# Patient Record
Sex: FEMALE | Race: WHITE | Employment: OTHER | URBAN - METROPOLITAN AREA
[De-identification: names, ages, dates, MRNs, and addresses within clinical notes are randomized per-mention and may not be internally consistent; named-entity substitution may affect disease eponyms.]

---

## 2017-04-26 ENCOUNTER — OFFICE VISIT (OUTPATIENT)
Dept: FAMILY MEDICINE CLINIC | Age: 27
End: 2017-04-26
Payer: MEDICAID

## 2017-04-26 VITALS
BODY MASS INDEX: 48.82 KG/M2 | HEIGHT: 65 IN | WEIGHT: 293 LBS | DIASTOLIC BLOOD PRESSURE: 76 MMHG | SYSTOLIC BLOOD PRESSURE: 132 MMHG | TEMPERATURE: 97.6 F | HEART RATE: 88 BPM

## 2017-04-26 DIAGNOSIS — N92.0 SPOTTING: ICD-10-CM

## 2017-04-26 DIAGNOSIS — N64.3 GALACTORRHEA: ICD-10-CM

## 2017-04-26 DIAGNOSIS — R06.00 DYSPNEA, UNSPECIFIED TYPE: Primary | ICD-10-CM

## 2017-04-26 DIAGNOSIS — E66.01 MORBID OBESITY DUE TO EXCESS CALORIES (HCC): ICD-10-CM

## 2017-04-26 LAB — HBA1C MFR BLD: 5.7 %

## 2017-04-26 PROCEDURE — 99213 OFFICE O/P EST LOW 20 MIN: CPT

## 2017-04-26 PROCEDURE — 99213 OFFICE O/P EST LOW 20 MIN: CPT | Performed by: STUDENT IN AN ORGANIZED HEALTH CARE EDUCATION/TRAINING PROGRAM

## 2017-04-26 PROCEDURE — 83036 HEMOGLOBIN GLYCOSYLATED A1C: CPT | Performed by: STUDENT IN AN ORGANIZED HEALTH CARE EDUCATION/TRAINING PROGRAM

## 2017-04-26 PROCEDURE — 81025 URINE PREGNANCY TEST: CPT | Performed by: STUDENT IN AN ORGANIZED HEALTH CARE EDUCATION/TRAINING PROGRAM

## 2017-04-26 RX ORDER — ALBUTEROL SULFATE 90 UG/1
2 AEROSOL, METERED RESPIRATORY (INHALATION) EVERY 6 HOURS PRN
Qty: 1 INHALER | Refills: 1 | Status: SHIPPED | OUTPATIENT
Start: 2017-04-26 | End: 2017-08-22 | Stop reason: SDUPTHER

## 2017-04-26 RX ORDER — PREDNISONE 20 MG/1
20 TABLET ORAL 2 TIMES DAILY
Qty: 10 TABLET | Refills: 0 | Status: SHIPPED | OUTPATIENT
Start: 2017-04-26 | End: 2017-05-01

## 2017-04-26 RX ORDER — DOXYCYCLINE HYCLATE 100 MG
100 TABLET ORAL 2 TIMES DAILY
Qty: 20 TABLET | Refills: 0 | Status: SHIPPED | OUTPATIENT
Start: 2017-04-26 | End: 2017-05-06

## 2017-04-26 ASSESSMENT — ENCOUNTER SYMPTOMS
WHEEZING: 1
CHEST TIGHTNESS: 1
DIARRHEA: 0
ABDOMINAL PAIN: 1
BLOOD IN STOOL: 0
SHORTNESS OF BREATH: 1
COUGH: 1
NAUSEA: 1
SORE THROAT: 0
CONSTIPATION: 0
BACK PAIN: 1
VOMITING: 1
CHOKING: 1
RECTAL PAIN: 0

## 2017-08-10 ENCOUNTER — OFFICE VISIT (OUTPATIENT)
Dept: OBGYN CLINIC | Age: 27
End: 2017-08-10
Payer: MEDICAID

## 2017-08-10 ENCOUNTER — HOSPITAL ENCOUNTER (OUTPATIENT)
Age: 27
Setting detail: SPECIMEN
Discharge: HOME | End: 2017-08-10
Payer: MEDICAID

## 2017-08-10 VITALS
WEIGHT: 288 LBS | BODY MASS INDEX: 47.93 KG/M2 | SYSTOLIC BLOOD PRESSURE: 119 MMHG | DIASTOLIC BLOOD PRESSURE: 81 MMHG | HEART RATE: 77 BPM | RESPIRATION RATE: 16 BRPM

## 2017-08-10 DIAGNOSIS — Z01.419 WOMEN'S ANNUAL ROUTINE GYNECOLOGICAL EXAMINATION: Primary | ICD-10-CM

## 2017-08-10 DIAGNOSIS — Z12.4 PAP SMEAR FOR CERVICAL CANCER SCREENING: ICD-10-CM

## 2017-08-10 PROCEDURE — S0612 ANNUAL GYNECOLOGICAL EXAMINA: HCPCS | Performed by: CLINICAL NURSE SPECIALIST

## 2017-08-11 LAB — CYTOLOGY REPORT: NORMAL

## 2017-08-18 ENCOUNTER — HOSPITAL ENCOUNTER (OUTPATIENT)
Age: 27
Setting detail: SPECIMEN
Discharge: HOME | End: 2017-08-18
Payer: MEDICAID

## 2017-08-18 ENCOUNTER — OFFICE VISIT (OUTPATIENT)
Dept: FAMILY MEDICINE CLINIC | Age: 27
End: 2017-08-18
Payer: MEDICAID

## 2017-08-18 VITALS
HEIGHT: 65 IN | WEIGHT: 293 LBS | DIASTOLIC BLOOD PRESSURE: 76 MMHG | BODY MASS INDEX: 48.82 KG/M2 | SYSTOLIC BLOOD PRESSURE: 118 MMHG | TEMPERATURE: 97.7 F | HEART RATE: 76 BPM

## 2017-08-18 DIAGNOSIS — Z00.00 HEALTH CARE MAINTENANCE: ICD-10-CM

## 2017-08-18 DIAGNOSIS — J45.40 MODERATE PERSISTENT ASTHMA WITHOUT COMPLICATION: ICD-10-CM

## 2017-08-18 DIAGNOSIS — F41.9 ANXIETY: Primary | ICD-10-CM

## 2017-08-18 LAB — HIV AG/AB: NONREACTIVE

## 2017-08-18 PROCEDURE — G0009 ADMIN PNEUMOCOCCAL VACCINE: HCPCS | Performed by: FAMILY MEDICINE

## 2017-08-18 PROCEDURE — 99213 OFFICE O/P EST LOW 20 MIN: CPT | Performed by: FAMILY MEDICINE

## 2017-08-18 PROCEDURE — 90732 PPSV23 VACC 2 YRS+ SUBQ/IM: CPT | Performed by: FAMILY MEDICINE

## 2017-08-18 RX ORDER — BUSPIRONE HYDROCHLORIDE 15 MG/1
15 TABLET ORAL 3 TIMES DAILY
Qty: 30 TABLET | Refills: 1 | Status: ON HOLD | OUTPATIENT
Start: 2017-08-18 | End: 2017-12-11

## 2017-08-18 RX ORDER — FLUTICASONE PROPIONATE 110 UG/1
2 AEROSOL, METERED RESPIRATORY (INHALATION) 2 TIMES DAILY
Qty: 1 INHALER | Refills: 1 | Status: ON HOLD | OUTPATIENT
Start: 2017-08-18 | End: 2017-12-03

## 2017-08-18 RX ORDER — HYDROXYZINE HYDROCHLORIDE 25 MG/1
25 TABLET, FILM COATED ORAL 3 TIMES DAILY PRN
Status: ON HOLD | COMMUNITY
End: 2017-12-11

## 2017-08-18 RX ORDER — HYDROXYZINE HYDROCHLORIDE 25 MG/1
25 TABLET, FILM COATED ORAL 3 TIMES DAILY PRN
Status: CANCELLED | OUTPATIENT
Start: 2017-08-18

## 2017-08-18 ASSESSMENT — ENCOUNTER SYMPTOMS
ABDOMINAL PAIN: 0
SHORTNESS OF BREATH: 1
VOMITING: 0
WHEEZING: 0
COUGH: 0
NAUSEA: 0

## 2017-08-22 ENCOUNTER — TELEPHONE (OUTPATIENT)
Dept: ADMINISTRATIVE | Age: 27
End: 2017-08-22

## 2017-08-22 DIAGNOSIS — R06.00 DYSPNEA, UNSPECIFIED TYPE: ICD-10-CM

## 2017-08-22 RX ORDER — MONTELUKAST SODIUM 10 MG/1
10 TABLET ORAL NIGHTLY
Qty: 30 TABLET | Refills: 12 | Status: CANCELLED | OUTPATIENT
Start: 2017-08-22

## 2017-08-22 RX ORDER — CETIRIZINE HYDROCHLORIDE 10 MG/1
10 TABLET ORAL DAILY
Qty: 30 TABLET | Refills: 0 | Status: ON HOLD | OUTPATIENT
Start: 2017-08-22 | End: 2020-08-13

## 2017-08-22 RX ORDER — FLUOXETINE HYDROCHLORIDE 20 MG/1
CAPSULE ORAL
Qty: 30 CAPSULE | Refills: 11 | Status: CANCELLED | OUTPATIENT
Start: 2017-08-22

## 2017-08-22 RX ORDER — ALBUTEROL SULFATE 90 UG/1
2 AEROSOL, METERED RESPIRATORY (INHALATION) EVERY 6 HOURS PRN
Qty: 1 INHALER | Refills: 1 | Status: CANCELLED | OUTPATIENT
Start: 2017-08-22

## 2017-08-22 RX ORDER — FLUOXETINE HYDROCHLORIDE 20 MG/1
CAPSULE ORAL
Qty: 30 CAPSULE | Refills: 11 | Status: ON HOLD | OUTPATIENT
Start: 2017-08-22 | End: 2017-12-11 | Stop reason: HOSPADM

## 2017-08-22 RX ORDER — CETIRIZINE HYDROCHLORIDE 10 MG/1
10 TABLET ORAL DAILY
Qty: 30 TABLET | Refills: 0 | Status: CANCELLED | OUTPATIENT
Start: 2017-08-22

## 2017-08-22 RX ORDER — MELOXICAM 15 MG/1
TABLET ORAL
Qty: 30 TABLET | Refills: 11 | Status: CANCELLED | OUTPATIENT
Start: 2017-08-22

## 2017-08-22 RX ORDER — FLUTICASONE PROPIONATE 50 MCG
SPRAY, SUSPENSION (ML) NASAL
Qty: 16 G | Refills: 11 | Status: ON HOLD
Start: 2017-08-22 | End: 2020-08-13

## 2017-08-22 RX ORDER — GUAIFENESIN 600 MG/1
TABLET, EXTENDED RELEASE ORAL
Qty: 28 TABLET | Refills: 10 | Status: CANCELLED | OUTPATIENT
Start: 2017-08-22

## 2017-08-22 RX ORDER — GUAIFENESIN 600 MG/1
TABLET, EXTENDED RELEASE ORAL
Qty: 28 TABLET | Refills: 10 | Status: ON HOLD | OUTPATIENT
Start: 2017-08-22 | End: 2017-12-11 | Stop reason: HOSPADM

## 2017-08-22 RX ORDER — MELOXICAM 15 MG/1
TABLET ORAL
Qty: 30 TABLET | Refills: 11 | Status: ON HOLD | OUTPATIENT
Start: 2017-08-22 | End: 2017-12-03

## 2017-08-22 RX ORDER — FLUTICASONE PROPIONATE 50 MCG
SPRAY, SUSPENSION (ML) NASAL
Qty: 16 G | Refills: 11 | Status: CANCELLED | OUTPATIENT
Start: 2017-08-22

## 2017-08-22 RX ORDER — IBUPROFEN 800 MG/1
TABLET ORAL
Qty: 30 TABLET | Refills: 10 | Status: CANCELLED | OUTPATIENT
Start: 2017-08-22

## 2017-08-22 RX ORDER — MONTELUKAST SODIUM 10 MG/1
10 TABLET ORAL NIGHTLY
Qty: 30 TABLET | Refills: 12 | Status: ON HOLD | OUTPATIENT
Start: 2017-08-22 | End: 2020-08-13

## 2017-08-22 RX ORDER — ALBUTEROL SULFATE 90 UG/1
2 AEROSOL, METERED RESPIRATORY (INHALATION) EVERY 6 HOURS PRN
Qty: 1 INHALER | Refills: 1 | Status: ON HOLD | OUTPATIENT
Start: 2017-08-22 | End: 2017-12-03

## 2017-08-22 RX ORDER — IBUPROFEN 800 MG/1
TABLET ORAL
Qty: 30 TABLET | Refills: 10 | Status: ON HOLD | OUTPATIENT
Start: 2017-08-22 | End: 2017-12-03 | Stop reason: HOSPADM

## 2017-08-22 RX ORDER — LORATADINE 10 MG/1
TABLET ORAL
Qty: 30 TABLET | Refills: 11 | Status: ON HOLD | OUTPATIENT
Start: 2017-08-22 | End: 2017-12-11 | Stop reason: HOSPADM

## 2017-11-29 ENCOUNTER — APPOINTMENT (OUTPATIENT)
Dept: ULTRASOUND IMAGING | Age: 27
DRG: 244 | End: 2017-11-29
Payer: MEDICAID

## 2017-11-29 ENCOUNTER — APPOINTMENT (OUTPATIENT)
Dept: GENERAL RADIOLOGY | Age: 27
DRG: 244 | End: 2017-11-29
Payer: MEDICAID

## 2017-11-29 ENCOUNTER — APPOINTMENT (OUTPATIENT)
Dept: NUCLEAR MEDICINE | Age: 27
DRG: 244 | End: 2017-11-29
Payer: MEDICAID

## 2017-11-29 ENCOUNTER — HOSPITAL ENCOUNTER (INPATIENT)
Age: 27
LOS: 3 days | Discharge: HOME | DRG: 244 | End: 2017-12-03
Attending: EMERGENCY MEDICINE | Admitting: INTERNAL MEDICINE
Payer: MEDICAID

## 2017-11-29 DIAGNOSIS — J45.40 MODERATE PERSISTENT ASTHMA WITHOUT COMPLICATION: ICD-10-CM

## 2017-11-29 DIAGNOSIS — R06.00 DYSPNEA, UNSPECIFIED TYPE: ICD-10-CM

## 2017-11-29 DIAGNOSIS — R10.11 RUQ ABDOMINAL PAIN: Primary | ICD-10-CM

## 2017-11-29 DIAGNOSIS — D72.829 LEUKOCYTOSIS, UNSPECIFIED TYPE: ICD-10-CM

## 2017-11-29 LAB
-: ABNORMAL
-: NORMAL
ABSOLUTE EOS #: 0.13 K/UL (ref 0–0.44)
ABSOLUTE IMMATURE GRANULOCYTE: 0.09 K/UL (ref 0–0.3)
ABSOLUTE LYMPH #: 1.79 K/UL (ref 1.1–3.7)
ABSOLUTE MONO #: 1.19 K/UL (ref 0.1–1.2)
ALBUMIN SERPL-MCNC: 3.8 G/DL (ref 3.5–5.2)
ALBUMIN/GLOBULIN RATIO: 1 (ref 1–2.5)
ALP BLD-CCNC: 99 U/L (ref 35–104)
ALT SERPL-CCNC: 22 U/L (ref 5–33)
AMORPHOUS: ABNORMAL
AMORPHOUS: NORMAL
ANION GAP SERPL CALCULATED.3IONS-SCNC: 11 MMOL/L (ref 9–17)
AST SERPL-CCNC: 20 U/L
BACTERIA: ABNORMAL
BACTERIA: NORMAL
BASOPHILS # BLD: 0 % (ref 0–2)
BASOPHILS ABSOLUTE: 0.05 K/UL (ref 0–0.2)
BILIRUB SERPL-MCNC: 0.91 MG/DL (ref 0.3–1.2)
BILIRUBIN DIRECT: 0.2 MG/DL
BILIRUBIN URINE: NEGATIVE
BILIRUBIN URINE: NEGATIVE
BILIRUBIN, INDIRECT: 0.71 MG/DL (ref 0–1)
BUN BLDV-MCNC: 12 MG/DL (ref 6–20)
BUN/CREAT BLD: ABNORMAL (ref 9–20)
CALCIUM SERPL-MCNC: 9 MG/DL (ref 8.6–10.4)
CASTS UA: ABNORMAL /LPF (ref 0–2)
CASTS UA: NORMAL /LPF (ref 0–8)
CHLORIDE BLD-SCNC: 97 MMOL/L (ref 98–107)
CO2: 25 MMOL/L (ref 20–31)
COLOR: YELLOW
COLOR: YELLOW
COMMENT UA: ABNORMAL
CREAT SERPL-MCNC: 0.75 MG/DL (ref 0.5–0.9)
CRYSTALS, UA: ABNORMAL /HPF
CRYSTALS, UA: NORMAL /HPF
DIFFERENTIAL TYPE: ABNORMAL
EKG ATRIAL RATE: 85 BPM
EKG P AXIS: 31 DEGREES
EKG P-R INTERVAL: 142 MS
EKG Q-T INTERVAL: 374 MS
EKG QRS DURATION: 84 MS
EKG QTC CALCULATION (BAZETT): 445 MS
EKG R AXIS: 11 DEGREES
EKG T AXIS: 17 DEGREES
EKG VENTRICULAR RATE: 85 BPM
EOSINOPHILS RELATIVE PERCENT: 1 % (ref 1–4)
EPITHELIAL CELLS UA: ABNORMAL /HPF (ref 0–5)
EPITHELIAL CELLS UA: NORMAL /HPF (ref 0–5)
GFR AFRICAN AMERICAN: >60 ML/MIN
GFR NON-AFRICAN AMERICAN: >60 ML/MIN
GFR SERPL CREATININE-BSD FRML MDRD: ABNORMAL ML/MIN/{1.73_M2}
GFR SERPL CREATININE-BSD FRML MDRD: ABNORMAL ML/MIN/{1.73_M2}
GLOBULIN: NORMAL G/DL (ref 1.5–3.8)
GLUCOSE BLD-MCNC: 102 MG/DL (ref 65–105)
GLUCOSE BLD-MCNC: 121 MG/DL (ref 70–99)
GLUCOSE BLD-MCNC: 131 MG/DL (ref 65–105)
GLUCOSE BLD-MCNC: 166 MG/DL (ref 65–105)
GLUCOSE URINE: NEGATIVE
GLUCOSE URINE: NEGATIVE
HCG QUALITATIVE: NEGATIVE
HCT VFR BLD CALC: 38.2 % (ref 36.3–47.1)
HEMOGLOBIN: 11.9 G/DL (ref 11.9–15.1)
IMMATURE GRANULOCYTES: 1 %
KETONES, URINE: ABNORMAL
KETONES, URINE: NEGATIVE
LEUKOCYTE ESTERASE, URINE: NEGATIVE
LEUKOCYTE ESTERASE, URINE: NEGATIVE
LIPASE: 25 U/L (ref 13–60)
LYMPHOCYTES # BLD: 10 % (ref 24–43)
MCH RBC QN AUTO: 28.4 PG (ref 25.2–33.5)
MCHC RBC AUTO-ENTMCNC: 31.2 G/DL (ref 28.4–34.8)
MCV RBC AUTO: 91.2 FL (ref 82.6–102.9)
MONOCYTES # BLD: 7 % (ref 3–12)
MUCUS: ABNORMAL
MUCUS: NORMAL
NITRITE, URINE: NEGATIVE
NITRITE, URINE: NEGATIVE
OTHER OBSERVATIONS UA: ABNORMAL
OTHER OBSERVATIONS UA: NORMAL
PDW BLD-RTO: 13.2 % (ref 11.8–14.4)
PH UA: 6 (ref 5–8)
PH UA: 6 (ref 5–8)
PLATELET # BLD: 434 K/UL (ref 138–453)
PLATELET ESTIMATE: ABNORMAL
PMV BLD AUTO: 9.6 FL (ref 8.1–13.5)
POTASSIUM SERPL-SCNC: 3.9 MMOL/L (ref 3.7–5.3)
PROTEIN UA: NEGATIVE
PROTEIN UA: NEGATIVE
RBC # BLD: 4.19 M/UL (ref 3.95–5.11)
RBC # BLD: ABNORMAL 10*6/UL
RBC UA: ABNORMAL /HPF (ref 0–2)
RBC UA: NORMAL /HPF (ref 0–4)
RENAL EPITHELIAL, UA: ABNORMAL /HPF
RENAL EPITHELIAL, UA: NORMAL /HPF
SEG NEUTROPHILS: 81 % (ref 36–65)
SEGMENTED NEUTROPHILS ABSOLUTE COUNT: 14.35 K/UL (ref 1.5–8.1)
SODIUM BLD-SCNC: 133 MMOL/L (ref 135–144)
SPECIFIC GRAVITY UA: 1.02 (ref 1–1.03)
SPECIFIC GRAVITY UA: 1.02 (ref 1–1.03)
TOTAL PROTEIN: 7.7 G/DL (ref 6.4–8.3)
TRICHOMONAS: ABNORMAL
TRICHOMONAS: NORMAL
TURBIDITY: ABNORMAL
TURBIDITY: CLEAR
URINE HGB: NEGATIVE
URINE HGB: NEGATIVE
UROBILINOGEN, URINE: NORMAL
UROBILINOGEN, URINE: NORMAL
WBC # BLD: 17.6 K/UL (ref 3.5–11.3)
WBC # BLD: ABNORMAL 10*3/UL
WBC UA: ABNORMAL /HPF (ref 0–5)
WBC UA: NORMAL /HPF (ref 0–5)
YEAST: ABNORMAL
YEAST: NORMAL

## 2017-11-29 PROCEDURE — 78227 HEPATOBIL SYST IMAGE W/DRUG: CPT

## 2017-11-29 PROCEDURE — 96375 TX/PRO/DX INJ NEW DRUG ADDON: CPT

## 2017-11-29 PROCEDURE — 71020 XR CHEST STANDARD TWO VW: CPT

## 2017-11-29 PROCEDURE — 99222 1ST HOSP IP/OBS MODERATE 55: CPT | Performed by: SURGERY

## 2017-11-29 PROCEDURE — G0378 HOSPITAL OBSERVATION PER HR: HCPCS

## 2017-11-29 PROCEDURE — 81001 URINALYSIS AUTO W/SCOPE: CPT

## 2017-11-29 PROCEDURE — 83690 ASSAY OF LIPASE: CPT

## 2017-11-29 PROCEDURE — 96372 THER/PROPH/DIAG INJ SC/IM: CPT

## 2017-11-29 PROCEDURE — 82947 ASSAY GLUCOSE BLOOD QUANT: CPT

## 2017-11-29 PROCEDURE — A9537 TC99M MEBROFENIN: HCPCS | Performed by: SURGERY

## 2017-11-29 PROCEDURE — 84703 CHORIONIC GONADOTROPIN ASSAY: CPT

## 2017-11-29 PROCEDURE — 6360000004 HC RX CONTRAST MEDICATION: Performed by: EMERGENCY MEDICINE

## 2017-11-29 PROCEDURE — 80076 HEPATIC FUNCTION PANEL: CPT

## 2017-11-29 PROCEDURE — 96374 THER/PROPH/DIAG INJ IV PUSH: CPT

## 2017-11-29 PROCEDURE — 6370000000 HC RX 637 (ALT 250 FOR IP): Performed by: EMERGENCY MEDICINE

## 2017-11-29 PROCEDURE — 99285 EMERGENCY DEPT VISIT HI MDM: CPT

## 2017-11-29 PROCEDURE — 93005 ELECTROCARDIOGRAM TRACING: CPT

## 2017-11-29 PROCEDURE — 83036 HEMOGLOBIN GLYCOSYLATED A1C: CPT

## 2017-11-29 PROCEDURE — 87086 URINE CULTURE/COLONY COUNT: CPT

## 2017-11-29 PROCEDURE — 6360000002 HC RX W HCPCS: Performed by: EMERGENCY MEDICINE

## 2017-11-29 PROCEDURE — 76705 ECHO EXAM OF ABDOMEN: CPT

## 2017-11-29 PROCEDURE — 85025 COMPLETE CBC W/AUTO DIFF WBC: CPT

## 2017-11-29 PROCEDURE — 6360000002 HC RX W HCPCS: Performed by: SURGERY

## 2017-11-29 PROCEDURE — 2580000003 HC RX 258: Performed by: EMERGENCY MEDICINE

## 2017-11-29 PROCEDURE — 3430000000 HC RX DIAGNOSTIC RADIOPHARMACEUTICAL: Performed by: SURGERY

## 2017-11-29 PROCEDURE — 94640 AIRWAY INHALATION TREATMENT: CPT

## 2017-11-29 PROCEDURE — 94762 N-INVAS EAR/PLS OXIMTRY CONT: CPT

## 2017-11-29 PROCEDURE — 80048 BASIC METABOLIC PNL TOTAL CA: CPT

## 2017-11-29 RX ORDER — HYDROXYZINE HYDROCHLORIDE 25 MG/1
25 TABLET, FILM COATED ORAL 3 TIMES DAILY PRN
Status: DISCONTINUED | OUTPATIENT
Start: 2017-11-29 | End: 2017-12-03 | Stop reason: HOSPADM

## 2017-11-29 RX ORDER — ONDANSETRON 2 MG/ML
4 INJECTION INTRAMUSCULAR; INTRAVENOUS ONCE
Status: COMPLETED | OUTPATIENT
Start: 2017-11-29 | End: 2017-11-29

## 2017-11-29 RX ORDER — CETIRIZINE HYDROCHLORIDE 10 MG/1
10 TABLET ORAL DAILY
Status: DISCONTINUED | OUTPATIENT
Start: 2017-11-29 | End: 2017-12-03 | Stop reason: HOSPADM

## 2017-11-29 RX ORDER — FLUTICASONE PROPIONATE 50 MCG
1 SPRAY, SUSPENSION (ML) NASAL DAILY
Status: DISCONTINUED | OUTPATIENT
Start: 2017-11-29 | End: 2017-12-03 | Stop reason: HOSPADM

## 2017-11-29 RX ORDER — DICYCLOMINE HYDROCHLORIDE 10 MG/ML
20 INJECTION INTRAMUSCULAR ONCE
Status: COMPLETED | OUTPATIENT
Start: 2017-11-29 | End: 2017-11-29

## 2017-11-29 RX ORDER — 0.9 % SODIUM CHLORIDE 0.9 %
1000 INTRAVENOUS SOLUTION INTRAVENOUS ONCE
Status: COMPLETED | OUTPATIENT
Start: 2017-11-29 | End: 2017-11-29

## 2017-11-29 RX ORDER — ACETAMINOPHEN 325 MG/1
650 TABLET ORAL EVERY 4 HOURS PRN
Status: DISCONTINUED | OUTPATIENT
Start: 2017-11-29 | End: 2017-12-03 | Stop reason: HOSPADM

## 2017-11-29 RX ORDER — SODIUM CHLORIDE 0.9 % (FLUSH) 0.9 %
10 SYRINGE (ML) INJECTION PRN
Status: DISCONTINUED | OUTPATIENT
Start: 2017-11-29 | End: 2017-12-03 | Stop reason: HOSPADM

## 2017-11-29 RX ORDER — GUAIFENESIN 600 MG/1
600 TABLET, EXTENDED RELEASE ORAL 2 TIMES DAILY
Status: DISCONTINUED | OUTPATIENT
Start: 2017-11-29 | End: 2017-12-03 | Stop reason: HOSPADM

## 2017-11-29 RX ORDER — ONDANSETRON 2 MG/ML
4 INJECTION INTRAMUSCULAR; INTRAVENOUS EVERY 6 HOURS PRN
Status: DISCONTINUED | OUTPATIENT
Start: 2017-11-29 | End: 2017-12-01

## 2017-11-29 RX ORDER — FLUOXETINE HYDROCHLORIDE 20 MG/1
20 CAPSULE ORAL DAILY
Status: DISCONTINUED | OUTPATIENT
Start: 2017-11-29 | End: 2017-12-03 | Stop reason: HOSPADM

## 2017-11-29 RX ORDER — ALBUTEROL SULFATE 90 UG/1
2 AEROSOL, METERED RESPIRATORY (INHALATION) EVERY 6 HOURS PRN
Status: DISCONTINUED | OUTPATIENT
Start: 2017-11-29 | End: 2017-12-03 | Stop reason: HOSPADM

## 2017-11-29 RX ORDER — MONTELUKAST SODIUM 10 MG/1
10 TABLET ORAL NIGHTLY
Status: DISCONTINUED | OUTPATIENT
Start: 2017-11-29 | End: 2017-12-03 | Stop reason: HOSPADM

## 2017-11-29 RX ORDER — BUSPIRONE HYDROCHLORIDE 15 MG/1
15 TABLET ORAL 3 TIMES DAILY
Status: DISCONTINUED | OUTPATIENT
Start: 2017-11-29 | End: 2017-12-03 | Stop reason: HOSPADM

## 2017-11-29 RX ORDER — CETIRIZINE HYDROCHLORIDE 10 MG/1
10 TABLET ORAL DAILY
Status: DISCONTINUED | OUTPATIENT
Start: 2017-11-29 | End: 2017-11-29 | Stop reason: SDUPTHER

## 2017-11-29 RX ORDER — SODIUM CHLORIDE 9 MG/ML
INJECTION, SOLUTION INTRAVENOUS CONTINUOUS
Status: DISCONTINUED | OUTPATIENT
Start: 2017-11-29 | End: 2017-12-02

## 2017-11-29 RX ORDER — SODIUM CHLORIDE 0.9 % (FLUSH) 0.9 %
10 SYRINGE (ML) INJECTION EVERY 12 HOURS SCHEDULED
Status: DISCONTINUED | OUTPATIENT
Start: 2017-11-29 | End: 2017-12-03 | Stop reason: HOSPADM

## 2017-11-29 RX ADMIN — FLUOXETINE HYDROCHLORIDE 20 MG: 20 CAPSULE ORAL at 08:28

## 2017-11-29 RX ADMIN — SODIUM CHLORIDE: 9 INJECTION, SOLUTION INTRAVENOUS at 18:19

## 2017-11-29 RX ADMIN — Medication 10 ML: at 21:08

## 2017-11-29 RX ADMIN — HYDROMORPHONE HYDROCHLORIDE 1 MG: 1 INJECTION, SOLUTION INTRAMUSCULAR; INTRAVENOUS; SUBCUTANEOUS at 18:39

## 2017-11-29 RX ADMIN — SODIUM CHLORIDE 1000 ML: 9 INJECTION, SOLUTION INTRAVENOUS at 04:58

## 2017-11-29 RX ADMIN — ONDANSETRON 4 MG: 2 INJECTION INTRAMUSCULAR; INTRAVENOUS at 04:58

## 2017-11-29 RX ADMIN — BUSPIRONE HYDROCHLORIDE 15 MG: 15 TABLET ORAL at 08:28

## 2017-11-29 RX ADMIN — BUSPIRONE HYDROCHLORIDE 15 MG: 15 TABLET ORAL at 21:04

## 2017-11-29 RX ADMIN — BECLOMETHASONE DIPROPIONATE 1 PUFF: 40 AEROSOL, METERED RESPIRATORY (INHALATION) at 20:57

## 2017-11-29 RX ADMIN — Medication 3 MILLICURIE: at 13:00

## 2017-11-29 RX ADMIN — BUSPIRONE HYDROCHLORIDE 15 MG: 15 TABLET ORAL at 18:19

## 2017-11-29 RX ADMIN — BECLOMETHASONE DIPROPIONATE 1 PUFF: 40 AEROSOL, METERED RESPIRATORY (INHALATION) at 08:51

## 2017-11-29 RX ADMIN — CETIRIZINE HYDROCHLORIDE 10 MG: 10 TABLET ORAL at 08:28

## 2017-11-29 RX ADMIN — SODIUM CHLORIDE: 9 INJECTION, SOLUTION INTRAVENOUS at 23:24

## 2017-11-29 RX ADMIN — GUAIFENESIN 600 MG: 600 TABLET, EXTENDED RELEASE ORAL at 21:05

## 2017-11-29 RX ADMIN — IOHEXOL 50 ML: 240 INJECTION, SOLUTION INTRATHECAL; INTRAVASCULAR; INTRAVENOUS; ORAL at 23:51

## 2017-11-29 RX ADMIN — ACETAMINOPHEN 650 MG: 325 TABLET ORAL at 18:55

## 2017-11-29 RX ADMIN — DICYCLOMINE HYDROCHLORIDE 20 MG: 20 INJECTION, SOLUTION INTRAMUSCULAR at 04:57

## 2017-11-29 RX ADMIN — SODIUM CHLORIDE: 9 INJECTION, SOLUTION INTRAVENOUS at 06:06

## 2017-11-29 RX ADMIN — GUAIFENESIN 600 MG: 600 TABLET, EXTENDED RELEASE ORAL at 08:28

## 2017-11-29 RX ADMIN — FLUTICASONE PROPIONATE 1 SPRAY: 50 SPRAY, METERED NASAL at 08:27

## 2017-11-29 ASSESSMENT — PAIN DESCRIPTION - FREQUENCY: FREQUENCY: CONTINUOUS

## 2017-11-29 ASSESSMENT — ENCOUNTER SYMPTOMS
NAUSEA: 1
DIARRHEA: 0
SORE THROAT: 0
VOMITING: 1
EYE PAIN: 0
EYE DISCHARGE: 0
ABDOMINAL PAIN: 1
COUGH: 0
SHORTNESS OF BREATH: 1

## 2017-11-29 ASSESSMENT — PAIN DESCRIPTION - LOCATION: LOCATION: CHEST

## 2017-11-29 ASSESSMENT — PAIN DESCRIPTION - ORIENTATION: ORIENTATION: MID

## 2017-11-29 ASSESSMENT — PAIN DESCRIPTION - ONSET: ONSET: ON-GOING

## 2017-11-29 ASSESSMENT — PAIN SCALES - GENERAL
PAINLEVEL_OUTOF10: 5
PAINLEVEL_OUTOF10: 4
PAINLEVEL_OUTOF10: 10

## 2017-11-29 ASSESSMENT — PAIN DESCRIPTION - DESCRIPTORS: DESCRIPTORS: ACHING

## 2017-11-29 ASSESSMENT — PAIN DESCRIPTION - PAIN TYPE: TYPE: ACUTE PAIN

## 2017-11-29 NOTE — PROGRESS NOTES
Smoking Cessation - topics covered   []  Health Risks  []  Benefits of Quitting   []  Smoking Cessation  []  Patient has no history of tobacco use  [x]  Patient is former smoker. [x]  No need for tobacco cessation education. []  Booklet given  []  Patient verbalizes understanding. []  Patient denies need for tobacco cessation education.   Marie Mora  8:24 AM

## 2017-11-29 NOTE — PROGRESS NOTES
[address] [facility]  CDU / OBSERVATION eNCOUnter  Attending NOte       I performed a history and physical examination of the patient and discussed management with the resident. I reviewed the residents note and agree with the documented findings and plan of care. Any areas of disagreement are noted on the chart. I was personally present for the key portions of any procedures. I have documented in the chart those procedures where I was not present during the key portions. I have reviewed the nurses notes. I agree with the chief complaint, past medical history, past surgical history, allergies, medications, social and family history as documented unless otherwise noted below. The Family history, social history, and ROS are effectively unchanged since admission unless noted elsewhere in the chart. Patient done with Monica scan. Some decrease in ejection fraction showing biliary dyskinesia versus chronic cholecystitis. Patient having difficulty handling oral.  Will notify surgery of result. Patient will need to stay as she is unable to handle oral and she requires further pain control.     Loyda Mojica MD  Attending Emergency  Physician

## 2017-11-29 NOTE — H&P
[address] [facility]  CDU / OBSERVATION eNCOUnter  Resident Note     Pt Name: Araceli Hernandez  MRN: [id number]  [place] 1990  Date of evaluation: 11/29/17  Patient's PCP is :  Ny Hoskins MD    [address] [place] [address]       Chief Complaint   Patient presents with    Chest Pain    Abdominal Pain         HISTORY OF PRESENT ILLNESS    Araceli Hernandez is a 32 y.o. female who presents With acute on chronic right upper quadrant abdominal pain. Patient has a history of gallstones. Patient states his pain started 1 day ago and has been associated with nausea, vomiting. Patient does state right upper quadrant abdominal pain does radiate into the midsternal area. Patient denies shortness of breath, dysuria, hematuria, headaches, vision changes. Patient is complaining of left ear pain. Initial workup showed normal LFTs, normal lipase, contaminated urinalysis, with an elevated WBC of 17.6. Patient was admitted to observation for formal gallbladder ultrasound after a bedside ultrasound demonstrated enlarged gallbladder, thickened wall and large stone. On August 4 patient stating her nausea is under control following receiving antiemetic in ED.     Location/Symptom: Right upper quadrant abdominal pain  Timing/Onset: Recurrent, with a history  Provocation: None  Quality: Stabbing  Radiation: Radiation into the chest  Severity: Moderate  Timing/Duration: Constant since onset    Modifying Factors: None    REVIEW OF SYSTEMS       General ROS - No fevers, No malaise  Ophthalmic ROS - No discharge, No changes in vision  ENT ROS -  No sore throat, No rhinorrhea,Left ear pain   Respiratory ROS - positive shortness of breath, no cough, no  wheezing  Cardiovascular ROS - positive chest pain, no dyspnea on exertion  Gastrointestinal ROS - positive right upper quadrant abdominal pain, positive nausea or vomiting,   Genito-Urinary ROS - No dysuria, trouble voiding, or hematuria  Musculoskeletal ROS - No myalgias, No arthalgias  Neurological ROS - No headache, no dizziness/lightheadedness, No focal weakness, no loss of sensation  Dermatological ROS - No lesions, No rash     (PQRS) Advance directives on face sheet per hospital policy. No change unless specifically mentioned in chart    PAST MEDICAL HISTORY    has a past medical history of Asthma; Back pain; Bipolar 1 disorder ([address] Depression; Dizziness; Fatty liver disease, nonalcoholic; Fatty liver disease, nonalcoholic; GERD (gastroesophageal reflux disease); and Obesity. I have reviewed the past medical history with the patient and it is pertinent to this complaint. History of gallstones, presenting with right upper quadrant abdominal pain. SURGICAL HISTORY      has a past surgical history that includes  section and LEEP. I have reviewed and agree with Surgical History entered    CURRENT MEDICATIONS       hydrOXYzine (ATARAX) tablet 25 mg TID PRN   busPIRone (BUSPAR) tablet 15 mg TID   beclomethasone (QVAR) 40 MCG/ACT inhaler 1 puff BID   cetirizine (ZYRTEC) tablet 10 mg Daily   FLUoxetine (PROZAC) capsule 20 mg Daily   albuterol sulfate  (90 Base) MCG/ACT inhaler 2 puff Q6H PRN   fluticasone (FLONASE) 50 MCG/ACT nasal spray 1 spray Daily   guaiFENesin (MUCINEX) extended release tablet 600 mg BID   metFORMIN (GLUCOPHAGE) tablet 500 mg Daily with breakfast   montelukast (SINGULAIR) tablet 10 mg Nightly   sodium chloride flush 0.9 % injection 10 mL 2 times per day   sodium chloride flush 0.9 % injection 10 mL PRN   acetaminophen (TYLENOL) tablet 650 mg Q4H PRN   magnesium hydroxide (MILK OF MAGNESIA) 400 MG/5ML suspension 30 mL Daily PRN   ondansetron (ZOFRAN) injection 4 mg Q6H PRN   0.9 % sodium chloride infusion Continuous       All medication charted and reviewed. ALLERGIES     is allergic to pcn [penicillins]; amoxicillin; and seasonal.      FAMILY HISTORY     indicated that her mother is alive.  She indicated that her father is . She indicated that her maternal aunt is . She indicated that her paternal cousin is . family history includes Cancer in her maternal aunt and paternal cousin; Diabetes in her mother; Early Death in her father; Heart Disease in her father and mother; High Blood Pressure in her mother. The patient denies any pertinent family history. I have reviewed and agree with the family history entered. I have reviewed the Family History and it is not significant to the case    SOCIAL HISTORY      reports that she has quit smoking. Her smoking use included Cigarettes. She has a 3.50 pack-year smoking history. She has never used smokeless tobacco. She reports that she uses drugs, including Marijuana. I have reviewed and agree with all Social.  There are no concerns for substance abuse/use. PHYSICAL EXAM     INITIAL VITALS:  height is 5' 5\" (1.651 m) and weight is 294 lb (133.4 kg). Her oral temperature is 98.1 °F (36.7 °C). Her blood pressure is 136/82 and her pulse is 79. Her respiration is 16 and oxygen saturation is 100%.       CONSTITUTIONAL: AOx4, no apparent distress, appears stated age, obese    HEAD: normocephalic, atraumatic   EYES: PERRLA, EOMI    ENT: moist mucous membranes, uvula midline   NECK: supple, symmetric   BACK: symmetric   LUNGS: clear to auscultation bilaterally   CARDIOVASCULAR: regular rate and rhythm, no murmurs, rubs or gallops   ABDOMEN: soft, Positive Rutherford's, non-distended with normal active bowel sounds   NEUROLOGIC:  MAEx4, no focal sensory or motor deficits   MUSCULOSKELETAL: no clubbing, cyanosis or edema   SKIN: no rash or wounds       DIFFERENTIAL DIAGNOSIS/MDM:     Abdominal Pain:  DDX: GERD, PUD, pancreatitis, cholecystitis, GB colic, cholangitis, Jsjs-Uyeg-Lkyutl, ACS/ MI, pneumonia, SBO, DKA, AAA, mesenteric ischemia, perforated viscous, acute gastroenteritis, NSAP, pyelonephritis, kidney stone, appendicitis, hernia, D-TICS, testicular torsion, ectopic, ovarian torsion, ovarian cyst, PID, Mittelschmerz, period/ fibroid, UTI, constipation, epididymitis/ orchitis      DIAGNOSTIC RESULTS     EKG: All EKG's are interpreted by the Observation Physician who either signs or Co-signs this chart in the absence of a cardiologist.    EKG Interpretation    EKG Interpretation    Interpreted by me    Rhythm: normal sinus   Rate: normal  Axis: normal  Ectopy: none  Conduction: normal  ST Segments: no acute change  T Waves: no acute change  Q Waves: none    Clinical Impression: no acute changes and normal EKG    RADIOLOGY:   I directly visualized the following  images and reviewed the radiologist interpretations:    Xr Chest Standard (2 Vw)    Result Date: 11/29/2017  EXAMINATION: TWO VIEWS OF THE CHEST 11/29/2017 4:03 am COMPARISON: December 22, 2010 HISTORY: ORDERING SYSTEM PROVIDED HISTORY: CP TECHNOLOGIST PROVIDED HISTORY: Reason for exam:->CP FINDINGS: The mediastinal and cardiac contours are normal. No lobar lung consolidation, pleural effusion or pneumothorax. The bones are stable. No radiographic evidence of acute cardiopulmonary disease. LABS:  I have reviewed and interpreted all available lab results.   Labs Reviewed   CBC WITH AUTO DIFFERENTIAL - Abnormal; Notable for the following:        Result Value    WBC 17.6 (*)     Seg Neutrophils 81 (*)     Lymphocytes 10 (*)     Immature Granulocytes 1 (*)     Segs Absolute 14.35 (*)     All other components within normal limits   BASIC METABOLIC PANEL - Abnormal; Notable for the following:     Glucose 121 (*)     Sodium 133 (*)     Chloride 97 (*)     All other components within normal limits   UA W/REFLEX CULTURE - Abnormal; Notable for the following:     Turbidity UA CLOUDY (*)     Ketones, Urine TRACE (*)     All other components within normal limits   MICROSCOPIC URINALYSIS - Abnormal; Notable for the following:     Bacteria, UA MANY (*)     All other components within normal limits   LIPASE   HEPATIC FUNCTION PANEL               CDU IMPRESSION / PLAN      Rey Farley is a 32 y.o. female who presents with    1. Acute on chronic right upper quadrant abdominal pain, with radiation chest associated with nausea and vomiting. Patient with history of gallstones  -Bedside ultrasound in the ED demonstrated large gallbladder, thickened wall and largest stone.  -Med office for formal call for ultrasound, pending result planned for surgery consultation.  -Patient is afebrile, WBC 17.6  -Normal lipase, normal LFTs, UA appears to be contaminant. -IV fluids, antiemetics    · None  · Further workup and evaluation   · Follow up recommendations     · Continue home meds, pain control   · Monitor vitals, labs, imaging     DISPO: pending consults and clinical improvement     CONSULTS:    None    PROCEDURES:  Not indicated       PATIENT REFERRED TO:    Collin Burch MD            --  Yaima Pineda DO   Emergency Medicine Resident     This dictation was generated by voice recognition computer software. Although all attempts are made to edit the dictation for accuracy, there may be errors in the transcription that are not intended.

## 2017-11-29 NOTE — ED PROVIDER NOTES
Family History   Problem Relation Age of Onset    High Blood Pressure Mother     Diabetes Mother     Heart Disease Mother     Heart Disease Father     Early Death Father     Cancer Maternal Aunt      lung    Cancer Paternal Cousin      brain       Allergies:  Pcn [penicillins]; Amoxicillin; and Seasonal    Home Medications:  Prior to Admission medications    Medication Sig Start Date End Date Taking?  Authorizing Provider   cetirizine (ZYRTEC) 10 MG tablet Take 1 tablet by mouth daily 8/22/17   Rio Gross MD   FLUoxetine (PROZAC) 20 MG capsule TAKE ONE (1) CAPSULE BY MOUTH ONCE DAILY 8/22/17   Sandra Torres MD   albuterol sulfate HFA (PROVENTIL HFA) 108 (90 Base) MCG/ACT inhaler Inhale 2 puffs into the lungs every 6 hours as needed for Wheezing 8/22/17   Rio Gross MD   fluticasone (FLONASE) 50 MCG/ACT nasal spray SPRAY 1 SPRAY IN EACH NOSTRIL ONCE DAILY 8/22/17   Rio Gross MD   guaiFENesin (MUCINEX) 600 MG extended release tablet TAKE 1 TABLET BY MOUTH 2 TIMES DAILY 8/22/17   Rio Gross MD   ibuprofen (ADVIL;MOTRIN) 800 MG tablet TAKE (1) TABLET BY MOUTH EVERY 8 HOURS AS NEEDED FOR PAIN 8/22/17   Rio Gross MD   loratadine (CLARITIN) 10 MG tablet TAKE ONE (1) TABLET BY MOUTH ONCE DAILY 8/22/17   Soila Torres MD   meloxicam (MOBIC) 15 MG tablet TAKE 1 TABLET BY MOUTH ONCE DAILY 8/22/17   Sandra Torres MD   metFORMIN (GLUCOPHAGE) 500 MG tablet TAKE 1 TABLET BY MOUTH DAILY 8/22/17   Soila Torres MD   montelukast (SINGULAIR) 10 MG tablet Take 1 tablet by mouth nightly 8/22/17   Rio Gross MD   hydrOXYzine (ATARAX) 25 MG tablet Take 25 mg by mouth 3 times daily as needed for Itching    Historical Provider, MD   busPIRone (BUSPAR) 15 MG tablet Take 1 tablet by mouth 3 times daily 8/18/17   Rio Gross MD   fluticasone (FLOVENT HFA) 110 MCG/ACT inhaler Inhale 2 puffs into the lungs 2 times daily 8/18/17   Rio Gross MD   triamcinolone (KENALOG) 0.1 % ointment APPLY TO >60 mL/min    GFR African American >60 >60 mL/min    GFR Comment          GFR Staging NOT REPORTED    LIPASE   Result Value Ref Range    Lipase 25 13 - 60 U/L   HEPATIC FUNCTION PANEL   Result Value Ref Range    Alb 3.8 3.5 - 5.2 g/dL    Alkaline Phosphatase 99 35 - 104 U/L    ALT 22 5 - 33 U/L    AST 20 <32 U/L    Total Bilirubin 0.91 0.3 - 1.2 mg/dL    Bilirubin, Direct 0.20 <0.31 mg/dL    Bilirubin, Indirect 0.71 0.00 - 1.00 mg/dL    Total Protein 7.7 6.4 - 8.3 g/dL    Globulin NOT REPORTED 1.5 - 3.8 g/dL    Albumin/Globulin Ratio 1.0 1.0 - 2.5   UA W/REFLEX CULTURE   Result Value Ref Range    Color, UA YELLOW YEL    Turbidity UA CLOUDY (A) CLEAR    Glucose, Ur NEGATIVE NEG    Bilirubin Urine NEGATIVE NEG    Ketones, Urine TRACE (A) NEG    Specific Gravity, UA 1.023 1.005 - 1.030    Urine Hgb NEGATIVE NEG    pH, UA 6.0 5.0 - 8.0    Protein, UA NEGATIVE NEG    Urobilinogen, Urine Normal NORM    Nitrite, Urine NEGATIVE NEG    Leukocyte Esterase, Urine NEGATIVE NEG    Urinalysis Comments Culture ordered based on defined criteria. Microscopic Urinalysis   Result Value Ref Range    -          WBC, UA 5 TO 10 0 - 5 /HPF    RBC, UA 2 TO 5 0 - 2 /HPF    Casts UA 0 TO 2 0 - 2 /LPF    Crystals UA NOT REPORTED NONE /HPF    Epithelial Cells UA 20 TO 50 0 - 5 /HPF    Renal Epithelial, Urine NOT REPORTED 0 /HPF    Bacteria, UA MANY (A) NONE    Mucus, UA NOT REPORTED NONE    Trichomonas, UA NOT REPORTED NONE    Amorphous, UA NOT REPORTED NONE    Other Observations UA NOT REPORTED NREQ    Yeast, UA NOT REPORTED NONE       IMPRESSION: 29-year-old female complaining of abdominal pain and chest pain, paints her abdomen, as progressed to her chest over the last day, associated nausea and vomiting, shortness of breath. Patient appears well, overall benign exam other than diffuse abdominal tenderness without guarding or rebound.   Lower patient's chest without with EKG and chest x-ray, abdominal labs, treat symptoms and

## 2017-11-29 NOTE — ED NOTES
PT arrived to ED via self with CO chest pain and abdominal pain. PT states that she has had several episodes of emesis, in the previous days. PT states that she has had abdominal pain recently. PT states that two days ago the pain \"moved\" from her stomach to her chest.  PT complains of midsternal chest pain. PT is alert and oriented, able to speak in full sentences. PT placed on monitor. Dr. William Gaviria at bedside.        Cathaleen Lesches, RN  11/29/17 [id number]

## 2017-11-29 NOTE — PROGRESS NOTES
Patient seen and examined with Dr. Sivakumar Faust. HIDA scan result reviewed. Due to patient having RLQ pain on exam and unclear source for leukocytosis, will get CT abd/pelvis with IV and PO contrast to rule out appendicitis. Further recs to follow post scan.     Mario Prescott DO, PGY-4  Pager#: [phone]  6:27 PM 11/29/2017

## 2017-11-30 ENCOUNTER — APPOINTMENT (OUTPATIENT)
Dept: CT IMAGING | Age: 27
DRG: 244 | End: 2017-11-30
Payer: MEDICAID

## 2017-11-30 PROBLEM — K80.10 CALCULUS OF GALLBLADDER WITH CHRONIC CHOLECYSTITIS WITHOUT OBSTRUCTION: Status: ACTIVE | Noted: 2017-11-30

## 2017-11-30 PROBLEM — E11.9 TYPE 2 DIABETES MELLITUS WITHOUT COMPLICATION (HCC): Status: ACTIVE | Noted: 2017-11-30

## 2017-11-30 PROBLEM — K57.80 DIVERTICULITIS OF INTESTINE WITH PERFORATION WITHOUT ABSCESS: Status: ACTIVE | Noted: 2017-11-30

## 2017-11-30 LAB
CULTURE: NORMAL
CULTURE: NORMAL
GLUCOSE BLD-MCNC: 76 MG/DL (ref 65–105)
Lab: NORMAL
SPECIMEN DESCRIPTION: NORMAL
STATUS: NORMAL

## 2017-11-30 PROCEDURE — 1200000000 HC SEMI PRIVATE

## 2017-11-30 PROCEDURE — 6360000002 HC RX W HCPCS: Performed by: INTERNAL MEDICINE

## 2017-11-30 PROCEDURE — 6360000002 HC RX W HCPCS: Performed by: EMERGENCY MEDICINE

## 2017-11-30 PROCEDURE — 6360000004 HC RX CONTRAST MEDICATION: Performed by: SURGERY

## 2017-11-30 PROCEDURE — 6360000002 HC RX W HCPCS: Performed by: SURGERY

## 2017-11-30 PROCEDURE — 6370000000 HC RX 637 (ALT 250 FOR IP): Performed by: EMERGENCY MEDICINE

## 2017-11-30 PROCEDURE — 99232 SBSQ HOSP IP/OBS MODERATE 35: CPT | Performed by: SURGERY

## 2017-11-30 PROCEDURE — 2580000003 HC RX 258: Performed by: INTERNAL MEDICINE

## 2017-11-30 PROCEDURE — 2500000003 HC RX 250 WO HCPCS: Performed by: INTERNAL MEDICINE

## 2017-11-30 PROCEDURE — 6360000002 HC RX W HCPCS: Performed by: STUDENT IN AN ORGANIZED HEALTH CARE EDUCATION/TRAINING PROGRAM

## 2017-11-30 PROCEDURE — 94640 AIRWAY INHALATION TREATMENT: CPT

## 2017-11-30 PROCEDURE — 94762 N-INVAS EAR/PLS OXIMTRY CONT: CPT

## 2017-11-30 PROCEDURE — 99223 1ST HOSP IP/OBS HIGH 75: CPT | Performed by: INTERNAL MEDICINE

## 2017-11-30 PROCEDURE — 74177 CT ABD & PELVIS W/CONTRAST: CPT

## 2017-11-30 PROCEDURE — 2500000003 HC RX 250 WO HCPCS: Performed by: STUDENT IN AN ORGANIZED HEALTH CARE EDUCATION/TRAINING PROGRAM

## 2017-11-30 PROCEDURE — 82947 ASSAY GLUCOSE BLOOD QUANT: CPT

## 2017-11-30 RX ORDER — ESOMEPRAZOLE SODIUM 40 MG/5ML
40 INJECTION INTRAVENOUS DAILY
Status: DISCONTINUED | OUTPATIENT
Start: 2017-11-30 | End: 2017-12-01

## 2017-11-30 RX ORDER — 0.9 % SODIUM CHLORIDE 0.9 %
10 VIAL (ML) INJECTION DAILY
Status: DISCONTINUED | OUTPATIENT
Start: 2017-11-30 | End: 2017-12-03 | Stop reason: HOSPADM

## 2017-11-30 RX ORDER — CIPROFLOXACIN 2 MG/ML
400 INJECTION, SOLUTION INTRAVENOUS EVERY 12 HOURS
Status: DISCONTINUED | OUTPATIENT
Start: 2017-11-30 | End: 2017-12-03

## 2017-11-30 RX ADMIN — FLUOXETINE HYDROCHLORIDE 20 MG: 20 CAPSULE ORAL at 09:06

## 2017-11-30 RX ADMIN — HYDROMORPHONE HYDROCHLORIDE 1 MG: 1 INJECTION, SOLUTION INTRAMUSCULAR; INTRAVENOUS; SUBCUTANEOUS at 01:18

## 2017-11-30 RX ADMIN — ESOMEPRAZOLE SODIUM 40 MG: 40 INJECTION, POWDER, LYOPHILIZED, FOR SOLUTION INTRAVENOUS at 15:22

## 2017-11-30 RX ADMIN — ENOXAPARIN SODIUM 30 MG: 30 INJECTION SUBCUTANEOUS at 12:29

## 2017-11-30 RX ADMIN — HYDROMORPHONE HYDROCHLORIDE 1 MG: 1 INJECTION, SOLUTION INTRAMUSCULAR; INTRAVENOUS; SUBCUTANEOUS at 09:09

## 2017-11-30 RX ADMIN — METRONIDAZOLE 500 MG: 500 INJECTION, SOLUTION INTRAVENOUS at 17:51

## 2017-11-30 RX ADMIN — SODIUM CHLORIDE: 9 INJECTION, SOLUTION INTRAVENOUS at 23:45

## 2017-11-30 RX ADMIN — BUSPIRONE HYDROCHLORIDE 15 MG: 15 TABLET ORAL at 09:05

## 2017-11-30 RX ADMIN — CIPROFLOXACIN 400 MG: 2 INJECTION, SOLUTION INTRAVENOUS at 04:19

## 2017-11-30 RX ADMIN — SODIUM CHLORIDE 10 ML: 9 INJECTION, SOLUTION INTRAMUSCULAR; INTRAVENOUS; SUBCUTANEOUS at 15:31

## 2017-11-30 RX ADMIN — BECLOMETHASONE DIPROPIONATE 1 PUFF: 40 AEROSOL, METERED RESPIRATORY (INHALATION) at 19:33

## 2017-11-30 RX ADMIN — FLUTICASONE PROPIONATE 1 SPRAY: 50 SPRAY, METERED NASAL at 09:05

## 2017-11-30 RX ADMIN — BECLOMETHASONE DIPROPIONATE 1 PUFF: 40 AEROSOL, METERED RESPIRATORY (INHALATION) at 10:31

## 2017-11-30 RX ADMIN — HYDROMORPHONE HYDROCHLORIDE 1 MG: 1 INJECTION, SOLUTION INTRAMUSCULAR; INTRAVENOUS; SUBCUTANEOUS at 22:52

## 2017-11-30 RX ADMIN — HYDROMORPHONE HYDROCHLORIDE 1 MG: 1 INJECTION, SOLUTION INTRAMUSCULAR; INTRAVENOUS; SUBCUTANEOUS at 12:32

## 2017-11-30 RX ADMIN — CETIRIZINE HYDROCHLORIDE 10 MG: 10 TABLET ORAL at 09:06

## 2017-11-30 RX ADMIN — ENOXAPARIN SODIUM 30 MG: 30 INJECTION SUBCUTANEOUS at 22:36

## 2017-11-30 RX ADMIN — CIPROFLOXACIN 400 MG: 2 INJECTION, SOLUTION INTRAVENOUS at 16:08

## 2017-11-30 RX ADMIN — METRONIDAZOLE 500 MG: 500 INJECTION, SOLUTION INTRAVENOUS at 05:31

## 2017-11-30 RX ADMIN — IOVERSOL 130 ML: 741 INJECTION INTRA-ARTERIAL; INTRAVENOUS at 02:18

## 2017-11-30 RX ADMIN — ONDANSETRON 4 MG: 2 INJECTION, SOLUTION INTRAMUSCULAR; INTRAVENOUS at 01:27

## 2017-11-30 RX ADMIN — HYDROMORPHONE HYDROCHLORIDE 1 MG: 1 INJECTION, SOLUTION INTRAMUSCULAR; INTRAVENOUS; SUBCUTANEOUS at 04:26

## 2017-11-30 RX ADMIN — METRONIDAZOLE 500 MG: 500 INJECTION, SOLUTION INTRAVENOUS at 10:20

## 2017-11-30 RX ADMIN — HYDROMORPHONE HYDROCHLORIDE 1 MG: 1 INJECTION, SOLUTION INTRAMUSCULAR; INTRAVENOUS; SUBCUTANEOUS at 18:35

## 2017-11-30 ASSESSMENT — PAIN SCALES - GENERAL
PAINLEVEL_OUTOF10: 8
PAINLEVEL_OUTOF10: 6
PAINLEVEL_OUTOF10: 8
PAINLEVEL_OUTOF10: 8
PAINLEVEL_OUTOF10: 7
PAINLEVEL_OUTOF10: 8

## 2017-11-30 ASSESSMENT — PAIN DESCRIPTION - ORIENTATION: ORIENTATION: RIGHT;MID

## 2017-11-30 ASSESSMENT — PAIN DESCRIPTION - DESCRIPTORS: DESCRIPTORS: SHARP

## 2017-11-30 ASSESSMENT — PAIN DESCRIPTION - PAIN TYPE: TYPE: ACUTE PAIN

## 2017-11-30 ASSESSMENT — PAIN DESCRIPTION - LOCATION: LOCATION: ABDOMEN

## 2017-11-30 NOTE — H&P
[facility]    HISTORY AND PHYSICAL EXAMINATION            Date:   2017  Patient name:  Lonny Garcia  Date of admission:  2017  3:43 AM  MRN:   [id number]  Account:  [de-identified]  YOB: 1990  PCP:    Jose Mckeon MD  Room:   7712/9892-14  Code Status:    Full Code    Chief Complaint:     Chief Complaint   Patient presents with    Chest Pain    Abdominal Pain       History Obtained From:     patient, electronic medical record    History of Present Illness:     Lonny Garcia is a 32 y.o. female who presents With acute on chronic right upper quadrant abdominal pain. Patient has a history of gallstones. The pain started one and is back more so on the right upper quadrant it was associated with nausea and vomiting and was radiating to the midsternal area. Her white cell count was elevated and was 17.6 the bedside gallbladder ultrasound showed enlarged gallbladder thickened wall and large stone, HIDA scan was done which is suggestive of chronic cholecystitis or biliary dyskinesia. She was evaluated by surgical team CT of the abdomen was done which was consistent with transverse colon diverticulitis with microperforation, she has been started on IV antibiotics and  is being kept nothing by mouth for now. Currently her pain is better by starting pain medications. She was also complaining of left ear pain in the ER and was told to be having ear infection.         Past Medical History:     Past Medical History:   Diagnosis Date    Asthma     Back pain 2014    Bipolar 1 disorder ([facility] [address]     Depression     Dizziness     Fatty liver disease, nonalcoholic     Fatty liver disease, nonalcoholic     GERD (gastroesophageal reflux disease)     Obesity         Past Surgical History:     Past Surgical History:   Procedure Laterality Date     SECTION      LEEP          Medications Prior to Admission:     Prior to Admission medications    Medication Sig Start Date End Date Taking? Authorizing Provider   FLUoxetine (PROZAC) 20 MG capsule TAKE ONE (1) CAPSULE BY MOUTH ONCE DAILY 8/22/17  Yes Lyiah Phillips MD   albuterol sulfate HFA (PROVENTIL HFA) 108 (90 Base) MCG/ACT inhaler Inhale 2 puffs into the lungs every 6 hours as needed for Wheezing 8/22/17  Yes Liyah Phillips MD   fluticasone (FLONASE) 50 MCG/ACT nasal spray SPRAY 1 SPRAY IN EACH NOSTRIL ONCE DAILY 8/22/17  Yes Liyah Phillips MD   metFORMIN (GLUCOPHAGE) 500 MG tablet TAKE 1 TABLET BY MOUTH DAILY 8/22/17  Yes Liyah Phillips MD   montelukast (SINGULAIR) 10 MG tablet Take 1 tablet by mouth nightly 8/22/17  Yes Liyah Phillips MD   fluticasone (FLOVENT HFA) 110 MCG/ACT inhaler Inhale 2 puffs into the lungs 2 times daily 8/18/17  Yes Liyah Phillips MD   cetirizine (ZYRTEC) 10 MG tablet Take 1 tablet by mouth daily 8/22/17   Liyah Phillips MD   guaiFENesin (MUCINEX) 600 MG extended release tablet TAKE 1 TABLET BY MOUTH 2 TIMES DAILY 8/22/17   Liyah Phillips MD   ibuprofen (ADVIL;MOTRIN) 800 MG tablet TAKE (1) TABLET BY MOUTH EVERY 8 HOURS AS NEEDED FOR PAIN 8/22/17   Liyah Phillips MD   loratadine (CLARITIN) 10 MG tablet TAKE ONE (1) TABLET BY MOUTH ONCE DAILY 8/22/17   Soila Torres MD   meloxicam (MOBIC) 15 MG tablet TAKE 1 TABLET BY MOUTH ONCE DAILY 8/22/17   Soila Torres MD   hydrOXYzine (ATARAX) 25 MG tablet Take 25 mg by mouth 3 times daily as needed for Itching    Historical Provider, MD   busPIRone (BUSPAR) 15 MG tablet Take 1 tablet by mouth 3 times daily 8/18/17   Liyah Phillips MD   triamcinolone (KENALOG) 0.1 % ointment APPLY TO AFFECTED AREA TWICE DAILY 10/21/16   Liyah Phillips MD   glucose blood VI test strips (ACURA BLOOD GLUCOSE TEST) strip 1 each by In Vitro route daily As needed.  8/29/16   Zafar Mariano MD   Respiratory Therapy Supplies (NEBULIZER/TUBING/MOUTHPIECE) KIT Use ever 4 hours prn 7/28/16 Pearl Barksdale MD   Accu-Chek Multiclix Lancets MISC USE AS INSTRUCTED. 4/22/16   Pearl Barksdale MD   Blood Glucose Monitoring Suppl [facility] BLOOD GLUCOSE METER) W/DEVICE KIT Use as instructed 10/23/15   Tiffany De Oliveira MD   [facility] [place] [facility]. Devices (WRIST BRACE) [facility] [place] Dispense 1 rt cock up wrist splint for CTS 5/15/15   Melanie Robledo MD   chlorhexidine (PERIDEX) 0.12 % solution  4/23/14   Historical Provider, MD        Allergies:     Pcn [penicillins]; Amoxicillin; and Seasonal    Social History:     Tobacco:    reports that she has quit smoking. Her smoking use included Cigarettes. She has a 3.50 pack-year smoking history. She has never used smokeless tobacco.  Alcohol:      has no alcohol history on file. Drug Use:  reports that she uses drugs, including Marijuana. Family History:     Family History   Problem Relation Age of Onset    High Blood Pressure Mother     Diabetes Mother     Heart Disease Mother     Heart Disease Father     Early Death Father     Cancer Maternal Aunt      lung    Cancer Paternal Cousin      brain       Review of Systems:     Positive and Negative as described in HPI. CONSTITUTIONAL:  negative for fevers, chills, sweats, fatigue, weight loss  HEENT:  negative for vision, hearing changes, runny nose, throat pain  RESPIRATORY:  negative for shortness of breath, cough, congestion, wheezing. CARDIOVASCULAR:  negative for chest pain, palpitations.   GASTROINTESTINAL:  + for nausea, vomiting, Right upper abdominal pain   GENITOURINARY:  negative for difficulty of urination, burning with urination, frequency   INTEGUMENT:  negative for rash, skin lesions, easy bruising   HEMATOLOGIC/LYMPHATIC:  negative for swelling/edema   ALLERGIC/IMMUNOLOGIC:  negative for urticaria , itching  ENDOCRINE:  negative increase in drinking, increase in urination, hot or cold intolerance  MUSCULOSKELETAL:  negative joint pains, muscle aches, swelling of joints  NEUROLOGICAL:  negative for headaches, dizziness, lightheadedness, numbness, pain, tingling extremities  BEHAVIOR/PSYCH:  + for depression, anxiety    Physical Exam:   /69   Pulse 90   Temp 97.8 °F (36.6 °C) (Oral)   Resp 18   Ht 5' 5\" (1.651 m)   Wt 294 lb (133.4 kg)   LMP 10/26/2017   SpO2 92%   BMI 48.92 kg/m²   Temp (24hrs), Av.4 °F (29.7 °C), Min:44.4 °F (6.9 °C), Max:101.1 °F (38.4 °C)    Recent Labs      17   1025  17   1543  17   1848   POCGLU  131*  166*  102     No intake or output data in the 24 hours ending 17 1840    General Appearance:  alert, well appearing, and in no acute distress,Morbidly obese  Mental status: oriented to person, place, and time with normal affect  Head:  normocephalic, atraumatic. Eye: no icterus, redness, pupils equal and reactive, extraocular eye movements intact, conjunctiva clear  Ear: normal external ear, no discharge, hearing intact, left eardrum does not show any congestion and no wax  Nose:  no drainage noted  Mouth: mucous membranes moist  Neck: supple, no carotid bruits, thyroid not palpable  Lungs: Bilateral equal air entry, clear to ausculation, no wheezing, rales or rhonchi, normal effort  Cardiovascular: normal rate, regular rhythm, no murmur, gallop, rub.   Abdomen: Soft, + discomfort in upper abdomen and the right upper quadrant and epigastric region, nondistended, normal bowel sounds, no hepatomegaly or splenomegaly  Neurologic: There are no new focal motor or sensory deficits, normal muscle tone and bulk, no abnormal sensation, normal speech, cranial nerves II through XII grossly intact  Skin: No gross lesions, rashes, bruising or bleeding on exposed skin area  Extremities:  peripheral pulses palpable, no pedal edema or calf pain with palpation  Psych: Anxious         Investigations:      Laboratory Testing:  Recent Results (from the past 24 hour(s))   POC Glucose Fingerstick    Collection Time: 17  6:48 PM   Result Value Ref Range    POC Glucose 102 65 - 105 mg/dL       Imaging/Diagnostics:    GB US     Cholelithiasis without secondary features of acute cholecystitis. HIDA scan;  1. Normal uptake of radiotracer activity within the gallbladder. 2. Slightly decreased gallbladder ejection fraction of 29.8%.  This can be   seen with chronic cholecystitis or biliary dyskinesia. CT abdomen;  Acute perforated diverticulitis of the transverse colon.  Moderate   pericolonic inflammatory changes without organized abscess. Assessment :      Primary Problem  Diverticulitis of intestine with perforation without abscess    Active Hospital Problems    Diagnosis Date Noted    Diverticulitis of intestine with perforation without abscess [K57.80] 11/30/2017     Priority: High    RUQ abdominal pain [R10.11] 11/29/2017     Priority: High    Calculus of gallbladder with chronic cholecystitis without obstruction [K80.10] 11/30/2017     Priority: Medium    Type 2 diabetes mellitus without complication ([facility] [address] [O14.7] 11/30/2017    Fatty liver disease, nonalcoholic [S64.0]     Asthma [J45.909] 02/13/2012    Depression [F32.9] 02/13/2012    GERD (gastroesophageal reflux disease) [K21.9] 02/13/2012    Obesity [E66.9] 11/07/2011       Plan:     Patient status Admit as inpatient in the  Med/Surge    1. Continue IV Cipro and Flagyl and IV fluids  2. Continue oral home medicines with a sip of water if okay with surgery  3. DVT prophylaxis  4. GI prophylaxis  5. Pain control  6. Monitor labs  7. Surgical consult has already been taken    Consultations:   IP [place] TO HOSPITALIST     Patient is admitted as inpatient status because of co-morbidities listed above, severity of signs and symptoms as outlined, requirement for current medical therapies and most importantly because of direct risk to patient if care not provided in a hospital setting.     Rosie Hammer MD  11/30/2017  6:40 PM    Copy sent to Dr. Nancy Mendoza

## 2017-11-30 NOTE — PLAN OF CARE
Problem: RESPIRATORY  Intervention: Respiratory assessment  BRONCHOSPASM/BRONCHOCONSTRICTION     [x]         IMPROVE AERATION/BREATH SOUNDS  [x]   ADMINISTER BRONCHODILATOR THERAPY AS APPROPRIATE  [x]   ASSESS BREATH SOUNDS  []   IMPLEMENT AEROSOL/MDI PROTOCOL  [x]   PATIENT EDUCATION AS NEEDED    PROVIDE ADEQUATE OXYGENATION WITH ACCEPTABLE SP02/ABG'S    [x]  IDENTIFY APPROPRIATE OXYGEN THERAPY  [x]   MONITOR SP02/ABG'S AS NEEDED   [x]   PATIENT EDUCATION AS NEEDED

## 2017-12-01 PROBLEM — K57.20 DIVERTICULITIS OF LARGE INTESTINE WITH PERFORATION WITHOUT ABSCESS OR BLEEDING: Status: ACTIVE | Noted: 2017-11-30

## 2017-12-01 LAB
ANION GAP SERPL CALCULATED.3IONS-SCNC: 14 MMOL/L (ref 9–17)
BUN BLDV-MCNC: 11 MG/DL (ref 6–20)
BUN/CREAT BLD: ABNORMAL (ref 9–20)
CALCIUM SERPL-MCNC: 7.9 MG/DL (ref 8.6–10.4)
CHLORIDE BLD-SCNC: 104 MMOL/L (ref 98–107)
CO2: 20 MMOL/L (ref 20–31)
CREAT SERPL-MCNC: 0.73 MG/DL (ref 0.5–0.9)
ESTIMATED AVERAGE GLUCOSE: 108 MG/DL
GFR AFRICAN AMERICAN: >60 ML/MIN
GFR NON-AFRICAN AMERICAN: >60 ML/MIN
GFR SERPL CREATININE-BSD FRML MDRD: ABNORMAL ML/MIN/{1.73_M2}
GFR SERPL CREATININE-BSD FRML MDRD: ABNORMAL ML/MIN/{1.73_M2}
GLUCOSE BLD-MCNC: 79 MG/DL (ref 65–105)
GLUCOSE BLD-MCNC: 80 MG/DL (ref 65–105)
GLUCOSE BLD-MCNC: 88 MG/DL (ref 70–99)
GLUCOSE BLD-MCNC: 92 MG/DL (ref 65–105)
GLUCOSE BLD-MCNC: 93 MG/DL (ref 65–105)
HBA1C MFR BLD: 5.4 % (ref 4–6)
HCT VFR BLD CALC: 34.8 % (ref 36.3–47.1)
HEMOGLOBIN: 10.4 G/DL (ref 11.9–15.1)
MCH RBC QN AUTO: 28.6 PG (ref 25.2–33.5)
MCHC RBC AUTO-ENTMCNC: 29.9 G/DL (ref 28.4–34.8)
MCV RBC AUTO: 95.6 FL (ref 82.6–102.9)
PDW BLD-RTO: 13.2 % (ref 11.8–14.4)
PLATELET # BLD: 373 K/UL (ref 138–453)
PMV BLD AUTO: 9.8 FL (ref 8.1–13.5)
POTASSIUM SERPL-SCNC: 3.8 MMOL/L (ref 3.7–5.3)
RBC # BLD: 3.64 M/UL (ref 3.95–5.11)
SODIUM BLD-SCNC: 138 MMOL/L (ref 135–144)
WBC # BLD: 12.3 K/UL (ref 3.5–11.3)

## 2017-12-01 PROCEDURE — 80048 BASIC METABOLIC PNL TOTAL CA: CPT

## 2017-12-01 PROCEDURE — 6360000002 HC RX W HCPCS: Performed by: STUDENT IN AN ORGANIZED HEALTH CARE EDUCATION/TRAINING PROGRAM

## 2017-12-01 PROCEDURE — 6360000002 HC RX W HCPCS: Performed by: SURGERY

## 2017-12-01 PROCEDURE — 82947 ASSAY GLUCOSE BLOOD QUANT: CPT

## 2017-12-01 PROCEDURE — 6370000000 HC RX 637 (ALT 250 FOR IP): Performed by: EMERGENCY MEDICINE

## 2017-12-01 PROCEDURE — 1200000000 HC SEMI PRIVATE

## 2017-12-01 PROCEDURE — 99232 SBSQ HOSP IP/OBS MODERATE 35: CPT | Performed by: INTERNAL MEDICINE

## 2017-12-01 PROCEDURE — 2580000003 HC RX 258: Performed by: INTERNAL MEDICINE

## 2017-12-01 PROCEDURE — 94762 N-INVAS EAR/PLS OXIMTRY CONT: CPT

## 2017-12-01 PROCEDURE — 2500000003 HC RX 250 WO HCPCS: Performed by: STUDENT IN AN ORGANIZED HEALTH CARE EDUCATION/TRAINING PROGRAM

## 2017-12-01 PROCEDURE — 36415 COLL VENOUS BLD VENIPUNCTURE: CPT

## 2017-12-01 PROCEDURE — 2500000003 HC RX 250 WO HCPCS: Performed by: INTERNAL MEDICINE

## 2017-12-01 PROCEDURE — 6370000000 HC RX 637 (ALT 250 FOR IP): Performed by: INTERNAL MEDICINE

## 2017-12-01 PROCEDURE — 6360000002 HC RX W HCPCS: Performed by: INTERNAL MEDICINE

## 2017-12-01 PROCEDURE — 85027 COMPLETE CBC AUTOMATED: CPT

## 2017-12-01 PROCEDURE — 94640 AIRWAY INHALATION TREATMENT: CPT

## 2017-12-01 PROCEDURE — 6360000002 HC RX W HCPCS: Performed by: EMERGENCY MEDICINE

## 2017-12-01 RX ORDER — FAMOTIDINE 20 MG/1
20 TABLET, FILM COATED ORAL 2 TIMES DAILY
Status: DISCONTINUED | OUTPATIENT
Start: 2017-12-01 | End: 2017-12-03 | Stop reason: HOSPADM

## 2017-12-01 RX ORDER — PROMETHAZINE HYDROCHLORIDE 25 MG/ML
12.5 INJECTION, SOLUTION INTRAMUSCULAR; INTRAVENOUS EVERY 6 HOURS PRN
Status: DISCONTINUED | OUTPATIENT
Start: 2017-12-01 | End: 2017-12-03 | Stop reason: HOSPADM

## 2017-12-01 RX ORDER — DEXTROSE MONOHYDRATE 25 G/50ML
12.5 INJECTION, SOLUTION INTRAVENOUS PRN
Status: DISCONTINUED | OUTPATIENT
Start: 2017-12-01 | End: 2017-12-03 | Stop reason: HOSPADM

## 2017-12-01 RX ORDER — DIPHENHYDRAMINE HYDROCHLORIDE 50 MG/ML
25 INJECTION INTRAMUSCULAR; INTRAVENOUS EVERY 6 HOURS PRN
Status: DISCONTINUED | OUTPATIENT
Start: 2017-12-01 | End: 2017-12-03 | Stop reason: HOSPADM

## 2017-12-01 RX ORDER — DEXTROSE MONOHYDRATE 50 MG/ML
100 INJECTION, SOLUTION INTRAVENOUS PRN
Status: DISCONTINUED | OUTPATIENT
Start: 2017-12-01 | End: 2017-12-03 | Stop reason: HOSPADM

## 2017-12-01 RX ORDER — MORPHINE SULFATE 4 MG/ML
4 INJECTION, SOLUTION INTRAMUSCULAR; INTRAVENOUS EVERY 4 HOURS PRN
Status: DISCONTINUED | OUTPATIENT
Start: 2017-12-01 | End: 2017-12-02

## 2017-12-01 RX ORDER — MORPHINE SULFATE 2 MG/ML
2 INJECTION, SOLUTION INTRAMUSCULAR; INTRAVENOUS EVERY 4 HOURS PRN
Status: DISCONTINUED | OUTPATIENT
Start: 2017-12-01 | End: 2017-12-02

## 2017-12-01 RX ORDER — NICOTINE POLACRILEX 4 MG
15 LOZENGE BUCCAL PRN
Status: DISCONTINUED | OUTPATIENT
Start: 2017-12-01 | End: 2017-12-03 | Stop reason: HOSPADM

## 2017-12-01 RX ADMIN — METRONIDAZOLE 500 MG: 500 INJECTION, SOLUTION INTRAVENOUS at 19:27

## 2017-12-01 RX ADMIN — FAMOTIDINE 20 MG: 20 TABLET, FILM COATED ORAL at 20:54

## 2017-12-01 RX ADMIN — BUSPIRONE HYDROCHLORIDE 15 MG: 15 TABLET ORAL at 20:54

## 2017-12-01 RX ADMIN — BUSPIRONE HYDROCHLORIDE 15 MG: 15 TABLET ORAL at 14:00

## 2017-12-01 RX ADMIN — MORPHINE SULFATE 4 MG: 4 INJECTION INTRAVENOUS at 15:24

## 2017-12-01 RX ADMIN — DIPHENHYDRAMINE HYDROCHLORIDE 25 MG: 50 INJECTION, SOLUTION INTRAMUSCULAR; INTRAVENOUS at 17:51

## 2017-12-01 RX ADMIN — ACETAMINOPHEN 650 MG: 325 TABLET ORAL at 20:57

## 2017-12-01 RX ADMIN — HYDROMORPHONE HYDROCHLORIDE 1 MG: 1 INJECTION, SOLUTION INTRAMUSCULAR; INTRAVENOUS; SUBCUTANEOUS at 05:05

## 2017-12-01 RX ADMIN — BECLOMETHASONE DIPROPIONATE 1 PUFF: 40 AEROSOL, METERED RESPIRATORY (INHALATION) at 21:27

## 2017-12-01 RX ADMIN — MORPHINE SULFATE 4 MG: 4 INJECTION INTRAVENOUS at 11:21

## 2017-12-01 RX ADMIN — CIPROFLOXACIN 400 MG: 2 INJECTION, SOLUTION INTRAVENOUS at 17:51

## 2017-12-01 RX ADMIN — GUAIFENESIN 600 MG: 600 TABLET, EXTENDED RELEASE ORAL at 20:54

## 2017-12-01 RX ADMIN — MONTELUKAST SODIUM 10 MG: 10 TABLET, FILM COATED ORAL at 20:54

## 2017-12-01 RX ADMIN — HYDROMORPHONE HYDROCHLORIDE 1 MG: 1 INJECTION, SOLUTION INTRAMUSCULAR; INTRAVENOUS; SUBCUTANEOUS at 02:06

## 2017-12-01 RX ADMIN — METRONIDAZOLE 500 MG: 500 INJECTION, SOLUTION INTRAVENOUS at 12:00

## 2017-12-01 RX ADMIN — ENOXAPARIN SODIUM 30 MG: 30 INJECTION SUBCUTANEOUS at 20:54

## 2017-12-01 RX ADMIN — ESOMEPRAZOLE SODIUM 40 MG: 40 INJECTION, POWDER, LYOPHILIZED, FOR SOLUTION INTRAVENOUS at 09:09

## 2017-12-01 RX ADMIN — METRONIDAZOLE 500 MG: 500 INJECTION, SOLUTION INTRAVENOUS at 00:33

## 2017-12-01 RX ADMIN — ENOXAPARIN SODIUM 30 MG: 30 INJECTION SUBCUTANEOUS at 09:07

## 2017-12-01 RX ADMIN — ONDANSETRON 4 MG: 2 INJECTION, SOLUTION INTRAMUSCULAR; INTRAVENOUS at 02:06

## 2017-12-01 RX ADMIN — SODIUM CHLORIDE 10 ML: 9 INJECTION, SOLUTION INTRAMUSCULAR; INTRAVENOUS; SUBCUTANEOUS at 09:07

## 2017-12-01 RX ADMIN — METRONIDAZOLE 500 MG: 500 INJECTION, SOLUTION INTRAVENOUS at 06:38

## 2017-12-01 RX ADMIN — BECLOMETHASONE DIPROPIONATE 1 PUFF: 40 AEROSOL, METERED RESPIRATORY (INHALATION) at 08:28

## 2017-12-01 RX ADMIN — CIPROFLOXACIN 400 MG: 2 INJECTION, SOLUTION INTRAVENOUS at 05:05

## 2017-12-01 RX ADMIN — ONDANSETRON 4 MG: 2 INJECTION, SOLUTION INTRAMUSCULAR; INTRAVENOUS at 19:36

## 2017-12-01 RX ADMIN — SODIUM CHLORIDE: 9 INJECTION, SOLUTION INTRAVENOUS at 19:29

## 2017-12-01 RX ADMIN — FLUTICASONE PROPIONATE 1 SPRAY: 50 SPRAY, METERED NASAL at 09:00

## 2017-12-01 ASSESSMENT — PAIN DESCRIPTION - PAIN TYPE
TYPE: ACUTE PAIN
TYPE: ACUTE PAIN

## 2017-12-01 ASSESSMENT — PAIN SCALES - GENERAL
PAINLEVEL_OUTOF10: 7
PAINLEVEL_OUTOF10: 8
PAINLEVEL_OUTOF10: 7
PAINLEVEL_OUTOF10: 5
PAINLEVEL_OUTOF10: 8
PAINLEVEL_OUTOF10: 2

## 2017-12-01 ASSESSMENT — PAIN DESCRIPTION - LOCATION
LOCATION: ABDOMEN
LOCATION: HEAD

## 2017-12-01 ASSESSMENT — PAIN DESCRIPTION - DESCRIPTORS
DESCRIPTORS: CONSTANT;SHARP
DESCRIPTORS: HEADACHE

## 2017-12-01 ASSESSMENT — PAIN DESCRIPTION - FREQUENCY
FREQUENCY: CONTINUOUS
FREQUENCY: INTERMITTENT

## 2017-12-01 ASSESSMENT — PAIN DESCRIPTION - ONSET
ONSET: GRADUAL
ONSET: ON-GOING

## 2017-12-01 ASSESSMENT — PAIN DESCRIPTION - PROGRESSION: CLINICAL_PROGRESSION: NOT CHANGED

## 2017-12-01 ASSESSMENT — PAIN DESCRIPTION - ORIENTATION: ORIENTATION: RIGHT;MID

## 2017-12-01 NOTE — PROGRESS NOTES
[facility]    Progress Note    12/1/2017    8:43 AM    Name:   Trevor Delatorre  MRN:     [id number]     [place]:      [de-identified]   Room:   [address] [place] [address] Day:  1  Admit Date:  11/29/2017  3:43 AM    PCP:   Sandi Blackburn MD  Code Status:  Full Code    Subjective:     C/C:   Chief Complaint   Patient presents with    Chest Pain    Abdominal Pain     Interval History Status: improved. Pain upper abdominal is minimal  Passed flatus, no BM yet  Brief History:   Zuleyma Goodman is a 32 y.o. female who presents With acute on chronic right upper quadrant abdominal pain.  Patient has a history of gallstones.    The pain started one and is back more so on the right upper quadrant it was associated with nausea and vomiting and was radiating to the midsternal area. Her white cell count was elevated and was 17.6 the bedside gallbladder ultrasound showed enlarged gallbladder thickened wall and large stone, HIDA scan was done which is suggestive of chronic cholecystitis or biliary dyskinesia. She was evaluated by surgical team CT of the abdomen was done which was consistent with transverse colon diverticulitis with microperforation, she has been started on IV antibiotics and  is being kept nothing by mouth for now. Currently her pain is better by starting pain medications. She was also complaining of left ear pain in the ER and was told to be having ear infection      Review of Systems:     Constitutional:  negative for chills, fevers, sweats  Respiratory:  negative for cough, dyspnea on exertion, hemoptysis, shortness of breath, wheezing  Cardiovascular:  negative for chest pain, chest pressure/discomfort, lower extremity edema, palpitations  Gastrointestinal:  Mild upper abdominal pain, + flatus, no bowel movement yet  Neurological:  negative for dizziness, headache    Medications: Allergies:     Allergies   Allergen Reactions    Pcn [Penicillins] Hives and Shortness Of Breath    Amoxicillin Hives    Seasonal        Current Meds:   Scheduled Meds:    ciprofloxacin  400 mg Intravenous Q12H    metroNIDAZOLE  500 mg Intravenous Q6H    enoxaparin  30 mg Subcutaneous BID    esomeprazole  40 mg Intravenous Daily    And    sodium chloride (PF)  10 mL Intravenous Daily    insulin lispro  0-6 Units Subcutaneous TID WC    insulin lispro  0-3 Units Subcutaneous Nightly    busPIRone  15 mg Oral TID    beclomethasone  1 puff Inhalation BID    cetirizine  10 mg Oral Daily    FLUoxetine  20 mg Oral Daily    fluticasone  1 spray Each Nare Daily    guaiFENesin  600 mg Oral BID    montelukast  10 mg Oral Nightly    sodium chloride flush  10 mL Intravenous 2 times per day     Continuous Infusions:    dextrose      sodium chloride 150 mL/hr at 11/30/17 2345     PRN Meds: glucose, dextrose, glucagon (rDNA), dextrose, hydrOXYzine, albuterol sulfate HFA, sodium chloride flush, acetaminophen, magnesium hydroxide, ondansetron, HYDROmorphone    Data:     Past Medical History:   has a past medical history of Asthma; Back pain; Bipolar 1 disorder ([address] Depression; Dizziness; Fatty liver disease, nonalcoholic; Fatty liver disease, nonalcoholic; GERD (gastroesophageal reflux disease); and Obesity. Social History:   reports that she has quit smoking. Her smoking use included Cigarettes. She has a 3.50 pack-year smoking history. She has never used smokeless tobacco. She reports that she uses drugs, including Marijuana.      Family History:   Family History   Problem Relation Age of Onset    High Blood Pressure Mother     Diabetes Mother     Heart Disease Mother     Heart Disease Father     Early Death Father     Cancer Maternal Aunt      lung    Cancer Paternal Cousin      brain       Vitals:  /64   Pulse 78   Temp 98.3 °F (36.8 °C) (Oral)   Resp 17   Ht 5' 5\" (1.651 m)   Wt 294 lb (133.4 kg)   LMP 10/26/2017   SpO2 96%   BMI 48.92 kg/m²   Temp (24hrs), Av.5 °F (36.9 °C), Min:97.8 °F (36.6 °C), Max:99.3 °F (37.4 °C)    Recent Labs      17   1543  17   1848  17   2232  17   0804   POCGLU  166*  102  76  80       I/O (24Hr): No intake or output data in the 24 hours ending 17 0843    Labs:    Hematology:  Recent Labs      17   0401  17   0642   WBC  17.6*  12.3*   HGB  11.9  10.4*   HCT  38.2  34.8*   PLT  434  373     Chemistry:  Recent Labs      17   0401  17   0642   NA  133*  138   K  3.9  3.8   CL  97*  104   CO2  25  20   GLUCOSE  121*  88   BUN  12  11   CREATININE  0.75  0.73   ANIONGAP  11  14   LABGLOM  >60  >60   GFRAA  >60  >60   CALCIUM  9.0  7.9*     Recent Labs      17   0401   PROT  7.7   LABALBU  3.8   AST  20   ALT  22   ALKPHOS  99   BILITOT  0.91   BILIDIR  0.20   LIPASE  25         Lab Results   Component Value Date/Time    SPECIAL NOT REPORTED 2017 07:38 AM     Lab Results   Component Value Date/Time    CULTURE NO SIGNIFICANT GROWTH 2017 07:38 AM    CULTURE  2017 07:38 AM     [address] [phone]       No results found for: POCPH, PHART, PH, POCPCO2, ZOS9WQF, PCO2, POCPO2, PO2ART, PO2, POCHCO3, RXK5YRR, HCO3, NBEA, PBEA, BEART, BE, THGBART, THB, OMS8BBQ, NQUV5VUG, U7SIQJQW, O2SAT, FIO2    Radiology:  CT abdomen  Impression   Acute perforated diverticulitis of the transverse colon.  Moderate   pericolonic inflammatory changes without organized abscess.            Physical Examination:        General appearance:  alert, cooperative and no distress  Mental Status:  oriented to person, place and time and normal affect  Lungs:  clear to auscultation bilaterally, normal effort  Heart:  regular rate and rhythm, no murmur  Abdomen:  soft, Mild discomfort in upper abdomen, nondistended, normal bowel sounds, no masses, hepatomegaly, splenomegaly  Extremities:  no edema, redness, tenderness in

## 2017-12-01 NOTE — PROGRESS NOTES
General Surgery Progress Note            PATIENT NAME: Abhijit Nichols     TODAY'S DATE: 12/1/2017, 5:35 AM    CC: cholelithiasis, perforated diverticulitis    SUBJECTIVE:    Pt seen and examined. No acute events overnight. Abdominal pain minimally improved, passing flatus without stool. No new issues otherwise. OBJECTIVE:   Vitals:  /66   Pulse 75   Temp 99.3 °F (37.4 °C) (Oral)   Resp 16   Ht 5' 5\" (1.651 m)   Wt 294 lb (133.4 kg)   LMP 10/26/2017   SpO2 98%   BMI 48.92 kg/m²      INTAKE/OUTPUT:    No intake or output data in the 24 hours ending 12/01/17 0535              General: AOx3, NAD  Lungs: Symmetrical chest rise bilaterally, no audible wheezes or rhonchi  Heart: S1S2  Abdomen: Soft, ND, minimally tender mid epigastric region. Extremity: moves all extremities x4, No edema      Data Review:  CBC:   Recent Labs      11/29/17 0401   WBC  17.6*   HGB  11.9   PLT  434     BMP:    Recent Labs      11/29/17 0401   NA  133*   K  3.9   CL  97*   CO2  25   BUN  12   CREATININE  0.75   GLUCOSE  121*     Hepatic:   Recent Labs      11/29/17 0401   AST  20   ALT  22   ALKPHOS  99   BILITOT  0.91   BILIDIR  0.20     Coagulation:   Recent Labs      11/29/17 0401   PROT  7.7             ASSESSMENT     Patient Active Problem List   Diagnosis    Obesity    GERD (gastroesophageal reflux disease)    Asthma    Depression    Fatty liver disease, nonalcoholic    Abdominal pain    URI (upper respiratory infection)    Menorrhagia    Back pain    Pedal edema    Medication refill    RUQ abdominal pain    Diverticulitis of intestine with perforation without abscess    Calculus of gallbladder with chronic cholecystitis without obstruction    Type 2 diabetes mellitus without complication (HCC)       PLAN    1. NPO, await morning labs. 2. Nonoperative management of diverticulitis, continue Cipro/Flagyl, monitor clinically  3.  Supportive care         Electronically signed by Galindo Cuasey

## 2017-12-02 LAB
ANION GAP SERPL CALCULATED.3IONS-SCNC: 14 MMOL/L (ref 9–17)
BUN BLDV-MCNC: 8 MG/DL (ref 6–20)
BUN/CREAT BLD: ABNORMAL (ref 9–20)
CALCIUM SERPL-MCNC: 8.1 MG/DL (ref 8.6–10.4)
CHLORIDE BLD-SCNC: 104 MMOL/L (ref 98–107)
CO2: 21 MMOL/L (ref 20–31)
CREAT SERPL-MCNC: 0.62 MG/DL (ref 0.5–0.9)
GFR AFRICAN AMERICAN: >60 ML/MIN
GFR NON-AFRICAN AMERICAN: >60 ML/MIN
GFR SERPL CREATININE-BSD FRML MDRD: ABNORMAL ML/MIN/{1.73_M2}
GFR SERPL CREATININE-BSD FRML MDRD: ABNORMAL ML/MIN/{1.73_M2}
GLUCOSE BLD-MCNC: 103 MG/DL (ref 65–105)
GLUCOSE BLD-MCNC: 103 MG/DL (ref 65–105)
GLUCOSE BLD-MCNC: 88 MG/DL (ref 70–99)
GLUCOSE BLD-MCNC: 92 MG/DL (ref 65–105)
GLUCOSE BLD-MCNC: 94 MG/DL (ref 65–105)
HCT VFR BLD CALC: 32.8 % (ref 36.3–47.1)
HEMOGLOBIN: 10.2 G/DL (ref 11.9–15.1)
MCH RBC QN AUTO: 28.9 PG (ref 25.2–33.5)
MCHC RBC AUTO-ENTMCNC: 31.1 G/DL (ref 28.4–34.8)
MCV RBC AUTO: 92.9 FL (ref 82.6–102.9)
PDW BLD-RTO: 13 % (ref 11.8–14.4)
PLATELET # BLD: 414 K/UL (ref 138–453)
PMV BLD AUTO: 9.4 FL (ref 8.1–13.5)
POTASSIUM SERPL-SCNC: 3.9 MMOL/L (ref 3.7–5.3)
RBC # BLD: 3.53 M/UL (ref 3.95–5.11)
SODIUM BLD-SCNC: 139 MMOL/L (ref 135–144)
WBC # BLD: 9.9 K/UL (ref 3.5–11.3)

## 2017-12-02 PROCEDURE — 1200000000 HC SEMI PRIVATE

## 2017-12-02 PROCEDURE — 99232 SBSQ HOSP IP/OBS MODERATE 35: CPT | Performed by: SURGERY

## 2017-12-02 PROCEDURE — 82947 ASSAY GLUCOSE BLOOD QUANT: CPT

## 2017-12-02 PROCEDURE — 6370000000 HC RX 637 (ALT 250 FOR IP): Performed by: INTERNAL MEDICINE

## 2017-12-02 PROCEDURE — 80048 BASIC METABOLIC PNL TOTAL CA: CPT

## 2017-12-02 PROCEDURE — 2580000003 HC RX 258: Performed by: INTERNAL MEDICINE

## 2017-12-02 PROCEDURE — 94762 N-INVAS EAR/PLS OXIMTRY CONT: CPT

## 2017-12-02 PROCEDURE — 99232 SBSQ HOSP IP/OBS MODERATE 35: CPT | Performed by: INTERNAL MEDICINE

## 2017-12-02 PROCEDURE — 6360000002 HC RX W HCPCS: Performed by: STUDENT IN AN ORGANIZED HEALTH CARE EDUCATION/TRAINING PROGRAM

## 2017-12-02 PROCEDURE — 85027 COMPLETE CBC AUTOMATED: CPT

## 2017-12-02 PROCEDURE — 6370000000 HC RX 637 (ALT 250 FOR IP): Performed by: EMERGENCY MEDICINE

## 2017-12-02 PROCEDURE — 36415 COLL VENOUS BLD VENIPUNCTURE: CPT

## 2017-12-02 PROCEDURE — 6360000002 HC RX W HCPCS: Performed by: INTERNAL MEDICINE

## 2017-12-02 PROCEDURE — 6360000002 HC RX W HCPCS: Performed by: NURSE PRACTITIONER

## 2017-12-02 PROCEDURE — 2580000003 HC RX 258: Performed by: EMERGENCY MEDICINE

## 2017-12-02 PROCEDURE — 2500000003 HC RX 250 WO HCPCS: Performed by: STUDENT IN AN ORGANIZED HEALTH CARE EDUCATION/TRAINING PROGRAM

## 2017-12-02 PROCEDURE — 94640 AIRWAY INHALATION TREATMENT: CPT

## 2017-12-02 RX ORDER — HYDROCODONE BITARTRATE AND ACETAMINOPHEN 5; 325 MG/1; MG/1
2 TABLET ORAL EVERY 4 HOURS PRN
Status: DISCONTINUED | OUTPATIENT
Start: 2017-12-02 | End: 2017-12-03 | Stop reason: HOSPADM

## 2017-12-02 RX ORDER — HYDROCODONE BITARTRATE AND ACETAMINOPHEN 5; 325 MG/1; MG/1
1 TABLET ORAL EVERY 4 HOURS PRN
Status: DISCONTINUED | OUTPATIENT
Start: 2017-12-02 | End: 2017-12-03 | Stop reason: HOSPADM

## 2017-12-02 RX ADMIN — Medication 10 ML: at 09:27

## 2017-12-02 RX ADMIN — FLUTICASONE PROPIONATE 1 SPRAY: 50 SPRAY, METERED NASAL at 09:27

## 2017-12-02 RX ADMIN — FLUOXETINE HYDROCHLORIDE 20 MG: 20 CAPSULE ORAL at 09:27

## 2017-12-02 RX ADMIN — PROMETHAZINE HYDROCHLORIDE 12.5 MG: 25 INJECTION INTRAMUSCULAR; INTRAVENOUS at 07:50

## 2017-12-02 RX ADMIN — METRONIDAZOLE 500 MG: 500 INJECTION, SOLUTION INTRAVENOUS at 05:34

## 2017-12-02 RX ADMIN — PROMETHAZINE HYDROCHLORIDE 12.5 MG: 25 INJECTION INTRAMUSCULAR; INTRAVENOUS at 17:02

## 2017-12-02 RX ADMIN — MORPHINE SULFATE 2 MG: 2 INJECTION, SOLUTION INTRAMUSCULAR; INTRAVENOUS at 17:02

## 2017-12-02 RX ADMIN — METRONIDAZOLE 500 MG: 500 INJECTION, SOLUTION INTRAVENOUS at 22:00

## 2017-12-02 RX ADMIN — ENOXAPARIN SODIUM 30 MG: 30 INJECTION SUBCUTANEOUS at 21:59

## 2017-12-02 RX ADMIN — BECLOMETHASONE DIPROPIONATE 1 PUFF: 40 AEROSOL, METERED RESPIRATORY (INHALATION) at 19:57

## 2017-12-02 RX ADMIN — MORPHINE SULFATE 2 MG: 2 INJECTION, SOLUTION INTRAMUSCULAR; INTRAVENOUS at 07:50

## 2017-12-02 RX ADMIN — SODIUM CHLORIDE: 9 INJECTION, SOLUTION INTRAVENOUS at 03:15

## 2017-12-02 RX ADMIN — BUSPIRONE HYDROCHLORIDE 15 MG: 15 TABLET ORAL at 14:49

## 2017-12-02 RX ADMIN — CIPROFLOXACIN 400 MG: 2 INJECTION, SOLUTION INTRAVENOUS at 06:35

## 2017-12-02 RX ADMIN — MONTELUKAST SODIUM 10 MG: 10 TABLET, FILM COATED ORAL at 21:59

## 2017-12-02 RX ADMIN — METRONIDAZOLE 500 MG: 500 INJECTION, SOLUTION INTRAVENOUS at 11:50

## 2017-12-02 RX ADMIN — MORPHINE SULFATE 2 MG: 2 INJECTION, SOLUTION INTRAMUSCULAR; INTRAVENOUS at 00:06

## 2017-12-02 RX ADMIN — GUAIFENESIN 600 MG: 600 TABLET, EXTENDED RELEASE ORAL at 09:27

## 2017-12-02 RX ADMIN — CETIRIZINE HYDROCHLORIDE 10 MG: 10 TABLET ORAL at 09:27

## 2017-12-02 RX ADMIN — BUSPIRONE HYDROCHLORIDE 15 MG: 15 TABLET ORAL at 21:59

## 2017-12-02 RX ADMIN — FAMOTIDINE 20 MG: 20 TABLET, FILM COATED ORAL at 09:27

## 2017-12-02 RX ADMIN — BUSPIRONE HYDROCHLORIDE 15 MG: 15 TABLET ORAL at 09:27

## 2017-12-02 RX ADMIN — CIPROFLOXACIN 400 MG: 2 INJECTION, SOLUTION INTRAVENOUS at 18:40

## 2017-12-02 RX ADMIN — METRONIDAZOLE 500 MG: 500 INJECTION, SOLUTION INTRAVENOUS at 00:01

## 2017-12-02 RX ADMIN — GUAIFENESIN 600 MG: 600 TABLET, EXTENDED RELEASE ORAL at 21:59

## 2017-12-02 RX ADMIN — ENOXAPARIN SODIUM 30 MG: 30 INJECTION SUBCUTANEOUS at 09:27

## 2017-12-02 RX ADMIN — FAMOTIDINE 20 MG: 20 TABLET, FILM COATED ORAL at 21:59

## 2017-12-02 RX ADMIN — BECLOMETHASONE DIPROPIONATE 1 PUFF: 40 AEROSOL, METERED RESPIRATORY (INHALATION) at 09:35

## 2017-12-02 RX ADMIN — PROMETHAZINE HYDROCHLORIDE 12.5 MG: 25 INJECTION INTRAMUSCULAR; INTRAVENOUS at 00:06

## 2017-12-02 ASSESSMENT — PAIN DESCRIPTION - ORIENTATION: ORIENTATION: RIGHT;UPPER

## 2017-12-02 ASSESSMENT — PAIN SCALES - GENERAL
PAINLEVEL_OUTOF10: 2
PAINLEVEL_OUTOF10: 6
PAINLEVEL_OUTOF10: 6
PAINLEVEL_OUTOF10: 3
PAINLEVEL_OUTOF10: 6

## 2017-12-02 ASSESSMENT — PAIN DESCRIPTION - PAIN TYPE: TYPE: ACUTE PAIN

## 2017-12-02 ASSESSMENT — PAIN DESCRIPTION - FREQUENCY: FREQUENCY: CONTINUOUS

## 2017-12-02 ASSESSMENT — PAIN DESCRIPTION - ONSET: ONSET: ON-GOING

## 2017-12-02 ASSESSMENT — PAIN DESCRIPTION - DESCRIPTORS: DESCRIPTORS: CONSTANT;SHARP

## 2017-12-02 ASSESSMENT — PAIN DESCRIPTION - LOCATION: LOCATION: ABDOMEN

## 2017-12-02 ASSESSMENT — PAIN DESCRIPTION - PROGRESSION: CLINICAL_PROGRESSION: NOT CHANGED

## 2017-12-02 NOTE — PROGRESS NOTES
General Surgery Progress Note            PATIENT NAME: Araceli Hernandez     TODAY'S DATE: 12/2/2017, 7:55 AM    CC: perforated diverticulitis, cholelithiasis    SUBJECTIVE:    Pt seen and examined. No acute events overnight. Abdominal pain continues to improve. Passing flatus and small BM overnight. No new issues. OBJECTIVE:   Vitals:  /68   Pulse 61   Temp 98.3 °F (36.8 °C) (Oral)   Resp 16   Ht 5' 5\" (1.651 m)   Wt 294 lb (133.4 kg)   LMP 10/26/2017   SpO2 98%   BMI 48.92 kg/m²      INTAKE/OUTPUT:      Intake/Output Summary (Last 24 hours) at 12/02/17 0755  Last data filed at 12/01/17 2021   Gross per 24 hour   Intake             1680 ml   Output                0 ml   Net             1680 ml                 General: AOx3, NAD  Lungs: Symmetrical chest rise bilaterally, no audible wheezes or rhonchi  Heart: S1S2, pulses present  Abdomen: Soft, ND, epigastric tenderness improved compared to previous exam  Extremity: moves all extremities x4, No edema      Data Review:  CBC:   Recent Labs      12/01/17   0642  12/02/17   0702   WBC  12.3*  9.9   HGB  10.4*  10.2*   PLT  373  414     BMP:  Recent Labs      12/01/17   0642  12/02/17   0702   NA  138  139   K  3.8  3.9   CL  104  104   CO2  20  21   BUN  11  8   CREATININE  0.73  0.62   GLUCOSE  88  88     Hepatic: No results for input(s): AST, ALT, ALB, ALKPHOS, BILITOT, BILIDIR in the last 72 hours. Coagulation: No results for input(s): APTT, PROT, INR in the last 72 hours.           ASSESSMENT     Patient Active Problem List   Diagnosis    Obesity    Gastroesophageal reflux disease without esophagitis    Asthma    Depression    Fatty liver disease, nonalcoholic    Abdominal pain    URI (upper respiratory infection)    Menorrhagia    Back pain    Pedal edema    Medication refill    RUQ abdominal pain    Diverticulitis of large intestine with perforation without abscess or bleeding    Calculus of gallbladder with chronic

## 2017-12-02 NOTE — PROGRESS NOTES
[facility]    Progress Note    12/2/2017    8:55 AM    Name:   Alexandra Jones  MRN:     [id number]     [place]:      [de-identified]   Room:   [address] [place] [address] Day:  2  Admit Date:  11/29/2017  3:43 AM    PCP:   Mirna Broussard MD  Code Status:  Full Code    Subjective:     C/C:   Chief Complaint   Patient presents with    Chest Pain    Abdominal Pain     Interval History Status: improved. Pain upper abdominal is minimal  Passed flatus, had one small  BM at night  Brief History:   Ananya Goodman is a 32 y.o. female who presents With acute on chronic right upper quadrant abdominal pain.  Patient has a history of gallstones.    The pain started one and is back more so on the right upper quadrant it was associated with nausea and vomiting and was radiating to the midsternal area. Her white cell count was elevated and was 17.6 the bedside gallbladder ultrasound showed enlarged gallbladder thickened wall and large stone, HIDA scan was done which is suggestive of chronic cholecystitis or biliary dyskinesia. She was evaluated by surgical team CT of the abdomen was done which was consistent with transverse colon diverticulitis with microperforation, she has been started on IV antibiotics and  is being kept nothing by mouth for now. Currently her pain is better by starting pain medications. She was also complaining of left ear pain in the ER and was told to be having ear infection      Review of Systems:     Constitutional:  negative for chills, fevers, sweats  Respiratory:  negative for cough, dyspnea on exertion, hemoptysis, shortness of breath, wheezing  Cardiovascular:  negative for chest pain, chest pressure/discomfort, lower extremity edema, palpitations  Gastrointestinal:  Mild upper abdominal pain, + flatus, 1 small bowel movement   Neurological:  negative for dizziness, headache    Medications: Allergies:     Allergies Allergen Reactions    Pcn [Penicillins] Hives and Shortness Of Breath    Amoxicillin Hives    Seasonal        Current Meds:   Scheduled Meds:    famotidine  20 mg Oral BID    ciprofloxacin  400 mg Intravenous Q12H    metroNIDAZOLE  500 mg Intravenous Q6H    enoxaparin  30 mg Subcutaneous BID    sodium chloride (PF)  10 mL Intravenous Daily    insulin lispro  0-6 Units Subcutaneous TID WC    insulin lispro  0-3 Units Subcutaneous Nightly    busPIRone  15 mg Oral TID    beclomethasone  1 puff Inhalation BID    cetirizine  10 mg Oral Daily    FLUoxetine  20 mg Oral Daily    fluticasone  1 spray Each Nare Daily    guaiFENesin  600 mg Oral BID    montelukast  10 mg Oral Nightly    sodium chloride flush  10 mL Intravenous 2 times per day     Continuous Infusions:    dextrose       PRN Meds: glucose, dextrose, glucagon (rDNA), dextrose, morphine, morphine, diphenhydrAMINE, promethazine, hydrOXYzine, albuterol sulfate HFA, sodium chloride flush, acetaminophen, magnesium hydroxide    Data:     Past Medical History:   has a past medical history of Asthma; Back pain; Bipolar 1 disorder ([address] Depression; Dizziness; Fatty liver disease, nonalcoholic; Fatty liver disease, nonalcoholic; GERD (gastroesophageal reflux disease); and Obesity. Social History:   reports that she has quit smoking. Her smoking use included Cigarettes. She has a 3.50 pack-year smoking history. She has never used smokeless tobacco. She reports that she uses drugs, including Marijuana.      Family History:   Family History   Problem Relation Age of Onset    High Blood Pressure Mother     Diabetes Mother     Heart Disease Mother     Heart Disease Father     Early Death Father     Cancer Maternal Aunt      lung    Cancer Paternal Cousin      brain       Vitals:  /82   Pulse 63   Temp 98.1 °F (36.7 °C) (Oral)   Resp 16   Ht 5' 5\" (1.651 m)   Wt 294 lb (133.4 kg)   LMP 10/26/2017   SpO2 94%   BMI 48.92 kg/m²   Temp effort  Heart:  regular rate and rhythm, no murmur  Abdomen:  soft, minimal discomfort in upper abdomen, nondistended, normal bowel sounds, no masses, hepatomegaly, splenomegaly  Extremities:  no edema, redness, tenderness in the calves  Skin:  no gross lesions, rashes, induration    Assessment:        Primary Problem  Diverticulitis of large intestine with perforation without abscess or bleeding-Improving with IV antibiotics    Active Hospital Problems    Diagnosis Date Noted    Diverticulitis of large intestine with perforation without abscess or bleeding [K57.20] 11/30/2017     Priority: High    RUQ abdominal pain [R10.11] 11/29/2017     Priority: High    Calculus of gallbladder with chronic cholecystitis without obstruction [K80.10] 11/30/2017     Priority: Medium    Leukocytosis [D72.829]     Type [facility] mellitus without complication [facility] [S78.9] 11/30/2017    Fatty liver disease, nonalcoholic [M49.4]     Asthma [J45.909] 02/13/2012    Depression [F32.9] 02/13/2012    Gastroesophageal reflux disease without esophagitis [K21.9] 02/13/2012    Obesity [E66.9] 11/07/2011       Plan:        1. Continue IV fluids and IV antibiotics  2. Start clear liquid diet as per surgery  3. Monitor labs  4.  Check labs in A.m.  5. Possible discharge tomorrow    Braden Fraga MD  12/2/2017  8:55 AM

## 2017-12-02 NOTE — PLAN OF CARE
[facility]    Second Visit Note  For more detailed information please refer to the progress note of the day      12/2/2017    5:21 PM    Name:   Elora Burkitt  MRN:     [id number]     [place]:      [de-identified]   Room:   [facility] Day:  2  Admit Date:  11/29/2017  3:43 AM    PCP:   Rob Burrows MD  Code Status:  Full Code        Pt vitals were reviewed   New labs were reviewed   Patient was seen    Updated plan :     1. Tolerating clear liquid diet  2. Denies pain  3.  We will plan to discharge tomorrow if stays well        Yon Brannon MD  12/2/2017  5:21 PM

## 2017-12-03 VITALS
SYSTOLIC BLOOD PRESSURE: 105 MMHG | HEART RATE: 62 BPM | BODY MASS INDEX: 48.82 KG/M2 | TEMPERATURE: 99 F | HEIGHT: 65 IN | WEIGHT: 293 LBS | DIASTOLIC BLOOD PRESSURE: 62 MMHG | RESPIRATION RATE: 16 BRPM | OXYGEN SATURATION: 96 %

## 2017-12-03 LAB
ANION GAP SERPL CALCULATED.3IONS-SCNC: 13 MMOL/L (ref 9–17)
BUN BLDV-MCNC: 8 MG/DL (ref 6–20)
BUN/CREAT BLD: ABNORMAL (ref 9–20)
CALCIUM SERPL-MCNC: 8.2 MG/DL (ref 8.6–10.4)
CHLORIDE BLD-SCNC: 103 MMOL/L (ref 98–107)
CO2: 23 MMOL/L (ref 20–31)
CREAT SERPL-MCNC: 0.73 MG/DL (ref 0.5–0.9)
GFR AFRICAN AMERICAN: >60 ML/MIN
GFR NON-AFRICAN AMERICAN: >60 ML/MIN
GFR SERPL CREATININE-BSD FRML MDRD: ABNORMAL ML/MIN/{1.73_M2}
GFR SERPL CREATININE-BSD FRML MDRD: ABNORMAL ML/MIN/{1.73_M2}
GLUCOSE BLD-MCNC: 103 MG/DL (ref 70–99)
GLUCOSE BLD-MCNC: 92 MG/DL (ref 65–105)
GLUCOSE BLD-MCNC: 94 MG/DL (ref 65–105)
HCT VFR BLD CALC: 33 % (ref 36.3–47.1)
HEMOGLOBIN: 10.5 G/DL (ref 11.9–15.1)
MCH RBC QN AUTO: 29.7 PG (ref 25.2–33.5)
MCHC RBC AUTO-ENTMCNC: 31.8 G/DL (ref 28.4–34.8)
MCV RBC AUTO: 93.2 FL (ref 82.6–102.9)
PDW BLD-RTO: 13.1 % (ref 11.8–14.4)
PLATELET # BLD: 451 K/UL (ref 138–453)
PMV BLD AUTO: 9.8 FL (ref 8.1–13.5)
POTASSIUM SERPL-SCNC: 3.5 MMOL/L (ref 3.7–5.3)
RBC # BLD: 3.54 M/UL (ref 3.95–5.11)
SODIUM BLD-SCNC: 139 MMOL/L (ref 135–144)
WBC # BLD: 10.6 K/UL (ref 3.5–11.3)

## 2017-12-03 PROCEDURE — 80048 BASIC METABOLIC PNL TOTAL CA: CPT

## 2017-12-03 PROCEDURE — 82947 ASSAY GLUCOSE BLOOD QUANT: CPT

## 2017-12-03 PROCEDURE — 99231 SBSQ HOSP IP/OBS SF/LOW 25: CPT | Performed by: SURGERY

## 2017-12-03 PROCEDURE — 6370000000 HC RX 637 (ALT 250 FOR IP): Performed by: INTERNAL MEDICINE

## 2017-12-03 PROCEDURE — 99232 SBSQ HOSP IP/OBS MODERATE 35: CPT | Performed by: INTERNAL MEDICINE

## 2017-12-03 PROCEDURE — 6360000002 HC RX W HCPCS: Performed by: STUDENT IN AN ORGANIZED HEALTH CARE EDUCATION/TRAINING PROGRAM

## 2017-12-03 PROCEDURE — 36415 COLL VENOUS BLD VENIPUNCTURE: CPT

## 2017-12-03 PROCEDURE — 6370000000 HC RX 637 (ALT 250 FOR IP): Performed by: NURSE PRACTITIONER

## 2017-12-03 PROCEDURE — 6370000000 HC RX 637 (ALT 250 FOR IP): Performed by: SURGERY

## 2017-12-03 PROCEDURE — 85027 COMPLETE CBC AUTOMATED: CPT

## 2017-12-03 PROCEDURE — 2500000003 HC RX 250 WO HCPCS: Performed by: STUDENT IN AN ORGANIZED HEALTH CARE EDUCATION/TRAINING PROGRAM

## 2017-12-03 PROCEDURE — 94762 N-INVAS EAR/PLS OXIMTRY CONT: CPT

## 2017-12-03 PROCEDURE — 6360000002 HC RX W HCPCS: Performed by: NURSE PRACTITIONER

## 2017-12-03 PROCEDURE — 6360000002 HC RX W HCPCS: Performed by: INTERNAL MEDICINE

## 2017-12-03 PROCEDURE — 6370000000 HC RX 637 (ALT 250 FOR IP): Performed by: EMERGENCY MEDICINE

## 2017-12-03 RX ORDER — CIPROFLOXACIN 500 MG/1
500 TABLET, FILM COATED ORAL 2 TIMES DAILY
Qty: 14 TABLET | Refills: 0 | Status: ON HOLD | OUTPATIENT
Start: 2017-12-03 | End: 2017-12-11 | Stop reason: HOSPADM

## 2017-12-03 RX ORDER — FLUTICASONE PROPIONATE 110 UG/1
2 AEROSOL, METERED RESPIRATORY (INHALATION) 2 TIMES DAILY
Qty: 1 INHALER | Refills: 1 | Status: ON HOLD | OUTPATIENT
Start: 2017-12-03 | End: 2020-08-13

## 2017-12-03 RX ORDER — ALBUTEROL SULFATE 90 UG/1
2 AEROSOL, METERED RESPIRATORY (INHALATION) EVERY 6 HOURS PRN
Qty: 1 INHALER | Refills: 1 | Status: SHIPPED | OUTPATIENT
Start: 2017-12-03 | End: 2020-09-13 | Stop reason: SDUPTHER

## 2017-12-03 RX ORDER — METRONIDAZOLE 500 MG/1
500 TABLET ORAL EVERY 8 HOURS SCHEDULED
Status: DISCONTINUED | OUTPATIENT
Start: 2017-12-03 | End: 2017-12-03 | Stop reason: HOSPADM

## 2017-12-03 RX ORDER — METRONIDAZOLE 500 MG/1
500 TABLET ORAL EVERY 8 HOURS SCHEDULED
Qty: 21 TABLET | Refills: 0 | Status: ON HOLD | OUTPATIENT
Start: 2017-12-03 | End: 2017-12-11 | Stop reason: HOSPADM

## 2017-12-03 RX ORDER — ONDANSETRON 4 MG/1
4 TABLET, ORALLY DISINTEGRATING ORAL EVERY 8 HOURS PRN
Qty: 20 TABLET | Refills: 0 | Status: SHIPPED | OUTPATIENT
Start: 2017-12-03 | End: 2020-02-06

## 2017-12-03 RX ORDER — ACETAMINOPHEN 325 MG/1
650 TABLET ORAL EVERY 4 HOURS PRN
Qty: 15 TABLET | Refills: 0 | Status: SHIPPED | OUTPATIENT
Start: 2017-12-03 | End: 2020-02-06

## 2017-12-03 RX ORDER — CIPROFLOXACIN 500 MG/1
500 TABLET, FILM COATED ORAL EVERY 12 HOURS SCHEDULED
Status: DISCONTINUED | OUTPATIENT
Start: 2017-12-03 | End: 2017-12-03 | Stop reason: HOSPADM

## 2017-12-03 RX ORDER — MELOXICAM 15 MG/1
TABLET ORAL
Qty: 30 TABLET | Refills: 0 | Status: ON HOLD | OUTPATIENT
Start: 2017-12-03 | End: 2017-12-11 | Stop reason: HOSPADM

## 2017-12-03 RX ORDER — FAMOTIDINE 20 MG/1
20 TABLET, FILM COATED ORAL 2 TIMES DAILY
Qty: 60 TABLET | Refills: 0 | Status: ON HOLD | OUTPATIENT
Start: 2017-12-03 | End: 2020-08-13

## 2017-12-03 RX ADMIN — FLUOXETINE HYDROCHLORIDE 20 MG: 20 CAPSULE ORAL at 10:24

## 2017-12-03 RX ADMIN — METRONIDAZOLE 500 MG: 500 INJECTION, SOLUTION INTRAVENOUS at 05:50

## 2017-12-03 RX ADMIN — FLUTICASONE PROPIONATE 1 SPRAY: 50 SPRAY, METERED NASAL at 10:23

## 2017-12-03 RX ADMIN — HYDROCODONE BITARTRATE AND ACETAMINOPHEN 2 TABLET: 5; 325 TABLET ORAL at 07:13

## 2017-12-03 RX ADMIN — CIPROFLOXACIN 500 MG: 500 TABLET, FILM COATED ORAL at 10:24

## 2017-12-03 RX ADMIN — HYDROCODONE BITARTRATE AND ACETAMINOPHEN 2 TABLET: 5; 325 TABLET ORAL at 02:21

## 2017-12-03 RX ADMIN — ENOXAPARIN SODIUM 30 MG: 30 INJECTION SUBCUTANEOUS at 10:24

## 2017-12-03 RX ADMIN — CETIRIZINE HYDROCHLORIDE 10 MG: 10 TABLET ORAL at 10:24

## 2017-12-03 RX ADMIN — FAMOTIDINE 20 MG: 20 TABLET, FILM COATED ORAL at 10:24

## 2017-12-03 RX ADMIN — BUSPIRONE HYDROCHLORIDE 15 MG: 15 TABLET ORAL at 13:23

## 2017-12-03 RX ADMIN — GUAIFENESIN 600 MG: 600 TABLET, EXTENDED RELEASE ORAL at 10:24

## 2017-12-03 RX ADMIN — METRONIDAZOLE 500 MG: 500 TABLET, FILM COATED ORAL at 13:23

## 2017-12-03 RX ADMIN — METRONIDAZOLE 500 MG: 500 TABLET, FILM COATED ORAL at 10:23

## 2017-12-03 RX ADMIN — BUSPIRONE HYDROCHLORIDE 15 MG: 15 TABLET ORAL at 10:24

## 2017-12-03 RX ADMIN — PROMETHAZINE HYDROCHLORIDE 12.5 MG: 25 INJECTION INTRAMUSCULAR; INTRAVENOUS at 01:42

## 2017-12-03 RX ADMIN — CIPROFLOXACIN 400 MG: 2 INJECTION, SOLUTION INTRAVENOUS at 07:12

## 2017-12-03 ASSESSMENT — PAIN SCALES - GENERAL
PAINLEVEL_OUTOF10: 7
PAINLEVEL_OUTOF10: 2
PAINLEVEL_OUTOF10: 8
PAINLEVEL_OUTOF10: 0

## 2017-12-03 NOTE — DISCHARGE SUMMARY
Chest Standard (2 Vw)    Result Date: 11/29/2017  EXAMINATION: TWO VIEWS OF THE CHEST 11/29/2017 4:03 am COMPARISON: December 22, 2010 HISTORY: ORDERING SYSTEM PROVIDED HISTORY: CP TECHNOLOGIST PROVIDED HISTORY: Reason for exam:->CP FINDINGS: The mediastinal and cardiac contours are normal. No lobar lung consolidation, pleural effusion or pneumothorax. The bones are stable. No radiographic evidence of acute cardiopulmonary disease. Ct Abdomen Pelvis W Iv Contrast Additional Contrast? Oral    Result Date: 12/1/2017  EXAMINATION: CT OF THE ABDOMEN AND PELVIS WITH CONTRAST 11/30/2017 2:18 am TECHNIQUE: CT of the abdomen and pelvis was performed with the administration of intravenous contrast. Multiplanar reformatted images are provided for review. Dose modulation, iterative reconstruction, and/or weight based adjustment of the mA/kV was utilized to reduce the radiation dose to as low as reasonably achievable. COMPARISON: None HISTORY: ORDERING SYSTEM PROVIDED HISTORY: RUQ and RLQ abdominal pain. Leukocytosis TECHNOLOGIST PROVIDED HISTORY: Additional Contrast?->Oral FINDINGS: Lower Chest: Bibasilar subsegmental atelectasis. Organs: The liver, gallbladder, adrenal glands, pancreas, spleen and kidneys are without acute finding. GI/Bowel: Circumferential bowel wall thickening of the transverse colon distal to the hepatic flexure with moderate surrounding inflammatory changes and punctate foci of extraluminal gas. No organized abscess. Bowel caliber is normal.  There are scattered small colonic diverticula. Normal appendix. Pelvis: The uterus is in situ. The urinary bladder is unremarkable. Peritoneum/Retroperitoneum: Aortic caliber is normal.  No significant free fluid. Bones/Soft Tissues: No acute osseous abnormality. Small fat containing periumbilical hernia. Acute perforated diverticulitis of the transverse colon. Moderate pericolonic inflammatory changes without organized abscess.  Critical results paper prescription for each of these medications  · acetaminophen 325 MG tablet  · albuterol sulfate  (90 Base) MCG/ACT inhaler  · ciprofloxacin 500 MG tablet  · famotidine 20 MG tablet  · fluticasone 110 MCG/ACT inhaler  · metroNIDAZOLE 500 MG tablet  · ondansetron 4 MG disintegrating tablet         Time Spent on discharge is  20 mins in patient examination, evaluation, counseling as well as medication reconciliation, prescriptions for required medications, discharge plan and follow up. Electronically signed by   Braden Fraga MD  12/3/2017  10:04 AM      Thank you Dr. Colin Warren MD for the opportunity to be involved in this patient's care.

## 2017-12-03 NOTE — PROGRESS NOTES
Pt tolerated full liquid and low fiber diet for breakfast and lunch. Wanting to be discharged. Orders obtained, meds delivered per meds to bed. Pt to follow up with PCP and surgery clinic.

## 2017-12-03 NOTE — PROGRESS NOTES
headache    Medications: Allergies: Allergies   Allergen Reactions    Pcn [Penicillins] Hives and Shortness Of Breath    Amoxicillin Hives    Seasonal        Current Meds:   Scheduled Meds:    ciprofloxacin  500 mg Oral 2 times per day    metroNIDAZOLE  500 mg Oral 3 times per day    famotidine  20 mg Oral BID    enoxaparin  30 mg Subcutaneous BID    sodium chloride (PF)  10 mL Intravenous Daily    insulin lispro  0-6 Units Subcutaneous TID WC    insulin lispro  0-3 Units Subcutaneous Nightly    busPIRone  15 mg Oral TID    beclomethasone  1 puff Inhalation BID    cetirizine  10 mg Oral Daily    FLUoxetine  20 mg Oral Daily    fluticasone  1 spray Each Nare Daily    guaiFENesin  600 mg Oral BID    montelukast  10 mg Oral Nightly    sodium chloride flush  10 mL Intravenous 2 times per day     Continuous Infusions:    dextrose       PRN Meds: HYDROcodone 5 mg - acetaminophen **OR** HYDROcodone 5 mg - acetaminophen, glucose, dextrose, glucagon (rDNA), dextrose, diphenhydrAMINE, promethazine, hydrOXYzine, albuterol sulfate HFA, sodium chloride flush, acetaminophen, magnesium hydroxide    Data:     Past Medical History:   has a past medical history of Asthma; Back pain; Bipolar 1 disorder ([address] Depression; Dizziness; Fatty liver disease, nonalcoholic; Fatty liver disease, nonalcoholic; GERD (gastroesophageal reflux disease); and Obesity. Social History:   reports that she has quit smoking. Her smoking use included Cigarettes. She has a 3.50 pack-year smoking history. She has never used smokeless tobacco. She reports that she uses drugs, including Marijuana.      Family History:   Family History   Problem Relation Age of Onset    High Blood Pressure Mother     Diabetes Mother     Heart Disease Mother     Heart Disease Father     Early Death Father     Cancer Maternal Aunt      lung    Cancer Paternal Cousin      brain       Vitals:  /77   Pulse 68   Temp 98.5 °F (36.9 °C) (Oral) Resp 18   Ht 5' 5\" (1.651 m)   Wt 294 lb (133.4 kg)   LMP 10/26/2017   SpO2 98%   BMI 48.92 kg/m²   Temp (24hrs), Av.7 °F (37.1 °C), Min:98.4 °F (36.9 °C), Max:99.1 °F (37.3 °C)    Recent Labs      17   1135  17   1705  17   2203  17   0720   POCGLU  92  94  103  92       I/O (24Hr): Intake/Output Summary (Last 24 hours) at 17 0812  Last data filed at 17 0743   Gross per 24 hour   Intake             1365 ml   Output                0 ml   Net             1365 ml       Labs:    Hematology:  Recent Labs      17   8246  17   0702  17   0706   WBC  12.3*  9.9  10.6   HGB  10.4*  10.2*  10.5*   HCT  34.8*  32.8*  33.0*   PLT  373  414  451     Chemistry:  Recent Labs      17   0642  17   0702  17   0706   NA  138  139  139   K  3.8  3.9  3.5*   CL  104  104  103   CO2  20  21  23   GLUCOSE  88  88  103*   BUN  11  8  8   CREATININE  0.73  0.62  0.73   ANIONGAP  14  14  13   LABGLOM  >60  >60  >60   GFRAA  >60  >60  >60   CALCIUM  7.9*  8.1*  8.2*     No results for input(s): PROT, LABALBU, LABA1C, G3ZKBLF, P3LYXPY, FT4, TSH, AST, ALT, LDH, GGT, ALKPHOS, BILITOT, BILIDIR, AMMONIA, AMYLASE, LIPASE, LACTATE, CHOL, HDL, LDLCHOLESTEROL, CHOLHDLRATIO, TRIG, VLDL, PHENYTOIN, PHENYF in the last 72 hours. Lab Results   Component Value Date/Time    SPECIAL NOT REPORTED 2017 07:38 AM     Lab Results   Component Value Date/Time    CULTURE NO SIGNIFICANT GROWTH 2017 07:38 AM    CULTURE  2017 07:38 AM     [address] [phone]       No results found for: POCPH, PHART, PH, POCPCO2, YAB0SYO, PCO2, POCPO2, PO2ART, PO2, POCHCO3, NMB7NPB, HCO3, NBEA, PBEA, BEART, BE, THGBART, THB, MST3JSO, IDIY7CER, W0RXASKH, O2SAT, FIO2    Radiology:  CT abdomen  Impression   Acute perforated diverticulitis of the transverse colon.  Moderate   pericolonic inflammatory changes without organized abscess. Physical Examination:        General appearance:  alert, cooperative and no distress  Mental Status:  oriented to person, place and time and normal affect  Lungs:  clear to auscultation bilaterally, normal effort  Heart:  regular rate and rhythm, no murmur  Abdomen:  soft, no discomfort in upper abdomen, nondistended, normal bowel sounds, no masses, hepatomegaly, splenomegaly  Extremities:  no edema, redness, tenderness in the calves  Skin:  no gross lesions, rashes, induration    Assessment:        Primary Problem  Diverticulitis of large intestine with perforation without abscess or bleeding-Improved  with IV antibiotics    Active Hospital Problems    Diagnosis Date Noted    Diverticulitis of large intestine with perforation without abscess or bleeding [K57.20] 11/30/2017     Priority: High    RUQ abdominal pain [R10.11] 11/29/2017     Priority: High    Calculus of gallbladder with chronic cholecystitis without obstruction [K80.10] 11/30/2017     Priority: Medium    Leukocytosis [D72.829]     [facility] [N45.5] 11/30/2017    Fatty liver disease, nonalcoholic [R33.4]     Asthma [J45.909] 02/13/2012    Depression [F32.9] 02/13/2012    Gastroesophageal reflux disease without esophagitis [K21.9] 02/13/2012    Obesity [E66.9] 11/07/2011       Plan:        1. Change antibiotics to oral  2. Start full liquid diet as per surgery  3.  Discharge home if tolerates diet    Discussed with surgical team    Ivan Martinez MD  12/3/2017  8:12 AM

## 2017-12-03 NOTE — PROGRESS NOTES
General Surgery Progress Note            PATIENT NAME: Glynn Stark     TODAY'S DATE: 12/3/2017, 7:23 AM    Chief Complaint   Patient presents with    Chest Pain    Abdominal Pain       SUBJECTIVE:    Pt seen and examined. No acute events overnight. Passing flatus and stool. Tolerating clear liquids. Abdominal pain nearly resolved. No new issues. OBJECTIVE:   Vitals:  /77   Pulse 68   Temp 98.5 °F (36.9 °C) (Oral)   Resp 18   Ht 5' 5\" (1.651 m)   Wt 294 lb (133.4 kg)   LMP 10/26/2017   SpO2 98%   BMI 48.92 kg/m²      INTAKE/OUTPUT:    No intake or output data in the 24 hours ending 12/03/17 0723              General: AOx3, NAD  Lungs: Symmetrical chest rise bilaterally, no audible wheezes or rhonchi  Heart: S1S2  Abdomen: Soft, ND, minimally tender mid epigastrum  Extremity: moves all extremities x4, No edema      Data Review:  CBC:   Recent Labs      12/01/17   0642  12/02/17   0702  12/03/17   0706   WBC  12.3*  9.9  10.6   HGB  10.4*  10.2*  10.5*   PLT  373  414  451     BMP:    Recent Labs      12/01/17   0642  12/02/17   0702   NA  138  139   K  3.8  3.9   CL  104  104   CO2  20  21   BUN  11  8   CREATININE  0.73  0.62   GLUCOSE  88  88     Hepatic: No results for input(s): AST, ALT, ALB, ALKPHOS, BILITOT, BILIDIR in the last 72 hours. Coagulation: No results for input(s): APTT, PROT, INR in the last 72 hours.           ASSESSMENT     Patient Active Problem List   Diagnosis    Obesity    Gastroesophageal reflux disease without esophagitis    Asthma    Depression    Fatty liver disease, nonalcoholic    Abdominal pain    URI (upper respiratory infection)    Menorrhagia    Back pain    Pedal edema    Medication refill    RUQ abdominal pain    Diverticulitis of large intestine with perforation without abscess or bleeding    Calculus of gallbladder with chronic cholecystitis without obstruction    Type 2 diabetes mellitus without complication (HCC)    Leukocytosis

## 2017-12-03 NOTE — DISCHARGE INSTR - DIET

## 2017-12-08 ENCOUNTER — HOSPITAL ENCOUNTER (INPATIENT)
Age: 27
LOS: 3 days | Discharge: HOME | DRG: 753 | End: 2017-12-11
Attending: EMERGENCY MEDICINE | Admitting: PSYCHIATRY & NEUROLOGY
Payer: MEDICAID

## 2017-12-08 DIAGNOSIS — F41.9 ANXIETY: ICD-10-CM

## 2017-12-08 DIAGNOSIS — F32.A DEPRESSION, UNSPECIFIED DEPRESSION TYPE: Primary | ICD-10-CM

## 2017-12-08 PROBLEM — F32.9 MAJOR DEPRESSION, SINGLE EPISODE: Status: ACTIVE | Noted: 2017-12-08

## 2017-12-08 PROBLEM — F31.9 BIPOLAR DISORDER (HCC): Status: ACTIVE | Noted: 2017-12-08

## 2017-12-08 PROCEDURE — 6370000000 HC RX 637 (ALT 250 FOR IP): Performed by: PSYCHIATRY & NEUROLOGY

## 2017-12-08 PROCEDURE — 1240000000 HC EMOTIONAL WELLNESS R&B

## 2017-12-08 PROCEDURE — 99285 EMERGENCY DEPT VISIT HI MDM: CPT

## 2017-12-08 RX ORDER — DIPHENHYDRAMINE HCL 25 MG
25 TABLET ORAL NIGHTLY PRN
Status: DISCONTINUED | OUTPATIENT
Start: 2017-12-08 | End: 2017-12-11 | Stop reason: HOSPADM

## 2017-12-08 RX ORDER — CIPROFLOXACIN 500 MG/1
500 TABLET, FILM COATED ORAL 2 TIMES DAILY
Status: DISCONTINUED | OUTPATIENT
Start: 2017-12-08 | End: 2017-12-11 | Stop reason: HOSPADM

## 2017-12-08 RX ORDER — BUSPIRONE HYDROCHLORIDE 15 MG/1
15 TABLET ORAL 3 TIMES DAILY
Status: DISCONTINUED | OUTPATIENT
Start: 2017-12-08 | End: 2017-12-11 | Stop reason: HOSPADM

## 2017-12-08 RX ORDER — ALBUTEROL SULFATE 90 UG/1
2 AEROSOL, METERED RESPIRATORY (INHALATION) EVERY 6 HOURS PRN
Status: DISCONTINUED | OUTPATIENT
Start: 2017-12-08 | End: 2017-12-11 | Stop reason: HOSPADM

## 2017-12-08 RX ORDER — MONTELUKAST SODIUM 10 MG/1
10 TABLET ORAL NIGHTLY
Status: DISCONTINUED | OUTPATIENT
Start: 2017-12-08 | End: 2017-12-11 | Stop reason: HOSPADM

## 2017-12-08 RX ORDER — OLANZAPINE 10 MG/1
10 TABLET ORAL NIGHTLY
Status: DISCONTINUED | OUTPATIENT
Start: 2017-12-08 | End: 2017-12-11 | Stop reason: HOSPADM

## 2017-12-08 RX ORDER — ONDANSETRON 4 MG/1
4 TABLET, ORALLY DISINTEGRATING ORAL EVERY 8 HOURS PRN
Status: DISCONTINUED | OUTPATIENT
Start: 2017-12-08 | End: 2017-12-11 | Stop reason: HOSPADM

## 2017-12-08 RX ORDER — CETIRIZINE HYDROCHLORIDE 10 MG/1
10 TABLET ORAL DAILY
Status: DISCONTINUED | OUTPATIENT
Start: 2017-12-08 | End: 2017-12-11 | Stop reason: HOSPADM

## 2017-12-08 RX ORDER — TRAZODONE HYDROCHLORIDE 50 MG/1
50 TABLET ORAL NIGHTLY PRN
Status: DISCONTINUED | OUTPATIENT
Start: 2017-12-08 | End: 2017-12-11 | Stop reason: HOSPADM

## 2017-12-08 RX ORDER — METRONIDAZOLE 500 MG/1
500 TABLET ORAL EVERY 8 HOURS SCHEDULED
Status: DISCONTINUED | OUTPATIENT
Start: 2017-12-08 | End: 2017-12-11 | Stop reason: HOSPADM

## 2017-12-08 RX ORDER — HYDROXYZINE HYDROCHLORIDE 25 MG/1
25 TABLET, FILM COATED ORAL 3 TIMES DAILY PRN
Status: DISCONTINUED | OUTPATIENT
Start: 2017-12-08 | End: 2017-12-11 | Stop reason: HOSPADM

## 2017-12-08 RX ORDER — ACETAMINOPHEN 325 MG/1
650 TABLET ORAL EVERY 4 HOURS PRN
Status: DISCONTINUED | OUTPATIENT
Start: 2017-12-08 | End: 2017-12-11 | Stop reason: HOSPADM

## 2017-12-08 RX ORDER — FAMOTIDINE 20 MG/1
20 TABLET, FILM COATED ORAL 2 TIMES DAILY
Status: DISCONTINUED | OUTPATIENT
Start: 2017-12-08 | End: 2017-12-11 | Stop reason: HOSPADM

## 2017-12-08 RX ORDER — BENZTROPINE MESYLATE 1 MG/ML
2 INJECTION INTRAMUSCULAR; INTRAVENOUS 2 TIMES DAILY PRN
Status: DISCONTINUED | OUTPATIENT
Start: 2017-12-08 | End: 2017-12-11 | Stop reason: HOSPADM

## 2017-12-08 RX ORDER — FLUTICASONE PROPIONATE 50 MCG
1 SPRAY, SUSPENSION (ML) NASAL DAILY PRN
Status: DISCONTINUED | OUTPATIENT
Start: 2017-12-08 | End: 2017-12-11 | Stop reason: HOSPADM

## 2017-12-08 RX ORDER — MAGNESIUM HYDROXIDE/ALUMINUM HYDROXICE/SIMETHICONE 120; 1200; 1200 MG/30ML; MG/30ML; MG/30ML
30 SUSPENSION ORAL PRN
Status: DISCONTINUED | OUTPATIENT
Start: 2017-12-08 | End: 2017-12-11 | Stop reason: HOSPADM

## 2017-12-08 RX ADMIN — FAMOTIDINE 20 MG: 20 TABLET ORAL at 21:07

## 2017-12-08 RX ADMIN — TRAZODONE HYDROCHLORIDE 50 MG: 50 TABLET ORAL at 21:07

## 2017-12-08 RX ADMIN — OLANZAPINE 10 MG: 10 TABLET, FILM COATED ORAL at 21:08

## 2017-12-08 RX ADMIN — CIPROFLOXACIN HYDROCHLORIDE 500 MG: 500 TABLET, FILM COATED ORAL at 21:08

## 2017-12-08 RX ADMIN — BUSPIRONE HYDROCHLORIDE 15 MG: 15 TABLET ORAL at 17:17

## 2017-12-08 RX ADMIN — MONTELUKAST SODIUM 10 MG: 10 TABLET, FILM COATED ORAL at 21:08

## 2017-12-08 RX ADMIN — CETIRIZINE HYDROCHLORIDE 10 MG: 10 TABLET, FILM COATED ORAL at 17:17

## 2017-12-08 RX ADMIN — METRONIDAZOLE 500 MG: 500 TABLET ORAL at 17:17

## 2017-12-08 RX ADMIN — BECLOMETHASONE DIPROPIONATE 2 PUFF: 80 AEROSOL, METERED RESPIRATORY (INHALATION) at 21:09

## 2017-12-08 RX ADMIN — METFORMIN HYDROCHLORIDE 500 MG: 500 TABLET, FILM COATED ORAL at 17:17

## 2017-12-08 RX ADMIN — BUSPIRONE HYDROCHLORIDE 15 MG: 15 TABLET ORAL at 21:08

## 2017-12-08 RX ADMIN — METRONIDAZOLE 500 MG: 500 TABLET ORAL at 21:08

## 2017-12-08 ASSESSMENT — ENCOUNTER SYMPTOMS
NAUSEA: 0
SORE THROAT: 0
SHORTNESS OF BREATH: 0
COUGH: 0
EYE REDNESS: 0
VOMITING: 0
RHINORRHEA: 0
EYE DISCHARGE: 0
DIARRHEA: 0
COLOR CHANGE: 0

## 2017-12-08 ASSESSMENT — SLEEP AND FATIGUE QUESTIONNAIRES
AVERAGE NUMBER OF SLEEP HOURS: 3
DIFFICULTY STAYING ASLEEP: YES
DIFFICULTY FALLING ASLEEP: YES
AVERAGE NUMBER OF SLEEP HOURS: 3
DIFFICULTY FALLING ASLEEP: YES
DO YOU USE A SLEEP AID: NO
DO YOU HAVE DIFFICULTY SLEEPING: YES
DO YOU USE A SLEEP AID: NO
RESTFUL SLEEP: NO
RESTFUL SLEEP: NO
SLEEP PATTERN: DIFFICULTY FALLING ASLEEP;DIFFICULTY ARISING;DISTURBED/INTERRUPTED SLEEP
DIFFICULTY ARISING: YES
DO YOU HAVE DIFFICULTY SLEEPING: YES
DIFFICULTY STAYING ASLEEP: YES
DIFFICULTY ARISING: YES
SLEEP PATTERN: DIFFICULTY FALLING ASLEEP;DIFFICULTY ARISING;DISTURBED/INTERRUPTED SLEEP

## 2017-12-08 ASSESSMENT — LIFESTYLE VARIABLES
HISTORY_ALCOHOL_USE: NO

## 2017-12-08 ASSESSMENT — PATIENT HEALTH QUESTIONNAIRE - PHQ9: SUM OF ALL RESPONSES TO PHQ QUESTIONS 1-9: 18

## 2017-12-08 NOTE — ED NOTES
Provisional Diagnosis:   Depression    Psychosocial and Contextual Factors:   Pt lost a son and her other did not want to live with her. Pt is going through a divorce. Pt states she might have cancer. C-SSRS Summary:  x    Patient: x  Family:   Agency:       Present Suicidal Behavior:  x    Verbal: Pt is suicidal with a plan to slit her wrists, and overdose on pills if the slitting her wrists didn't work    Attempt:Pt had intent but was caught before she attempted    Past Suicidal Behavior: x    Verbal:Pt states she 'used to cut all the time' with the intention of killing herself      Self-Injurious/Self-Mutilation:Pt states she hits walls and pulls her hair    Trauma Identified:  Pt reported she has experienced trauma but did not want to disclose details      Protective Factors:    Pt has housing. Pt has an income. Risk Factors:    Pt has minimal social supports. Pt is not linked with a CMHA. Pt has poor coping skills. Clinical Summary:    Teresita Ty is a 32year old female who was presented to the ED via [facility]. Pt states her friend called [facility] to bring her in because she was 'going to commit suicide'. Pt is suicidal with a plan to slit her wrists, and then overdose on pills if slitting her wrists isn't effective. Pt states she has been feeling suicidal since her dad  15 years ago, but the ideations have gotten worse in the past couple months. Pt states she 'messed up everything' in her life. Pt states she lost a baby and her other son did not want to live with her so he lives with his grandma. Pt is going through a divorce. Pt states she might have cancer. Pt states she is not currently homicidal but she has homicidal ideations sometimes, not directed at a particular person. Pt states she last attempted suicide when she was 14 by cutting her wrists. Pt states she used to cut herself 'all the time' with the intent of killing herself. Pt states she hears voices in her head, pt denies command hallucinations.  Pt denies delusions. Pt states she smokes marijuana 'once in a while', the last time being 'a couple days ago'. Pt states she has been diagnosed with Bipolar and Anxiety disorder. Pt is not linked with a CMHA. Pt states she does not take any psych medications. There is no record of admission to the [facility], but pt reports being admitted to a psych hospital 'a couple of years ago'. Pt states she does not sleep well or eat well. Level of Care Disposition:    Dr. Donna Barksdale consulted and accepted patient for admission to the [facility] for safety and stabilization.

## 2017-12-08 NOTE — BH NOTE

## 2017-12-08 NOTE — CARE COORDINATION
Brief Intervention and Referral to Treatment Summary    Patient was provided PHQ-9, AUDIT and DAST Screening:      PHQ-9 Score:  AUDIT Score: 0  DAST Score: 1 Risky Brief Education/ Brief Intervention    Patients substance use is considered    Low Risk/Healthy  Risk X DAST  Harmful  Dependent    Patients depression is considered:    Minimal  Mild   Moderate  Moderately Severe X  Severe    Brief Education was provided: KATE    Patient was receptive  Patient was not receptive      Brief Intervention Is Provided (Only for AUDIT or DAST, there is no brief intervention for Depression)  KATE    Patient reports readiness to decrease and/or stop use and a plan was discussed   Patient denies readiness to decrease and/or stop use and a plan was not discussed      Recommendations/Referrals for Brief and/or Specialized Treatment Provided to Patient Sw will provide  Education and intervention as needed.

## 2017-12-08 NOTE — ED PROVIDER NOTES
[facility]  eMERGENCY dEPARTMENT eNCOUnter      Pt Name: Srinivasan Cohen  MRN: [id number]  [place] 1990  Date of evaluation: 2017    CHIEF COMPLAINT       Chief Complaint   Patient presents with   [address] Problem     here per TPD    Suicidal         HISTORY OF PRESENT ILLNESS    Srinivasan Cohen is a 32 y.o. female who presents The of the [place] [employer]. The patient was at home this evening and threatened to slit her wrists to kill herself. Patient complains of severe depression and suicidal ideation. She has feelings worthlessness. She described her symptoms as severe. She does not take any psychiatric medicines. REVIEW OF SYSTEMS         Review of Systems   Constitutional: Negative for chills and fever. HENT: Negative for rhinorrhea and sore throat. Eyes: Negative for discharge, redness and visual disturbance. Respiratory: Negative for cough and shortness of breath. Cardiovascular: Negative for chest pain, palpitations and leg swelling. Gastrointestinal: Negative for diarrhea, nausea and vomiting. Genitourinary: Negative for dysuria and hematuria. Musculoskeletal: Negative for arthralgias, myalgias and neck pain. Skin: Negative for color change and rash. Neurological: Negative for seizures, weakness and headaches. Psychiatric/Behavioral: Positive for dysphoric mood and suicidal ideas. Negative for hallucinations and self-injury. PAST MEDICAL HISTORY    has a past medical history of Asthma; Back pain; Bipolar 1 disorder ([address] Depression; Dizziness; Fatty liver disease, nonalcoholic; Fatty liver disease, nonalcoholic; GERD (gastroesophageal reflux disease); and Obesity. SURGICAL HISTORY      has a past surgical history that includes  section and LEEP. CURRENT MEDICATIONS       Previous Medications    ACCU-CHEK MULTICLIX LANCETS MISC    USE AS INSTRUCTED.     ACETAMINOPHEN (TYLENOL) 325 MG TABLET    Take 2 tablets by mouth every 4 hours as needed for Pain    ALBUTEROL SULFATE HFA (PROVENTIL HFA) 108 (90 BASE) MCG/ACT INHALER    Inhale 2 puffs into the lungs every 6 hours as needed for Wheezing    BLOOD GLUCOSE MONITORING SUPPL (ACURA BLOOD GLUCOSE METER) W/DEVICE KIT    Use as instructed    BUSPIRONE (BUSPAR) 15 MG TABLET    Take 1 tablet by mouth 3 times daily    CETIRIZINE (ZYRTEC) 10 MG TABLET    Take 1 tablet by mouth daily    CHLORHEXIDINE (PERIDEX) 0.12 % SOLUTION        CIPROFLOXACIN (CIPRO) 500 MG TABLET    Take 1 tablet by mouth 2 times daily for 7 days    FAMOTIDINE (PEPCID) 20 MG TABLET    Take 1 tablet by mouth 2 times daily    FLUOXETINE (PROZAC) 20 MG CAPSULE    TAKE ONE (1) CAPSULE BY MOUTH ONCE DAILY    FLUTICASONE (FLONASE) 50 MCG/ACT NASAL SPRAY    SPRAY 1 SPRAY IN EACH NOSTRIL ONCE DAILY    FLUTICASONE (FLOVENT HFA) 110 MCG/ACT INHALER    Inhale 2 puffs into the lungs 2 times daily    GLUCOSE BLOOD VI TEST STRIPS (ACURA BLOOD GLUCOSE TEST) STRIP    1 each by In Vitro route daily As needed. GUAIFENESIN (MUCINEX) 600 MG EXTENDED RELEASE TABLET    TAKE 1 TABLET BY MOUTH 2 TIMES DAILY    HYDROXYZINE (ATARAX) 25 MG TABLET    Take 25 mg by mouth 3 times daily as needed for Itching    LORATADINE (CLARITIN) 10 MG TABLET    TAKE ONE (1) TABLET BY MOUTH ONCE DAILY    MELOXICAM (MOBIC) 15 MG TABLET    TAKE 1 TABLET BY MOUTH ONCE DAILY    METFORMIN (GLUCOPHAGE) 500 MG TABLET    TAKE 1 TABLET BY MOUTH DAILY    METRONIDAZOLE (FLAGYL) 500 MG TABLET    Take 1 tablet by mouth every 8 hours for 7 days    [facility].  DEVICES (WRIST BRACE) [facility]    Dispense 1 rt cock up wrist splint for CTS    MONTELUKAST (SINGULAIR) 10 MG TABLET    Take 1 tablet by mouth nightly    ONDANSETRON (ZOFRAN ODT) 4 MG DISINTEGRATING TABLET    Take 1 tablet by mouth every 8 hours as needed for Nausea    RESPIRATORY THERAPY SUPPLIES (NEBULIZER/TUBING/MOUTHPIECE) KIT    Use ever 4 hours prn    TRIAMCINOLONE (KENALOG) 0.1 % OINTMENT    APPLY TO AFFECTED AREA TWICE DAILY       ALLERGIES     is allergic to pcn [penicillins]; amoxicillin; and seasonal.    FAMILY HISTORY     indicated that her mother is alive. She indicated that her father is . She indicated that her maternal aunt is . She indicated that her paternal cousin is . family history includes Cancer in her maternal aunt and paternal cousin; Diabetes in her mother; Early Death in her father; Heart Disease in her father and mother; High Blood Pressure in her mother. SOCIAL HISTORY      reports that she has quit smoking. Her smoking use included Cigarettes. She has a 3.50 pack-year smoking history. She has never used smokeless tobacco. She reports that she uses drugs, including Marijuana. PHYSICAL EXAM     INITIAL VITALS:  height is 5' 5\" (1.651 m) and weight is 293 lb (132.9 kg). Her oral temperature is 98.4 °F (36.9 °C). Her blood pressure is 139/93 (abnormal) and her pulse is 77. Her respiration is 16 and oxygen saturation is 77% (abnormal). Physical Exam   Constitutional: She is oriented to person, place, and time. She appears well-developed and well-nourished. Non-toxic appearance. She does not appear ill. HENT:   Head: Normocephalic and atraumatic. Eyes: Conjunctivae and EOM are normal. Pupils are equal, round, and reactive to light. Neck: Trachea normal and normal range of motion. Neck supple. Cardiovascular: Normal rate, regular rhythm, S1 normal and S2 normal.    No murmur heard. Pulmonary/Chest: Effort normal and breath sounds normal. No accessory muscle usage. No respiratory distress. She exhibits no tenderness and no deformity. Abdominal: Normal appearance and bowel sounds are normal. She exhibits no distension, no abdominal bruit and no ascites. There is no tenderness. There is no rigidity, no rebound and no guarding. Neurological: She is alert and oriented to person, place, and time. GCS eye subscore is 4. GCS verbal subscore is 5.

## 2017-12-08 NOTE — ED NOTES
Pt escorted to the [facility] via [facility] staff. Pt's belongings in possession.

## 2017-12-08 NOTE — BH NOTE
`[facility]  Admission Note     Admission Type:   Admission Type: Voluntary    Reason for admission:  Reason for Admission: Voluntary from [facility], having SI to cut wrists and if that didnt work was going to OD on pills    PATIENT STRENGTHS:  Strengths: Medication Compliance, No significant Physical Illness    Patient Strengths and Limitations:  Limitations: Difficulty problem solving/relies on others to help solve problems    Addictive Behavior:   Addictive Behavior  In the past 3 months, have you felt or has someone told you that you have a problem with:  : None  Do you have a history of Chemical Use?: No  Do you have a history of Alcohol Use?: No  Do you have a history of Street Drug Abuse?: No  Histroy of Prescripton Drug Abuse?: No    Medical Problems:   Past Medical History:   Diagnosis Date    Asthma     Back pain 9/16/2014    [facility]     Depression     Diabetes mellitus ([facility]     Dizziness     Fatty liver disease, nonalcoholic     Fatty liver disease, nonalcoholic     GERD (gastroesophageal reflux disease)     Obesity        Status EXAM:  Status and Exam  Normal: No  Facial Expression: Avoids Gaze, Flat, Sad, Expressionless  Affect: Appropriate  Level of Consciousness: Alert  Mood:Normal: No  Mood: Depressed, Helpless, Sad  Motor Activity:Normal: No  Motor Activity: Decreased  Interview Behavior: Cooperative  Preception: [place] to Person, [place] to Time, [place] to Place, [place] to Situation  Attention:Normal: No  Attention: Distractible  Thought Processes: Blocking  Thought Content:Normal: No  Thought Content: Poverty of Content  Hallucinations:  Auditory (Comment) (\"sometimes, but i can't make them out\")  Delusions: No  Memory:Normal: Yes  Insight and Judgment: Yes  Present Suicidal Ideation: No (was having SI in ER, upon admit denies)  Present Homicidal Ideation: No    Tobacco Screening:  Practical Counseling, on admission, melinda X, if applicable and completed (first 3 are required if patient doesn't refuse):            ( )  Recognizing danger situations (included triggers and roadblocks)                    ( )  Coping skills (new ways to manage stress, exercise, relaxation techniques, changing routine, distraction)                                                           ( )  Basic information about quitting (benefits of quitting, techniques in how to quit, available resources  ( ) Referral for counseling faxed to [facility]                                           (x) Patient refused counseling  ( ) Patient has not smoked in the last 30 days      Pt admitted with followings belongings:  Dentures: None  Vision - Corrective Lenses: None  Hearing Aid: None  Jewelry: Ring, Earrings, Bracelet, Other (Comment) (wedding band set, tongue ring. gray and black colored wedding band with clear stone.)  Body Piercings Removed: No  Clothing: Footwear, Jacket / coat, Pants, Shirt, Socks, Undergarments (Comment)  Were All Patient Medications Collected?: Not Applicable  Other Valuables: Cell phone     Valuables placed in safe in security envelope, number:  [id number]. Patient's home medications were N/A. Patient oriented to surroundings and program expectations and copy of patient rights given. Received admission packet:  yes. Consents reviewed, signed yes. Patient verbalize understanding:  Yes. Patient education on precautions: yes.                    Raphael Connell RN

## 2017-12-08 NOTE — PLAN OF CARE
Problem: Altered Mood, Depressive Behavior  Goal: LTG-Able to verbalize acceptance of life and situations over which he or she has no control  Outcome: Ongoing  [id number] [address]  Initial Interdisciplinary Treatment Plan NO      Original treatment plan Date & Time: 12/8/2017 0728    Admission Type:  Admission Type: Voluntary    Reason for admission:   Reason for Admission: Voluntary from [facility], having SI to cut wrists and if that didnt work was going to OD on pills    Estimated Length of Stay:  5-7days  Estimated Discharge Date: to be determined by physician    PATIENT STRENGTHS:  Patient Strengths:Strengths: Medication Compliance, No significant Physical Illness  Patient Strengths and Limitations:Limitations: Difficulty problem solving/relies on others to help solve problems  Addictive Behavior: Addictive Behavior  In the past 3 months, have you felt or has someone told you that you have a problem with:  : None  Do you have a history of Chemical Use?: No  Do you have a history of Alcohol Use?: No  Do you have a history of Street Drug Abuse?: No  Histroy of Prescripton Drug Abuse?: No  Medical Problems:  Past Medical History:   Diagnosis Date    Asthma     Back pain 9/16/2014    [facility]     Depression     Diabetes mellitus ([email or web address]     Dizziness     Fatty liver disease, nonalcoholic     Fatty liver disease, nonalcoholic     GERD (gastroesophageal reflux disease)     Obesity      Status EXAM:Status and Exam  Normal: No  Facial Expression: Avoids Gaze, Flat, Sad, Expressionless  Affect: Appropriate  Level of Consciousness: Alert  Mood:Normal: No  Mood: Depressed, Helpless, Sad  Motor Activity:Normal: No  Motor Activity: Decreased  Interview Behavior: Cooperative  Preception: [place] to Person, [place] to Time, [place] to Place, [place] to Situation  Attention:Normal: No  Attention: Distractible  Thought Processes: Blocking  Thought Content:Normal: No  Thought Content: Poverty of

## 2017-12-08 NOTE — PLAN OF CARE
Problem: Altered Mood, Depressive Behavior  Goal: LTG-Able to verbalize acceptance of life and situations over which he or she has no control  Outcome: Ongoing  Pt verbalizes that her depression is a 8 on a scale of 1-10 along with her anxiety being an 8 on a scale of 1-10. Pt verbalizes that she knows she needs to be here to get better but just wants to go home. Pt agrees that she needs to focus on her mind and mood and getting them in the right place to go home to her son.

## 2017-12-08 NOTE — CARE COORDINATION
BHI Biopsychosocial Assessment    Current Level of Psychosocial Functioning     Independent   Dependent  X  Minimal Assist     :    Psychosocial High Risk Factors (check all that apply)    Unable to obtain meds   Chronic illness/pain    Substance abuse X  Lack of Family Support X  Financial stress   Isolation X  Inadequate Community Resources  Suicide attempt(s)  Not taking medications X  Victim of crime   Developmental Delay  Unable to manage personal needs    Age 72 or older   Homeless   No transportation   Readmission within 30 days  Unemployment  Traumatic Event X PT reports that she had  A miscarriage one week ago and reports to be going through a divorce. Psychiatric Advanced Directives: none reported    Family to Involve in Treatment: lack of family support    Sexual Orientation:  Heterosexual    Patient Strengths: has housing, has income    Patient Barriers: minimum social supports, not linked with [facility],  Has poor social skills. Opiate Education Provided: N/A PT does not have any documented history of heroin use, or opiate use. CMHC/mental health history: PT is not currently linked with mental health agency, she reports being linked to [employer] [id number] in the past and would li8ke to be relinked upon discharge. Plan of Care:  medication management, group/individual therapies, family meetings, psycho -education, treatment team meetings to assist with stabilization, referral to community resources. Initial Discharge Plan:  PT reports a plan to return to her home in [facility]. Clinical Summary from ED :  Tiffani Carranza is a 32year old female who was presented to the ED via [facility]. Pt states her friend called [employer] to bring her in because she was 'going to commit suicide'. Pt is suicidal with a plan to slit her wrists, and then overdose on pills if slitting her wrists isn't effective.  Pt states she has been feeling suicidal since her dad  12 years ago, but the ideations have gotten worse in the past couple months. Pt states she 'messed up everything' in her life. Pt states she lost a baby and her other son did not want to live with her so he lives with his grandma. Pt is going through a divorce. Pt states she might have cancer. Pt states she is not currently homicidal but she has homicidal ideations sometimes, not directed at a particular person. Pt states she last attempted suicide when she was 14 by cutting her wrists. Pt states she used to cut herself 'all the time' with the intent of killing herself. Pt states she hears voices in her head, pt denies command hallucinations. Pt denies delusions. Pt states she smokes marijuana 'once in a while', the last time being 'a couple days ago'. Pt states she has been diagnosed with Bipolar and Anxiety disorder. Pt is not linked with a CM. Pt states she does not take any psych medications. There is no record of admission to the [facility], but pt reports being admitted to a psych hospital 'a couple of years ago'.  Pt states she does not sleep well or eat well.

## 2017-12-09 LAB
ABSOLUTE EOS #: 0.3 K/UL (ref 0–0.4)
ABSOLUTE IMMATURE GRANULOCYTE: ABNORMAL K/UL (ref 0–0.3)
ABSOLUTE LYMPH #: 3.1 K/UL (ref 1–4.8)
ABSOLUTE MONO #: 0.7 K/UL (ref 0.1–1.3)
ALBUMIN SERPL-MCNC: 3.4 G/DL (ref 3.5–5.2)
ALBUMIN/GLOBULIN RATIO: ABNORMAL (ref 1–2.5)
ALP BLD-CCNC: 70 U/L (ref 35–104)
ALT SERPL-CCNC: 19 U/L (ref 5–33)
ANION GAP SERPL CALCULATED.3IONS-SCNC: 10 MMOL/L (ref 9–17)
AST SERPL-CCNC: 26 U/L
BASOPHILS # BLD: 1 % (ref 0–2)
BASOPHILS ABSOLUTE: 0.1 K/UL (ref 0–0.2)
BILIRUB SERPL-MCNC: 0.35 MG/DL (ref 0.3–1.2)
BUN BLDV-MCNC: 11 MG/DL (ref 6–20)
BUN/CREAT BLD: ABNORMAL (ref 9–20)
CALCIUM SERPL-MCNC: 9.2 MG/DL (ref 8.6–10.4)
CHLORIDE BLD-SCNC: 105 MMOL/L (ref 98–107)
CO2: 27 MMOL/L (ref 20–31)
CREAT SERPL-MCNC: 0.72 MG/DL (ref 0.5–0.9)
DIFFERENTIAL TYPE: ABNORMAL
EOSINOPHILS RELATIVE PERCENT: 3 % (ref 0–4)
GFR AFRICAN AMERICAN: >60 ML/MIN
GFR NON-AFRICAN AMERICAN: >60 ML/MIN
GFR SERPL CREATININE-BSD FRML MDRD: ABNORMAL ML/MIN/{1.73_M2}
GFR SERPL CREATININE-BSD FRML MDRD: ABNORMAL ML/MIN/{1.73_M2}
GLUCOSE BLD-MCNC: 97 MG/DL (ref 70–99)
HCT VFR BLD CALC: 37.4 % (ref 36–46)
HEMOGLOBIN: 12.4 G/DL (ref 12–16)
IMMATURE GRANULOCYTES: ABNORMAL %
LYMPHOCYTES # BLD: 35 % (ref 24–44)
MCH RBC QN AUTO: 29.7 PG (ref 26–34)
MCHC RBC AUTO-ENTMCNC: 33.2 G/DL (ref 31–37)
MCV RBC AUTO: 89.5 FL (ref 80–100)
MONOCYTES # BLD: 8 % (ref 1–7)
PDW BLD-RTO: 13.7 % (ref 11.5–14.9)
PLATELET # BLD: 495 K/UL (ref 150–450)
PLATELET ESTIMATE: ABNORMAL
PMV BLD AUTO: 7.7 FL (ref 6–12)
POTASSIUM SERPL-SCNC: 4.4 MMOL/L (ref 3.7–5.3)
RBC # BLD: 4.18 M/UL (ref 4–5.2)
RBC # BLD: ABNORMAL 10*6/UL
SEG NEUTROPHILS: 53 % (ref 36–66)
SEGMENTED NEUTROPHILS ABSOLUTE COUNT: 4.6 K/UL (ref 1.3–9.1)
SODIUM BLD-SCNC: 142 MMOL/L (ref 135–144)
TOTAL PROTEIN: 7 G/DL (ref 6.4–8.3)
WBC # BLD: 8.7 K/UL (ref 3.5–11)
WBC # BLD: ABNORMAL 10*3/UL

## 2017-12-09 PROCEDURE — 1240000000 HC EMOTIONAL WELLNESS R&B

## 2017-12-09 PROCEDURE — 80053 COMPREHEN METABOLIC PANEL: CPT

## 2017-12-09 PROCEDURE — 6370000000 HC RX 637 (ALT 250 FOR IP): Performed by: PSYCHIATRY & NEUROLOGY

## 2017-12-09 PROCEDURE — 36415 COLL VENOUS BLD VENIPUNCTURE: CPT

## 2017-12-09 PROCEDURE — 85025 COMPLETE CBC W/AUTO DIFF WBC: CPT

## 2017-12-09 RX ADMIN — CIPROFLOXACIN HYDROCHLORIDE 500 MG: 500 TABLET, FILM COATED ORAL at 09:11

## 2017-12-09 RX ADMIN — BUSPIRONE HYDROCHLORIDE 15 MG: 15 TABLET ORAL at 09:11

## 2017-12-09 RX ADMIN — METFORMIN HYDROCHLORIDE 500 MG: 500 TABLET, FILM COATED ORAL at 17:17

## 2017-12-09 RX ADMIN — METRONIDAZOLE 500 MG: 500 TABLET ORAL at 14:59

## 2017-12-09 RX ADMIN — FAMOTIDINE 20 MG: 20 TABLET ORAL at 09:11

## 2017-12-09 RX ADMIN — FAMOTIDINE 20 MG: 20 TABLET ORAL at 21:00

## 2017-12-09 RX ADMIN — METFORMIN HYDROCHLORIDE 500 MG: 500 TABLET, FILM COATED ORAL at 09:11

## 2017-12-09 RX ADMIN — CIPROFLOXACIN HYDROCHLORIDE 500 MG: 500 TABLET, FILM COATED ORAL at 21:01

## 2017-12-09 RX ADMIN — BECLOMETHASONE DIPROPIONATE 2 PUFF: 80 AEROSOL, METERED RESPIRATORY (INHALATION) at 09:13

## 2017-12-09 RX ADMIN — BECLOMETHASONE DIPROPIONATE 2 PUFF: 80 AEROSOL, METERED RESPIRATORY (INHALATION) at 21:00

## 2017-12-09 RX ADMIN — BUSPIRONE HYDROCHLORIDE 15 MG: 15 TABLET ORAL at 21:01

## 2017-12-09 RX ADMIN — MONTELUKAST SODIUM 10 MG: 10 TABLET, FILM COATED ORAL at 21:01

## 2017-12-09 RX ADMIN — OLANZAPINE 10 MG: 10 TABLET, FILM COATED ORAL at 21:01

## 2017-12-09 RX ADMIN — BUSPIRONE HYDROCHLORIDE 15 MG: 15 TABLET ORAL at 14:59

## 2017-12-09 RX ADMIN — METRONIDAZOLE 500 MG: 500 TABLET ORAL at 06:19

## 2017-12-09 RX ADMIN — CETIRIZINE HYDROCHLORIDE 10 MG: 10 TABLET, FILM COATED ORAL at 09:11

## 2017-12-09 RX ADMIN — METRONIDAZOLE 500 MG: 500 TABLET ORAL at 21:01

## 2017-12-09 RX ADMIN — FLUTICASONE PROPIONATE 1 SPRAY: 50 SPRAY, METERED NASAL at 09:13

## 2017-12-09 NOTE — DISCHARGE SUMMARY
CDU Discharge Summary        Patient:  Nahum Kelly  YOB: 1990    MRN: [id number]   Acct: [de-identified]    Primary Care Physician: Jayna Martin MD    Admit date:  11/29/2017  3:43 AM  Discharge date: 12/3/2017  3:41 PM     Discharge Diagnoses:     1.) Acute on chronic right upper quadrant abdominal pain, with radiation chest associated with nausea and vomiting secondary to perforated diverticulitis, treated with IV antibiotics, surgery consult and  transfer to hospitalist service    Follow-up:  Call today/tomorrow for a follow up appointment with Jayna Martin MD , or return to the Emergency Room with worsening symptoms    Stressed to patient the importance of following up with primary care doctor for further workup/management of symptoms. Pt verbalizes understanding and agrees with plan.     Discharge Medication Changes:       Medication List      START taking these medications    acetaminophen 325 MG tablet  Commonly known as:  TYLENOL  Take 2 tablets by mouth every 4 hours as needed for Pain     ciprofloxacin 500 MG tablet  Commonly known as:  CIPRO  Take 1 tablet by mouth 2 times daily for 7 days     famotidine 20 MG tablet  Commonly known as:  PEPCID  Take 1 tablet by mouth 2 times daily     metroNIDAZOLE 500 MG tablet  Commonly known as:  FLAGYL  Take 1 tablet by mouth every 8 hours for 7 days     ondansetron 4 MG disintegrating tablet  Commonly known as:  ZOFRAN ODT  Take 1 tablet by mouth every 8 hours as needed for Nausea        CONTINUE taking these medications    albuterol sulfate  (90 Base) MCG/ACT inhaler  Commonly known as:  PROVENTIL HFA  Inhale 2 puffs into the lungs every 6 hours as needed for Wheezing     busPIRone 15 MG tablet  Commonly known as:  BUSPAR  Take 1 tablet by mouth 3 times daily     cetirizine 10 MG tablet  Commonly known as:  ZYRTEC  Take 1 tablet by mouth daily     chlorhexidine 0.12 % solution  Commonly known as:  PERIDEX FLUoxetine 20 MG capsule  Commonly known as:  PROZAC  TAKE ONE (1) CAPSULE BY MOUTH ONCE DAILY     fluticasone 110 MCG/ACT inhaler  Commonly known as:  FLOVENT HFA  Inhale 2 puffs into the lungs 2 times daily     fluticasone 50 MCG/ACT nasal spray  Commonly known as:  FLONASE  SPRAY 1 SPRAY IN EACH NOSTRIL ONCE DAILY     glucose blood VI test strips strip  Commonly known as:  ACURA BLOOD GLUCOSE TEST  1 each by In Vitro route daily As needed. guaiFENesin 600 MG extended release tablet  Commonly known as:  MUCINEX  TAKE 1 TABLET BY MOUTH 2 TIMES DAILY     hydrOXYzine 25 MG tablet  Commonly known as:  ATARAX     loratadine 10 MG tablet  Commonly known as:  CLARITIN  TAKE ONE (1) TABLET BY MOUTH ONCE DAILY     meloxicam 15 MG tablet  Commonly known as:  MOBIC  TAKE 1 TABLET BY MOUTH ONCE DAILY     metFORMIN 500 MG tablet  Commonly known as:  GLUCOPHAGE  TAKE 1 TABLET BY MOUTH DAILY     montelukast 10 MG tablet  Commonly known as:  SINGULAIR  Take 1 tablet by mouth nightly     triamcinolone 0.1 % ointment  Commonly known as:  KENALOG  APPLY TO AFFECTED AREA TWICE DAILY        STOP taking these medications    ibuprofen 800 MG tablet  Commonly known as:  ADVIL;MOTRIN        ASK your doctor about these medications    ACCU-CHEK MULTICLIX LANCETS Misc  USE AS INSTRUCTED.      ACURA BLOOD GLUCOSE METER w/Device Kit  Use as instructed     Nebulizer/Tubing/Mouthpiece Kit  Use ever 4 hours prn     Wrist Brace Misc  Dispense 1 rt cock up wrist splint for CTS           Where to Get Your Medications      These medications were sent to [facility], [address] [place] [address], [facility] [address]    Phone:  [phone]   · meloxicam 15 MG tablet     You can get these medications from any pharmacy    Bring a paper prescription for each of these medications  · acetaminophen 325 MG tablet  · albuterol sulfate  (90 Base) MCG/ACT inhaler  · ciprofloxacin 500 MG mmol/L    Anion Gap 11 9 - 17 mmol/L    GFR Non-African American >60 >60 mL/min    GFR African American >60 >60 mL/min    GFR Comment          GFR Staging NOT REPORTED    LIPASE   Result Value Ref Range    Lipase 25 13 - 60 U/L   HEPATIC FUNCTION PANEL   Result Value Ref Range    Alb 3.8 3.5 - 5.2 g/dL    Alkaline Phosphatase 99 35 - 104 U/L    ALT 22 5 - 33 U/L    AST 20 <32 U/L    Total Bilirubin 0.91 0.3 - 1.2 mg/dL    Bilirubin, Direct 0.20 <0.31 mg/dL    Bilirubin, Indirect 0.71 0.00 - 1.00 mg/dL    Total Protein 7.7 6.4 - 8.3 g/dL    Globulin NOT REPORTED 1.5 - 3.8 g/dL    Albumin/Globulin Ratio 1.0 1.0 - 2.5   UA W/REFLEX CULTURE   Result Value Ref Range    Color, UA YELLOW YEL    Turbidity UA CLOUDY (A) CLEAR    Glucose, Ur NEGATIVE NEG    Bilirubin Urine NEGATIVE NEG    Ketones, Urine TRACE (A) NEG    Specific Gravity, UA 1.023 1.005 - 1.030    Urine Hgb NEGATIVE NEG    pH, UA 6.0 5.0 - 8.0    Protein, UA NEGATIVE NEG    Urobilinogen, Urine Normal NORM    Nitrite, Urine NEGATIVE NEG    Leukocyte Esterase, Urine NEGATIVE NEG    Urinalysis Comments Culture ordered based on defined criteria.     Microscopic Urinalysis   Result Value Ref Range    -          WBC, UA 5 TO 10 0 - 5 /HPF    RBC, UA 2 TO 5 0 - 2 /HPF    Casts UA 0 TO 2 0 - 2 /LPF    Crystals UA NOT REPORTED NONE /HPF    Epithelial Cells UA 20 TO 50 0 - 5 /HPF    Renal Epithelial, Urine NOT REPORTED 0 /HPF    Bacteria, UA MANY (A) NONE    Mucus, UA NOT REPORTED NONE    Trichomonas, UA NOT REPORTED NONE    Amorphous, UA NOT REPORTED NONE    Other Observations UA NOT REPORTED NREQ    Yeast, UA NOT REPORTED NONE   URINALYSIS WITH MICROSCOPIC   Result Value Ref Range    Color, UA YELLOW YEL    Turbidity UA CLEAR CLEAR    Glucose, Ur NEGATIVE NEG    Bilirubin Urine NEGATIVE NEG    Ketones, Urine NEGATIVE NEG    Specific Gravity, UA 1.016 1.005 - 1.030    Urine Hgb NEGATIVE NEG    pH, UA 6.0 5.0 - 8.0    Protein, UA NEGATIVE NEG    Urobilinogen, Urine Normal NORM    Nitrite, Urine NEGATIVE NEG    Leukocyte Esterase, Urine NEGATIVE NEG    -          WBC, UA 0 TO 2 0 - 5 /HPF    RBC, UA 2 TO 5 0 - 4 /HPF    Casts UA 0 TO 2 HYALINE 0 - 8 /LPF    Crystals UA NOT REPORTED NONE /HPF    Epithelial Cells UA 0 TO 2 0 - 5 /HPF    Renal Epithelial, Urine NOT REPORTED 0 /HPF    Bacteria, UA NOT REPORTED NONE    Mucus, UA NOT REPORTED NONE    Trichomonas, UA NOT REPORTED NONE    Amorphous, UA NOT REPORTED NONE    Other Observations UA NOT REPORTED NREQ    Yeast, UA NOT REPORTED NONE   HCG Qualitative, Serum   Result Value Ref Range    hCG Qual NEGATIVE NEG   CBC   Result Value Ref Range    WBC 12.3 (H) 3.5 - 11.3 k/uL    RBC 3.64 (L) 3.95 - 5.11 m/uL    Hemoglobin 10.4 (L) 11.9 - 15.1 g/dL    Hematocrit 34.8 (L) 36.3 - 47.1 %    MCV 95.6 82.6 - 102.9 fL    MCH 28.6 25.2 - 33.5 pg    MCHC 29.9 28.4 - 34.8 g/dL    RDW 13.2 11.8 - 14.4 %    Platelets 288 153 - 473 k/uL    MPV 9.8 8.1 - 13.5 fL   Basic Metabolic Panel   Result Value Ref Range    Glucose 88 70 - 99 mg/dL    BUN 11 6 - 20 mg/dL    CREATININE 0.73 0.50 - 0.90 mg/dL    Bun/Cre Ratio NOT REPORTED 9 - 20    Calcium 7.9 (L) 8.6 - 10.4 mg/dL    Sodium 138 135 - 144 mmol/L    Potassium 3.8 3.7 - 5.3 mmol/L    Chloride 104 98 - 107 mmol/L    CO2 20 20 - 31 mmol/L    Anion Gap 14 9 - 17 mmol/L    GFR Non-African American >60 >60 mL/min    GFR African American >60 >60 mL/min    GFR Comment          GFR Staging NOT REPORTED    Hemoglobin A1C   Result Value Ref Range    Hemoglobin A1C 5.4 4.0 - 6.0 %    Estimated Avg Glucose 108 mg/dL   CBC   Result Value Ref Range    WBC 9.9 3.5 - 11.3 k/uL    RBC 3.53 (L) 3.95 - 5.11 m/uL    Hemoglobin 10.2 (L) 11.9 - 15.1 g/dL    Hematocrit 32.8 (L) 36.3 - 47.1 %    MCV 92.9 82.6 - 102.9 fL    MCH 28.9 25.2 - 33.5 pg    MCHC 31.1 28.4 - 34.8 g/dL    RDW 13.0 11.8 - 14.4 %    Platelets 581 084 - 991 k/uL    MPV 9.4 8.1 - 13.5 fL   Basic Metabolic Panel   Result Value Ref Range    Glucose 88

## 2017-12-09 NOTE — PROGRESS NOTES
OBS/CDU   RESIDENT NOTE FOR INPATIENT STATUS      INPATIENT       The Patient Now Meets Inpatient Criteria as of 11/29/2017 @ 03:43  .     For the Following reasons:    [x]   Severity of Signs/ Symptoms indicate admission need   []   Medical Condition Would be Threatened in lower level of care   []   Diagnostic studies procedures results justify inpatient care   []   Adverse event likely if not inpatient admission   []   Pre-existing conditions warrants inpatient care     The patient requires inpatient care for further evaluation of their RUQ abdominal pain [R10.11]  RUQ abdominal pain [R10.11]  Diverticulitis of intestine with perforation without abscess, unspecified bleeding status, unspecified part of intestinal tract [K57.80]

## 2017-12-09 NOTE — PLAN OF CARE
Problem: Altered Mood, Depressive Behavior  Goal: LTG-Able to verbalize acceptance of life and situations over which he or she has no control  Outcome: Ongoing  [facility]  Day 3 Interdisciplinary Treatment Plan NOTE    Review Date & Time: 12/9/2017 1100    Admission Type:   Admission Type: Voluntary    Reason for admission:  Reason for Admission: Voluntary from [facility], having SI to cut wrists and if that didnt work was going to OD on pills  Estimated Length of Stay: 5-7 days  Estimated Discharge Date Update: to be determined by physician    PATIENT STRENGTHS:  Patient Strengths Strengths: Medication Compliance, No significant Physical Illness  Patient Strengths and Limitations:Limitations: Tendency to isolate self, Difficulty problem solving/relies on others to help solve problems, Demonstrates discomfort with /lack of social skills  Addictive Behavior:Addictive Behavior  In the past 3 months, have you felt or has someone told you that you have a problem with:  : None  Do you have a history of Chemical Use?: No  Do you have a history of Alcohol Use?: No  Do you have a history of Street Drug Abuse?: Yes  Histroy of Prescripton Drug Abuse?: No  Medical Problems:  Past Medical History:   Diagnosis Date    Asthma     Back pain 9/16/2014    Bipolar [facility] disorder ([facility]     Bipolar disorder ([facility] 12/8/2017    Depression     Diabetes mellitus ([facility]     Dizziness     Fatty liver disease, nonalcoholic     Fatty liver disease, nonalcoholic     GERD (gastroesophageal reflux disease)     Obesity        Risk:  Fall RiskTotal: 53  Son Scale Son Scale Score: 23  BVC Total: 0  Change in scores no Changes to plan of Care no    Status EXAM:   Status and Exam  Normal: No  Facial Expression: Flat  Affect: Appropriate  Level of Consciousness: Alert  Mood:Normal: No  Mood: Depressed, Anxious  Motor Activity:Normal: No  Motor Activity: Decreased  Interview Behavior: Cooperative  Preception: [place] to Person,

## 2017-12-09 NOTE — BH NOTE
PSYCHOEDUCATION GROUP NOTE    Date: 12/9/17       Start Time: 1100      End Time: 1288    Number Participants in Group: 8     Name of group: [facility]      Discipline Responsible:   OT  AT  [facility].  x P Other       Participation Level:     None x Minimal    Active Listener  Interactive    Monopolizing         Participation Quality:   Appropriate x Inappropriate          Attentive        Intrusive          Sharing        Resistant          Supportive x       Lethargic       Affective:    Congruent  Incongruent  Blunted x Flat    Constricted  Anxious  Elated  Angry    Labile  Depressed  Other         Cognitive:   Alert  Oriented PPTP     Concentration  G  F x P   Attention Span  G  F x P   Short-Term Memory  G  F x P   Long-Term Memory  G  F x P   ProblemSolving/  Decision Making  G  F x P   Ability to Process  Information  G  F x P      Contributing Factors             Delusional             Hallucinating             Flight of Ideas             Other:        Modes of Intervention:  x Education  Support x Exploration   x Clarifying x Problem Solving  Confrontation   x Socialization x Limit Setting  Reality Testing    Activity  Movement  Media    Other:            Response to Learning:   Able to verbalize current knowledge/experience    Able to verbalize/acknowledge new learning    Able to retain information    Capable of insight    Able to change behavior    Progressing to goal    Other:        Comments: sleeping

## 2017-12-09 NOTE — PLAN OF CARE
Problem: Altered Mood, Depressive Behavior  Goal: STG-Knowledge of positive coping patterns  Outcome: Ongoing  Pt did not demonstrate current knowledge of positive coping patterns. Pt independence was promoted. Goal: LTG-Able to verbalize and/or display a decrease in depressive symptoms  Outcome: Ongoing  Pt was able to verbalize, but not display a decrease in depressive symptoms. Pt was observed resting in room. Pt appeared thought blocking but responded to staff questions. Pt was observed remaining isolative from peers. Pt denies thoughts to hurt self or others. Pt denies all hallucinations. Pt was encouraged to communicate with staff if symptoms worsen or needs arise. Problem: Depressive Behavior with or without Suicide precautions  Goal: LTG-Able to verbalize and/or display a decrease in depressive symptoms  Outcome: Ongoing  Pt was able to verbalize, but not display a decrease in depressive symptoms. Pt was observed resting in room. Pt appeared thought blocking but responded to staff questions. Pt was observed remaining isolative from peers. Pt denies thoughts to hurt self or others. Pt denies all hallucinations. Pt was encouraged to communicate with staff if symptoms worsen or needs arise.

## 2017-12-09 NOTE — BH NOTE
PSYCHOEDUCATION GROUP NOTE    Date: 12/9/17       Start Time: 1600      End Time: 1645    Number Participants in Group: 15    Name of group: Wellness Group      Discipline Responsible:   OT  AT  [facility].  x [facility] Other       Participation Level:     None x Minimal   x Active Listener  Interactive    Monopolizing         Participation Quality:  x Appropriate  Inappropriate   x       Attentive        Intrusive          Sharing        Resistant   x       Supportive x       Lethargic       Affective:   x Congruent  Incongruent  Blunted  Flat    Constricted  Anxious  Elated  Angry    Labile  Depressed  Other         Cognitive:  x Alert x Oriented PPTP     Concentration  G  F x P   Attention Span  G  F x P   Short-Term Memory  G  F x P   Long-Term Memory  G  F x P   ProblemSolving/  Decision Making  G  F x P   Ability to Process  Information  G  F x P      Contributing Factors             Delusional             Hallucinating             Flight of Ideas             Other:        Modes of Intervention:  x Education  Support x Exploration   x Clarifying x Problem Solving  Confrontation   x Socialization x Limit Setting  Reality Testing    Activity  Movement x Media    Other:            Response to Learning:   Able to verbalize current knowledge/experience    Able to verbalize/acknowledge new learning    Able to retain information    Capable of insight    Able to change behavior    Progressing to goal    Other:        Comments:

## 2017-12-10 LAB — GLUCOSE BLD-MCNC: 88 MG/DL (ref 65–105)

## 2017-12-10 PROCEDURE — 6370000000 HC RX 637 (ALT 250 FOR IP): Performed by: PSYCHIATRY & NEUROLOGY

## 2017-12-10 PROCEDURE — 82947 ASSAY GLUCOSE BLOOD QUANT: CPT

## 2017-12-10 PROCEDURE — 1240000000 HC EMOTIONAL WELLNESS R&B

## 2017-12-10 RX ADMIN — BUSPIRONE HYDROCHLORIDE 15 MG: 15 TABLET ORAL at 08:37

## 2017-12-10 RX ADMIN — HYDROXYZINE HYDROCHLORIDE 25 MG: 25 TABLET, FILM COATED ORAL at 20:47

## 2017-12-10 RX ADMIN — OLANZAPINE 10 MG: 10 TABLET, FILM COATED ORAL at 20:47

## 2017-12-10 RX ADMIN — FAMOTIDINE 20 MG: 20 TABLET ORAL at 08:37

## 2017-12-10 RX ADMIN — CIPROFLOXACIN HYDROCHLORIDE 500 MG: 500 TABLET, FILM COATED ORAL at 08:37

## 2017-12-10 RX ADMIN — ACETAMINOPHEN 650 MG: 325 TABLET, FILM COATED ORAL at 20:47

## 2017-12-10 RX ADMIN — METFORMIN HYDROCHLORIDE 500 MG: 500 TABLET, FILM COATED ORAL at 17:21

## 2017-12-10 RX ADMIN — BECLOMETHASONE DIPROPIONATE 2 PUFF: 80 AEROSOL, METERED RESPIRATORY (INHALATION) at 08:37

## 2017-12-10 RX ADMIN — CETIRIZINE HYDROCHLORIDE 10 MG: 10 TABLET, FILM COATED ORAL at 08:37

## 2017-12-10 RX ADMIN — FAMOTIDINE 20 MG: 20 TABLET ORAL at 20:48

## 2017-12-10 RX ADMIN — METRONIDAZOLE 500 MG: 500 TABLET ORAL at 22:00

## 2017-12-10 RX ADMIN — CIPROFLOXACIN HYDROCHLORIDE 500 MG: 500 TABLET, FILM COATED ORAL at 20:48

## 2017-12-10 RX ADMIN — METRONIDAZOLE 500 MG: 500 TABLET ORAL at 14:44

## 2017-12-10 RX ADMIN — METRONIDAZOLE 500 MG: 500 TABLET ORAL at 06:21

## 2017-12-10 RX ADMIN — MONTELUKAST SODIUM 10 MG: 10 TABLET, FILM COATED ORAL at 20:48

## 2017-12-10 RX ADMIN — FLUTICASONE PROPIONATE 1 SPRAY: 50 SPRAY, METERED NASAL at 08:39

## 2017-12-10 RX ADMIN — BECLOMETHASONE DIPROPIONATE 2 PUFF: 80 AEROSOL, METERED RESPIRATORY (INHALATION) at 20:47

## 2017-12-10 RX ADMIN — BUSPIRONE HYDROCHLORIDE 15 MG: 15 TABLET ORAL at 14:42

## 2017-12-10 RX ADMIN — METFORMIN HYDROCHLORIDE 500 MG: 500 TABLET, FILM COATED ORAL at 08:37

## 2017-12-10 RX ADMIN — BUSPIRONE HYDROCHLORIDE 15 MG: 15 TABLET ORAL at 20:48

## 2017-12-10 ASSESSMENT — PAIN DESCRIPTION - LOCATION: LOCATION: BACK

## 2017-12-10 ASSESSMENT — PAIN SCALES - GENERAL
PAINLEVEL_OUTOF10: 3
PAINLEVEL_OUTOF10: 0
PAINLEVEL_OUTOF10: 6

## 2017-12-10 ASSESSMENT — PAIN DESCRIPTION - PAIN TYPE: TYPE: CHRONIC PAIN

## 2017-12-10 NOTE — BH NOTE
PSYCHOEDUCATION GROUP NOTE       Date:   12/10/17              Start Time:  1600                       End Time: 1700      Number Participants in Group: 14      Name of group:         RT  SW  Nsg x LPN  x BHTII  Other       Participation Level:     None  Minimal   x Active Listener  Interactive    Monopolizing         Participation Quality:  x Appropriate  Inappropriate     Attentive   Intrusive     Sharing   Resistant     Supportive    Lethargic       Affective:   x Congruent  Incongruent  Blunted  Flat    Constricted  Anxious  Elated  Angry    Labile  Depressed  Other         Cognitive:   Alert  Oriented PPTS     Concentration G x F  P    Attention Span G  F  P    Short-Term Memory G  F  P    Long-Term Memory G  F  P    ProblemSolving/  Decision Making G  F  P    Ability to Process  Information G  F  P       Contributing Factors             Delusional             Hallucinating             Flight of Ideas             Other: poor concentration       Modes of Intervention:  x Education  Support  Exploration    Clarifying  Problem Solving  Confrontation    Socialization  Limit Setting  Reality Testing    Activity  Movement  Media    Other:          Response to Learning:  x Able to verbalize current knowledge/experience    Able to verbalize/acknowledge new learning    Able to retain information    Capable of insight    Able to change behavior    Progressing to goal    Other:        Comments:

## 2017-12-10 NOTE — PLAN OF CARE
Problem: Altered Mood, Depressive Behavior  Goal: STG-Knowledge of positive coping patterns  Outcome: Ongoing  Pt did not attend Community Meeting at 0900 d/t resting in room despite staff invitation to attend.

## 2017-12-10 NOTE — BH NOTE
I have reviewed [facility] documentation Electronically signed by Lisette Rodriguez LPN on 2/97/9932 at 09:12 PM

## 2017-12-11 VITALS
SYSTOLIC BLOOD PRESSURE: 130 MMHG | RESPIRATION RATE: 16 BRPM | WEIGHT: 293 LBS | BODY MASS INDEX: 48.82 KG/M2 | HEIGHT: 65 IN | OXYGEN SATURATION: 97 % | HEART RATE: 99 BPM | TEMPERATURE: 96.5 F | DIASTOLIC BLOOD PRESSURE: 65 MMHG

## 2017-12-11 PROBLEM — F32.9 MAJOR DEPRESSION, SINGLE EPISODE: Status: RESOLVED | Noted: 2017-12-08 | Resolved: 2017-12-11

## 2017-12-11 PROCEDURE — 6370000000 HC RX 637 (ALT 250 FOR IP): Performed by: PSYCHIATRY & NEUROLOGY

## 2017-12-11 PROCEDURE — 5130000000 HC BRIDGE APPOINTMENT

## 2017-12-11 RX ORDER — HYDROXYZINE HYDROCHLORIDE 25 MG/1
25 TABLET, FILM COATED ORAL 2 TIMES DAILY PRN
Qty: 60 TABLET | Refills: 0 | Status: ON HOLD | OUTPATIENT
Start: 2017-12-11 | End: 2020-08-13 | Stop reason: ALTCHOICE

## 2017-12-11 RX ORDER — BUSPIRONE HYDROCHLORIDE 15 MG/1
15 TABLET ORAL 3 TIMES DAILY
Qty: 90 TABLET | Refills: 0 | Status: ON HOLD | OUTPATIENT
Start: 2017-12-11 | End: 2020-08-13

## 2017-12-11 RX ORDER — OLANZAPINE 10 MG/1
10 TABLET ORAL NIGHTLY
Qty: 30 TABLET | Refills: 0 | Status: ON HOLD | OUTPATIENT
Start: 2017-12-11 | End: 2020-08-13 | Stop reason: ALTCHOICE

## 2017-12-11 RX ADMIN — BUSPIRONE HYDROCHLORIDE 15 MG: 15 TABLET ORAL at 13:38

## 2017-12-11 RX ADMIN — BUSPIRONE HYDROCHLORIDE 15 MG: 15 TABLET ORAL at 09:00

## 2017-12-11 RX ADMIN — METRONIDAZOLE 500 MG: 500 TABLET ORAL at 06:03

## 2017-12-11 RX ADMIN — FAMOTIDINE 20 MG: 20 TABLET ORAL at 09:00

## 2017-12-11 RX ADMIN — CETIRIZINE HYDROCHLORIDE 10 MG: 10 TABLET, FILM COATED ORAL at 09:00

## 2017-12-11 RX ADMIN — METRONIDAZOLE 500 MG: 500 TABLET ORAL at 13:38

## 2017-12-11 RX ADMIN — METFORMIN HYDROCHLORIDE 500 MG: 500 TABLET, FILM COATED ORAL at 09:00

## 2017-12-11 RX ADMIN — CIPROFLOXACIN HYDROCHLORIDE 500 MG: 500 TABLET, FILM COATED ORAL at 09:00

## 2017-12-11 RX ADMIN — HYDROXYZINE HYDROCHLORIDE 25 MG: 25 TABLET, FILM COATED ORAL at 09:00

## 2017-12-11 RX ADMIN — BECLOMETHASONE DIPROPIONATE 2 PUFF: 80 AEROSOL, METERED RESPIRATORY (INHALATION) at 09:00

## 2017-12-11 RX ADMIN — ACETAMINOPHEN 650 MG: 325 TABLET, FILM COATED ORAL at 10:52

## 2017-12-11 ASSESSMENT — PAIN DESCRIPTION - LOCATION
LOCATION: BACK
LOCATION: BACK

## 2017-12-11 ASSESSMENT — PAIN DESCRIPTION - PAIN TYPE
TYPE: ACUTE PAIN
TYPE: CHRONIC PAIN

## 2017-12-11 ASSESSMENT — PAIN DESCRIPTION - FREQUENCY: FREQUENCY: INTERMITTENT

## 2017-12-11 ASSESSMENT — PAIN SCALES - GENERAL
PAINLEVEL_OUTOF10: 6
PAINLEVEL_OUTOF10: 3
PAINLEVEL_OUTOF10: 1

## 2017-12-11 ASSESSMENT — PAIN DESCRIPTION - ORIENTATION: ORIENTATION: LOWER

## 2017-12-11 ASSESSMENT — PAIN DESCRIPTION - DESCRIPTORS: DESCRIPTORS: ACHING;DISCOMFORT

## 2017-12-11 NOTE — PROGRESS NOTES
She is still quite depressed, despondent, overwhelmed, and having frequent suicidal thoughts to overdose. Affect remains flat and poorly reactive. Patient has been withdrawn and isolative. Patient complains of high level of racing thoughts driving feelings of anxiety. Feeling hopeless and helpless. Denies any side effects to medications. Explored her  concerns and support provided. There is no identifiable safe alternative other than continued hospitalization. Charting and medications reviewed.

## 2017-12-11 NOTE — BH NOTE
Patient given tobacco quitline number 80427814140 at this time, refusing to call at this time, states \" I just dont want to quit now\"- patient given information as to the dangers of long term tobacco use. Continue to reinforce the importance of tobacco cessation.

## 2017-12-11 NOTE — H&P
HISTORY and [facility] [address]       NAME:  Beto Hinojosa  MRN: [id number]   YOB: 1990   Date: 12/11/2017   Age: 32 y.o. Gender: female     H&P Update Note    H&P from 11/30/2017  reviewed and updated, Patient examined. Vitals: /65   Pulse 99   Temp 96.5 °F (35.8 °C) (Oral)   Resp 16   Ht 5' 5\" (1.651 m)   Wt 293 lb (132.9 kg)   LMP 10/26/2017   SpO2 97%   BMI 48.76 kg/m²  Body mass index is 48.76 kg/m². Beot Hinojosa is 32 y.o.,  female, admitted because of depression. Pt has suicidal ideation, pt has plans to overdose on pills or cut her wrist. Pt denies any homicidal ideation. Pt has a history of previous suicide attempts, hx cutting. Pt feels hopeless, helpless, worthless, lack of interest, loss of energy. Poor insight. Pt has loss of appetite, denies having poor sleep, poor concentration and memory. Pt has auditory hallucinations, denies any commands to hurt self or others. Pt denies any current visual or tactile hallucinations. Patient denies any current stressors, per ER note of  Marcin Tran, pt is going through divorce. Pt denies alcohol abuse, drinks \"occassionally\". Patient denies any currentsubstance abuse, marijuana use \"once in awhile\". Patient lives with  in an apartment. Pt has been non compliant with her medications for couple months. Tachcardiac, regular rhythm, lungs are clear, abdomen is soft, non-tender, AAO X 3. No interval changes. I concur with the findings. PROVISIONAL DIAGNOSES / SURGERY:      Depression.     Patient Active Problem List    Diagnosis Date Noted    Bipolar disorder ([facility] 12/08/2017    Leukocytosis     Diverticulitis of large intestine with perforation without abscess or bleeding 11/30/2017    Calculus of gallbladder with chronic cholecystitis without obstruction 11/30/2017    Type 2 diabetes mellitus without complication ([address] 30/17/4591    RUQ abdominal      Heart Disease Mother      Heart Disease Father      Early Death Father      Cancer Maternal Aunt         lung    Cancer Paternal Cousin         brain            Review of Systems:      Positive and Negative as described in HPI.     CONSTITUTIONAL:  negative for fevers, chills, sweats, fatigue, weight loss  HEENT:  negative for vision, hearing changes, runny nose, throat pain  RESPIRATORY:  negative for shortness of breath, cough, congestion, wheezing. CARDIOVASCULAR:  negative for chest pain, palpitations. GASTROINTESTINAL:  + for nausea, vomiting, Right upper abdominal pain   GENITOURINARY:  negative for difficulty of urination, burning with urination, frequency   INTEGUMENT:  negative for rash, skin lesions, easy bruising   HEMATOLOGIC/LYMPHATIC:  negative for swelling/edema   ALLERGIC/IMMUNOLOGIC:  negative for urticaria , itching  ENDOCRINE:  negative increase in drinking, increase in urination, hot or cold intolerance  MUSCULOSKELETAL:  negative joint pains, muscle aches, swelling of joints  NEUROLOGICAL:  negative for headaches, dizziness, lightheadedness, numbness, pain, tingling extremities  BEHAVIOR/PSYCH:  + for depression, anxiety     Physical Exam:   /69   Pulse 90   Temp 97.8 °F (36.6 °C) (Oral)   Resp 18   Ht 5' 5\" (1.651 m)   Wt 294 lb (133.4 kg)   LMP 10/26/2017   SpO2 92%   BMI 48.92 kg/m²   Temp (24hrs), Av.4 °F (29.7 °C), Min:44.4 °F (6.9 °C), Max:101.1 °F (38.4 °C)           Recent Labs      17   1025  17   1543  17   1848   POCGLU  131*  166*  102      No intake or output data in the 24 hours ending 17 1840     General Appearance:  alert, well appearing, and in no acute distress,Morbidly obese  Mental status: oriented to person, place, and time with normal affect  Head:  normocephalic, atraumatic.   Eye: no icterus, redness, pupils equal and reactive, extraocular eye movements intact, conjunctiva clear  Ear: normal external ear, no discharge, hearing intact, left eardrum does not show any congestion and no wax  Nose:  no drainage noted  Mouth: mucous membranes moist  Neck: supple, no carotid bruits, thyroid not palpable  Lungs: Bilateral equal air entry, clear to ausculation, no wheezing, rales or rhonchi, normal effort  Cardiovascular: normal rate, regular rhythm, no murmur, gallop, rub. Abdomen: Soft, + discomfort in upper abdomen and the right upper quadrant and epigastric region, nondistended, normal bowel sounds, no hepatomegaly or splenomegaly  Neurologic: There are no new focal motor or sensory deficits, normal muscle tone and bulk, no abnormal sensation, normal speech, cranial nerves II through XII grossly intact  Skin: No gross lesions, rashes, bruising or bleeding on exposed skin area  Extremities:  peripheral pulses palpable, no pedal edema or calf pain with palpation  Psych: Anxious            Investigations:       Laboratory Testing:  Recent Results         Recent Results (from the past 24 hour(s))   POC Glucose Fingerstick     Collection Time: 11/29/17  6:48 PM   Result Value Ref Range     POC Glucose 102 65 - 105 mg/dL            Imaging/Diagnostics:     GB US      Cholelithiasis without secondary features of acute cholecystitis.         HIDA scan;  1. Normal uptake of radiotracer activity within the gallbladder.    2. Slightly decreased gallbladder ejection fraction of 29.8%.  This can be   seen with chronic cholecystitis or biliary dyskinesia.               CT abdomen;  Acute perforated diverticulitis of the transverse colon.  Moderate   pericolonic inflammatory changes without organized abscess.            Assessment :       Primary Problem  Diverticulitis of intestine with perforation without abscess           Active Hospital Problems     Diagnosis Date Noted    Diverticulitis of intestine with perforation without abscess [K57.80] 11/30/2017       Priority: High    RUQ abdominal pain [R10.11] 11/29/2017       Priority: High    Calculus of gallbladder with chronic cholecystitis without obstruction [K80.10] 11/30/2017       Priority: Medium    Type 2 diabetes mellitus without complication ([facility] [K12.1] 11/30/2017    Fatty liver disease, nonalcoholic [W27.4]      Asthma [J45.909] 02/13/2012    Depression [F32.9] 02/13/2012    GERD (gastroesophageal reflux disease) [K21.9] 02/13/2012    Obesity [E66.9] 11/07/2011         Plan:      Patient status Admit as inpatient in the  Med/Surge     1. Continue IV Cipro and Flagyl and IV fluids  2. Continue oral home medicines with a sip of water if okay with surgery  3. DVT prophylaxis  4. GI prophylaxis  5. Pain control  6. Monitor labs  7.  Surgical consult has already been taken     Consultations:   IP [place] TO HOSPITALIST      Patient is admitted as inpatient status because of co-morbidities listed above, severity of signs and symptoms as outlined, requirement for current medical therapies and most importantly because of direct risk to patient if care not provided in a hospital setting.  Aline Garrett MD  11/30/2017  6:40 PM     Copy sent to Dr. Frida Martell MD

## 2017-12-11 NOTE — BH NOTE
Psychoeducation Group Note    Date: 12/10/2017  Start Time: 2030  End Time: 2100    Number Participants in Group:  10    Goal:  Patient will demonstrate increased interpersonal interaction   Topic: wrap up and relaxation    Discipline Responsible:   OT  AT  [facility].  RT BHT2 Other       Participation Level:     None  Minimal   x Active Listener x Interactive    Monopolizing         Participation Quality:  x Appropriate  Inappropriate          Attentive        Intrusive   x       Sharing        Resistant          Supportive        Lethargic       Affective:   x Congruent  Incongruent  Blunted  Flat    Constricted  Anxious  Elated  Angry    Labile  Depressed  Other         Cognitive:  x Alert x Oriented PPTP     Concentration x G  F  P   Attention Span x G  F  P   Short-Term Memory x G  F  P   Long-Term Memory x G  F  P   ProblemSolving/  Decision Making x G  F  P   Ability to Process  Information x G  F  P      Contributing Factors             Delusional             Hallucinating             Flight of Ideas             Other:       Modes of Intervention:   Education  Support  Exploration    Clarifying  Problem Solving  Confrontation   x Socialization  Limit Setting  Reality Testing   x Activity  Movement  Media    Other:            Response to Learning:  x Able to verbalize current knowledge/experience   x Able to verbalize/acknowledge new learning   x Able to retain information   x Capable of insight   x Able to change behavior   x Progressing to goal    Other:        Comments: Pt was an active participant in HS groups.

## 2017-12-11 NOTE — BH NOTE
Psychoeducation Group Note    Date: 12/11/17  Start Time: 1000AM  End Time: 1045AM    Number Participants in Group:  6    Goal:  Patient will demonstrate increased interpersonal interaction   Topic: SKILLS GROUP: COMMUNICATION    Discipline Responsible:   OT  AT  [facility]. X RT  Other       Participation Level:     None  Minimal   X Active Listener X Interactive    Monopolizing         Participation Quality:   Appropriate  Inappropriate   X       Attentive        Intrusive   X       Sharing        Resistant   X       Supportive        Lethargic       Affective:   X Congruent  Incongruent  Blunted  Flat    Constricted  Anxious  Elated  Angry    Labile  Depressed  Other X BRIGHT        Cognitive:  X Alert X Oriented PPTP     Concentration X G  F  P   Attention Span X G  F  P   Short-Term Memory  G  F  P   Long-Term Memory  G  F  P   ProblemSolving/  Decision Making  G X F  P   Ability to Process  Information X G  F  P      Contributing Factors             Delusional             Hallucinating             Flight of Ideas             Other:       Modes of Intervention:  X Education X Support X Exploration   X Clarifying X Problem Solving  Confrontation    Socialization  Limit Setting  Reality Testing   X Activity  Movement  Media    Other:            Response to Learning:  X Able to verbalize current knowledge/experience   X Able to verbalize/acknowledge new learning   X Able to retain information    Capable of insight    Able to change behavior   X Progressing to goal    Other:        Comments:

## 2017-12-11 NOTE — PLAN OF CARE
Problem: Altered Mood, Depressive Behavior  Goal: STG-Able to verbalize suicidal ideations  Outcome: Ongoing  Pt denies any current suicidal ideations upon request this morning. Reports her depression and anxiety are much improved and she feels ready for discharge. Worried about getting home to watch her \"12 kids\". Cont to appear slow to process at times. Support and reassurance given. Encouraged to attend unit programing and take medications as prescribed. Agrees to seek out staff as needed and before harming self if negative self harm thoughts arise. Q15 minute checks for safety cont.

## 2017-12-12 NOTE — CARE COORDINATION
Bridge Appointment completed: Reviewed Discharge Instructions with patient. Patient verbalizes understanding and agreement with the discharge plan using the teachback method. Discharge Arrangements:  [address] [place] [address]. Miri Gregg   you have an appointment on Tuesday, Dec. 12th at 1:30pm for a hospital follow-up and to get set-up for all services.     Guardian notified:   Discharge destination/address: [place] [address], [address]  Transported by:  cab

## 2018-01-12 NOTE — PROGRESS NOTES
patient:  Blood sugar        CONTINUE taking these medications    ACCU-CHEK MULTICLIX LANCETS Misc  USE AS INSTRUCTED. Notes to patient:  Check blood sugar     acetaminophen 325 MG tablet  Commonly known as:  TYLENOL  Take 2 tablets by mouth every 4 hours as needed for Pain  Notes to patient:  Pain     ACURA BLOOD GLUCOSE METER w/Device Kit  Use as instructed  Notes to patient:  Read blood sugar     albuterol sulfate  (90 Base) MCG/ACT inhaler  Commonly known as:  PROVENTIL HFA  Inhale 2 puffs into the lungs every 6 hours as needed for Wheezing  Notes to patient:  Wheezing     busPIRone 15 MG tablet  Commonly known as:  BUSPAR  Take 1 tablet by mouth 3 times daily  Notes to patient:  Anxiety     cetirizine 10 MG tablet  Commonly known as:  ZYRTEC  Take 1 tablet by mouth daily  Notes to patient:  Allergies     famotidine 20 MG tablet  Commonly known as:  PEPCID  Take 1 tablet by mouth 2 times daily  Notes to patient:  Reduce stomach acid     fluticasone 110 MCG/ACT inhaler  Commonly known as:  FLOVENT HFA  Inhale 2 puffs into the lungs 2 times daily  Notes to patient:  Open airways     fluticasone 50 MCG/ACT nasal spray  Commonly known as:  FLONASE  SPRAY 1 SPRAY IN EACH NOSTRIL ONCE DAILY  Notes to patient:  Allergies     glucose blood VI test strips strip  Commonly known as:  ACURA BLOOD GLUCOSE TEST  1 each by In Vitro route daily As needed.   Notes to patient:  Check blood sugar     montelukast 10 MG tablet  Commonly known as:  SINGULAIR  Take 1 tablet by mouth nightly  Notes to patient:  Prevent asthma attacks     Nebulizer/Tubing/Mouthpiece Kit  Use ever 4 hours prn  Notes to patient:  Open airways     ondansetron 4 MG disintegrating tablet  Commonly known as:  ZOFRAN ODT  Take 1 tablet by mouth every 8 hours as needed for Nausea  Notes to patient:  Nausea     Wrist Brace Misc  Dispense 1 rt cock up wrist splint for CTS  Notes to patient:  Support wrist        STOP taking these medications chlorhexidine 0.12 % solution  Commonly known as:  PERIDEX     ciprofloxacin 500 MG tablet  Commonly known as:  CIPRO     FLUoxetine 20 MG capsule  Commonly known as:  PROZAC     guaiFENesin 600 MG extended release tablet  Commonly known as:  MUCINEX     loratadine 10 MG tablet  Commonly known as:  CLARITIN     meloxicam 15 MG tablet  Commonly known as:  MOBIC     metroNIDAZOLE 500 MG tablet  Commonly known as:  FLAGYL     triamcinolone 0.1 % ointment  Commonly known as:  KENALOG           Where to Get Your Medications      You can get these medications from any pharmacy    Bring a paper prescription for each of these medications  · busPIRone 15 MG tablet  · hydrOXYzine 25 MG tablet  · OLANZapine 10 MG tablet     Information about where to get these medications is not yet available    Ask your nurse or doctor about these medications  · metFORMIN 500 MG tablet

## 2018-01-12 NOTE — DISCHARGE SUMMARY
Presenting Evaluation:  Tere Harris is a 32 y.o. female who was admitted from the ED where she reported feeling suicidal to overdose or cut her wrists. She reports very low mood, low energy, anhedonia, hopeless thinking. She states she has been experiencing auditory hallucinations. She reports poor sleep. She denies recently taking any psychiatric medications. Diagnostic Impression     Bipolar disorder with psychosis      After discussion with patient about potential risks, benefits, side effects, decided tostart patient on Zyprexa 10 mg nightly as well as Vistaril when necessary anxiety. Medication was effective in resolving auditory hallucinations and improving subjective mood. She was no longer having thoughts of harming herself. She was doing fairly well at the time of discharge and was not in any distress. Thought process was organized and showed insight into compliance with treatment. Patient had been making rational and realistic plans so she was discharged. Patient had been bright, reactive, and interacting appropriately with staff and peers. There had been multiple consecutive days without any thoughts of suicide or other safety concerns. Patient was tolerating medication changes without any adverse side effects. She will follow up at [facility].        Medication List      START taking these medications    OLANZapine 10 MG tablet  Commonly known as:  ZYPREXA  Take 1 tablet by mouth nightly  Notes to patient:  Clear thoughts        CHANGE how you take these medications    hydrOXYzine 25 MG tablet  Commonly known as:  ATARAX  Take 1 tablet by mouth 2 times daily as needed for Itching  What changed:  when to take this  Notes to patient:  Anxiety     metFORMIN 500 MG tablet  Commonly known as:  GLUCOPHAGE  Take 1 tablet by mouth 2 times daily (with meals) TAKE 1 TABLET BY MOUTH DAILY  What changed:  · how much to take  · how to take this  · when to take this  Notes to patient:  Blood sugar        CONTINUE taking these medications    ACCU-CHEK MULTICLIX LANCETS Misc  USE AS INSTRUCTED. Notes to patient:  Check blood sugar     acetaminophen 325 MG tablet  Commonly known as:  TYLENOL  Take 2 tablets by mouth every 4 hours as needed for Pain  Notes to patient:  Pain     ACURA BLOOD GLUCOSE METER w/Device Kit  Use as instructed  Notes to patient:  Read blood sugar     albuterol sulfate  (90 Base) MCG/ACT inhaler  Commonly known as:  PROVENTIL HFA  Inhale 2 puffs into the lungs every 6 hours as needed for Wheezing  Notes to patient:  Wheezing     busPIRone 15 MG tablet  Commonly known as:  BUSPAR  Take 1 tablet by mouth 3 times daily  Notes to patient:  Anxiety     cetirizine 10 MG tablet  Commonly known as:  ZYRTEC  Take 1 tablet by mouth daily  Notes to patient:  Allergies     famotidine 20 MG tablet  Commonly known as:  PEPCID  Take 1 tablet by mouth 2 times daily  Notes to patient:  Reduce stomach acid     fluticasone 110 MCG/ACT inhaler  Commonly known as:  FLOVENT HFA  Inhale 2 puffs into the lungs 2 times daily  Notes to patient:  Open airways     fluticasone 50 MCG/ACT nasal spray  Commonly known as:  FLONASE  SPRAY 1 SPRAY IN EACH NOSTRIL ONCE DAILY  Notes to patient:  Allergies     glucose blood VI test strips strip  Commonly known as:  ACURA BLOOD GLUCOSE TEST  1 each by In Vitro route daily As needed.   Notes to patient:  Check blood sugar     montelukast 10 MG tablet  Commonly known as:  SINGULAIR  Take 1 tablet by mouth nightly  Notes to patient:  Prevent asthma attacks     Nebulizer/Tubing/Mouthpiece Kit  Use ever 4 hours prn  Notes to patient:  Open airways     ondansetron 4 MG disintegrating tablet  Commonly known as:  ZOFRAN ODT  Take 1 tablet by mouth every 8 hours as needed for Nausea  Notes to patient:  Nausea     Wrist Brace Misc  Dispense 1 rt cock up wrist splint for CTS  Notes to patient:  Support wrist        STOP taking these medications chlorhexidine 0.12 % solution  Commonly known as:  PERIDEX     ciprofloxacin 500 MG tablet  Commonly known as:  CIPRO     FLUoxetine 20 MG capsule  Commonly known as:  PROZAC     guaiFENesin 600 MG extended release tablet  Commonly known as:  MUCINEX     loratadine 10 MG tablet  Commonly known as:  CLARITIN     meloxicam 15 MG tablet  Commonly known as:  MOBIC     metroNIDAZOLE 500 MG tablet  Commonly known as:  FLAGYL     triamcinolone 0.1 % ointment  Commonly known as:  KENALOG           Where to Get Your Medications      You can get these medications from any pharmacy    Bring a paper prescription for each of these medications  · busPIRone 15 MG tablet  · hydrOXYzine 25 MG tablet  · OLANZapine 10 MG tablet     Information about where to get these medications is not yet available    Ask your nurse or doctor about these medications  · metFORMIN 500 MG tablet

## 2018-04-21 ENCOUNTER — HOSPITAL ENCOUNTER (EMERGENCY)
Age: 28
Discharge: HOME | End: 2018-04-21
Attending: EMERGENCY MEDICINE
Payer: COMMERCIAL

## 2018-04-21 VITALS
SYSTOLIC BLOOD PRESSURE: 152 MMHG | TEMPERATURE: 98.1 F | BODY MASS INDEX: 48.82 KG/M2 | HEART RATE: 73 BPM | WEIGHT: 293 LBS | OXYGEN SATURATION: 96 % | DIASTOLIC BLOOD PRESSURE: 100 MMHG | HEIGHT: 65 IN | RESPIRATION RATE: 16 BRPM

## 2018-04-21 DIAGNOSIS — F32.A DEPRESSION, UNSPECIFIED DEPRESSION TYPE: Primary | ICD-10-CM

## 2018-04-21 PROCEDURE — 99283 EMERGENCY DEPT VISIT LOW MDM: CPT

## 2018-04-21 ASSESSMENT — ENCOUNTER SYMPTOMS
ABDOMINAL PAIN: 0
COUGH: 0

## 2018-04-21 ASSESSMENT — SLEEP AND FATIGUE QUESTIONNAIRES
DO YOU HAVE DIFFICULTY SLEEPING: NO
DO YOU USE A SLEEP AID: NO
AVERAGE NUMBER OF SLEEP HOURS: 7

## 2018-04-21 ASSESSMENT — LIFESTYLE VARIABLES: HISTORY_ALCOHOL_USE: NO

## 2018-09-17 ENCOUNTER — HOSPITAL ENCOUNTER (OUTPATIENT)
Age: 28
Setting detail: SPECIMEN
Discharge: HOME | End: 2018-09-17
Payer: COMMERCIAL

## 2018-09-18 LAB
ABSOLUTE EOS #: 0.23 K/UL (ref 0–0.44)
ABSOLUTE IMMATURE GRANULOCYTE: <0.03 K/UL (ref 0–0.3)
ABSOLUTE LYMPH #: 1.84 K/UL (ref 1.1–3.7)
ABSOLUTE MONO #: 0.58 K/UL (ref 0.1–1.2)
ALBUMIN SERPL-MCNC: 4.1 G/DL (ref 3.5–5.2)
ALBUMIN/GLOBULIN RATIO: 1.5 (ref 1–2.5)
ALP BLD-CCNC: 95 U/L (ref 35–104)
ALT SERPL-CCNC: 26 U/L (ref 5–33)
ANION GAP SERPL CALCULATED.3IONS-SCNC: 15 MMOL/L (ref 9–17)
AST SERPL-CCNC: 29 U/L
BASOPHILS # BLD: 1 % (ref 0–2)
BASOPHILS ABSOLUTE: 0.06 K/UL (ref 0–0.2)
BILIRUB SERPL-MCNC: 0.49 MG/DL (ref 0.3–1.2)
BILIRUBIN URINE: NEGATIVE
BUN BLDV-MCNC: 9 MG/DL (ref 6–20)
BUN/CREAT BLD: NORMAL (ref 9–20)
CALCIUM SERPL-MCNC: 9.3 MG/DL (ref 8.6–10.4)
CHLORIDE BLD-SCNC: 106 MMOL/L (ref 98–107)
CHOLESTEROL/HDL RATIO: 5.2
CHOLESTEROL: 192 MG/DL
CO2: 21 MMOL/L (ref 20–31)
COLOR: YELLOW
COMMENT UA: NORMAL
CREAT SERPL-MCNC: 0.73 MG/DL (ref 0.5–0.9)
DIFFERENTIAL TYPE: ABNORMAL
EOSINOPHILS RELATIVE PERCENT: 3 % (ref 1–4)
ESTIMATED AVERAGE GLUCOSE: 103 MG/DL
GFR AFRICAN AMERICAN: >60 ML/MIN
GFR NON-AFRICAN AMERICAN: >60 ML/MIN
GFR SERPL CREATININE-BSD FRML MDRD: NORMAL ML/MIN/{1.73_M2}
GFR SERPL CREATININE-BSD FRML MDRD: NORMAL ML/MIN/{1.73_M2}
GLUCOSE BLD-MCNC: 96 MG/DL (ref 70–99)
GLUCOSE URINE: NEGATIVE
HBA1C MFR BLD: 5.2 % (ref 4–6)
HCT VFR BLD CALC: 36.6 % (ref 36.3–47.1)
HDLC SERPL-MCNC: 37 MG/DL
HEMOGLOBIN: 11.4 G/DL (ref 11.9–15.1)
IMMATURE GRANULOCYTES: 0 %
KETONES, URINE: NEGATIVE
LDL CHOLESTEROL: 134 MG/DL (ref 0–130)
LEUKOCYTE ESTERASE, URINE: NEGATIVE
LYMPHOCYTES # BLD: 23 % (ref 24–43)
MCH RBC QN AUTO: 30 PG (ref 25.2–33.5)
MCHC RBC AUTO-ENTMCNC: 31.1 G/DL (ref 28.4–34.8)
MCV RBC AUTO: 96.3 FL (ref 82.6–102.9)
MONOCYTES # BLD: 7 % (ref 3–12)
NITRITE, URINE: NEGATIVE
NRBC AUTOMATED: 0 PER 100 WBC
PDW BLD-RTO: 12.7 % (ref 11.8–14.4)
PH UA: 5.5 (ref 5–8)
PLATELET # BLD: 361 K/UL (ref 138–453)
PLATELET ESTIMATE: ABNORMAL
PMV BLD AUTO: 10.6 FL (ref 8.1–13.5)
POTASSIUM SERPL-SCNC: 4 MMOL/L (ref 3.7–5.3)
PROTEIN UA: NEGATIVE
RBC # BLD: 3.8 M/UL (ref 3.95–5.11)
RBC # BLD: ABNORMAL 10*6/UL
SEG NEUTROPHILS: 66 % (ref 36–65)
SEGMENTED NEUTROPHILS ABSOLUTE COUNT: 5.36 K/UL (ref 1.5–8.1)
SODIUM BLD-SCNC: 142 MMOL/L (ref 135–144)
SPECIFIC GRAVITY UA: 1.01 (ref 1–1.03)
THYROXINE, FREE: 1.14 NG/DL (ref 0.93–1.7)
TOTAL PROTEIN: 6.9 G/DL (ref 6.4–8.3)
TRIGL SERPL-MCNC: 105 MG/DL
TSH SERPL DL<=0.05 MIU/L-ACNC: 1.71 MIU/L (ref 0.3–5)
TURBIDITY: CLEAR
URINE HGB: NEGATIVE
UROBILINOGEN, URINE: NORMAL
VITAMIN D 25-HYDROXY: 17.4 NG/ML (ref 30–100)
VLDLC SERPL CALC-MCNC: ABNORMAL MG/DL (ref 1–30)
WBC # BLD: 8.1 K/UL (ref 3.5–11.3)
WBC # BLD: ABNORMAL 10*3/UL

## 2019-07-31 ENCOUNTER — HOSPITAL ENCOUNTER (OUTPATIENT)
Age: 29
Setting detail: SPECIMEN
Discharge: HOME | End: 2019-07-31
Payer: COMMERCIAL

## 2019-08-05 LAB
HPV SAMPLE: NORMAL
HPV, GENOTYPE 16: NOT DETECTED
HPV, GENOTYPE 18: NOT DETECTED
HPV, HIGH RISK OTHER: NOT DETECTED
HPV, INTERPRETATION: NORMAL
SPECIMEN DESCRIPTION: NORMAL

## 2019-08-06 LAB — CYTOLOGY REPORT: NORMAL

## 2020-02-06 ENCOUNTER — APPOINTMENT (OUTPATIENT)
Dept: CT IMAGING | Age: 30
End: 2020-02-06
Payer: COMMERCIAL

## 2020-02-06 ENCOUNTER — APPOINTMENT (OUTPATIENT)
Dept: GENERAL RADIOLOGY | Age: 30
End: 2020-02-06
Payer: COMMERCIAL

## 2020-02-06 ENCOUNTER — HOSPITAL ENCOUNTER (EMERGENCY)
Age: 30
Discharge: HOME | End: 2020-02-06
Attending: EMERGENCY MEDICINE
Payer: COMMERCIAL

## 2020-02-06 VITALS
DIASTOLIC BLOOD PRESSURE: 88 MMHG | HEIGHT: 65 IN | OXYGEN SATURATION: 96 % | HEART RATE: 70 BPM | SYSTOLIC BLOOD PRESSURE: 124 MMHG | WEIGHT: 277 LBS | TEMPERATURE: 98.4 F | RESPIRATION RATE: 18 BRPM | BODY MASS INDEX: 46.15 KG/M2

## 2020-02-06 LAB
-: NORMAL
ABSOLUTE EOS #: 0.43 K/UL (ref 0–0.44)
ABSOLUTE IMMATURE GRANULOCYTE: <0.03 K/UL (ref 0–0.3)
ABSOLUTE LYMPH #: 2.56 K/UL (ref 1.1–3.7)
ABSOLUTE MONO #: 0.67 K/UL (ref 0.1–1.2)
ALBUMIN SERPL-MCNC: 3.6 G/DL (ref 3.5–5.2)
ALBUMIN/GLOBULIN RATIO: 1.1 (ref 1–2.5)
ALP BLD-CCNC: 82 U/L (ref 35–104)
ALT SERPL-CCNC: 19 U/L (ref 5–33)
AMORPHOUS: NORMAL
ANION GAP SERPL CALCULATED.3IONS-SCNC: 12 MMOL/L (ref 9–17)
AST SERPL-CCNC: 19 U/L
BACTERIA: NORMAL
BASOPHILS # BLD: 1 % (ref 0–2)
BASOPHILS ABSOLUTE: 0.07 K/UL (ref 0–0.2)
BILIRUB SERPL-MCNC: 0.19 MG/DL (ref 0.3–1.2)
BILIRUBIN URINE: NEGATIVE
BNP INTERPRETATION: ABNORMAL
BUN BLDV-MCNC: 10 MG/DL (ref 6–20)
BUN/CREAT BLD: ABNORMAL (ref 9–20)
CALCIUM SERPL-MCNC: 8.8 MG/DL (ref 8.6–10.4)
CASTS UA: NORMAL /LPF (ref 0–8)
CHLORIDE BLD-SCNC: 105 MMOL/L (ref 98–107)
CO2: 22 MMOL/L (ref 20–31)
COLOR: YELLOW
COMMENT UA: ABNORMAL
CREAT SERPL-MCNC: 0.65 MG/DL (ref 0.5–0.9)
CRYSTALS, UA: NORMAL /HPF
DIFFERENTIAL TYPE: ABNORMAL
EOSINOPHILS RELATIVE PERCENT: 5 % (ref 1–4)
EPITHELIAL CELLS UA: NORMAL /HPF (ref 0–5)
GFR AFRICAN AMERICAN: >60 ML/MIN
GFR NON-AFRICAN AMERICAN: >60 ML/MIN
GFR SERPL CREATININE-BSD FRML MDRD: ABNORMAL ML/MIN/{1.73_M2}
GFR SERPL CREATININE-BSD FRML MDRD: ABNORMAL ML/MIN/{1.73_M2}
GLUCOSE BLD-MCNC: 88 MG/DL (ref 70–99)
GLUCOSE URINE: NEGATIVE
HCG QUALITATIVE: NEGATIVE
HCT VFR BLD CALC: 37 % (ref 36.3–47.1)
HEMOGLOBIN: 11.6 G/DL (ref 11.9–15.1)
IMMATURE GRANULOCYTES: 0 %
KETONES, URINE: NEGATIVE
LEUKOCYTE ESTERASE, URINE: NEGATIVE
LIPASE: 33 U/L (ref 13–60)
LYMPHOCYTES # BLD: 28 % (ref 24–43)
MCH RBC QN AUTO: 29.4 PG (ref 25.2–33.5)
MCHC RBC AUTO-ENTMCNC: 31.4 G/DL (ref 28.4–34.8)
MCV RBC AUTO: 93.9 FL (ref 82.6–102.9)
MONOCYTES # BLD: 7 % (ref 3–12)
MUCUS: NORMAL
NITRITE, URINE: NEGATIVE
NRBC AUTOMATED: 0 PER 100 WBC
OTHER OBSERVATIONS UA: NORMAL
PDW BLD-RTO: 12.7 % (ref 11.8–14.4)
PH UA: 7 (ref 5–8)
PLATELET # BLD: 411 K/UL (ref 138–453)
PLATELET ESTIMATE: ABNORMAL
PMV BLD AUTO: 9.6 FL (ref 8.1–13.5)
POTASSIUM SERPL-SCNC: 3.8 MMOL/L (ref 3.7–5.3)
PRO-BNP: 350 PG/ML
PROTEIN UA: NEGATIVE
RBC # BLD: 3.94 M/UL (ref 3.95–5.11)
RBC # BLD: ABNORMAL 10*6/UL
RBC UA: NORMAL /HPF (ref 0–4)
RENAL EPITHELIAL, UA: NORMAL /HPF
SEG NEUTROPHILS: 59 % (ref 36–65)
SEGMENTED NEUTROPHILS ABSOLUTE COUNT: 5.32 K/UL (ref 1.5–8.1)
SODIUM BLD-SCNC: 139 MMOL/L (ref 135–144)
SPECIFIC GRAVITY UA: 1.01 (ref 1–1.03)
TOTAL PROTEIN: 6.9 G/DL (ref 6.4–8.3)
TRICHOMONAS: NORMAL
TROPONIN INTERP: NORMAL
TROPONIN T: NORMAL NG/ML
TROPONIN, HIGH SENSITIVITY: <6 NG/L (ref 0–14)
TURBIDITY: CLEAR
URINE HGB: ABNORMAL
UROBILINOGEN, URINE: NORMAL
WBC # BLD: 9.1 K/UL (ref 3.5–11.3)
WBC # BLD: ABNORMAL 10*3/UL
WBC UA: NORMAL /HPF (ref 0–5)
YEAST: NORMAL

## 2020-02-06 PROCEDURE — 6370000000 HC RX 637 (ALT 250 FOR IP): Performed by: STUDENT IN AN ORGANIZED HEALTH CARE EDUCATION/TRAINING PROGRAM

## 2020-02-06 PROCEDURE — 80053 COMPREHEN METABOLIC PANEL: CPT

## 2020-02-06 PROCEDURE — 83690 ASSAY OF LIPASE: CPT

## 2020-02-06 PROCEDURE — 6360000002 HC RX W HCPCS: Performed by: STUDENT IN AN ORGANIZED HEALTH CARE EDUCATION/TRAINING PROGRAM

## 2020-02-06 PROCEDURE — 93005 ELECTROCARDIOGRAM TRACING: CPT | Performed by: STUDENT IN AN ORGANIZED HEALTH CARE EDUCATION/TRAINING PROGRAM

## 2020-02-06 PROCEDURE — 84703 CHORIONIC GONADOTROPIN ASSAY: CPT

## 2020-02-06 PROCEDURE — 96374 THER/PROPH/DIAG INJ IV PUSH: CPT

## 2020-02-06 PROCEDURE — 74177 CT ABD & PELVIS W/CONTRAST: CPT

## 2020-02-06 PROCEDURE — 71046 X-RAY EXAM CHEST 2 VIEWS: CPT

## 2020-02-06 PROCEDURE — 84484 ASSAY OF TROPONIN QUANT: CPT

## 2020-02-06 PROCEDURE — 83880 ASSAY OF NATRIURETIC PEPTIDE: CPT

## 2020-02-06 PROCEDURE — 2580000003 HC RX 258: Performed by: STUDENT IN AN ORGANIZED HEALTH CARE EDUCATION/TRAINING PROGRAM

## 2020-02-06 PROCEDURE — 6360000004 HC RX CONTRAST MEDICATION: Performed by: STUDENT IN AN ORGANIZED HEALTH CARE EDUCATION/TRAINING PROGRAM

## 2020-02-06 PROCEDURE — 81001 URINALYSIS AUTO W/SCOPE: CPT

## 2020-02-06 PROCEDURE — 99284 EMERGENCY DEPT VISIT MOD MDM: CPT

## 2020-02-06 PROCEDURE — 85025 COMPLETE CBC W/AUTO DIFF WBC: CPT

## 2020-02-06 PROCEDURE — 96372 THER/PROPH/DIAG INJ SC/IM: CPT

## 2020-02-06 RX ORDER — ONDANSETRON 4 MG/1
4 TABLET, ORALLY DISINTEGRATING ORAL EVERY 8 HOURS PRN
Qty: 10 TABLET | Refills: 0 | Status: ON HOLD | OUTPATIENT
Start: 2020-02-06 | End: 2020-08-13 | Stop reason: ALTCHOICE

## 2020-02-06 RX ORDER — IBUPROFEN 400 MG/1
400 TABLET ORAL EVERY 8 HOURS PRN
Qty: 30 TABLET | Refills: 0 | Status: ON HOLD | OUTPATIENT
Start: 2020-02-06 | End: 2020-08-13 | Stop reason: ALTCHOICE

## 2020-02-06 RX ORDER — ONDANSETRON 2 MG/ML
4 INJECTION INTRAMUSCULAR; INTRAVENOUS ONCE
Status: COMPLETED | OUTPATIENT
Start: 2020-02-06 | End: 2020-02-06

## 2020-02-06 RX ORDER — ACETAMINOPHEN 325 MG/1
325 TABLET ORAL EVERY 8 HOURS PRN
Qty: 30 TABLET | Refills: 0 | Status: ON HOLD | OUTPATIENT
Start: 2020-02-06 | End: 2020-08-13 | Stop reason: ALTCHOICE

## 2020-02-06 RX ORDER — DICYCLOMINE HYDROCHLORIDE 10 MG/ML
20 INJECTION INTRAMUSCULAR ONCE
Status: COMPLETED | OUTPATIENT
Start: 2020-02-06 | End: 2020-02-06

## 2020-02-06 RX ORDER — ACETAMINOPHEN 325 MG/1
650 TABLET ORAL ONCE
Status: COMPLETED | OUTPATIENT
Start: 2020-02-06 | End: 2020-02-06

## 2020-02-06 RX ORDER — DICYCLOMINE HYDROCHLORIDE 10 MG/1
10 CAPSULE ORAL EVERY 6 HOURS PRN
Qty: 20 CAPSULE | Refills: 0 | Status: ON HOLD | OUTPATIENT
Start: 2020-02-06 | End: 2020-08-13 | Stop reason: ALTCHOICE

## 2020-02-06 RX ORDER — 0.9 % SODIUM CHLORIDE 0.9 %
1000 INTRAVENOUS SOLUTION INTRAVENOUS ONCE
Status: COMPLETED | OUTPATIENT
Start: 2020-02-06 | End: 2020-02-06

## 2020-02-06 RX ADMIN — IOHEXOL 75 ML: 350 INJECTION, SOLUTION INTRAVENOUS at 17:23

## 2020-02-06 RX ADMIN — DICYCLOMINE HYDROCHLORIDE 20 MG: 10 INJECTION INTRAMUSCULAR at 15:46

## 2020-02-06 RX ADMIN — SODIUM CHLORIDE 1000 ML: 9 INJECTION, SOLUTION INTRAVENOUS at 15:52

## 2020-02-06 RX ADMIN — ONDANSETRON 4 MG: 2 INJECTION INTRAMUSCULAR; INTRAVENOUS at 15:49

## 2020-02-06 RX ADMIN — ACETAMINOPHEN 650 MG: 325 TABLET ORAL at 15:45

## 2020-02-06 ASSESSMENT — PAIN DESCRIPTION - LOCATION: LOCATION: BACK;ABDOMEN

## 2020-02-06 ASSESSMENT — PAIN DESCRIPTION - ORIENTATION: ORIENTATION: LOWER

## 2020-02-06 ASSESSMENT — ENCOUNTER SYMPTOMS
NAUSEA: 1
EYE ITCHING: 0
SORE THROAT: 0
RHINORRHEA: 0
BACK PAIN: 1
SHORTNESS OF BREATH: 1
EYE REDNESS: 0
CONSTIPATION: 0
ABDOMINAL PAIN: 1
COUGH: 0
DIARRHEA: 0
BLOOD IN STOOL: 0
VOMITING: 0

## 2020-02-06 ASSESSMENT — PAIN SCALES - GENERAL
PAINLEVEL_OUTOF10: 8
PAINLEVEL_OUTOF10: 8

## 2020-02-06 ASSESSMENT — PAIN DESCRIPTION - PAIN TYPE: TYPE: ACUTE PAIN

## 2020-02-06 ASSESSMENT — PAIN DESCRIPTION - PROGRESSION: CLINICAL_PROGRESSION: NOT CHANGED

## 2020-02-06 ASSESSMENT — PAIN DESCRIPTION - FREQUENCY: FREQUENCY: CONTINUOUS

## 2020-02-06 ASSESSMENT — PAIN DESCRIPTION - ONSET: ONSET: ON-GOING

## 2020-02-06 ASSESSMENT — PAIN DESCRIPTION - DESCRIPTORS: DESCRIPTORS: SHARP;SHOOTING

## 2020-02-06 NOTE — ED PROVIDER NOTES
[facility] ED  Emergency Department Encounter  Emergency Medicine Resident     Pt Name: Jenni Polanco  MRN: [id number]  [place] 1990  Date ofevaluation: 20  PCP:  Halima Andino MD    [address] [place] [address]       Chief Complaint   Patient presents with    Abdominal Pain     x3 weeks    Back Pain    Nausea     HISTORY OF PRESENT ILLNESS  (Location/Symptom, Timing/Onset, Context/Setting, Quality, Duration, Modifying Factors, Severity, Associated signs/symptoms)     Jenni Polanco is a 30 y.o. female who presents with multiple complaints. Patient reports that she has had abdominal pain, lower back pain, and lower extremity weakness for approximate last 2 to 3 weeks. She states her symptoms have not improved prompting her to the emergency department. She currently rates her pain 8/10. She describes a diffuse abdominal pain that she has not had before, but states it is lighter than normal.  It is accompanied by nausea but no vomiting, diarrhea, or vaginal discharge. She does currently on her period s no other urinary symptoms. She also has low back pain but denies any recent trauma, saddle anesthesia, numbness or tingling, bowel or bladder incontinence/retention. Further symptoms include associated lightheadedness, subjective fevers and chills, fatigue, neck pain. She is a history of depression for which she has been taking Zoloft but stopped 6 months ago. Occasional marijuana use, no tobacco use no alcohol use. PAST MEDICAL / SURGICAL / SOCIAL / FAMILY HISTORY      has a past medical history of Asthma, Back pain, Bipolar 1 disorder ([address] Bipolar disorder ([address] Depression, Diabetes mellitus ([address] Dizziness, Fatty liver disease, nonalcoholic, Fatty liver disease, nonalcoholic, GERD (gastroesophageal reflux disease), and Obesity. has a past surgical history that includes  section and LEEP.     Social History     Socioeconomic History    Marital status:      Spouse name: Not on file    Number of children: Not on file    Years of education: Not on file    Highest education level: Not on file   Occupational History    Not on file   Social Needs    Financial resource strain: Not on file    Food insecurity:     Worry: Not on file     Inability: Not on file    Transportation needs:     Medical: Not on file     Non-medical: Not on file   Tobacco Use    Smoking status: Former Smoker     Packs/day: 0.25     Years: 14.00     Pack years: 3.50     Types: Cigarettes    Smokeless tobacco: Never Used   Substance and Sexual Activity    Alcohol use: Not on file     Comment: rarely    Drug use: Yes     Types: Marijuana     Comment: quit    Sexual activity: Not Currently   Lifestyle    Physical activity:     Days per week: Not on file     Minutes per session: Not on file    Stress: Not on file   Relationships    Social connections:     Talks on phone: Not on file     Gets together: Not on file     Attends [employer] service: Not on file     Active member of club or organization: Not on file     Attends meetings of clubs or organizations: Not on file     Relationship status: Not on file    Intimate partner violence:     Fear of current or ex partner: Not on file     Emotionally abused: Not on file     Physically abused: Not on file     Forced sexual activity: Not on file   Other Topics Concern    Not on file   Social History Narrative    Not on file       Family History   Problem Relation Age of Onset    High Blood Pressure Mother     Diabetes Mother     Heart Disease Mother     Heart Disease Father     Early Death Father     Cancer Maternal Aunt         lung    Cancer Paternal Cousin         brain       Allergies:  Pcn [penicillins]; Amoxicillin; and Seasonal    Home Medications:  Prior to Admission medications    Medication Sig Start Date End Date Taking?  Authorizing Provider   dicyclomine (BENTYL) 10 MG capsule Take 1 capsule by mouth every 6 hours as needed (cramps) 2/6/20  Yes Meenakshi Fox DO   ondansetron (ZOFRAN ODT) 4 MG disintegrating tablet Take 1 tablet by mouth every 8 hours as needed for Nausea 2/6/20  Yes Meenakshi Fox DO   ibuprofen (IBU) 400 MG tablet Take 1 tablet by mouth every 8 hours as needed for Pain 2/6/20  Yes Meenakshi Fox DO   acetaminophen (TYLENOL) 325 MG tablet Take 1 tablet by mouth every 8 hours as needed for Pain 2/6/20  Yes Meenakshi Fox DO   busPIRone (BUSPAR) 15 MG tablet Take 1 tablet by mouth 3 times daily 12/11/17   Lynn Wang MD   hydrOXYzine (ATARAX) 25 MG tablet Take 1 tablet by mouth 2 times daily as needed for Itching 12/11/17   Lynn Wang MD   metFORMIN (GLUCOPHAGE) 500 MG tablet Take 1 tablet by mouth 2 times daily (with meals) TAKE 1 TABLET BY MOUTH DAILY 12/11/17   Lynn Wang MD   OLANZapine (ZYPREXA) 10 MG tablet Take 1 tablet by mouth nightly 12/11/17   Lynn Wang MD   albuterol sulfate HFA (PROVENTIL HFA) 108 (90 Base) MCG/ACT inhaler Inhale 2 puffs into the lungs every 6 hours as needed for Wheezing 12/3/17   Bard Karri MD   fluticasone (FLOVENT HFA) 110 MCG/ACT inhaler Inhale 2 puffs into the lungs 2 times daily 12/3/17   Bard Karri MD   famotidine (PEPCID) 20 MG tablet Take 1 tablet by mouth 2 times daily 12/3/17   Bard Karri MD   cetirizine (ZYRTEC) 10 MG tablet Take 1 tablet by mouth daily 8/22/17   Racquel Gerardo MD   fluticasone (FLONASE) 50 MCG/ACT nasal spray SPRAY 1 SPRAY IN EACH NOSTRIL ONCE DAILY 8/22/17   Soila Torres MD   montelukast (SINGULAIR) 10 MG tablet Take 1 tablet by mouth nightly 8/22/17   Racquel Gerardo MD   glucose blood VI test strips (ACURA BLOOD GLUCOSE TEST) strip 1 each by In Vitro route daily As needed.  8/29/16   Halima Andino MD   Respiratory Therapy Supplies (NEBULIZER/TUBING/MOUTHPIECE) KIT Use ever 4 hours prn 7/28/16 June MD Thu   Accu-Chek Multiclix Lancets MISC USE AS INSTRUCTED. 4/22/16 and 2+ on the left side. Heart sounds: No friction rub. No gallop. Pulmonary:      Effort: Pulmonary effort is normal. No respiratory distress. Breath sounds: Normal breath sounds. No stridor. No wheezing, rhonchi or rales. Abdominal:      General: There is no distension. Palpations: Abdomen is soft. There is no mass. Tenderness: There is abdominal tenderness. There is right CVA tenderness and left CVA tenderness. There is no guarding or rebound. Musculoskeletal:      Right lower leg: No edema. Left lower leg: No edema. Skin:     General: Skin is warm and dry. Findings: No rash. Erythema: over exposed skin. Neurological:      General: No focal deficit present. Mental Status: She is alert and oriented to person, place, and time.    Psychiatric:         Mood and Affect: Mood normal.         Behavior: Behavior normal.       DIAGNOSTICS     PLAN (LABS / IMAGING / EKG):  Orders Placed This Encounter   Procedures    XR CHEST STANDARD (2 VW)    CT ABDOMEN PELVIS W IV CONTRAST    CBC WITH AUTO DIFFERENTIAL    Comprehensive Metabolic Panel    LIPASE    URINALYSIS    HCG Qualitative, Serum    Brain Natriuretic Peptide    Microscopic Urinalysis    EKG 12 Lead     MEDICATIONS ORDERED:  Orders Placed This Encounter   Medications    dicyclomine (BENTYL) injection 20 mg    ondansetron (ZOFRAN) injection 4 mg    0.9 % sodium chloride bolus    acetaminophen (TYLENOL) tablet 650 mg    iohexol (OMNIPAQUE 350) solution 75 mL    dicyclomine (BENTYL) 10 MG capsule     Sig: Take 1 capsule by mouth every 6 hours as needed (cramps)     Dispense:  20 capsule     Refill:  0    ondansetron (ZOFRAN ODT) 4 MG disintegrating tablet     Sig: Take 1 tablet by mouth every 8 hours as needed for Nausea     Dispense:  10 tablet     Refill:  0    ibuprofen (IBU) 400 MG tablet     Sig: Take 1 tablet by mouth every 8 hours as needed for Pain     Dispense:  30 tablet     Refill:  0    GFR Staging NOT REPORTED    LIPASE   Result Value Ref Range    Lipase 33 13 - 60 U/L   URINALYSIS   Result Value Ref Range    Color, UA YELLOW YELLOW    Turbidity UA CLEAR CLEAR    Glucose, Ur NEGATIVE NEGATIVE    Bilirubin Urine NEGATIVE NEGATIVE    Ketones, Urine NEGATIVE NEGATIVE    Specific Gravity, UA 1.011 1.005 - 1.030    Urine Hgb LARGE (A) NEGATIVE    pH, UA 7.0 5.0 - 8.0    Protein, UA NEGATIVE NEGATIVE    Urobilinogen, Urine Normal Normal    Nitrite, Urine NEGATIVE NEGATIVE    Leukocyte Esterase, Urine NEGATIVE NEGATIVE    Urinalysis Comments NOT REPORTED    HCG Qualitative, Serum   Result Value Ref Range    hCG Qual NEGATIVE NEGATIVE   Brain Natriuretic Peptide   Result Value Ref Range    Pro- (H) <300 pg/mL    BNP Interpretation Pro-BNP Reference Range:    Troponin   Result Value Ref Range    Troponin, High Sensitivity <6 0 - 14 ng/L    Troponin T NOT REPORTED <0.03 ng/mL    Troponin Interp NOT REPORTED    Microscopic Urinalysis   Result Value Ref Range    -          WBC, UA 2 TO 5 0 - 5 /HPF    RBC, UA 50  0 - 4 /HPF    Casts UA  0 - 8 /LPF    Crystals UA NOT REPORTED None /HPF    Epithelial Cells UA 0 TO 2 0 - 5 /HPF    Renal Epithelial, Urine NOT REPORTED 0 /HPF    Bacteria, UA NOT REPORTED None    Mucus, UA NOT REPORTED None    Trichomonas, UA NOT REPORTED None    Amorphous, UA NOT REPORTED None    Other Observations UA NOT REPORTED NOT REQ. Yeast, UA NOT REPORTED None       RADIOLOGY:  CT ABDOMEN PELVIS W IV CONTRAST   Final Result   1. No acute abnormalities seen in the abdomen or pelvis   2. Nonobstructing bilateral renal calculi   3. No definite obstructive uropathy. Bilateral pelvic calcifications most   likely represent phleboliths   4. Normal appearing appendix   5. Normal appearing gallbladder   6. Fat containing umbilical hernia         XR CHEST STANDARD (2 VW)   Final Result   No acute intrathoracic process.             EKG  Rhythm: sinus bradycardia  Rate: bradycardia  Axis: normal  Ectopy: none  Conduction: normal  ST Segments: normal  T Waves: inversion in  III  Q Waves: none    Clinical Impression: When compared to EKG dated 11/29/17, no acute changes, and non-specific EKG    Normal Interval Reference:  P-wave <110 ms  -200 ms  QRS <100 ms  QT <420 ms  QTc 330-470 ms    All EKG's are interpreted by the Emergency Department Physician who either signs or Co-signsthis chart in the absence of a cardiologist.    EMERGENCY DEPARTMENT COURSE:    Patient evaluated by attending physician and myself. Patient presenting various complaints but most focused on her generalized abdominal pain. On exam she is well-appearing in no acute distress. Her vitals are unremarkable. Heart regular rate and rhythm, lungs are clear to auscultation bilateral.  Abdomen is soft but generalized tenderness palpation, more so in the upper part of her abdomen. She does also report that she has some weakness in her legs, but denies any saddle  anesthesia, or bowel or bladder incontinence. She does have full strength and sensation her bilateral lower extremities with 2+ DP pulses. Doubt cauda equina or other acute spinal cord pathology. Differential diagnosis includes pancreatitis, cholecystitis, gastritis, colitis, constipation, among others. Plan is for abdominal labs, symptomatic treatment and reassess. Patient remained symptomatic. CT scan of her abdomen was performed and was unremarkable for any acute pathology. Doubt any emergent pathology of patient's abdominal pain. Instructed patient to follow-up with her primary care doctor in the next several days. Provided prescriptions for Bentyl, Zofran as well as ibuprofen and Tylenol. Strict return precautions given. Patient instructed to follow with her primary care provider as soon as possible for follow up. Patient and/or family expressed understanding and agreement with this plan.

## 2020-02-06 NOTE — ED NOTES
Pt. Ambulatory with steady gait to ER room 1 from triage  Pt. Presents with diffuse and lower abdominal pain, middle to lower back pain, nausea and fatigue x3 weeks  Pt. Denies vomiting, states she has been able to keep food and water down but has decreased appetite  Pt.  Arrives A/Ox4, RR even and unlabored, NAD; vital signs stable  Awaiting further orders  Will continue to monitor and reassess     Ginny Reilly RN  02/06/20 [id number]

## 2020-02-06 NOTE — ED PROVIDER NOTES
[address] [facility]     Emergency Department     Faculty Attestation        I performed a history and physical examination of the patient and discussed management with the resident. I reviewed the residents note and agree with the documented findings and plan of care. Any areas of disagreement are noted on the chart. I was personally present for the key portions of any procedures. I have documented in the chart those procedures where I was not present during the key portions. I have reviewed the emergency nurses triage note. I agree with the chief complaint, past medical history, past surgical history, allergies, medications, social and family history as documented unless otherwise noted below. For Physician Assistant/ Nurse Practitioner cases/documentation I have personally evaluated this patient and have completed at least one if not all key elements of the E/M (history, physical exam, and MDM). Additional findings are as noted. Vital Signs: BP: (!) 129/90  Pulse: 66  Resp: 16  Temp: 98.4 °F (36.9 °C) SpO2: 96 %  PCP:  Halima Andino MD    Pertinent Comments:         Critical Care  None      (Please note that portions of this note were completed with a voice recognition program. Efforts were made to edit the dictations but occasionally words are mis-transcribed.  Whenever words are used in this note in any gender, they shall be construed as though they were used in the gender appropriate to the circumstances; and whenever words are used in this note in the singular or plural form, they shall be construed as though they were used in the form appropriate to the circumstances.)    Louis Georges MD [facility]  Attending Emergency Medicine Physician            aFith Rivers MD  02/06/20 [id number]

## 2020-02-07 LAB
EKG ATRIAL RATE: 59 BPM
EKG P AXIS: 46 DEGREES
EKG P-R INTERVAL: 134 MS
EKG Q-T INTERVAL: 446 MS
EKG QRS DURATION: 84 MS
EKG QTC CALCULATION (BAZETT): 441 MS
EKG R AXIS: 11 DEGREES
EKG T AXIS: 3 DEGREES
EKG VENTRICULAR RATE: 59 BPM

## 2020-02-07 PROCEDURE — 93010 ELECTROCARDIOGRAM REPORT: CPT | Performed by: INTERNAL MEDICINE

## 2020-07-08 NOTE — CONSULTS
General Surgery   Consultation        PATIENT NAME: Lory Goodman   YOB: 1990    ADMISSION DATE: 2017  3:43 AM     Admitting Provider: Dr. Aric Morin Physician: Dr. Nikki Moffett: 2017    Consult Regarding:  RUQ abdominal pain    HISTORY OF PRESENT ILLNESS:  The patient is a 32 y.o. female being consulted for RUQ abdominal pain. Patient's pain started yesterday morning with radiation to mid upper back. Positive for nausea and vomiting. Denies fever or chills. Last bowel movement yesterday that was normal.  Denies hematochezia or diarrhea. Patient has had right upper quadrant pain in the past.  He was seen by a surgeon in clinic for this reason. Was told that pain was likely not due to gallbladder pathology. Patient had a white blood count of 17.6.  UA showed many bacteria in specimen. Patient denies any dysuria or burning sensation while voiding. Ultrasound of gallbladder showed cholelithiasis without gallbladder wall thickening or pericholecystic fluid. Common bile duct measured 5 mm. LFTs were within normal limits. Our services was consultation for biliary colic symptom and surgical evaluation. Status post . Denies any other abdominal surgeries.     Past Medical History:        Diagnosis Date    Asthma     Back pain 2014    B[facility] 1 disorder ([facility] [address]     Depression     Dizziness     Fatty liver disease, nonalcoholic     Fatty liver disease, nonalcoholic     GERD (gastroesophageal reflux disease)     Obesity        Past Surgical History:        Procedure Laterality Date     SECTION      LEEP         Medications Prior to Admission:   Prescriptions Prior to Admission: FLUoxetine (PROZAC) 20 MG capsule, TAKE ONE (1) CAPSULE BY MOUTH ONCE DAILY  albuterol sulfate HFA (PROVENTIL HFA) 108 (90 Base) MCG/ACT inhaler, Inhale 2 puffs into the lungs every 6 hours as needed for Wheezing  fluticasone (FLONASE) 50 Procedure Name: Medial Branch/Dorsal Ramus Block, Bilateral, Lumbar L3, L4, and L5, Second Diagnostic Block performed by Medhat Flowers MD on 7/7/2020.    Date of procedure: 7/7/20    Pain Diary:   Prior to procedure: 5    At discharge: 0   Hour 1: 0   Hour 2: 0   Hour 3: 0   Hour 4: 0   Hour 5: 0   Current: 0     Pain relief:   100%    SCHEDULERS: Please schedule the following:    DX: Facet arthropathy, lumbar       IIf patient has greater than 50% pain relief, increased activity tolerance and/or range of motion, then please schedule next procedure with :  · Radiofrequency Ablation Medial Branch/Dorsal Ramus, Right, Lumbar L3, L4,  L5  · Followed by Radiofrequency Ablation Medial Branch/Dorsal Ramus, Left, Lumbar L3, L4,  L5  · Procedure(s) to be scheduled with [facility]    Next pain management appointment: None, follow up appt scheduled       MCG/ACT nasal spray, SPRAY 1 SPRAY IN EACH NOSTRIL ONCE DAILY  metFORMIN (GLUCOPHAGE) 500 MG tablet, TAKE 1 TABLET BY MOUTH DAILY  montelukast (SINGULAIR) 10 MG tablet, Take 1 tablet by mouth nightly  fluticasone (FLOVENT HFA) 110 MCG/ACT inhaler, Inhale 2 puffs into the lungs 2 times daily  cetirizine (ZYRTEC) 10 MG tablet, Take 1 tablet by mouth daily  guaiFENesin (MUCINEX) 600 MG extended release tablet, TAKE 1 TABLET BY MOUTH 2 TIMES DAILY  ibuprofen (ADVIL;MOTRIN) 800 MG tablet, TAKE (1) TABLET BY MOUTH EVERY 8 HOURS AS NEEDED FOR PAIN  loratadine (CLARITIN) 10 MG tablet, TAKE ONE (1) TABLET BY MOUTH ONCE DAILY  meloxicam (MOBIC) 15 MG tablet, TAKE 1 TABLET BY MOUTH ONCE DAILY  hydrOXYzine (ATARAX) 25 MG tablet, Take 25 mg by mouth 3 times daily as needed for Itching  busPIRone (BUSPAR) 15 MG tablet, Take 1 tablet by mouth 3 times daily  triamcinolone (KENALOG) 0.1 % ointment, APPLY TO AFFECTED AREA TWICE DAILY  glucose blood VI test strips (ACURA BLOOD GLUCOSE TEST) strip, 1 each by In Vitro route daily As needed. Respiratory Therapy Supplies (NEBULIZER/TUBING/MOUTHPIECE) KIT, Use ever 4 hours prn  Accu-Chek Multiclix Lancets MISC, USE AS INSTRUCTED. Blood Glucose Monitoring Suppl (ACURA BLOOD GLUCOSE METER) W/DEVICE KIT, Use as instructed  [facility] Devices (WRIST BRACE) [facility], Dispense 1 rt cock up wrist splint for CTS  chlorhexidine (PERIDEX) 0.12 % solution,     Allergies:  Pcn [penicillins]; Amoxicillin; and Seasonal    Social History:   Social History     Social History    Marital status:      Spouse name: N/A    Number of children: N/A    Years of education: N/A     Occupational History    Not on file.      Social History Main Topics    Smoking status: Former Smoker     Packs/day: 0.25     Years: 14.00     Types: Cigarettes    Smokeless tobacco: Never Used    Alcohol use Not on file      Comment: rarely    Drug use:      Types: Marijuana      Comment: quit    Sexual activity: Not Currently Other Topics Concern    Not on file     Social History Narrative    No narrative on file       Family History:       Problem Relation Age of Onset    High Blood Pressure Mother     Diabetes Mother     Heart Disease Mother     Heart Disease Father     Early Death Father     Cancer Maternal Aunt      lung    Cancer Paternal Cousin      brain       REVIEW OF SYSTEMS:      General: No recent weight gain/loss. Energy level normal for pt. HEENT: Denies sore throat, sinus problems, allergic rhinosinusitis  Cardiovascular: Denies chest pain, palpitations, orthopnea/PND. Denies h/o murmurs  Pulmonary: Denies cough, shortness of breath  GI:  per HPI  : Denies polyuria, dysuria, hematuria  Endocrine: Positive for DM  Hem/Onc: Denies anemia, h/o bleeding or clotting problems.    Neurological: Denies dizziness/headache  Skin: Denies rashes, ulcers  Musculoskeletal: Denies muscle, joint, back pain      PHYSICAL EXAM:    VITALS:  /76   Pulse 83   Temp 99.9 °F (37.7 °C) (Oral)   Resp 17   Ht 5' 5\" (1.651 m)   Wt 294 lb (133.4 kg)   LMP 10/26/2017   SpO2 99%   BMI 48.92 kg/m²   INTAKE/OUTPUT:   No intake or output data in the 24 hours ending 11/29/17 1128      CONSTITUTIONAL:  Alert and oriented, no acute distress  HEAD: normocephalic, atraumatic  EYES: Pupils equal and reactive to light, Extraocular muscles intact, sclera non icteric  ENT: Mucus membranes moist, No otorrhea, no rhinorrhea  NECK:  supple, symmetrical, trachea midline   LUNGS:  Good air movement bilaterally, unlabored respirations, no wheezes or rhonchi  CARDIOVASCULAR: Regular rate and rhythm, no murmurs rubs or gallops  ABDOMEN: soft, TTP on RUQ, nondistended, obese, no peritoneal sign  MUSCULOSKELETAL:  Equal strength bilaterally, normal muscle tone  SKIN: No abscess or rash  NEUROLOGIC:  Cranial nerves 2-12 grossly intact, no focal deficits  PSYCH: affect appropriate    CBC:   Lab Results   Component Value Date    WBC 17.6 11/29/2017

## 2020-08-12 ENCOUNTER — HOSPITAL ENCOUNTER (INPATIENT)
Age: 30
LOS: 4 days | Discharge: HOME | DRG: 753 | End: 2020-08-17
Attending: EMERGENCY MEDICINE | Admitting: PSYCHIATRY & NEUROLOGY
Payer: COMMERCIAL

## 2020-08-12 PROCEDURE — 99285 EMERGENCY DEPT VISIT HI MDM: CPT

## 2020-08-12 RX ORDER — ACETAMINOPHEN 500 MG
1000 TABLET ORAL ONCE
Status: COMPLETED | OUTPATIENT
Start: 2020-08-13 | End: 2020-08-13

## 2020-08-12 RX ORDER — ONDANSETRON 4 MG/1
4 TABLET, ORALLY DISINTEGRATING ORAL ONCE
Status: COMPLETED | OUTPATIENT
Start: 2020-08-13 | End: 2020-08-13

## 2020-08-12 ASSESSMENT — PAIN SCALES - GENERAL: PAINLEVEL_OUTOF10: 10

## 2020-08-13 ENCOUNTER — APPOINTMENT (OUTPATIENT)
Dept: GENERAL RADIOLOGY | Age: 30
DRG: 753 | End: 2020-08-13
Payer: COMMERCIAL

## 2020-08-13 PROBLEM — F33.9 MAJOR DEPRESSIVE DISORDER, RECURRENT (HCC): Status: ACTIVE | Noted: 2020-08-13

## 2020-08-13 LAB
AMPHETAMINE SCREEN URINE: NEGATIVE
BARBITURATE SCREEN URINE: NEGATIVE
BENZODIAZEPINE SCREEN, URINE: NEGATIVE
BUPRENORPHINE URINE: NORMAL
CANNABINOID SCREEN URINE: NEGATIVE
COCAINE METABOLITE, URINE: NEGATIVE
ETHANOL PERCENT: <0.01 %
ETHANOL: <10 MG/DL
HCG(URINE) PREGNANCY TEST: NEGATIVE
MDMA URINE: NORMAL
METHADONE SCREEN, URINE: NEGATIVE
METHAMPHETAMINE, URINE: NORMAL
OPIATES, URINE: NEGATIVE
OXYCODONE SCREEN URINE: NEGATIVE
PHENCYCLIDINE, URINE: NEGATIVE
PROPOXYPHENE, URINE: NORMAL
SARS-COV-2, PCR: NORMAL
SARS-COV-2, RAPID: NOT DETECTED
SARS-COV-2: NORMAL
SOURCE: NORMAL
TEST INFORMATION: NORMAL
TRICYCLIC ANTIDEPRESSANTS, UR: NORMAL

## 2020-08-13 PROCEDURE — 1240000000 HC EMOTIONAL WELLNESS R&B

## 2020-08-13 PROCEDURE — 6370000000 HC RX 637 (ALT 250 FOR IP): Performed by: EMERGENCY MEDICINE

## 2020-08-13 PROCEDURE — 80307 DRUG TEST PRSMV CHEM ANLYZR: CPT

## 2020-08-13 PROCEDURE — 81025 URINE PREGNANCY TEST: CPT

## 2020-08-13 PROCEDURE — G0480 DRUG TEST DEF 1-7 CLASSES: HCPCS

## 2020-08-13 PROCEDURE — 6370000000 HC RX 637 (ALT 250 FOR IP): Performed by: PSYCHIATRY & NEUROLOGY

## 2020-08-13 PROCEDURE — 73130 X-RAY EXAM OF HAND: CPT

## 2020-08-13 PROCEDURE — 36415 COLL VENOUS BLD VENIPUNCTURE: CPT

## 2020-08-13 PROCEDURE — U0002 COVID-19 LAB TEST NON-CDC: HCPCS

## 2020-08-13 PROCEDURE — 90792 PSYCH DIAG EVAL W/MED SRVCS: CPT | Performed by: NURSE PRACTITIONER

## 2020-08-13 PROCEDURE — 6370000000 HC RX 637 (ALT 250 FOR IP): Performed by: NURSE PRACTITIONER

## 2020-08-13 RX ORDER — TRAZODONE HYDROCHLORIDE 50 MG/1
50 TABLET ORAL NIGHTLY PRN
Status: DISCONTINUED | OUTPATIENT
Start: 2020-08-13 | End: 2020-08-17 | Stop reason: HOSPADM

## 2020-08-13 RX ORDER — HYDROXYZINE HYDROCHLORIDE 25 MG/1
25 TABLET, FILM COATED ORAL 3 TIMES DAILY PRN
Status: DISCONTINUED | OUTPATIENT
Start: 2020-08-13 | End: 2020-08-17 | Stop reason: HOSPADM

## 2020-08-13 RX ORDER — NICOTINE 21 MG/24HR
1 PATCH, TRANSDERMAL 24 HOURS TRANSDERMAL DAILY
Status: DISCONTINUED | OUTPATIENT
Start: 2020-08-13 | End: 2020-08-15

## 2020-08-13 RX ORDER — MAGNESIUM HYDROXIDE/ALUMINUM HYDROXICE/SIMETHICONE 120; 1200; 1200 MG/30ML; MG/30ML; MG/30ML
30 SUSPENSION ORAL EVERY 6 HOURS PRN
Status: DISCONTINUED | OUTPATIENT
Start: 2020-08-13 | End: 2020-08-17 | Stop reason: HOSPADM

## 2020-08-13 RX ORDER — BENZTROPINE MESYLATE 1 MG/ML
2 INJECTION INTRAMUSCULAR; INTRAVENOUS 2 TIMES DAILY PRN
Status: DISCONTINUED | OUTPATIENT
Start: 2020-08-13 | End: 2020-08-17 | Stop reason: HOSPADM

## 2020-08-13 RX ORDER — ALBUTEROL SULFATE 90 UG/1
2 AEROSOL, METERED RESPIRATORY (INHALATION) EVERY 6 HOURS PRN
Status: DISCONTINUED | OUTPATIENT
Start: 2020-08-13 | End: 2020-08-17 | Stop reason: HOSPADM

## 2020-08-13 RX ORDER — ACETAMINOPHEN 325 MG/1
650 TABLET ORAL EVERY 4 HOURS PRN
Status: DISCONTINUED | OUTPATIENT
Start: 2020-08-13 | End: 2020-08-17 | Stop reason: HOSPADM

## 2020-08-13 RX ADMIN — TRAZODONE HYDROCHLORIDE 50 MG: 50 TABLET ORAL at 21:21

## 2020-08-13 RX ADMIN — ACETAMINOPHEN 650 MG: 325 TABLET, FILM COATED ORAL at 14:00

## 2020-08-13 RX ADMIN — NICOTINE POLACRILEX 2 MG: 2 GUM, CHEWING BUCCAL at 13:50

## 2020-08-13 RX ADMIN — HYDROXYZINE HYDROCHLORIDE 25 MG: 25 TABLET, FILM COATED ORAL at 21:21

## 2020-08-13 RX ADMIN — ONDANSETRON 4 MG: 4 TABLET, ORALLY DISINTEGRATING ORAL at 00:00

## 2020-08-13 RX ADMIN — CARIPRAZINE 1.5 MG: 1.5 CAPSULE, GELATIN COATED ORAL at 13:50

## 2020-08-13 RX ADMIN — ACETAMINOPHEN 1000 MG: 500 TABLET, FILM COATED ORAL at 00:00

## 2020-08-13 RX ADMIN — SERTRALINE HYDROCHLORIDE 50 MG: 50 TABLET ORAL at 13:50

## 2020-08-13 RX ADMIN — NICOTINE POLACRILEX 2 MG: 2 GUM, CHEWING BUCCAL at 20:06

## 2020-08-13 RX ADMIN — ACETAMINOPHEN 650 MG: 325 TABLET, FILM COATED ORAL at 19:26

## 2020-08-13 ASSESSMENT — PAIN SCALES - GENERAL
PAINLEVEL_OUTOF10: 7
PAINLEVEL_OUTOF10: 1
PAINLEVEL_OUTOF10: 6
PAINLEVEL_OUTOF10: 3
PAINLEVEL_OUTOF10: 3
PAINLEVEL_OUTOF10: 10

## 2020-08-13 ASSESSMENT — PAIN DESCRIPTION - LOCATION
LOCATION: HAND
LOCATION: ARM;HAND

## 2020-08-13 ASSESSMENT — LIFESTYLE VARIABLES
HISTORY_ALCOHOL_USE: YES
HISTORY_ALCOHOL_USE: NO

## 2020-08-13 ASSESSMENT — SLEEP AND FATIGUE QUESTIONNAIRES
DIFFICULTY ARISING: NO
DIFFICULTY FALLING ASLEEP: YES
DIFFICULTY STAYING ASLEEP: YES
DO YOU HAVE DIFFICULTY SLEEPING: YES
SLEEP PATTERN: DIFFICULTY FALLING ASLEEP;RESTLESSNESS;DISTURBED/INTERRUPTED SLEEP
RESTFUL SLEEP: NO
DO YOU USE A SLEEP AID: NO
AVERAGE NUMBER OF SLEEP HOURS: 4

## 2020-08-13 ASSESSMENT — PAIN DESCRIPTION - PAIN TYPE
TYPE: ACUTE PAIN
TYPE: ACUTE PAIN

## 2020-08-13 ASSESSMENT — PAIN DESCRIPTION - FREQUENCY: FREQUENCY: CONTINUOUS

## 2020-08-13 ASSESSMENT — PAIN DESCRIPTION - ORIENTATION
ORIENTATION: RIGHT
ORIENTATION: RIGHT

## 2020-08-13 NOTE — GROUP NOTE
Group Therapy Note    Date: 8/13/2020    Group Start Time: 1430  Group End Time: 1500  Group Topic: Recreational    [facility]    [place] Day    patient refused to attend recreational therapy group at 1430 after encouragement from staff.   1:1 talk time provided as alternative to group session     Signature:  Alfredo Valentino

## 2020-08-13 NOTE — ED PROVIDER NOTES
[facility] ED  Emergency Department Encounter  Emergency Medicine Attending Note     Pt Name: Chela Belle  MRN: [id number]  [place] 1990   Date of evaluation: 2020  PCP:  No primary care provider on file. CHIEF COMPLAINT       Chief Complaint   Patient presents with    Suicidal    Hand Pain     right       HISTORY OF PRESENT ILLNESS  (Location/Symptom, Timing/Onset, Context/Setting, Quality, Duration, Modifying Factors, Severity.)      Chela Belle is a 26 y.o. female who presents with suicidal ideations with specific plan to cut herself. Patient denies any homicidal ideations. Patient denies hallucinations. Reports alcohol use today. , Patient consumed approximately 2 drinks and last drank several hours ago. .  Denies any illicit drug use . Patient is complaining of right hand pain and states thatt she punched the floor. Denies cough, shortness of breath or fever. Denies any known CoVID contacts. Patient is very tearful and stating that she wants to go home, but I willing to be voluntary for psychiatric admission at this time. PAST MEDICAL / SURGICAL / SOCIAL / FAMILY HISTORY     Past Medical History:  has a past medical history of Asthma, Back pain, Bipolar 1 disorder ([address] Bipolar disorder ([address] Depression, Diabetes mellitus ([address] Dizziness, Fatty liver disease, nonalcoholic, Fatty liver disease, nonalcoholic, GERD (gastroesophageal reflux disease), and Obesity. Past Surgical History:  has a past surgical history that includes  section and LEEP. Allergies:  Pcn [penicillins]; Amoxicillin; and Seasonal     Home Meds:   Prior to Visit Medications    Medication Sig Taking?  Authorizing Provider   sertraline (ZOLOFT) 50 MG tablet Take 50 mg by mouth daily Yes Historical Provider, MD   cariprazine hcl (VRAYLAR) 1.5 MG capsule Take 1.5 mg by mouth daily Yes Historical Provider, MD   albuterol sulfate HFA (PROVENTIL HFA) 108 (90 Base) MCG/ACT inhaler Inhale 2 puffs into the lungs every 6 hours as needed for Wheezing Yes Tereza Haq MD   metFORMIN (GLUCOPHAGE) 500 MG tablet Take 1 tablet by mouth 2 times daily (with meals) TAKE 1 TABLET BY MOUTH DAILY  Yuliet Cannon MD     Please note that medications prescribed at discharge will auto-populate into this medication list when note is refreshed. Please look at prescription date andprescriber to clarify. Family History:family history includes Cancer in her maternal aunt and paternal cousin; Diabetes in her mother; Early Death in her father; Heart Disease in her father and mother; High Blood Pressure in her mother. Social History: She reports that she has been smoking cigarettes. She has a 3.50 pack-year smoking history. She has never used smokeless tobacco. She reports previous drug use. Drug: Marijuana. She reports previously being sexually active. REVIEW OF SYSTEMS    (2-9 systems for level 4, 10 or more for level 5)      Constitutional: Negative for chills and fever. HENT: Negative for congestion and sore throat. Respiratory: Negative for cough and shortness of breath. Cardiovascular: Negative for chest pain and palpitations. Gastrointestinal: Negative for abdominal pain, diarrhea, nausea and vomiting. Genitourinary: Negative for difficulty urinating and dysuria. Musculoskeletal: Positive for right hand pain, negative for back pain . Skin: Negative for rash and wound. Neurological: Negative for light-headedness and headaches.    Psychiatric/Behavioral: Negative for hallucinations, Positive for depression, suicidal thoughts    PHYSICAL EXAM   (up to 7 for level 4, 8 or more for level 5)      Initial Vitals   ED Triage Vitals [08/12/20 2330]   BP Temp Temp Source Pulse Resp SpO2 Height Weight   (!) 172/113 99.2 °F (37.3 °C) Oral 106 20 97 % 5' 5\" (1.651 m) 277 lb (125.6 kg)       Constitutional:  Normal appearance in no acute distress, not ill-appearing  HENT:

## 2020-08-13 NOTE — BH NOTE
`[facility] [place]  Admission Note     Admission Type:   Admission Type: Voluntary    Reason for admission:  Reason for Admission: Patient voluntary from [facility] ED with suicidal ideation to go to her father's grave and cut herself.     PATIENT STRENGTHS:  Strengths: Communication, Connection to output provider    Patient Strengths and Limitations:  Limitations: Hopeless about future, Tendency to isolate self, General negative or hopeless attitude about future/recovery    Addictive Behavior:   Addictive Behavior  In the past 3 months, have you felt or has someone told you that you have a problem with:  : Other(Comments)(denies)  Do you have a history of Chemical Use?: No  Do you have a history of Alcohol Use?: No  Do you have a history of Street Drug Abuse?: No  Histroy of Prescripton Drug Abuse?: No    Medical Problems:   Past Medical History:   Diagnosis Date    Asthma     Back pain 9/16/2014    [facility] [address]     Bipolar disorder ([facility] 12/8/2017    Depression     Diabetes mellitus (HCC)     Dizziness     Fatty liver disease, nonalcoholic     Fatty liver disease, nonalcoholic     GERD (gastroesophageal reflux disease)     Obesity        Status EXAM:  Status and Exam  Normal: No  Facial Expression: Flat  Affect: Appropriate  Level of Consciousness: Alert  Mood:Normal: No  Mood: Depressed, Anxious  Motor Activity:Normal: Yes  Interview Behavior: Cooperative  Preception: [place] to Person, [place] to Time, [place] to Place, [place] to Situation  Attention:Normal: No  Attention: Distractible  Thought Processes: Circumstantial  Thought Content:Normal: No  Thought Content: Preoccupations  Hallucinations: None  Delusions: No  Memory:Normal: Yes  Insight and Judgment: No  Insight and Judgment: Poor Judgment, Poor Insight  Present Suicidal Ideation: No  Present Homicidal Ideation: No    Tobacco Screening:  Practical Counseling, on admission, melinda X, if applicable and completed (first 3 are required if patient doesn't refuse):            ( )  Recognizing danger situations (included triggers and roadblocks)                    ( )  Coping skills (new ways to manage stress, exercise, relaxation techniques, changing routine, distraction)                                                           ( )  Basic information about quitting (benefits of quitting, techniques in how to quit, available resources  ( ) Referral for counseling faxed to [facility]                                           (X2 ) Patient refused counseling  ( ) Patient has not smoked in the last 30 days    Metabolic Screening:    Lab Results   Component Value Date    LABA1C 5.2 09/17/2018       Lab Results   Component Value Date    CHOL 192 09/17/2018    CHOL 188 08/12/2014    CHOL 195 06/27/2013    CHOL 182 02/20/2013    CHOL 170 02/13/2012     Lab Results   Component Value Date    TRIG 105 09/17/2018    TRIG 135 08/12/2014    TRIG 119 06/27/2013    TRIG 108 02/20/2013    TRIG 88 02/13/2012     Lab Results   Component Value Date    HDL 37 (L) 09/17/2018    HDL 38 (L) 08/12/2014    HDL 40 (L) 06/27/2013    HDL 42 02/20/2013    HDL 43 02/13/2012     No components found for: LDLCAL  No results found for: LABVLDL      Body mass index is 46.1 kg/m². BP Readings from Last 2 Encounters:   08/13/20 (!) 159/92   02/06/20 124/88           Pt admitted with followings belongings:  Dentures: None  Vision - Corrective Lenses: None  Hearing Aid: None  Jewelry: Earrings(3 lip piercing  3 pr earrings tongue ring nipple piercing)  Body Piercings Removed: No  Clothing: Footwear, Pants, Shirt, Socks, Undergarments (Comment)  Were All Patient Medications Collected?: Yes  Other Valuables: Cell phone     Valuables sent home with n/a. Valuables placed in safe in security envelope, number:  [id number]. Patient's home medications were reviewed by RN. Patient oriented to surroundings and program expectations and copy of patient rights given. Received admission packet:  yes. Consents reviewed, signed yes. Refused n/a. Patient verbalize understanding:  yes. Patient education on precautions: yes. Patient voluntary from [facility] with suicidal ideation to go to her father's grave and cut herself due to losing custody of her son. Patient denies homicidal ideation at this time. Patient denies hallucinations at this time. Patient denies any drug or alcohol abuse. Patient is linked with [facility] and is medication compliant.        Chris Naik RN
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
## 2020-08-13 NOTE — BH NOTE
Patient given tobacco quitline number 04800956645 at this time, refusing to call at this time, states \" I just dont want to quit now\"- patient given information as to the dangers of long term tobacco use. Continue to reinforce the importance of tobacco cessation.

## 2020-08-13 NOTE — GROUP NOTE
Group Therapy Note    Date: 8/13/2020    Group Start Time: 1100  Group End Time: 5857  Group Topic: Cognitive Skills    ABBY Garrison    Patient refused to attend leisure/ cognitive skills group at 1100 after encouragement from staff. 1:1 talk time provided by staff.         Signature:  Rachelle Garrison

## 2020-08-13 NOTE — GROUP NOTE
Group Therapy Note    Date: 8/13/2020    Group Start Time: 1330  Group End Time: 1350  Group Topic: Psychoeducation    ABBY Anderson    Patient refused to attend coping skills/ positive thinking group at 1330 after encouragement from staff. 1:1 talk time provided by staff.           Signature:  Xiomara Anderson

## 2020-08-13 NOTE — PLAN OF CARE
[address] [place] [address]  Initial Interdisciplinary Treatment Plan NOTE    Original treatment plan Date & Time: 8/13/2020 0820    Admission Type:  Admission Type: Voluntary    Reason for admission:   Reason for Admission: Patient voluntary from [facility] ED with suicidal ideation to go to her father's grave and cut herself.     Estimated Length of Stay:  5-7days  Estimated Discharge Date:   to be determined by physician    PATIENT STRENGTHS:  Patient Strengths:Strengths: Communication, Connection to output provider  Patient Strengths and Limitations:Limitations: Hopeless about future, Tendency to isolate self, General negative or hopeless attitude about future/recovery  Addictive Behavior: Addictive Behavior  In the past 3 months, have you felt or has someone told you that you have a problem with:  : Other(Comments)(denies)  Do you have a history of Chemical Use?: No  Do you have a history of Alcohol Use?: No  Do you have a history of Street Drug Abuse?: No  Histroy of Prescripton Drug Abuse?: No  Medical Problems:  Past Medical History:   Diagnosis Date    Asthma     Back pain 9/16/2014    Bipolar 1 disorder ([facility]     Bipolar disorder ([facility] 12/8/2017    Depression     Diabetes mellitus ([facility]     Dizziness     Fatty liver disease, nonalcoholic     Fatty liver disease, nonalcoholic     GERD (gastroesophageal reflux disease)     Obesity      Status EXAM:Status and Exam  Normal: No  Facial Expression: Flat  Affect: Appropriate  Level of Consciousness: Alert  Mood:Normal: No  Mood: Depressed, Anxious  Motor Activity:Normal: Yes  Interview Behavior: Cooperative  Preception: [place] to Person, [place] Alejandro to Time, [place] to Place, [place] to Situation  Attention:Normal: No  Attention: Distractible  Thought Processes: Circumstantial  Thought Content:Normal: No  Thought Content: Preoccupations  Hallucinations: None  Delusions: No  Memory:Normal: Yes  Insight and Judgment: No  Insight and Judgment: Poor Judgment, Poor Insight  Present Suicidal Ideation: No  Present Homicidal Ideation: No    EDUCATION:   Learner Progress Toward Treatment Goals:  Reviewed group plans and strategies for care    Method:  Group therapy, medication compliance, individualized assessments and care planning    Outcome:  Needs reinforcement    PATIENT GOALS:  Pt did not identify, to be discussed with patient within 72 hours.     PLAN/TREATMENT RECOMMENDATIONS:   Group therapy, medications compliance, goal setting, individualized assessments and care, continue to monitor pt on unit      SHORT-TERM GOALS:   Time frame for Short-Term Goals:  5-7 days    LONG-TERM GOALS:  Time frame for Long-Term Goals:  6 months    Members Present in Team Meeting:       Roberth Painter

## 2020-08-13 NOTE — ED NOTES
Pt arrives to ED by police drop off. States her friend called 911 on her. Pt reports being suicidal. States that her plan is to go to her fathers grave and cut herself. Pt is tearful. Pt states that she just wants to go home to her . Pt also reports punching a wall. Pt has swelling to the lateral dorsal aspect of right hand. Pt reports having a \"nervous breakdown\" earlier where she reports one episode of emesis. Pt is A&Ox4, GCS-15.       Richard Burrows RN  08/13/20 7053

## 2020-08-13 NOTE — VIRTUAL HEALTH
Psychiatric Admission Note Nurse Practitioner     Robert Mccormick is a 26 y.o. female who was voluntarily admitted from the [facility] with suicidal ideation to go to her father's grave and cut herself. Patient reported that her mother allowed her brother to have sex with her son and she has lost custody. She continues to live with her  however; he has a girlfriend. Patient is seen via tele psychiatry; caregiver was present in the room along with the patient. Pursuant to the emergency declaration under the [address] [facility], [address] [place] [address] authority and the [employer] and [employer] Act, this Virtual Visit was conducted with patient's (and/or legal guardian's) consent, to reduce the patient's risk of exposure to COVID-19 and provide necessary medical care. Services were provided through a video synchronous discussion virtually to substitute for in-person visit by provider. Patient is also known by the last name Ron Rai. Patient's last admission was 2017. Patient is guarded, flat, poorly reactive, hopeless and helpless. She reported wanting to kill herself due to her son being in foster care. Patient is not forthcoming with information throughout the interview requiring considerable encouragement. She is denying SI, HI and hallucinations endorsing depression 6/10 and anxiety 6/10 with 0 being none and 10 the worst. She reports being depressed and anxious in high school after her father . She did not seek treatment. She has a history of paranoia, denying currently. She denied racing thoughts, addy, verbal, physical, emotional or sexual trauma and PTSD. Patient currently has a soft case on her right arm from punching the wall last night. Chart and medications reviewed. Therapeutic support, empathetic care and psycho education provided greater than 20 minutes.  At this time there is no safe alternative other than inpatient care.    Past Psychiatric History   Patient reports current outpatient psychiatric linkage. . Reported history of psychiatric inpatient hospitalizations. Her last admission was in 2017. Reported history of suicide attempts in 2017. History of Substance Abuse     Patient denied cigarette and marijuana use. She reported drinking ETOH socially with the last time last night. Denied street drug use. Family History of psychiatric disorders    Family history: denied Brother and Son Bipolar Disorder. Brother - Schizophrenia. Denied ARACELI. Medical History   Allergies:  Pcn [penicillins]; Amoxicillin; and Seasonal   Past Medical History:   Diagnosis Date    Asthma     Back pain 2014    [facility] [address]     [facility]    Depression     Diabetes mellitus (HCC)     Dizziness     Fatty liver disease, nonalcoholic     Fatty liver disease, nonalcoholic     GERD (gastroesophageal reflux disease)     Obesity       Past Surgical History:   Procedure Laterality Date     SECTION      LEEP         Neurologic Exam      Mental Status   Oriented to person, place, and time. Oriented to city, area, street and number. Oriented to country. Registration: recalls 3 of 3 objects. Recall at 5 minutes: recalls 3 of 3 objects. Follows 3 step commands. Attention: normal. Concentration: normal.   Speech: speech is normal   Level of consciousness: alert  Knowledge: good. Able to perform simple calculations. Able to name object. Able to read. Able to repeat. Able to write.  Normal comprehension.      Cranial Nerves   Cranial nerves II through XII intact.      Motor Exam   Muscle bulk: normal  Overall muscle tone: normal     Strength   Strength 5/5 throughout.      Sensory Exam   Light touch normal.      Gait, Coordination, and Reflexes      Normal     Coordination   Romberg: negative     Tremor   Resting tremor: absent  Intention tremor: absent  Action tremor: absent     Reflexes Right brachioradialis: 2+  Left brachioradialis: 2+  Right biceps: 2+  Left biceps: 2+  Right triceps: 2+  Left triceps: 2+  Right patellar: 2+  Left patellar: 2+  Right achilles: 2+  Left achilles: 2+  Right : 2+  Left : 2+    SOCIAL HISTORY: Patient has been  for five years; she is living with her . She has one biological son; he is not living with her. He is in foster care. She grew up in [place], her mother is living, father is . She has two siblings. She completed the 12th grade and supports herself with Social Security.    Social History     Socioeconomic History    Marital status:      Spouse name: Not on file    Number of children: Not on file    Years of education: Not on file    Highest education level: Not on file   Occupational History    Not on file   Social Needs    Financial resource strain: Not on file    Food insecurity     Worry: Not on file     Inability: Not on file    Transportation needs     Medical: Not on file     Non-medical: Not on file   Tobacco Use    Smoking status: Current Some Day Smoker     Packs/day: 0.25     Years: 14.00     Pack years: 3.50     Types: Cigarettes    Smokeless tobacco: Never Used    Tobacco comment: pt accepting of nicotine gum   Substance and Sexual Activity    Alcohol use: Not on file     Comment: rarely    Drug use: Not Currently     Types: Marijuana     Comment: quit    Sexual activity: Not Currently   Lifestyle    Physical activity     Days per week: Not on file     Minutes per session: Not on file    Stress: Not on file   Relationships    Social connections     Talks on phone: Not on file     Gets together: Not on file     Attends Jehovah's witness service: Not on file     Active member of club or organization: Not on file     Attends meetings of clubs or organizations: Not on file     Relationship status: Not on file    Intimate partner violence     Fear of current or ex partner: Not on file     Emotionally abused: Not on file     Physically abused: Not on file     Forced sexual activity: Not on file   Other Topics Concern    Not on file   Social History Narrative    Not on file         Mental Status  Pt. was alert, fully oriented, and cooperative. Appearance and hygiene weredisheveled, poor hygiene and overweight/obese . Mood was depressed. Affect was guarded, flat, \"dysthymic and poorly reactive Thought process was demonstrative of poverty of thought and thought blocking. Patient denied any hallucinations or paranoia. Patient does have a history of paranoid thoughts. Patient denied suicidal ideations. Patient denied homicidal ideations . Patient's gross cognitive functions were intact. Insight and judgement were poor. Both recent and remote memory were intact. Psychomotor status was without abnormality     Diagnostic Impression    Major Depressive Disorder, Moderate, Recurrent      Medications    nicotine polacrilex, traZODone    Treatment Plan:  Continue inpatient psychiatric treatment. Continue home psychotropic medications. Internal Medicine consult placed on 8/13/2020 for diabetic management. Supportive therapy with medication management. Reviewed risks and benefits as well as potential side effects with patient. Review medications for efficacy and side effects. Therapeutic support and empathetic care provided greater than 20 minutes. Engage in therapeutic activities and groups. Follow up at [facility] after symptoms stabilized. Estimated length of stay: 5-7 days    Olga Lidia Nogueira, APRN - CNP  Psychiatric Advanced Practice Nurse    Patient Location:  [address] [place] [address]    Provider Location ([facility]/State): [place]    This virtual visit was conducted via interactive/real-time audio/video.

## 2020-08-13 NOTE — GROUP NOTE
Group Therapy Note    Date: 8/13/2020    Group Start Time: 0900  Group End Time: 0915  Group Topic: Community Meeting    ABBY Amaya    Patient refused to attend community meeting/ goals group at 0900 after encouragement from staff. 1:1 talk time provided by staff.        Signature:  Kalee Amaya Pt not sure.   Slight itching, \" fishy smell',  \" clumpy white discharge\"

## 2020-08-13 NOTE — H&P
HISTORY and Treinta HUSEYIN [address]       NAME:  Daylin Pike  MRN: [id number]   YOB: 1990   Date: 8/13/2020   Age: 26 y.o. Gender: female     COMPLAINT AND PRESENT HISTORY:    Daylin Pike is a 26 y.o.  female, admitted because of increasing depression with suicidal ideation. According to ED/ Admission notes,  Patient came in with suicidal ideations and plans to cut herself. Patient states that she had a panic attack and became upset. She reporting punching the ground resulting in fracture of her 5th metacarpal.  Pt is in a posterior splint. Patient is complaining of pain at 10/10. Patient does admit to occasional auditory hallucinations that usually tells her to harm herself more than others. Patient states that she  has been compliant with psychiatric medications . Patient denies any current alcohol or substance abuse. Patient admits to sleep disturbances . Patient admits to appetite changes, in which it has increased, although she is complaining of nausea currently. Patient endorses poor concentration with racy thoughts. Patient states that living arrangements after discharge will be returning back home with her . No  chest pain or  shortness of breath. No fever/chills. Please see patient's psychiatric hx for more information. THREE XRAY VIEWS OF THE RIGHT HAND       8/13/2020 12:20 am       COMPARISON:   September 13, 2009       HISTORY:   ORDERING SYSTEM PROVIDED HISTORY: punched a floor   TECHNOLOGIST PROVIDED HISTORY:   punched a floor   Reason for Exam: Punched a floor.    Acuity: Acute   Type of Exam: Initial   Mechanism of Injury: Punched a floor.  Pain along 5th metacarpal of right hand   Relevant Medical/Surgical History: Punched a floor.  Pain along 5th   metacarpal of right hand       FINDINGS:   A comminuted, mildly angulated and displaced fracture seen involving the neck   of the 5th metacarpal.  Associated soft tissue swelling is present.  Lateral   view demonstrates dorsal apex angulation of the fracture.  AP view   demonstrates slight radial deviation of the distal fracture fragment. Remainder of the osseous structures are intact.       Impression   1. Comminuted, mildly displaced and angulated 5th metacarpal neck fracture             DIAGNOSTIC RESULTS   Labs:  CBC: No results for input(s): WBC, HGB, PLT in the last 72 hours. BMP:  No results for input(s): NA, K, CL, CO2, BUN, CREATININE, GLUCOSE in the last 72 hours. Hepatic: No results for input(s): AST, ALT, ALB, BILITOT, ALKPHOS in the last 72 hours. Lipids: No results for input(s): CHOL, HDL in the last 72 hours.     Invalid input(s): LDLCALCU  U/A:  Lab Results   Component Value Date    COLORU YELLOW 2020    WBCUA 2 TO 5 2020    RBCUA 50  2020    MUCUS NOT REPORTED 2020    BACTERIA NOT REPORTED 2020    SPECGRAV 1.011 2020    LEUKOCYTESUR NEGATIVE 2020    GLUCOSEU NEGATIVE 2020    AMORPHOUS NOT REPORTED 2020       PAST MEDICAL HISTORY     Past Medical History:   Diagnosis Date    Asthma     Back pain 2014    Bipolar 1 disorder (HCC)     [facility] disorder ([facility] [address] 2017    Depression     Diabetes mellitus (HCC)     Dizziness     Fatty liver disease, nonalcoholic     Fatty liver disease, nonalcoholic     GERD (gastroesophageal reflux disease)     Obesity        Pt denies any history of Diabetes mellitus type 2, hypertension, stroke, heart disease, COPD, Asthma, GERD, HLD, Cancer, Seizures,Thyroid disease, Kidney Disease, Hepatitis, TB.    SURGICAL HISTORY       Past Surgical History:   Procedure Laterality Date     SECTION      LEEP         FAMILY HISTORY       Family History   Problem Relation Age of Onset    High Blood Pressure Mother     Diabetes Mother     Heart Disease Mother     Heart Disease Father     Early Death Father     Cancer Maternal Aunt         lung    Cancer Paternal Cousin         brain       SOCIAL HISTORY       Social History     Socioeconomic History    Marital status:      Spouse name: None    Number of children: None    Years of education: None    Highest education level: None   Occupational History    None   Social Needs    Financial resource strain: None    Food insecurity     Worry: None     Inability: None    Transportation needs     Medical: None     Non-medical: None   Tobacco Use    Smoking status: Current Some Day Smoker     Packs/day: 0.25     Years: 14.00     Pack years: 3.50     Types: Cigarettes    Smokeless tobacco: Never Used    Tobacco comment: pt accepting of nicotine gum   Substance and Sexual Activity    Alcohol use: None     Comment: rarely    Drug use: Not Currently     Types: Marijuana     Comment: quit    Sexual activity: Not Currently   Lifestyle    Physical activity     Days per week: None     Minutes per session: None    Stress: None   Relationships    Social connections     Talks on phone: None     Gets together: None     Attends Amish service: None     Active member of club or organization: None     Attends meetings of clubs or organizations: None     Relationship status: None    Intimate partner violence     Fear of current or ex partner: None     Emotionally abused: None     Physically abused: None     Forced sexual activity: None   Other Topics Concern    None   Social History Narrative    None           REVIEW OF SYSTEMS      Allergies   Allergen Reactions    Pcn [Penicillins] Hives and Shortness Of Breath    Amoxicillin Hives    Seasonal        No current facility-administered medications on file prior to encounter.       Current Outpatient Medications on File Prior to Encounter   Medication Sig Dispense Refill    sertraline (ZOLOFT) 50 MG tablet Take 50 mg by mouth daily      cariprazine hcl (VRAYLAR) 1.5 MG capsule Take 1.5 mg by mouth daily      albuterol sulfate HFA (PROVENTIL HFA) 108 (90 Base) MCG/ACT inhaler Inhale 2 puffs into the lungs every 6 hours as needed for Wheezing 1 Inhaler 1    metFORMIN (GLUCOPHAGE) 500 MG tablet Take 1 tablet by mouth 2 times daily (with meals) TAKE 1 TABLET BY MOUTH DAILY 1 tablet 0                      General health:  Fairly good. No fever or chills. Skin:  No itching, redness or rash. Head, eyes, ears, nose, throat:  No headache, epistaxis, rhinorrhea hearing loss or sore throat. Neck:  No pain, stiffness or masses. Cardiovascular/Respiratory system:  No chest pain, palpitation, shortness of breath, coughing or expectoration. Gastrointestinal tract: No abdominal pain, nausea, vomiting, dysphagia, diarrhea or constipation. Genitourinary:  No burning on micturition. No hesitancy, urgency, frequency or discoloration of urine. Locomotor:  No bone or joint pains. No swelling or deformities. Neuropsychiatric:  See HPI. GENERAL PHYSICAL EXAM:     Vitals: BP (!) 159/92   Pulse 95   Temp 98.6 °F (37 °C)   Resp 16   Ht 5' 5\" (1.651 m)   Wt 277 lb (125.6 kg)   SpO2 97%   BMI 46.10 kg/m²  Body mass index is 46.1 kg/m². Pt was examined with a nurse present in the room. GENERAL APPEARANCE:  Jose Cisneros is 26 y.o.,  female, moderately obese, nourished, conscious, alert. Does not appear to be distress or pain at this time. SKIN:  Warm, dry, no cyanosis or jaundice. HEAD:  Normocephalic, atraumatic, no swelling or tenderness. EYES:  Pupils equal, reactive to light, Conjunctiva is clear, EOMs intact cristiane. eyelids WNL. EARS:  No discharge, no marked hearing loss. NOSE:  No rhinorrhea, epistaxis or septal deformity. THROAT:  Not congested. No ulceration bleeding or discharge. NECK:  No stiffness, trachea central.  No palpable masses or L.N.      CHEST:  Symmetrical and equal on expansion. HEART:  Regular rate and rhythm.  S1 > S2, No audible murmurs or

## 2020-08-13 NOTE — ED NOTES
Paperwork and pt's belongings provided to [facility] staff. Pt escorted to [facility] via two [facility] staff members. Pt cooperative exiting [facility].

## 2020-08-13 NOTE — BH NOTE
On call provider, notified of best practice advisory suggesting to place patient on suicide precautions. Provider to discontinue the order as patient does not meet criteria for suicide precautions at this time. Continue to observe patient on every 15 minute checks.

## 2020-08-14 LAB
ABSOLUTE EOS #: 0.5 K/UL (ref 0–0.4)
ABSOLUTE IMMATURE GRANULOCYTE: ABNORMAL K/UL (ref 0–0.3)
ABSOLUTE LYMPH #: 2.6 K/UL (ref 1–4.8)
ABSOLUTE MONO #: 0.6 K/UL (ref 0.1–1.3)
ALBUMIN SERPL-MCNC: 3.6 G/DL (ref 3.5–5.2)
ALBUMIN/GLOBULIN RATIO: ABNORMAL (ref 1–2.5)
ALP BLD-CCNC: 88 U/L (ref 35–104)
ALT SERPL-CCNC: 24 U/L (ref 5–33)
ANION GAP SERPL CALCULATED.3IONS-SCNC: 9 MMOL/L (ref 9–17)
AST SERPL-CCNC: 23 U/L
BASOPHILS # BLD: 1 % (ref 0–2)
BASOPHILS ABSOLUTE: 0.1 K/UL (ref 0–0.2)
BILIRUB SERPL-MCNC: 0.45 MG/DL (ref 0.3–1.2)
BUN BLDV-MCNC: 13 MG/DL (ref 6–20)
BUN/CREAT BLD: ABNORMAL (ref 9–20)
CALCIUM SERPL-MCNC: 9.2 MG/DL (ref 8.6–10.4)
CHLORIDE BLD-SCNC: 103 MMOL/L (ref 98–107)
CHOLESTEROL/HDL RATIO: 3.9
CHOLESTEROL: 167 MG/DL
CO2: 26 MMOL/L (ref 20–31)
CREAT SERPL-MCNC: 0.74 MG/DL (ref 0.5–0.9)
DIFFERENTIAL TYPE: ABNORMAL
EOSINOPHILS RELATIVE PERCENT: 6 % (ref 0–4)
ESTIMATED AVERAGE GLUCOSE: 120 MG/DL
GFR AFRICAN AMERICAN: >60 ML/MIN
GFR NON-AFRICAN AMERICAN: >60 ML/MIN
GFR SERPL CREATININE-BSD FRML MDRD: ABNORMAL ML/MIN/{1.73_M2}
GFR SERPL CREATININE-BSD FRML MDRD: ABNORMAL ML/MIN/{1.73_M2}
GLUCOSE BLD-MCNC: 110 MG/DL (ref 70–99)
HBA1C MFR BLD: 5.8 % (ref 4–6)
HCT VFR BLD CALC: 34.9 % (ref 36–46)
HDLC SERPL-MCNC: 43 MG/DL
HEMOGLOBIN: 11.8 G/DL (ref 12–16)
IMMATURE GRANULOCYTES: ABNORMAL %
LDL CHOLESTEROL: 108 MG/DL (ref 0–130)
LYMPHOCYTES # BLD: 30 % (ref 24–44)
MCH RBC QN AUTO: 30 PG (ref 26–34)
MCHC RBC AUTO-ENTMCNC: 33.8 G/DL (ref 31–37)
MCV RBC AUTO: 88.8 FL (ref 80–100)
MONOCYTES # BLD: 7 % (ref 1–7)
NRBC AUTOMATED: ABNORMAL PER 100 WBC
PDW BLD-RTO: 13.7 % (ref 11.5–14.9)
PLATELET # BLD: 360 K/UL (ref 150–450)
PLATELET ESTIMATE: ABNORMAL
PMV BLD AUTO: 8.1 FL (ref 6–12)
POTASSIUM SERPL-SCNC: 4.3 MMOL/L (ref 3.7–5.3)
RBC # BLD: 3.93 M/UL (ref 4–5.2)
RBC # BLD: ABNORMAL 10*6/UL
SEG NEUTROPHILS: 56 % (ref 36–66)
SEGMENTED NEUTROPHILS ABSOLUTE COUNT: 4.9 K/UL (ref 1.3–9.1)
SODIUM BLD-SCNC: 138 MMOL/L (ref 135–144)
THYROXINE, FREE: 1.02 NG/DL (ref 0.93–1.7)
TOTAL PROTEIN: 6.7 G/DL (ref 6.4–8.3)
TRIGL SERPL-MCNC: 79 MG/DL
TSH SERPL DL<=0.05 MIU/L-ACNC: 2.34 MIU/L (ref 0.3–5)
VLDLC SERPL CALC-MCNC: NORMAL MG/DL (ref 1–30)
WBC # BLD: 8.7 K/UL (ref 3.5–11)
WBC # BLD: ABNORMAL 10*3/UL

## 2020-08-14 PROCEDURE — 84443 ASSAY THYROID STIM HORMONE: CPT

## 2020-08-14 PROCEDURE — 80053 COMPREHEN METABOLIC PANEL: CPT

## 2020-08-14 PROCEDURE — 83036 HEMOGLOBIN GLYCOSYLATED A1C: CPT

## 2020-08-14 PROCEDURE — 1240000000 HC EMOTIONAL WELLNESS R&B

## 2020-08-14 PROCEDURE — 6370000000 HC RX 637 (ALT 250 FOR IP): Performed by: NURSE PRACTITIONER

## 2020-08-14 PROCEDURE — 99232 SBSQ HOSP IP/OBS MODERATE 35: CPT | Performed by: NURSE PRACTITIONER

## 2020-08-14 PROCEDURE — 84439 ASSAY OF FREE THYROXINE: CPT

## 2020-08-14 PROCEDURE — 85025 COMPLETE CBC W/AUTO DIFF WBC: CPT

## 2020-08-14 PROCEDURE — 99253 IP/OBS CNSLTJ NEW/EST LOW 45: CPT | Performed by: INTERNAL MEDICINE

## 2020-08-14 PROCEDURE — 6370000000 HC RX 637 (ALT 250 FOR IP): Performed by: PSYCHIATRY & NEUROLOGY

## 2020-08-14 PROCEDURE — 36415 COLL VENOUS BLD VENIPUNCTURE: CPT

## 2020-08-14 PROCEDURE — 80061 LIPID PANEL: CPT

## 2020-08-14 RX ORDER — ONDANSETRON 4 MG/1
4 TABLET, FILM COATED ORAL ONCE
Status: COMPLETED | OUTPATIENT
Start: 2020-08-14 | End: 2020-08-14

## 2020-08-14 RX ORDER — IBUPROFEN 800 MG/1
800 TABLET ORAL 3 TIMES DAILY PRN
Status: DISCONTINUED | OUTPATIENT
Start: 2020-08-14 | End: 2020-08-17 | Stop reason: HOSPADM

## 2020-08-14 RX ADMIN — HYDROXYZINE HYDROCHLORIDE 25 MG: 25 TABLET, FILM COATED ORAL at 21:20

## 2020-08-14 RX ADMIN — CARIPRAZINE 1.5 MG: 1.5 CAPSULE, GELATIN COATED ORAL at 08:43

## 2020-08-14 RX ADMIN — ALBUTEROL SULFATE 2 PUFF: 90 AEROSOL, METERED RESPIRATORY (INHALATION) at 08:45

## 2020-08-14 RX ADMIN — IBUPROFEN 800 MG: 800 TABLET ORAL at 21:20

## 2020-08-14 RX ADMIN — NICOTINE POLACRILEX 2 MG: 2 GUM, CHEWING BUCCAL at 21:22

## 2020-08-14 RX ADMIN — TRAZODONE HYDROCHLORIDE 50 MG: 50 TABLET ORAL at 21:20

## 2020-08-14 RX ADMIN — ACETAMINOPHEN 650 MG: 325 TABLET, FILM COATED ORAL at 08:43

## 2020-08-14 RX ADMIN — ONDANSETRON HYDROCHLORIDE 4 MG: 4 TABLET, FILM COATED ORAL at 08:49

## 2020-08-14 RX ADMIN — SERTRALINE HYDROCHLORIDE 50 MG: 50 TABLET ORAL at 08:43

## 2020-08-14 RX ADMIN — IBUPROFEN 800 MG: 800 TABLET ORAL at 15:43

## 2020-08-14 ASSESSMENT — PAIN DESCRIPTION - PAIN TYPE: TYPE: ACUTE PAIN

## 2020-08-14 ASSESSMENT — PAIN SCALES - GENERAL
PAINLEVEL_OUTOF10: 8
PAINLEVEL_OUTOF10: 6
PAINLEVEL_OUTOF10: 10
PAINLEVEL_OUTOF10: 5

## 2020-08-14 ASSESSMENT — PAIN DESCRIPTION - LOCATION: LOCATION: HAND

## 2020-08-14 NOTE — VIRTUAL HEALTH
Department of Psychiatry  Nurse Practitioner Progress Note    Chief Complaint: Major Depressive Disorder    SUBJECTIVE:  Cody Miranda is a 26 y.o. female who was voluntarily admitted from the [facility] with suicidal ideation to go to her father's grave and cut herself. Patient reported that her mother allowed her brother to have sex with her son and she has lost custody. She continues to live with her  however; he has a girlfriend. Patient is seen via tele psychiatry; caregiver was present in the room along with the patient. Pursuant to the emergency declaration under the [facility], [address] [place] [address] authority and the [employer] and [employer] Act, this Virtual Visit was conducted with patient's (and/or legal guardian's) consent, to reduce the patient's risk of exposure to COVID-19 and provide necessary medical care. Services were provided through a video synchronous discussion virtually to substitute for in-person visit by provider. Patient is dressed in hospital attire with poor ADLS. She remains flat, poorly reactive, guarded, hopeless and helpless. She is c/o a headache and nausea. Writer asked if she had worked anything out at home and she minimized the situation with her son and . She is denying SI, HI and hallucinations endorsing depression and anxiety 5/10 with 0 being none and 10 the worst. Chart and medications reviewed. Therapeutic support, empathetic care and psycho education provided. At this time there is no safe alternative other than inpatient care.     OBJECTIVE    Physical  /80   Pulse 80   Temp 97.7 °F (36.5 °C) (Oral)   Resp 14   Ht 5' 5\" (1.651 m)   Wt 277 lb (125.6 kg)   SpO2 97%   BMI 46.10 kg/m²      Mental Status Evaluation:  Orientation: alertness: alert   Mood:. depressed      Affect:  blunted and flat      Appearance:  disheveled and overweight   Activity:  Psychomotor Retardation Panel w/ Reflex to MG    Collection Time: 08/14/20  6:36 AM   Result Value Ref Range    Glucose 110 (H) 70 - 99 mg/dL    BUN 13 6 - 20 mg/dL    CREATININE 0.74 0.50 - 0.90 mg/dL    Bun/Cre Ratio NOT REPORTED 9 - 20    Calcium 9.2 8.6 - 10.4 mg/dL    Sodium 138 135 - 144 mmol/L    Potassium 4.3 3.7 - 5.3 mmol/L    Chloride 103 98 - 107 mmol/L    CO2 26 20 - 31 mmol/L    Anion Gap 9 9 - 17 mmol/L    Alkaline Phosphatase 88 35 - 104 U/L    ALT 24 5 - 33 U/L    AST 23 <32 U/L    Total Bilirubin 0.45 0.3 - 1.2 mg/dL    Total Protein 6.7 6.4 - 8.3 g/dL    Alb 3.6 3.5 - 5.2 g/dL    Albumin/Globulin Ratio NOT REPORTED 1.0 - 2.5    GFR Non-African American >60 >60 mL/min    GFR African American >60 >60 mL/min    GFR Comment          GFR Staging NOT REPORTED    Hemoglobin A1c    Collection Time: 08/14/20  6:36 AM   Result Value Ref Range    Hemoglobin A1C 5.8 4.0 - 6.0 %    Estimated Avg Glucose 120 mg/dL   TSH without Reflex    Collection Time: 08/14/20  6:36 AM   Result Value Ref Range    TSH 2.34 0.30 - 5.00 mIU/L   T4, free    Collection Time: 08/14/20  6:36 AM   Result Value Ref Range    Thyroxine, Free 1.02 0.93 - 1.70 ng/dL   Lipid panel - fasting    Collection Time: 08/14/20  6:36 AM   Result Value Ref Range    Cholesterol 167 <200 mg/dL    HDL 43 >40 mg/dL    LDL Cholesterol 108 0 - 130 mg/dL    Chol/HDL Ratio 3.9 <5    Triglycerides 79 <150 mg/dL    VLDL NOT REPORTED 1 - 30 mg/dL   CBC auto differential    Collection Time: 08/14/20  6:36 AM   Result Value Ref Range    WBC 8.7 3.5 - 11.0 k/uL    RBC 3.93 (L) 4.0 - 5.2 m/uL    Hemoglobin 11.8 (L) 12.0 - 16.0 g/dL    Hematocrit 34.9 (L) 36 - 46 %    MCV 88.8 80 - 100 fL    MCH 30.0 26 - 34 pg    MCHC 33.8 31 - 37 g/dL    RDW 13.7 11.5 - 14.9 %    Platelets 702 142 - 985 k/uL    MPV 8.1 6.0 - 12.0 fL    NRBC Automated NOT REPORTED per 100 WBC    Differential Type NOT REPORTED     Seg Neutrophils 56 36 - 66 %    Lymphocytes 30 24 - 44 %    Monocytes 7 1 - 7 % Eosinophils % 6 (H) 0 - 4 %    Basophils 1 0 - 2 %    Immature Granulocytes NOT REPORTED 0 %    Segs Absolute 4.90 1.3 - 9.1 k/uL    Absolute Lymph # 2.60 1.0 - 4.8 k/uL    Absolute Mono # 0.60 0.1 - 1.3 k/uL    Absolute Eos # 0.50 (H) 0.0 - 0.4 k/uL    Basophils Absolute 0.10 0.0 - 0.2 k/uL    Absolute Immature Granulocyte NOT REPORTED 0.00 - 0.30 k/uL    WBC Morphology NOT REPORTED     RBC Morphology NOT REPORTED     Platelet Estimate NOT REPORTED        Medications  Current Facility-Administered Medications   Medication Dose Route Frequency Provider Last Rate Last Dose    ibuprofen (ADVIL;MOTRIN) tablet 800 mg  800 mg Oral TID PRN JUNE Reilly CNP   800 mg at 08/14/20 1543    [START ON 8/15/2020] metFORMIN (GLUCOPHAGE) tablet 500 mg  500 mg Oral Daily with breakfast Pau Amado MD        nicotine polacrilex (NICORETTE) gum 2 mg  2 mg Oral PRN Liberty Nielsen MD   2 mg at 08/13/20 2006    traZODone (DESYREL) tablet 50 mg  50 mg Oral Nightly PRN Liberty Nielsen MD   50 mg at 08/13/20 2121    albuterol sulfate  (90 Base) MCG/ACT inhaler 2 puff  2 puff Inhalation Q6H PRN JUNE Reilly CNP   2 puff at 08/14/20 0845    cariprazine hcl (VRAYLAR) capsule 1.5 mg  1.5 mg Oral Daily JUNE Reilly CNP   1.5 mg at 08/14/20 0570    sertraline (ZOLOFT) tablet 50 mg  50 mg Oral Daily JUNE Reilly CNP   50 mg at 08/14/20 3514    benztropine mesylate (COGENTIN) injection 2 mg  2 mg Intramuscular BID PRN JUNE Reilly CNP        magnesium hydroxide (MILK OF MAGNESIA) 400 MG/5ML suspension 30 mL  30 mL Oral Daily PRN JUNE Reilly CNP        aluminum & magnesium hydroxide-simethicone (MAALOX) 200-200-20 MG/5ML suspension 30 mL  30 mL Oral Q6H PRN Michael Faye, APRN - CNP        nicotine (NICODERM CQ) 14 MG/24HR 1 patch  1 patch Transdermal Daily JUNE Reilly - CNP        hydrOXYzine (ATARAX) tablet 25 mg  25 mg Oral TID PRN JUNE Cisneros CNP   25 mg at 08/13/20 2121    acetaminophen (TYLENOL) tablet 650 mg  650 mg Oral Q4H PRN Kayla Cobb MD   650 mg at 08/14/20 0843         [START ON 8/15/2020] metFORMIN  500 mg Oral Daily with breakfast    cariprazine hcl  1.5 mg Oral Daily    sertraline  50 mg Oral Daily    nicotine  1 patch Transdermal Daily       ASSESSMENT  Major Depressive Disorder, Moderate, Recurrent    Patient's Response to Treatment:   Slow    PLAN  · Continue inpatient psychiatric treatment. · Continue home psychotropic medications. · Internal Medicine consult placed on 8/13/2020 for diabetic management. · Supportive therapy with medication management. Reviewed risks and benefits as well as potential side effects with patient. · Review medications for efficacy and side effects. · Therapeutic support and empathetic care provided. · Engage in therapeutic activities and groups. · Follow up at [facility] after symptoms stabilized.     Estimated length of stay: 5-7 days      Electronically signed by JUNE Cisneros CNP on 8/14/2020 at 4:40 PM    Patient Location:  [address] [place] [address]    Provider Location (City/State): [place]    This virtual visit was conducted via interactive/real-time audio/video.

## 2020-08-14 NOTE — GROUP NOTE
Group Therapy Note    Date: 8/14/2020    Group Start Time: 0900  Group End Time: 0920  Group Topic: Community Meeting    ABBY FRANCO    [place]    Patient refused to attend community meeting/ goals group at 0900 after encouragement from staff. 1:1 talk time provided by staff.       Signature:  [place] Osgood

## 2020-08-14 NOTE — GROUP NOTE
Group Therapy Note    Date: 8/14/2020    Group Start Time: 1400  Group End Time: 1440  Group Topic: Psychoeducation    ABBY Schwab    Patient refused to attend social skills/ mental health education group at 1400 after encouragement from staff. 1:1 talk time provided by staff.        Signature:  Denita Schwab

## 2020-08-14 NOTE — PLAN OF CARE
Preoccupations  Hallucinations: None  Delusions: No  Memory:Normal: Yes  Insight and Judgment: No  Insight and Judgment: Poor Judgment, Poor Insight  Present Suicidal Ideation: No  Present Homicidal Ideation: No    Daily Assessment Last Entry:   Daily Sleep (WDL): Within Defined Limits         Patient Currently in Pain: Yes  Daily Nutrition (WDL): Within Defined Limits    Patient Monitoring:  Frequency of Checks: (Line of Site for ace wrap)    Psychiatric Symptoms:   Depression Symptoms  Depression Symptoms: Isolative, Loss of interest, Impaired concentration  Anxiety Symptoms  Anxiety Symptoms: Generalized  Rosaura Symptoms  Rosaura Symptoms: No problems reported or observed. Psychosis Symptoms  Delusion Type: No problems reported or observed. Suicide Risk CSSR-S:  1) Within the past month, have you wished you were dead or wished you could go to sleep and not wake up? : Yes  2) Have you actually had any thoughts of killing yourself? : Yes  3) Have you been thinking about how you might kill yourself? : Yes  5) Have you started to work out or worked out the details of how to kill yourself? Do you intend to carry out this plan? : No  6) Have you ever done anything, started to do anything, or prepared to do anything to end your life?: No  Change in Result No Change in Plan of care No      EDUCATION:   EDUCATION:   Learner Progress Toward Treatment Goals: Reviewed results and recommendations of this team, Reviewed group plan and strategies, Reviewed signs, symptoms and risk of self harm and violent behavior, Reviewed goals and plan of care    Method:small group, individual verbal education    Outcome:verbalized by patient, but needs reinforcement to obtain goals    PATIENT GOALS:  Short term: To decrease depression and increase positive coping skills  Long term:  Follow up with [facility] and to express feelings more, be compliant with medications     PLAN/TREATMENT RECOMMENDATIONS UPDATE: continue with group therapies, increased socialization, continue planning for after discharge goals, continue with medication compliance    SHORT-TERM GOALS UPDATE:   Time frame for Short-Term Goals: 5-7 days    LONG-TERM GOALS UPDATE:   Time frame for Long-Term Goals: 6 months  Members Present in Team Meeting: See Signature Sheet    Xiomara Anderson

## 2020-08-14 NOTE — GROUP NOTE
Group Therapy Note    Date: 8/14/2020    Group Start Time: 9659  Group End Time: 1630  Group Topic: Healthy Living/Wellness    STCZ AUNDREA Rocha RN        Group Therapy Note    Attendees: 4     Patient refused to attend wellness group at [phone] after encouragement from staff. 1:1 talk time offered as alternative to group session and patient declined.        Signature:  Melissa Rocha RN

## 2020-08-14 NOTE — PLAN OF CARE
Problem: Suicide risk  Goal: Provide patient with safe environment  Description: Provide patient with safe environment  8/14/2020 1128 by Elizabeth Lam  Outcome: Ongoing   Safe environment maintained and patient remains free from harm. Agreeable to seeking staff should thoughts to harm self or others arise. Q15 minute checks for safety. Problem: Depressive Behavior With or Without Suicide Precautions:  Goal: Able to verbalize and/or display a decrease in depressive symptoms  Description: Able to verbalize and/or display a decrease in depressive symptoms  Outcome: Ongoing   Patient admits to feelings of depression and rates the severity 6/10. Writer offered support and pt accepted. Will continue to monitor and provide support as needed. Q15 minute checks for safety. Problem: Depressive Behavior With or Without Suicide Precautions:  Goal: Ability to disclose and discuss suicidal ideas will improve  Description: Ability to disclose and discuss suicidal ideas will improve  8/14/2020 1128 by Elizabeth Lam  Outcome: Ongoing   Patient denies suicidal ideations. Agreeable to talking with staff if thoughts to harm self arise. Q15 minute checks maintained for safety.

## 2020-08-14 NOTE — GROUP NOTE
Group Therapy Note    Date: 8/14/2020    Group Start Time: 1000  Group End Time: 0280  Group Topic: Psychotherapy    [address], LSW    patient refused to attend psychotherapy group at [facility] after encouragement from staff.   1:1 talk time provided as alternative to group session

## 2020-08-14 NOTE — GROUP NOTE
Group Therapy Note    Date: 8/14/2020    Group Start Time: 1100  Group End Time: 8233  Group Topic: Psychoeducation    FRANKY Kramer    Patient refused to attend coping skills and support group at 1100 after encouragement from staff. 1:1 talk time offered by staff as alternative to group session.

## 2020-08-15 PROCEDURE — 6370000000 HC RX 637 (ALT 250 FOR IP): Performed by: PSYCHIATRY & NEUROLOGY

## 2020-08-15 PROCEDURE — 99232 SBSQ HOSP IP/OBS MODERATE 35: CPT | Performed by: NURSE PRACTITIONER

## 2020-08-15 PROCEDURE — 6370000000 HC RX 637 (ALT 250 FOR IP): Performed by: INTERNAL MEDICINE

## 2020-08-15 PROCEDURE — 6370000000 HC RX 637 (ALT 250 FOR IP): Performed by: NURSE PRACTITIONER

## 2020-08-15 PROCEDURE — 1240000000 HC EMOTIONAL WELLNESS R&B

## 2020-08-15 RX ADMIN — CARIPRAZINE 1.5 MG: 1.5 CAPSULE, GELATIN COATED ORAL at 08:00

## 2020-08-15 RX ADMIN — IBUPROFEN 800 MG: 800 TABLET ORAL at 08:00

## 2020-08-15 RX ADMIN — IBUPROFEN 800 MG: 800 TABLET ORAL at 19:19

## 2020-08-15 RX ADMIN — HYDROXYZINE HYDROCHLORIDE 25 MG: 25 TABLET, FILM COATED ORAL at 12:03

## 2020-08-15 RX ADMIN — ALBUTEROL SULFATE 2 PUFF: 90 AEROSOL, METERED RESPIRATORY (INHALATION) at 07:59

## 2020-08-15 RX ADMIN — ALBUTEROL SULFATE 2 PUFF: 90 AEROSOL, METERED RESPIRATORY (INHALATION) at 16:59

## 2020-08-15 RX ADMIN — ACETAMINOPHEN 650 MG: 325 TABLET, FILM COATED ORAL at 16:59

## 2020-08-15 RX ADMIN — SERTRALINE HYDROCHLORIDE 50 MG: 50 TABLET ORAL at 08:00

## 2020-08-15 RX ADMIN — NICOTINE POLACRILEX 2 MG: 2 GUM, CHEWING BUCCAL at 21:27

## 2020-08-15 RX ADMIN — NICOTINE POLACRILEX 2 MG: 2 GUM, CHEWING BUCCAL at 08:03

## 2020-08-15 RX ADMIN — TRAZODONE HYDROCHLORIDE 50 MG: 50 TABLET ORAL at 21:27

## 2020-08-15 RX ADMIN — HYDROXYZINE HYDROCHLORIDE 25 MG: 25 TABLET, FILM COATED ORAL at 21:27

## 2020-08-15 RX ADMIN — METFORMIN HYDROCHLORIDE 500 MG: 500 TABLET ORAL at 08:00

## 2020-08-15 ASSESSMENT — PAIN DESCRIPTION - LOCATION: LOCATION: HAND

## 2020-08-15 ASSESSMENT — PAIN SCALES - GENERAL
PAINLEVEL_OUTOF10: 6
PAINLEVEL_OUTOF10: 8
PAINLEVEL_OUTOF10: 6
PAINLEVEL_OUTOF10: 0
PAINLEVEL_OUTOF10: 4

## 2020-08-15 ASSESSMENT — PAIN DESCRIPTION - PAIN TYPE: TYPE: ACUTE PAIN

## 2020-08-15 ASSESSMENT — PAIN DESCRIPTION - DESCRIPTORS: DESCRIPTORS: ACHING

## 2020-08-15 NOTE — GROUP NOTE
Group Therapy Note    Date: 8/15/2020    Group Start Time: 1100  Group End Time: 1200  Group Topic: Psychoeducation    ABBY GAONA    MASOUD Mackay LSW        Group Therapy Note    Attendees: 10         Patient's Goal:  Pt will demonstrate increased interpersonal interaction. Notes:  Group topic was Cognitive Distortions.      Status After Intervention:  Improved    Participation Level: Interactive    Participation Quality: Appropriate      Speech:  normal      Thought Process/Content: Logical      Affective Functioning: Congruent      Mood: anxious      Level of consciousness:  Alert      Response to Learning: Progressing to goal      Endings: None Reported    Modes of Intervention: Education      Discipline Responsible: /Counselor      Signature:  MASOUD Mackay, CORBY

## 2020-08-15 NOTE — BH NOTE
patient refused to attend coping skills group at 4pm after encouragement from staff.   1:1 talk time provided as alternative to group session

## 2020-08-15 NOTE — VIRTUAL HEALTH
Psychiatric Progress Note - Psychiatric Nurse Practitioner      Pertinent History & Psychiatric Examination    HPI:  Nando Jacobo was interviewed in her room via telehealth with staff present. She reports that she is feeling less depressed but her nerves are still \"real bad\". She reports that she \"can't get them to calm down. She does deny any suicidal thoughts. She has no auditory or visual hallucinations. She is sleeping good. She reports that she is not going to groups and she \"just wants to be alone\". Encouraged her to use Atarax that is available for anxiety and also encouraged her to attend groups. Chart and medications reviewed. Therapeutic support, empathetic care and psycho education provided. At this time there is no safe alternative other than inpatient care. Patient is seen via tele psychiatry; caregiver was present in the room along with the patient. Pursuant to the emergency declaration under the [facility], [facility] waiver authority and the Domob and [employer]izu Act, this Virtual Visit was conducted with patient's (and/or legal guardian's) consent, to reduce the patient's risk of exposure to COVID-19 and provide necessary medical care. Services were provided through a video synchronous discussion virtually to substitute for in-person visit by provider.     Complaints of Pain: present - adequately treated  Functioning Relationships: good support system      Mental Status Evaluation:  Orientation: alertness: alert   Mood:. anxious      Affect:  blunted      Appearance:  overweight   Activity:  Within Normal Limits   Gait/Posture: Normal   Speech:  normal pitch and normal volume   Thought Process:  within normal limits   Thought Content:  anxioux   Sensorium:  person, place, time/date and situation   Cognition:  grossly intact   Memory: intact   Insight:  limited   Judgment: limited   Suicidal Ideations: denies suicidal ideation   Homicidal Ideations: Negative for homicidal ideation      Medication Side Effects: absent       Attention Span attention span and concentration were age appropriate     Clinical Assessment Medical Decision    Axis I: Major Depression, Rec        Precautions with Justification:   LOS due to Ace wrap    Medication Review/Mgmt: Medications reviewed - no changes    Medical Issues: See Chart    Assessment of Risk for Harm to Self/Others:  None    PLAN  · Continue inpatient psychiatric treatment  · Supportive therapy with medication management. Reviewed risks and benefits as well as potential side effects with patient. · Review medications for efficacy and side effects. · Therapeutic support and empathetic care provided. · Engage in therapeutic activities and groups. · Follow up at [facility] after symptoms stabilized. · Possible DC on 8-      Anticipated Discharge Date: 8-    Patient's Response to Treatment: positive    Isadore Danger  8/15/2020  9:01 AM           Patient Location:  [address] [place] [address]    Provider Location ([facility]): [place], [place]    This virtual visit was conducted via interactive/real-time audio/video.

## 2020-08-15 NOTE — PLAN OF CARE
Problem: Suicide risk  Goal: Provide patient with safe environment  Description: Provide patient with safe environment  8/15/2020 1117 by Murtaza Mccormick LPN  Outcome: Ongoing   Patient is provided with a safe environment Q15 min safety checks continue. Problem: Depressive Behavior With or Without Suicide Precautions:  Goal: Ability to disclose and discuss suicidal ideas will improve  Description: Ability to disclose and discuss suicidal ideas will improve  8/15/2020 1117 by Murtaza Mccormick LPN  Outcome: Ongoing   Patient denies suicidal ideations. Problem: Depressive Behavior With or Without Suicide Precautions:  Goal: Able to verbalize and/or display a decrease in depressive symptoms  Description: Able to verbalize and/or display a decrease in depressive symptoms  Outcome: Ongoing   Patient verbalizes a decrease in depressive symptoms.

## 2020-08-15 NOTE — GROUP NOTE
Group Therapy Note    Date: 8/15/2020    Group Start Time: 1330  Group End Time: 1430  Group Topic: Cognitive Skills    ABBY Fish      Patient declined to attend social skills group at 1330 despite encouragement from staff. 1:1 talk time offered by staff as alternative to group session.         Signature:  Rosette Fish

## 2020-08-15 NOTE — PLAN OF CARE
Problem: Depressive Behavior With or Without Suicide Precautions:  Goal: Ability to disclose and discuss suicidal ideas will improve  Description: Ability to disclose and discuss suicidal ideas will improve  8/15/2020 0009 by Dorothy Gallagher LPN  Outcome: Patient denies having any suicidal thoughts or ideations. Patient states she is feeling better and is ready for discharge. 1:1 talk time offered with patient that lasted 15 minutes. Patient encouraged to seek staff assistance if thoughts of self harm arise, patient verbalized understanding. Will continue to monitor Q15 minute safety checks. Problem: Suicide risk  Goal: Provide patient with safe environment  Description: Provide patient with safe environment  8/15/2020 0009 by Dorothy Gallagher LPN  Outcome:  Patient provided with save environment in [facility]. Patients room is close to nurses station. Will continue to monitor Q15 minute safety checks.

## 2020-08-15 NOTE — CONSULTS
[facility] Internal Medicine    CONSULTATION / HISTORY AND PHYSICAL EXAMINATION            Date:   2020  Patient name:  Cody Miranda  Date of admission:  2020 11:43 PM  MRN:   [id number]  Account:  [de-identified]  YOB: 1990  PCP:    No primary care provider on file. Room:   [address] [place] [address]  Code Status:    Full Code    Physician Requesting Consult: Guillaume Delacruz MD    Reason for Consult: Diabetes    Chief Complaint:     Chief Complaint   Patient presents with    Suicidal    Hand Pain     right       History Obtained From:     patient, electronic medical record    History of Present Illness:   Patient admitted with major depression, to medicine consulted for  Diabetes  Very poor historian, she is taking metformin 500 daily, does not check her blood sugar regularly  Not compliant with diet  Past Medical History:     Past Medical History:   Diagnosis Date    Asthma     Back pain 2014    Bipolar 1 disorder ([facility]     Bipolar disorder ([facility] 2017    Depression     Diabetes mellitus ([facility]     Dizziness     Fatty liver disease, nonalcoholic     Fatty liver disease, nonalcoholic     GERD (gastroesophageal reflux disease)     Obesity         Past Surgical History:     Past Surgical History:   Procedure Laterality Date     SECTION      LEEP          Medications Prior to Admission:     Prior to Admission medications    Medication Sig Start Date End Date Taking?  Authorizing Provider   sertraline (ZOLOFT) 50 MG tablet Take 50 mg by mouth daily   Yes Historical Provider, MD   cariprazine hcl (VRAYLAR) 1.5 MG capsule Take 1.5 mg by mouth daily   Yes Historical Provider, MD   albuterol sulfate HFA (PROVENTIL HFA) 108 (90 Base) MCG/ACT inhaler Inhale 2 puffs into the lungs every 6 hours as needed for Wheezing 12/3/17  Yes Do Logan MD   metFORMIN (GLUCOPHAGE) 500 MG tablet Take 1 tablet by mouth 2 times daily (with meals) TAKE 1 TABLET BY MOUTH DAILY 17   Maria Isabel Toscano MD        Allergies:     Pcn [penicillins]; Amoxicillin; and Seasonal    Social History:     Tobacco:    reports that she has been smoking cigarettes. She has a 3.50 pack-year smoking history. She has never used smokeless tobacco.  Alcohol:      has no history on file for alcohol. Drug Use:  reports previous drug use. Drug: Marijuana. Family History:     Family History   Problem Relation Age of Onset    High Blood Pressure Mother     Diabetes Mother     Heart Disease Mother     Heart Disease Father     Early Death Father     Cancer Maternal Aunt         lung    Cancer Paternal Cousin         brain       Review of Systems:     Positive and Negative as described in HPI. CONSTITUTIONAL:  negative for fevers, chills, sweats, fatigue, weight loss  HEENT:  negative for vision, hearing changes, runny nose, throat pain  RESPIRATORY:  negative for shortness of breath, cough, congestion, wheezing. CARDIOVASCULAR:  negative for chest pain, palpitations.   GASTROINTESTINAL:  negative for nausea, vomiting, diarrhea, constipation, change in bowel habits, abdominal pain   GENITOURINARY:  negative for difficulty of urination, burning with urination, frequency   INTEGUMENT:  negative for rash, skin lesions, easy bruising   HEMATOLOGIC/LYMPHATIC:  negative for swelling/edema   ALLERGIC/IMMUNOLOGIC:  negative for urticaria , itching  ENDOCRINE:  negative increase in drinking, increase in urination, hot or cold intolerance  MUSCULOSKELETAL: Pain in right forearm nEUROLOGICAL:  negative for headaches, dizziness, lightheadedness, numbness, pain, tingling extremities  BEHAVIOR/PSYCH: Depressed    Physical Exam:     /78   Pulse 73   Temp 97.9 °F (36.6 °C) (Oral)   Resp 14   Ht 5' 5\" (1.651 m)   Wt 277 lb (125.6 kg)   SpO2 97%   BMI 46.10 kg/m²   Temp (24hrs), Av.8 °F (36.6 °C), Min:97.7 °F (36.5 °C), Max:97.9 °F (36.6 °C)    No results for input(s): POCGLU in the last 72 hours. No intake or output data in the 24 hours ending 08/14/20 2136    General Appearance:  alert, well appearing, and in no acute distress  Mental status: oriented to person, place, and time with normal affect  Head:  normocephalic, atraumatic. Eye: no icterus, redness, pupils equal and reactive, extraocular eye movements intact, conjunctiva clear  Ear: normal external ear, no discharge, hearing intact  Nose:  no drainage noted  Mouth: mucous membranes moist  Neck: supple, no carotid bruits, thyroid not palpable  Lungs: Bilateral equal air entry, clear to ausculation, no wheezing, rales or rhonchi, normal effort  Cardiovascular: normal rate, regular rhythm, no murmur, gallop, rub.   Abdomen: Soft, nontender, nondistended, normal bowel sounds, no hepatomegaly or splenomegaly  Neurologic: There are no new focal motor or sensory deficits, normal muscle tone and bulk, no abnormal sensation, normal speech, cranial nerves II through XII grossly intact  Skin: No gross lesions, rashes, bruising or bleeding on exposed skin area  Extremities: Right forearm in dressing  Psych: normal affect    Investigations:      Laboratory Testing:  Recent Results (from the past 24 hour(s))   Comprehensive Metabolic Panel w/ Reflex to MG    Collection Time: 08/14/20  6:36 AM   Result Value Ref Range    Glucose 110 (H) 70 - 99 mg/dL    BUN 13 6 - 20 mg/dL    CREATININE 0.74 0.50 - 0.90 mg/dL    Bun/Cre Ratio NOT REPORTED 9 - 20    Calcium 9.2 8.6 - 10.4 mg/dL    Sodium 138 135 - 144 mmol/L    Potassium 4.3 3.7 - 5.3 mmol/L    Chloride 103 98 - 107 mmol/L    CO2 26 20 - 31 mmol/L    Anion Gap 9 9 - 17 mmol/L    Alkaline Phosphatase 88 35 - 104 U/L    ALT 24 5 - 33 U/L    AST 23 <32 U/L    Total Bilirubin 0.45 0.3 - 1.2 mg/dL    Total Protein 6.7 6.4 - 8.3 g/dL    Alb 3.6 3.5 - 5.2 g/dL    Albumin/Globulin Ratio NOT REPORTED 1.0 - 2.5    GFR Non-African American >60 >60 mL/min    GFR African American >60 >60 mL/min    GFR Comment GFR Staging NOT REPORTED    Hemoglobin A1c    Collection Time: 08/14/20  6:36 AM   Result Value Ref Range    Hemoglobin A1C 5.8 4.0 - 6.0 %    Estimated Avg Glucose 120 mg/dL   TSH without Reflex    Collection Time: 08/14/20  6:36 AM   Result Value Ref Range    TSH 2.34 0.30 - 5.00 mIU/L   T4, free    Collection Time: 08/14/20  6:36 AM   Result Value Ref Range    Thyroxine, Free 1.02 0.93 - 1.70 ng/dL   Lipid panel - fasting    Collection Time: 08/14/20  6:36 AM   Result Value Ref Range    Cholesterol 167 <200 mg/dL    HDL 43 >40 mg/dL    LDL Cholesterol 108 0 - 130 mg/dL    Chol/HDL Ratio 3.9 <5    Triglycerides 79 <150 mg/dL    VLDL NOT REPORTED 1 - 30 mg/dL   CBC auto differential    Collection Time: 08/14/20  6:36 AM   Result Value Ref Range    WBC 8.7 3.5 - 11.0 k/uL    RBC 3.93 (L) 4.0 - 5.2 m/uL    Hemoglobin 11.8 (L) 12.0 - 16.0 g/dL    Hematocrit 34.9 (L) 36 - 46 %    MCV 88.8 80 - 100 fL    MCH 30.0 26 - 34 pg    MCHC 33.8 31 - 37 g/dL    RDW 13.7 11.5 - 14.9 %    Platelets 891 230 - 884 k/uL    MPV 8.1 6.0 - 12.0 fL    NRBC Automated NOT REPORTED per 100 WBC    Differential Type NOT REPORTED     Seg Neutrophils 56 36 - 66 %    Lymphocytes 30 24 - 44 %    Monocytes 7 1 - 7 %    Eosinophils % 6 (H) 0 - 4 %    Basophils 1 0 - 2 %    Immature Granulocytes NOT REPORTED 0 %    Segs Absolute 4.90 1.3 - 9.1 k/uL    Absolute Lymph # 2.60 1.0 - 4.8 k/uL    Absolute Mono # 0.60 0.1 - 1.3 k/uL    Absolute Eos # 0.50 (H) 0.0 - 0.4 k/uL    Basophils Absolute 0.10 0.0 - 0.2 k/uL    Absolute Immature Granulocyte NOT REPORTED 0.00 - 0.30 k/uL    WBC Morphology NOT REPORTED     RBC Morphology NOT REPORTED     Platelet Estimate NOT REPORTED        Imaging/Diagonstics:    Assessment :      Primary Problem  <principal problem not specified>    Active Hospital Problems    Diagnosis Date Noted    Major depressive disorder, recurrent ([facility] [F33.9] 08/13/2020    Type 2 diabetes mellitus without complication ([facility] [E11.9] 11/30/2017    Obesity [E66.9] 11/07/2011       Plan:     1. Diabetes, controlled on oral hypoglycemics, last HbA1c 5.8  2. Continue with same medication  3. No need for AC and HS blood sugar testing  4. We will sign off, please call with questions  5. Consultations:   IP CONSULT TO HOSPITALIST  IP CONSULT TO INTERNAL MEDICINE      River Mcdaniel MD  8/14/2020  9:36 PM    Copy sent to Dr. Sergio Clark primary care provider on file. Please note that this chart was generated using voice recognition Dragon dictation software. Although every effort was made to ensure the accuracy of this automated transcription, some errors in transcription may have occurred.

## 2020-08-16 PROCEDURE — 6370000000 HC RX 637 (ALT 250 FOR IP): Performed by: INTERNAL MEDICINE

## 2020-08-16 PROCEDURE — 99232 SBSQ HOSP IP/OBS MODERATE 35: CPT | Performed by: NURSE PRACTITIONER

## 2020-08-16 PROCEDURE — 1240000000 HC EMOTIONAL WELLNESS R&B

## 2020-08-16 PROCEDURE — 6370000000 HC RX 637 (ALT 250 FOR IP): Performed by: PSYCHIATRY & NEUROLOGY

## 2020-08-16 PROCEDURE — 6370000000 HC RX 637 (ALT 250 FOR IP): Performed by: NURSE PRACTITIONER

## 2020-08-16 RX ORDER — HYDROXYZINE HYDROCHLORIDE 25 MG/1
25 TABLET, FILM COATED ORAL 3 TIMES DAILY PRN
Qty: 28 TABLET | Refills: 0 | Status: SHIPPED | OUTPATIENT
Start: 2020-08-16 | End: 2020-08-26

## 2020-08-16 RX ORDER — TRAZODONE HYDROCHLORIDE 50 MG/1
50 TABLET ORAL NIGHTLY PRN
Qty: 14 TABLET | Refills: 0 | Status: SHIPPED | OUTPATIENT
Start: 2020-08-16 | End: 2022-04-21

## 2020-08-16 RX ADMIN — NICOTINE POLACRILEX 2 MG: 2 GUM, CHEWING BUCCAL at 19:34

## 2020-08-16 RX ADMIN — NICOTINE POLACRILEX 2 MG: 2 GUM, CHEWING BUCCAL at 08:32

## 2020-08-16 RX ADMIN — IBUPROFEN 800 MG: 800 TABLET ORAL at 08:34

## 2020-08-16 RX ADMIN — ACETAMINOPHEN 650 MG: 325 TABLET, FILM COATED ORAL at 01:43

## 2020-08-16 RX ADMIN — ALBUTEROL SULFATE 2 PUFF: 90 AEROSOL, METERED RESPIRATORY (INHALATION) at 08:31

## 2020-08-16 RX ADMIN — CARIPRAZINE 1.5 MG: 1.5 CAPSULE, GELATIN COATED ORAL at 08:31

## 2020-08-16 RX ADMIN — NICOTINE POLACRILEX 2 MG: 2 GUM, CHEWING BUCCAL at 12:03

## 2020-08-16 RX ADMIN — METFORMIN HYDROCHLORIDE 500 MG: 500 TABLET ORAL at 08:31

## 2020-08-16 RX ADMIN — TRAZODONE HYDROCHLORIDE 50 MG: 50 TABLET ORAL at 21:22

## 2020-08-16 RX ADMIN — ACETAMINOPHEN 650 MG: 325 TABLET, FILM COATED ORAL at 19:05

## 2020-08-16 RX ADMIN — HYDROXYZINE HYDROCHLORIDE 25 MG: 25 TABLET, FILM COATED ORAL at 21:22

## 2020-08-16 RX ADMIN — ALBUTEROL SULFATE 2 PUFF: 90 AEROSOL, METERED RESPIRATORY (INHALATION) at 19:34

## 2020-08-16 RX ADMIN — IBUPROFEN 800 MG: 800 TABLET ORAL at 22:07

## 2020-08-16 RX ADMIN — SERTRALINE HYDROCHLORIDE 50 MG: 50 TABLET ORAL at 08:31

## 2020-08-16 RX ADMIN — ALBUTEROL SULFATE 2 PUFF: 90 AEROSOL, METERED RESPIRATORY (INHALATION) at 01:40

## 2020-08-16 ASSESSMENT — PAIN SCALES - GENERAL
PAINLEVEL_OUTOF10: 6
PAINLEVEL_OUTOF10: 6
PAINLEVEL_OUTOF10: 2
PAINLEVEL_OUTOF10: 1
PAINLEVEL_OUTOF10: 10
PAINLEVEL_OUTOF10: 3
PAINLEVEL_OUTOF10: 1

## 2020-08-16 ASSESSMENT — PAIN DESCRIPTION - LOCATION: LOCATION: HAND

## 2020-08-16 ASSESSMENT — PAIN DESCRIPTION - PAIN TYPE: TYPE: ACUTE PAIN

## 2020-08-16 NOTE — GROUP NOTE
Group Therapy Note    Date: 8/16/2020    Group Start Time: 1100  Group End Time: 1200  Group Topic: Psychoeducation    [facility]    MASOUD Rebollar LSW        Group Therapy Note    Attendees: 13         Patient's Goal:  Pt will demonstrate increased interpersonal interaction. Notes:  Group topic was Problem Solving.     Status After Intervention:  Improved    Participation Level: Interactive    Participation Quality: Appropriate      Speech:  normal      Thought Process/Content: Logical      Affective Functioning: Congruent      Mood: anxious      Level of consciousness:  Alert      Response to Learning: Progressing to goal      Endings: None Reported    Modes of Intervention: Education      Discipline Responsible: /Counselor      Signature:  MASOUD Rebollar, CORBY

## 2020-08-16 NOTE — GROUP NOTE
Group Therapy Note    Date: 8/16/2020    Group Start Time: 0900  Group End Time: 0940  Group Topic: Community Meeting    [address] [facility]      Patient declined to attend community meeting/goal setting group at 0900 despite encouragement from staff. 1:1 talk time offered by staff as alternative to group session.       Signature:  Rosette Fish

## 2020-08-16 NOTE — VIRTUAL HEALTH
Psychiatric Progress Note - Psychiatric Nurse Practitioner      Pertinent History & Psychiatric Examination    HPI:  MAKAYLA was interviewed in her room via telehealth with staff present. She reports that she is still anxious and she did try Atarax yesterday afternoon and she reports that it helped a little. She reports that her anxiety would be better at home because she doesn't know anybody here. She reports that depression is good. She denies any suicidal thoughts. She does not have auditory or visual hallucinations. She is sleeping good. She did go to a group yesterday and will try to go today. She has a good appetite. Chart and medications reviewed. Therapeutic support, empathetic care and psycho education provided. At this time there is no safe alternative other than inpatient care. Patient is seen via tele psychiatry; caregiver was present in the room along with the patient. Pursuant to the emergency declaration under the [facility], 1135 waiver authority and the [employer] Act, this Virtual Visit was conducted with patient's (and/or legal guardian's) consent, to reduce the patient's risk of exposure to COVID-19 and provide necessary medical care. Services were provided through a video synchronous discussion virtually to substitute for in-person visit by provider.     Complaints of Pain: present - adequately treated  Functioning Relationships: good support system      Mental Status Evaluation:  Orientation: alertness: alert   Mood:. anxious      Affect:  blunted      Appearance:  overweight   Activity:  Within Normal Limits   Gait/Posture: Normal   Speech:  normal pitch and normal volume   Thought Process:  within normal limits   Thought Content:  anxioux   Sensorium:  person, place, time/date and situation   Cognition:  grossly intact   Memory: intact   Insight:  limited   Judgment: limited   Suicidal Ideations: denies suicidal ideation   Homicidal Ideations: Negative for homicidal ideation      Medication Side Effects: absent       Attention Span attention span and concentration were age appropriate     Clinical Assessment Medical Decision    Axis I: Major Depression, Rec        Precautions with Justification:   LOS due to Ace wrap    Medication Review/Mgmt: Medications reviewed - no changes    Medical Issues: See Chart    Assessment of Risk for Harm to Self/Others:  None    PLAN  · Continue inpatient psychiatric treatment  · Supportive therapy with medication management. Reviewed risks and benefits as well as potential side effects with patient. · Review medications for efficacy and side effects. · Therapeutic support and empathetic care provided. · Engage in therapeutic activities and groups. · Follow up at [facility] after symptoms stabilized. · DC on 8-      Anticipated Discharge Date: 8-    Patient's Response to Treatment: positive    Kolton Pineda  8/16/2020  8:38 AM           Patient Location:  [address] [place] [address]    Provider Location ([facility]): [place], [place]    This virtual visit was conducted via interactive/real-time audio/video.

## 2020-08-16 NOTE — GROUP NOTE
Patient participated in [address] this shift.     Signature:  Thomas Austin RN

## 2020-08-16 NOTE — PLAN OF CARE
Problem: Suicide risk  Goal: Provide patient with safe environment  Description: Provide patient with safe environment  8/15/2020 2328 by Jenny Child  Outcome: Ongoing    Problem: Depressive Behavior With or Without Suicide Precautions:  Goal: Able to verbalize and/or display a decrease in depressive symptoms  Description: Able to verbalize and/or display a decrease in depressive symptoms  8/15/2020 2328 by Jenny Child  Outcome: Ongoing    Goal: Ability to disclose and discuss suicidal ideas will improve  Description: Ability to disclose and discuss suicidal ideas will improve  8/15/2020 2328 by Jenny Child  Outcome: Ongoing    Pt admits to depression and anxiety. Pt denies suicidal and/or homicidal ideation, and hallucinations. Pt contracts for safety and states she feels safe in hospital. Pt appeared brightened during evening group and actively participated. Pt was social with select peers. Pt remains safe on unit. Every 15 min checks for safety continue.

## 2020-08-16 NOTE — GROUP NOTE
Group Therapy Note    Date: 8/16/2020    Group Start Time: 1330  Group End Time: 1261  Group Topic: Psychoeducation    ABBY Cho      Patient declined to attend coping skills group at 1330 despite encouragement from staff. 1:1 talk time offered by staff as alternative to group session.         Signature:  Pau Shah

## 2020-08-17 VITALS
TEMPERATURE: 98.6 F | OXYGEN SATURATION: 97 % | WEIGHT: 277 LBS | DIASTOLIC BLOOD PRESSURE: 58 MMHG | BODY MASS INDEX: 46.15 KG/M2 | HEIGHT: 65 IN | SYSTOLIC BLOOD PRESSURE: 112 MMHG | HEART RATE: 100 BPM | RESPIRATION RATE: 12 BRPM

## 2020-08-17 PROCEDURE — 99238 HOSP IP/OBS DSCHRG MGMT 30/<: CPT | Performed by: PSYCHIATRY & NEUROLOGY

## 2020-08-17 PROCEDURE — 6370000000 HC RX 637 (ALT 250 FOR IP): Performed by: PSYCHIATRY & NEUROLOGY

## 2020-08-17 PROCEDURE — 6370000000 HC RX 637 (ALT 250 FOR IP): Performed by: NURSE PRACTITIONER

## 2020-08-17 PROCEDURE — 6370000000 HC RX 637 (ALT 250 FOR IP): Performed by: INTERNAL MEDICINE

## 2020-08-17 RX ADMIN — CARIPRAZINE 1.5 MG: 1.5 CAPSULE, GELATIN COATED ORAL at 09:22

## 2020-08-17 RX ADMIN — ACETAMINOPHEN 650 MG: 325 TABLET, FILM COATED ORAL at 06:14

## 2020-08-17 RX ADMIN — METFORMIN HYDROCHLORIDE 500 MG: 500 TABLET ORAL at 09:22

## 2020-08-17 RX ADMIN — IBUPROFEN 800 MG: 800 TABLET ORAL at 07:48

## 2020-08-17 RX ADMIN — SERTRALINE HYDROCHLORIDE 50 MG: 50 TABLET ORAL at 09:22

## 2020-08-17 RX ADMIN — ALBUTEROL SULFATE 2 PUFF: 90 AEROSOL, METERED RESPIRATORY (INHALATION) at 06:15

## 2020-08-17 RX ADMIN — NICOTINE POLACRILEX 2 MG: 2 GUM, CHEWING BUCCAL at 09:24

## 2020-08-17 ASSESSMENT — PAIN SCALES - GENERAL
PAINLEVEL_OUTOF10: 3
PAINLEVEL_OUTOF10: 8

## 2020-08-17 NOTE — BH NOTE
Patient given tobacco quitline number 13516122101 at this time, refusing to call at this time and states they are not interested in quitting. Will consider options in the future. Patient given information as to the dangers of long term tobacco use. Continue to reinforce the importance of tobacco cessation.    Patient is a non smoker

## 2020-08-17 NOTE — BH NOTE
[facility]  Discharge Note    Pt discharged with followings belongings:   Dentures: None  Vision - Corrective Lenses: None  Hearing Aid: None  Jewelry: Earrings(3 lip piercing  3 pr earrings tongue ring nipple piercing)  Body Piercings Removed: No  Clothing: Footwear, Pants, Shirt, Socks, Undergarments (Comment)  Were All Patient Medications Collected?: Yes  Other Valuables: Cell phone   Valuables sent home with patient. Valuables retrieved from safe, Security envelope number:  [id number] and returned to patient. Patient education on aftercare instructions: given  Information faxed to [facility] by staff Patient verbalize understanding of AVS:  yes. Pt transported home by family.    Status EXAM upon discharge:  Status and Exam  Normal: Yes  Facial Expression: Flat  Affect: Appropriate  Level of Consciousness: Alert  Mood:Normal: Yes  Mood: Depressed, Anxious  Motor Activity:Normal: Yes  Motor Activity: Decreased  Interview Behavior: Cooperative  Preception: [place] to Person, Ramandeep Sauger to Time, [place] to Place, [place] to Situation  Attention:Normal: No  Attention: Distractible  Thought Processes: Circumstantial  Thought Content:Normal: Yes  Thought Content: Preoccupations  Hallucinations: None  Delusions: No  Memory:Normal: Yes  Insight and Judgment: No  Insight and Judgment: Poor Judgment  Present Suicidal Ideation: No  Present Homicidal Ideation: No      Metabolic Screening:    Lab Results   Component Value Date    LABA1C 5.8 08/14/2020       Lab Results   Component Value Date    CHOL 167 08/14/2020    CHOL 192 09/17/2018    CHOL 188 08/12/2014    CHOL 195 06/27/2013    CHOL 182 02/20/2013    CHOL 170 02/13/2012     Lab Results   Component Value Date    TRIG 79 08/14/2020    TRIG 105 09/17/2018    TRIG 135 08/12/2014    TRIG 119 06/27/2013    TRIG 108 02/20/2013    TRIG 88 02/13/2012     Lab Results   Component Value Date    HDL 43 08/14/2020    HDL 37 (L) 09/17/2018    HDL 38 (L) 08/12/2014    HDL 40 (L) 06/27/2013    HDL 42 02/20/2013    HDL 43 02/13/2012     No components found for: LDLCAL  No results found for: Nando Bhagat LPN

## 2020-08-17 NOTE — GROUP NOTE
Group Therapy Note    Date: 8/17/2020    Group Start Time: 1000  Group End Time: 6115  Group Topic: Psychotherapy    [address], LSW        Group Therapy Note    Attendees: 6/9         Patient's Goal:  Expression of hearing     Notes:      Status After Intervention:  Unchanged    Participation Level:  Active Listener and Interactive    Participation Quality: Appropriate and Attentive      Speech:  normal      Thought Process/Content: Logical      Affective Functioning: Congruent      Mood: euthymic      Level of consciousness:  Alert and Oriented x4      Response to Learning: Able to verbalize current knowledge/experience and Able to verbalize/acknowledge new learning      Endings: None Reported    Modes of Intervention: Education and Support      Discipline Responsible: /Counselor      Signature:  CORBY Saenz

## 2020-08-17 NOTE — PLAN OF CARE
Problem: Depressive Behavior With or Without Suicide Precautions:  Goal: Able to verbalize and/or display a decrease in depressive symptoms  Description: Able to verbalize and/or display a decrease in depressive symptoms  8/16/2020 2251 by Eros Mojica  Outcome: Ongoing  8/16/2020 1042 by Ayo Mathis LPN  Outcome: Ongoing     Problem: Suicide risk  Goal: Provide patient with safe environment  Description: Provide patient with safe environment  8/16/2020 2251 by Eros Mojica  Outcome: Ongoing  8/16/2020 1042 by Aoy Mathis LPN  Outcome: Ongoing   Patient relates that she is feeling good and ready for her discharge tomorrow. She denies depression, suicidal thoughts or anxiety at this time.
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
## 2020-09-11 ENCOUNTER — HOSPITAL ENCOUNTER (EMERGENCY)
Age: 30
Discharge: LEFT BEFORE DISCHARGE | End: 2020-09-11
Payer: COMMERCIAL

## 2020-09-11 VITALS
BODY MASS INDEX: 46.15 KG/M2 | HEIGHT: 65 IN | DIASTOLIC BLOOD PRESSURE: 94 MMHG | SYSTOLIC BLOOD PRESSURE: 134 MMHG | HEART RATE: 103 BPM | WEIGHT: 277 LBS | TEMPERATURE: 97.9 F | OXYGEN SATURATION: 93 % | RESPIRATION RATE: 14 BRPM

## 2020-09-11 PROCEDURE — 99281 EMR DPT VST MAYX REQ PHY/QHP: CPT

## 2020-09-12 ENCOUNTER — HOSPITAL ENCOUNTER (OUTPATIENT)
Age: 30
Setting detail: OBSERVATION
Discharge: LEFT BEFORE DISCHARGE | End: 2020-09-13
Attending: EMERGENCY MEDICINE
Payer: COMMERCIAL

## 2020-09-12 ENCOUNTER — APPOINTMENT (OUTPATIENT)
Dept: GENERAL RADIOLOGY | Age: 30
End: 2020-09-12
Payer: COMMERCIAL

## 2020-09-12 VITALS
RESPIRATION RATE: 28 BRPM | SYSTOLIC BLOOD PRESSURE: 132 MMHG | BODY MASS INDEX: 46.15 KG/M2 | TEMPERATURE: 98.3 F | OXYGEN SATURATION: 97 % | HEIGHT: 65 IN | HEART RATE: 120 BPM | WEIGHT: 277 LBS | DIASTOLIC BLOOD PRESSURE: 90 MMHG

## 2020-09-12 PROBLEM — R06.02 SHORTNESS OF BREATH: Status: ACTIVE | Noted: 2020-09-12

## 2020-09-12 LAB
ABSOLUTE EOS #: 0.91 K/UL (ref 0–0.44)
ABSOLUTE IMMATURE GRANULOCYTE: 0.04 K/UL (ref 0–0.3)
ABSOLUTE LYMPH #: 2.6 K/UL (ref 1.1–3.7)
ABSOLUTE MONO #: 0.83 K/UL (ref 0.1–1.2)
ANION GAP SERPL CALCULATED.3IONS-SCNC: 10 MMOL/L (ref 9–17)
BASOPHILS # BLD: 1 % (ref 0–2)
BASOPHILS ABSOLUTE: 0.07 K/UL (ref 0–0.2)
BUN BLDV-MCNC: 8 MG/DL (ref 6–20)
BUN/CREAT BLD: ABNORMAL (ref 9–20)
CALCIUM SERPL-MCNC: 9.6 MG/DL (ref 8.6–10.4)
CHLORIDE BLD-SCNC: 103 MMOL/L (ref 98–107)
CO2: 23 MMOL/L (ref 20–31)
CREAT SERPL-MCNC: 0.76 MG/DL (ref 0.5–0.9)
DIFFERENTIAL TYPE: ABNORMAL
EOSINOPHILS RELATIVE PERCENT: 7 % (ref 1–4)
GFR AFRICAN AMERICAN: >60 ML/MIN
GFR NON-AFRICAN AMERICAN: >60 ML/MIN
GFR SERPL CREATININE-BSD FRML MDRD: ABNORMAL ML/MIN/{1.73_M2}
GFR SERPL CREATININE-BSD FRML MDRD: ABNORMAL ML/MIN/{1.73_M2}
GLUCOSE BLD-MCNC: 119 MG/DL (ref 70–99)
HCG QUALITATIVE: NEGATIVE
HCT VFR BLD CALC: 40.1 % (ref 36.3–47.1)
HEMOGLOBIN: 13 G/DL (ref 11.9–15.1)
IMMATURE GRANULOCYTES: 0 %
LYMPHOCYTES # BLD: 21 % (ref 24–43)
MCH RBC QN AUTO: 29.3 PG (ref 25.2–33.5)
MCHC RBC AUTO-ENTMCNC: 32.4 G/DL (ref 28.4–34.8)
MCV RBC AUTO: 90.5 FL (ref 82.6–102.9)
MONOCYTES # BLD: 7 % (ref 3–12)
NRBC AUTOMATED: 0 PER 100 WBC
PDW BLD-RTO: 13.1 % (ref 11.8–14.4)
PLATELET # BLD: 489 K/UL (ref 138–453)
PLATELET ESTIMATE: ABNORMAL
PMV BLD AUTO: 9.5 FL (ref 8.1–13.5)
POTASSIUM SERPL-SCNC: 4.2 MMOL/L (ref 3.7–5.3)
RBC # BLD: 4.43 M/UL (ref 3.95–5.11)
RBC # BLD: ABNORMAL 10*6/UL
SARS-COV-2, RAPID: NOT DETECTED
SARS-COV-2: NORMAL
SARS-COV-2: NORMAL
SEG NEUTROPHILS: 64 % (ref 36–65)
SEGMENTED NEUTROPHILS ABSOLUTE COUNT: 8.16 K/UL (ref 1.5–8.1)
SODIUM BLD-SCNC: 136 MMOL/L (ref 135–144)
SOURCE: NORMAL
TROPONIN INTERP: NORMAL
TROPONIN T: NORMAL NG/ML
TROPONIN, HIGH SENSITIVITY: <6 NG/L (ref 0–14)
WBC # BLD: 12.6 K/UL (ref 3.5–11.3)
WBC # BLD: ABNORMAL 10*3/UL

## 2020-09-12 PROCEDURE — 84703 CHORIONIC GONADOTROPIN ASSAY: CPT

## 2020-09-12 PROCEDURE — 85025 COMPLETE CBC W/AUTO DIFF WBC: CPT

## 2020-09-12 PROCEDURE — 94644 CONT INHLJ TX 1ST HOUR: CPT

## 2020-09-12 PROCEDURE — 96366 THER/PROPH/DIAG IV INF ADDON: CPT

## 2020-09-12 PROCEDURE — 96365 THER/PROPH/DIAG IV INF INIT: CPT

## 2020-09-12 PROCEDURE — 94645 CONT INHLJ TX EACH ADDL HOUR: CPT

## 2020-09-12 PROCEDURE — 94640 AIRWAY INHALATION TREATMENT: CPT

## 2020-09-12 PROCEDURE — 99285 EMERGENCY DEPT VISIT HI MDM: CPT

## 2020-09-12 PROCEDURE — 6360000002 HC RX W HCPCS: Performed by: EMERGENCY MEDICINE

## 2020-09-12 PROCEDURE — 99219 PR INITIAL OBSERVATION CARE/DAY 50 MINUTES: CPT | Performed by: NURSE PRACTITIONER

## 2020-09-12 PROCEDURE — 71045 X-RAY EXAM CHEST 1 VIEW: CPT

## 2020-09-12 PROCEDURE — 6360000002 HC RX W HCPCS: Performed by: STUDENT IN AN ORGANIZED HEALTH CARE EDUCATION/TRAINING PROGRAM

## 2020-09-12 PROCEDURE — U0002 COVID-19 LAB TEST NON-CDC: HCPCS

## 2020-09-12 PROCEDURE — 84484 ASSAY OF TROPONIN QUANT: CPT

## 2020-09-12 PROCEDURE — 93005 ELECTROCARDIOGRAM TRACING: CPT | Performed by: STUDENT IN AN ORGANIZED HEALTH CARE EDUCATION/TRAINING PROGRAM

## 2020-09-12 PROCEDURE — G0378 HOSPITAL OBSERVATION PER HR: HCPCS

## 2020-09-12 PROCEDURE — 80048 BASIC METABOLIC PNL TOTAL CA: CPT

## 2020-09-12 PROCEDURE — 2580000003 HC RX 258: Performed by: STUDENT IN AN ORGANIZED HEALTH CARE EDUCATION/TRAINING PROGRAM

## 2020-09-12 PROCEDURE — 96375 TX/PRO/DX INJ NEW DRUG ADDON: CPT

## 2020-09-12 RX ORDER — ALBUTEROL SULFATE 2.5 MG/3ML
2.5 SOLUTION RESPIRATORY (INHALATION) EVERY 6 HOURS PRN
Status: DISCONTINUED | OUTPATIENT
Start: 2020-09-12 | End: 2020-09-12 | Stop reason: SDUPTHER

## 2020-09-12 RX ORDER — IPRATROPIUM BROMIDE AND ALBUTEROL SULFATE 2.5; .5 MG/3ML; MG/3ML
1 SOLUTION RESPIRATORY (INHALATION)
Status: CANCELLED | OUTPATIENT
Start: 2020-09-13

## 2020-09-12 RX ORDER — MAGNESIUM SULFATE IN WATER 40 MG/ML
2 INJECTION, SOLUTION INTRAVENOUS ONCE
Status: COMPLETED | OUTPATIENT
Start: 2020-09-12 | End: 2020-09-12

## 2020-09-12 RX ORDER — METHYLPREDNISOLONE SODIUM SUCCINATE 125 MG/2ML
125 INJECTION, POWDER, LYOPHILIZED, FOR SOLUTION INTRAMUSCULAR; INTRAVENOUS ONCE
Status: COMPLETED | OUTPATIENT
Start: 2020-09-12 | End: 2020-09-12

## 2020-09-12 RX ORDER — ACETAMINOPHEN 650 MG/1
650 SUPPOSITORY RECTAL EVERY 6 HOURS PRN
Status: CANCELLED | OUTPATIENT
Start: 2020-09-12

## 2020-09-12 RX ORDER — PROMETHAZINE HYDROCHLORIDE 12.5 MG/1
12.5 TABLET ORAL EVERY 6 HOURS PRN
Status: CANCELLED | OUTPATIENT
Start: 2020-09-12

## 2020-09-12 RX ORDER — ONDANSETRON 2 MG/ML
4 INJECTION INTRAMUSCULAR; INTRAVENOUS EVERY 6 HOURS PRN
Status: CANCELLED | OUTPATIENT
Start: 2020-09-12

## 2020-09-12 RX ORDER — SODIUM CHLORIDE 0.9 % (FLUSH) 0.9 %
10 SYRINGE (ML) INJECTION PRN
Status: CANCELLED | OUTPATIENT
Start: 2020-09-12

## 2020-09-12 RX ORDER — ALBUTEROL SULFATE 2.5 MG/3ML
2.5 SOLUTION RESPIRATORY (INHALATION)
Status: DISCONTINUED | OUTPATIENT
Start: 2020-09-12 | End: 2020-09-13 | Stop reason: HOSPADM

## 2020-09-12 RX ORDER — ALBUTEROL SULFATE 2.5 MG/3ML
2.5 SOLUTION RESPIRATORY (INHALATION)
Status: CANCELLED | OUTPATIENT
Start: 2020-09-12

## 2020-09-12 RX ORDER — TRAZODONE HYDROCHLORIDE 50 MG/1
50 TABLET ORAL NIGHTLY PRN
Status: CANCELLED | OUTPATIENT
Start: 2020-09-13

## 2020-09-12 RX ORDER — METHYLPREDNISOLONE SODIUM SUCCINATE 40 MG/ML
40 INJECTION, POWDER, LYOPHILIZED, FOR SOLUTION INTRAMUSCULAR; INTRAVENOUS DAILY
Status: CANCELLED | OUTPATIENT
Start: 2020-09-13

## 2020-09-12 RX ORDER — SODIUM CHLORIDE 0.9 % (FLUSH) 0.9 %
10 SYRINGE (ML) INJECTION EVERY 12 HOURS SCHEDULED
Status: CANCELLED | OUTPATIENT
Start: 2020-09-12

## 2020-09-12 RX ORDER — SODIUM CHLORIDE 9 MG/ML
INJECTION, SOLUTION INTRAVENOUS CONTINUOUS
Status: CANCELLED | OUTPATIENT
Start: 2020-09-12

## 2020-09-12 RX ORDER — NICOTINE 21 MG/24HR
1 PATCH, TRANSDERMAL 24 HOURS TRANSDERMAL DAILY PRN
Status: CANCELLED | OUTPATIENT
Start: 2020-09-12

## 2020-09-12 RX ORDER — 0.9 % SODIUM CHLORIDE 0.9 %
1000 INTRAVENOUS SOLUTION INTRAVENOUS ONCE
Status: COMPLETED | OUTPATIENT
Start: 2020-09-12 | End: 2020-09-12

## 2020-09-12 RX ORDER — ACETAMINOPHEN 325 MG/1
650 TABLET ORAL EVERY 6 HOURS PRN
Status: CANCELLED | OUTPATIENT
Start: 2020-09-12

## 2020-09-12 RX ADMIN — SODIUM CHLORIDE 1000 ML: 9 INJECTION, SOLUTION INTRAVENOUS at 19:46

## 2020-09-12 RX ADMIN — MAGNESIUM SULFATE HEPTAHYDRATE 2 G: 40 INJECTION, SOLUTION INTRAVENOUS at 20:06

## 2020-09-12 RX ADMIN — IPRATROPIUM BROMIDE 0.5 MG: 0.5 SOLUTION RESPIRATORY (INHALATION) at 19:16

## 2020-09-12 RX ADMIN — ALBUTEROL SULFATE 2.5 MG: 2.5 SOLUTION RESPIRATORY (INHALATION) at 19:16

## 2020-09-12 RX ADMIN — IPRATROPIUM BROMIDE 0.5 MG: 0.5 SOLUTION RESPIRATORY (INHALATION) at 20:25

## 2020-09-12 RX ADMIN — METHYLPREDNISOLONE SODIUM SUCCINATE 125 MG: 125 INJECTION, POWDER, FOR SOLUTION INTRAMUSCULAR; INTRAVENOUS at 19:46

## 2020-09-12 RX ADMIN — ALBUTEROL SULFATE 2.5 MG: 2.5 SOLUTION RESPIRATORY (INHALATION) at 19:30

## 2020-09-12 RX ADMIN — ALBUTEROL SULFATE 15 MG/HR: 5 SOLUTION RESPIRATORY (INHALATION) at 20:25

## 2020-09-12 ASSESSMENT — PAIN DESCRIPTION - ORIENTATION: ORIENTATION: LEFT

## 2020-09-12 ASSESSMENT — PAIN DESCRIPTION - ONSET: ONSET: ON-GOING

## 2020-09-12 ASSESSMENT — ENCOUNTER SYMPTOMS
COUGH: 1
SHORTNESS OF BREATH: 1
EYE REDNESS: 0
VOMITING: 0
NAUSEA: 0
EYE ITCHING: 0
SORE THROAT: 0
RHINORRHEA: 0
ABDOMINAL PAIN: 1

## 2020-09-12 ASSESSMENT — PAIN DESCRIPTION - LOCATION: LOCATION: CHEST

## 2020-09-12 ASSESSMENT — PAIN SCALES - GENERAL: PAINLEVEL_OUTOF10: 7

## 2020-09-12 ASSESSMENT — PAIN DESCRIPTION - DESCRIPTORS: DESCRIPTORS: ACHING

## 2020-09-12 ASSESSMENT — PAIN DESCRIPTION - FREQUENCY: FREQUENCY: CONTINUOUS

## 2020-09-12 ASSESSMENT — PAIN DESCRIPTION - PAIN TYPE: TYPE: ACUTE PAIN

## 2020-09-12 ASSESSMENT — PAIN DESCRIPTION - PROGRESSION: CLINICAL_PROGRESSION: NOT CHANGED

## 2020-09-12 NOTE — ED PROVIDER NOTES
[address] [facility]  ED  Emergency Department Encounter  Emergency Medicine Resident     Pt Name: Alonzo Marin  MRN: [id number]  [place] 1990  Date ofevaluation: 20  PCP:  No primary care provider on file. CHIEF COMPLAINT       Chief Complaint   Patient presents with    Asthma    Cough     HISTORY OF PRESENT ILLNESS  (Location/Symptom, Timing/Onset, Context/Setting, Quality, Duration, Modifying Factors, Severity, Associated signs/symptoms)     Alonzo Marin is a 26 y.o. female who presents acute onset shortness of breath. Patient reports that she has been short of breath for the last 3 days. She does have a history of asthma but has been without her inhalers for this time. Reports that she also feels as if she is going to pass out due to her breathing. Socially symptoms include chest pain abdominal pain both of which she states are from coughing so much. Denies any fevers, chills, nausea, vomiting, weakness, numbness, tingling, or other concerns. PAST MEDICAL / SURGICAL / SOCIAL / FAMILY HISTORY      has a past medical history of Asthma, Back pain, Bipolar 1 disorder ([address] Bipolar disorder ([address] Depression, Diabetes mellitus ([facility] [address] Dizziness, Fatty liver disease, nonalcoholic, Fatty liver disease, nonalcoholic, GERD (gastroesophageal reflux disease), and Obesity. has a past surgical history that includes  section and LEEP.     Social History     Socioeconomic History    Marital status:      Spouse name: Not on file    Number of children: Not on file    Years of education: Not on file    Highest education level: Not on file   Occupational History    Not on file   Social Needs    Financial resource strain: Not on file    Food insecurity     Worry: Not on file     Inability: Not on file    Transportation needs     Medical: Not on file     Non-medical: Not on file   Tobacco Use    Smoking status: Current Some Day Smoker     Packs/day: 0.25 8/16/20   JUNE Tadeo - CNP   cariprazine hcl (VRAYLAR) 1.5 MG capsule Take 1 capsule by mouth daily 8/17/20   Carri JUNE Hunter CNP   metFORMIN (GLUCOPHAGE) 500 MG tablet Take 1 tablet by mouth 2 times daily (with meals) TAKE 1 TABLET BY MOUTH DAILY 12/11/17   Lisa Jesus MD       REVIEW OF SYSTEMS    (2-9 systems for level 4, 10 or more for level 5)      Review of Systems   Constitutional: Negative for chills and fever. HENT: Negative for rhinorrhea and sore throat. Eyes: Negative for redness and itching. Respiratory: Positive for cough and shortness of breath. Cardiovascular: Positive for chest pain. Gastrointestinal: Positive for abdominal pain. Negative for nausea and vomiting. Genitourinary: Negative for difficulty urinating and dysuria. Musculoskeletal: Negative for arthralgias and myalgias. Allergic/Immunologic: Negative for environmental allergies and food allergies. Neurological: Positive for light-headedness and headaches. Negative for weakness and numbness. PHYSICAL EXAM   (up to 7 for level 4, 8 or more for level 5)      INITIAL VITALS:   BP (!) 132/90   Pulse 120   Temp 98.3 °F (36.8 °C)   Resp 28   Ht 5' 5\" (1.651 m)   Wt 277 lb (125.6 kg)   SpO2 97%   BMI 46.10 kg/m²     Physical Exam  Vitals signs and nursing note reviewed. Constitutional:       Appearance: Normal appearance. She is obese. She is not ill-appearing, toxic-appearing or diaphoretic. HENT:      Head: Normocephalic and atraumatic. Eyes:      General: No scleral icterus. Conjunctiva/sclera: Conjunctivae normal.   Neck:      Musculoskeletal: Neck supple. Cardiovascular:      Rate and Rhythm: Regular rhythm. Tachycardia present. Pulmonary:      Effort: Respiratory distress present. Breath sounds: No stridor. Wheezing present. No rhonchi or rales. Abdominal:      General: There is no distension. Palpations: There is no mass. Tenderness:  There is no abdominal tenderness. There is no guarding or rebound. Musculoskeletal:      Right lower leg: No edema. Left lower leg: No edema. Skin:     General: Skin is warm and dry. Findings: No rash (over exposed skin). Neurological:      General: No focal deficit present. Mental Status: She is alert and oriented to person, place, and time.    Psychiatric:         Mood and Affect: Mood normal.         Behavior: Behavior normal.       DIAGNOSTICS     PLAN (LABS / IMAGING / EKG):  Orders Placed This Encounter   Procedures    XR CHEST PORTABLE    CBC WITH AUTO DIFFERENTIAL    BASIC METABOLIC PANEL    HCG Qualitative, Serum    COVID-19, PCR    Inpatient consult to [address] [place] [address] Initiate ED RT Aerosol protocol    Initiate RT Protocol    HHN Treatment    HHN Treatment    EKG 12 Lead    PATIENT STATUS (FROM ED OR OR/PROCEDURAL) Observation       MEDICATIONS ORDERED:  Orders Placed This Encounter   Medications    DISCONTD: albuterol (PROVENTIL) nebulizer solution 2.5 mg    ipratropium (ATROVENT) 0.02 % nebulizer solution 0.5 mg    methylPREDNISolone sodium (SOLU-MEDROL) injection 125 mg    magnesium sulfate 2 g in 50 mL IVPB premix    albuterol (PROVENTIL) nebulizer solution 2.5 mg    0.9 % sodium chloride bolus    albuterol (PROVENTIL) nebulizer solution    albuterol sulfate HFA (PROVENTIL HFA) 108 (90 Base) MCG/ACT inhaler     Sig: Inhale 2 puffs into the lungs every 6 hours as needed for Wheezing     Dispense:  1 Inhaler     Refill:  0    predniSONE (DELTASONE) 20 MG tablet     Sig: Take 1 tablet by mouth 2 times daily for 5 days     Dispense:  10 tablet     Refill:  0       DIAGNOSTIC RESULTS / EMERGENCYDEPARTMENT COURSE / MDM     LABS:  Results for orders placed or performed during the hospital encounter of 09/12/20   CBC WITH AUTO DIFFERENTIAL   Result Value Ref Range    WBC 12.6 (H) 3.5 - 11.3 k/uL    RBC 4.43 3.95 - 5.11 m/uL    Hemoglobin 13.0 11.9 - 15.1 g/dL    Hematocrit 40.1 36.3 - 47.1 % MCV 90.5 82.6 - 102.9 fL    MCH 29.3 25.2 - 33.5 pg    MCHC 32.4 28.4 - 34.8 g/dL    RDW 13.1 11.8 - 14.4 %    Platelets 356 (H) 435 - 453 k/uL    MPV 9.5 8.1 - 13.5 fL    NRBC Automated 0.0 0.0 per 100 WBC    Differential Type NOT REPORTED     Seg Neutrophils 64 36 - 65 %    Lymphocytes 21 (L) 24 - 43 %    Monocytes 7 3 - 12 %    Eosinophils % 7 (H) 1 - 4 %    Basophils 1 0 - 2 %    Immature Granulocytes 0 0 %    Segs Absolute 8.16 (H) 1.50 - 8.10 k/uL    Absolute Lymph # 2.60 1.10 - 3.70 k/uL    Absolute Mono # 0.83 0.10 - 1.20 k/uL    Absolute Eos # 0.91 (H) 0.00 - 0.44 k/uL    Basophils Absolute 0.07 0.00 - 0.20 k/uL    Absolute Immature Granulocyte 0.04 0.00 - 0.30 k/uL    WBC Morphology NOT REPORTED     RBC Morphology NOT REPORTED     Platelet Estimate NOT REPORTED    BASIC METABOLIC PANEL   Result Value Ref Range    Glucose 119 (H) 70 - 99 mg/dL    BUN 8 6 - 20 mg/dL    CREATININE 0.76 0.50 - 0.90 mg/dL    Bun/Cre Ratio NOT REPORTED 9 - 20    Calcium 9.6 8.6 - 10.4 mg/dL    Sodium 136 135 - 144 mmol/L    Potassium 4.2 3.7 - 5.3 mmol/L    Chloride 103 98 - 107 mmol/L    CO2 23 20 - 31 mmol/L    Anion Gap 10 9 - 17 mmol/L    GFR Non-African American >60 >60 mL/min    GFR African American >60 >60 mL/min    GFR Comment          GFR Staging NOT REPORTED    Troponin   Result Value Ref Range    Troponin, High Sensitivity <6 0 - 14 ng/L    Troponin T NOT REPORTED <0.03 ng/mL    Troponin Interp NOT REPORTED    HCG Qualitative, Serum   Result Value Ref Range    hCG Qual NEGATIVE NEGATIVE   COVID-19, PCR    Specimen: Other   Result Value Ref Range    SARS-CoV-2          SARS-CoV-2, Rapid Not Detected Not Detected    Source . NASOPHARYNGEAL SWAB     SARS-CoV-2             RADIOLOGY:  XR CHEST PORTABLE   Final Result   No acute cardiopulmonary abnormality.             EKG  Rhythm: sinus tachycardia  Rate: tachycardia   Axis: normal  Ectopy: none  Conduction: normal  ST Segments: no acute change  T Waves: no acute change  Q Waves: none    Clinical Impression: When compared to EKG dated 2/6/20, no acute changes and non-specific EKG    Normal Interval Reference:  P-wave <110 ms  -200 ms  QRS <100 ms  QT <420 ms  QTc 330-470 ms    All EKG's are interpreted by the Emergency Department Physician who either signs or Co-signsthis chart in the absence of a cardiologist.    EMERGENCY DEPARTMENT COURSE:         MDM: 17-year-old female presenting with shortness of breath consistent with prior asthma exacerbations. On exam she is nontoxic-appearing but in mild to moderate respiratory distress. Tachypneic and tachycardic as well as hypoxic on room air but vitals otherwise unremarkable. Heart tachycardic but regular rhythm, lungs with diffuse wheezing rhonchi in bilateral lung fields. Abdomen soft nontender. No significant lower extremity edema noted on exam.  Differential diagnosis includes asthma exacerbation, ACS, pneumonia, among others. Doubt PE as patient is clinically an asthma exacerbation. Will obtain cardiac work-up, treat symptomatically and reassess. Patient required several breathing treatments as well as IV steroids, but continues to be hypoxic and tachycardic. She was going to be admitted to [place] [address] however she states that she feels better and like to go home. She left AGAINST MEDICAL ADVICE. She does have decision-making capacity is alert and oriented x3. PROCEDURES:  none  CONSULTS:  IP CONSULT TO HOSPITALIST    FINAL IMPRESSION      1. Moderate asthma with exacerbation, unspecified whether persistent    2. Dyspnea, unspecified type          DISPOSITION / PLAN     DISPOSITION [place] 09/13/2020 12:03:40 AM      PATIENT REFERRED TO:  No follow-up provider specified.     DISCHARGE MEDICATIONS:  New Prescriptions    PREDNISONE (DELTASONE) 20 MG TABLET    Take 1 tablet by mouth 2 times daily for 5 days       Evangelina Schaefer DO  Emergency Medicine Resident  [facility]    (Please

## 2020-09-13 PROCEDURE — APPNB30 APP NON BILLABLE TIME 0-30 MINS: Performed by: NURSE PRACTITIONER

## 2020-09-13 PROCEDURE — G0378 HOSPITAL OBSERVATION PER HR: HCPCS

## 2020-09-13 RX ORDER — PREDNISONE 20 MG/1
20 TABLET ORAL 2 TIMES DAILY
Qty: 10 TABLET | Refills: 0 | Status: SHIPPED | OUTPATIENT
Start: 2020-09-13 | End: 2020-09-18

## 2020-09-13 RX ORDER — ALBUTEROL SULFATE 90 UG/1
2 AEROSOL, METERED RESPIRATORY (INHALATION) EVERY 6 HOURS PRN
Qty: 1 INHALER | Refills: 0 | Status: SHIPPED | OUTPATIENT
Start: 2020-09-13 | End: 2020-09-29

## 2020-09-13 ASSESSMENT — ENCOUNTER SYMPTOMS
STRIDOR: 0
SHORTNESS OF BREATH: 1
WHEEZING: 1
DIARRHEA: 0
ABDOMINAL PAIN: 0
BLOOD IN STOOL: 0
CONSTIPATION: 0
NAUSEA: 0
COUGH: 1
VOMITING: 0

## 2020-09-13 NOTE — DISCHARGE SUMMARY
[facility]  Office: [address], DO, Guillaumejennifer Sands, DO, Tamiko Lawrence, DO, Laura Flores, DO, Rob Joel MD, Kiley Kilpatrick MD, Norberto Herrera MD, Bethanie Li MD, Idalia Lopez MD, Martinez Escalante MD, Yg Teixeira MD, Charles Moncada MD, Patrizia Del Cid MD, Zulma Dos Santos, DO, Yadira Cano MD, Jerad Ryder MD, Sam Rolon DO, Kae Dumont MD,  Mauricio Nassar DO, Ghanshyam Lester MD, [place] MD, Louise Das [facility], [facility], CNP, Michael Matta, CNP, Nicole Byrd, CNS, Anisha Bailey, CNP, Dileep Mcallister, CNP, Roderick Landon, CNP, Court Hsu, CNP, Frederick Clement, CNP, Lindy Ambrosio PA-C, Louis Arora, [facility], Arnaldo Najera, CNP, Alaina Jacques, CNP, Tyra Beasley, CNP, Ananth Majano, CNP, Keisha Rao, [facility]   [facility]    Discharge Summary     Patient ID: Zamzam Zelaya  :  1990   MRN: [id number]     ACCOUNT:  [de-identified]   Patient's PCP: No primary care provider on file. Admit Date: 2020   Discharge Date: 2020 -AMA  Length of Stay: 0  Code Status:  Prior  Admitting Physician: No admitting provider for patient encounter. Discharge Physician: JUNE Covarrubias - CNP     Active Discharge Diagnoses:     Hospital Problem Lists:  Principal Problem:    Mild intermittent asthma with acute exacerbation  Active Problems:    Leukocytosis    Type 2 diabetes mellitus without complication (HCC)  Resolved Problems:    * No resolved hospital problems. *      Admission Condition:  fair     Discharged Condition: Unable to determine as she signed out [facility] Stay:     Hospital Course: Lesli Lemus is a 26 y.o. female who was admitted for the management of   Mild intermittent asthma with acute exacerbation , presented to ER with acute onset of shortness of breath cough which started after she \"choked on some rice and has not fully recovered \".     She was placed on continuous albuterol aerosol, given Solu-Medrol 125 mg, and 2 g of magnesium of 8 and upon my evaluation she was verbalizing significant improvement and wanting to actually be discharged. When I spoke with her and discussed the recommended plan for admission continued watching and assure improvement she was agreeable for admission. However signed out AMA 1 hour later           Significant therapeutic interventions: See above    Significant Diagnostic Studies:   Labs / Micro:    Metabolic panel. -Normal outside mild elevation of glucose of 119  GI/Liver Panel-not drawn  Hematology.-Normal outside of mild elevation of white blood cell count of 12.6 and platelets 043. Hemoglobin 13 hematocrit 40. Eosinophils sevens absolute segs 8.16 lymphocytes 21 absolute eosinophils 0.91  Coagulation-not done  Cardiac Markers-high sensitive troponin less than 7  EKG-   Urine-not done  COVID-19 swab-negative        Radiology:  Xr Chest Portable    Result Date: 9/12/2020  No acute cardiopulmonary abnormality. Consultations:    Consults:     Final Specialist Recommendations/Findings:   IP CONSULT TO HOSPITALIST      The patient was seen and examined on day of discharge and this discharge summary is in conjunction with any daily progress note from day of discharge.     Discharge plan:     Disposition: AMA    Physician Follow Up:   Obtain and follow-up with her primary care  Requiring Further Evaluation/Follow Up POST HOSPITALIZATION/Incidental Findings:     Consider repeat of x-ray to assure no aspiration    Diet: diabetic diet    Activity: As tolerated    Instructions to Patient: Unavailable given the fact she signed AMA    Discharge Medications:       I did not prescribe any of the medications below any medications prescribed were given by the ER physician as she signed out [place] after I evaluated her           Medication List      START taking these medications    predniSONE 20 MG tablet  Commonly known as: DELTASONE  Take 1 tablet by mouth 2 times daily for 5 days        CONTINUE taking these medications    albuterol sulfate  (90 Base) MCG/ACT inhaler  Commonly known as:  Proventil HFA  Inhale 2 puffs into the lungs every 6 hours as needed for Wheezing        ASK your doctor about these medications    cariprazine hcl 1.5 MG capsule  Commonly known as:  Vraylar  Take 1 capsule by mouth daily     metFORMIN 500 MG tablet  Commonly known as:  GLUCOPHAGE  Take 1 tablet by mouth 2 times daily (with meals) TAKE 1 TABLET BY MOUTH DAILY     sertraline 50 MG tablet  Commonly known as:  ZOLOFT  Take 1 tablet by mouth daily     traZODone 50 MG tablet  Commonly known as:  DESYREL  Take 1 tablet by mouth nightly as needed for Sleep           Where to Get Your Medications      You can get these medications from any pharmacy    Bring a paper prescription for each of these medications  · albuterol sulfate  (90 Base) MCG/ACT inhaler  · predniSONE 20 MG tablet         No discharge procedures on file. Time Spent on discharge is  15 mins in patient examination, evaluation, counseling as well as medication reconciliation, prescriptions for required medications, discharge plan and follow up.     Electronically signed by   JUNE Steele CNP  9/13/2020  12:58 AM

## 2020-09-13 NOTE — ED NOTES
Patient resting comfortably on stretcher, in no apparent distress  Respirations even and non-labored  Patient has no needs at this time  Call light remains within reach     [employer], RN  09/13/20 0036

## 2020-09-13 NOTE — H&P
[facility]  Office: [address], DO, Horacio Resendez, DO, Mateo Plain, DO, Americo Matthews Blood, DO, Stacey Ryan MD, Stefany Justice MD, Madhav Camejo MD, Karyn Rivera MD, Demetra Juarez MD, Jhon Ramirez MD, Marcos Hdez MD, Killian Hood MD, Patrizia Cobb MD, Jammie Becerril DO, Janie Hubbard MD, aGrcia Salgado MD, Adry Soni DO, Lindsey Myers MD,  Ebony Petersen DO, Lesley Chan MD, Li Rawls MD, Vanesa Patel, [facility], [facility] CenterAL[facility] Beck, CNP, Gucci Aleman, CNP, Sukhjinder Driver, CNS, Jewel Mcgarry, CNP, Chidi Garcia, CNP, Osmar Mccloud, CNP, Terry Wilson, CNP, Germán Fowler, CNP, Derik Polanco PA-C, Angela Marc, LISSA, Cadence Mitchell, CNP, Vickki Lennox, CNP, William Roque, CNP, Srinivas Benavides, CNP, Staci Jones, [address]    HISTORY AND PHYSICAL EXAMINATION            Date:   9/12/2020  Patient name:  Traci Rodrigues  Date of admission:  9/12/2020  7:01 PM  MRN:   [id number]  Account:  [de-identified]  YOB: 1990  PCP:    No primary care provider on file. Room:   07/07  Code Status:    Prior    Chief Complaint:     Chief Complaint   Patient presents with    Asthma    Cough       History Obtained From:     patient, electronic medical record    History of Present Illness:     Traci Rodrigues is a 26 y.o. Non-/non  female who presents with Asthma and Cough   and is admitted to the hospital for the management of acute asthma exacerbation on chronic mild intermittent asthma. Patient presents to the emergency room with an acute onset of shortness of breath cough which started after she \"choked on some rice and has not fully recovered \". Patient states that she has not had a flareup in her asthma in multiple years and has not been using any inhalers.   Upon arrival patient stated she was so short of breath she felt like she was going to pass out per the documentation. She denied any fevers or chills, chest pain abdominal pain nausea vomiting dysuria muscle weakness swelling of the lower extremities. She was placed on continuous albuterol aerosol, given Solu-Medrol 125 mg, and 2 g of magnesium of 8 and upon my evaluation she was verbalizing significant improvement and wanting to actually be discharged. When I spoke with her and discussed the recommended plan for admission continued watching and assure improvement she was agreeable for admission. Work up in the emergency room      Vitals:    Temp. 98 3   HR. 120    RR. 28   BP.   132/90      SPO2. 97% continuous aerosol    Laboratories:   Metabolic panel. -Normal outside mild elevation of glucose of 119  GI/Liver Panel-not drawn  Hematology.-Normal outside of mild elevation of white blood cell count of 12.6 and platelets 222. Hemoglobin 13 hematocrit 40. Eosinophils sevens absolute segs 8.16 lymphocytes 21 absolute eosinophils 0.91  Coagulation-not done  Cardiac Markers-high sensitive troponin less than 7  EKG-   Urine-not done  COVID-19 swab-negative    Imaging and Diagnostics:     Xr Chest Portable    Result Date: 2020  No acute cardiopulmonary abnormality. Past Medical History:     Past Medical History:   Diagnosis Date    Asthma     Back pain 2014    [facility]     Bipolar disorder ([facility] 2017    Depression     Diabetes mellitus (HCC)     Dizziness     Fatty liver disease, nonalcoholic     Fatty liver disease, nonalcoholic     GERD (gastroesophageal reflux disease)     Obesity         Past Surgical History:     Past Surgical History:   Procedure Laterality Date     SECTION      LEEP          Medications Prior to Admission:     Prior to Admission medications    Medication Sig Start Date End Date Taking?  Authorizing Provider   sertraline (ZOLOFT) 50 MG tablet Take 1 tablet by mouth daily 20   JUNE Tadeo - JACKIE   traZODonjeison (DESYREL) 50 MG tablet Take 1 tablet by mouth nightly as needed for Sleep 8/16/20   JUNE Tadeo CNP   cariprazine hcl (VRAYLAR) 1.5 MG capsule Take 1 capsule by mouth daily 8/17/20   Carri GastonJUNE CNP   metFORMIN (GLUCOPHAGE) 500 MG tablet Take 1 tablet by mouth 2 times daily (with meals) TAKE 1 TABLET BY MOUTH DAILY 12/11/17   Lisa Jesus MD   albuterol sulfate HFA (PROVENTIL HFA) 108 (90 Base) MCG/ACT inhaler Inhale 2 puffs into the lungs every 6 hours as needed for Wheezing 12/3/17   Paula Gay MD        Allergies:     Morphine; Pcn [penicillins]; Amoxicillin; and Seasonal    Social History:     Tobacco:    reports that she has been smoking cigarettes. She has a 3.50 pack-year smoking history. She has never used smokeless tobacco.  Alcohol:      has no history on file for alcohol. Drug Use:  reports previous drug use. Drug: Marijuana. Family History:     Family History   Problem Relation Age of Onset    High Blood Pressure Mother     Diabetes Mother     Heart Disease Mother     Heart Disease Father     Early Death Father     Cancer Maternal Aunt         lung    Cancer Paternal Cousin         brain       Review of Systems:     Positive and Negative as described in HPI. Review of Systems   Constitutional: Negative for chills, diaphoresis and fever. HENT: Negative for congestion and hearing loss. Respiratory: Positive for cough, shortness of breath and wheezing. Negative for stridor. Cardiovascular: Negative for chest pain, palpitations and leg swelling. Gastrointestinal: Negative for abdominal pain, blood in stool, constipation, diarrhea, nausea and vomiting. Genitourinary: Negative for dysuria and frequency. Musculoskeletal: Negative for myalgias. Skin: Negative for rash. Neurological: Negative for dizziness, seizures and headaches. Psychiatric/Behavioral: The patient is not nervous/anxious.         Physical Exam:   BP (!) 132/90   Pulse 120   Temp 98.3 °F (36.8 °C)   Resp 28   Ht 5' 5\" (1.651 m)   Wt 277 lb (125.6 kg)   SpO2 94%   BMI 46.10 kg/m²   Temp (24hrs), Av.3 °F (36.8 °C), Min:98.3 °F (36.8 °C), Max:98.3 °F (36.8 °C)    No results for input(s): POCGLU in the last 72 hours. No intake or output data in the 24 hours ending 20 2308    Physical Exam  Vitals signs and nursing note reviewed. Constitutional:       General: She is not in acute distress. Appearance: She is well-developed. She is not diaphoretic. HENT:      Head: Normocephalic and atraumatic. Right Ear: Hearing normal.      Left Ear: Hearing normal.      Nose: Nose normal. No rhinorrhea. Eyes:      General: Lids are normal.      Extraocular Movements:      Right eye: Normal extraocular motion. Left eye: Normal extraocular motion. Conjunctiva/sclera: Conjunctivae normal.      Right eye: Right conjunctiva is not injected. Left eye: Left conjunctiva is not injected. Pupils: Pupils are equal, round, and reactive to light. Pupils are equal.      Right eye: Pupil is reactive. Left eye: Pupil is reactive. Neck:      Musculoskeletal: Neck supple. Thyroid: No thyromegaly. Vascular: No carotid bruit. Trachea: Trachea and phonation normal. No tracheal deviation. Cardiovascular:      Rate and Rhythm: Normal rate and regular rhythm. Pulses: Normal pulses. Heart sounds: Normal heart sounds. No murmur. Pulmonary:      Effort: Pulmonary effort is normal. No tachypnea, accessory muscle usage or respiratory distress. Breath sounds: No stridor. Examination of the right-upper field reveals wheezing. Examination of the left-upper field reveals wheezing. Examination of the right-middle field reveals wheezing. Examination of the left-middle field reveals wheezing. Examination of the right-lower field reveals wheezing. Examination of the left-lower field reveals wheezing. Wheezing present. No decreased breath sounds.    Abdominal: General: Bowel sounds are normal. There is no distension. Palpations: Abdomen is soft. There is no mass. Tenderness: There is no abdominal tenderness. There is no guarding. Musculoskeletal:         General: No tenderness. Skin:     General: Skin is warm and dry. Findings: No erythema, lesion or rash. Neurological:      Mental Status: She is alert and oriented to person, place, and time. She is not disoriented. Cranial Nerves: No cranial nerve deficit. Psychiatric:         Speech: Speech normal.         Behavior: Behavior normal. Behavior is cooperative.          Investigations:      Laboratory Testing:  Recent Results (from the past 24 hour(s))   CBC WITH AUTO DIFFERENTIAL    Collection Time: 09/12/20  7:26 PM   Result Value Ref Range    WBC 12.6 (H) 3.5 - 11.3 k/uL    RBC 4.43 3.95 - 5.11 m/uL    Hemoglobin 13.0 11.9 - 15.1 g/dL    Hematocrit 40.1 36.3 - 47.1 %    MCV 90.5 82.6 - 102.9 fL    MCH 29.3 25.2 - 33.5 pg    MCHC 32.4 28.4 - 34.8 g/dL    RDW 13.1 11.8 - 14.4 %    Platelets 033 (H) 524 - 453 k/uL    MPV 9.5 8.1 - 13.5 fL    NRBC Automated 0.0 0.0 per 100 WBC    Differential Type NOT REPORTED     Seg Neutrophils 64 36 - 65 %    Lymphocytes 21 (L) 24 - 43 %    Monocytes 7 3 - 12 %    Eosinophils % 7 (H) 1 - 4 %    Basophils 1 0 - 2 %    Immature Granulocytes 0 0 %    Segs Absolute 8.16 (H) 1.50 - 8.10 k/uL    Absolute Lymph # 2.60 1.10 - 3.70 k/uL    Absolute Mono # 0.83 0.10 - 1.20 k/uL    Absolute Eos # 0.91 (H) 0.00 - 0.44 k/uL    Basophils Absolute 0.07 0.00 - 0.20 k/uL    Absolute Immature Granulocyte 0.04 0.00 - 0.30 k/uL    WBC Morphology NOT REPORTED     RBC Morphology NOT REPORTED     Platelet Estimate NOT REPORTED    BASIC METABOLIC PANEL    Collection Time: 09/12/20  7:26 PM   Result Value Ref Range    Glucose 119 (H) 70 - 99 mg/dL    BUN 8 6 - 20 mg/dL    CREATININE 0.76 0.50 - 0.90 mg/dL    Bun/Cre Ratio NOT REPORTED 9 - 20    Calcium 9.6 8.6 - 10.4 mg/dL    Sodium 136 135 - 144 mmol/L    Potassium 4.2 3.7 - 5.3 mmol/L    Chloride 103 98 - 107 mmol/L    CO2 23 20 - 31 mmol/L    Anion Gap 10 9 - 17 mmol/L    GFR Non-African American >60 >60 mL/min    GFR African American >60 >60 mL/min    GFR Comment          GFR Staging NOT REPORTED    Troponin    Collection Time: 09/12/20  7:26 PM   Result Value Ref Range    Troponin, High Sensitivity <6 0 - 14 ng/L    Troponin T NOT REPORTED <0.03 ng/mL    Troponin Interp NOT REPORTED    HCG Qualitative, Serum    Collection Time: 09/12/20  7:26 PM   Result Value Ref Range    hCG Qual NEGATIVE NEGATIVE   COVID-19, PCR    Collection Time: 09/12/20  9:17 PM    Specimen: Other   Result Value Ref Range    SARS-CoV-2          SARS-CoV-2, Rapid Not Detected Not Detected    Source . NASOPHARYNGEAL SWAB     SARS-CoV-2             Imaging/Diagnostics:  Xr Chest Portable    Result Date: 9/12/2020  No acute cardiopulmonary abnormality. Assessment :      Hospital Problems           Last Modified POA    * (Principal) Mild intermittent asthma with acute exacerbation 9/13/2020 Yes    Leukocytosis 9/13/2020 Yes    [facility] 4/37/0911 Yes          Plan:     Patient status observation in the Progressive Unit/Step down    1. Observation, de-escalate continuous nebulizer to every 2 as needed and reevaluate. Discussed with patient that if she wanted to leave it would be AMA and the concern that she would then went back down and need further medical treatment later in the evening when she got home, that she is probably feeling well because of the medications that we gave her and the 8 months provided and could be short-term. Patient verbalized understanding and is in agreement to stay. Given the fact that the episode started with coughing after ingestion of rice/\"choking\" will hydrate and repeat chest x-ray imaging 2 view to rule out any sort of aspiration.    2. Leukocytosis-this merely could be secondary to the

## 2020-09-13 NOTE — ED PROVIDER NOTES
[facility]     Emergency Department     Faculty Attestation    I performed a history and physical examination of the patient and discussed management with the resident. I reviewed the resident´s note and agree with the documented findings and plan of care. Any areas of disagreement are noted on the chart. I was personally present for the key portions of any procedures. I have documented in the chart those procedures where I was not present during the key portions. I have reviewed the emergency nurses triage note. I agree with the chief complaint, past medical history, past surgical history, allergies, medications, social and family history as documented unless otherwise noted below. For Physician Assistant/ Nurse Practitioner cases/documentation I have personally evaluated this patient and have completed at least one if not all key elements of the E/M (history, physical exam, and MDM). Additional findings are as noted. I saw the patient after an aerosol and she still had coarse expiratory wheezes, no lower extremity pain or swelling on examination. Tachycardic without murmurs.      Nettie Ayala MD  09/12/20 2005

## 2020-09-13 NOTE — ED NOTES
Patient to ED via self ambulatory to room 7  Patient here with c/o asthma exacerbation  Patient states she has also been nauseous, dizzy, and been having chest pain for the last 3 days  Patient believes she has a cold on top of her typical asthma  Patient states she came to be seen today because of her wheezing  Inspiratory and expiratory wheezes heard on auscultation  Vitals obtained, EKG obtained, placed on monitor  IV started and blood work obtained  Respirations even and non-labored  No other needs at this time      [employer], RN  09/12/20 1780

## 2020-09-14 LAB
EKG ATRIAL RATE: 134 BPM
EKG P AXIS: 55 DEGREES
EKG P-R INTERVAL: 132 MS
EKG Q-T INTERVAL: 296 MS
EKG QRS DURATION: 80 MS
EKG QTC CALCULATION (BAZETT): 442 MS
EKG R AXIS: 9 DEGREES
EKG T AXIS: 51 DEGREES
EKG VENTRICULAR RATE: 134 BPM

## 2020-09-14 PROCEDURE — 93010 ELECTROCARDIOGRAM REPORT: CPT | Performed by: INTERNAL MEDICINE

## 2020-09-26 ENCOUNTER — APPOINTMENT (OUTPATIENT)
Dept: GENERAL RADIOLOGY | Age: 30
End: 2020-09-26
Payer: COMMERCIAL

## 2020-09-26 ENCOUNTER — HOSPITAL ENCOUNTER (EMERGENCY)
Age: 30
Discharge: HOME | End: 2020-09-26
Attending: EMERGENCY MEDICINE
Payer: COMMERCIAL

## 2020-09-26 VITALS — HEART RATE: 92 BPM | RESPIRATION RATE: 18 BRPM | OXYGEN SATURATION: 94 %

## 2020-09-26 LAB
ANION GAP SERPL CALCULATED.3IONS-SCNC: 11 MMOL/L (ref 9–17)
BUN BLDV-MCNC: 11 MG/DL (ref 6–20)
BUN/CREAT BLD: ABNORMAL (ref 9–20)
CALCIUM SERPL-MCNC: 9.1 MG/DL (ref 8.6–10.4)
CHLORIDE BLD-SCNC: 102 MMOL/L (ref 98–107)
CO2: 21 MMOL/L (ref 20–31)
CREAT SERPL-MCNC: 0.85 MG/DL (ref 0.5–0.9)
CULTURE: NORMAL
D-DIMER QUANTITATIVE: 0.32 MG/L FEU
GFR AFRICAN AMERICAN: >60 ML/MIN
GFR NON-AFRICAN AMERICAN: >60 ML/MIN
GFR SERPL CREATININE-BSD FRML MDRD: ABNORMAL ML/MIN/{1.73_M2}
GFR SERPL CREATININE-BSD FRML MDRD: ABNORMAL ML/MIN/{1.73_M2}
GLUCOSE BLD-MCNC: 229 MG/DL (ref 70–99)
HCG QUALITATIVE: NEGATIVE
HCT VFR BLD CALC: 37.5 % (ref 36.3–47.1)
HEMOGLOBIN: 12 G/DL (ref 11.9–15.1)
Lab: NORMAL
MCH RBC QN AUTO: 29.3 PG (ref 25.2–33.5)
MCHC RBC AUTO-ENTMCNC: 32 G/DL (ref 28.4–34.8)
MCV RBC AUTO: 91.7 FL (ref 82.6–102.9)
NRBC AUTOMATED: 0 PER 100 WBC
PDW BLD-RTO: 13.7 % (ref 11.8–14.4)
PLATELET # BLD: 395 K/UL (ref 138–453)
PMV BLD AUTO: 9.8 FL (ref 8.1–13.5)
POTASSIUM SERPL-SCNC: 3.9 MMOL/L (ref 3.7–5.3)
RBC # BLD: 4.09 M/UL (ref 3.95–5.11)
SODIUM BLD-SCNC: 134 MMOL/L (ref 135–144)
SPECIMEN DESCRIPTION: NORMAL
TROPONIN INTERP: NORMAL
TROPONIN T: NORMAL NG/ML
TROPONIN, HIGH SENSITIVITY: <6 NG/L (ref 0–14)
WBC # BLD: 16.2 K/UL (ref 3.5–11.3)

## 2020-09-26 PROCEDURE — 71045 X-RAY EXAM CHEST 1 VIEW: CPT

## 2020-09-26 PROCEDURE — 85027 COMPLETE CBC AUTOMATED: CPT

## 2020-09-26 PROCEDURE — 85379 FIBRIN DEGRADATION QUANT: CPT

## 2020-09-26 PROCEDURE — 93005 ELECTROCARDIOGRAM TRACING: CPT | Performed by: EMERGENCY MEDICINE

## 2020-09-26 PROCEDURE — 80048 BASIC METABOLIC PNL TOTAL CA: CPT

## 2020-09-26 PROCEDURE — 99285 EMERGENCY DEPT VISIT HI MDM: CPT

## 2020-09-26 PROCEDURE — 84703 CHORIONIC GONADOTROPIN ASSAY: CPT

## 2020-09-26 PROCEDURE — 87086 URINE CULTURE/COLONY COUNT: CPT

## 2020-09-26 PROCEDURE — 84484 ASSAY OF TROPONIN QUANT: CPT

## 2020-09-26 RX ORDER — ALUMINA, MAGNESIA, AND SIMETHICONE 2400; 2400; 240 MG/30ML; MG/30ML; MG/30ML
30 SUSPENSION ORAL 3 TIMES DAILY PRN
Qty: 355 ML | Refills: 0 | Status: ON HOLD | OUTPATIENT
Start: 2020-09-26 | End: 2021-03-13 | Stop reason: ALTCHOICE

## 2020-09-26 RX ORDER — MORPHINE SULFATE 4 MG/ML
INJECTION, SOLUTION INTRAMUSCULAR; INTRAVENOUS
Status: DISCONTINUED
Start: 2020-09-26 | End: 2020-09-26 | Stop reason: HOSPADM

## 2020-09-26 RX ORDER — ONDANSETRON 4 MG/1
4 TABLET, ORALLY DISINTEGRATING ORAL EVERY 8 HOURS PRN
Qty: 10 TABLET | Refills: 0 | Status: SHIPPED | OUTPATIENT
Start: 2020-09-26 | End: 2021-01-26

## 2020-09-26 RX ORDER — FAMOTIDINE 20 MG/1
20 TABLET, FILM COATED ORAL 2 TIMES DAILY
Qty: 60 TABLET | Refills: 0 | Status: ON HOLD | OUTPATIENT
Start: 2020-09-26 | End: 2021-03-13 | Stop reason: ALTCHOICE

## 2020-09-26 RX ORDER — IBUPROFEN 800 MG/1
800 TABLET ORAL EVERY 8 HOURS PRN
Qty: 30 TABLET | Refills: 0 | Status: ON HOLD | OUTPATIENT
Start: 2020-09-26 | End: 2021-03-13 | Stop reason: ALTCHOICE

## 2020-09-26 NOTE — ED NOTES
Pt updated that continue to resting without distress, awaiting results of pending tests and then dispo decision , pt is aware without concerns voiced.       Bry Jimenes RN  09/26/20 [id number]

## 2020-09-26 NOTE — ED NOTES
All initial assessment and documentation completed on paper charting due to system downtime.  Patient noted in no obvious distress     Chiquis Valerio RN  09/26/20 [id number]

## 2020-09-28 ENCOUNTER — APPOINTMENT (OUTPATIENT)
Dept: GENERAL RADIOLOGY | Age: 30
End: 2020-09-28
Payer: COMMERCIAL

## 2020-09-28 ENCOUNTER — HOSPITAL ENCOUNTER (EMERGENCY)
Age: 30
Discharge: HOME | End: 2020-09-29
Attending: EMERGENCY MEDICINE
Payer: COMMERCIAL

## 2020-09-28 VITALS
SYSTOLIC BLOOD PRESSURE: 156 MMHG | WEIGHT: 277 LBS | TEMPERATURE: 98.2 F | HEART RATE: 112 BPM | DIASTOLIC BLOOD PRESSURE: 101 MMHG | OXYGEN SATURATION: 100 % | BODY MASS INDEX: 46.15 KG/M2 | RESPIRATION RATE: 24 BRPM | HEIGHT: 65 IN

## 2020-09-28 LAB
ABSOLUTE EOS #: 0.95 K/UL (ref 0–0.44)
ABSOLUTE IMMATURE GRANULOCYTE: 0.05 K/UL (ref 0–0.3)
ABSOLUTE LYMPH #: 2.05 K/UL (ref 1.1–3.7)
ABSOLUTE MONO #: 0.95 K/UL (ref 0.1–1.2)
ALLEN TEST: ABNORMAL
ALLEN TEST: NORMAL
ANION GAP: 8 MMOL/L (ref 7–16)
BASOPHILS # BLD: 0 % (ref 0–2)
BASOPHILS ABSOLUTE: 0.06 K/UL (ref 0–0.2)
CARBOXYHEMOGLOBIN: 0.7 % (ref 0–5)
D-DIMER QUANTITATIVE: 0.49 MG/L FEU
DIFFERENTIAL TYPE: ABNORMAL
EKG ATRIAL RATE: 97 BPM
EKG P AXIS: 52 DEGREES
EKG P-R INTERVAL: 150 MS
EKG Q-T INTERVAL: 356 MS
EKG QRS DURATION: 84 MS
EKG QTC CALCULATION (BAZETT): 452 MS
EKG R AXIS: 26 DEGREES
EKG T AXIS: 21 DEGREES
EKG VENTRICULAR RATE: 97 BPM
EOSINOPHILS RELATIVE PERCENT: 7 % (ref 1–4)
FIO2: ABNORMAL
FIO2: NORMAL
GFR NON-AFRICAN AMERICAN: >60 ML/MIN
GFR SERPL CREATININE-BSD FRML MDRD: >60 ML/MIN
GFR SERPL CREATININE-BSD FRML MDRD: NORMAL ML/MIN/{1.73_M2}
GLUCOSE BLD-MCNC: 134 MG/DL (ref 74–100)
HCG QUALITATIVE: NEGATIVE
HCO3 VENOUS: 23.2 MMOL/L (ref 24–30)
HCO3 VENOUS: 27 MMOL/L (ref 22–29)
HCT VFR BLD CALC: 36.4 % (ref 36.3–47.1)
HEMOGLOBIN: 11.6 G/DL (ref 11.9–15.1)
IMMATURE GRANULOCYTES: 0 %
LYMPHOCYTES # BLD: 14 % (ref 24–43)
MCH RBC QN AUTO: 29 PG (ref 25.2–33.5)
MCHC RBC AUTO-ENTMCNC: 31.9 G/DL (ref 28.4–34.8)
MCV RBC AUTO: 91 FL (ref 82.6–102.9)
METHEMOGLOBIN: ABNORMAL % (ref 0–1.5)
MODE: ABNORMAL
MODE: NORMAL
MONOCYTES # BLD: 7 % (ref 3–12)
NEGATIVE BASE EXCESS, VEN: 0.7 MMOL/L (ref 0–2)
NEGATIVE BASE EXCESS, VEN: NORMAL (ref 0–2)
NOTIFICATION TIME: ABNORMAL
NOTIFICATION: ABNORMAL
NRBC AUTOMATED: 0 PER 100 WBC
O2 DEVICE/FLOW/%: ABNORMAL
O2 DEVICE/FLOW/%: NORMAL
O2 SAT, VEN: 82 % (ref 60–85)
O2 SAT, VEN: 94.9 % (ref 60–85)
OXYHEMOGLOBIN: ABNORMAL % (ref 95–98)
PATIENT TEMP: 37
PATIENT TEMP: NORMAL
PCO2, VEN, TEMP ADJ: ABNORMAL MMHG (ref 39–55)
PCO2, VEN: 37.5 (ref 39–55)
PCO2, VEN: 43.2 MM HG (ref 41–51)
PDW BLD-RTO: 13.7 % (ref 11.8–14.4)
PEEP/CPAP: ABNORMAL
PH VENOUS: 7.4 (ref 7.32–7.43)
PH VENOUS: 7.41 (ref 7.32–7.42)
PH, VEN, TEMP ADJ: ABNORMAL (ref 7.32–7.42)
PLATELET # BLD: 389 K/UL (ref 138–453)
PLATELET ESTIMATE: ABNORMAL
PMV BLD AUTO: 9.8 FL (ref 8.1–13.5)
PO2, VEN, TEMP ADJ: ABNORMAL MMHG (ref 30–50)
PO2, VEN: 46.5 MM HG (ref 30–50)
PO2, VEN: 71.8 (ref 30–50)
POC CHLORIDE: 107 MMOL/L (ref 98–107)
POC CREATININE: 0.91 MG/DL (ref 0.51–1.19)
POC HEMATOCRIT: 38 % (ref 36–46)
POC HEMOGLOBIN: 13 G/DL (ref 12–16)
POC IONIZED CALCIUM: 1.22 MMOL/L (ref 1.15–1.33)
POC LACTIC ACID: 0.62 MMOL/L (ref 0.56–1.39)
POC PCO2 TEMP: NORMAL MM HG
POC PH TEMP: NORMAL
POC PO2 TEMP: NORMAL MM HG
POC POTASSIUM: 3.9 MMOL/L (ref 3.5–4.5)
POC SODIUM: 142 MMOL/L (ref 138–146)
POSITIVE BASE EXCESS, VEN: 2 (ref 0–3)
POSITIVE BASE EXCESS, VEN: ABNORMAL MMOL/L (ref 0–2)
PSV: ABNORMAL
PT. POSITION: ABNORMAL
RBC # BLD: 4 M/UL (ref 3.95–5.11)
RBC # BLD: ABNORMAL 10*6/UL
RESPIRATORY RATE: ABNORMAL
SAMPLE SITE: ABNORMAL
SAMPLE SITE: NORMAL
SEG NEUTROPHILS: 72 % (ref 36–65)
SEGMENTED NEUTROPHILS ABSOLUTE COUNT: 10.63 K/UL (ref 1.5–8.1)
SET RATE: ABNORMAL
TEXT FOR RESPIRATORY: ABNORMAL
TOTAL CO2, VENOUS: 28 MMOL/L (ref 23–30)
TOTAL HB: ABNORMAL G/DL (ref 12–16)
TOTAL RATE: ABNORMAL
VT: ABNORMAL
WBC # BLD: 14.7 K/UL (ref 3.5–11.3)
WBC # BLD: ABNORMAL 10*3/UL

## 2020-09-28 PROCEDURE — 94660 CPAP INITIATION&MGMT: CPT

## 2020-09-28 PROCEDURE — 99285 EMERGENCY DEPT VISIT HI MDM: CPT

## 2020-09-28 PROCEDURE — 85025 COMPLETE CBC W/AUTO DIFF WBC: CPT

## 2020-09-28 PROCEDURE — 84295 ASSAY OF SERUM SODIUM: CPT

## 2020-09-28 PROCEDURE — 6360000002 HC RX W HCPCS: Performed by: STUDENT IN AN ORGANIZED HEALTH CARE EDUCATION/TRAINING PROGRAM

## 2020-09-28 PROCEDURE — 82805 BLOOD GASES W/O2 SATURATION: CPT

## 2020-09-28 PROCEDURE — 82330 ASSAY OF CALCIUM: CPT

## 2020-09-28 PROCEDURE — 93010 ELECTROCARDIOGRAM REPORT: CPT | Performed by: INTERNAL MEDICINE

## 2020-09-28 PROCEDURE — 94640 AIRWAY INHALATION TREATMENT: CPT

## 2020-09-28 PROCEDURE — 82435 ASSAY OF BLOOD CHLORIDE: CPT

## 2020-09-28 PROCEDURE — 84703 CHORIONIC GONADOTROPIN ASSAY: CPT

## 2020-09-28 PROCEDURE — 85379 FIBRIN DEGRADATION QUANT: CPT

## 2020-09-28 PROCEDURE — 96374 THER/PROPH/DIAG INJ IV PUSH: CPT

## 2020-09-28 PROCEDURE — 83605 ASSAY OF LACTIC ACID: CPT

## 2020-09-28 PROCEDURE — U0002 COVID-19 LAB TEST NON-CDC: HCPCS

## 2020-09-28 PROCEDURE — 82803 BLOOD GASES ANY COMBINATION: CPT

## 2020-09-28 PROCEDURE — 80048 BASIC METABOLIC PNL TOTAL CA: CPT

## 2020-09-28 PROCEDURE — 36415 COLL VENOUS BLD VENIPUNCTURE: CPT

## 2020-09-28 PROCEDURE — 84132 ASSAY OF SERUM POTASSIUM: CPT

## 2020-09-28 PROCEDURE — 82565 ASSAY OF CREATININE: CPT

## 2020-09-28 PROCEDURE — 2700000000 HC OXYGEN THERAPY PER DAY

## 2020-09-28 PROCEDURE — 82947 ASSAY GLUCOSE BLOOD QUANT: CPT

## 2020-09-28 PROCEDURE — 93005 ELECTROCARDIOGRAM TRACING: CPT | Performed by: STUDENT IN AN ORGANIZED HEALTH CARE EDUCATION/TRAINING PROGRAM

## 2020-09-28 PROCEDURE — 71045 X-RAY EXAM CHEST 1 VIEW: CPT

## 2020-09-28 PROCEDURE — 85014 HEMATOCRIT: CPT

## 2020-09-28 RX ORDER — METHYLPREDNISOLONE SODIUM SUCCINATE 125 MG/2ML
125 INJECTION, POWDER, LYOPHILIZED, FOR SOLUTION INTRAMUSCULAR; INTRAVENOUS ONCE
Status: COMPLETED | OUTPATIENT
Start: 2020-09-28 | End: 2020-09-28

## 2020-09-28 RX ADMIN — METHYLPREDNISOLONE SODIUM SUCCINATE 125 MG: 125 INJECTION, POWDER, FOR SOLUTION INTRAMUSCULAR; INTRAVENOUS at 22:40

## 2020-09-28 RX ADMIN — ALBUTEROL SULFATE 5 MG: 5 SOLUTION RESPIRATORY (INHALATION) at 22:52

## 2020-09-28 RX ADMIN — ALBUTEROL SULFATE 5 MG: 5 SOLUTION RESPIRATORY (INHALATION) at 23:17

## 2020-09-28 ASSESSMENT — ENCOUNTER SYMPTOMS
DIARRHEA: 0
TACHYPNEA: 1
SHORTNESS OF BREATH: 1
SORE THROAT: 0
COUGH: 1
NAUSEA: 0
WHEEZING: 1
ABDOMINAL PAIN: 0
RHINORRHEA: 0
VOMITING: 0

## 2020-09-28 ASSESSMENT — PAIN DESCRIPTION - ONSET: ONSET: ON-GOING

## 2020-09-28 ASSESSMENT — PAIN DESCRIPTION - PROGRESSION: CLINICAL_PROGRESSION: NOT CHANGED

## 2020-09-28 ASSESSMENT — PAIN SCALES - GENERAL: PAINLEVEL_OUTOF10: 10

## 2020-09-28 ASSESSMENT — PAIN DESCRIPTION - LOCATION: LOCATION: ABDOMEN;CHEST

## 2020-09-28 ASSESSMENT — PAIN DESCRIPTION - FREQUENCY: FREQUENCY: CONTINUOUS

## 2020-09-28 ASSESSMENT — PAIN DESCRIPTION - PAIN TYPE: TYPE: ACUTE PAIN

## 2020-09-28 ASSESSMENT — PAIN DESCRIPTION - DESCRIPTORS: DESCRIPTORS: ACHING

## 2020-09-29 LAB
ANION GAP SERPL CALCULATED.3IONS-SCNC: 10 MMOL/L (ref 9–17)
BUN BLDV-MCNC: 11 MG/DL (ref 6–20)
BUN/CREAT BLD: ABNORMAL (ref 9–20)
CALCIUM SERPL-MCNC: 8.8 MG/DL (ref 8.6–10.4)
CHLORIDE BLD-SCNC: 106 MMOL/L (ref 98–107)
CO2: 23 MMOL/L (ref 20–31)
CREAT SERPL-MCNC: 0.81 MG/DL (ref 0.5–0.9)
EKG ATRIAL RATE: 111 BPM
EKG P AXIS: 55 DEGREES
EKG P-R INTERVAL: 138 MS
EKG Q-T INTERVAL: 328 MS
EKG QRS DURATION: 78 MS
EKG QTC CALCULATION (BAZETT): 446 MS
EKG R AXIS: 24 DEGREES
EKG T AXIS: 28 DEGREES
EKG VENTRICULAR RATE: 111 BPM
GFR AFRICAN AMERICAN: >60 ML/MIN
GFR NON-AFRICAN AMERICAN: >60 ML/MIN
GFR SERPL CREATININE-BSD FRML MDRD: ABNORMAL ML/MIN/{1.73_M2}
GFR SERPL CREATININE-BSD FRML MDRD: ABNORMAL ML/MIN/{1.73_M2}
GLUCOSE BLD-MCNC: 128 MG/DL (ref 70–99)
POTASSIUM SERPL-SCNC: 3.9 MMOL/L (ref 3.7–5.3)
SARS-COV-2, RAPID: NOT DETECTED
SARS-COV-2: NORMAL
SARS-COV-2: NORMAL
SODIUM BLD-SCNC: 139 MMOL/L (ref 135–144)
SOURCE: NORMAL

## 2020-09-29 RX ORDER — PREDNISONE 50 MG/1
50 TABLET ORAL DAILY
Qty: 4 TABLET | Refills: 0 | Status: SHIPPED | OUTPATIENT
Start: 2020-09-29 | End: 2020-10-03

## 2020-09-29 RX ORDER — ALBUTEROL SULFATE 90 UG/1
1-2 AEROSOL, METERED RESPIRATORY (INHALATION) EVERY 4 HOURS PRN
Qty: 1 INHALER | Refills: 0 | Status: SHIPPED | OUTPATIENT
Start: 2020-09-29 | End: 2021-01-26 | Stop reason: SDUPTHER

## 2020-09-29 NOTE — ED NOTES
Patient to ED via self wheeled back to room 20  Patient here with c/o asthma exacerbation  Patient states she has been out of her asthma medications and had a flare up today that has not been getting any better  Patient states she also has been coughing a lot which is causing her mid-sternal chest pain as well as diffuse abdominal pain  RT called to bedside  Patient presents with an o2 sat of 86 ORA  Placed on 3L NC O2 bringing patient to an O2 sat of 94  Vitals obtained, EKG obtained, placed on monitor  IV started and blood work obtained  Respirations even and non-labored  No other needs at this time     Tamy Gonsalez, GERALD  09/28/20 2170

## 2020-09-29 NOTE — ED PROVIDER NOTES
[facility]  ED  Emergency Department Encounter  EmergencyMedicine Resident     Pt Name:Angel Rodolph Dancer  MRN: [id number]  [place] 1990  Date of evaluation: 20  PCP:  No primary care provider on file. CHIEF COMPLAINT       Chief Complaint   Patient presents with    Shortness of Breath    Cough    Chest Pain       HISTORY OF PRESENT ILLNESS  (Location/Symptom, Timing/Onset, Context/Setting, Quality, Duration, Modifying Factors, Severity.)      Ligia Razo is a 26 y.o. female who presents with 2-day history of shortness of breath and cough, background history of asthma. Patient states that she has been out of her albuterol inhaler for the last 4 days due to insurance reasons. Patient also endorses chest pain and pleurisy. Patient denies hemoptysis, denies calf pain bilaterally, denies recent travel history, denies hormonal birth control. Patient also denies recent trauma. Patient endorses chills and hot flashes. In the ED, patient was satting at 88%, sitting upright with increased effort breathing, with tachypnea and tachycardia. Patient was afebrile in the ED. Patient was speaking in full sentences. Patient's oxygen saturation and breathing effort improved with nasal cannula oxygen. Patient is a non-smoker, no history of heart problems or clots, denies drug use or alcohol use. Patient denies dysuria or hematuria. PAST MEDICAL / SURGICAL / SOCIAL / FAMILY HISTORY      has a past medical history of Asthma, Back pain, Bipolar 1 disorder ([address] Bipolar disorder ([address] Depression, Diabetes mellitus ([address] Dizziness, Fatty liver disease, nonalcoholic, Fatty liver disease, nonalcoholic, GERD (gastroesophageal reflux disease), and Obesity. has a past surgical history that includes  section and LEEP.       Social History     Socioeconomic History    Marital status:      Spouse name: Not on file    Number of children: Not on file    Years of education: Not on file    Highest education level: Not on file   Occupational History    Not on file   Social Needs    Financial resource strain: Not on file    Food insecurity     Worry: Not on file     Inability: Not on file    Transportation needs     Medical: Not on file     Non-medical: Not on file   Tobacco Use    Smoking status: Current Some Day Smoker     Packs/day: 0.25     Years: 14.00     Pack years: 3.50     Types: Cigarettes    Smokeless tobacco: Never Used    Tobacco comment: pt accepting of nicotine gum   Substance and Sexual Activity    Alcohol use: Not on file     Comment: rarely    Drug use: Not Currently     Types: Marijuana     Comment: quit    Sexual activity: Not Currently   Lifestyle    Physical activity     Days per week: Not on file     Minutes per session: Not on file    Stress: Not on file   Relationships    Social connections     Talks on phone: Not on file     Gets together: Not on file     Attends Orthodoxy service: Not on file     Active member of club or organization: Not on file     Attends meetings of clubs or organizations: Not on file     Relationship status: Not on file    Intimate partner violence     Fear of current or ex partner: Not on file     Emotionally abused: Not on file     Physically abused: Not on file     Forced sexual activity: Not on file   Other Topics Concern    Not on file   Social History Narrative    Not on file       Family History   Problem Relation Age of Onset    High Blood Pressure Mother     Diabetes Mother     Heart Disease Mother     Heart Disease Father     Early Death Father     Cancer Maternal Aunt         lung    Cancer Paternal Cousin         brain       Allergies:  Morphine; Pcn [penicillins]; Amoxicillin; and Seasonal    Home Medications:  Prior to Admission medications    Medication Sig Start Date End Date Taking?  Authorizing Provider   albuterol sulfate HFA (PROVENTIL HFA) 108 (90 Base) MCG/ACT inhaler Inhale 1-2 puffs into the lungs every 4 hours as needed for Wheezing or Shortness of Breath (Space out to every 6 hours as symptoms improve) Space out to every 6 hours as symptoms improve. 9/29/20  Yes Mina Terry MD   predniSONE (DELTASONE) 50 MG tablet Take 1 tablet by mouth daily for 4 days 9/29/20 10/3/20 Yes EMMA Blanco MD   ondansetron (ZOFRAN ODT) 4 MG disintegrating tablet Take 1 tablet by mouth every 8 hours as needed for Nausea 9/26/20   Briseyda Sr MD   ibuprofen (ADVIL;MOTRIN) 800 MG tablet Take 1 tablet by mouth every 8 hours as needed for Pain 9/26/20   Briseyda Sr MD   famotidine (PEPCID) 20 MG tablet Take 1 tablet by mouth 2 times daily 9/26/20   Briseyda Sr MD   aluminum & magnesium hydroxide-simethicone (MAALOX ADVANCED MAX ST) 806-789-82 MG/5ML SUSP Take 30 mLs by mouth 3 times daily as needed (heartburn/indigestion) 9/26/20   Briseyda Sr MD   sertraline (ZOLOFT) 50 MG tablet Take 1 tablet by mouth daily 8/16/20   JUNE Suarez CNP   traZODone (DESYREL) 50 MG tablet Take 1 tablet by mouth nightly as needed for Sleep 8/16/20   JUNE Suarez CNP   cariprazine hcl (VRAYLAR) 1.5 MG capsule Take 1 capsule by mouth daily 8/17/20   JUNE Suarez CNP   metFORMIN (GLUCOPHAGE) 500 MG tablet Take 1 tablet by mouth 2 times daily (with meals) TAKE 1 TABLET BY MOUTH DAILY 12/11/17   Elidia Landaverde MD       REVIEW OF SYSTEMS    (2-9 systems for level 4, 10 or more for level 5)      Review of Systems   Constitutional: Positive for chills. HENT: Negative for rhinorrhea and sore throat. Eyes: Negative for visual disturbance. Respiratory: Positive for cough, shortness of breath and wheezing. Cardiovascular: Positive for chest pain. Negative for leg swelling. Gastrointestinal: Negative for abdominal pain, diarrhea, nausea and vomiting. Endocrine: Positive for polyuria. Genitourinary: Negative for dysuria and hematuria.    Musculoskeletal: Negative for gait problem. Neurological: Negative for light-headedness and headaches. Psychiatric/Behavioral: Negative for confusion. PHYSICAL EXAM   (up to 7 for level 4, 8 or more for level 5)      INITIAL VITALS:   BP (!) 156/101   Pulse 112   Temp 98.2 °F (36.8 °C) (Oral)   Resp 24   Ht 5' 5\" (1.651 m)   Wt 277 lb (125.6 kg)   SpO2 100%   BMI 46.10 kg/m²     Physical Exam  Constitutional:       Appearance: She is obese. HENT:      Head: Normocephalic. Eyes:      Extraocular Movements: Extraocular movements intact. Pupils: Pupils are equal, round, and reactive to light. Cardiovascular:      Rate and Rhythm: Regular rhythm. Tachycardia present. Pulses: Normal pulses. Heart sounds: Normal heart sounds. Pulmonary:      Effort: Tachypnea and accessory muscle usage present. Breath sounds: Wheezing present. Chest:      Chest wall: No tenderness. Abdominal:      Palpations: Abdomen is soft. Musculoskeletal: Normal range of motion. Right lower leg: She exhibits no tenderness. Left lower leg: She exhibits no tenderness. Comments: No unilateral calf swelling   Skin:     General: Skin is warm. Capillary Refill: Capillary refill takes less than 2 seconds. Neurological:      General: No focal deficit present. Mental Status: She is alert and oriented to person, place, and time.    Psychiatric:         Mood and Affect: Mood normal.         DIFFERENTIAL  DIAGNOSIS     PLAN (LABS / IMAGING / EKG):  Orders Placed This Encounter   Procedures    XR CHEST PORTABLE    Hemoglobin and hematocrit, blood    SODIUM (POC)    POTASSIUM (POC)    CHLORIDE (POC)    CALCIUM, IONIC (POC)    COVID-19    Basic Metabolic Panel    CBC Auto Differential    D-Dimer, Quantitative    HCG Qualitative, Serum    Blood Gas, Venous    BIPAP    HHN Treatment    Venous Blood Gas, POC    Creatinine W/GFR Point of Care    Lactic Acid, POC    POCT Glucose    Anion Gap (Calc) POC    EKG 12 Lead       MEDICATIONS ORDERED:  Orders Placed This Encounter   Medications    methylPREDNISolone sodium (SOLU-MEDROL) injection 125 mg    albuterol (PROVENTIL) nebulizer solution 2.5 mg    albuterol sulfate HFA (PROVENTIL HFA) 108 (90 Base) MCG/ACT inhaler     Sig: Inhale 1-2 puffs into the lungs every 4 hours as needed for Wheezing or Shortness of Breath (Space out to every 6 hours as symptoms improve) Space out to every 6 hours as symptoms improve. Dispense:  1 Inhaler     Refill:  0    predniSONE (DELTASONE) 50 MG tablet     Sig: Take 1 tablet by mouth daily for 4 days     Dispense:  4 tablet     Refill:  0       DDX: Asthma exacerbation, COVID, PE, pneumonia    DIAGNOSTIC RESULTS / EMERGENCY DEPARTMENT COURSE / MDM   LAB RESULTS:  Results for orders placed or performed during the hospital encounter of 09/28/20   Hemoglobin and hematocrit, blood   Result Value Ref Range    POC Hemoglobin 13.0 12.0 - 16.0 g/dL    POC Hematocrit 38 36 - 46 %   SODIUM (POC)   Result Value Ref Range    POC Sodium 142 138 - 146 mmol/L   POTASSIUM (POC)   Result Value Ref Range    POC Potassium 3.9 3.5 - 4.5 mmol/L   CHLORIDE (POC)   Result Value Ref Range    POC Chloride 107 98 - 107 mmol/L   CALCIUM, IONIC (POC)   Result Value Ref Range    POC Ionized Calcium 1.22 1.15 - 1.33 mmol/L   COVID-19    Specimen: Other   Result Value Ref Range    SARS-CoV-2          SARS-CoV-2, Rapid Not Detected Not Detected    Source . NASOPHARYNGEAL SWAB     SARS-CoV-2         Basic Metabolic Panel   Result Value Ref Range    Glucose 128 (H) 70 - 99 mg/dL    BUN 11 6 - 20 mg/dL    CREATININE 0.81 0.50 - 0.90 mg/dL    Bun/Cre Ratio NOT REPORTED 9 - 20    Calcium 8.8 8.6 - 10.4 mg/dL    Sodium 139 135 - 144 mmol/L    Potassium 3.9 3.7 - 5.3 mmol/L    Chloride 106 98 - 107 mmol/L    CO2 23 20 - 31 mmol/L    Anion Gap 10 9 - 17 mmol/L    GFR Non-African American >60 >60 mL/min    GFR African American >60 >60 mL/min    GFR Comment          GFR Staging NOT REPORTED    CBC Auto Differential   Result Value Ref Range    WBC 14.7 (H) 3.5 - 11.3 k/uL    RBC 4.00 3.95 - 5.11 m/uL    Hemoglobin 11.6 (L) 11.9 - 15.1 g/dL    Hematocrit 36.4 36.3 - 47.1 %    MCV 91.0 82.6 - 102.9 fL    MCH 29.0 25.2 - 33.5 pg    MCHC 31.9 28.4 - 34.8 g/dL    RDW 13.7 11.8 - 14.4 %    Platelets 376 613 - 845 k/uL    MPV 9.8 8.1 - 13.5 fL    NRBC Automated 0.0 0.0 per 100 WBC    Differential Type NOT REPORTED     Seg Neutrophils 72 (H) 36 - 65 %    Lymphocytes 14 (L) 24 - 43 %    Monocytes 7 3 - 12 %    Eosinophils % 7 (H) 1 - 4 %    Basophils 0 0 - 2 %    Immature Granulocytes 0 0 %    Segs Absolute 10.63 (H) 1.50 - 8.10 k/uL    Absolute Lymph # 2.05 1.10 - 3.70 k/uL    Absolute Mono # 0.95 0.10 - 1.20 k/uL    Absolute Eos # 0.95 (H) 0.00 - 0.44 k/uL    Basophils Absolute 0.06 0.00 - 0.20 k/uL    Absolute Immature Granulocyte 0.05 0.00 - 0.30 k/uL    WBC Morphology NOT REPORTED     RBC Morphology NOT REPORTED     Platelet Estimate NOT REPORTED    D-Dimer, Quantitative   Result Value Ref Range    D-Dimer, Quant 0.49 mg/L FEU   HCG Qualitative, Serum   Result Value Ref Range    hCG Qual NEGATIVE NEGATIVE   Blood Gas, Venous   Result Value Ref Range    pH, Kirt 7.408 7.320 - 7.420    pCO2, Kirt 37.5 (L) 39 - 55    pO2, Kirt 71.8 (H) 30 - 50    HCO3, Venous 23.2 (L) 24 - 30 mmol/L    Positive Base Excess, Kirt NOT REPORTED 0.0 - 2.0 mmol/L    Negative Base Excess, Kirt 0.7 0.0 - 2.0 mmol/L    O2 Sat, Kirt 94.9 (H) 60.0 - 85.0 %    Total Hb NOT REPORTED 12.0 - 16.0 g/dl    Oxyhemoglobin NOT REPORTED 95.0 - 98.0 %    Carboxyhemoglobin 0.7 0 - 5 %    Methemoglobin NOT REPORTED 0.0 - 1.5 %    Pt Temp 37.0     pH, Kirt, Temp Adj NOT REPORTED 7.320 - 7.420    pCO2, Kirt, Temp Adj NOT REPORTED 39 - 55 mmHg    pO2, Kirt, Temp Adj NOT REPORTED 30 - 50 mmHg    O2 Device/Flow/% NOT REPORTED     Respiratory Rate NOT REPORTED     Raghu Test NOT REPORTED     Sample Site NOT REPORTED     Pt.  Position NOT chest x-ray. Will start patient on albuterol nebulizer and BiPAP. Will assess after, if patient still wheezing after BiPAP, will put her on continuous BiPAP. Will reassess after for CT PE if d-dimer is raised. ED Course as of Sep 29 0117   Mon Sep 28, 2020   2315 WBC(!): 14.7 [EM]   2318 CXR   There is no acute consolidation or effusion. There is no pneumothorax. The  mediastinal structures are unremarkable. The upper abdomen is unremarkable. The extrathoracic soft tissues are unremarkable. There is no acute osseous  abnormality. [EM]   7542 hCG Qual: NEGATIVE [EM]   7079 Patient satting 98%, and feeling better on BiPAP    [EM]   Tue Sep 29, 2020   0055 Patient expressed that she did not want to be admitted. Expressed that she is feeling much better and would like to go home. Had conversation with patient about risk of going home including respiratory distress, coma and other risks. Patient understands and still wishes to be discharged AMA. We will have respiratory therapist wean her off BiPAP and reevaluate after.    [EM]   7808 SARS-CoV-2, Rapid: Not Detected [EM]      ED Course User Index  [EM] Yang Deshpande MD        PROCEDURES:  None    CONSULTS:  None    CRITICAL CARE:  Please see attending note    FINAL IMPRESSION      1. Asthma with acute exacerbation, unspecified asthma severity, unspecified whether persistent          DISPOSITION / PLAN     DISPOSITION patient decided to leave AGAINST MEDICAL ADVICE      PATIENT REFERRED TO:  No follow-up provider specified. DISCHARGE MEDICATIONS:  New Prescriptions    ALBUTEROL SULFATE HFA (PROVENTIL HFA) 108 (90 BASE) MCG/ACT INHALER    Inhale 1-2 puffs into the lungs every 4 hours as needed for Wheezing or Shortness of Breath (Space out to every 6 hours as symptoms improve) Space out to every 6 hours as symptoms improve.     PREDNISONE (DELTASONE) 50 MG TABLET    Take 1 tablet by mouth daily for 4 days       Yang Deshpande MD  Emergency Medicine Resident    (Please note that portions of thisnote were completed with a voice recognition program.  Efforts were made to edit the dictations but occasionally words are mis-transcribed.)        Kendra Garcia MD  Resident  09/29/20 [id number]

## 2020-09-29 NOTE — ED NOTES
Patient resting comfortably on stretcher, in no apparent distress on BIPAP  Respirations even and non-labored  Patient has no needs at this time  Call light remains within reach       Everton Sweeney RN  09/29/20 0114

## 2020-09-29 NOTE — ED PROVIDER NOTES
[facility]   Emergency Department  Emergency Medicine Attending Sign-out     Care of Patrick Tony was assumed from previous attending Dr. Corwin De Anda and is being seen for Shortness of Breath; Cough; and Chest Pain  . The patient's initial evaluation and plan have been discussed with the prior provider who initially evaluated the patient. Attestation  I was available and discussed any additional care issues that arose and coordinated the management plans with the resident(s) caring for the patient during my duty period. Any areas of disagreement with resident's documentation of care or procedures are noted on the chart. I was personally present for the key portions of any/all procedures, during my duty period. I have documented in the chart those procedures where I was not present during the key portions. BRIEF PATIENT SUMMARY/MDM COURSE PER INITIAL PROVIDER:   RECENT VITALS:     Temp: 98.2 °F (36.8 °C),  Pulse: 112, Resp: 24, BP: (!) 156/101, SpO2: 100 %    This patient is a 26 y.o. Female with asthma exacerbation with respiratory distress initially at upper [de-identified]. On bipap. Awaiitng labs and CoVID.   Plan for admissin    DIAGNOSTICS/MEDICATIONS:     MEDICATIONS GIVEN:  ED Medication Orders (From admission, onward)    Start Ordered     Status Ordering Provider    09/28/20 2245 09/28/20 2230  methylPREDNISolone sodium (SOLU-MEDROL) injection 125 mg  ONCE      Last MAR action:  Given - by Ivonne Caballero on 09/28/20 at [address], [address]    09/28/20 2237 09/28/20 2238  albuterol (PROVENTIL) nebulizer solution 2.5 mg  EVERY 6 HOURS PRN      Last MAR action:  Given - [facility] Zachery MERA [facility] - Abnormal; Notable for the following components:       Result Value    Glucose 128 (*)     All other components within normal limits   CBC WITH AUTO DIFFERENTIAL - Abnormal; Notable for the following components:

## 2020-10-01 ASSESSMENT — ENCOUNTER SYMPTOMS
ABDOMINAL PAIN: 1
VOMITING: 0
NAUSEA: 0
SHORTNESS OF BREATH: 0

## 2020-10-02 NOTE — ED PROVIDER NOTES
[facility] ED  Emergency Department Encounter  Emergency Medicine Resident     Pt Name: Glenys Carbone  MRN: [id number]  [place] 1990  Date of evaluation: 10/1/20  PCP:  No primary care provider on file. CHIEF COMPLAINT       Abdominal pain      HISTORY OFPRESENT ILLNESS  (Location/Symptom, Timing/Onset, Context/Setting, Quality, Duration, Modifying Factors,Severity.)      Glenys Carbone is a 26year old female who presents with abdominal pain since this morning. Patient reports that the pain has been constant is located in the epigastric region. Denies any nausea/vomiting or fevers at home. No urinary complaints. No vaginal discharge or bleeding. PAST MEDICAL / SURGICAL / SOCIAL / FAMILY HISTORY      has a past medical history of Asthma, Back pain, Bipolar 1 disorder ([facility] [address] Bipolar disorder ([facility] [address] Depression, Diabetes mellitus ([facility] [address] Dizziness, Fatty liver disease, nonalcoholic, Fatty liver disease, nonalcoholic, GERD (gastroesophageal reflux disease), and Obesity. has a past surgical history that includes  section and LEEP.      Social History     Socioeconomic History    Marital status:      Spouse name: Not on file    Number of children: Not on file    Years of education: Not on file    Highest education level: Not on file   Occupational History    Not on file   Social Needs    Financial resource strain: Not on file    Food insecurity     Worry: Not on file     Inability: Not on file    Transportation needs     Medical: Not on file     Non-medical: Not on file   Tobacco Use    Smoking status: Current Some Day Smoker     Packs/day: 0.25     Years: 14.00     Pack years: 3.50     Types: Cigarettes    Smokeless tobacco: Never Used    Tobacco comment: pt accepting of nicotine gum   Substance and Sexual Activity    Alcohol use: Not on file     Comment: rarely    Drug use: Not Currently     Types: Marijuana     Comment: quit    Sexual activity: Not Currently   Lifestyle    Physical activity     Days per week: Not on file     Minutes per session: Not on file    Stress: Not on file   Relationships    Social connections     Talks on phone: Not on file     Gets together: Not on file     Attends Gnosticism service: Not on file     Active member of club or organization: Not on file     Attends meetings of clubs or organizations: Not on file     Relationship status: Not on file    Intimate partner violence     Fear of current or ex partner: Not on file     Emotionally abused: Not on file     Physically abused: Not on file     Forced sexual activity: Not on file   Other Topics Concern    Not on file   Social History Narrative    Not on file       Family History   Problem Relation Age of Onset    High Blood Pressure Mother     Diabetes Mother     Heart Disease Mother     Heart Disease Father     Early Death Father     Cancer Maternal Aunt         lung    Cancer Paternal Cousin         brain        Allergies:  Morphine; Pcn [penicillins]; Amoxicillin; and Seasonal    Home Medications:  Prior to Admission medications    Medication Sig Start Date End Date Taking?  Authorizing Provider   ondansetron (ZOFRAN ODT) 4 MG disintegrating tablet Take 1 tablet by mouth every 8 hours as needed for Nausea 9/26/20  Yes George Harley MD   ibuprofen (ADVIL;MOTRIN) 800 MG tablet Take 1 tablet by mouth every 8 hours as needed for Pain 9/26/20  Yes George Harley MD   famotidine (PEPCID) 20 MG tablet Take 1 tablet by mouth 2 times daily 9/26/20  Yes George Harley MD   aluminum & magnesium hydroxide-simethicone (MAALOX ADVANCED MAX ST) 400-400-40 MG/5ML SUSP Take 30 mLs by mouth 3 times daily as needed (heartburn/indigestion) 9/26/20  Yes George Harley MD   albuterol sulfate HFA (PROVENTIL HFA) 108 (90 Base) MCG/ACT inhaler Inhale 1-2 puffs into the lungs every 4 hours as needed for Wheezing or Shortness of Breath (Space out to every 6 hours as symptoms improve) Space out to every 6 hours as symptoms improve. 9/29/20   Madhav Braone MD   predniSONE (DELTASONE) 50 MG tablet Take 1 tablet by mouth daily for 4 days 9/29/20 10/3/20  EMMA Shelton MD   sertraline (ZOLOFT) 50 MG tablet Take 1 tablet by mouth daily 8/16/20   Elton Griffin APRN - CNP   traZODone (DESYREL) 50 MG tablet Take 1 tablet by mouth nightly as needed for Sleep 8/16/20   Elton Griffin APRN - CNP   cariprazine hcl (VRAYLAR) 1.5 MG capsule Take 1 capsule by mouth daily 8/17/20   Elton Griffin APRN - CNP   metFORMIN (GLUCOPHAGE) 500 MG tablet Take 1 tablet by mouth 2 times daily (with meals) TAKE 1 TABLET BY MOUTH DAILY 12/11/17   Mita Forde MD       REVIEW OFSYSTEMS    (2-9 systems for level 4, 10 or more for level 5)      Review of Systems   Constitutional: Negative for chills and fever. Respiratory: Negative for shortness of breath. Cardiovascular: Positive for chest pain. Gastrointestinal: Positive for abdominal pain. Negative for nausea and vomiting. Genitourinary: Negative for difficulty urinating, dysuria, vaginal bleeding and vaginal discharge. Skin: Negative for wound. Neurological: Negative for light-headedness and headaches. PHYSICAL EXAM   (up to 7 for level 4, 8 or more forlevel 5)      INITIAL VITALS:   ED Triage Vitals [09/26/20 0706]   BP Temp Temp src Pulse Resp SpO2 Height Weight   -- -- -- 92 18 94 % -- --       Physical Exam  Constitutional:       General: She is not in acute distress. Appearance: She is well-developed. She is not diaphoretic. HENT:      Head: Normocephalic and atraumatic. Eyes:      Conjunctiva/sclera: Conjunctivae normal.      Pupils: Pupils are equal, round, and reactive to light. Neck:      Musculoskeletal: Neck supple. Cardiovascular:      Rate and Rhythm: Normal rate and regular rhythm. Heart sounds: No murmur. No friction rub. No gallop.     Pulmonary:      Effort: Pulmonary effort is normal. No respiratory distress. Breath sounds: Normal breath sounds. No wheezing or rales. Abdominal:      General: There is no distension. Palpations: Abdomen is soft. Tenderness: There is no abdominal tenderness. There is no guarding. Musculoskeletal:      Comments: No lower extremity edema    Skin:     General: Skin is warm. Neurological:      Mental Status: She is alert and oriented to person, place, and time. DIFFERENTIAL  DIAGNOSIS     PLAN (LABS / IMAGING / EKG):  Orders Placed This Encounter   Procedures    XR CHEST PORTABLE    EKG 12 Lead    EKG REPORT       MEDICATIONS ORDERED:  Orders Placed This Encounter   Medications    DISCONTD: morphine 4 MG/ML injection     Tendarion Valdezars: cabinet override    ondansetron (ZOFRAN ODT) 4 MG disintegrating tablet     Sig: Take 1 tablet by mouth every 8 hours as needed for Nausea     Dispense:  10 tablet     Refill:  0    ibuprofen (ADVIL;MOTRIN) 800 MG tablet     Sig: Take 1 tablet by mouth every 8 hours as needed for Pain     Dispense:  30 tablet     Refill:  0    famotidine (PEPCID) 20 MG tablet     Sig: Take 1 tablet by mouth 2 times daily     Dispense:  60 tablet     Refill:  0    aluminum & magnesium hydroxide-simethicone (MAALOX ADVANCED MAX ST) 609-706-91 MG/5ML SUSP     Sig: Take 30 mLs by mouth 3 times daily as needed (heartburn/indigestion)     Dispense:  355 mL     Refill:  0         Initial MDM/Plan: 26 y.o. female who presents with abdominal pain. The patient was afebrile on arrival.  Heart rate in the 90s. Saturating well on room air. On physical exam, she had mild tenderness palpation epigastric region. No lower abdominal tenderness. Will get basic lab work and cardiac work-up given the patient's complaint of chest pain on review of systems. DIAGNOSTIC RESULTS / EMERGENCYDEPARTMENT COURSE / MDM     LABS:  Labs Reviewed - No data to display      RADIOLOGY:  No results found.       EKG  EKG Interpretation    Interpreted by emergency department physician    Rhythm: normal sinus   Rate: normal  Axis: normal  Ectopy: none  Conduction: normal  ST Segments: no acute change  T Waves: no acute change and normal  Q Waves: none    Clinical Impression: no acute changes    [place]      All EKG's are interpreted by the Emergency Department Physicianwho either signs or Co-signs this chart in the absence of a cardiologist.    EMERGENCY DEPARTMENT COURSE:      Pregnancy test negative. Troponin within normal limits. Electrolytes within normal limits as well. D-dimer negative. The patient was discharged in stable condition. She was given strict return precautions. PROCEDURES:  None    CONSULTS:  None    CRITICAL CARE:  Please see attending note    FINAL IMPRESSION      1. Chest pain, unspecified type    2. Acute gastritis without hemorrhage, unspecified gastritis type          DISPOSITION / PLAN     DISPOSITION Decision To Discharge 09/26/2020 07:38:25 AM      PATIENT REFERRED TO:  [facility]  [phone]  Call in 2 days  To establish a PCP and follow-up.       DISCHARGE MEDICATIONS:  Discharge Medication List as of 9/26/2020  7:40 AM      START taking these medications    Details   ondansetron (ZOFRAN ODT) 4 MG disintegrating tablet Take 1 tablet by mouth every 8 hours as needed for Nausea, Disp-10 tablet,R-0Print      ibuprofen (ADVIL;MOTRIN) 800 MG tablet Take 1 tablet by mouth every 8 hours as needed for Pain, Disp-30 tablet,R-0Print      famotidine (PEPCID) 20 MG tablet Take 1 tablet by mouth 2 times daily, Disp-60 tablet,R-0Print      aluminum & magnesium hydroxide-simethicone (MAALOX ADVANCED MAX ST) 400-400-40 MG/5ML SUSP Take 30 mLs by mouth 3 times daily as needed (heartburn/indigestion), Disp-355 mL,R-0Print             Shanon Galicia MD  Emergency Medicine Resident    (Please note that portions of this note were completed with a voice recognition program.Efforts were made to edit the dictations but occasionally words are mis-transcribed.)        Miguel Donaldson MD  Resident  10/01/20 1057

## 2021-01-25 ENCOUNTER — APPOINTMENT (OUTPATIENT)
Dept: CT IMAGING | Age: 31
End: 2021-01-25
Payer: COMMERCIAL

## 2021-01-25 ENCOUNTER — APPOINTMENT (OUTPATIENT)
Dept: GENERAL RADIOLOGY | Age: 31
End: 2021-01-25
Payer: COMMERCIAL

## 2021-01-25 ENCOUNTER — HOSPITAL ENCOUNTER (EMERGENCY)
Age: 31
Discharge: HOME | End: 2021-01-26
Attending: EMERGENCY MEDICINE
Payer: COMMERCIAL

## 2021-01-25 VITALS
SYSTOLIC BLOOD PRESSURE: 157 MMHG | OXYGEN SATURATION: 99 % | BODY MASS INDEX: 48.82 KG/M2 | HEART RATE: 116 BPM | WEIGHT: 293 LBS | DIASTOLIC BLOOD PRESSURE: 109 MMHG | RESPIRATION RATE: 16 BRPM | TEMPERATURE: 98.4 F | HEIGHT: 65 IN

## 2021-01-25 DIAGNOSIS — J18.9 COMMUNITY ACQUIRED PNEUMONIA, UNSPECIFIED LATERALITY: Primary | ICD-10-CM

## 2021-01-25 DIAGNOSIS — J45.41 MODERATE PERSISTENT ASTHMA WITH EXACERBATION: ICD-10-CM

## 2021-01-25 LAB
ABSOLUTE EOS #: 0.97 K/UL (ref 0–0.44)
ABSOLUTE IMMATURE GRANULOCYTE: 0.06 K/UL (ref 0–0.3)
ABSOLUTE LYMPH #: 2.73 K/UL (ref 1.1–3.7)
ABSOLUTE MONO #: 0.94 K/UL (ref 0.1–1.2)
ALBUMIN SERPL-MCNC: 3.8 G/DL (ref 3.5–5.2)
ALBUMIN/GLOBULIN RATIO: 1.1 (ref 1–2.5)
ALLEN TEST: ABNORMAL
ALP BLD-CCNC: 98 U/L (ref 35–104)
ALT SERPL-CCNC: 24 U/L (ref 5–33)
ANION GAP SERPL CALCULATED.3IONS-SCNC: 12 MMOL/L (ref 9–17)
AST SERPL-CCNC: 34 U/L
BASOPHILS # BLD: 1 % (ref 0–2)
BASOPHILS ABSOLUTE: 0.09 K/UL (ref 0–0.2)
BILIRUB SERPL-MCNC: 0.21 MG/DL (ref 0.3–1.2)
BUN BLDV-MCNC: 7 MG/DL (ref 6–20)
BUN/CREAT BLD: ABNORMAL (ref 9–20)
C-REACTIVE PROTEIN: 14.6 MG/L (ref 0–5)
CALCIUM SERPL-MCNC: 9.3 MG/DL (ref 8.6–10.4)
CARBOXYHEMOGLOBIN: 2.5 % (ref 0–5)
CHLORIDE BLD-SCNC: 104 MMOL/L (ref 98–107)
CO2: 23 MMOL/L (ref 20–31)
CREAT SERPL-MCNC: 0.8 MG/DL (ref 0.5–0.9)
D-DIMER QUANTITATIVE: 0.35 MG/L FEU
DIFFERENTIAL TYPE: ABNORMAL
EOSINOPHILS RELATIVE PERCENT: 7 % (ref 1–4)
FERRITIN: 64 UG/L (ref 13–150)
FIO2: ABNORMAL
GFR AFRICAN AMERICAN: >60 ML/MIN
GFR NON-AFRICAN AMERICAN: >60 ML/MIN
GFR SERPL CREATININE-BSD FRML MDRD: ABNORMAL ML/MIN/{1.73_M2}
GFR SERPL CREATININE-BSD FRML MDRD: ABNORMAL ML/MIN/{1.73_M2}
GLUCOSE BLD-MCNC: 96 MG/DL (ref 70–99)
HCG QUALITATIVE: NEGATIVE
HCO3 VENOUS: 25.9 MMOL/L (ref 24–30)
HCT VFR BLD CALC: 42.2 % (ref 36.3–47.1)
HEMOGLOBIN: 13 G/DL (ref 11.9–15.1)
IMMATURE GRANULOCYTES: 0 %
LACTATE DEHYDROGENASE: 353 U/L (ref 135–214)
LACTIC ACID, WHOLE BLOOD: 1.4 MMOL/L (ref 0.7–2.1)
LACTIC ACID: NORMAL MMOL/L
LIPASE: 29 U/L (ref 13–60)
LYMPHOCYTES # BLD: 20 % (ref 24–43)
MCH RBC QN AUTO: 27.9 PG (ref 25.2–33.5)
MCHC RBC AUTO-ENTMCNC: 30.8 G/DL (ref 28.4–34.8)
MCV RBC AUTO: 90.6 FL (ref 82.6–102.9)
METHEMOGLOBIN: ABNORMAL % (ref 0–1.5)
MODE: ABNORMAL
MONOCYTES # BLD: 7 % (ref 3–12)
NEGATIVE BASE EXCESS, VEN: 0.6 MMOL/L (ref 0–2)
NOTIFICATION TIME: ABNORMAL
NOTIFICATION: ABNORMAL
NRBC AUTOMATED: 0 PER 100 WBC
O2 DEVICE/FLOW/%: ABNORMAL
O2 SAT, VEN: 82.7 % (ref 60–85)
OXYHEMOGLOBIN: ABNORMAL % (ref 95–98)
PATIENT TEMP: 37
PCO2, VEN, TEMP ADJ: ABNORMAL MMHG (ref 39–55)
PCO2, VEN: 52.9 (ref 39–55)
PDW BLD-RTO: 13.2 % (ref 11.8–14.4)
PEEP/CPAP: ABNORMAL
PH VENOUS: 7.31 (ref 7.32–7.42)
PH, VEN, TEMP ADJ: ABNORMAL (ref 7.32–7.42)
PLATELET # BLD: ABNORMAL K/UL (ref 138–453)
PLATELET ESTIMATE: ABNORMAL
PLATELET, FLUORESCENCE: NORMAL K/UL (ref 138–453)
PLATELET, IMMATURE FRACTION: 1.4 % (ref 1.1–10.3)
PMV BLD AUTO: ABNORMAL FL (ref 8.1–13.5)
PO2, VEN, TEMP ADJ: ABNORMAL MMHG (ref 30–50)
PO2, VEN: 52.4 (ref 30–50)
POSITIVE BASE EXCESS, VEN: ABNORMAL MMOL/L (ref 0–2)
POTASSIUM SERPL-SCNC: 4.2 MMOL/L (ref 3.7–5.3)
PROCALCITONIN: 0.05 NG/ML
PSV: ABNORMAL
PT. POSITION: ABNORMAL
RBC # BLD: 4.66 M/UL (ref 3.95–5.11)
RBC # BLD: ABNORMAL 10*6/UL
RESPIRATORY RATE: ABNORMAL
SAMPLE SITE: ABNORMAL
SARS-COV-2, RAPID: NOT DETECTED
SARS-COV-2: NORMAL
SARS-COV-2: NORMAL
SEG NEUTROPHILS: 66 % (ref 36–65)
SEGMENTED NEUTROPHILS ABSOLUTE COUNT: 9.08 K/UL (ref 1.5–8.1)
SET RATE: ABNORMAL
SODIUM BLD-SCNC: 139 MMOL/L (ref 135–144)
SOURCE: NORMAL
TEXT FOR RESPIRATORY: ABNORMAL
TOTAL HB: ABNORMAL G/DL (ref 12–16)
TOTAL PROTEIN: 7.3 G/DL (ref 6.4–8.3)
TOTAL RATE: ABNORMAL
TROPONIN INTERP: NORMAL
TROPONIN T: NORMAL NG/ML
TROPONIN, HIGH SENSITIVITY: <6 NG/L (ref 0–14)
VT: ABNORMAL
WBC # BLD: 13.9 K/UL (ref 3.5–11.3)
WBC # BLD: ABNORMAL 10*3/UL

## 2021-01-25 PROCEDURE — 2580000003 HC RX 258: Performed by: STUDENT IN AN ORGANIZED HEALTH CARE EDUCATION/TRAINING PROGRAM

## 2021-01-25 PROCEDURE — 83605 ASSAY OF LACTIC ACID: CPT

## 2021-01-25 PROCEDURE — 84145 PROCALCITONIN (PCT): CPT

## 2021-01-25 PROCEDURE — 83615 LACTATE (LD) (LDH) ENZYME: CPT

## 2021-01-25 PROCEDURE — 86140 C-REACTIVE PROTEIN: CPT

## 2021-01-25 PROCEDURE — 82805 BLOOD GASES W/O2 SATURATION: CPT

## 2021-01-25 PROCEDURE — 85055 RETICULATED PLATELET ASSAY: CPT

## 2021-01-25 PROCEDURE — 99285 EMERGENCY DEPT VISIT HI MDM: CPT

## 2021-01-25 PROCEDURE — 82728 ASSAY OF FERRITIN: CPT

## 2021-01-25 PROCEDURE — 84703 CHORIONIC GONADOTROPIN ASSAY: CPT

## 2021-01-25 PROCEDURE — 83690 ASSAY OF LIPASE: CPT

## 2021-01-25 PROCEDURE — 71045 X-RAY EXAM CHEST 1 VIEW: CPT

## 2021-01-25 PROCEDURE — 36415 COLL VENOUS BLD VENIPUNCTURE: CPT

## 2021-01-25 PROCEDURE — 6360000004 HC RX CONTRAST MEDICATION: Performed by: STUDENT IN AN ORGANIZED HEALTH CARE EDUCATION/TRAINING PROGRAM

## 2021-01-25 PROCEDURE — 85379 FIBRIN DEGRADATION QUANT: CPT

## 2021-01-25 PROCEDURE — 94640 AIRWAY INHALATION TREATMENT: CPT

## 2021-01-25 PROCEDURE — 71260 CT THORAX DX C+: CPT

## 2021-01-25 PROCEDURE — 85025 COMPLETE CBC W/AUTO DIFF WBC: CPT

## 2021-01-25 PROCEDURE — 6360000002 HC RX W HCPCS: Performed by: STUDENT IN AN ORGANIZED HEALTH CARE EDUCATION/TRAINING PROGRAM

## 2021-01-25 PROCEDURE — 93005 ELECTROCARDIOGRAM TRACING: CPT | Performed by: STUDENT IN AN ORGANIZED HEALTH CARE EDUCATION/TRAINING PROGRAM

## 2021-01-25 PROCEDURE — 80053 COMPREHEN METABOLIC PANEL: CPT

## 2021-01-25 PROCEDURE — 84484 ASSAY OF TROPONIN QUANT: CPT

## 2021-01-25 PROCEDURE — 74177 CT ABD & PELVIS W/CONTRAST: CPT

## 2021-01-25 PROCEDURE — U0002 COVID-19 LAB TEST NON-CDC: HCPCS

## 2021-01-25 PROCEDURE — 96374 THER/PROPH/DIAG INJ IV PUSH: CPT

## 2021-01-25 RX ORDER — IPRATROPIUM BROMIDE AND ALBUTEROL SULFATE 2.5; .5 MG/3ML; MG/3ML
1 SOLUTION RESPIRATORY (INHALATION) EVERY 4 HOURS PRN
Status: DISCONTINUED | OUTPATIENT
Start: 2021-01-25 | End: 2021-01-26 | Stop reason: HOSPADM

## 2021-01-25 RX ORDER — ALBUTEROL SULFATE 2.5 MG/3ML
2.5 SOLUTION RESPIRATORY (INHALATION) EVERY 6 HOURS PRN
Status: DISCONTINUED | OUTPATIENT
Start: 2021-01-25 | End: 2021-01-26 | Stop reason: HOSPADM

## 2021-01-25 RX ORDER — DEXAMETHASONE SODIUM PHOSPHATE 10 MG/ML
10 INJECTION INTRAMUSCULAR; INTRAVENOUS ONCE
Status: COMPLETED | OUTPATIENT
Start: 2021-01-25 | End: 2021-01-25

## 2021-01-25 RX ORDER — SODIUM CHLORIDE 9 MG/ML
INJECTION, SOLUTION INTRAVENOUS ONCE
Status: COMPLETED | OUTPATIENT
Start: 2021-01-25 | End: 2021-01-25

## 2021-01-25 RX ADMIN — SODIUM CHLORIDE: 9 INJECTION, SOLUTION INTRAVENOUS at 19:23

## 2021-01-25 RX ADMIN — ALBUTEROL SULFATE 5 MG: 2.5 SOLUTION RESPIRATORY (INHALATION) at 18:48

## 2021-01-25 RX ADMIN — IPRATROPIUM BROMIDE 0.5 MG: 0.5 SOLUTION RESPIRATORY (INHALATION) at 18:48

## 2021-01-25 RX ADMIN — IOPAMIDOL 90 ML: 755 INJECTION, SOLUTION INTRAVENOUS at 23:13

## 2021-01-25 RX ADMIN — DEXAMETHASONE SODIUM PHOSPHATE 10 MG: 10 INJECTION INTRAMUSCULAR; INTRAVENOUS at 19:21

## 2021-01-25 ASSESSMENT — ENCOUNTER SYMPTOMS
ABDOMINAL PAIN: 1
DIARRHEA: 0
CONSTIPATION: 0
VOMITING: 0
COUGH: 1
CHEST TIGHTNESS: 0
NAUSEA: 0
SHORTNESS OF BREATH: 1
BACK PAIN: 1
COLOR CHANGE: 0
WHEEZING: 1

## 2021-01-25 ASSESSMENT — PAIN DESCRIPTION - LOCATION: LOCATION: ABDOMEN;BACK

## 2021-01-25 ASSESSMENT — PAIN DESCRIPTION - PAIN TYPE: TYPE: ACUTE PAIN

## 2021-01-25 NOTE — PROGRESS NOTES
Rapid Covid 19 swab taken from {RIGHT nare, labeled, placed in red dot bag, and handed off to second healthcare worker outside of room for transport to laboratory per hospital policy and procedure.   Patient tolerated procedure ; WELL

## 2021-01-26 LAB
EKG ATRIAL RATE: 117 BPM
EKG P AXIS: 67 DEGREES
EKG P-R INTERVAL: 138 MS
EKG Q-T INTERVAL: 324 MS
EKG QRS DURATION: 84 MS
EKG QTC CALCULATION (BAZETT): 451 MS
EKG R AXIS: 40 DEGREES
EKG T AXIS: 55 DEGREES
EKG VENTRICULAR RATE: 117 BPM

## 2021-01-26 PROCEDURE — 93010 ELECTROCARDIOGRAM REPORT: CPT | Performed by: INTERNAL MEDICINE

## 2021-01-26 RX ORDER — ACETAMINOPHEN 500 MG
1000 TABLET ORAL 3 TIMES DAILY
Qty: 42 TABLET | Refills: 0 | Status: ON HOLD | OUTPATIENT
Start: 2021-01-26 | End: 2021-03-13 | Stop reason: ALTCHOICE

## 2021-01-26 RX ORDER — ONDANSETRON 2 MG/ML
4 INJECTION INTRAMUSCULAR; INTRAVENOUS ONCE
Status: DISCONTINUED | OUTPATIENT
Start: 2021-01-26 | End: 2021-01-26 | Stop reason: HOSPADM

## 2021-01-26 RX ORDER — ALBUTEROL SULFATE 90 UG/1
2 AEROSOL, METERED RESPIRATORY (INHALATION) EVERY 6 HOURS PRN
Qty: 2 INHALER | Refills: 0 | Status: SHIPPED | OUTPATIENT
Start: 2021-01-26 | End: 2021-08-12 | Stop reason: SDUPTHER

## 2021-01-26 RX ORDER — ONDANSETRON 4 MG/1
4 TABLET, FILM COATED ORAL EVERY 8 HOURS PRN
Qty: 10 TABLET | Refills: 0 | Status: ON HOLD | OUTPATIENT
Start: 2021-01-26 | End: 2021-03-17 | Stop reason: HOSPADM

## 2021-01-26 RX ORDER — DOXYCYCLINE HYCLATE 100 MG
100 TABLET ORAL 2 TIMES DAILY
Qty: 14 TABLET | Refills: 0 | Status: SHIPPED | OUTPATIENT
Start: 2021-01-26 | End: 2021-02-02

## 2021-01-26 RX ORDER — DOXYCYCLINE HYCLATE 100 MG
100 TABLET ORAL ONCE
Status: DISCONTINUED | OUTPATIENT
Start: 2021-01-26 | End: 2021-01-26 | Stop reason: HOSPADM

## 2021-01-26 RX ORDER — ACETAMINOPHEN 500 MG
1000 TABLET ORAL ONCE
Status: DISCONTINUED | OUTPATIENT
Start: 2021-01-26 | End: 2021-01-26 | Stop reason: HOSPADM

## 2021-01-26 RX ORDER — PREDNISONE 20 MG/1
40 TABLET ORAL DAILY
Qty: 10 TABLET | Refills: 0 | Status: SHIPPED | OUTPATIENT
Start: 2021-01-27 | End: 2021-02-01

## 2021-01-26 NOTE — ED PROVIDER NOTES
[facility]  ED  Emergency Department  Emergency Medicine Resident Sign-out     Care of Frank Shin was assumed from Dr. Roger Barrera and is being seen for Shortness of Breath, Abdominal Pain, Back Pain, and Asthma  . The patient's initial evaluation and plan have been discussed with the prior provider who initially evaluated the patient. EMERGENCY DEPARTMENT COURSE / MEDICAL DECISION MAKING:       MEDICATIONS GIVEN:  Orders Placed This Encounter   Medications    ipratropium-albuterol (DUONEB) nebulizer solution 1 ampule    0.9 % sodium chloride infusion    albuterol (PROVENTIL) nebulizer solution 2.5 mg    ipratropium (ATROVENT) 0.02 % nebulizer solution 0.5 mg    dexamethasone (DECADRON) injection 10 mg    iopamidol (ISOVUE-370) 76 % injection 90 mL    doxycycline hyclate (VIBRA-TABS) tablet 100 mg     Order Specific Question:   Antimicrobial Indications     Answer:   Pneumonia (CAP)    acetaminophen (TYLENOL) tablet 1,000 mg    ondansetron (ZOFRAN) injection 4 mg    albuterol sulfate HFA (PROVENTIL HFA) 108 (90 Base) MCG/ACT inhaler     Sig: Inhale 2 puffs into the lungs every 6 hours as needed for Wheezing or Shortness of Breath (Space out to every 6 hours as symptoms improve) Space out to every 6 hours as symptoms improve.      Dispense:  2 Inhaler     Refill:  0    doxycycline hyclate (VIBRA-TABS) 100 MG tablet     Sig: Take 1 tablet by mouth 2 times daily for 7 days     Dispense:  14 tablet     Refill:  0    acetaminophen (TYLENOL) 500 MG tablet     Sig: Take 2 tablets by mouth 3 times daily for 7 days     Dispense:  42 tablet     Refill:  0    ondansetron (ZOFRAN) 4 MG tablet     Sig: Take 1 tablet by mouth every 8 hours as needed for Nausea     Dispense:  10 tablet     Refill:  0    predniSONE (DELTASONE) 20 MG tablet     Sig: Take 2 tablets by mouth daily for 5 days Take 4 tablets by mouth once daily for 5 days     Dispense:  10 tablet     Refill:  0       LABS / RADIOLOGY:     Labs Reviewed   CBC WITH AUTO DIFFERENTIAL - Abnormal; Notable for the following components:       Result Value    WBC 13.9 (*)     Seg Neutrophils 66 (*)     Lymphocytes 20 (*)     Eosinophils % 7 (*)     Segs Absolute 9.08 (*)     Absolute Eos # 0.97 (*)     All other components within normal limits   COMPREHENSIVE METABOLIC PANEL W/ REFLEX TO MG FOR LOW K - Abnormal; Notable for the following components:    AST 34 (*)     Total Bilirubin 0.21 (*)     All other components within normal limits   C-REACTIVE PROTEIN - Abnormal; Notable for the following components:    CRP 14.6 (*)     All other components within normal limits   BLOOD GAS, VENOUS - Abnormal; Notable for the following components:    pH, Kirt 7.311 (*)     pO2, Kirt 52.4 (*)     All other components within normal limits   LACTATE DEHYDROGENASE - Abnormal; Notable for the following components:     (*)     All other components within normal limits   LIPASE   TROPONIN   D-DIMER, QUANTITATIVE   PROCALCITONIN   LACTIC ACID, PLASMA   HCG, SERUM, QUALITATIVE   COVID-19   FERRITIN   IMMATURE PLATELET FRACTION       Xr Chest Portable    Result Date: 1/25/2021  EXAMINATION: ONE XRAY VIEW OF THE CHEST 1/25/2021 6:53 pm COMPARISON: September 28, 2020 HISTORY: ORDERING SYSTEM PROVIDED HISTORY: shortness of breath, hypoxia, chills,concern for covid TECHNOLOGIST PROVIDED HISTORY: shortness of breath, hypoxia, chills,concern for covid Reason for Exam: upr,hx asthma FINDINGS: Lungs are clear. No cardiomegaly. No pulmonary edema. Negative chest radiograph. RECENT VITALS:     Temp: 98.4 °F (36.9 °C),  Pulse: 116, Resp: 16, BP: (!) 157/109, SpO2: 99 %    This patient is a 26 y.o. Female with 3 days of fatigue, fever, right lower quadrant abdominal pain. History of asthma, has been taking her inhaler intermittently but sparingly given she is low on them.   P patient still has several doses at home, however has been trying to spread them out to make them last.  Patient arrives hypoxic in the mid 80s, placed on 5 L nasal cannula and satting 99%. Patient now off oxygen. Covid negative, D-dimer negative, however given tachycardia and hypoxia CT PE ordered. Given right lower quadrant jovanny pain leukocytosis, CT abdomen pelvis added as well. Awaiting scans, likely discharge pending scans and continued toleration of room air. Reevaluated patient. Patient satting 97% on room air. Lung sounds clear to auscultation bilaterally. Patient states she is feeling considerably better. Patient was happy to know that her CT abdomen and pelvis was negative for appendicitis and that she did not have a blood clot. Patient was complaining of some minor abdominal pain and requested Tylenol. Patient safe for discharge at this time. Gave prescriptions for prednisone, doxycycline to treat for atypical pneumonia coverage due to findings on CT scan, prednisone, Tylenol, and some Zofran patient states that she sometimes gets nauseated with steroids. ED Course as of Jan 26 0153   Mon Jan 25, 2021 1919 SARS-CoV-2, Rapid: Not Detected [JG]   2112 Reevaluated, no acute distress, tachypnea improved, satting 93-94% on room air. Pending CT scans, may discharge with steroids and refills for inhalers. Patient states she does have an inhaler at home, but it was almost out. Patient comfortable at home if scans are negative. [JG]      ED Course User Index  [JG] Suzie Gold,        OUTSTANDING TASKS / RECOMMENDATIONS:    1. Re-eval after scans  2. Albuterol at dc  3. Steroids at dc     FINAL IMPRESSION:     1. Community acquired pneumonia, unspecified laterality    2.  Moderate persistent asthma with exacerbation        DISPOSITION:         DISPOSITION:  [x]  Discharge   []  Transfer -    []  Admission -     []  Against Medical Advice   []  Eloped   FOLLOW-UP: [address] [facility] 22 Primary Care  [address] [place] [address]  [phone]        [facility]  [place], [place]  [phone]         DISCHARGE MEDICATIONS: Discharge Medication List as of 1/26/2021 12:41 AM      START taking these medications    Details   doxycycline hyclate (VIBRA-TABS) 100 MG tablet Take 1 tablet by mouth 2 times daily for 7 days, Disp-14 tablet, R-0Print      acetaminophen (TYLENOL) 500 MG tablet Take 2 tablets by mouth 3 times daily for 7 days, Disp-42 tablet, R-0Print      ondansetron (ZOFRAN) 4 MG tablet Take 1 tablet by mouth every 8 hours as needed for Nausea, Disp-10 tablet, R-0Print      predniSONE (DELTASONE) 20 MG tablet Take 2 tablets by mouth daily for 5 days Take 4 tablets by mouth once daily for 5 days, Disp-10 tablet, R-0Print                 Angelina Tao MD  Emergency Medicine Resident  [facility]       Angelina Tao MD  01/26/21 0910

## 2021-01-26 NOTE — FLOWSHEET NOTE
SPIRITUAL CARE DEPARTMENT - [facility]  PROGRESS NOTE    Shift date: 1.25.2021  Shift day: Monday   Shift # 2    Room # 49PED/49PED   Name: Genie Payne            Age: 26 y.o. Gender: female          Sabianism: Unknown   Place of [facility]: Unknown    Referral: Routine Visit    Admit Date & Time: 1/25/2021  6:27 PM    PATIENT/EVENT DESCRIPTION:  Genie Payne is a 26 y.o. female who complains of pain in her side. SPIRITUAL ASSESSMENT/INTERVENTION:  Patient appeared somewhat uncomfortable and solemn. Patient states her side is hurting her and has been for several days. Her  is aware that she is in the hospital but not bedside. Patient seems to be trying to cope with her pain but appears anxious and concerns.  provided space for patient to express feelings, thoughts, and concerns. Determined support to be available. SPIRITUAL CARE FOLLOW-UP PLAN:  Chaplains will remain available to offer spiritual and emotional support as needed. Electronically signed by Yudith Freeman on 1/25/2021 at 9:57 PM.  [address]  [phone]       01/25/21 2130   Encounter Summary   Services provided to: Patient   Referral/Consult From: [employer] Spouse   Continue Visiting   ([phone])   Complexity of Encounter Moderate   Length of Encounter 15 minutes   Spiritual Assessment Completed Yes   Routine   Type Initial   Assessment Calm; Approachable;Coping   Intervention Active listening;Explored feelings, thoughts, concerns;Explored coping resources; Discussed illness/injury and it's impact   Outcome Expressed feelings/needs/concerns     Electronically signed by Carlos Medellin on 1/25/2021 at 9:57 PM

## 2021-01-26 NOTE — ED PROVIDER NOTES
[facility]     Emergency Department     Faculty Attestation    I performed a history and physical examination of the patient and discussed management with the resident. I reviewed the residents note and agree with the documented findings and plan of care. Any areas of disagreement are noted on the chart. I was personally present for the key portions of any procedures. I have documented in the chart those procedures where I was not present during the key portions. I have reviewed the emergency nurses triage note. I agree with the chief complaint, past medical history, past surgical history, allergies, medications, social and family history as documented unless otherwise noted below. For Physician Assistant/ Nurse Practitioner cases/documentation I have personally evaluated this patient and have completed at least one if not all key elements of the E/M (history, physical exam, and MDM). Additional findings are as noted. I have personally seen and evaluated the patient. I find the patient's history and physical exam are consistent with the NP/PA documentation. I agree with the care provided, treatment rendered, disposition and follow-up plan. Patient is reporting shortness of breath history of asthma bilateral wheezes noted on exam patient is also noted to be moderately hypoxic afebrile here but with diffuse myalgias suspicion for COVID-19 cannot exclude pulmonary embolism due to hypoxia and tachycardia as well. Critical Care     Anahy No M.D.   Attending Emergency  Physician              Isaac Larios MD  01/25/21 4366

## 2021-01-26 NOTE — ED TRIAGE NOTES
Pt arrived to the ED with c/o shortness of breath. Asthma flare up, and body aches. Pt states she has felt like this for three days and her albuterol treatments are not helping at home. Pt is wheezing upon arrival. 5L placed on pt EKG completed IV established and pt placed on monitor.

## 2021-01-26 NOTE — ED PROVIDER NOTES
[facility]  ED  Emergency Department Encounter  Emergency Medicine Resident     Pt Name: Franki Uribe  MRN: [id number]  [place] 1990  Date of evaluation: 21  PCP:  No primary care provider on file. CHIEF COMPLAINT       Chief Complaint   Patient presents with    Shortness of Breath    Abdominal Pain    Back Pain    Asthma       HISTORY OFPRESENT ILLNESS  (Location/Symptom, Timing/Onset, Context/Setting, Quality, Duration, Modifying Factors,Severity.)      Franki Uribe is a 26 y.o. female who presents with shortness of breath, asthma, back pain, right lower quadrant abdominal pain. Patient states she has felt short of breath for several days, and has been getting worse and as she has belly pain back pain associated with it. Patient has audible wheezing, last used her inhaler this morning. No known sick contacts, however patient states she has been feeling ill, but afebrile. PAST MEDICAL / SURGICAL / SOCIAL / FAMILY HISTORY      has a past medical history of Asthma, Back pain, Bipolar 1 disorder ([address] Bipolar disorder ([address] Depression, Diabetes mellitus ([address] Dizziness, Fatty liver disease, nonalcoholic, Fatty liver disease, nonalcoholic, GERD (gastroesophageal reflux disease), and Obesity. has a past surgical history that includes  section and LEEP.      Social History     Socioeconomic History    Marital status:      Spouse name: Not on file    Number of children: Not on file    Years of education: Not on file    Highest education level: Not on file   Occupational History    Not on file   Social Needs    Financial resource strain: Not on file    Food insecurity     Worry: Not on file     Inability: Not on file    Transportation needs     Medical: Not on file     Non-medical: Not on file   Tobacco Use    Smoking status: Current Some Day Smoker     Packs/day: 0.25     Years: 14.00     Pack years: 3.50     Types: Cigarettes    Smokeless tobacco: Never Used    Tobacco comment: pt accepting of nicotine gum   Substance and Sexual Activity    Alcohol use: Not on file     Comment: rarely    Drug use: Not Currently     Types: Marijuana     Comment: quit    Sexual activity: Not Currently   Lifestyle    Physical activity     Days per week: Not on file     Minutes per session: Not on file    Stress: Not on file   Relationships    Social connections     Talks on phone: Not on file     Gets together: Not on file     Attends Holiness service: Not on file     Active member of club or organization: Not on file     Attends meetings of clubs or organizations: Not on file     Relationship status: Not on file    Intimate partner violence     Fear of current or ex partner: Not on file     Emotionally abused: Not on file     Physically abused: Not on file     Forced sexual activity: Not on file   Other Topics Concern    Not on file   Social History Narrative    Not on file       Family History   Problem Relation Age of Onset    High Blood Pressure Mother     Diabetes Mother     Heart Disease Mother     Heart Disease Father     Early Death Father     Cancer Maternal Aunt         lung    Cancer Paternal Cousin         brain        Allergies:  Morphine, Pcn [penicillins], Amoxicillin, and Seasonal    Home Medications:  Prior to Admission medications    Medication Sig Start Date End Date Taking? Authorizing Provider   albuterol sulfate HFA (PROVENTIL HFA) 108 (90 Base) MCG/ACT inhaler Inhale 2 puffs into the lungs every 6 hours as needed for Wheezing or Shortness of Breath (Space out to every 6 hours as symptoms improve) Space out to every 6 hours as symptoms improve.  1/26/21  Yes Edmond Varner MD   doxycycline hyclate (VIBRA-TABS) 100 MG tablet Take 1 tablet by mouth 2 times daily for 7 days 1/26/21 2/2/21 Yes Edmond Varner MD   acetaminophen (TYLENOL) 500 MG tablet Take 2 tablets by mouth 3 times daily for 7 days 1/26/21 2/2/21 Yes Angelina Tao MD   ondansetron (ZOFRAN) 4 MG tablet Take 1 tablet by mouth every 8 hours as needed for Nausea 1/26/21  Yes Angelina Tao MD   predniSONE (DELTASONE) 20 MG tablet Take 2 tablets by mouth daily for 5 days Take 4 tablets by mouth once daily for 5 days 1/27/21 2/1/21 Yes Angelina Tao MD   ibuprofen (ADVIL;MOTRIN) 800 MG tablet Take 1 tablet by mouth every 8 hours as needed for Pain 9/26/20   Zari Azul MD   famotidine (PEPCID) 20 MG tablet Take 1 tablet by mouth 2 times daily 9/26/20   Zari Azul MD   aluminum & magnesium hydroxide-simethicone (MAALOX ADVANCED MAX ST) 426-576-15 MG/5ML SUSP Take 30 mLs by mouth 3 times daily as needed (heartburn/indigestion) 9/26/20   Zari Azul MD   sertraline (ZOLOFT) 50 MG tablet Take 1 tablet by mouth daily 8/16/20   JUNE Castro CNP   traZODone (DESYREL) 50 MG tablet Take 1 tablet by mouth nightly as needed for Sleep 8/16/20   JUNE Castro CNP   cariprazine hcl (VRAYLAR) 1.5 MG capsule Take 1 capsule by mouth daily 8/17/20   JUNE Castro CNP   metFORMIN (GLUCOPHAGE) 500 MG tablet Take 1 tablet by mouth 2 times daily (with meals) TAKE 1 TABLET BY MOUTH DAILY 12/11/17   Cynthia Perez MD       REVIEW OFSYSTEMS    (2-9 systems for level 4, 10 or more for level 5)      Review of Systems   Constitutional: Positive for fatigue. Negative for chills, diaphoresis and fever. Respiratory: Positive for cough, shortness of breath and wheezing. Negative for chest tightness. Cardiovascular: Negative for chest pain, palpitations and leg swelling. Gastrointestinal: Positive for abdominal pain. Negative for constipation, diarrhea, nausea and vomiting. Endocrine: Negative for polydipsia, polyphagia and polyuria. Genitourinary: Negative for difficulty urinating, dysuria and urgency. Musculoskeletal: Positive for back pain. Negative for arthralgias, neck pain and neck stiffness.    Skin: Negative for color change, pallor and rash. Neurological: Negative for dizziness, weakness, light-headedness and headaches. PHYSICAL EXAM   (up to 7 for level 4, 8 or more forlevel 5)      INITIAL VITALS:   ED Triage Vitals [01/25/21 1828]   BP Temp Temp Source Pulse Resp SpO2 Height Weight   (!) 152/103 98.4 °F (36.9 °C) Skin 123 22 (!) 85 % 5' 5\" (1.651 m) (!) 328 lb (148.8 kg)       Physical Exam  Vitals signs and nursing note reviewed. Constitutional:       General: She is not in acute distress. Appearance: She is well-developed. She is not diaphoretic. HENT:      Head: Normocephalic and atraumatic. Eyes:      General: No scleral icterus. Conjunctiva/sclera: Conjunctivae normal.      Pupils: Pupils are equal, round, and reactive to light. Neck:      Musculoskeletal: Normal range of motion and neck supple. Vascular: No JVD. Trachea: No tracheal deviation. Cardiovascular:      Rate and Rhythm: Regular rhythm. Tachycardia present. Pulses: Normal pulses. Heart sounds: Normal heart sounds. No murmur. No friction rub. Pulmonary:      Effort: Pulmonary effort is normal. Tachypnea present. Breath sounds: Decreased breath sounds and wheezing present. No rhonchi. Chest:      Chest wall: No tenderness. Abdominal:      General: Bowel sounds are normal. There is no distension. Palpations: Abdomen is soft. Tenderness: There is abdominal tenderness (RLQ, mild). There is no guarding or rebound. Musculoskeletal:      Right lower leg: She exhibits no tenderness. No edema. Left lower leg: She exhibits no tenderness. No edema. Skin:     General: Skin is warm and dry. Capillary Refill: Capillary refill takes less than 2 seconds. Coloration: Skin is not pale. Findings: No erythema. Neurological:      General: No focal deficit present. Mental Status: She is alert and oriented to person, place, and time. Cranial Nerves: No cranial nerve deficit. Sensory: No sensory deficit. Psychiatric:         Mood and Affect: Mood normal.         Behavior: Behavior normal.         DIFFERENTIAL  DIAGNOSIS     PLAN (LABS / IMAGING / EKG):  Orders Placed This Encounter   Procedures    XR CHEST PORTABLE    CT CHEST PULMONARY EMBOLISM W CONTRAST    CT ABDOMEN PELVIS W IV CONTRAST Additional Contrast? None    CBC Auto Differential    Comprehensive Metabolic Panel w/ Reflex to MG    Lipase    Troponin    D-Dimer, Quantitative    C-Reactive Protein    Procalcitonin    Lactic Acid, Plasma    HCG Qualitative, Serum    BLOOD GAS, VENOUS    COVID-19    Ferritin    Lactate Dehydrogenase    Immature Platelet Fraction    EKG 12 Lead    EKG REPORT    Saline lock IV    Insert peripheral IV       MEDICATIONS ORDERED:  Orders Placed This Encounter   Medications    DISCONTD: ipratropium-albuterol (DUONEB) nebulizer solution 1 ampule    0.9 % sodium chloride infusion    DISCONTD: albuterol (PROVENTIL) nebulizer solution 2.5 mg    ipratropium (ATROVENT) 0.02 % nebulizer solution 0.5 mg    dexamethasone (DECADRON) injection 10 mg    iopamidol (ISOVUE-370) 76 % injection 90 mL    DISCONTD: doxycycline hyclate (VIBRA-TABS) tablet 100 mg     Order Specific Question:   Antimicrobial Indications     Answer:   Pneumonia (CAP)    DISCONTD: acetaminophen (TYLENOL) tablet 1,000 mg    DISCONTD: ondansetron (ZOFRAN) injection 4 mg    albuterol sulfate HFA (PROVENTIL HFA) 108 (90 Base) MCG/ACT inhaler     Sig: Inhale 2 puffs into the lungs every 6 hours as needed for Wheezing or Shortness of Breath (Space out to every 6 hours as symptoms improve) Space out to every 6 hours as symptoms improve.      Dispense:  2 Inhaler     Refill:  0    doxycycline hyclate (VIBRA-TABS) 100 MG tablet     Sig: Take 1 tablet by mouth 2 times daily for 7 days     Dispense:  14 tablet     Refill:  0    acetaminophen (TYLENOL) 500 MG tablet     Sig: Take 2 tablets by mouth 3 times daily for 7 days     Dispense:  42 tablet     Refill:  0    ondansetron (ZOFRAN) 4 MG tablet     Sig: Take 1 tablet by mouth every 8 hours as needed for Nausea     Dispense:  10 tablet     Refill:  0    predniSONE (DELTASONE) 20 MG tablet     Sig: Take 2 tablets by mouth daily for 5 days Take 4 tablets by mouth once daily for 5 days     Dispense:  10 tablet     Refill:  0       DDX: Asthma exacerbation, COVID-19, pneumonia, PE, viral illness, appendicitis, sepsis, gastroenteritis, colitis. Initial MDM/Plan: 26 y.o. female who presents with fatigue, shortness of breath, cough, right lower quadrant abdominal pain. Ongoing for 3 days. Running low on her asthma inhaler. Will treat symptomatically with Decadron and breathing treatments. Plan for chest x-ray, Covid test, Covid labs, abdominal labs to include lipase and CMP. Potential admission pending symptomatic improvement. Low threshold to CT scan. Although patient is ill-appearing and tachycardic and hypoxic, I suspect this is secondary to asthma. Will not give significant IV fluids given concern for COVID-19. We will also not start antibiotics for suspected viral illnesses versus asthma exacerbation. If work-up shows a clear source of bacterial infection, would reconsider antibiotics.     DIAGNOSTIC RESULTS / EMERGENCYDEPARTMENT COURSE / MDM     LABS:  Labs Reviewed   CBC WITH AUTO DIFFERENTIAL - Abnormal; Notable for the following components:       Result Value    WBC 13.9 (*)     Seg Neutrophils 66 (*)     Lymphocytes 20 (*)     Eosinophils % 7 (*)     Segs Absolute 9.08 (*)     Absolute Eos # 0.97 (*)     All other components within normal limits   COMPREHENSIVE METABOLIC PANEL W/ REFLEX TO MG FOR LOW K - Abnormal; Notable for the following components:    AST 34 (*)     Total Bilirubin 0.21 (*)     All other components within normal limits   C-REACTIVE PROTEIN - Abnormal; Notable for the following components:    CRP 14.6 (*)     All other components within normal limits   BLOOD GAS, VENOUS - Abnormal; Notable for the following components:    pH, Kirt 7.311 (*)     pO2, Kirt 52.4 (*)     All other components within normal limits   LACTATE DEHYDROGENASE - Abnormal; Notable for the following components:     (*)     All other components within normal limits   LIPASE   TROPONIN   D-DIMER, QUANTITATIVE   PROCALCITONIN   LACTIC ACID, PLASMA   HCG, SERUM, QUALITATIVE   COVID-19   FERRITIN   IMMATURE PLATELET FRACTION         RADIOLOGY:  Ct Abdomen Pelvis W Iv Contrast Additional Contrast? None    Result Date: 1/25/2021  EXAMINATION: CT OF THE ABDOMEN AND PELVIS WITH CONTRAST 1/25/2021 11:00 pm TECHNIQUE: CT of the abdomen and pelvis was performed with the administration of intravenous contrast. Multiplanar reformatted images are provided for review. Dose modulation, iterative reconstruction, and/or weight based adjustment of the mA/kV was utilized to reduce the radiation dose to as low as reasonably achievable. COMPARISON: 02/06/2020 HISTORY: ORDERING SYSTEM PROVIDED HISTORY: RLQ abd pain TECHNOLOGIST PROVIDED HISTORY: RLQ abd pain Decision Support Exception->Emergency Medical Condition (MA) Reason for Exam: rlq pain Acuity: Acute Type of Exam: Initial FINDINGS: Lower Chest: There are geographic areas of ground-glass opacity identified involving the right middle lobe and lingula and mild patchy opacity involving both dependent lower lobes worrisome for developing atypical infection viral pneumonia. Organs: Mild gallbladder distention. No definite gallbladder wall thickening. There is hypoattenuation liver suggesting fatty infiltration. Bilateral renal calculi. No hydronephrosis. Left adrenal nodule/adrenal adenoma unchanged. GI/Bowel: Scattered colonic diverticulosis. Mild retained stool. No bowel obstruction. Appendix is normal. Pelvis: Mild bladder distention. Uterus unremarkable. No suspicious adnexal mass. Bladder is unremarkable.  Peritoneum/Retroperitoneum: note    FINAL IMPRESSION      1. Community acquired pneumonia, unspecified laterality    2.  Moderate persistent asthma with exacerbation          DISPOSITION / PLAN     DISPOSITION        PATIENT REFERRED TO:  [address]  [address] [address]  [address] [place] [address] Care  [phone]  [phone]        [facility]  [address]  [phone]          DISCHARGE MEDICATIONS:  Discharge Medication List as of 1/26/2021 12:41 AM      START taking these medications    Details   doxycycline hyclate (VIBRA-TABS) 100 MG tablet Take 1 tablet by mouth 2 times daily for 7 days, Disp-14 tablet, R-0Print      acetaminophen (TYLENOL) 500 MG tablet Take 2 tablets by mouth 3 times daily for 7 days, Disp-42 tablet, R-0Print      ondansetron (ZOFRAN) 4 MG tablet Take 1 tablet by mouth every 8 hours as needed for Nausea, Disp-10 tablet, R-0Print      predniSONE (DELTASONE) 20 MG tablet Take 2 tablets by mouth daily for 5 days Take 4 tablets by mouth once daily for 5 days, Disp-10 tablet, R-0Print             Alyson Kendrick DO  Emergency Medicine Resident    (Please note that portions of this note were completed with a voice recognition program.Efforts were made to edit the dictations but occasionally words are mis-transcribed.)     Alyson Kendrick DO  Resident  01/26/21 [id number]

## 2021-02-10 ENCOUNTER — APPOINTMENT (OUTPATIENT)
Dept: CT IMAGING | Age: 31
End: 2021-02-10
Payer: COMMERCIAL

## 2021-02-10 ENCOUNTER — HOSPITAL ENCOUNTER (EMERGENCY)
Age: 31
Discharge: ANOTHER HOSPITAL | End: 2021-02-10
Attending: EMERGENCY MEDICINE
Payer: COMMERCIAL

## 2021-02-10 ENCOUNTER — APPOINTMENT (OUTPATIENT)
Dept: GENERAL RADIOLOGY | Age: 31
End: 2021-02-10
Payer: COMMERCIAL

## 2021-02-10 VITALS
RESPIRATION RATE: 22 BRPM | TEMPERATURE: 97.1 F | OXYGEN SATURATION: 96 % | SYSTOLIC BLOOD PRESSURE: 130 MMHG | BODY MASS INDEX: 54.58 KG/M2 | HEART RATE: 125 BPM | WEIGHT: 293 LBS | DIASTOLIC BLOOD PRESSURE: 80 MMHG

## 2021-02-10 DIAGNOSIS — J44.1 COPD EXACERBATION (HCC): Primary | ICD-10-CM

## 2021-02-10 DIAGNOSIS — J18.9 PNEUMONIA DUE TO ORGANISM: ICD-10-CM

## 2021-02-10 LAB
ABSOLUTE EOS #: 1.08 K/UL (ref 0–0.44)
ABSOLUTE IMMATURE GRANULOCYTE: 0.05 K/UL (ref 0–0.3)
ABSOLUTE LYMPH #: 3.47 K/UL (ref 1.1–3.7)
ABSOLUTE MONO #: 1.06 K/UL (ref 0.1–1.2)
ANION GAP SERPL CALCULATED.3IONS-SCNC: 12 MMOL/L (ref 9–17)
BASOPHILS # BLD: 1 % (ref 0–2)
BASOPHILS ABSOLUTE: 0.08 K/UL (ref 0–0.2)
BUN BLDV-MCNC: 8 MG/DL (ref 6–20)
BUN/CREAT BLD: ABNORMAL (ref 9–20)
CALCIUM SERPL-MCNC: 8.7 MG/DL (ref 8.6–10.4)
CHLORIDE BLD-SCNC: 106 MMOL/L (ref 98–107)
CO2: 23 MMOL/L (ref 20–31)
CREAT SERPL-MCNC: 0.84 MG/DL (ref 0.5–0.9)
D-DIMER QUANTITATIVE: 0.56 MG/L FEU
DIFFERENTIAL TYPE: ABNORMAL
EKG ATRIAL RATE: 126 BPM
EKG P AXIS: 75 DEGREES
EKG P-R INTERVAL: 144 MS
EKG Q-T INTERVAL: 292 MS
EKG QRS DURATION: 80 MS
EKG QTC CALCULATION (BAZETT): 422 MS
EKG R AXIS: 66 DEGREES
EKG T AXIS: 65 DEGREES
EKG VENTRICULAR RATE: 126 BPM
EOSINOPHILS RELATIVE PERCENT: 8 % (ref 1–4)
GFR AFRICAN AMERICAN: >60 ML/MIN
GFR NON-AFRICAN AMERICAN: >60 ML/MIN
GFR SERPL CREATININE-BSD FRML MDRD: ABNORMAL ML/MIN/{1.73_M2}
GFR SERPL CREATININE-BSD FRML MDRD: ABNORMAL ML/MIN/{1.73_M2}
GLUCOSE BLD-MCNC: 196 MG/DL (ref 70–99)
HCG QUALITATIVE: NEGATIVE
HCT VFR BLD CALC: 38.8 % (ref 36.3–47.1)
HEMOGLOBIN: 12 G/DL (ref 11.9–15.1)
IMMATURE GRANULOCYTES: 0 %
LYMPHOCYTES # BLD: 26 % (ref 24–43)
MCH RBC QN AUTO: 28.8 PG (ref 25.2–33.5)
MCHC RBC AUTO-ENTMCNC: 30.9 G/DL (ref 28.4–34.8)
MCV RBC AUTO: 93.3 FL (ref 82.6–102.9)
MONOCYTES # BLD: 8 % (ref 3–12)
NRBC AUTOMATED: 0 PER 100 WBC
PDW BLD-RTO: 14.1 % (ref 11.8–14.4)
PLATELET # BLD: 440 K/UL (ref 138–453)
PLATELET ESTIMATE: ABNORMAL
PMV BLD AUTO: 9.6 FL (ref 8.1–13.5)
POTASSIUM SERPL-SCNC: 4 MMOL/L (ref 3.7–5.3)
RBC # BLD: 4.16 M/UL (ref 3.95–5.11)
RBC # BLD: ABNORMAL 10*6/UL
SARS-COV-2, RAPID: NOT DETECTED
SARS-COV-2: NORMAL
SARS-COV-2: NORMAL
SEG NEUTROPHILS: 57 % (ref 36–65)
SEGMENTED NEUTROPHILS ABSOLUTE COUNT: 7.67 K/UL (ref 1.5–8.1)
SODIUM BLD-SCNC: 141 MMOL/L (ref 135–144)
SOURCE: NORMAL
TROPONIN INTERP: NORMAL
TROPONIN T: NORMAL NG/ML
TROPONIN, HIGH SENSITIVITY: 6 NG/L (ref 0–14)
WBC # BLD: 13.4 K/UL (ref 3.5–11.3)
WBC # BLD: ABNORMAL 10*3/UL

## 2021-02-10 PROCEDURE — 84484 ASSAY OF TROPONIN QUANT: CPT

## 2021-02-10 PROCEDURE — 85379 FIBRIN DEGRADATION QUANT: CPT

## 2021-02-10 PROCEDURE — 93005 ELECTROCARDIOGRAM TRACING: CPT | Performed by: STUDENT IN AN ORGANIZED HEALTH CARE EDUCATION/TRAINING PROGRAM

## 2021-02-10 PROCEDURE — 6360000004 HC RX CONTRAST MEDICATION: Performed by: STUDENT IN AN ORGANIZED HEALTH CARE EDUCATION/TRAINING PROGRAM

## 2021-02-10 PROCEDURE — 96365 THER/PROPH/DIAG IV INF INIT: CPT

## 2021-02-10 PROCEDURE — 93010 ELECTROCARDIOGRAM REPORT: CPT | Performed by: INTERNAL MEDICINE

## 2021-02-10 PROCEDURE — 6360000002 HC RX W HCPCS: Performed by: STUDENT IN AN ORGANIZED HEALTH CARE EDUCATION/TRAINING PROGRAM

## 2021-02-10 PROCEDURE — U0002 COVID-19 LAB TEST NON-CDC: HCPCS

## 2021-02-10 PROCEDURE — 84703 CHORIONIC GONADOTROPIN ASSAY: CPT

## 2021-02-10 PROCEDURE — 96375 TX/PRO/DX INJ NEW DRUG ADDON: CPT

## 2021-02-10 PROCEDURE — 6370000000 HC RX 637 (ALT 250 FOR IP): Performed by: STUDENT IN AN ORGANIZED HEALTH CARE EDUCATION/TRAINING PROGRAM

## 2021-02-10 PROCEDURE — 80048 BASIC METABOLIC PNL TOTAL CA: CPT

## 2021-02-10 PROCEDURE — 94640 AIRWAY INHALATION TREATMENT: CPT

## 2021-02-10 PROCEDURE — 71045 X-RAY EXAM CHEST 1 VIEW: CPT

## 2021-02-10 PROCEDURE — 71260 CT THORAX DX C+: CPT

## 2021-02-10 PROCEDURE — 99284 EMERGENCY DEPT VISIT MOD MDM: CPT

## 2021-02-10 PROCEDURE — 85025 COMPLETE CBC W/AUTO DIFF WBC: CPT

## 2021-02-10 RX ORDER — CEFDINIR 300 MG/1
300 CAPSULE ORAL 2 TIMES DAILY
Qty: 10 CAPSULE | Refills: 0 | Status: SHIPPED | OUTPATIENT
Start: 2021-02-10 | End: 2021-02-15

## 2021-02-10 RX ORDER — LEVOFLOXACIN 750 MG/1
750 TABLET ORAL ONCE
Status: DISCONTINUED | OUTPATIENT
Start: 2021-02-10 | End: 2021-02-10

## 2021-02-10 RX ORDER — PREDNISONE 20 MG/1
20 TABLET ORAL 2 TIMES DAILY
Qty: 10 TABLET | Refills: 0 | Status: SHIPPED | OUTPATIENT
Start: 2021-02-10 | End: 2021-02-15

## 2021-02-10 RX ORDER — AZITHROMYCIN 250 MG/1
500 TABLET, FILM COATED ORAL ONCE
Status: COMPLETED | OUTPATIENT
Start: 2021-02-10 | End: 2021-02-10

## 2021-02-10 RX ORDER — ALBUTEROL SULFATE 2.5 MG/3ML
15 SOLUTION RESPIRATORY (INHALATION)
Status: DISCONTINUED | OUTPATIENT
Start: 2021-02-10 | End: 2021-02-10 | Stop reason: HOSPADM

## 2021-02-10 RX ORDER — LEVOFLOXACIN 750 MG/1
750 TABLET ORAL DAILY
Qty: 5 TABLET | Refills: 0 | Status: SHIPPED | OUTPATIENT
Start: 2021-02-10 | End: 2021-02-10

## 2021-02-10 RX ORDER — ALBUTEROL SULFATE 2.5 MG/3ML
2.5 SOLUTION RESPIRATORY (INHALATION)
Status: DISCONTINUED | OUTPATIENT
Start: 2021-02-10 | End: 2021-02-10 | Stop reason: HOSPADM

## 2021-02-10 RX ORDER — CEFDINIR 300 MG/1
300 CAPSULE ORAL ONCE
Status: COMPLETED | OUTPATIENT
Start: 2021-02-10 | End: 2021-02-10

## 2021-02-10 RX ORDER — MAGNESIUM SULFATE IN WATER 40 MG/ML
2000 INJECTION, SOLUTION INTRAVENOUS ONCE
Status: COMPLETED | OUTPATIENT
Start: 2021-02-10 | End: 2021-02-10

## 2021-02-10 RX ORDER — METHYLPREDNISOLONE SODIUM SUCCINATE 125 MG/2ML
125 INJECTION, POWDER, LYOPHILIZED, FOR SOLUTION INTRAMUSCULAR; INTRAVENOUS ONCE
Status: COMPLETED | OUTPATIENT
Start: 2021-02-10 | End: 2021-02-10

## 2021-02-10 RX ORDER — AZITHROMYCIN 250 MG/1
250 TABLET, FILM COATED ORAL DAILY
Qty: 4 TABLET | Refills: 0 | Status: SHIPPED | OUTPATIENT
Start: 2021-02-10 | End: 2021-02-14

## 2021-02-10 RX ADMIN — ALBUTEROL SULFATE 10 MG: 2.5 SOLUTION RESPIRATORY (INHALATION) at 01:36

## 2021-02-10 RX ADMIN — CEFDINIR 300 MG: 300 CAPSULE ORAL at 05:17

## 2021-02-10 RX ADMIN — METHYLPREDNISOLONE SODIUM SUCCINATE 125 MG: 125 INJECTION, POWDER, FOR SOLUTION INTRAMUSCULAR; INTRAVENOUS at 01:48

## 2021-02-10 RX ADMIN — AZITHROMYCIN 500 MG: 250 TABLET, FILM COATED ORAL at 05:17

## 2021-02-10 RX ADMIN — ALBUTEROL SULFATE 10 MG: 2.5 SOLUTION RESPIRATORY (INHALATION) at 01:55

## 2021-02-10 RX ADMIN — IOPAMIDOL 75 ML: 755 INJECTION, SOLUTION INTRAVENOUS at 02:57

## 2021-02-10 RX ADMIN — MAGNESIUM SULFATE 2000 MG: 2 INJECTION INTRAVENOUS at 01:48

## 2021-02-10 RX ADMIN — IPRATROPIUM BROMIDE 0.25 MG: 0.5 SOLUTION RESPIRATORY (INHALATION) at 01:36

## 2021-02-10 ASSESSMENT — ENCOUNTER SYMPTOMS
ABDOMINAL PAIN: 0
NAUSEA: 0
BACK PAIN: 0
WHEEZING: 1
COUGH: 1
SHORTNESS OF BREATH: 1
VOMITING: 0

## 2021-02-10 ASSESSMENT — PAIN SCALES - WONG BAKER: WONGBAKER_NUMERICALRESPONSE: 0

## 2021-02-10 NOTE — ED PROVIDER NOTES
[facility]     Emergency Department     Faculty Attestation    I performed a history and physical examination of the patient and discussed management with the resident. I have reviewed and agree with the residents findings including all diagnostic interpretations, and treatment plans as written. Any areas of disagreement are noted on the chart. I was personally present for the key portions of any procedures. I have documented in the chart those procedures where I was not present during the key portions. I have reviewed the emergency nurses triage note. I agree with the chief complaint, past medical history, past surgical history, allergies, medications, social and family history as documented unless otherwise noted below. Documentation of the HPI, Physical Exam and Medical Decision Making performed by scribjodi is based on my personal performance of the HPI, PE and MDM. For Physician Assistant/ Nurse Practitioner cases/documentation I have personally evaluated this patient and have completed at least one if not all key elements of the E/M (history, physical exam, and MDM). Additional findings are as noted. 25 yo F c/o cp / sob, wheeze, no fever, no injury,   pe Gian RN escort for exam, tachy, gcs 15, neck supple, mild expiratory wheeze, diminished through out, abdomen non tender & obese, no calf swelling, no calf tenderness,     -ct no pe, bilateral ggo, worse than ct on 1/25, will treat with abx, we stressed smoking cessation,     EKG Interpretation    Interpreted by me  Sinus tachycardia heart rate 126, no ischemia, normal axis, QT corrected 422    CRITICAL CARE: There was a high probability of clinically significant/life threatening deterioration in this patient's condition which required my urgent intervention. Total critical care time was 5 minutes. This excludes any time for separately reportable procedures.        Rosemarie [address] [facility], DO  02/10/21 1 Howard Mares, DO  02/10/21 [id number]

## 2021-02-10 NOTE — ED PROVIDER NOTES
[facility]  ED  Emergency Department Encounter  Emergency Medicine Resident     Pt Name: Dania Gallardo  MRN: [id number]  [place] 1990  Date of evaluation: 2/10/21  PCP:  No primary care provider on file. CHIEF COMPLAINT       Chief Complaint   Patient presents with    Shortness of Breath    Wheezing       HISTORY OFPRESENT ILLNESS  (Location/Symptom, Timing/Onset, Context/Setting, Quality, Duration, Modifying Factors,Severity.)      Dania Gallardo is a 26 y. o.yo female who presents with SOB. presented with respiratory distress, has a history of COPD/asthma and is here in the emergency department multiple times for exacerbations in the past, states that this particular exacerbation started 2 days ago and progressively worsened, states that she is having shortness of breath with wheezing. States that there is also's chest pain which is substernal radiating to the left of anterior chest, 10 out of 10 in severity, denies any nausea or vomiting, states she does have a headache but does not have any focal neuro deficits. Denies vision changes, abdominal pain, rashes, dysuria or hematuria. Denies recent fevers or chills, states that she has been using her albuterol inhalers at home but they have not worked. Denies sick contacts. PAST MEDICAL / SURGICAL / SOCIAL / FAMILY HISTORY      has a past medical history of Asthma, Back pain, Bipolar 1 disorder ([address] Bipolar disorder ([address] Depression, Diabetes mellitus ([address] Dizziness, Fatty liver disease, nonalcoholic, Fatty liver disease, nonalcoholic, GERD (gastroesophageal reflux disease), and Obesity. has a past surgical history that includes  section and LEEP.      Social History     Socioeconomic History    Marital status:      Spouse name: Not on file    Number of children: Not on file    Years of education: Not on file    Highest education level: Not on file   Occupational History    Not on file Social Needs    Financial resource strain: Not on file    Food insecurity     Worry: Not on file     Inability: Not on file    Transportation needs     Medical: Not on file     Non-medical: Not on file   Tobacco Use    Smoking status: Current Some Day Smoker     Packs/day: 0.25     Years: 14.00     Pack years: 3.50     Types: Cigarettes    Smokeless tobacco: Never Used    Tobacco comment: pt accepting of nicotine gum   Substance and Sexual Activity    Alcohol use: Not on file     Comment: rarely    Drug use: Not Currently     Types: Marijuana     Comment: quit    Sexual activity: Not Currently   Lifestyle    Physical activity     Days per week: Not on file     Minutes per session: Not on file    Stress: Not on file   Relationships    Social connections     Talks on phone: Not on file     Gets together: Not on file     Attends Worship service: Not on file     Active member of club or organization: Not on file     Attends meetings of clubs or organizations: Not on file     Relationship status: Not on file    Intimate partner violence     Fear of current or ex partner: Not on file     Emotionally abused: Not on file     Physically abused: Not on file     Forced sexual activity: Not on file   Other Topics Concern    Not on file   Social History Narrative    Not on file       Family History   Problem Relation Age of Onset    High Blood Pressure Mother     Diabetes Mother     Heart Disease Mother     Heart Disease Father     Early Death Father     Cancer Maternal Aunt         lung    Cancer Paternal Cousin         brain        Allergies:  Morphine, Pcn [penicillins], Amoxicillin, and Seasonal    Home Medications:  Prior to Admission medications    Medication Sig Start Date End Date Taking?  Authorizing Provider   predniSONE (DELTASONE) 20 MG tablet Take 1 tablet by mouth 2 times daily for 5 days 2/10/21 2/15/21 Yes Tee Garrison MD   levoFLOXacin (LEVAQUIN) 750 MG tablet Take 1 tablet by mouth daily for 5 days 2/10/21 2/15/21 Yes Fuller Felty, MD   albuterol sulfate HFA (PROVENTIL HFA) 108 (90 Base) MCG/ACT inhaler Inhale 2 puffs into the lungs every 6 hours as needed for Wheezing or Shortness of Breath (Space out to every 6 hours as symptoms improve) Space out to every 6 hours as symptoms improve. 1/26/21   Demetrio Rai MD   acetaminophen (TYLENOL) 500 MG tablet Take 2 tablets by mouth 3 times daily for 7 days 1/26/21 2/2/21  Demetrio Rai MD   ond[facility]etron [facility]) 4 MG tablet Take 1 tablet by mouth every 8 hours as needed for Nausea 1/26/21   Demetrio Rai MD   ibuprofen (ADVIL;MOTRIN) 800 MG tablet Take 1 tablet by mouth every 8 hours as needed for Pain 9/26/20   Pat Herrera MD   famotidine (PEPCID) 20 MG tablet Take 1 tablet by mouth 2 times daily 9/26/20   Pat Herrera MD   aluminum & magnesium hydroxide-simethicone (MAALOX ADVANCED MAX ST) 400-400-40 MG/5ML SUSP Take 30 mLs by mouth 3 times daily as needed (heartburn/indigestion) 9/26/20   Pat Herrera MD   sertraline (ZOLOFT) 50 MG tablet Take 1 tablet by mouth daily 8/16/20   JUNE Thakur CNP   traZODone (DESYREL) 50 MG tablet Take 1 tablet by mouth nightly as needed for Sleep 8/16/20   JUNE Thakur CNP   cariprazine hcl (VRAYLAR) 1.5 MG capsule Take 1 capsule by mouth daily 8/17/20   JUNE Thakur CNP   metFORMIN (GLUCOPHAGE) 500 MG tablet Take 1 tablet by mouth 2 times daily (with meals) TAKE 1 TABLET BY MOUTH DAILY 12/11/17   Delia Lucia MD       REVIEW OFSYSTEMS    (2-9 systems for level 4, 10 or more for level 5)      Review of Systems   Constitutional: Negative for diaphoresis and fever. HENT: Negative for congestion. Eyes: Negative for visual disturbance. Respiratory: Positive for cough, shortness of breath and wheezing. Cardiovascular: Positive for chest pain. Gastrointestinal: Negative for abdominal pain, nausea and vomiting.    Endocrine: Negative for polyuria. Genitourinary: Negative for dysuria. Musculoskeletal: Negative for back pain. Skin: Negative for wound. Neurological: Positive for headaches. Psychiatric/Behavioral: Negative for confusion. PHYSICAL EXAM   (up to 7 for level 4, 8 or more forlevel 5)      ED TRIAGE VITALS BP: (!) 167/104, Temp: 97.1 °F (36.2 °C), Pulse: 138, Resp: 26, SpO2: (!) 89 %    Vitals:    02/10/21 0136 02/10/21 0139 02/10/21 0155 02/10/21 0231   BP:    130/80   Pulse:    125   Resp: 23  29 22   Temp:       TempSrc:       SpO2: 96%  100% 96%   Weight:  (!) 328 lb (148.8 kg)         Physical Exam  Constitutional:       General: She is not in acute distress. Appearance: She is well-developed. HENT:      Head: Normocephalic and atraumatic. Nose: Nose normal.   Eyes:      Pupils: Pupils are equal, round, and reactive to light. Neck:      Musculoskeletal: Normal range of motion and neck supple. Cardiovascular:      Rate and Rhythm: Normal rate and regular rhythm. Heart sounds: No murmur. Pulmonary:      Effort: Tachypnea and respiratory distress present. Breath sounds: No stridor. Examination of the right-upper field reveals wheezing. Examination of the left-upper field reveals wheezing. Examination of the right-middle field reveals wheezing. Examination of the left-middle field reveals wheezing. Examination of the right-lower field reveals wheezing. Examination of the left-lower field reveals wheezing. Decreased breath sounds and wheezing present. Chest:      Chest wall: Tenderness (L anterior chest) present. Abdominal:      General: There is no distension. Palpations: Abdomen is soft. Tenderness: There is no abdominal tenderness. Musculoskeletal: Normal range of motion. General: No tenderness. Skin:     General: Skin is warm and dry. Capillary Refill: Capillary refill takes less than 2 seconds. Findings: No erythema or rash.    Neurological:      Mental Status: She is alert and oriented to person, place, and time. Sensory: No sensory deficit. Deep Tendon Reflexes: Reflexes normal.   Psychiatric:         Behavior: Behavior normal.         DIFFERENTIAL  DIAGNOSIS     PLAN (LABS / IMAGING / EKG):  Orders Placed This Encounter   Procedures    XR CHEST PORTABLE    CT CHEST PULMONARY EMBOLISM W CONTRAST    CBC Auto Differential    Troponin    Basic Metabolic Panel w/ Reflex to MG    HCG Qualitative, Serum    D-Dimer, Quantitative    COVID-19    Respiratory care evaluation only    Initiate ED RT Aerosol protocol    EKG 12 Lead    Saline lock IV    Insert peripheral IV       MEDICATIONS ORDERED:  Orders Placed This Encounter   Medications    methylPREDNISolone sodium (SOLU-MEDROL) injection 125 mg    magnesium sulfate 2000 mg in 50 mL IVPB premix    AND Linked Order Group     albuterol (PROVENTIL) nebulizer solution 2.5 mg     ipratropium (ATROVENT) 0.02 % nebulizer solution 0.25 mg    AND Linked Order Group     albuterol (PROVENTIL) nebulizer solution 15 mg     ipratropium (ATROVENT) 0.02 % nebulizer solution 0.25 mg    iopamidol (ISOVUE-370) 76 % injection 75 mL    predniSONE (DELTASONE) 20 MG tablet     Sig: Take 1 tablet by mouth 2 times daily for 5 days     Dispense:  10 tablet     Refill:  0    levoFLOXacin (LEVAQUIN) tablet 750 mg     Order Specific Question:   Antimicrobial Indications     Answer:   Pneumonia (CAP)    levoFLOXacin (LEVAQUIN) 750 MG tablet     Sig: Take 1 tablet by mouth daily for 5 days     Dispense:  5 tablet     Refill:  0       DDX:     COPD exacerbation, pulmonary embolism, myocardial ischemia, pneumonia    Initial MDM/Plan: 26 y.o. female who presents with SOB.   presented with respiratory distress, has a history of COPD/asthma and is here in the emergency department multiple times for exacerbations in the past, states that this particular exacerbation started 2 days ago and progressively worsened, states that she is having shortness of breath with wheezing. States that there is also's chest pain which is substernal radiating to the left of anterior chest, 10 out of 10 in severity, denies any nausea or vomiting, states she does have a headache but does not have any focal neuro deficits. Denies vision changes, abdominal pain, rashes, dysuria or hematuria. Denies recent fevers or chills, states that she has been using her albuterol inhalers at home but they have not worked. Denies sick contacts.     DIAGNOSTIC RESULTS / EMERGENCYDEPARTMENT COURSE / MDM     LABS:  Results for orders placed or performed during the hospital encounter of 02/10/21   CBC Auto Differential   Result Value Ref Range    WBC 13.4 (H) 3.5 - 11.3 k/uL    RBC 4.16 3.95 - 5.11 m/uL    Hemoglobin 12.0 11.9 - 15.1 g/dL    Hematocrit 38.8 36.3 - 47.1 %    MCV 93.3 82.6 - 102.9 fL    MCH 28.8 25.2 - 33.5 pg    MCHC 30.9 28.4 - 34.8 g/dL    RDW 14.1 11.8 - 14.4 %    Platelets 934 529 - 119 k/uL    MPV 9.6 8.1 - 13.5 fL    NRBC Automated 0.0 0.0 per 100 WBC    Differential Type NOT REPORTED     Seg Neutrophils 57 36 - 65 %    Lymphocytes 26 24 - 43 %    Monocytes 8 3 - 12 %    Eosinophils % 8 (H) 1 - 4 %    Basophils 1 0 - 2 %    Immature Granulocytes 0 0 %    Segs Absolute 7.67 1.50 - 8.10 k/uL    Absolute Lymph # 3.47 1.10 - 3.70 k/uL    Absolute Mono # 1.06 0.10 - 1.20 k/uL    Absolute Eos # 1.08 (H) 0.00 - 0.44 k/uL    Basophils Absolute 0.08 0.00 - 0.20 k/uL    Absolute Immature Granulocyte 0.05 0.00 - 0.30 k/uL    WBC Morphology NOT REPORTED     RBC Morphology NOT REPORTED     Platelet Estimate NOT REPORTED    Troponin   Result Value Ref Range    Troponin, High Sensitivity 6 0 - 14 ng/L    Troponin T NOT REPORTED <0.03 ng/mL    Troponin Interp NOT REPORTED    Basic Metabolic Panel w/ Reflex to MG   Result Value Ref Range    Glucose 196 (H) 70 - 99 mg/dL    BUN 8 6 - 20 mg/dL    CREATININE 0.84 0.50 - 0.90 mg/dL    Bun/Cre Ratio NOT REPORTED 9 - 20 Calcium 8.7 8.6 - 10.4 mg/dL    Sodium 141 135 - 144 mmol/L    Potassium 4.0 3.7 - 5.3 mmol/L    Chloride 106 98 - 107 mmol/L    CO2 23 20 - 31 mmol/L    Anion Gap 12 9 - 17 mmol/L    GFR Non-African American >60 >60 mL/min    GFR African American >60 >60 mL/min    GFR Comment          GFR Staging NOT REPORTED    HCG Qualitative, Serum   Result Value Ref Range    hCG Qual NEGATIVE NEGATIVE   D-Dimer, Quantitative   Result Value Ref Range    D-Dimer, Quant 0.56 mg/L FEU   COVID-19    Specimen: Other   Result Value Ref Range    SARS-CoV-2          SARS-CoV-2, Rapid Not Detected Not Detected    Source . NASOPHARYNGEAL SWAB     SARS-CoV-2             RADIOLOGY:  CT CHEST PULMONARY EMBOLISM W CONTRAST   Final Result   1. Note: Study limited by patient breathing motion related artifact. 2. No definitive evidence of acute pulmonary embolism. 3. Mild multifocal ground-glass consolidation involving bilateral upper lung   lobes and left lower lung lobe. Differential diagnostic considerations   include association with viral pneumonia, hypersensitivity pneumonitis,   opportunistic infection, drug toxicity. Recommend clinical correlation. 4. Diffuse fatty infiltration of the liver. XR CHEST PORTABLE   Final Result   Unremarkable single upright AP view of the chest.             EKG  No st seg elevation    All EKG's are interpreted by the Emergency Department Physicianwho either signs or Co-signs this chart in the absence of a cardiologist.    EMERGENCY DEPARTMENT COURSE:  ED Course as of Feb 10 0415   Wed Feb 10, 2021   0200 Patient seen and assessed in the emergency department presented with respiratory distress, has a history of COPD/asthma and is here in the emergency department multiple times for exacerbations in the past, states that this particular exacerbation started 2 days ago and progressively worsened, states that she is having shortness of breath with wheezing.   States that there is also's chest pain which is substernal radiating to the left of anterior chest, 10 out of 10 in severity, denies any nausea or vomiting, states she does have a headache but does not have any focal neuro deficits. Denies vision changes, abdominal pain, rashes, dysuria or hematuria. Denies recent fevers or chills, states that she has been using her albuterol inhalers at home but they have not worked. Denies sick contacts. [PS]   0201 SpO2: 100 % [PS]   0201 Resp: 23 [PS]   0222 Troponin, High Sensitivity: 6 [PS]   0225 Ct ordered     D-Dimer, Quant: 0.56 [PS]   0234 Pulse: 125 [PS]   4555 SARS-CoV-2, Rapid: Not Detected [PS]   7028 D/w radiology, concern for pnm     [PS]      ED Course User Index  [PS] Delta Petit MD          PROCEDURES:  None    CONSULTS:  None    CRITICAL CARE:  Please see attending note    FINAL IMPRESSION      1. COPD exacerbation ([address]    2.  Pneumonia due to organism          DISPOSITION / PLAN     DISPOSITION Discharge - Pending Orders Complete 02/10/2021 03:54:19 AM       PATIENT REFERRED TO:  [facility] ED  [address] [place] [address]  [phone]    As needed, If symptoms worsen    [address] [place] [address] Primary Care  [address] [place] [address]  [phone]  Go in 3 days        DISCHARGE MEDICATIONS:  New Prescriptions    LEVOFLOXACIN (LEVAQUIN) 750 MG TABLET    Take 1 tablet by mouth daily for 5 days    PREDNISONE (DELTASONE) 20 MG TABLET    Take 1 tablet by mouth 2 times daily for 5 days       Delta Petit MD  Emergency Medicine Resident    (Please note that portions of this note were completed with a voice recognition program.Efforts were made to edit the dictations but occasionally words are mis-transcribed.)       Delta Petit MD  Resident  02/10/21 [id number]       Delta Petit MD  Resident  02/10/21 1582

## 2021-02-10 NOTE — ED NOTES
RT at bedside to start albuterol treatment. Dr. Sonia Naylor at bedside to assess.       Lynne Ordonez RN  02/10/21 0140

## 2021-02-10 NOTE — ED NOTES
Pt. To the ED states that she has been having difficulty breathing for the last 2 days and it just isn't getting better. Pt. Has a hx of asthma states that she has been using her inhalers but is getting no relief. Upon arrival pt. Has labored breathing w/ audible wheezes, RT notified, Pt. Is A&Ox4, placed on full cardiac monitor. Resident at bedside to assess.        Jonny Berg RN  02/10/21 [id number]

## 2021-03-10 ENCOUNTER — HOSPITAL ENCOUNTER (EMERGENCY)
Age: 31
Discharge: HOME | End: 2021-03-10
Attending: EMERGENCY MEDICINE
Payer: COMMERCIAL

## 2021-03-10 ENCOUNTER — APPOINTMENT (OUTPATIENT)
Dept: GENERAL RADIOLOGY | Age: 31
End: 2021-03-10
Payer: COMMERCIAL

## 2021-03-10 VITALS
TEMPERATURE: 97.3 F | OXYGEN SATURATION: 95 % | RESPIRATION RATE: 16 BRPM | HEART RATE: 92 BPM | DIASTOLIC BLOOD PRESSURE: 75 MMHG | SYSTOLIC BLOOD PRESSURE: 138 MMHG

## 2021-03-10 DIAGNOSIS — J45.21 MILD INTERMITTENT ASTHMA WITH EXACERBATION: Primary | ICD-10-CM

## 2021-03-10 DIAGNOSIS — R51.9 ACUTE NONINTRACTABLE HEADACHE, UNSPECIFIED HEADACHE TYPE: ICD-10-CM

## 2021-03-10 PROCEDURE — 6370000000 HC RX 637 (ALT 250 FOR IP): Performed by: STUDENT IN AN ORGANIZED HEALTH CARE EDUCATION/TRAINING PROGRAM

## 2021-03-10 PROCEDURE — 99285 EMERGENCY DEPT VISIT HI MDM: CPT

## 2021-03-10 PROCEDURE — 6360000002 HC RX W HCPCS: Performed by: STUDENT IN AN ORGANIZED HEALTH CARE EDUCATION/TRAINING PROGRAM

## 2021-03-10 PROCEDURE — 96375 TX/PRO/DX INJ NEW DRUG ADDON: CPT

## 2021-03-10 PROCEDURE — 94761 N-INVAS EAR/PLS OXIMETRY MLT: CPT

## 2021-03-10 PROCEDURE — 2700000000 HC OXYGEN THERAPY PER DAY

## 2021-03-10 PROCEDURE — 94640 AIRWAY INHALATION TREATMENT: CPT

## 2021-03-10 PROCEDURE — 71046 X-RAY EXAM CHEST 2 VIEWS: CPT

## 2021-03-10 PROCEDURE — 93005 ELECTROCARDIOGRAM TRACING: CPT | Performed by: EMERGENCY MEDICINE

## 2021-03-10 PROCEDURE — 96374 THER/PROPH/DIAG INJ IV PUSH: CPT

## 2021-03-10 RX ORDER — KETOROLAC TROMETHAMINE 15 MG/ML
15 INJECTION, SOLUTION INTRAMUSCULAR; INTRAVENOUS ONCE
Status: COMPLETED | OUTPATIENT
Start: 2021-03-10 | End: 2021-03-10

## 2021-03-10 RX ORDER — PREDNISONE 20 MG/1
60 TABLET ORAL DAILY
Qty: 15 TABLET | Refills: 0 | Status: ON HOLD | OUTPATIENT
Start: 2021-03-10 | End: 2021-03-17 | Stop reason: HOSPADM

## 2021-03-10 RX ORDER — PROCHLORPERAZINE EDISYLATE 5 MG/ML
10 INJECTION INTRAMUSCULAR; INTRAVENOUS ONCE
Status: COMPLETED | OUTPATIENT
Start: 2021-03-10 | End: 2021-03-10

## 2021-03-10 RX ORDER — ALBUTEROL SULFATE 2.5 MG/3ML
10 SOLUTION RESPIRATORY (INHALATION) EVERY 6 HOURS PRN
Status: DISCONTINUED | OUTPATIENT
Start: 2021-03-10 | End: 2021-03-10 | Stop reason: HOSPADM

## 2021-03-10 RX ORDER — MAGNESIUM SULFATE IN WATER 40 MG/ML
2000 INJECTION, SOLUTION INTRAVENOUS ONCE
Status: COMPLETED | OUTPATIENT
Start: 2021-03-10 | End: 2021-03-10

## 2021-03-10 RX ORDER — ACETAMINOPHEN 500 MG
1000 TABLET ORAL ONCE
Status: COMPLETED | OUTPATIENT
Start: 2021-03-10 | End: 2021-03-10

## 2021-03-10 RX ORDER — DIPHENHYDRAMINE HYDROCHLORIDE 50 MG/ML
25 INJECTION INTRAMUSCULAR; INTRAVENOUS ONCE
Status: COMPLETED | OUTPATIENT
Start: 2021-03-10 | End: 2021-03-10

## 2021-03-10 RX ADMIN — PROCHLORPERAZINE EDISYLATE 10 MG: 5 INJECTION INTRAMUSCULAR; INTRAVENOUS at 17:56

## 2021-03-10 RX ADMIN — MAGNESIUM SULFATE 2000 MG: 2 INJECTION INTRAVENOUS at 17:48

## 2021-03-10 RX ADMIN — ACETAMINOPHEN 1000 MG: 500 TABLET ORAL at 17:48

## 2021-03-10 RX ADMIN — ALBUTEROL SULFATE 10 MG: 2.5 SOLUTION RESPIRATORY (INHALATION) at 17:27

## 2021-03-10 RX ADMIN — KETOROLAC TROMETHAMINE 15 MG: 15 INJECTION, SOLUTION INTRAMUSCULAR; INTRAVENOUS at 17:57

## 2021-03-10 RX ADMIN — DIPHENHYDRAMINE HYDROCHLORIDE 25 MG: 50 INJECTION, SOLUTION INTRAMUSCULAR; INTRAVENOUS at 17:57

## 2021-03-10 ASSESSMENT — PAIN SCALES - GENERAL: PAINLEVEL_OUTOF10: 6

## 2021-03-10 NOTE — ED PROVIDER NOTES
[address]  Emergency Department Encounter  Emergency Medicine Resident         This patient was evaluated in the Emergency Department for symptoms described in the history of present illness. He/she was evaluated in the context of the global COVID-19 pandemic, which necessitated consideration that the patient might be at risk for infection with the SARS-CoV-2 virus that causes COVID-19. Institutional protocols and algorithms that pertain to the evaluation of patients at risk for COVID-19 are in a state of rapid change based on information released by regulatory bodies including the CDC and federal and state organizations. These policies and algorithms were followed during the patient's care in the ED. Pt Name: Frank Shin  MRN: [id number]  [place] 1990  Date of evaluation: 3/10/21  PCP:  No primary care provider on file. CHIEF COMPLAINT       Chief Complaint   Patient presents with    Shortness of Breath    Asthma       HISTORY OF PRESENT ILLNESS  (Location/Symptom, Timing/Onset, Context/Setting, Quality, Duration, Modifying Factors, Severity.)    Frank Shin is a 26 y.o. female who presents with urgency of breath, this feels similar to her previous asthma exacerbations. She was exerting herself outside in the hot weather when she started wheezing. She believes this is her asthma exacerbation she tried 2 puffs of inhaler with minimal relief. EMS gave 125 mg of Solu-Medrol with slight improvement. No nausea vomiting lightheadedness dizziness weakness numbness or altered sensorium. Patient does have headache that is 8 out of 10 severity throbbing right side starting this AM and worsened after coughing from her asthma exacerbation. No neck pain or injuries. No chest pain. Denies tobacco alcohol or drug use. Denies any recent changes in medications.           PAST MEDICAL / SURGICAL / SOCIAL / FAMILY HISTORY    has a past medical history of Asthma, Back pain, Bipolar 1 disorder ([facility] Bipolar disorder ([facility] Depression, Diabetes mellitus ([facility] Dizziness, Fatty liver disease, nonalcoholic, Fatty liver disease, nonalcoholic, GERD (gastroesophageal reflux disease), and Obesity. has a past surgical history that includes  section and LEEP.     Social History     Socioeconomic History    Marital status:      Spouse name: Not on file    Number of children: Not on file    Years of education: Not on file    Highest education level: Not on file   Occupational History    Not on file   Social Needs    Financial resource strain: Not on file    Food insecurity     Worry: Not on file     Inability: Not on file    Transportation needs     Medical: Not on file     Non-medical: Not on file   Tobacco Use    Smoking status: Current Some Day Smoker     Packs/day: 0.25     Years: 14.00     Pack years: 3.50     Types: Cigarettes    Smokeless tobacco: Never Used    Tobacco comment: pt accepting of nicotine gum   Substance and Sexual Activity    Alcohol use: Not on file     Comment: rarely    Drug use: Not Currently     Types: Marijuana     Comment: quit    Sexual activity: Not Currently   Lifestyle    Physical activity     Days per week: Not on file     Minutes per session: Not on file    Stress: Not on file   Relationships    Social connections     Talks on phone: Not on file     Gets together: Not on file     Attends [employer] service: Not on file     Active member of club or organization: Not on file     Attends meetings of clubs or organizations: Not on file     Relationship status: Not on file    Intimate partner violence     Fear of current or ex partner: Not on file     Emotionally abused: Not on file     Physically abused: Not on file     Forced sexual activity: Not on file   Other Topics Concern    Not on file   Social History Narrative    Not on file       Family History   Problem Relation Age of Onset    High Blood Pressure Mother     Diabetes Mother     Heart Disease Mother     Heart Disease Father     Early Death Father     Cancer Maternal Aunt         lung    Cancer Paternal Cousin         brain       Allergies:    Morphine, Pcn [penicillins], Amoxicillin, and Seasonal    Home Medications:  Prior to Admission medications    Medication Sig Start Date End Date Taking? Authorizing Provider   predniSONE (DELTASONE) 20 MG tablet Take 3 tablets by mouth daily for 5 days 3/10/21 3/15/21 Yes Darinel Harden,    albuterol sulfate HFA (PROVENTIL HFA) 108 (90 Base) MCG/ACT inhaler Inhale 2 puffs into the lungs every 6 hours as needed for Wheezing or Shortness of Breath (Space out to every 6 hours as symptoms improve) Space out to every 6 hours as symptoms improve.  1/26/21  Yes Raghu Medeiros MD   ondansetron [facility]) 4 MG tablet Take 1 tablet by mouth every 8 hours as needed for Nausea 1/26/21  Yes Raghu Medeiros MD   ibuprofen (ADVIL;MOTRIN) 800 MG tablet Take 1 tablet by mouth every 8 hours as needed for Pain 9/26/20  Yes Jean Nix MD   famotidine (PEPCID) 20 MG tablet Take 1 tablet by mouth 2 times daily 9/26/20  Yes Jean Nxi MD   aluminum & magnesium hydroxide-simethicone (MAALOX ADVANCED MAX ST) 230-509-00 MG/5ML SUSP Take 30 mLs by mouth 3 times daily as needed (heartburn/indigestion) 9/26/20  Yes Jean Nix MD   sertraline (ZOLOFT) 50 MG tablet Take 1 tablet by mouth daily 8/16/20  Yes JUNE Zaldivar CNP   traZODone (DESYREL) 50 MG tablet Take 1 tablet by mouth nightly as needed for Sleep 8/16/20  Yes JUNE Zaldivar CNP   cariprazine hcl (VRAYLAR) 1.5 MG capsule Take 1 capsule by mouth daily 8/17/20  Yes JUNE Zaldivar CNP   metFORMIN (GLUCOPHAGE) 500 MG tablet Take 1 tablet by mouth 2 times daily (with meals) TAKE 1 TABLET BY MOUTH DAILY 12/11/17  Yes Isauro Gomez MD   acetaminophen (TYLENOL) 500 MG tablet Take 2 tablets by mouth 3 times daily for 7 days 1/26/21 2/2/21  Raghu Medeiros  albuterol (PROVENTIL) nebulizer solution 10 mg    ipratropium (ATROVENT) 0.02 % nebulizer solution 0.5 mg    magnesium sulfate 2000 mg in 50 mL IVPB premix    ketorolac (TORADOL) injection 15 mg    prochlorperazine (COMPAZINE) injection 10 mg    diphenhydrAMINE (BENADRYL) injection 25 mg    predniSONE (DELTASONE) 20 MG tablet     Sig: Take 3 tablets by mouth daily for 5 days     Dispense:  15 tablet     Refill:  0         MDM:    Ernestine Gaston is a 26 y.o. female who presents with shortness of breath consistent with patient's prior episodes of asthma exacerbation as well as a headache that has developed since. Continue aerosolized nebs, she does not have history of prior admissions or intubations for this. Not feel that laboratory imaging is needed at this time as patient episodes seems like her prior episodes for known condition when she exerts herself to the hot weather. Will additionally give headache cocktail. Consider possible CT if patient does not have headache improvement. 6:27 PM EST  Patient reassessed, she has significant improvement with her breathing as well as her headache which is 2 out of 10 severity and continues to improve. No focal deficits. Did not require advanced imaging at this time. Will discharge patient home with prednisone burst.  Did offer her refills on her inhalers states that she has not at home. She understands return precautions. EMERGENCY DEPARTMENT COURSE:  ED Course as of Mar 10 1827   Wed Mar 10, 2021   1730 Patient reassessed, she is still wheezing however she feels that she is significantly improved. As she is still wheezy, will trial magnesium. [RB]      ED Course User Index  [RB] Ana Colon DO         DIAGNOSTIC RESULTS / EMERGENCYDEPARTMENT COURSE   LABS:  Labs Reviewed - No data to display    RADIOLOGY:  No results found. CONSULTS:  None    CRITICAL CARE:  Please see attending note    FINAL IMPRESSION     1.  Mild intermittent asthma with exacerbation    2. Acute nonintractable headache, unspecified headache type          DISPOSITION / PLAN   DISPOSITION Decision To Discharge 03/10/2021 06:26:38 PM       Evaluation and treatment course in the ED, and plan of care upon discharge was discussed in length with the patient. Patient had no further questions prior to being discharged and was instructed to return to the ED for new or worsening symptoms. Any changes to existing medications or new prescriptions were reviewed with patient and they expressed understanding of how to correctly take their medications and the possible common side effects. The patient understands that at this time there is no evidence for a more malignant underlying process, but the patient also understands that early in the process of an illness or injury, an emergency department workup can be falsely reassuring. Routine discharge counseling was given, and the patient understands that worsening, changing or persistent symptoms should prompt an immediate call or follow up with his/her primary physician or return to the emergency department. The importance of appropriate follow up was also discussed. More extensive discharge instructions were given in the patients discharge paperwork. PATIENT REFERRED TO:  [address] [place] [address] AT [place] [id number] [phone]  Schedule an appointment as soon as possible for a visit in 2 days  Return to the ER if worsening or any other concern      DISCHARGE MEDICATIONS:  New Prescriptions    PREDNISONE (DELTASONE) 20 MG TABLET    Take 3 tablets by mouth daily for 5 days       Dr. Sandeep Lombardi.  1970 [facility]  Emergency Medicine Resident Physician, PGY-3    (Please note that portions of this note were completed with a voice recognition program.  Efforts were made to edit the dictations but occasionally words are mis-transcribed.)          Radha Dempsey DO  Resident  03/10/21 2031

## 2021-03-10 NOTE — ED PROVIDER NOTES
for her which is right-sided throbbing and associated with peripheral vertigo. No clumsiness or ataxia. There is mild achiness in her neck but not stiff neck. No pain with eye muscle movement. No syncope. No trauma. No history of migraines. On exam she is uncomfortable tachycardic minimal respiratory distress normal saturations. Breath sounds have diffuse rhonchi. No retractions or accessory muscle use. GCS is 15. Normal speech mentation memory pupils extraocular movements. There are several beats of right fast beating lateral nystagmus which fatigues with holding position. Normal finger-nose movements. Neck is supple. Impression is bronchitis rule out pneumonia, asthma, atypical headache. Plan is migraine cocktail, steroids, bronchodilators, chest x-ray, reassess, if no improvement in headache will CT brain and CT angiogram head/neck. Tone Gallo.  Marco Daily MD, [facility]  Attending Emergency  Physician               Aminta Durant MD  03/10/21 8565

## 2021-03-11 LAB
EKG ATRIAL RATE: 118 BPM
EKG P AXIS: 64 DEGREES
EKG P-R INTERVAL: 138 MS
EKG Q-T INTERVAL: 346 MS
EKG QRS DURATION: 80 MS
EKG QTC CALCULATION (BAZETT): 484 MS
EKG R AXIS: 11 DEGREES
EKG T AXIS: 24 DEGREES
EKG VENTRICULAR RATE: 118 BPM

## 2021-03-11 PROCEDURE — 93010 ELECTROCARDIOGRAM REPORT: CPT | Performed by: INTERNAL MEDICINE

## 2021-03-13 ENCOUNTER — HOSPITAL ENCOUNTER (INPATIENT)
Age: 31
LOS: 4 days | Discharge: HOME | DRG: 753 | End: 2021-03-17
Attending: EMERGENCY MEDICINE | Admitting: PSYCHIATRY & NEUROLOGY
Payer: COMMERCIAL

## 2021-03-13 DIAGNOSIS — R45.851 SUICIDAL IDEATION: Primary | ICD-10-CM

## 2021-03-13 PROBLEM — F32.A DEPRESSION WITH SUICIDAL IDEATION: Status: ACTIVE | Noted: 2021-03-13

## 2021-03-13 LAB
-: ABNORMAL
ABSOLUTE EOS #: 0.2 K/UL (ref 0–0.4)
ABSOLUTE IMMATURE GRANULOCYTE: ABNORMAL K/UL (ref 0–0.3)
ABSOLUTE LYMPH #: 2.6 K/UL (ref 1–4.8)
ABSOLUTE MONO #: 0.8 K/UL (ref 0.1–1.3)
ACETAMINOPHEN LEVEL: <5 UG/ML (ref 10–30)
AMORPHOUS: ABNORMAL
AMPHETAMINE SCREEN URINE: NEGATIVE
ANION GAP SERPL CALCULATED.3IONS-SCNC: 11 MMOL/L (ref 9–17)
BACTERIA: ABNORMAL
BARBITURATE SCREEN URINE: NEGATIVE
BASOPHILS # BLD: 1 % (ref 0–2)
BASOPHILS ABSOLUTE: 0.1 K/UL (ref 0–0.2)
BENZODIAZEPINE SCREEN, URINE: NEGATIVE
BILIRUBIN URINE: NEGATIVE
BUN BLDV-MCNC: 15 MG/DL (ref 6–20)
BUN/CREAT BLD: ABNORMAL (ref 9–20)
BUPRENORPHINE URINE: NORMAL
CALCIUM SERPL-MCNC: 9.1 MG/DL (ref 8.6–10.4)
CANNABINOID SCREEN URINE: NEGATIVE
CASTS UA: ABNORMAL /LPF
CHLORIDE BLD-SCNC: 105 MMOL/L (ref 98–107)
CO2: 22 MMOL/L (ref 20–31)
COCAINE METABOLITE, URINE: NEGATIVE
COLOR: YELLOW
COMMENT UA: ABNORMAL
CREAT SERPL-MCNC: 0.76 MG/DL (ref 0.5–0.9)
CRYSTALS, UA: ABNORMAL /HPF
DIFFERENTIAL TYPE: ABNORMAL
EOSINOPHILS RELATIVE PERCENT: 2 % (ref 0–4)
EPITHELIAL CELLS UA: ABNORMAL /HPF
ETHANOL PERCENT: <0.01 %
ETHANOL: <10 MG/DL
GFR AFRICAN AMERICAN: >60 ML/MIN
GFR NON-AFRICAN AMERICAN: >60 ML/MIN
GFR SERPL CREATININE-BSD FRML MDRD: ABNORMAL ML/MIN/{1.73_M2}
GFR SERPL CREATININE-BSD FRML MDRD: ABNORMAL ML/MIN/{1.73_M2}
GLUCOSE BLD-MCNC: 110 MG/DL (ref 70–99)
GLUCOSE BLD-MCNC: 123 MG/DL (ref 65–105)
GLUCOSE URINE: NEGATIVE
HCG QUALITATIVE: NEGATIVE
HCT VFR BLD CALC: 38 % (ref 36–46)
HEMOGLOBIN: 12.8 G/DL (ref 12–16)
IMMATURE GRANULOCYTES: ABNORMAL %
KETONES, URINE: NEGATIVE
LEUKOCYTE ESTERASE, URINE: NEGATIVE
LYMPHOCYTES # BLD: 23 % (ref 24–44)
MCH RBC QN AUTO: 29.1 PG (ref 26–34)
MCHC RBC AUTO-ENTMCNC: 33.8 G/DL (ref 31–37)
MCV RBC AUTO: 86.1 FL (ref 80–100)
MDMA URINE: NORMAL
METHADONE SCREEN, URINE: NEGATIVE
METHAMPHETAMINE, URINE: NORMAL
MONOCYTES # BLD: 7 % (ref 1–7)
MUCUS: ABNORMAL
NITRITE, URINE: NEGATIVE
NRBC AUTOMATED: ABNORMAL PER 100 WBC
OPIATES, URINE: NEGATIVE
OTHER OBSERVATIONS UA: ABNORMAL
OXYCODONE SCREEN URINE: NEGATIVE
PDW BLD-RTO: 14.9 % (ref 11.5–14.9)
PH UA: 6.5 (ref 5–8)
PHENCYCLIDINE, URINE: NEGATIVE
PLATELET # BLD: 461 K/UL (ref 150–450)
PLATELET ESTIMATE: ABNORMAL
PMV BLD AUTO: 7.1 FL (ref 6–12)
POTASSIUM SERPL-SCNC: 3.9 MMOL/L (ref 3.7–5.3)
PROPOXYPHENE, URINE: NORMAL
PROTEIN UA: NEGATIVE
RBC # BLD: 4.41 M/UL (ref 4–5.2)
RBC # BLD: ABNORMAL 10*6/UL
RBC UA: ABNORMAL /HPF
RENAL EPITHELIAL, UA: ABNORMAL /HPF
SALICYLATE LEVEL: <1 MG/DL (ref 3–10)
SARS-COV-2, RAPID: NOT DETECTED
SEG NEUTROPHILS: 67 % (ref 36–66)
SEGMENTED NEUTROPHILS ABSOLUTE COUNT: 8 K/UL (ref 1.3–9.1)
SODIUM BLD-SCNC: 138 MMOL/L (ref 135–144)
SPECIFIC GRAVITY UA: 1.01 (ref 1–1.03)
SPECIMEN DESCRIPTION: NORMAL
TEST INFORMATION: NORMAL
TOXIC TRICYCLIC SC,BLOOD: ABNORMAL
TRICHOMONAS: ABNORMAL
TRICYCLIC ANTIDEP,URINE: NEGATIVE
TRICYCLIC ANTIDEPRESSANTS, UR: NORMAL
TURBIDITY: CLEAR
URINE HGB: ABNORMAL
UROBILINOGEN, URINE: NORMAL
WBC # BLD: 11.7 K/UL (ref 3.5–11)
WBC # BLD: ABNORMAL 10*3/UL
WBC UA: ABNORMAL /HPF
YEAST: ABNORMAL

## 2021-03-13 PROCEDURE — 6370000000 HC RX 637 (ALT 250 FOR IP): Performed by: PSYCHIATRY & NEUROLOGY

## 2021-03-13 PROCEDURE — G0480 DRUG TEST DEF 1-7 CLASSES: HCPCS

## 2021-03-13 PROCEDURE — U0002 COVID-19 LAB TEST NON-CDC: HCPCS

## 2021-03-13 PROCEDURE — 80307 DRUG TEST PRSMV CHEM ANLYZR: CPT

## 2021-03-13 PROCEDURE — 99285 EMERGENCY DEPT VISIT HI MDM: CPT

## 2021-03-13 PROCEDURE — 84703 CHORIONIC GONADOTROPIN ASSAY: CPT

## 2021-03-13 PROCEDURE — 80179 DRUG ASSAY SALICYLATE: CPT

## 2021-03-13 PROCEDURE — 82947 ASSAY GLUCOSE BLOOD QUANT: CPT

## 2021-03-13 PROCEDURE — 36415 COLL VENOUS BLD VENIPUNCTURE: CPT

## 2021-03-13 PROCEDURE — 80143 DRUG ASSAY ACETAMINOPHEN: CPT

## 2021-03-13 PROCEDURE — 80048 BASIC METABOLIC PNL TOTAL CA: CPT

## 2021-03-13 PROCEDURE — 1240000000 HC EMOTIONAL WELLNESS R&B

## 2021-03-13 PROCEDURE — 85025 COMPLETE CBC W/AUTO DIFF WBC: CPT

## 2021-03-13 PROCEDURE — 81001 URINALYSIS AUTO W/SCOPE: CPT

## 2021-03-13 RX ORDER — HYDROXYZINE 50 MG/1
50 TABLET, FILM COATED ORAL 3 TIMES DAILY PRN
Status: DISCONTINUED | OUTPATIENT
Start: 2021-03-13 | End: 2021-03-17 | Stop reason: HOSPADM

## 2021-03-13 RX ORDER — BENZTROPINE MESYLATE 1 MG/ML
2 INJECTION INTRAMUSCULAR; INTRAVENOUS 2 TIMES DAILY PRN
Status: DISCONTINUED | OUTPATIENT
Start: 2021-03-13 | End: 2021-03-17 | Stop reason: HOSPADM

## 2021-03-13 RX ORDER — PREDNISONE 20 MG/1
60 TABLET ORAL DAILY
Status: DISCONTINUED | OUTPATIENT
Start: 2021-03-13 | End: 2021-03-15

## 2021-03-13 RX ORDER — IBUPROFEN 400 MG/1
400 TABLET ORAL EVERY 6 HOURS PRN
Status: DISCONTINUED | OUTPATIENT
Start: 2021-03-13 | End: 2021-03-17 | Stop reason: HOSPADM

## 2021-03-13 RX ORDER — POLYETHYLENE GLYCOL 3350 17 G/17G
17 POWDER, FOR SOLUTION ORAL DAILY PRN
Status: DISCONTINUED | OUTPATIENT
Start: 2021-03-13 | End: 2021-03-17 | Stop reason: HOSPADM

## 2021-03-13 RX ORDER — MAGNESIUM HYDROXIDE/ALUMINUM HYDROXICE/SIMETHICONE 120; 1200; 1200 MG/30ML; MG/30ML; MG/30ML
30 SUSPENSION ORAL EVERY 6 HOURS PRN
Status: DISCONTINUED | OUTPATIENT
Start: 2021-03-13 | End: 2021-03-17 | Stop reason: HOSPADM

## 2021-03-13 RX ORDER — ACETAMINOPHEN 325 MG/1
650 TABLET ORAL EVERY 4 HOURS PRN
Status: DISCONTINUED | OUTPATIENT
Start: 2021-03-13 | End: 2021-03-17 | Stop reason: HOSPADM

## 2021-03-13 RX ORDER — ALBUTEROL SULFATE 2.5 MG/3ML
2.5 SOLUTION RESPIRATORY (INHALATION) EVERY 6 HOURS PRN
Status: DISCONTINUED | OUTPATIENT
Start: 2021-03-13 | End: 2021-03-17 | Stop reason: HOSPADM

## 2021-03-13 RX ORDER — TRAZODONE HYDROCHLORIDE 50 MG/1
50 TABLET ORAL NIGHTLY PRN
Status: DISCONTINUED | OUTPATIENT
Start: 2021-03-13 | End: 2021-03-17 | Stop reason: HOSPADM

## 2021-03-13 RX ADMIN — SERTRALINE HYDROCHLORIDE 50 MG: 50 TABLET ORAL at 17:57

## 2021-03-13 RX ADMIN — HYDROXYZINE HYDROCHLORIDE 50 MG: 50 TABLET, FILM COATED ORAL at 15:08

## 2021-03-13 RX ADMIN — HYDROXYZINE HYDROCHLORIDE 50 MG: 50 TABLET, FILM COATED ORAL at 23:18

## 2021-03-13 RX ADMIN — TRAZODONE HYDROCHLORIDE 50 MG: 50 TABLET ORAL at 20:48

## 2021-03-13 ASSESSMENT — LIFESTYLE VARIABLES: HISTORY_ALCOHOL_USE: NO

## 2021-03-13 ASSESSMENT — SLEEP AND FATIGUE QUESTIONNAIRES: DO YOU HAVE DIFFICULTY SLEEPING: NO

## 2021-03-13 ASSESSMENT — PAIN SCALES - GENERAL: PAINLEVEL_OUTOF10: 0

## 2021-03-13 NOTE — BH NOTE
Pt was approached to complete RT assessment but was resting in room et declined. RT will seek out pt to complete assessment during next shift on 3/14/2021.

## 2021-03-13 NOTE — GROUP NOTE
Group Therapy Note    Date: 3/13/2021    Group Start Time: 1600  Group End Time: 0108  Group Topic: Healthy Living/Wellness    [facility]    Shae Swann        Group Therapy Note    Attendees: 7/18         Patient's Goal:  Discuss the effects our environment can have on one's mental health    Notes:      Status After Intervention:  Improved    Participation Level:  Active Listener    Participation Quality: Appropriate      Speech:  normal      Thought Process/Content: Logical      Affective Functioning: Congruent      Mood: normal      Level of consciousness:  Alert      Response to Learning: Able to verbalize current knowledge/experience      Endings: None Reported    Modes of Intervention: Education      Discipline Responsible: [address], [employer]      Signature:  Shae Swann

## 2021-03-13 NOTE — ED NOTES
Mode of arrival (squad #, walk in, police, etc) : [employer] from home      Chief complaint(s): Mental health problem, suicidal         Arrival Note (brief scenario, treatment PTA, etc). : Pt arrives due to SI. Pt states she attempted to harm herself by overusing her inhaler and breathing treatment, unknown amount of times. Pt reports hx SI and cutting herself. Pt denies HI. Pt tearful upon arrival. Pt is A&Ox4, eupneic, PWD. GCS=15. Call light in reach. Safeguard at bedside. C= \"Have you ever felt that you should Cut down on your drinking? \"  No  A= \"Have people Annoyed you by criticizing your drinking? \"  No  G= \"Have you ever felt bad or Guilty about your drinking? \"  No  E= \"Have you ever had a drink as an Eye-opener first thing in the morning to steady your nerves or to help a hangover? \"  No      Deferred []      Reason for deferring: N/A    *If yes to two or more: probable alcohol abuse. Court Lulú, GERALD  03/13/21 8955

## 2021-03-13 NOTE — PROGRESS NOTES
Behavioral Services  Medicare Certification Upon Admission    I certify that this patient's inpatient psychiatric hospital admission is medically necessary for:    [x] (1) Treatment which could reasonably be expected to improve this patient's condition,       [x] (2) Or for diagnostic study;     AND     [x](2) The inpatient psychiatric services are provided while the individual is under the care of a physician and are included in the individualized plan of care.     Estimated length of stay/service 5-7day    Plan for post-hospital care outpatient care      Electronically signed by Xuan Colbert MD on 3/13/2021 at 2:26 PM

## 2021-03-13 NOTE — BH NOTE
`Behavioral [facility] [place]  Admission Note     Admission Type:   Admission Type: Voluntary    Reason for admission:  Reason for Admission: thoughts to kill self by overdosing on prescribed medications    PATIENT STRENGTHS:  Strengths: Positive Support, Connection to output provider, No significant Physical Illness    Patient Strengths and Limitations:  Limitations: Hopeless about future    Addictive Behavior:   Addictive Behavior  In the past 3 months, have you felt or has someone told you that you have a problem with:  : None  Do you have a history of Chemical Use?: No  Do you have a history of Alcohol Use?: No  Do you have a history of Street Drug Abuse?: No  Histroy of Prescripton Drug Abuse?: No    Medical Problems:   Past Medical History:   Diagnosis Date    Asthma     Back pain 9/16/2014    Bipolar 1 disorder ([facility]     Bipolar disorder ([facility] 12/8/2017    Depression     Diabetes mellitus ([facility]     Dizziness     Fatty liver disease, nonalcoholic     Fatty liver disease, nonalcoholic     GERD (gastroesophageal reflux disease)     Obesity        Status EXAM:  Status and Exam  Normal: No  Facial Expression: Sad  Affect: Appropriate  Level of Consciousness: Alert  Mood:Normal: No  Mood: Depressed, Anxious  Motor Activity:Normal: Yes  Interview Behavior: Cooperative  Preception: [place] to Person, Radha Scrape to Time, [place] to Place, [place] to Situation  Attention:Normal: Yes  Thought Content:Normal: Yes  Hallucinations: None  Delusions: No  Memory:Normal: Yes  Insight and Judgment: No  Insight and Judgment: Poor Insight  Present Suicidal Ideation: No  Present Homicidal Ideation: No    Tobacco Screening:  Practical Counseling, on admission, melinda X, if applicable and completed (first 3 are required if patient doesn't refuse):            ( )  Recognizing danger situations (included triggers and roadblocks)                    ( )  Coping skills (new ways to manage stress, exercise, relaxation techniques, changing routine, distraction)                                                           ( )  Basic information about quitting (benefits of quitting, techniques in how to quit, available resources  ( ) Referral for counseling faxed to [facility]                                           ( ) Patient refused counseling  (X ) Patient has not smoked in the last 30 days    Metabolic Screening:    Lab Results   Component Value Date    LABA1C 5.8 08/14/2020       Lab Results   Component Value Date    CHOL 167 08/14/2020    CHOL 192 09/17/2018    CHOL 188 08/12/2014    CHOL 195 06/27/2013    CHOL 182 02/20/2013    CHOL 170 02/13/2012     Lab Results   Component Value Date    TRIG 79 08/14/2020    TRIG 105 09/17/2018    TRIG 135 08/12/2014    TRIG 119 06/27/2013    TRIG 108 02/20/2013    TRIG 88 02/13/2012     Lab Results   Component Value Date    HDL 43 08/14/2020    HDL 37 (L) 09/17/2018    HDL 38 (L) 08/12/2014    HDL 40 (L) 06/27/2013    HDL 42 02/20/2013    HDL 43 02/13/2012     No components found for: LDLCAL  No results found for: LABVLDL      Body mass index is 54.58 kg/m². BP Readings from Last 2 Encounters:   03/13/21 (!) 130/96   03/10/21 138/75           Pt admitted with followings belongings:  Dentures: None  Vision - Corrective Lenses: None  Hearing Aid: None  Jewelry: Ring, Necklace  Body Piercings Removed: No  Clothing: Footwear, Pants, Shirt, Socks, Undergarments (Comment), Sweater  Were All Patient Medications Collected?: Other (comment)(all pill bottles in belongings are empty)  Other Valuables: Cell phone, Other (Comment)(headphones, court papers, phone )     Valuables placed in safe in security envelope, number: TBD. Patient's home medications will be verified with [employer]. Patient oriented to surroundings and program expectations and copy of patient rights given. Received admission packet: complete. Consents reviewed, signed by patient and staff.  Patient verbalize understanding: of unit rules and expectations. Patient education on precautions: safety    Patient admitted to [facility] room [facility] from the ER due to suicidal thoughts to overdose on her inhaler. Patient stated she has been feeling hopeless and depressed on and off for the past year due to losing her 12year old son to foster care.   Patient stated she has been sleeping and crying more than usual.  Suicide precautions discontinued as patient stated she will remain safe while in the hospital.                  Vladimir Zamora RN

## 2021-03-13 NOTE — ED PROVIDER NOTES
lisbeth and [facility]. Social History:  reports that she has been smoking cigarettes. She has a 3.50 pack-year smoking history. She has never used smokeless tobacco. She reports previous drug use. Drug: Marijuana. Family History: Noncontributory at this time  Psychiatric History: See PMH  Allergies:is allergic to morphine; pcn [penicillins]; amoxicillin; and seasonal.      PHYSICAL EXAM     INITIAL VITALS: BP: (!) 129/112  Pulse: 103  Resp: 18  Temp: 97.4 °F (36.3 °C) SpO2: 97 %     Constitutional:  Well developed, no acute distress   Eyes:  Pupils equal and readily reactive to light  HENT:  Atraumatic, external ears normal, nose normal, oropharynx moist. Neck- supple    Respiratory: Scattered wheezing bilaterally  Cardiovascular:  RRR with normal S1 and S2  GI:  Soft, nondistended and nontender   Musculoskeletal:  No edema, no tenderness, no deformities  Integument:  No rash  Neurologic:  Alert & oriented x 4, no focal deficits noted   Psychiatric: Flat affect      EMERGENCY DEPARTMENT COURSE     27-year-old female presents with suicidal ideations with a plan to overdose or cut herself. She is afebrile, nontoxic, normal vital signs. No acute distress. She has no other medical complaints. Her respiratory status is baseline for her. She does have wheezing but has a long history of asthma. Not in any respiratory distress. Patient is medically cleared for psychiatric evaluation and likely admission. FINAL IMPRESSION     1. Suicidal ideation          DISPOSITION:  DISPOSITION      Admission    PATIENT REFERRED TO:  No follow-up provider specified. DISCHARGE MEDICATIONS:  New Prescriptions    No medications on file       (Please note that portions of this note were completed with a voice recognition program. Efforts were made to edit the dictations but occasionally words are mis-transcribed.  Whenever words are used in this note in any gender, they shall be construed as though they were used in the gender appropriate to the circumstances; and whenever words are used in this note in the singular or plural form, they shall be construed as though they were used in the form appropriate to the circumstances.)    Yoni Byrd DO  Attending Emergency Medicine Physician        Yoni Byrd,   03/13/21 Ashish Fernandez DO  03/13/21 1132

## 2021-03-13 NOTE — ED NOTES
Report given to Sonia Valencia from [place]. Report method by phone   The following was reviewed with receiving RN:   Current vital signs:  BP (!) 130/96   Pulse 103   Temp 97.4 °F (36.3 °C) (Oral)   Resp 18   Ht 5' 5\" (1.651 m)   Wt (!) 328 lb (148.8 kg)   SpO2 97%   BMI 54.58 kg/m²                MEWS Score: 2     Any medication or safety alerts were reviewed. Any pending diagnostics and notifications were also reviewed, as well as any safety concerns or issues, abnormal labs, abnormal imaging, and abnormal assessment findings. Questions were answered.             Gerda Casillas RN  03/13/21 3270

## 2021-03-14 PROBLEM — F33.3 MDD (MAJOR DEPRESSIVE DISORDER), RECURRENT, SEVERE, WITH PSYCHOSIS (HCC): Status: ACTIVE | Noted: 2021-03-14

## 2021-03-14 PROBLEM — F33.2 MDD (MAJOR DEPRESSIVE DISORDER), RECURRENT SEVERE, WITHOUT PSYCHOSIS (HCC): Status: ACTIVE | Noted: 2021-03-14

## 2021-03-14 PROCEDURE — 6370000000 HC RX 637 (ALT 250 FOR IP): Performed by: PSYCHIATRY & NEUROLOGY

## 2021-03-14 PROCEDURE — 99223 1ST HOSP IP/OBS HIGH 75: CPT | Performed by: PSYCHIATRY & NEUROLOGY

## 2021-03-14 PROCEDURE — 94640 AIRWAY INHALATION TREATMENT: CPT

## 2021-03-14 PROCEDURE — 94760 N-INVAS EAR/PLS OXIMETRY 1: CPT

## 2021-03-14 PROCEDURE — 1240000000 HC EMOTIONAL WELLNESS R&B

## 2021-03-14 RX ORDER — SERTRALINE HYDROCHLORIDE 100 MG/1
100 TABLET, FILM COATED ORAL DAILY
Status: DISCONTINUED | OUTPATIENT
Start: 2021-03-15 | End: 2021-03-14

## 2021-03-14 RX ADMIN — CARIPRAZINE 3 MG: 3 CAPSULE, GELATIN COATED ORAL at 08:41

## 2021-03-14 RX ADMIN — ACETAMINOPHEN 650 MG: 325 TABLET ORAL at 19:01

## 2021-03-14 RX ADMIN — PREDNISONE 60 MG: 20 TABLET ORAL at 08:42

## 2021-03-14 RX ADMIN — TRAZODONE HYDROCHLORIDE 50 MG: 50 TABLET ORAL at 22:48

## 2021-03-14 RX ADMIN — HYDROXYZINE HYDROCHLORIDE 50 MG: 50 TABLET, FILM COATED ORAL at 18:23

## 2021-03-14 RX ADMIN — SERTRALINE HYDROCHLORIDE 50 MG: 50 TABLET ORAL at 08:41

## 2021-03-14 ASSESSMENT — SLEEP AND FATIGUE QUESTIONNAIRES
DIFFICULTY FALLING ASLEEP: NO
SLEEP PATTERN: INSOMNIA
DO YOU USE A SLEEP AID: NO
AVERAGE NUMBER OF SLEEP HOURS: 5
DO YOU HAVE DIFFICULTY SLEEPING: YES
RESTFUL SLEEP: NO

## 2021-03-14 ASSESSMENT — PAIN SCALES - GENERAL: PAINLEVEL_OUTOF10: 3
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
## 2021-03-14 NOTE — PLAN OF CARE
Problem: Altered Mood, Depressive Behavior:  Goal: Absence of self-harm  Description: Absence of self-harm  Outcome: Ongoing  Note: Pt denies thoughts of self harm and is agreeable to seeking out should thoughts of self harm arise. Safe environment maintained. Q15 minute checks for safety cont per unit policy. Will cont to monitor for safety and provides support and reassurance as needed. Patient states depression and anxiety are the same.

## 2021-03-14 NOTE — PLAN OF CARE
[facility]  Initial Interdisciplinary Treatment Plan NO      Original treatment plan Date & Time: 3/14/2021             730AM    Admission Type:  Admission Type: Involuntary    Reason for admission:   Reason for Admission: SI no plan    Estimated Length of Stay:  5-7days  Estimated Discharge Date: to be determined by physician    PATIENT STRENGTHS:  Patient Strengths:Strengths: Positive Support, No significant Physical Illness  Patient Strengths and Limitations:Limitations: Tendency to isolate self, Lacks leisure interests, Difficulty problem solving/relies on others to help solve problems, Hopeless about future, Multiple barriers to leisure interests, Inappropriate/potentially harmful leisure interests (depression substance abuse anxiety poor coping skills)  Addictive Behavior: Addictive Behavior  In the past 3 months, have you felt or has someone told you that you have a problem with:  : None  Do you have a history of Chemical Use?: No  Do you have a history of Alcohol Use?: No  Do you have a history of Street Drug Abuse?: Yes  Histroy of Prescripton Drug Abuse?: No  Medical Problems:No past medical history on file.   Status EXAM:Status and Exam  Normal: No  Facial Expression: Elevated  Affect: Inappropriate  Level of Consciousness: Alert  Mood:Normal: No  Mood: Depressed, Anxious  Motor Activity:Normal: No  Motor Activity: Decreased  Interview Behavior: Cooperative  Preception: Delta to Person, Radha Scrape to Time, Delta to Place, Delta to Situation  Attention:Normal: Yes  Attention: Distractible  Thought Processes: Circumstantial  Thought Content:Normal: Yes  Thought Content: Preoccupations  Hallucinations: None  Delusions: No  Memory:Normal: Yes  Memory: Poor Recent, Confabulation  Insight and Judgment: No  Insight and Judgment: Unmotivated  Present Suicidal Ideation: No  Present Homicidal Ideation: No    EDUCATION:   Learner Progress Toward Treatment Goals: reviewed group plans and strategies for care    Method:group therapy, medication compliance, individualized assessments and care planning    Outcome: needs reinforcement    PATIENT GOALS: to be discussed with patient within 72 hours    PLAN/TREATMENT RECOMMENDATIONS:     continue group therapy , medications compliance, goal setting, individualized assessments and care, continue to monitor pt on unit      SHORT-TERM GOALS:   Time frame for Short-Term Goals: 5-7 days    LONG-TERM GOALS:  Time frame for Long-Term Goals: 6 months  Members Present in Team Meeting: See Signature Sheet    Anibal Gomez

## 2021-03-14 NOTE — GROUP NOTE
Group Therapy Note    Date: 3/14/2021    Group Start Time: 0900  Group End Time: 0940  Group Topic: Community Meeting    [facility] AUNDREA Marie [place]        Group Therapy Note    Attendees: 8/20         Pt did not participate in Community Meeting/Goals Group at 900am when encouraged by RT due to resting in room. Pt was offered talk time as an alternative to group but declined.       Discipline Responsible: Psychoeducational Specialist      Signature:  Ioana Ruiz

## 2021-03-14 NOTE — GROUP NOTE
Group Therapy Note    Date: 3/14/2021    Group Start Time: 1330  Group End Time: 2115  Group Topic: Cognitive Skills    [facility]    FRANKY Madden        Group Therapy Note    Attendees: 7/20         Patient's Goal:  To increase social interaction and to practice decision making and communication skills. Notes: Pt participated fully in group task . Pt was able to practice decision making and communication skills. Status After Intervention:  Improved     Participation Level:  Attentive, Interactive, alert     Participation Quality:  Attentive, Sharing , Appropriate     Speech:  Normal        Thought Process/Content: Logical, linear        Affective Functioning: blunted, brightened      Mood:  Euthymic      Level of consciousness:  Alert, Oriented x4 and Attentive        Response to Learning: Able to verbalize current knowledge/experience, Able to acknowledge new learning ,and Progressing to goal        Endings: None Reported     Modes of Intervention: Education, Support, Socialization, Exploration, Clarifying and Problem-solving        Discipline Responsible: Psychoeducational Specialist        Signature:  FRANKY Ryder

## 2021-03-14 NOTE — PLAN OF CARE
Problem: Suicide risk  Goal: Provide patient with safe environment  Description: Provide patient with safe environment  3/14/2021 1435 by Riaz Nassar RN  Outcome: Ongoing  Patient provided with a safe environment, spoken to often and checked on at least every 15 minutes by staff.  3/14/2021 0730 by FRANKY Bridges  Outcome: Ongoing     Problem: Altered Mood, Depressive Behavior:  Goal: Able to verbalize and/or display a decrease in depressive symptoms  Description: Able to verbalize and/or display a decrease in depressive symptoms  3/14/2021 1435 by Riaz Nassar RN  Outcome: Ongoing  Patient continues to complain of high levels of depression stating she is not feeling any better than when she arrived at the hospital.  Patient is out on the unit in intervals, affect flat, but occasionally brightens upon approach, stated she slept well and her appetite is good. Patient showered and changed clothes, attends most groups and is selectively social with peers and staff.  3/14/2021 0730 by FRANKY Bridges  Outcome: Ongoing  Goal: Absence of self-harm  Description: Absence of self-harm  3/14/2021 1435 by Riaz Nassar RN  Outcome: Ongoing  Patient denies any thoughts to harm self or others.   3/14/2021 0730 by FRANKY Bridges  Outcome: Ongoing
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
## 2021-03-15 LAB
GLUCOSE BLD-MCNC: 184 MG/DL (ref 65–105)
GLUCOSE BLD-MCNC: 186 MG/DL (ref 65–105)

## 2021-03-15 PROCEDURE — 6370000000 HC RX 637 (ALT 250 FOR IP): Performed by: PSYCHIATRY & NEUROLOGY

## 2021-03-15 PROCEDURE — 82947 ASSAY GLUCOSE BLOOD QUANT: CPT

## 2021-03-15 PROCEDURE — 6370000000 HC RX 637 (ALT 250 FOR IP): Performed by: INTERNAL MEDICINE

## 2021-03-15 PROCEDURE — 6370000000 HC RX 637 (ALT 250 FOR IP): Performed by: NURSE PRACTITIONER

## 2021-03-15 PROCEDURE — 99253 IP/OBS CNSLTJ NEW/EST LOW 45: CPT | Performed by: INTERNAL MEDICINE

## 2021-03-15 PROCEDURE — 1240000000 HC EMOTIONAL WELLNESS R&B

## 2021-03-15 PROCEDURE — 99232 SBSQ HOSP IP/OBS MODERATE 35: CPT | Performed by: PSYCHIATRY & NEUROLOGY

## 2021-03-15 PROCEDURE — 94640 AIRWAY INHALATION TREATMENT: CPT

## 2021-03-15 RX ORDER — NICOTINE POLACRILEX 4 MG
15 LOZENGE BUCCAL PRN
Status: DISCONTINUED | OUTPATIENT
Start: 2021-03-15 | End: 2021-03-17 | Stop reason: HOSPADM

## 2021-03-15 RX ORDER — DEXTROSE MONOHYDRATE 50 MG/ML
100 INJECTION, SOLUTION INTRAVENOUS PRN
Status: DISCONTINUED | OUTPATIENT
Start: 2021-03-15 | End: 2021-03-17 | Stop reason: HOSPADM

## 2021-03-15 RX ORDER — PREDNISONE 10 MG/1
10 TABLET ORAL DAILY
Status: DISCONTINUED | OUTPATIENT
Start: 2021-03-16 | End: 2021-03-17 | Stop reason: HOSPADM

## 2021-03-15 RX ORDER — BUDESONIDE AND FORMOTEROL FUMARATE DIHYDRATE 160; 4.5 UG/1; UG/1
2 AEROSOL RESPIRATORY (INHALATION) 2 TIMES DAILY
Status: DISCONTINUED | OUTPATIENT
Start: 2021-03-15 | End: 2021-03-17 | Stop reason: HOSPADM

## 2021-03-15 RX ORDER — DEXTROSE MONOHYDRATE 25 G/50ML
12.5 INJECTION, SOLUTION INTRAVENOUS PRN
Status: DISCONTINUED | OUTPATIENT
Start: 2021-03-15 | End: 2021-03-17 | Stop reason: HOSPADM

## 2021-03-15 RX ORDER — IPRATROPIUM BROMIDE AND ALBUTEROL SULFATE 2.5; .5 MG/3ML; MG/3ML
1 SOLUTION RESPIRATORY (INHALATION) 3 TIMES DAILY
Status: DISCONTINUED | OUTPATIENT
Start: 2021-03-15 | End: 2021-03-17 | Stop reason: HOSPADM

## 2021-03-15 RX ORDER — ACETAMINOPHEN, ASPIRIN AND CAFFEINE 250; 250; 65 MG/1; MG/1; MG/1
1 TABLET, FILM COATED ORAL EVERY 6 HOURS PRN
Status: DISCONTINUED | OUTPATIENT
Start: 2021-03-15 | End: 2021-03-17 | Stop reason: HOSPADM

## 2021-03-15 RX ADMIN — CARIPRAZINE 3 MG: 3 CAPSULE, GELATIN COATED ORAL at 08:57

## 2021-03-15 RX ADMIN — SERTRALINE HYDROCHLORIDE 150 MG: 100 TABLET ORAL at 08:56

## 2021-03-15 RX ADMIN — ACETAMINOPHEN, ASPIRIN AND CAFFEINE 1 TABLET: 250; 250; 65 TABLET, FILM COATED ORAL at 18:13

## 2021-03-15 RX ADMIN — TRAZODONE HYDROCHLORIDE 50 MG: 50 TABLET ORAL at 22:27

## 2021-03-15 RX ADMIN — HYDROXYZINE HYDROCHLORIDE 50 MG: 50 TABLET, FILM COATED ORAL at 22:27

## 2021-03-15 RX ADMIN — METFORMIN HYDROCHLORIDE 500 MG: 500 TABLET ORAL at 08:57

## 2021-03-15 RX ADMIN — INSULIN LISPRO 1 UNITS: 100 INJECTION, SOLUTION INTRAVENOUS; SUBCUTANEOUS at 20:58

## 2021-03-15 RX ADMIN — BUDESONIDE AND FORMOTEROL FUMARATE DIHYDRATE 2 PUFF: 160; 4.5 AEROSOL RESPIRATORY (INHALATION) at 20:58

## 2021-03-15 RX ADMIN — PREDNISONE 60 MG: 20 TABLET ORAL at 08:57

## 2021-03-15 RX ADMIN — INSULIN LISPRO 1 UNITS: 100 INJECTION, SOLUTION INTRAVENOUS; SUBCUTANEOUS at 17:06

## 2021-03-15 RX ADMIN — IPRATROPIUM BROMIDE AND ALBUTEROL SULFATE 1 AMPULE: .5; 3 SOLUTION RESPIRATORY (INHALATION) at 19:16

## 2021-03-15 ASSESSMENT — PAIN DESCRIPTION - LOCATION: LOCATION: HEAD

## 2021-03-15 NOTE — PLAN OF CARE
Problem: Suicide risk  Goal: Provide patient with safe environment  Description: Provide patient with safe environment  Outcome: Ongoing  Note: Patient is provided with a safe environment. Q15 minute checks maintained. Patient remains safe at this time. Problem: Altered Mood, Depressive Behavior:  Goal: Able to verbalize and/or display a decrease in depressive symptoms  Description: Able to verbalize and/or display a decrease in depressive symptoms  Outcome: Ongoing  Note: Patient is accepting of 1:1 talk time with staff. Patient admits to depression and anxiety. Patient is eating and sleeping well. Patient is medication compliant. Patient attends unit programming. Reassurance and support provided. Q15 minute checks maintained. Patient remains safe at this time. Problem: Altered Mood, Depressive Behavior:  Goal: Absence of self-harm  Description: Absence of self-harm  3/15/2021 1034 by Heather Crowder  Outcome: Ongoing  Note: No self harm behaviors noted. Patient denies suicidal and homicidal ideation and auditory and visual hallucinations and verbally agrees to approach staff if thoughts of self harm arises. Q15 minute checks maintained. Patient remains safe at this time.

## 2021-03-15 NOTE — SUICIDE SAFETY PLAN
SAFETY PLAN    A suicide Safety Plan is a document that supports someone when they are having thoughts of suicide. Warning Signs that indicate a suicidal crisis may be developing: What (situations, thoughts, feelings, body sensations, behaviors, etc.) do you experience that lets you know you are beginning to think about suicide? 1. Crying a lot  2. Screaming  3. Shaking    Internal Coping Strategies:  What things can I do (relaxation techniques, hobbies, physical activities, etc.) to take my mind off my problems without contacting another person? 1. Coloring  2. Reading  3. Talking    People and social settings that provide distraction: Who can I call or where can I go to distract me? 1. Name: My   Phone: [phone]    People whom I can ask for help: Who can I call when I need help - for example, friends, family, clergy, someone else? 1. Name: My   Phone: [phone]    Professionals or [address] [place] [address] Blvd I can contact during a crisis: Who can I call for help - for example, my doctor, my psychiatrist, my psychologist, a mental health provider, a suicide hotline? 1. Clinician Name: [facility]   2.911  3. Suicide Prevention Lifeline: 2-830-919-TALK (7072)      Making the environment safe: How can I make my environment (house/apartment/living space) safer? For example, can I remove guns, medications, and other items? 1. Think positive' clean house. 2. Talk to more people about my feelings. 3. Follow-up with appointments.

## 2021-03-15 NOTE — PLAN OF CARE
5 [facility]  Day 3 Interdisciplinary Treatment Plan NOTE    Review Date & Time: 3/15/2021 1313    Admission Type:   Admission Type: Voluntary    Reason for admission:  Reason for Admission: thoughts to kill self by overdosing on prescribed medications  Estimated Length of Stay: 5-7 days  Estimated Discharge Date Update: to be determined by physician    PATIENT STRENGTHS:  Patient Strengths Strengths: Connection to output provider, Medication Compliance  Patient Strengths and Limitations:Limitations: Difficult relationships / poor social skills, Demonstrates discomfort with /lack of social skills, Difficulty problem solving/relies on others to help solve problems, Tendency to isolate self, Unrealistic self-view  Addictive Behavior:Addictive Behavior  In the past 3 months, have you felt or has someone told you that you have a problem with:  : None  Do you have a history of Chemical Use?: No  Do you have a history of Alcohol Use?: No  Do you have a history of Street Drug Abuse?: No  Histroy of Prescripton Drug Abuse?: No  Medical Problems:  Past Medical History:   Diagnosis Date    Asthma     Back pain 9/16/2014    Bipolar 1 disorder ([facility]     Bipolar disorder ([facility] 12/8/2017    Depression     Diabetes mellitus ([facility]     Dizziness     Fatty liver disease, nonalcoholic     Fatty liver disease, nonalcoholic     GERD (gastroesophageal reflux disease)     Obesity        Risk:  Fall RiskTotal: 63  Son Scale Son Scale Score: 22  BVC Total: 0  Change in scores no Changes to plan of Care no    Status EXAM:   Status and Exam  Normal: No  Facial Expression: Flat  Affect: Appropriate  Level of Consciousness: Alert  Mood:Normal: No  Mood: Depressed, Anxious  Motor Activity:Normal: Yes  Interview Behavior: Cooperative  Preception: [place] to Person, [place] to Time, [place] to Situation, [place] to Place  Attention:Normal: Yes  Thought Content:Normal: No  Thought Content: Preoccupations  Hallucinations: None Delusions: No  Memory:Normal: Yes  Insight and Judgment: No  Insight and Judgment: Poor Judgment, Poor Insight  Present Suicidal Ideation: No  Present Homicidal Ideation: No    Daily Assessment Last Entry:   Daily Sleep (WDL): Exceptions to WDL         Patient Currently in Pain: Denies  Daily Nutrition (WDL): Within Defined Limits    Patient Monitoring:  Frequency of Checks: 4 times per hour, close    Psychiatric Symptoms:   Depression Symptoms  Depression Symptoms: Feelings of helplessness  Anxiety Symptoms  Anxiety Symptoms: Generalized  Rosaura Symptoms  Rosaura Symptoms: No problems reported or observed. Psychosis Symptoms  Delusion Type: No problems reported or observed. Suicide Risk CSSR-S:  1) Within the past month, have you wished you were dead or wished you could go to sleep and not wake up? : Yes  2) Have you actually had any thoughts of killing yourself? : Yes  3) Have you been thinking about how you might kill yourself? : Yes  5) Have you started to work out or worked out the details of how to kill yourself? Do you intend to carry out this plan? : No  6) Have you ever done anything, started to do anything, or prepared to do anything to end your life?: Yes  Change in Result No Change in Plan of care No      EDUCATION:   EDUCATION:   Learner Progress Toward Treatment Goals: Reviewed results and recommendations of this team, Reviewed group plan and strategies, Reviewed signs, symptoms and risk of self harm and violent behavior, Reviewed goals and plan of care    Method:small group, individual verbal education    Outcome:verbalized by patient, but needs reinforcement to obtain goals    PATIENT GOALS:  Short term: Decrease depression and discharge planning  Long term:  To stay positive and surround self with more positive people to encourage better decision making     PLAN/TREATMENT RECOMMENDATIONS UPDATE: continue with group therapies, increased socialization, continue planning for after discharge goals,

## 2021-03-15 NOTE — GROUP NOTE
Group Therapy Note    Date: 3/15/2021    Group Start Time: 1430  Group End Time: 8381  Group Topic: cognitive skills    ABBY BHFRANKY Gilman        Group Therapy Note    Attendees: 11         Patient's Goal:  To improve coping skills / improve communication skills     Notes:   Pt was pleasant and participated well     Status After Intervention:  Improved    Participation Level:  Active Listener and Interactive    Participation Quality: Appropriate, Attentive and Supportive      Speech:  normal      Thought Process/Content: Logical      Affective Functioning: Congruent      Mood: euthymic      Level of consciousness:  Alert and Oriented x4      Response to Learning: Able to verbalize current knowledge/experience and Progressing to goal      Endings: None Reported    Modes of Intervention: Education, Support and Problem-solving      Discipline Responsible: Psychoeducational Specialist      Signature:  Latisha Perdomo

## 2021-03-15 NOTE — GROUP NOTE
Group Therapy Note    Date: 3/15/2021    Group Start Time: 1600  Group End Time: 2448  Group Topic: Healthy Living/Wellness    [facility] D    Amy Cedillo        Group Therapy Note    Attendees: 11/19         Patient's Goal:  Discuss discharge plan    Notes:      Status After Intervention:  Improved    Participation Level:  Active Listener    Participation Quality: Appropriate      Speech:  normal      Thought Process/Content: Logical      Affective Functioning: Congruent      Mood: normal      Level of consciousness:  Alert      Response to Learning: Able to verbalize current knowledge/experience      Endings: None Reported    Modes of Intervention: Education      Discipline Responsible: [address], [employer]      Signature:  Amy Cedillo

## 2021-03-15 NOTE — GROUP NOTE
Group Therapy Note    Date: 3/15/2021    Group Start Time: 0900  Group End Time: 7707  Group Topic: Community Meeting    FRANKY Mcallistre        Group Therapy Note    Attendees: 11         Patient's Goal:  To improve decision making skills     Notes:   Pt was pleasant and participated well     Status After Intervention:  Improved    Participation Level:  Active Listener and Interactive    Participation Quality: Appropriate, Attentive and Supportive      Speech:  normal      Thought Process/Content: Logical      Affective Functioning: flat      Mood: depressed       Level of consciousness:  Alert and Oriented x4      Response to Learning: Able to verbalize current knowledge/experience and Progressing to goal      Endings: None Reported    Modes of Intervention: Education, Support and Problem-solving      Discipline Responsible: Psychoeducational Specialist      Signature:  Rebekah Henderson

## 2021-03-15 NOTE — CONSULTS
[facility] Internal Medicine    CONSULTATION    / FOLLOW UP VISIT       Date:   3/15/2021  Patient name:  Mita Zuniga  Date of admission:  3/13/2021 10:31 AM  MRN:   [id number]  Account:  [de-identified]  YOB: 1990  PCP:    No primary care provider on file. Room:   [address] [place] [address]  Code Status:    Full Code    Physician Requesting Consult: William Robles MD    History of Present Illness:      C/C ;  Medical comorbidity management     REASON FOR CONSULT;  Medical comorbidity and medication management ;                                                 *Active Problems:    Obesity    Mild intermittent asthma with acute exacerbation    Type 2 diabetes mellitus without complication (HCC)    MDD (major depressive disorder), recurrent, severe, with psychosis ([facility]  Resolved Problems:    * No resolved hospital problems. *           HPI;  Patient has been started on prednisone 60 mg daily  Patient has history of mild diabetes mellitus in the past hemoglobin A1c was 5.8%  Not on any meds  We will check Accu-Cheks before meals and at bedtime  Steroid-induced hyperglycemia will be treated with low-dose sliding scale    Patient is known to have asthma     Past and Surgical hx as in H and P  Social History:     Tobacco:    reports that she has quit smoking. Her smoking use included cigarettes. She has never used smokeless tobacco.  Alcohol:      has no history on file for alcohol. Drug Use:  reports previous drug use. Drug: Marijuana.     Review of Systems:     POSITIVE AND NEGATIVES AS DESCRIBED IN HISTORY OF PRESENT ILLNESS ;  IN ADDITION ;  Review of Systems          All other systems negative                Physical Exam:     Physical Exam   Vitals:    03/14/21 0548 03/14/21 0800 03/14/21 1945 03/15/21 0800   BP:  (!) 146/77 (!) 155/97 138/81   Pulse:  73 107 80   Resp:  14 14 14   Temp:  98.1 °F (36.7 °C) 98.1 °F (36.7 °C) 98 °F (36.7 °C)   TempSrc:  Oral Oral Oral sertraline  150 mg Oral Daily    cariprazine hcl  3 mg Oral Daily    predniSONE  60 mg Oral Daily     Continuous Infusions:   PRN Meds: acetaminophen, ibuprofen, aluminum & magnesium hydroxide-simethicone, traZODone, benztropine mesylate, polyethylene glycol, hydrOXYzine, albuterol        Assessment :       Assessment Dx  Active Problems:    Obesity    Mild intermittent asthma with acute exacerbation    Type 2 diabetes mellitus without complication (HCC)    MDD (major depressive disorder), recurrent, severe, with psychosis ([facility]  Resolved Problems:    * No resolved hospital problems. *              Plan: Will start him on Accu-Cheks and low-dose insulin sliding scale to treat  Corticosteroid associated hyperglycemia  Patient has history of diabetes mellitus      Reduce prednisone to 10 mg daily ,      High dose prednisone can decompensate bipolar   Add symbicort . Monitor sugars     Morbidly obese       Francisco J Banks MD    Copy sent to Dr. Burton primary care provider on file. Pleasenote that this chart was generated using voice recognition Dragon dictation software. Although every effort was made to ensure the accuracy of this automated transcription, some errors in transcription may have occurred.

## 2021-03-15 NOTE — PROGRESS NOTES
BEHAVIORAL HEALTH FOLLOW-UP NOTE     3/15/2021     Patient was seen and examined in person, Chart reviewed   Patient's case discussed with staff/team    Chief Complaint: Depression with suicidal attempt with overdose of albuterol    Interim History:   Patient has been compliant with medications, attending groups. Using Atarax and trazodone as needed for anxiety and sleep. Patient endorses depression rates depression 8/10 (10 worst) with fleeting suicidal ideation, contracts for safety while on unit. Denies auditory visual hallucinations. Reports that she had difficulty with sleep was broken down a total of 5 hours last night. Reports her appetite is \"okay\". Denies any medication side effects  She reports she has a headache, thinks it is from lack of caffeine. Denies any vision changes, auras.     Appetite:   [x] Normal/Unchanged  [] Increased  [] Decreased      Sleep:       [] Normal/Unchanged  [] Fair       [x] Poor              Energy:    [] Normal/Unchanged  [] Increased  [x] Decreased        Aggression:  [] yes  [x] no    Patient is [x] able  [] unable to CONTRACT FOR SAFETY ON THE UNIT    PAST MEDICAL/PSYCHIATRIC HISTORY:   Past Medical History:   Diagnosis Date    Asthma     Back pain 9/16/2014    B[facility] 1 disorder ([facility]     Bipolar disorder ([facility] 12/8/2017    Depression     Diabetes mellitus ([facility]     Dizziness     Fatty liver disease, nonalcoholic     Fatty liver disease, nonalcoholic     GERD (gastroesophageal reflux disease)     Obesity        FAMILY/SOCIAL HISTORY:  Family History   Problem Relation Age of Onset    High Blood Pressure Mother     Diabetes Mother     Heart Disease Mother     Heart Disease Father     Early Death Father     Cancer Maternal Aunt         lung    Cancer Paternal Cousin         brain     Social History     Socioeconomic History    Marital status:      Spouse name: Not on file    Number of children: Not on file    Years of education: Not on file  Highest education level: Not on file   Occupational History    Not on file   Social Needs    Financial resource strain: Not on file    Food insecurity     Worry: Not on file     Inability: Not on file    Transportation needs     Medical: Not on file     Non-medical: Not on file   Tobacco Use    Smoking status: Former Smoker     Types: Cigarettes    Smokeless tobacco: Never Used   Substance and Sexual Activity    Alcohol use: Not on file     Comment: rarely    Drug use: Not Currently     Types: Marijuana     Comment: quit    Sexual activity: Not Currently   Lifestyle    Physical activity     Days per week: Not on file     Minutes per session: Not on file    Stress: Not on file   Relationships    Social connections     Talks on phone: Not on file     Gets together: Not on file     Attends [employer] service: Not on file     Active member of club or organization: Not on file     Attends meetings of clubs or organizations: Not on file     Relationship status: Not on file    Intimate partner violence     Fear of current or ex partner: Not on file     Emotionally abused: Not on file     Physically abused: Not on file     Forced sexual activity: Not on file   Other Topics Concern    Not on file   Social History Narrative    Not on file           ROS:  [x] All negative/unchanged except if checked.  Explain positive(checked items) below:  [] Constitutional  [] Eyes  [] Ear/Nose/Mouth/Throat  [] Respiratory  [] CV  [] GI  []   [] Musculoskeletal  [] Skin/Breast  [x] Neurological  [] Endocrine  [] Heme/Lymph  [] Allergic/Immunologic    Explanation: Headache    MEDICATIONS:    Current Facility-Administered Medications:     insulin lispro (HUMALOG) injection vial 0-6 Units, 0-6 Units, Subcutaneous, TID WCCarolina MD    insulin lispro (HUMALOG) injection vial 0-3 Units, 0-3 Units, Subcutaneous, Nightly, Carolina Troncoso MD    glucose (GLUTOSE) 40 % oral gel 15 g, 15 g, Oral, PRN, Carolina Troncoso MD   dextrose 50 % IV solution, 12.5 g, Intravenous, PRN, Mary Foreman MD    glucagon (rDNA) injection 1 mg, 1 mg, Intramuscular, PRN, Mary Foreman MD    dextrose 5 % solution, 100 mL/hr, Intravenous, PRN, Mary Foreman MD  Cherelle Ryan  [START ON 3/16/2021] predniSONE (DELTASONE) tablet 10 mg, 10 mg, Oral, Daily, Mary Foreman MD    budesonide-formoterol (SYMBICORT) 160-4.5 MCG/ACT inhaler 2 puff, 2 puff, Inhalation, BID, Mary Formean MD    ipratropium-albuterol (DUONEB) nebulizer solution 1 ampule, 1 ampule, Inhalation, TID, Mary Foreman MD    sertraline (ZOLOFT) tablet 150 mg, 150 mg, Oral, Daily, Hema Perales MD, 150 mg at 03/15/21 0856    acetaminophen (TYLENOL) tablet 650 mg, 650 mg, Oral, Q4H PRN, Hema Perales MD, 650 mg at 03/14/21 1901    ibuprofen (ADVIL;MOTRIN) tablet 400 mg, 400 mg, Oral, Q6H PRN, Hema Perales MD    aluminum & magnesium hydroxide-simethicone (MAALOX) 200-200-20 MG/5ML suspension 30 mL, 30 mL, Oral, Q6H PRN, Hema Perales MD    traZODone (DESYREL) tablet 50 mg, 50 mg, Oral, Nightly PRN, Hema Perales MD, 50 mg at 03/14/21 2248    benztropine mesylate (COGENTIN) injection 2 mg, 2 mg, Intramuscular, BID PRN, Hema Perales MD    polyethylene glycol (GLYCOLAX) packet 17 g, 17 g, Oral, Daily PRN, Hema Perales MD    hydrOXYzine (ATARAX) tablet 50 mg, 50 mg, Oral, TID PRN, Hema Perales MD, 50 mg at 03/14/21 1823    cariprazine hcl (VRAYLAR) capsule 3 mg, 3 mg, Oral, Daily, Hema Perales MD, 3 mg at 03/15/21 0857    albuterol (PROVENTIL) nebulizer solution 2.5 mg, 2.5 mg, Nebulization, Q6H PRN, Hema Perales MD      Examination:  /81   Pulse 80   Temp 98 °F (36.7 °C) (Oral)   Resp 14   Ht 5' 5\" (1.651 m)   Wt (!) 328 lb (148.8 kg)   SpO2 98%   BMI 54.58 kg/m²   Gait - steady   Medication side effects(SE): Denies    Mental Status Examination:    Level of consciousness:  within normal limits   Appearance: Dressed casually, fair grooming and hygiene  Behavior/Motor: Approachable, no psychomotor abnormality  Attitude toward examiner: Cooperative with fair eye contact  Speech: Normal rate and volume with good articulation  Mood: Depressed  Affect: Mood congruent  Thought processes: Logical and coherent  Thought content:  Homicidal ideation - none  Suicidal Ideation: Fleeting, contracts for safety while on unit  Delusions: None observed or reported by staff  Perceptual Disturbance: Denies auditory visual hallucinations  Cognition: Oriented to person, location and circumstance  Concentration intact  Insight fair  Judgement poor    ASSESSMENT:   Patient symptoms are:  [] Well controlled  [x] Improving  [] Worsening  [] No change      Diagnosis:   Active Problems:    Obesity    Mild intermittent asthma with acute exacerbation    Type 2 diabetes mellitus without complication ([facility])    MDD (major depressive disorder), recurrent, severe, with psychosis ([facility]    Moderate persistent asthma  Resolved Problems:    * No resolved hospital problems. *      LABS:    Recent Labs     03/13/21  1100   WBC 11.7*   HGB 12.8   *     Recent Labs     03/13/21  1100      K 3.9      CO2 22   BUN 15   CREATININE 0.76   GLUCOSE 110*     No results for input(s): BILITOT, ALKPHOS, AST, ALT in the last 72 hours. Lab Results   Component Value Date    BARBSCNU NEGATIVE 03/13/2021    LABBENZ NEGATIVE 03/13/2021    LABMETH NEGATIVE 03/13/2021    PPXUR NOT REPORTED 03/13/2021     Lab Results   Component Value Date    TSH 2.34 08/14/2020     Lab Results   Component Value Date    LITHIUM 0.3 (L) 01/14/2014     No results found for: VALPROATE, CBMZ        Treatment Plan:  Reviewed current Medications with the patient. Continue current medication  Start Excedrin Migraine    Risks, benefits, side effects, drug-to-drug interactions and alternatives to treatment were discussed. The patient  consented to treatment.      Encourage patient to attend group and other milieu activities. Discharge planning discussed with the patient and treatment team.  Follow-up daily while inpatient    PSYCHOTHERAPY/COUNSELING:  [] Therapeutic interview  [x] Supportive  [] CBT  [] Ongoing  [] Other    [x] Patient continues to need, on a daily basis, active treatment furnished directly by or requiring the supervision of inpatient psychiatric personnel      Anticipated Length of stay: Per attending psychiatric physician                                         Keri López is a 26 y.o. female being evaluated face to face. --JUNE Contreras - CNP on 3/15/2021 at 2:37 PM    An electronic signature was used to authenticate this note. **This report has been created using voice recognition software. It may contain minor errors which are inherent in voice recognition technology. **  I independently saw and evaluated the patient. I reviewed the midlevel provider's documentation above. Any additional comments or changes to the midlevel provider's documentation are stated below otherwise agree with assessment. - no longer hearing voices. Depression and anxiety are both better. No longer thinking of hurting herself. Comes across as anxious with limited range of affect and is restless and fidgety.     -Believes she almost killed herself with her inhaler.      -Has been refusing her Metformin yesterday. Says she is supposed to be off of it. It will be discontinued.     -Eating and sleeping ok. PLAN  Medications as noted above  Attempt to develop insight  Psycho-education conducted. Supportive Therapy conducted.   Probable discharge is as noted above  Follow-up daily while on inpatient unit    Electronically signed by Juan Diego Sinha MD on 3/15/21 at 3:48 PM EDT

## 2021-03-15 NOTE — GROUP NOTE
Group Therapy Note    Date: 3/15/2021    Group Start Time: 1100  Group End Time: 5706  Group Topic: Group Therapy    ABBY TELLO    MASOUD Baker LSW        Group Therapy Note    Attendees: 6/11         Patient's Goal:  Increase interpersonal relationship skills    Notes:  Patient was an active participant in group discussion     Status After Intervention:  Unchanged    Participation Level:  Active Listener and Interactive    Participation Quality: Appropriate, Attentive, Sharing and Supportive      Speech:  normal      Thought Process/Content: Logical      Affective Functioning: Congruent      Mood: anxious and depressed      Level of consciousness:  Alert, Oriented x4 and Attentive      Response to Learning: Able to verbalize current knowledge/experience      Endings: None Reported    Modes of Intervention: Support, Socialization, Exploration and Clarifying      Discipline Responsible: /Counselor      Signature:  MASOUD Baker LSW

## 2021-03-16 LAB
GLUCOSE BLD-MCNC: 143 MG/DL (ref 65–105)
GLUCOSE BLD-MCNC: 92 MG/DL (ref 65–105)

## 2021-03-16 PROCEDURE — 6370000000 HC RX 637 (ALT 250 FOR IP): Performed by: PSYCHIATRY & NEUROLOGY

## 2021-03-16 PROCEDURE — 94640 AIRWAY INHALATION TREATMENT: CPT

## 2021-03-16 PROCEDURE — 6370000000 HC RX 637 (ALT 250 FOR IP): Performed by: NURSE PRACTITIONER

## 2021-03-16 PROCEDURE — 94761 N-INVAS EAR/PLS OXIMETRY MLT: CPT

## 2021-03-16 PROCEDURE — 6370000000 HC RX 637 (ALT 250 FOR IP): Performed by: INTERNAL MEDICINE

## 2021-03-16 PROCEDURE — 99232 SBSQ HOSP IP/OBS MODERATE 35: CPT | Performed by: PSYCHIATRY & NEUROLOGY

## 2021-03-16 PROCEDURE — 99232 SBSQ HOSP IP/OBS MODERATE 35: CPT | Performed by: INTERNAL MEDICINE

## 2021-03-16 PROCEDURE — 82947 ASSAY GLUCOSE BLOOD QUANT: CPT

## 2021-03-16 PROCEDURE — 1240000000 HC EMOTIONAL WELLNESS R&B

## 2021-03-16 RX ORDER — ALBUTEROL SULFATE 2.5 MG/3ML
2.5 SOLUTION RESPIRATORY (INHALATION) EVERY 6 HOURS PRN
Status: DISCONTINUED | OUTPATIENT
Start: 2021-03-16 | End: 2021-03-16

## 2021-03-16 RX ADMIN — TRAZODONE HYDROCHLORIDE 50 MG: 50 TABLET ORAL at 21:27

## 2021-03-16 RX ADMIN — INSULIN LISPRO 1 UNITS: 100 INJECTION, SOLUTION INTRAVENOUS; SUBCUTANEOUS at 12:17

## 2021-03-16 RX ADMIN — BUDESONIDE AND FORMOTEROL FUMARATE DIHYDRATE 2 PUFF: 160; 4.5 AEROSOL RESPIRATORY (INHALATION) at 21:27

## 2021-03-16 RX ADMIN — SERTRALINE HYDROCHLORIDE 150 MG: 100 TABLET ORAL at 08:39

## 2021-03-16 RX ADMIN — ACETAMINOPHEN 650 MG: 325 TABLET ORAL at 18:56

## 2021-03-16 RX ADMIN — HYDROXYZINE HYDROCHLORIDE 50 MG: 50 TABLET, FILM COATED ORAL at 21:27

## 2021-03-16 RX ADMIN — PREDNISONE 10 MG: 10 TABLET ORAL at 08:39

## 2021-03-16 RX ADMIN — BUDESONIDE AND FORMOTEROL FUMARATE DIHYDRATE 2 PUFF: 160; 4.5 AEROSOL RESPIRATORY (INHALATION) at 08:38

## 2021-03-16 RX ADMIN — ACETAMINOPHEN, ASPIRIN AND CAFFEINE 1 TABLET: 250; 250; 65 TABLET, FILM COATED ORAL at 19:36

## 2021-03-16 RX ADMIN — IPRATROPIUM BROMIDE AND ALBUTEROL SULFATE 1 AMPULE: .5; 3 SOLUTION RESPIRATORY (INHALATION) at 07:22

## 2021-03-16 RX ADMIN — IPRATROPIUM BROMIDE AND ALBUTEROL SULFATE 1 AMPULE: .5; 3 SOLUTION RESPIRATORY (INHALATION) at 20:30

## 2021-03-16 RX ADMIN — IPRATROPIUM BROMIDE AND ALBUTEROL SULFATE 1 AMPULE: .5; 3 SOLUTION RESPIRATORY (INHALATION) at 14:33

## 2021-03-16 RX ADMIN — CARIPRAZINE 3 MG: 3 CAPSULE, GELATIN COATED ORAL at 08:39

## 2021-03-16 ASSESSMENT — PAIN SCALES - GENERAL
PAINLEVEL_OUTOF10: 3
PAINLEVEL_OUTOF10: 6

## 2021-03-16 ASSESSMENT — PAIN DESCRIPTION - PAIN TYPE: TYPE: CHRONIC PAIN

## 2021-03-16 ASSESSMENT — PAIN - FUNCTIONAL ASSESSMENT
PAIN_FUNCTIONAL_ASSESSMENT: 0-10
PAIN_FUNCTIONAL_ASSESSMENT: 0-10

## 2021-03-16 ASSESSMENT — PAIN DESCRIPTION - LOCATION: LOCATION: BACK

## 2021-03-16 NOTE — PROGRESS NOTES
BEHAVIORAL HEALTH FOLLOW-UP NOTE     3/16/2021     Patient was seen and examined in person, Chart reviewed   Patient's case discussed with staff/team    Chief Complaint: Depression with suicidal attempt with overdose of albuterol    Interim History:   Patient has been compliant with medications, attending group. Using Atarax and trazodone as needed for anxiety and sleep last night. Patient rates depression 6/10 (10 worst), denies suicidal ideation intent or plan, contracts for safety while in unit. Denies auditory and visual hallucinations. Reports that she slept 6 hours last night, and her appetite is normal.  She was concerned about having her Ventolin as needed for shortness of breath. Discussed with her we will order nebulizer Ventolin while she is here. She has had no more headaches, reports that Excedrin Migraine helped \"a lot\".   Denies any medication side effects    Appetite:   [x] Normal/Unchanged  [] Increased  [] Decreased      Sleep:       [] Normal/Unchanged  [x] Fair       [] Poor              Energy:    [x] Normal/Unchanged  [] Increased  [] Decreased        Aggression:  [] yes  [x] no    Patient is [x] able  [] unable to CONTRACT FOR SAFETY ON THE UNIT    PAST MEDICAL/PSYCHIATRIC HISTORY:   Past Medical History:   Diagnosis Date    Asthma     Back pain 9/16/2014    [facility] [address]     Bipolar disorder ([facility] [address] 12/8/2017    Depression     Diabetes mellitus ([facility] [address]     Dizziness     Fatty liver disease, nonalcoholic     Fatty liver disease, nonalcoholic     GERD (gastroesophageal reflux disease)     Obesity        FAMILY/SOCIAL HISTORY:  Family History   Problem Relation Age of Onset    High Blood Pressure Mother     Diabetes Mother     Heart Disease Mother     Heart Disease Father     Early Death Father     Cancer Maternal Aunt         lung    Cancer Paternal Cousin         brain     Social History     Socioeconomic History    Marital status:      Spouse name: Not on file    Number of children: Not on file    Years of education: Not on file    Highest education level: Not on file   Occupational History    Not on file   Social Needs    Financial resource strain: Not on file    Food insecurity     Worry: Not on file     Inability: Not on file    Transportation needs     Medical: Not on file     Non-medical: Not on file   Tobacco Use    Smoking status: Former Smoker     Types: Cigarettes    Smokeless tobacco: Never Used   Substance and Sexual Activity    Alcohol use: Not on file     Comment: rarely    Drug use: Not Currently     Types: Marijuana     Comment: quit    Sexual activity: Not Currently   Lifestyle    Physical activity     Days per week: Not on file     Minutes per session: Not on file    Stress: Not on file   Relationships    Social connections     Talks on phone: Not on file     Gets together: Not on file     Attends Rastafari service: Not on file     Active member of club or organization: Not on file     Attends meetings of clubs or organizations: Not on file     Relationship status: Not on file    Intimate partner violence     Fear of current or ex partner: Not on file     Emotionally abused: Not on file     Physically abused: Not on file     Forced sexual activity: Not on file   Other Topics Concern    Not on file   Social History Narrative    Not on file           ROS:  [x] All negative/unchanged except if checked.  Explain positive(checked items) below:  [] Constitutional  [] Eyes  [] Ear/Nose/Mouth/Throat  [] Respiratory  [] CV  [] GI  []   [] Musculoskeletal  [] Skin/Breast  [] Neurological  [] Endocrine  [] Heme/Lymph  [] Allergic/Immunologic    Explanation:     MEDICATIONS:    Current Facility-Administered Medications:     insulin lispro (HUMALOG) injection vial 0-6 Units, 0-6 Units, Subcutaneous, TID Radha DE SOUZA MD, 1 Units at 03/16/21 1217    insulin lispro (HUMALOG) injection vial 0-3 Units, 0-3 Units, Subcutaneous, Nightly, Juan Freed MD, 1 Units at 03/15/21 2058    glucose (GLUTOSE) 40 % oral gel 15 g, 15 g, Oral, PRN, Juan Freed MD    dextrose 50 % IV solution, 12.5 g, Intravenous, PRN, Juan Freed MD    glucagon (rDNA) injection 1 mg, 1 mg, Intramuscular, PRN, Juan Freed MD    dextrose 5 % solution, 100 mL/hr, Intravenous, PRN, Juan Freed MD    predniSONE (DELTASONE) tablet 10 mg, 10 mg, Oral, Daily, Juan Freed MD, 10 mg at 03/16/21 0839    budesonide-formoterol (SYMBICORT) 160-4.5 MCG/ACT inhaler 2 puff, 2 puff, Inhalation, BID, Juan Freed MD, 2 puff at 03/16/21 0838    ipratropium-albuterol (DUONEB) nebulizer solution 1 ampule, 1 ampule, Inhalation, TID, Juan Freed MD, 1 ampule at 03/16/21 0722    aspirin-acetaminophen-caffeine (Viveca Quince) per tablet 1 tablet, 1 tablet, Oral, Q6H PRN, Jose Alejandra APRN - CNP, 1 tablet at 03/15/21 1813    sertraline (ZOLOFT) tablet 150 mg, 150 mg, Oral, Daily, Gonzales Valenzuela MD, 150 mg at 03/16/21 7021    acetaminophen (TYLENOL) tablet 650 mg, 650 mg, Oral, Q4H PRN, Gonzales Valenzuela MD, 650 mg at 03/14/21 1901    ibuprofen (ADVIL;MOTRIN) tablet 400 mg, 400 mg, Oral, Q6H PRN, Gonzales Valenzuela MD    aluminum & magnesium hydroxide-simethicone (MAALOX) 200-200-20 MG/5ML suspension 30 mL, 30 mL, Oral, Q6H PRN, Gonzales Valenzuela MD    traZODone (DESYREL) tablet 50 mg, 50 mg, Oral, Nightly PRN, Gonzales Valenzuela MD, 50 mg at 03/15/21 2227    benztropine mesylate (COGENTIN) injection 2 mg, 2 mg, Intramuscular, BID PRN, Gonzales Valenzuela MD    polyethylene glycol (GLYCOLAX) packet 17 g, 17 g, Oral, Daily PRN, Gonzales Valenzuela MD    hydrOXYzine (ATARAX) tablet 50 mg, 50 mg, Oral, TID PRN, Gonzales Valenzuela MD, 50 mg at 03/15/21 2227    cariprazine hcl (VRAYLAR) capsule 3 mg, 3 mg, Oral, Daily, Gonzales Valenzuela MD, 3 mg at 03/16/21 0839    albuterol (PROVENTIL) nebulizer solution 2.5 mg, 2.5 mg, Nebulization, Q6H PRN, Gonzales Valenzuela MD      Examination: /67   Pulse 55   Temp 98 °F (36.7 °C)   Resp 14   Ht 5' 5\" (1.651 m)   Wt (!) 328 lb (148.8 kg)   SpO2 96%   BMI 54.58 kg/m²   Gait - steady   Medication side effects(SE): Denies    Mental Status Examination:    Level of consciousness: Awake and alert  Appearance: Dressed casually, fair grooming and hygiene  Behavior/Motor: Approachable, no psychomotor abnormality  Attitude toward examiner: Cooperative with fair eye contact  Speech: Normal rate and volume with good articulation  Mood: Depressed  Affect: Mood congruent  Thought processes: Logical and coherent  Thought content:  Homicidal ideation - none  Suicidal Ideation: Denies, contracts for safety while on unit  Delusions: None observed or reported by staff  Perceptual Disturbance: Denies auditory visual hallucinations  Cognition: Oriented to person, location and circumstance  Concentration intact  Insight fair  Judgement poor    ASSESSMENT:   Patient symptoms are:  [] Well controlled  [x] Improving  [] Worsening  [] No change      Diagnosis:   Active Problems:    Obesity    Mild intermittent asthma with acute exacerbation    Type 2 diabetes mellitus without complication ([facility])    MDD (major depressive disorder), recurrent, severe, with psychosis ([facility])    Moderate persistent asthma  Resolved Problems:    * No resolved hospital problems. *      LABS:    No results for input(s): WBC, HGB, PLT in the last 72 hours. No results for input(s): NA, K, CL, CO2, BUN, CREATININE, GLUCOSE in the last 72 hours. No results for input(s): BILITOT, ALKPHOS, AST, ALT in the last 72 hours.   Lab Results   Component Value Date    BARBSCNU NEGATIVE 03/13/2021    LABBENZ NEGATIVE 03/13/2021    LABMETH NEGATIVE 03/13/2021    PPXUR NOT REPORTED 03/13/2021     Lab Results   Component Value Date    TSH 2.34 08/14/2020     Lab Results   Component Value Date    LITHIUM 0.3 (L) 01/14/2014     No results found for: VALPROATE, CBMZ        Treatment Plan:  Reviewed current Medications with the patient. Albuterol nebulizing treatments every 4 as needed for shortness of breath/wheezing    Risks, benefits, side effects, drug-to-drug interactions and alternatives to treatment were discussed. The patient  consented to treatment. Encourage patient to attend group and other milieu activities. Discharge planning discussed with the patient and treatment team.  Follow-up daily while inpatient    PSYCHOTHERAPY/COUNSELING:  [] Therapeutic interview  [x] Supportive  [] CBT  [] Ongoing  [] Other    [x] Patient continues to need, on a daily basis, active treatment furnished directly by or requiring the supervision of inpatient psychiatric personnel      Anticipated Length of stay: Per attending psychiatric physician                                         Lisa Truong is a 26 y.o. female being evaluated face to face. --JUNE Landa - CNP on 3/16/2021 at 1:39 PM    An electronic signature was used to authenticate this note. **This report has been created using voice recognition software. It may contain minor errors which are inherent in voice recognition technology. **    I independently saw and evaluated the patient. I reviewed the midlevel provider's documentation above. Any additional comments or changes to the midlevel provider's documentation are stated below otherwise agree with assessment.      -Patient is concerned about getting put back on her inhaler. Mood is slightly better  -Has been sleeping on and off at night and she is feeling sleepy at this time. She has been going to groups. -Wants to get her son out of foster care and not be suicidal  -Feels ready to go home. She is discharge focussed. PLAN  Medications as noted above  Attempt to develop insight  Psycho-education conducted. Supportive Therapy conducted.   Probable discharge is as noted above  Follow-up daily while on inpatient unit    Electronically signed by Ginna Cano MD on 3/16/21 at

## 2021-03-16 NOTE — GROUP NOTE
Group Therapy Note    Date: 3/16/2021    Group Start Time: 1000  Group End Time: 4524  Group Topic: Cognitive Skills    [facility]    General Coon, DILLONS        Group Therapy Note    Attendees: 5/10         Patient's Goal:  To increase social interaction and to practice problem solving/deductive reasoning and communication skills. Notes: Pt participated fully in group task . Pt was able to practice problem solving/deductive reasoning and communication skills. Pt was pleasant and supportive of peers. Status After Intervention:  Improved     Participation Level:  Attentive, Interactive, alert     Participation Quality:  Attentive, Sharing , Appropriate     Speech:  Normal        Thought Process/Content: Logical, linear        Affective Functioning: Congruent, brightened      Mood:  Euthymic, increased participation     Level of consciousness:  Alert, Oriented x4 and Attentive        Response to Learning: Able to verbalize current knowledge/experience, Able to acknowledge new learning ,and Progressing to goal        Endings: None Reported     Modes of Intervention: Education, Support, Socialization, Exploration, Clarifying and Problem-solving        Discipline Responsible: Psychoeducational Specialist        Signature:  FRANKY Fairbanks

## 2021-03-16 NOTE — GROUP NOTE
Group Therapy Note    Date: 3/16/2021    Group Start Time: 1330  Group End Time: 7521  Group Topic: Music Therapy    ABBY LARSON    Miguel Shearer        Group Therapy Note    Attendees: 11/20         Patient's Goal:  Patients shared music and linked the songs to mental health related quotes as provided by on a piece of paper by this writer. Patients had brief opportunities to engage in conversations regarding these things. Goals to increase sense of community, increase self-expression, and increase sense of hope and motivation. Notes:  Patient attended and participated in group, sharing music and engaging positively with peers and staff throughout group. Briefly pulled for meeting with doctor and returned afterwards. Shared the song My Girl by [employer] Temptations      Status After Intervention:  Improved    Participation Level:  Active Listener and Interactive    Participation Quality: Appropriate, Attentive and Sharing      Speech:  normal      Thought Process/Content: Logical  Linear      Affective Functioning: Congruent      Mood: euthymic      Level of consciousness:  Alert and Attentive      Response to Learning: Able to verbalize current knowledge/experience and Progressing to goal      Endings: None Reported    Modes of Intervention: Exploration, Activity, Media and Reality-testing      Discipline Responsible: Psychoeducational Specialist      Signature:  Miguel Shearer

## 2021-03-16 NOTE — CONSULTS
[facility]    CONSULTATION    / FOLLOW UP VISIT       Date:   3/16/2021  Patient name:  Dania Gallardo  Date of admission:  3/13/2021 10:31 AM  MRN:   [id number]  Account:  [de-identified]  YOB: 1990  PCP:    No primary care provider on file. Room:   [address] [place] [address]  Code Status:    Full Code    Physician Requesting Consult: Abbie Hagen MD    History of Present Illness:      C/C ;  Medical comorbidity management     REASON FOR CONSULT;  Medical comorbidity and medication management ;                                                 *Active Problems:    Obesity    Mild intermittent asthma with acute exacerbation    Type 2 diabetes mellitus without complication (HCC)    MDD (major depressive disorder), recurrent, severe, with psychosis ([facility] [address]    Moderate persistent asthma  Resolved Problems:    * No resolved hospital problems. *           HPI;  Patient has been started on prednisone 60 mg daily  Patient has history of mild diabetes mellitus in the past hemoglobin A1c was 5.8%  Not on any meds  We will check Accu-Cheks before meals and at bedtime  Steroid-induced hyperglycemia will be treated with low-dose sliding scale    Patient is known to have asthma  Recent Labs     03/13/21  1447 03/15/21  1654 03/15/21  2030 03/16/21  0753 03/16/21  1145   POCGLU 123* 184* 186* 92 143*        Past and Surgical hx as in H and P  Social History:     Tobacco:    reports that she has quit smoking. Her smoking use included cigarettes. She has never used smokeless tobacco.  Alcohol:      has no history on file for alcohol. Drug Use:  reports previous drug use. Drug: Marijuana.     Review of Systems:     POSITIVE AND NEGATIVES AS DESCRIBED IN HISTORY OF PRESENT ILLNESS ;  IN ADDITION ;  Review of Systems          All other systems negative                Physical Exam:     Physical Exam   Vitals:    03/15/21 1917 03/15/21 1928 03/16/21 0722 03/16/21 0810   BP: (!) 152/86  120/67   Pulse:  91  55   Resp: 16 14 16 14   Temp:  98 °F (36.7 °C)     TempSrc:       SpO2: 97%  96%    Weight:       Height:                       Body mass index is 54.58 kg/m². General Appearance:   -, CO-OPERATIVE ,                                                        Pulmonary/Chest:        Clear to auscultation bilaterally . No wheezes, rales or rhonchi . Cardiovascular:            Normal rate, regular rhythm,                                          No murmur or  Gallop . Abdomen:                       Soft, non-tender                                                                                    Extremities:                    No Edema . Neuromuskuloskeletal    ... Data:     URINE ANALYSIS: No results found for: LABURIN     CBC:  Lab Results   Component Value Date    WBC 11.7 03/13/2021    HGB 12.8 03/13/2021     03/13/2021     05/14/2012        BMP:    Lab Results   Component Value Date     03/13/2021    K 3.9 03/13/2021     03/13/2021    CO2 22 03/13/2021    BUN 15 03/13/2021    CREATININE 0.76 03/13/2021    GLUCOSE 110 03/13/2021    GLUCOSE 93 05/14/2012      LIVER PROFILE:  Lab Results   Component Value Date    ALT 24 01/25/2021    AST 34 01/25/2021    PROT 7.3 01/25/2021    BILITOT 0.21 01/25/2021    BILIDIR 0.20 11/29/2017    LABALBU 3.8 01/25/2021    LABALBU 4.9 10/04/2011           Recent Labs     03/13/21  1447 03/15/21  1654 03/15/21  2030 03/16/21  0753 03/16/21  1145   POCGLU 123* 184* 186* 92 143*     Lab Results   Component Value Date    LABA1C 5.8 08/14/2020    LABA1C 5.2 09/17/2018     Lab Results   Component Value Date    CREATININE 0.76 03/13/2021    CREATININE 0.84 02/10/2021     No results found for: [facility]  Lab Results   Component Value Date    LDLCHOLESTEROL 108 08/14/2020              Radiology:         Medications:      Allergies: Allergies   Allergen Reactions    Morphine Anaphylaxis    Pcn [Penicillins] Hives and Shortness Of Breath    Amoxicillin Hives    Seasonal        Current Meds:   Scheduled Meds:    insulin lispro  0-6 Units Subcutaneous TID WC    insulin lispro  0-3 Units Subcutaneous Nightly    predniSONE  10 mg Oral Daily    budesonide-formoterol  2 puff Inhalation BID    ipratropium-albuterol  1 ampule Inhalation TID    sertraline  150 mg Oral Daily    cariprazine hcl  3 mg Oral Daily     Continuous Infusions:    dextrose       PRN Meds: glucose, dextrose, glucagon (rDNA), dextrose, aspirin-acetaminophen-caffeine, acetaminophen, ibuprofen, aluminum & magnesium hydroxide-simethicone, traZODone, benztropine mesylate, polyethylene glycol, hydrOXYzine, albuterol        Assessment :       Assessment Dx  Active Problems:    Obesity    Mild intermittent asthma with acute exacerbation    Type 2 diabetes mellitus without complication (HCC)    MDD (major depressive disorder), recurrent, severe, with psychosis (HCC)    Moderate persistent asthma  Resolved Problems:    * No resolved hospital problems. *              Plan: Will start him on Accu-Cheks and low-dose insulin sliding scale to treat  Corticosteroid associated hyperglycemia  Patient has history of diabetes mellitus      Reduce prednisone to 10 mg daily ,      High dose prednisone can decompensate bipolar   Add symbicort . Monitor sugars     Morbidly obese       3/16/21    · Discontinue insulin   · Blood sugars noted   · rx asthma removed 1. Dilip Alvarado MD    Copy sent to Dr. Rosie Medina primary care provider on file. Pleasenote that this chart was generated using voice recognition Dragon dictation software. Although every effort was made to ensure the accuracy of this automated transcription, some errors in transcription may have occurred.

## 2021-03-16 NOTE — GROUP NOTE
Group Therapy Note    Date: 3/16/2021    Group Start Time: 1100  Group End Time: 0835  Group Topic: Psychotherapy    [facility]    Shaquille Guzman        Group Therapy Note    Attendees: 7/10         Patient's Goal:    PT will demonstrate increased interpersonal interaction and a clear understanding on multiple types of coping skills relating to the here-and-now therapeutic practice. Notes:  :  Patient is making progress, AEB participating in group discussion, actively listening, and supporting other group members. PT participates in group and encourages others to participate     Status After Intervention:  Improved    Participation Level: Active Listener and Interactive    Participation Quality: Appropriate, Attentive and Sharing      Speech:  normal      Thought Process/Content: Logical      Affective Functioning: Flat      Mood: depressed      Level of consciousness:  Alert, Oriented x4 and Attentive      Response to Learning: Able to verbalize current knowledge/experience      Endings: None Reported    Modes of Intervention: Support, Socialization, Exploration, Clarifying and Problem-solving      Discipline Responsible: /Counselor      Signature:   Shaquille Guzman

## 2021-03-16 NOTE — PLAN OF CARE
Problem: Suicide risk  Goal: Provide patient with safe environment  Description: Provide patient with safe environment  Outcome: Ongoing  Note: Patient provided with safe environment. Safety checks every 15 minutes and as needed. Will monitor. Problem: Altered Mood, Depressive Behavior:  Goal: Able to verbalize and/or display a decrease in depressive symptoms  Description: Able to verbalize and/or display a decrease in depressive symptoms  Outcome: Ongoing  Note: Patient alert and orient x 4. Speech clear. Patient presents this shift with flat affect, makes good eye contact during 1:1 talk. Patient reports mood has improved since admission. Patient denies suicidal/homicidal/self harm ideations. Patient is out of room during the day and social with peers. Patient attends groups. Patient compliant with medications and in behavioral control. Patient reports restless night of sleep this past night/appetite good. Support provided. Safety maintained.       Problem: Altered Mood, Depressive Behavior:  Goal: Absence of self-harm  Description: Absence of self-harm  3/16/2021 1010 by Barry Abad RN  Outcome: Ongoing

## 2021-03-17 VITALS
HEART RATE: 82 BPM | WEIGHT: 293 LBS | RESPIRATION RATE: 16 BRPM | HEIGHT: 65 IN | OXYGEN SATURATION: 97 % | SYSTOLIC BLOOD PRESSURE: 102 MMHG | TEMPERATURE: 97.3 F | BODY MASS INDEX: 48.82 KG/M2 | DIASTOLIC BLOOD PRESSURE: 52 MMHG

## 2021-03-17 LAB — GLUCOSE BLD-MCNC: 93 MG/DL (ref 65–105)

## 2021-03-17 PROCEDURE — 94640 AIRWAY INHALATION TREATMENT: CPT

## 2021-03-17 PROCEDURE — 82947 ASSAY GLUCOSE BLOOD QUANT: CPT

## 2021-03-17 PROCEDURE — 6370000000 HC RX 637 (ALT 250 FOR IP): Performed by: INTERNAL MEDICINE

## 2021-03-17 PROCEDURE — 6370000000 HC RX 637 (ALT 250 FOR IP): Performed by: PSYCHIATRY & NEUROLOGY

## 2021-03-17 PROCEDURE — 99239 HOSP IP/OBS DSCHRG MGMT >30: CPT | Performed by: PSYCHIATRY & NEUROLOGY

## 2021-03-17 RX ORDER — BUDESONIDE AND FORMOTEROL FUMARATE DIHYDRATE 160; 4.5 UG/1; UG/1
2 AEROSOL RESPIRATORY (INHALATION) 2 TIMES DAILY
Qty: 1 INHALER | Refills: 3 | Status: SHIPPED | OUTPATIENT
Start: 2021-03-17 | End: 2021-08-12 | Stop reason: SDUPTHER

## 2021-03-17 RX ORDER — ACETAMINOPHEN, ASPIRIN AND CAFFEINE 250; 250; 65 MG/1; MG/1; MG/1
1 TABLET, FILM COATED ORAL EVERY 6 HOURS PRN
Qty: 90 TABLET | Refills: 3 | Status: SHIPPED | OUTPATIENT
Start: 2021-03-17 | End: 2022-03-24 | Stop reason: SDUPTHER

## 2021-03-17 RX ADMIN — CARIPRAZINE 3 MG: 3 CAPSULE, GELATIN COATED ORAL at 08:18

## 2021-03-17 RX ADMIN — IPRATROPIUM BROMIDE AND ALBUTEROL SULFATE 1 AMPULE: .5; 3 SOLUTION RESPIRATORY (INHALATION) at 06:59

## 2021-03-17 RX ADMIN — BUDESONIDE AND FORMOTEROL FUMARATE DIHYDRATE 2 PUFF: 160; 4.5 AEROSOL RESPIRATORY (INHALATION) at 08:17

## 2021-03-17 RX ADMIN — SERTRALINE HYDROCHLORIDE 150 MG: 100 TABLET ORAL at 08:18

## 2021-03-17 NOTE — SUICIDE SAFETY PLAN
SAFETY PLAN    A suicide Safety Plan is a document that supports someone when they are having thoughts of suicide. Warning Signs that indicate a suicidal crisis may be developing: What (situations, thoughts, feelings, body sensations, behaviors, etc.) do you experience that lets you know you are beginning to think about suicide? 1. Go off medications  2. Mood is depressed and start to feel sad, hopeless, helpless, guilty, decline in self-esteem, excess worry, no interest in doing any pleasurable activities, unable to concentrate  3. Begin to cry over the smallest of things  4. Not eating or sleeping as normal  5. Relationship issues start happening  6. I become angry and start a fight  7. When I dont listen or respond to people in a good, positive way  Internal Coping Strategies:  What things can I do (relaxation techniques, hobbies, physical activities, etc.) to take my mind off my problems without contacting another person? 1. Go to hospital discharge appointments and follow-up with community mental health counseling  2. Talk with other people  3. Learn to identify and control your emotions by new ways  4. Think before you speak or act; walk away from the situation  5. Join a support group in person or on Social Media  6. Take a time-out  7. Take deep breaths; use relaxation techniques  8. Get some exercise; go for a walk  9. Read; listen to music; watch a funny movie     People whom I can ask for help: Who can I call when I need help - for example, friends, family, clergy, someone else? [facility] [address], [address] [facility]    Professionals or [facility] agencies I can contact during a crisis: Who can I call for help - for example, my doctor, my psychiatrist, my psychologist, a mental health provider, a suicide hotline?   Suicide Prevention Lifeline: 4-090-814-TALK (1190)  [facility] 2-1-8 [phone]) [phone] or (578) [phone]  [facility], 24-hour Crisis Services, Central Access: (439)
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
## 2021-03-17 NOTE — PLAN OF CARE
Problem: Altered Mood, Depressive Behavior:  Goal: Absence of self-harm  Description: Absence of self-harm  Outcome: Ongoing  Note: Patient remains free from self harm. Denies any thoughts of self harm or hallucinations. Behavior controlled. Reports being ready for discharge.

## 2021-03-17 NOTE — GROUP NOTE
Group Therapy Note    Date: 3/17/2021    Group Start Time: 1000  Group End Time: 0646  Group Topic: Recreational    [facility]    Milagro Kerr        Group Therapy Note    Attendees: 3/10         Patient's Goal:  To increase interpersonal interaction    Notes:      Status After Intervention:  Improved    Participation Level:  Active Listener and Interactive    Participation Quality: Appropriate, Attentive and Sharing      Speech:  normal      Thought Process/Content: Logical  Linear      Affective Functioning: Congruent      Mood: euthymic      Level of consciousness:  Alert, Oriented x4 and Attentive      Response to Learning: Able to verbalize current knowledge/experience, Able to verbalize/acknowledge new learning, Able to retain information and Progressing to goal      Endings: None Reported    Modes of Intervention: Education, Support, Socialization, Exploration, Clarifying, Problem-solving and Activity      Discipline Responsible: Psychoeducational Specialist      Signature:  Milagro Kerr

## 2021-03-17 NOTE — GROUP NOTE
Group Therapy Note    Date: 3/17/2021    Group Start Time: 1100  Group End Time: 1130  Group Topic: Psychotherapy    [facility]    Sarah Ashby        Group Therapy Note    Attendees: 3/10         Patient's Goal:  PT will demonstrate increased interpersonal interaction and a clear understanding on multiple types of coping skills relating to the here-and-now therapeutic practice. Notes:  Patient is making progress, AEB participating in group discussion, actively listening, and supporting other group members. PT participates in group and encourages others to participate     Status After Intervention:  Improved    Participation Level: Active Listener and Interactive    Participation Quality: Appropriate, Attentive and Sharing      Speech:  normal      Thought Process/Content: Logical      Affective Functioning: Flat      Mood: depressed      Level of consciousness:  Alert, Oriented x4 and Attentive      Response to Learning: Able to verbalize current knowledge/experience and Progressing to goal      Endings: None Reported    Modes of Intervention: Support, Socialization, Exploration, Clarifying and Problem-solving      Discipline Responsible: /Counselor      Signature:   Sarah Ashby

## 2021-03-17 NOTE — BH NOTE
[facility]  Discharge Note    Pt discharged with followings belongings: All Valuables sent home with patient. Security envelope number: patient   Patient went home by cab  Medications sent to patient pharmacy  Denied suicidal thoughts or thoughts of harming others at time of D/C. Discharged to home    Patient education on aftercare instructions, states understanding and signed discharge AVS, copy given to patient.          Status EXAM upon discharge:  Status and Exam  Normal: No  Facial Expression: Flat  Affect: Appropriate  Level of Consciousness: Alert  Mood:Normal: No  Mood: Depressed, Anxious  Motor Activity:Normal: Yes  Motor Activity: Decreased  Interview Behavior: Cooperative  Preception: [place] to Person, Adeline Pilon to Time, [place] to Place, [place] to Situation  Attention:Normal: Yes  Thought Processes: Circumstantial  Thought Content:Normal: No  Thought Content: Preoccupations  Hallucinations: None  Delusions: No  Memory:Normal: Yes  Insight and Judgment: No  Insight and Judgment: Poor Judgment, Poor Insight  Present Suicidal Ideation: No  Present Homicidal Ideation: No    Roberta Mckenzie RN

## 2021-03-17 NOTE — CARE COORDINATION
Name: Franki Uribe    : 1990    Discharge Date: 3/17/2021    Primary Auth/Cert #: [id number]    Destination: home    Discharge Medications:      Medication List      START taking these medications    aspirin-acetaminophen-caffeine 250-250-65 MG per tablet  Commonly known as: EXCEDRIN MIGRAINE  Take 1 tablet by mouth every 6 hours as needed for Headaches  Notes to patient: headaches     budesonide-formoterol 160-4.5 MCG/ACT Aero  Commonly known as: SYMBICORT  Inhale 2 puffs into the lungs 2 times daily  Notes to patient: Improve breathing        CHANGE how you take these medications    cariprazine hcl 3 MG Caps capsule  Commonly known as: Vraylar  Take 1 capsule by mouth daily  Start taking on: 2021  What changed:   · medication strength  · how much to take  Notes to patient: Clear thoughts     sertraline 50 MG tablet  Commonly known as: ZOLOFT  Take 3 tablets by mouth daily  Start taking on: 2021  What changed: how much to take  Notes to patient: Improve mood        CONTINUE taking these medications    albuterol sulfate  (90 Base) MCG/ACT inhaler  Commonly known as: Proventil HFA  Inhale 2 puffs into the lungs every 6 hours as needed for Wheezing or Shortness of Breath (Space out to every 6 hours as symptoms improve) Space out to every 6 hours as symptoms improve. Notes to patient: Wheezing or shortness of breath     traZODone 50 MG tablet  Commonly known as: DESYREL  Take 1 tablet by mouth nightly as needed for Sleep  Notes to patient: sleep        STOP taking these medications    acetaminophen 500 MG tablet  Commonly known as: TYLENOL     aluminum & magnesium hydroxide-simethicone 400-400-40 MG/5ML Susp  Commonly known as:  Maalox Advanced Max St     famotidine 20 MG tablet  Commonly known as: Pepcid     ibuprofen 800 MG tablet  Commonly known as: ADVIL;MOTRIN     metFORMIN 500 MG tablet  Commonly known as: GLUCOPHAGE     ondansetron 4 MG tablet  Commonly known as: Zofran     predniSONE 20 MG tablet  Commonly known as: Danita Peterson           Where to Get Your Medications      These medications were sent to [facility], [address], [address] [place] [address]    Phone: [phone]   · aspirin-acetaminophen-caffeine [id number] MG per tablet  · budesonide-formoterol 160-4.5 MCG/ACT Aero  · cariprazine hcl 3 MG Caps capsule  · sertraline 50 MG tablet         Follow Up Appointment: Ruby [address] [place], [place] [address] [address]  [phone]    On 3/19/2021  3/19 @12PM for video appointment.       Pt discharge home to:  [address] [place] [id number]      On 3/16/2021  If Pt doesn't have a ride home, Please send by [facility]

## 2021-04-14 ENCOUNTER — HOSPITAL ENCOUNTER (INPATIENT)
Age: 31
LOS: 3 days | Discharge: HOME | DRG: 244 | End: 2021-04-17
Attending: EMERGENCY MEDICINE | Admitting: SURGERY
Payer: COMMERCIAL

## 2021-04-14 ENCOUNTER — APPOINTMENT (OUTPATIENT)
Dept: CT IMAGING | Age: 31
DRG: 244 | End: 2021-04-14
Payer: COMMERCIAL

## 2021-04-14 DIAGNOSIS — K57.92 ACUTE DIVERTICULITIS: ICD-10-CM

## 2021-04-14 DIAGNOSIS — K57.80 PERFORATED DIVERTICULUM: Primary | ICD-10-CM

## 2021-04-14 DIAGNOSIS — D72.829 LEUKOCYTOSIS, UNSPECIFIED TYPE: ICD-10-CM

## 2021-04-14 LAB
ALBUMIN SERPL-MCNC: 3.7 G/DL (ref 3.5–5.2)
ALBUMIN/GLOBULIN RATIO: 1.1 (ref 1–2.5)
ALP BLD-CCNC: 81 U/L (ref 35–104)
ALT SERPL-CCNC: 30 U/L (ref 5–33)
ANION GAP SERPL CALCULATED.3IONS-SCNC: 9 MMOL/L (ref 9–17)
AST SERPL-CCNC: 26 U/L
BILIRUB SERPL-MCNC: 1.04 MG/DL (ref 0.3–1.2)
BUN BLDV-MCNC: 10 MG/DL (ref 6–20)
BUN/CREAT BLD: ABNORMAL (ref 9–20)
C-REACTIVE PROTEIN: 121.4 MG/L (ref 0–5)
CALCIUM SERPL-MCNC: 9.2 MG/DL (ref 8.6–10.4)
CHLORIDE BLD-SCNC: 101 MMOL/L (ref 98–107)
CO2: 22 MMOL/L (ref 20–31)
CREAT SERPL-MCNC: 0.73 MG/DL (ref 0.5–0.9)
GFR AFRICAN AMERICAN: >60 ML/MIN
GFR NON-AFRICAN AMERICAN: >60 ML/MIN
GFR SERPL CREATININE-BSD FRML MDRD: ABNORMAL ML/MIN/{1.73_M2}
GFR SERPL CREATININE-BSD FRML MDRD: ABNORMAL ML/MIN/{1.73_M2}
GLUCOSE BLD-MCNC: 139 MG/DL (ref 70–99)
HCG QUALITATIVE: NEGATIVE
HCT VFR BLD CALC: 40.3 % (ref 36.3–47.1)
HEMOGLOBIN: 13.1 G/DL (ref 11.9–15.1)
LACTIC ACID, WHOLE BLOOD: 2.4 MMOL/L (ref 0.7–2.1)
LIPASE: 23 U/L (ref 13–60)
MCH RBC QN AUTO: 29.1 PG (ref 25.2–33.5)
MCHC RBC AUTO-ENTMCNC: 32.5 G/DL (ref 28.4–34.8)
MCV RBC AUTO: 89.6 FL (ref 82.6–102.9)
NRBC AUTOMATED: 0 PER 100 WBC
PDW BLD-RTO: 14.1 % (ref 11.8–14.4)
PLATELET # BLD: 319 K/UL (ref 138–453)
PMV BLD AUTO: 10.1 FL (ref 8.1–13.5)
POTASSIUM SERPL-SCNC: 4.3 MMOL/L (ref 3.7–5.3)
RBC # BLD: 4.5 M/UL (ref 3.95–5.11)
SARS-COV-2, RAPID: NOT DETECTED
SODIUM BLD-SCNC: 132 MMOL/L (ref 135–144)
SPECIMEN DESCRIPTION: NORMAL
TOTAL PROTEIN: 7.2 G/DL (ref 6.4–8.3)
WBC # BLD: 16.5 K/UL (ref 3.5–11.3)

## 2021-04-14 PROCEDURE — 96376 TX/PRO/DX INJ SAME DRUG ADON: CPT

## 2021-04-14 PROCEDURE — 83690 ASSAY OF LIPASE: CPT

## 2021-04-14 PROCEDURE — 6360000004 HC RX CONTRAST MEDICATION: Performed by: EMERGENCY MEDICINE

## 2021-04-14 PROCEDURE — 99284 EMERGENCY DEPT VISIT MOD MDM: CPT

## 2021-04-14 PROCEDURE — 2580000003 HC RX 258: Performed by: EMERGENCY MEDICINE

## 2021-04-14 PROCEDURE — 6360000002 HC RX W HCPCS: Performed by: EMERGENCY MEDICINE

## 2021-04-14 PROCEDURE — 2500000003 HC RX 250 WO HCPCS: Performed by: STUDENT IN AN ORGANIZED HEALTH CARE EDUCATION/TRAINING PROGRAM

## 2021-04-14 PROCEDURE — 83605 ASSAY OF LACTIC ACID: CPT

## 2021-04-14 PROCEDURE — 80053 COMPREHEN METABOLIC PANEL: CPT

## 2021-04-14 PROCEDURE — 74177 CT ABD & PELVIS W/CONTRAST: CPT

## 2021-04-14 PROCEDURE — 87635 SARS-COV-2 COVID-19 AMP PRB: CPT

## 2021-04-14 PROCEDURE — 84703 CHORIONIC GONADOTROPIN ASSAY: CPT

## 2021-04-14 PROCEDURE — 85027 COMPLETE CBC AUTOMATED: CPT

## 2021-04-14 PROCEDURE — 2580000003 HC RX 258: Performed by: STUDENT IN AN ORGANIZED HEALTH CARE EDUCATION/TRAINING PROGRAM

## 2021-04-14 PROCEDURE — 6370000000 HC RX 637 (ALT 250 FOR IP): Performed by: STUDENT IN AN ORGANIZED HEALTH CARE EDUCATION/TRAINING PROGRAM

## 2021-04-14 PROCEDURE — 6360000002 HC RX W HCPCS: Performed by: STUDENT IN AN ORGANIZED HEALTH CARE EDUCATION/TRAINING PROGRAM

## 2021-04-14 PROCEDURE — 86140 C-REACTIVE PROTEIN: CPT

## 2021-04-14 PROCEDURE — 96365 THER/PROPH/DIAG IV INF INIT: CPT

## 2021-04-14 PROCEDURE — 2500000003 HC RX 250 WO HCPCS: Performed by: EMERGENCY MEDICINE

## 2021-04-14 PROCEDURE — 96375 TX/PRO/DX INJ NEW DRUG ADDON: CPT

## 2021-04-14 PROCEDURE — 1200000000 HC SEMI PRIVATE

## 2021-04-14 RX ORDER — FENTANYL CITRATE 50 UG/ML
50 INJECTION, SOLUTION INTRAMUSCULAR; INTRAVENOUS ONCE
Status: COMPLETED | OUTPATIENT
Start: 2021-04-14 | End: 2021-04-14

## 2021-04-14 RX ORDER — ONDANSETRON 2 MG/ML
4 INJECTION INTRAMUSCULAR; INTRAVENOUS ONCE
Status: COMPLETED | OUTPATIENT
Start: 2021-04-14 | End: 2021-04-14

## 2021-04-14 RX ORDER — CIPROFLOXACIN 2 MG/ML
400 INJECTION, SOLUTION INTRAVENOUS ONCE
Status: COMPLETED | OUTPATIENT
Start: 2021-04-14 | End: 2021-04-14

## 2021-04-14 RX ORDER — OXYCODONE HYDROCHLORIDE 5 MG/1
5 TABLET ORAL EVERY 4 HOURS PRN
Status: DISCONTINUED | OUTPATIENT
Start: 2021-04-14 | End: 2021-04-17

## 2021-04-14 RX ORDER — FENTANYL CITRATE 50 UG/ML
25 INJECTION, SOLUTION INTRAMUSCULAR; INTRAVENOUS ONCE
Status: COMPLETED | OUTPATIENT
Start: 2021-04-14 | End: 2021-04-14

## 2021-04-14 RX ORDER — ONDANSETRON 4 MG/1
4 TABLET, ORALLY DISINTEGRATING ORAL EVERY 8 HOURS PRN
Status: DISCONTINUED | OUTPATIENT
Start: 2021-04-14 | End: 2021-04-17 | Stop reason: HOSPADM

## 2021-04-14 RX ORDER — SODIUM CHLORIDE 9 MG/ML
25 INJECTION, SOLUTION INTRAVENOUS PRN
Status: DISCONTINUED | OUTPATIENT
Start: 2021-04-14 | End: 2021-04-17 | Stop reason: HOSPADM

## 2021-04-14 RX ORDER — SODIUM CHLORIDE 0.9 % (FLUSH) 0.9 %
5-40 SYRINGE (ML) INJECTION PRN
Status: DISCONTINUED | OUTPATIENT
Start: 2021-04-14 | End: 2021-04-17 | Stop reason: HOSPADM

## 2021-04-14 RX ORDER — SODIUM CHLORIDE 0.9 % (FLUSH) 0.9 %
5-40 SYRINGE (ML) INJECTION EVERY 12 HOURS SCHEDULED
Status: DISCONTINUED | OUTPATIENT
Start: 2021-04-14 | End: 2021-04-17 | Stop reason: HOSPADM

## 2021-04-14 RX ORDER — TRAZODONE HYDROCHLORIDE 50 MG/1
50 TABLET ORAL NIGHTLY PRN
Status: DISCONTINUED | OUTPATIENT
Start: 2021-04-14 | End: 2021-04-17 | Stop reason: HOSPADM

## 2021-04-14 RX ORDER — ONDANSETRON 2 MG/ML
4 INJECTION INTRAMUSCULAR; INTRAVENOUS EVERY 6 HOURS PRN
Status: DISCONTINUED | OUTPATIENT
Start: 2021-04-14 | End: 2021-04-17 | Stop reason: HOSPADM

## 2021-04-14 RX ORDER — CIPROFLOXACIN 2 MG/ML
400 INJECTION, SOLUTION INTRAVENOUS EVERY 12 HOURS
Status: DISCONTINUED | OUTPATIENT
Start: 2021-04-15 | End: 2021-04-17

## 2021-04-14 RX ORDER — BUDESONIDE AND FORMOTEROL FUMARATE DIHYDRATE 160; 4.5 UG/1; UG/1
2 AEROSOL RESPIRATORY (INHALATION) 2 TIMES DAILY
Status: DISCONTINUED | OUTPATIENT
Start: 2021-04-14 | End: 2021-04-17 | Stop reason: HOSPADM

## 2021-04-14 RX ORDER — FENTANYL CITRATE 50 UG/ML
50 INJECTION, SOLUTION INTRAMUSCULAR; INTRAVENOUS
Status: DISCONTINUED | OUTPATIENT
Start: 2021-04-14 | End: 2021-04-17 | Stop reason: HOSPADM

## 2021-04-14 RX ORDER — ACETAMINOPHEN 500 MG
1000 TABLET ORAL EVERY 8 HOURS PRN
Status: DISCONTINUED | OUTPATIENT
Start: 2021-04-14 | End: 2021-04-17 | Stop reason: HOSPADM

## 2021-04-14 RX ORDER — ALBUTEROL SULFATE 2.5 MG/3ML
2.5 SOLUTION RESPIRATORY (INHALATION) EVERY 6 HOURS PRN
Status: DISCONTINUED | OUTPATIENT
Start: 2021-04-14 | End: 2021-04-17 | Stop reason: HOSPADM

## 2021-04-14 RX ORDER — 0.9 % SODIUM CHLORIDE 0.9 %
1000 INTRAVENOUS SOLUTION INTRAVENOUS ONCE
Status: COMPLETED | OUTPATIENT
Start: 2021-04-14 | End: 2021-04-14

## 2021-04-14 RX ORDER — SODIUM CHLORIDE, SODIUM LACTATE, POTASSIUM CHLORIDE, CALCIUM CHLORIDE 600; 310; 30; 20 MG/100ML; MG/100ML; MG/100ML; MG/100ML
INJECTION, SOLUTION INTRAVENOUS CONTINUOUS
Status: DISCONTINUED | OUTPATIENT
Start: 2021-04-14 | End: 2021-04-17

## 2021-04-14 RX ADMIN — SODIUM CHLORIDE 1000 ML: 9 INJECTION, SOLUTION INTRAVENOUS at 11:05

## 2021-04-14 RX ADMIN — ONDANSETRON 4 MG: 2 INJECTION INTRAMUSCULAR; INTRAVENOUS at 11:04

## 2021-04-14 RX ADMIN — ENOXAPARIN SODIUM 40 MG: 40 INJECTION SUBCUTANEOUS at 22:36

## 2021-04-14 RX ADMIN — TRAZODONE HYDROCHLORIDE 50 MG: 50 TABLET ORAL at 22:36

## 2021-04-14 RX ADMIN — CIPROFLOXACIN 400 MG: 2 INJECTION, SOLUTION INTRAVENOUS at 14:10

## 2021-04-14 RX ADMIN — METRONIDAZOLE 500 MG: 500 INJECTION, SOLUTION INTRAVENOUS at 15:43

## 2021-04-14 RX ADMIN — SODIUM CHLORIDE, POTASSIUM CHLORIDE, SODIUM LACTATE AND CALCIUM CHLORIDE: 600; 310; 30; 20 INJECTION, SOLUTION INTRAVENOUS at 21:33

## 2021-04-14 RX ADMIN — FENTANYL CITRATE 50 MCG: 50 INJECTION, SOLUTION INTRAMUSCULAR; INTRAVENOUS at 12:40

## 2021-04-14 RX ADMIN — FENTANYL CITRATE 25 MCG: 50 INJECTION, SOLUTION INTRAMUSCULAR; INTRAVENOUS at 11:04

## 2021-04-14 RX ADMIN — FENTANYL CITRATE 50 MCG: 50 INJECTION, SOLUTION INTRAMUSCULAR; INTRAVENOUS at 22:35

## 2021-04-14 RX ADMIN — FENTANYL CITRATE 50 MCG: 50 INJECTION, SOLUTION INTRAMUSCULAR; INTRAVENOUS at 15:09

## 2021-04-14 RX ADMIN — ACETAMINOPHEN 1000 MG: 500 TABLET ORAL at 22:54

## 2021-04-14 RX ADMIN — SERTRALINE 150 MG: 50 TABLET, FILM COATED ORAL at 22:36

## 2021-04-14 RX ADMIN — METRONIDAZOLE 500 MG: 500 INJECTION, SOLUTION INTRAVENOUS at 23:27

## 2021-04-14 RX ADMIN — IOPAMIDOL 75 ML: 755 INJECTION, SOLUTION INTRAVENOUS at 12:44

## 2021-04-14 RX ADMIN — HYDROMORPHONE HYDROCHLORIDE 1 MG: 1 INJECTION, SOLUTION INTRAMUSCULAR; INTRAVENOUS; SUBCUTANEOUS at 13:53

## 2021-04-14 ASSESSMENT — PAIN SCALES - GENERAL
PAINLEVEL_OUTOF10: 3
PAINLEVEL_OUTOF10: 7
PAINLEVEL_OUTOF10: 6
PAINLEVEL_OUTOF10: 6
PAINLEVEL_OUTOF10: 7

## 2021-04-14 ASSESSMENT — PAIN DESCRIPTION - DESCRIPTORS
DESCRIPTORS: SHARP;PRESSURE
DESCRIPTORS: TENDER;ACHING
DESCRIPTORS: ACHING

## 2021-04-14 ASSESSMENT — ENCOUNTER SYMPTOMS
NAUSEA: 1
CONSTIPATION: 0
ABDOMINAL DISTENTION: 0
VOMITING: 0
ABDOMINAL PAIN: 1
DIARRHEA: 0
RHINORRHEA: 0
SORE THROAT: 0
WHEEZING: 0
SHORTNESS OF BREATH: 0
COUGH: 0

## 2021-04-14 ASSESSMENT — PAIN DESCRIPTION - PAIN TYPE: TYPE: ACUTE PAIN

## 2021-04-14 ASSESSMENT — PAIN DESCRIPTION - PROGRESSION
CLINICAL_PROGRESSION: GRADUALLY IMPROVING

## 2021-04-14 ASSESSMENT — PAIN DESCRIPTION - LOCATION
LOCATION: ABDOMEN
LOCATION: ABDOMEN

## 2021-04-14 ASSESSMENT — PAIN DESCRIPTION - ORIENTATION: ORIENTATION: RIGHT;LOWER

## 2021-04-14 ASSESSMENT — PAIN DESCRIPTION - FREQUENCY: FREQUENCY: CONTINUOUS

## 2021-04-14 NOTE — CONSULTS
General Surgery   Consultation        PATIENT NAME: Nia Amaya   YOB: 1990  MRN: [id number]    ADMISSION DATE: 4/14/2021 10:40 AM      TODAY'S DATE: 4/14/2021    Consult regarding: cecal diverticulitis     Cc: RLQ pain    HPI:    This patient is a 25-year-old female with a history of morbid obesity and previous episode of diverticulitis of the transverse colon who presents to [facility] emergency department with 24 hours of severe right lower quadrant pain. The patient reports that yesterday morning she woke up with the pain and it has progressed since that time. She reports that the pain is focal to the right lower quadrant and not diffuse. She does report some associated nausea but denies any vomitings. She denies any fevers or chills. The patient reports that her bowel movements have been regular and normal without any blood and does not have any history of chronic abdominal pain. The patient was admitted to the hospital approximately 4 years ago for right upper quadrant pain which was found to be secondary to diverticulitis of her proximal transverse colon. She denies having had any follow-up since that time or endoscopic evaluation. In the emergency department the patient is afebrile, mildly tachycardic with a heart rate of 112, normotensive, leukocytosis of 16.5k and mild lactic acidosis of 2.4 consistent with dehydration. CT of the abdomen pelvis which demonstrates what appears to be acute diverticulitis of the cecum/ascending colon with question of microperforation although there is no adjacent abscess or significant free air or free fluid. General surgery been consulted in the emergency department for evaluation at this time.     Past Medical History:        Diagnosis Date    Asthma     Back pain 9/16/2014    [facility]     Bipolar disorder ([facility] 12/8/2017    Depression     Diabetes mellitus (HCC)     Dizziness     Fatty liver disease, nonalcoholic  Fatty liver disease, nonalcoholic     GERD (gastroesophageal reflux disease)     Obesity        Past Surgical History:        Procedure Laterality Date     SECTION      LEEP         Medications Prior to Admission:   Current Facility-Administered Medications   Medication Dose Route Frequency Provider Last Rate Last Admin    metronidazole (FLAGYL) 500 mg in NaCl 100 mL IVPB premix  500 mg Intravenous Once Anabela Amaya,  mL/hr at 21 1543 500 mg at 21 1543     Current Outpatient Medications   Medication Sig Dispense Refill    aspirin-acetaminophen-caffeine (EXCEDRIN MIGRAINE) 250-250-65 MG per tablet Take 1 tablet by mouth every 6 hours as needed for Headaches 90 tablet 3    budesonide-formoterol (SYMBICORT) 160-4.5 MCG/ACT AERO Inhale 2 puffs into the lungs 2 times daily 1 Inhaler 3    sertraline (ZOLOFT) 50 MG tablet Take 3 tablets by mouth daily 90 tablet 3    cariprazine hcl (VRAYLAR) 3 MG CAPS capsule Take 1 capsule by mouth daily 30 capsule 0    albuterol sulfate HFA (PROVENTIL HFA) 108 (90 Base) MCG/ACT inhaler Inhale 2 puffs into the lungs every 6 hours as needed for Wheezing or Shortness of Breath (Space out to every 6 hours as symptoms improve) Space out to every 6 hours as symptoms improve.  2 Inhaler 0    traZODone (DESYREL) 50 MG tablet Take 1 tablet by mouth nightly as needed for Sleep 14 tablet 0       Allergies:  Morphine, Pcn [penicillins], Amoxicillin, and Seasonal    Social History:   Social History     Socioeconomic History    Marital status:      Spouse name: Not on file    Number of children: Not on file    Years of education: Not on file    Highest education level: Not on file   Occupational History    Not on file   Social Needs    Financial resource strain: Not on file    Food insecurity     Worry: Not on file     Inability: Not on file    Transportation needs     Medical: Not on file     Non-medical: Not on file   Tobacco Use    Smoking status: Former Smoker     Types: Cigarettes    Smokeless tobacco: Never Used   Substance and Sexual Activity    Alcohol use: Not on file     Comment: rarely    Drug use: Not Currently     Types: Marijuana     Comment: quit    Sexual activity: Not Currently   Lifestyle    Physical activity     Days per week: Not on file     Minutes per session: Not on file    Stress: Not on file   Relationships    Social connections     Talks on phone: Not on file     Gets together: Not on file     Attends [employer]ism service: Not on file     Active member of club or organization: Not on file     Attends meetings of clubs or organizations: Not on file     Relationship status: Not on file    Intimate partner violence     Fear of current or ex partner: Not on file     Emotionally abused: Not on file     Physically abused: Not on file     Forced sexual activity: Not on file   Other Topics Concern    Not on file   Social History Narrative    Not on file       Family History:       Problem Relation Age of Onset    High Blood Pressure Mother     Diabetes Mother     Heart Disease Mother     Heart Disease Father     Early Death Father     Cancer Maternal Aunt         lung    Cancer Paternal Cousin         brain       Review of Systems:    Gen: No fever or chills  Eyes: No blurry vision or conjunctivitis   ENT: No sinus congestion or sore throat  CV: No chest pain or dyspnea on exertion  Pulm: No cough or shortness of breath  GI: Positive right lower quadrant pain and nausea, no emesis or hematochezia or melena  Renal: No dysuria or hematuria  Endo: No excessive sweating or cold intolerance  Heme/Onc: No excessive bruising or swollen lymph nodes  Neuro: No difficulty with speech or acute extremity weakness  Skin: No rashes or ulcers  Musculoskeletal: Denies muscle, joint, back pain      PHYSICAL EXAM:    VITALS:  /76   Pulse 112   Temp 99.9 °F (37.7 °C) (Oral)   Resp 18   Ht 5' 5\" (1.651 m)   Wt (!) 328 lb (148.8 kg)   LMP 04/10/2021   SpO2 96%   BMI 54.58 kg/m²     CONSTITUTIONAL: awake, alert, cooperative, no apparent distress  HEAD: atraumatic, normocephalic  EYES: sclera clear, pupils equal and reactive to light  ENT: ears are symmetric, nares patent, moist mucous membranes  NECK: Supple, symmetrical, trachea midline  LUNGS: no respiratory distress, no audible wheezing  CARDIOVASCULAR: +S1/S2  ABDOMEN: Morbidly obese, nondistended, focally tender to palpation in the right lower quadrant, no diffuse peritoneal signs  MUSCULOSKELETAL: full range of motion noted  NEUROLOGIC: Awake, alert, oriented to name, place and time  EXTREMITIES: no cyanosis or clubbing  SKIN: normal coloration and turgor  Psych: Flat affect      CBC with Differential:    Lab Results   Component Value Date    WBC 16.5 04/14/2021    RBC 4.50 04/14/2021    RBC 4.02 05/14/2012    HGB 13.1 04/14/2021    HCT 40.3 04/14/2021     04/14/2021     05/14/2012    MCV 89.6 04/14/2021    MCH 29.1 04/14/2021    MCHC 32.5 04/14/2021    RDW 14.1 04/14/2021    LYMPHOPCT 23 03/13/2021    MONOPCT 7 03/13/2021    BASOPCT 1 03/13/2021    MONOSABS 0.80 03/13/2021    LYMPHSABS 2.60 03/13/2021    EOSABS 0.20 03/13/2021    BASOSABS 0.10 03/13/2021    DIFFTYPE NOT REPORTED 03/13/2021     BMP:    Lab Results   Component Value Date     04/14/2021    K 4.3 04/14/2021     04/14/2021    CO2 22 04/14/2021    BUN 10 04/14/2021    LABALBU 3.7 04/14/2021    LABALBU 4.9 10/04/2011    CREATININE 0.73 04/14/2021    CALCIUM 9.2 04/14/2021    GFRAA >60 04/14/2021    LABGLOM >60 04/14/2021    GLUCOSE 139 04/14/2021    GLUCOSE 93 05/14/2012       Pertinent Radiology:     Ct Abdomen Pelvis W Iv Contrast Additional Contrast? None  Result Date: 4/14/2021  1. Findings most compatible with a perforated cecal diverticulitis. Fairly normal appearing appendix courses through the area of inflammation. 2. Hepatic steatosis.  Findings were discussed with Dr. Amy Soria

## 2021-04-14 NOTE — H&P
General Surgery  History & Physical Exam        PATIENT NAME: Theadore Leventhal   YOB: 1990  MRN: [id number]    ADMISSION DATE: 4/14/2021 10:40 AM      TODAY'S DATE: 4/14/2021    Consult regarding: cecal diverticulitis     Cc: RLQ pain    HPI:    This patient is a 66-year-old morbidly obese female with a history of morbid obesity and previous episode of diverticulitis of the transverse colon who presents to [facility] emergency department where she is seen and evaluated with 24 hours of severe right lower quadrant pain. The patient reports that yesterday morning she woke up with the pain and it has progressed since that time. She reports that the pain is focal to the right lower quadrant and not diffuse. She does report some associated nausea but denies any vomitings. She denies any fevers or chills. The patient reports that her bowel movements have been regular and normal without any blood and does not have any history of chronic abdominal pain. The patient was admitted to the hospital approximately 4 years ago for right upper quadrant pain which was found to be secondary to diverticulitis of her proximal transverse colon. She denies having had any follow-up since that time or endoscopic evaluation. In the emergency department the patient is afebrile, mildly tachycardic with a heart rate of 112, normotensive, leukocytosis of 16.5k and mild lactic acidosis of 2.4 consistent with dehydration. CT of the abdomen pelvis which demonstrates what appears to be acute diverticulitis of the cecum/ascending colon with question of microperforation although there is no adjacent abscess or significant free air or free fluid patient also noted to have pandiverticulosis of the colon. General surgery been consulted in the emergency department for evaluation at this time. Patient has comorbidities of bipolar disorder, depression, asthma and chronic back pain and morbid obesity.     Past Medical History: Diagnosis Date    Asthma     Back pain 2014    [facility] ([facility]     Bipolar disorder ([facility] 2017    Depression     Diabetes mellitus ([facility]     Dizziness     Fatty liver disease, nonalcoholic     Fatty liver disease, nonalcoholic     GERD (gastroesophageal reflux disease)     Obesity        Past Surgical History:        Procedure Laterality Date     SECTION      LEEP         Medications Prior to Admission:   Current Facility-Administered Medications   Medication Dose Route Frequency Provider Last Rate Last Admin    metronidazole (FLAGYL) 500 mg in NaCl 100 mL IVPB premix  500 mg Intravenous Once Alberto Moser,  mL/hr at 21 1543 500 mg at 21 1543     Current Outpatient Medications   Medication Sig Dispense Refill    aspirin-acetaminophen-caffeine (EXCEDRIN MIGRAINE) 250-250-65 MG per tablet Take 1 tablet by mouth every 6 hours as needed for Headaches 90 tablet 3    budesonide-formoterol (SYMBICORT) 160-4.5 MCG/ACT AERO Inhale 2 puffs into the lungs 2 times daily 1 Inhaler 3    sertraline (ZOLOFT) 50 MG tablet Take 3 tablets by mouth daily 90 tablet 3    cariprazine hcl (VRAYLAR) 3 MG CAPS capsule Take 1 capsule by mouth daily 30 capsule 0    albuterol sulfate HFA (PROVENTIL HFA) 108 (90 Base) MCG/ACT inhaler Inhale 2 puffs into the lungs every 6 hours as needed for Wheezing or Shortness of Breath (Space out to every 6 hours as symptoms improve) Space out to every 6 hours as symptoms improve.  2 Inhaler 0    traZODone (DESYREL) 50 MG tablet Take 1 tablet by mouth nightly as needed for Sleep 14 tablet 0       Allergies:  Morphine, Pcn [penicillins], Amoxicillin, and Seasonal    Social History:   Social History     Socioeconomic History    Marital status:      Spouse name: Not on file    Number of children: Not on file    Years of education: Not on file    Highest education level: Not on file   Occupational History    Not on file nodes  Neuro: No difficulty with speech or acute extremity weakness has treated depression  Skin: No rashes or ulcers  Musculoskeletal: Denies muscle, joint, has chronic back pain      PHYSICAL EXAM:    VITALS:  /76   Pulse 112   Temp 99.9 °F (37.7 °C) (Oral)   Resp 18   Ht 5' 5\" (1.651 m)   Wt (!) 328 lb (148.8 kg)   LMP 04/10/2021   SpO2 96%   BMI 54.58 kg/m²     CONSTITUTIONAL: awake, alert, cooperative, no apparent distress  HEAD: atraumatic, normocephalic  EYES: sclera clear, pupils equal and reactive to light  ENT: ears are symmetric, nares patent, moist mucous membranes  NECK: Supple, symmetrical, trachea midline  LUNGS: no respiratory distress, no audible wheezing  CARDIOVASCULAR: +S1/S2  ABDOMEN: Morbidly obese, nondistended, focally tender to palpation in the right lower quadrant, no diffuse peritoneal signs  MUSCULOSKELETAL: full range of motion noted no focal tenderness but complains of chronic back pain  NEUROLOGIC: Awake, alert, oriented to name, place and time  EXTREMITIES: no cyanosis or clubbing  SKIN: normal coloration and turgor  Psych: Flat affect      CBC with Differential:    Lab Results   Component Value Date    WBC 16.5 04/14/2021    RBC 4.50 04/14/2021    RBC 4.02 05/14/2012    HGB 13.1 04/14/2021    HCT 40.3 04/14/2021     04/14/2021     05/14/2012    MCV 89.6 04/14/2021    MCH 29.1 04/14/2021    MCHC 32.5 04/14/2021    RDW 14.1 04/14/2021    LYMPHOPCT 23 03/13/2021    MONOPCT 7 03/13/2021    BASOPCT 1 03/13/2021    MONOSABS 0.80 03/13/2021    LYMPHSABS 2.60 03/13/2021    EOSABS 0.20 03/13/2021    BASOSABS 0.10 03/13/2021    DIFFTYPE NOT REPORTED 03/13/2021     BMP:    Lab Results   Component Value Date     04/14/2021    K 4.3 04/14/2021     04/14/2021    CO2 22 04/14/2021    BUN 10 04/14/2021    LABALBU 3.7 04/14/2021    LABALBU 4.9 10/04/2011    CREATININE 0.73 04/14/2021    CALCIUM 9.2 04/14/2021    GFRAA >60 04/14/2021    LABGLOM >60 04/14/2021 admission.    -----------------------------------------------  Sarah Puga DO  General Surgery Resident, PGY-5  4/14/2021 at 4:20 PM   I attest that I was present with the resident in the emergency department at [facility] during the patient's evaluation and agree with the description of findings and plan as dictated above.   Deborah Pierce MD

## 2021-04-14 NOTE — ED PROVIDER NOTES
[facility] ED  Emergency Department  Emergency Medicine Resident Sign-out     Care of Latricia Shaw was assumed from Dr. Ac Pimentel and is being seen for Abdominal Pain (RLQ abdominal pain since yesterday)   . The patient's initial evaluation and plan have been discussed with the prior provider who initially evaluated the patient. EMERGENCY DEPARTMENT COURSE / MEDICAL DECISION MAKING:       MEDICATIONS GIVEN:  Orders Placed This Encounter   Medications    fentaNYL (SUBLIMAZE) injection 25 mcg    ondansetron (ZOFRAN) injection 4 mg    0.9 % sodium chloride bolus    fentaNYL (SUBLIMAZE) injection 50 mcg    iopamidol (ISOVUE-370) 76 % injection 75 mL       LABS / RADIOLOGY:     Labs Reviewed   COMPREHENSIVE METABOLIC PANEL - Abnormal; Notable for the following components:       Result Value    Glucose 139 (*)     Sodium 132 (*)     All other components within normal limits   CBC - Abnormal; Notable for the following components:    WBC 16.5 (*)     All other components within normal limits   LACTIC ACID, WHOLE BLOOD - Abnormal; Notable for the following components:    Lactic Acid, Whole Blood 2.4 (*)     All other components within normal limits   LIPASE   HCG, SERUM, QUALITATIVE   C-REACTIVE PROTEIN       CT ABDOMEN PELVIS W IV CONTRAST Additional Contrast? None    (Results Pending)       RECENT VITALS:     Temp: 99.9 °F (37.7 °C),  Pulse: 112, Resp: 18, BP: 116/76, SpO2: 96 %    This patient is a 33 y.o. Female with concerns for appendicitis, patient is reporting abdominal pain, is tachycardic with pulse of 112, elevated white count. Pending CT scan at this time. CT scan shows concerns for perforated diverticulitis of the cecal colon, surgery team admitting, patient started on IV antibiotics, Cipro Flagyl secondary to penicillin allergy. Patient agreeable to admission at this time. OUTSTANDING TASKS / RECOMMENDATIONS:      1.  Await Bed placement       FINAL IMPRESSION:     No

## 2021-04-14 NOTE — ED PROVIDER NOTES
[facility]  ED  Emergency Department        Pt Name: Vivienne Foley  MRN: [id number]  [place] 1990  Date of evaluation: 21    CHIEF COMPLAINT       Chief Complaint   Patient presents with    Abdominal Pain     RLQ abdominal pain since yesterday       HISTORY OF PRESENT ILLNESS  (Location/Symptom, Timing/Onset, Context/Setting, Quality, Duration, ModifyingFactors, Severity.)      Vivienne Foley is a 33 y.o. female who presents with Right lower abdominal pain that started yesterday, non radiating. Has not taken anything for pain, feels nauseated but not vomiting, no fevers. Has prior c/s but no other abdominal surgeries, Last Bm was soft. No diarrhea, no constipation. LMP was 2021 and was a normal menstrual cycle    PAST MEDICAL / SURGICAL / SOCIAL / FAMILY HISTORY      has a past medical history of Asthma, Back pain, Bipolar 1 disorder ([address] Bipolar disorder ([address] Depression, Diabetes mellitus ([address] Dizziness, Fatty liver disease, nonalcoholic, Fatty liver disease, nonalcoholic, GERD (gastroesophageal reflux disease), and Obesity. has a past surgical history that includes  section and LEEP.        Social History     Socioeconomic History    Marital status:      Spouse name: Not on file    Number of children: Not on file    Years of education: Not on file    Highest education level: Not on file   Occupational History    Not on file   Social Needs    Financial resource strain: Not on file    Food insecurity     Worry: Not on file     Inability: Not on file    Transportation needs     Medical: Not on file     Non-medical: Not on file   Tobacco Use    Smoking status: Former Smoker     Types: Cigarettes    Smokeless tobacco: Never Used   Substance and Sexual Activity    Alcohol use: Not on file     Comment: rarely    Drug use: Not Currently     Types: Marijuana     Comment: quit    Sexual activity: Not Currently   Lifestyle    Physical tablet by mouth nightly as needed for Sleep 8/16/20   Danny Guzman, APRN - CNP       REVIEW OF SYSTEMS    (2-9 systems for level 4, 10 or more for level 5)      Review of Systems   Constitutional: Negative for activity change, appetite change, fatigue and fever. HENT: Negative for congestion, rhinorrhea and sore throat. Respiratory: Negative for cough, shortness of breath and wheezing. Cardiovascular: Negative for chest pain, palpitations and leg swelling. Gastrointestinal: Positive for abdominal pain and nausea. Negative for abdominal distention, constipation, diarrhea and vomiting. Genitourinary: Negative for decreased urine volume and dysuria. Skin: Negative for rash. Neurological: Negative for dizziness, weakness, light-headedness, numbness and headaches. PHYSICAL EXAM   (up to 7 for level 4, 8 or more for level 5)     INITIAL VITALS:   /76   Pulse 112   Temp 99.9 °F (37.7 °C) (Oral)   Resp 18   Ht 5' 5\" (1.651 m)   Wt (!) 328 lb (148.8 kg)   LMP 04/10/2021   SpO2 96%   BMI 54.58 kg/m²     Physical Exam  Vitals signs and nursing note reviewed. Constitutional:       General: She is not in acute distress. Appearance: She is well-developed. Comments: Non toxic appearing, speaking in full sentences without signs of distress   HENT:      Head: Normocephalic and atraumatic. Eyes:      General:         Right eye: No discharge. Left eye: No discharge. Conjunctiva/sclera: Conjunctivae normal.   Cardiovascular:      Rate and Rhythm: Normal rate and regular rhythm. Heart sounds: Normal heart sounds. No murmur. No friction rub. No gallop. Pulmonary:      Effort: Pulmonary effort is normal. No respiratory distress. Breath sounds: Normal breath sounds. No wheezing or rales. Chest:      Chest wall: No tenderness. Abdominal:      General: There is no distension. Palpations: Abdomen is soft. There is no mass. Tenderness:  There is abdominal tenderness in the right lower quadrant. Comments: With obese abdomen, and tenderness to palpation in the right upper as well as right lower quadrant, but generalized tenderness in the entire abdomen most prominent on the right side with voluntary guarding in the right lower quadrant   Skin:     General: Skin is warm and dry. Findings: No rash. Neurological:      Mental Status: She is alert and oriented to person, place, and time. DIFFERENTIAL  DIAGNOSIS     Patient with right lower quadrant as well as right-sided abdominal pain previous history of gallbladder disease. We will plan on labs, CRP concern for possible appendicitis. No prior abdominal surgeries.     PLAN (LABS / IMAGING / EKG):  Orders Placed This Encounter   Procedures    CT ABDOMEN PELVIS W IV CONTRAST Additional Contrast? None    COMPREHENSIVE METABOLIC PANEL    CBC    C-REACTIVE PROTEIN    LACTIC ACID, WHOLE BLOOD    LIPASE    HCG Qualitative, Serum    Inpatient consult to General Surgery    Inpatient consult to General Surgery    Insert peripheral IV       MEDICATIONS ORDERED:  Orders Placed This Encounter   Medications    fentaNYL (SUBLIMAZE) injection 25 mcg    ondansetron (ZOFRAN) injection 4 mg    0.9 % sodium chloride bolus    fentaNYL (SUBLIMAZE) injection 50 mcg    iopamidol (ISOVUE-370) 76 % injection 75 mL    HYDROmorphone (DILAUDID) injection 1 mg       DIAGNOSTIC RESULTS / EMERGENCY DEPARTMENT COURSE / MDM     LABS:  Results for orders placed or performed during the hospital encounter of 04/14/21   COMPREHENSIVE METABOLIC PANEL   Result Value Ref Range    Glucose 139 (H) 70 - 99 mg/dL    BUN 10 6 - 20 mg/dL    CREATININE 0.73 0.50 - 0.90 mg/dL    Bun/Cre Ratio NOT REPORTED 9 - 20    Calcium 9.2 8.6 - 10.4 mg/dL    Sodium 132 (L) 135 - 144 mmol/L    Potassium 4.3 3.7 - 5.3 mmol/L    Chloride 101 98 - 107 mmol/L    CO2 22 20 - 31 mmol/L    Anion Gap 9 9 - 17 mmol/L    Alkaline Phosphatase 81 35 - 104 U/L ALT 30 5 - 33 U/L    AST 26 <32 U/L    Total Bilirubin 1.04 0.3 - 1.2 mg/dL    Total Protein 7.2 6.4 - 8.3 g/dL    Albumin 3.7 3.5 - 5.2 g/dL    Albumin/Globulin Ratio 1.1 1.0 - 2.5    GFR Non-African American >60 >60 mL/min    GFR African American >60 >60 mL/min    GFR Comment          GFR Staging NOT REPORTED    CBC   Result Value Ref Range    WBC 16.5 (H) 3.5 - 11.3 k/uL    RBC 4.50 3.95 - 5.11 m/uL    Hemoglobin 13.1 11.9 - 15.1 g/dL    Hematocrit 40.3 36.3 - 47.1 %    MCV 89.6 82.6 - 102.9 fL    MCH 29.1 25.2 - 33.5 pg    MCHC 32.5 28.4 - 34.8 g/dL    RDW 14.1 11.8 - 14.4 %    Platelets 196 255 - 826 k/uL    MPV 10.1 8.1 - 13.5 fL    NRBC Automated 0.0 0.0 per 100 WBC   C-REACTIVE PROTEIN   Result Value Ref Range    .4 (H) 0.0 - 5.0 mg/L   LACTIC ACID, WHOLE BLOOD   Result Value Ref Range    Lactic Acid, Whole Blood 2.4 (H) 0.7 - 2.1 mmol/L   LIPASE   Result Value Ref Range    Lipase 23 13 - 60 U/L   HCG Qualitative, Serum   Result Value Ref Range    hCG Qual NEGATIVE NEGATIVE       IMPRESSION: rlq abominal pain    RADIOLOGY:  CT ABDOMEN PELVIS W IV CONTRAST Additional Contrast? None    (Results Pending)       EMERGENCY DEPARTMENT COURSE:  CT scan showing concerning findings for diverticulitis with perforation and contained abscess we will plan on antibiotics, general surgery consulted admission    PROCEDURES:      CONSULTS:  IP CONSULT TO GENERAL SURGERY  IP CONSULT TO GENERAL SURGERY    CRITICAL CARE:      FINAL IMPRESSION      1. Perforated diverticulum    2. Leukocytosis, unspecified type          DISPOSITION / PLAN     DISPOSITION Decision To Admit 04/14/2021 01:56:24 PM      PATIENT REFERRED TO:  No follow-up provider specified.     DISCHARGE MEDICATIONS:  New Prescriptions    No medications on file       Drea Morse  2:01 PM    Attending Emergency Physician  [facility] ED    (Please note that portions of this note were completed with a voice recognition program.  Effortswere made to edit the dictations but occasionally words are mis-transcribed.)              Giacomo Fam DO  04/14/21 [id number]

## 2021-04-15 LAB
ABSOLUTE EOS #: 0.12 K/UL (ref 0–0.44)
ABSOLUTE IMMATURE GRANULOCYTE: 0.07 K/UL (ref 0–0.3)
ABSOLUTE LYMPH #: 1.16 K/UL (ref 1.1–3.7)
ABSOLUTE MONO #: 1.25 K/UL (ref 0.1–1.2)
ANION GAP SERPL CALCULATED.3IONS-SCNC: 10 MMOL/L (ref 9–17)
BASOPHILS # BLD: 0 % (ref 0–2)
BASOPHILS ABSOLUTE: 0.04 K/UL (ref 0–0.2)
BUN BLDV-MCNC: 12 MG/DL (ref 6–20)
BUN/CREAT BLD: ABNORMAL (ref 9–20)
CALCIUM SERPL-MCNC: 8.3 MG/DL (ref 8.6–10.4)
CHLORIDE BLD-SCNC: 102 MMOL/L (ref 98–107)
CO2: 24 MMOL/L (ref 20–31)
CREAT SERPL-MCNC: 0.84 MG/DL (ref 0.5–0.9)
DIFFERENTIAL TYPE: ABNORMAL
EOSINOPHILS RELATIVE PERCENT: 1 % (ref 1–4)
GFR AFRICAN AMERICAN: >60 ML/MIN
GFR NON-AFRICAN AMERICAN: >60 ML/MIN
GFR SERPL CREATININE-BSD FRML MDRD: ABNORMAL ML/MIN/{1.73_M2}
GFR SERPL CREATININE-BSD FRML MDRD: ABNORMAL ML/MIN/{1.73_M2}
GLUCOSE BLD-MCNC: 125 MG/DL (ref 70–99)
HCT VFR BLD CALC: 37.2 % (ref 36.3–47.1)
HEMOGLOBIN: 12.2 G/DL (ref 11.9–15.1)
IMMATURE GRANULOCYTES: 1 %
LYMPHOCYTES # BLD: 9 % (ref 24–43)
MCH RBC QN AUTO: 28.9 PG (ref 25.2–33.5)
MCHC RBC AUTO-ENTMCNC: 32.8 G/DL (ref 28.4–34.8)
MCV RBC AUTO: 88.2 FL (ref 82.6–102.9)
MONOCYTES # BLD: 10 % (ref 3–12)
NRBC AUTOMATED: 0 PER 100 WBC
PDW BLD-RTO: 14 % (ref 11.8–14.4)
PLATELET # BLD: 233 K/UL (ref 138–453)
PLATELET ESTIMATE: ABNORMAL
PMV BLD AUTO: 9.7 FL (ref 8.1–13.5)
POTASSIUM SERPL-SCNC: 4.2 MMOL/L (ref 3.7–5.3)
RBC # BLD: 4.22 M/UL (ref 3.95–5.11)
RBC # BLD: ABNORMAL 10*6/UL
SEG NEUTROPHILS: 79 % (ref 36–65)
SEGMENTED NEUTROPHILS ABSOLUTE COUNT: 9.82 K/UL (ref 1.5–8.1)
SODIUM BLD-SCNC: 136 MMOL/L (ref 135–144)
WBC # BLD: 12.4 K/UL (ref 3.5–11.3)
WBC # BLD: ABNORMAL 10*3/UL

## 2021-04-15 PROCEDURE — 1200000000 HC SEMI PRIVATE

## 2021-04-15 PROCEDURE — 94760 N-INVAS EAR/PLS OXIMETRY 1: CPT

## 2021-04-15 PROCEDURE — 85025 COMPLETE CBC W/AUTO DIFF WBC: CPT

## 2021-04-15 PROCEDURE — 6370000000 HC RX 637 (ALT 250 FOR IP): Performed by: STUDENT IN AN ORGANIZED HEALTH CARE EDUCATION/TRAINING PROGRAM

## 2021-04-15 PROCEDURE — 94664 DEMO&/EVAL PT USE INHALER: CPT

## 2021-04-15 PROCEDURE — 2500000003 HC RX 250 WO HCPCS: Performed by: STUDENT IN AN ORGANIZED HEALTH CARE EDUCATION/TRAINING PROGRAM

## 2021-04-15 PROCEDURE — 6360000002 HC RX W HCPCS: Performed by: STUDENT IN AN ORGANIZED HEALTH CARE EDUCATION/TRAINING PROGRAM

## 2021-04-15 PROCEDURE — 94640 AIRWAY INHALATION TREATMENT: CPT

## 2021-04-15 PROCEDURE — 2580000003 HC RX 258: Performed by: STUDENT IN AN ORGANIZED HEALTH CARE EDUCATION/TRAINING PROGRAM

## 2021-04-15 PROCEDURE — 36415 COLL VENOUS BLD VENIPUNCTURE: CPT

## 2021-04-15 PROCEDURE — 80048 BASIC METABOLIC PNL TOTAL CA: CPT

## 2021-04-15 RX ORDER — ACETAMINOPHEN 160 MG/5ML
650 SOLUTION ORAL EVERY 4 HOURS PRN
Status: DISCONTINUED | OUTPATIENT
Start: 2021-04-15 | End: 2021-04-15

## 2021-04-15 RX ADMIN — SERTRALINE 150 MG: 50 TABLET, FILM COATED ORAL at 08:57

## 2021-04-15 RX ADMIN — SODIUM CHLORIDE, POTASSIUM CHLORIDE, SODIUM LACTATE AND CALCIUM CHLORIDE: 600; 310; 30; 20 INJECTION, SOLUTION INTRAVENOUS at 16:23

## 2021-04-15 RX ADMIN — ACETAMINOPHEN 1000 MG: 500 TABLET ORAL at 20:17

## 2021-04-15 RX ADMIN — OXYCODONE HYDROCHLORIDE 5 MG: 5 TABLET ORAL at 08:58

## 2021-04-15 RX ADMIN — SODIUM CHLORIDE, POTASSIUM CHLORIDE, SODIUM LACTATE AND CALCIUM CHLORIDE: 600; 310; 30; 20 INJECTION, SOLUTION INTRAVENOUS at 06:21

## 2021-04-15 RX ADMIN — METRONIDAZOLE 500 MG: 500 INJECTION, SOLUTION INTRAVENOUS at 09:08

## 2021-04-15 RX ADMIN — CIPROFLOXACIN 400 MG: 2 INJECTION, SOLUTION INTRAVENOUS at 01:57

## 2021-04-15 RX ADMIN — ENOXAPARIN SODIUM 40 MG: 40 INJECTION SUBCUTANEOUS at 20:17

## 2021-04-15 RX ADMIN — METRONIDAZOLE 500 MG: 500 INJECTION, SOLUTION INTRAVENOUS at 15:37

## 2021-04-15 RX ADMIN — BUDESONIDE AND FORMOTEROL FUMARATE DIHYDRATE 2 PUFF: 160; 4.5 AEROSOL RESPIRATORY (INHALATION) at 07:41

## 2021-04-15 RX ADMIN — ENOXAPARIN SODIUM 40 MG: 40 INJECTION SUBCUTANEOUS at 08:56

## 2021-04-15 RX ADMIN — ACETAMINOPHEN 1000 MG: 500 TABLET ORAL at 11:01

## 2021-04-15 RX ADMIN — CIPROFLOXACIN 400 MG: 2 INJECTION, SOLUTION INTRAVENOUS at 13:53

## 2021-04-15 RX ADMIN — BUDESONIDE AND FORMOTEROL FUMARATE DIHYDRATE 2 PUFF: 160; 4.5 AEROSOL RESPIRATORY (INHALATION) at 20:08

## 2021-04-15 RX ADMIN — SODIUM CHLORIDE, POTASSIUM CHLORIDE, SODIUM LACTATE AND CALCIUM CHLORIDE: 600; 310; 30; 20 INJECTION, SOLUTION INTRAVENOUS at 23:27

## 2021-04-15 RX ADMIN — OXYCODONE HYDROCHLORIDE 5 MG: 5 TABLET ORAL at 12:59

## 2021-04-15 RX ADMIN — METRONIDAZOLE 500 MG: 500 INJECTION, SOLUTION INTRAVENOUS at 23:27

## 2021-04-15 ASSESSMENT — PAIN DESCRIPTION - PROGRESSION

## 2021-04-15 ASSESSMENT — PAIN SCALES - GENERAL
PAINLEVEL_OUTOF10: 3
PAINLEVEL_OUTOF10: 6
PAINLEVEL_OUTOF10: 6
PAINLEVEL_OUTOF10: 7
PAINLEVEL_OUTOF10: 7
PAINLEVEL_OUTOF10: 3

## 2021-04-15 ASSESSMENT — PAIN DESCRIPTION - PAIN TYPE: TYPE: ACUTE PAIN

## 2021-04-15 ASSESSMENT — PAIN DESCRIPTION - LOCATION: LOCATION: ABDOMEN

## 2021-04-15 NOTE — PROGRESS NOTES
General Surgery:  Daily Progress Note                    PATIENT NAME: Lisa Braga     TODAY'S DATE: 4/15/2021, 5:53 AM    SUBJECTIVE:     Pt seen and examined at bedside. No acute events overnight. Afebrile. Hemodynamically stable. Patient reports abdominal pain has improved overall from yesterday. Currently a 5/10. Denies any fever, chills, nausea, or vomiting. No bowel movement since admission, having flatus. No nausea or emesis. OBJECTIVE:   VITALS:  /75   Pulse 104   Temp 100 °F (37.8 °C) (Oral)   Resp 18   Ht 5' 5\" (1.651 m)   Wt (!) 328 lb (148.8 kg)   LMP 04/10/2021   SpO2 95%   BMI 54.58 kg/m²      INTAKE/OUTPUT:      Intake/Output Summary (Last 24 hours) at 4/15/2021 0553  Last data filed at 4/15/2021 0202  Gross per 24 hour   Intake 100 ml   Output 275 ml   Net -175 ml       PHYSICAL EXAM:  General Appearance:  awake, alert, oriented, in no acute distress  HEENT:  Normocephalic, atraumatic, mucus membranes moist   Skin:  Skin color, texture, turgor normal. No rashes or lesions. Lungs:  Normal effort. No respiratory distress. No accessory muscle use. Heart:  Regular rate and rhythm  Abdomen:  Soft, ND, mild right lower quadrant tenderness, nonperitoneal   Extremities: Extremities warm to touch, pink, with no edema.       Data:  CBC:   Lab Results   Component Value Date    WBC 16.5 04/14/2021    RBC 4.50 04/14/2021    RBC 4.02 05/14/2012    HGB 13.1 04/14/2021    HCT 40.3 04/14/2021    MCV 89.6 04/14/2021    MCH 29.1 04/14/2021    MCHC 32.5 04/14/2021    RDW 14.1 04/14/2021     04/14/2021     05/14/2012    MPV 10.1 04/14/2021     BMP:    Lab Results   Component Value Date     04/14/2021    K 4.3 04/14/2021     04/14/2021    CO2 22 04/14/2021    BUN 10 04/14/2021    LABALBU 3.7 04/14/2021    LABALBU 4.9 10/04/2011    CREATININE 0.73 04/14/2021    CALCIUM 9.2 04/14/2021    GFRAA >60 04/14/2021    LABGLOM >60 04/14/2021    GLUCOSE 139 04/14/2021    GLUCOSE 93 05/14/2012       Radiology Review:      CT Abdomen Pelvis:    1. Findings most compatible with a perforated cecal diverticulitis.  Fairly   normal appearing appendix courses through the area of inflammation. 2. Hepatic steatosis. Findings were discussed with Dr. Shawn Deshpande at 1:55 p.m. on 4/14/2021. ASSESSMENT:  Active Hospital Problems    Diagnosis Date Noted    Acute diverticulitis [K57.92] 04/14/2021       33 y.o. female with likely acute cecal/ascending colon diverticulitis. Plan:  1. Diet: CLD   2. Continue IVF  3. Continue IV cipro/flagyl  4. Serial abdominal exams  5. Patient's abdominal pain has improved overall. Will continue to treat non-operatively with IV abx. If she worsens, there is a possibility she could require emergent surgical intervention. At this time, no plans for surgical intervention this admission. Electronically signed by Heydi Pedraza  on 4/15/2021 at 5:53 AM     General Surgery Resident Statement/Note:  I have discussed the case, including pertinent history and exam findings with the above medical student. I have personally seen the patient. Pt seen and examined at bedside. I performed both history and physical exam.     Note was edited and changes made by me. Assessment and plan reviewed and changes made by me. I agree with the assessment and plan as stated above. Afebrile, VSS. Abdominal exam improving-focally tender RLQ. Will continue IV antibiotics and trial CLD.      Dalton Pedraza, DO  General Surgery PGY-2

## 2021-04-15 NOTE — PLAN OF CARE
Problem: Bowel/Gastric:  Goal: Control of bowel function will improve  Description: Control of bowel function will improve  4/15/2021 1412 by Vanda Nicole  Outcome: Ongoing  4/15/2021 0405 by Holli Vera RN  Outcome: Ongoing  Goal: Ability to achieve a regular elimination pattern will improve  Description: Ability to achieve a regular elimination pattern will improve  4/15/2021 1412 by Vanda Nicole  Outcome: Ongoing  4/15/2021 0405 by Holli Vera RN  Outcome: Ongoing  Goal: Bowel function will improve  Description: Bowel function will improve  4/15/2021 1412 by Vanda Nicole  Outcome: Ongoing  4/15/2021 0405 by Holli Vera RN  Outcome: Ongoing  Goal: Diagnostic test results will improve  Description: Diagnostic test results will improve  4/15/2021 1412 by Vanda Nicole  Outcome: Ongoing  4/15/2021 0405 by Holli Vera RN  Outcome: Ongoing  Goal: Occurrences of nausea will decrease  Description: Occurrences of nausea will decrease  4/15/2021 1412 by Vanda Nicole  Outcome: Ongoing  4/15/2021 0405 by Holli Vera RN  Outcome: Ongoing  Goal: Occurrences of vomiting will decrease  Description: Occurrences of vomiting will decrease  4/15/2021 1412 by Vanda Nicole  Outcome: Ongoing  4/15/2021 0405 by Holli Vera RN  Outcome: Ongoing     Problem: Nutritional:  Goal: Ability to follow a diet with enough fiber (20 to 30 grams) for normal bowel function will improve  Description: Ability to follow a diet with enough fiber (20 to 30 grams) for normal bowel function will improve  4/15/2021 1412 by Vanda Nicole  Outcome: Ongoing  4/15/2021 0405 by Holli Vera RN  Outcome: Ongoing     Problem: Skin Integrity:  Goal: Risk for impaired skin integrity will decrease  Description: Risk for impaired skin integrity will decrease  4/15/2021 1412 by Vanda Nicole  Outcome: Ongoing  4/15/2021 0405 by Holli Vera RN  Outcome: Ongoing     Problem:  Activity:  Goal: Risk for activity intolerance will decrease  Description: Risk for activity intolerance will decrease  4/15/2021 1412 by Dianne Duggan  Outcome: Ongoing  4/15/2021 0405 by Airam Anna RN  Outcome: Ongoing     Problem: Fluid Volume:  Goal: Maintenance of adequate hydration will improve  Description: Maintenance of adequate hydration will improve  4/15/2021 1412 by Dianne Duggan  Outcome: Ongoing  4/15/2021 0405 by Airam Anna RN  Outcome: Ongoing     Problem: Health Behavior:  Goal: Ability to state signs and symptoms to report to health care provider will improve  Description: Ability to state signs and symptoms to report to health care provider will improve  4/15/2021 1412 by Dianne Duggan  Outcome: Ongoing  4/15/2021 0405 by Airma Anna RN  Outcome: Ongoing     Problem: Physical Regulation:  Goal: Complications related to the disease process, condition or treatment will be avoided or minimized  Description: Complications related to the disease process, condition or treatment will be avoided or minimized  4/15/2021 1412 by Dianne Duggan  Outcome: Ongoing  4/15/2021 0405 by Airam Anna RN  Outcome: Ongoing  Goal: Ability to maintain clinical measurements within normal limits will improve  Description: Ability to maintain clinical measurements within normal limits will improve  4/15/2021 1412 by Dianne Duggan  Outcome: Ongoing  4/15/2021 0405 by Airam Anna RN  Outcome: Ongoing     Problem: Sensory:  Goal: Ability to identify factors that increase the pain will improve  Description: Ability to identify factors that increase the pain will improve  4/15/2021 1412 by Dianne Duggan  Outcome: Ongoing  4/15/2021 0405 by Airam Anna RN  Outcome: Ongoing  Goal: Ability to notify healthcare provider of pain before it becomes unmanageable or unbearable will improve  Description: Ability to notify healthcare provider of pain before it becomes unmanageable or unbearable will improve  4/15/2021 1412 by Dianne Duggan  Outcome: Ongoing  4/15/2021 0405 by Dontrell Villagran RN  Outcome: Ongoing  Goal: Pain level will decrease  Description: Pain level will decrease  4/15/2021 1412 by Yariel Bush  Outcome: Ongoing  4/15/2021 0405 by Dontrell Villagran RN  Outcome: Ongoing     Problem: Pain:  Goal: Pain level will decrease  Description: Pain level will decrease  4/15/2021 1412 by Yariel Bush  Outcome: Ongoing  4/15/2021 0405 by Dontrell Villagran RN  Outcome: Ongoing  Goal: Control of acute pain  Description: Control of acute pain  Outcome: Ongoing  Goal: Control of chronic pain  Description: Control of chronic pain  Outcome: Ongoing

## 2021-04-15 NOTE — PLAN OF CARE
Problem: Bowel/Gastric:  Goal: Control of bowel function will improve  Description: Control of bowel function will improve  Outcome: Ongoing  Goal: Ability to achieve a regular elimination pattern will improve  Description: Ability to achieve a regular elimination pattern will improve  Outcome: Ongoing  Goal: Bowel function will improve  Description: Bowel function will improve  Outcome: Ongoing  Goal: Diagnostic test results will improve  Description: Diagnostic test results will improve  Outcome: Ongoing  Goal: Occurrences of nausea will decrease  Description: Occurrences of nausea will decrease  Outcome: Ongoing  Goal: Occurrences of vomiting will decrease  Description: Occurrences of vomiting will decrease  Outcome: Ongoing     Problem: Nutritional:  Goal: Ability to follow a diet with enough fiber (20 to 30 grams) for normal bowel function will improve  Description: Ability to follow a diet with enough fiber (20 to 30 grams) for normal bowel function will improve  Outcome: Ongoing     Problem: Skin Integrity:  Goal: Risk for impaired skin integrity will decrease  Description: Risk for impaired skin integrity will decrease  Outcome: Ongoing     Problem:  Activity:  Goal: Risk for activity intolerance will decrease  Description: Risk for activity intolerance will decrease  Outcome: Ongoing     Problem: Fluid Volume:  Goal: Maintenance of adequate hydration will improve  Description: Maintenance of adequate hydration will improve  Outcome: Ongoing     Problem: Health Behavior:  Goal: Ability to state signs and symptoms to report to health care provider will improve  Description: Ability to state signs and symptoms to report to health care provider will improve  Outcome: Ongoing     Problem: Sensory:  Goal: Ability to identify factors that increase the pain will improve  Description: Ability to identify factors that increase the pain will improve  Outcome: Ongoing  Goal: Ability to notify healthcare provider of pain before it becomes unmanageable or unbearable will improve  Description: Ability to notify healthcare provider of pain before it becomes unmanageable or unbearable will improve  Outcome: Ongoing  Goal: Pain level will decrease  Description: Pain level will decrease  Outcome: Ongoing

## 2021-04-16 LAB
ABSOLUTE EOS #: 0.07 K/UL (ref 0–0.44)
ABSOLUTE IMMATURE GRANULOCYTE: 0.04 K/UL (ref 0–0.3)
ABSOLUTE LYMPH #: 1.08 K/UL (ref 1.1–3.7)
ABSOLUTE MONO #: 0.72 K/UL (ref 0.1–1.2)
ANION GAP SERPL CALCULATED.3IONS-SCNC: 9 MMOL/L (ref 9–17)
BASOPHILS # BLD: 0 % (ref 0–2)
BASOPHILS ABSOLUTE: <0.03 K/UL (ref 0–0.2)
BUN BLDV-MCNC: 6 MG/DL (ref 6–20)
BUN/CREAT BLD: ABNORMAL (ref 9–20)
CALCIUM SERPL-MCNC: 8.1 MG/DL (ref 8.6–10.4)
CHLORIDE BLD-SCNC: 104 MMOL/L (ref 98–107)
CO2: 21 MMOL/L (ref 20–31)
CREAT SERPL-MCNC: 0.69 MG/DL (ref 0.5–0.9)
DIFFERENTIAL TYPE: ABNORMAL
EOSINOPHILS RELATIVE PERCENT: 1 % (ref 1–4)
GFR AFRICAN AMERICAN: >60 ML/MIN
GFR NON-AFRICAN AMERICAN: >60 ML/MIN
GFR SERPL CREATININE-BSD FRML MDRD: ABNORMAL ML/MIN/{1.73_M2}
GFR SERPL CREATININE-BSD FRML MDRD: ABNORMAL ML/MIN/{1.73_M2}
GLUCOSE BLD-MCNC: 126 MG/DL (ref 70–99)
HCT VFR BLD CALC: 34.9 % (ref 36.3–47.1)
HEMOGLOBIN: 10.3 G/DL (ref 11.9–15.1)
IMMATURE GRANULOCYTES: 1 %
LYMPHOCYTES # BLD: 15 % (ref 24–43)
MCH RBC QN AUTO: 28.9 PG (ref 25.2–33.5)
MCHC RBC AUTO-ENTMCNC: 29.5 G/DL (ref 28.4–34.8)
MCV RBC AUTO: 98 FL (ref 82.6–102.9)
MONOCYTES # BLD: 10 % (ref 3–12)
NRBC AUTOMATED: 0 PER 100 WBC
PDW BLD-RTO: 13.7 % (ref 11.8–14.4)
PLATELET # BLD: 275 K/UL (ref 138–453)
PLATELET ESTIMATE: ABNORMAL
PMV BLD AUTO: 9.8 FL (ref 8.1–13.5)
POTASSIUM SERPL-SCNC: 3.7 MMOL/L (ref 3.7–5.3)
RBC # BLD: 3.56 M/UL (ref 3.95–5.11)
RBC # BLD: ABNORMAL 10*6/UL
SEG NEUTROPHILS: 73 % (ref 36–65)
SEGMENTED NEUTROPHILS ABSOLUTE COUNT: 5.16 K/UL (ref 1.5–8.1)
SODIUM BLD-SCNC: 134 MMOL/L (ref 135–144)
WBC # BLD: 7.1 K/UL (ref 3.5–11.3)
WBC # BLD: ABNORMAL 10*3/UL

## 2021-04-16 PROCEDURE — 6370000000 HC RX 637 (ALT 250 FOR IP): Performed by: STUDENT IN AN ORGANIZED HEALTH CARE EDUCATION/TRAINING PROGRAM

## 2021-04-16 PROCEDURE — 85025 COMPLETE CBC W/AUTO DIFF WBC: CPT

## 2021-04-16 PROCEDURE — 2580000003 HC RX 258: Performed by: SURGERY

## 2021-04-16 PROCEDURE — 6360000002 HC RX W HCPCS: Performed by: STUDENT IN AN ORGANIZED HEALTH CARE EDUCATION/TRAINING PROGRAM

## 2021-04-16 PROCEDURE — 1200000000 HC SEMI PRIVATE

## 2021-04-16 PROCEDURE — 94640 AIRWAY INHALATION TREATMENT: CPT

## 2021-04-16 PROCEDURE — 2500000003 HC RX 250 WO HCPCS: Performed by: STUDENT IN AN ORGANIZED HEALTH CARE EDUCATION/TRAINING PROGRAM

## 2021-04-16 PROCEDURE — 94760 N-INVAS EAR/PLS OXIMETRY 1: CPT

## 2021-04-16 PROCEDURE — 36415 COLL VENOUS BLD VENIPUNCTURE: CPT

## 2021-04-16 PROCEDURE — 80048 BASIC METABOLIC PNL TOTAL CA: CPT

## 2021-04-16 RX ADMIN — TRAZODONE HYDROCHLORIDE 50 MG: 50 TABLET ORAL at 22:27

## 2021-04-16 RX ADMIN — SODIUM CHLORIDE, POTASSIUM CHLORIDE, SODIUM LACTATE AND CALCIUM CHLORIDE: 600; 310; 30; 20 INJECTION, SOLUTION INTRAVENOUS at 23:39

## 2021-04-16 RX ADMIN — BUDESONIDE AND FORMOTEROL FUMARATE DIHYDRATE 2 PUFF: 160; 4.5 AEROSOL RESPIRATORY (INHALATION) at 20:23

## 2021-04-16 RX ADMIN — CIPROFLOXACIN 400 MG: 2 INJECTION, SOLUTION INTRAVENOUS at 01:54

## 2021-04-16 RX ADMIN — ACETAMINOPHEN 1000 MG: 500 TABLET ORAL at 08:51

## 2021-04-16 RX ADMIN — METRONIDAZOLE 500 MG: 500 INJECTION, SOLUTION INTRAVENOUS at 15:22

## 2021-04-16 RX ADMIN — SERTRALINE 150 MG: 50 TABLET, FILM COATED ORAL at 08:51

## 2021-04-16 RX ADMIN — BUDESONIDE AND FORMOTEROL FUMARATE DIHYDRATE 2 PUFF: 160; 4.5 AEROSOL RESPIRATORY (INHALATION) at 08:47

## 2021-04-16 RX ADMIN — ENOXAPARIN SODIUM 40 MG: 40 INJECTION SUBCUTANEOUS at 22:27

## 2021-04-16 RX ADMIN — ACETAMINOPHEN 1000 MG: 500 TABLET ORAL at 20:22

## 2021-04-16 RX ADMIN — METRONIDAZOLE 500 MG: 500 INJECTION, SOLUTION INTRAVENOUS at 08:42

## 2021-04-16 RX ADMIN — ENOXAPARIN SODIUM 40 MG: 40 INJECTION SUBCUTANEOUS at 08:36

## 2021-04-16 RX ADMIN — METRONIDAZOLE 500 MG: 500 INJECTION, SOLUTION INTRAVENOUS at 23:39

## 2021-04-16 RX ADMIN — CIPROFLOXACIN 400 MG: 2 INJECTION, SOLUTION INTRAVENOUS at 14:07

## 2021-04-16 RX ADMIN — SODIUM CHLORIDE, POTASSIUM CHLORIDE, SODIUM LACTATE AND CALCIUM CHLORIDE: 600; 310; 30; 20 INJECTION, SOLUTION INTRAVENOUS at 10:40

## 2021-04-16 ASSESSMENT — PAIN DESCRIPTION - PAIN TYPE
TYPE: ACUTE PAIN
TYPE: ACUTE PAIN

## 2021-04-16 ASSESSMENT — PAIN DESCRIPTION - DESCRIPTORS: DESCRIPTORS: CRAMPING

## 2021-04-16 ASSESSMENT — PAIN SCALES - GENERAL
PAINLEVEL_OUTOF10: 2
PAINLEVEL_OUTOF10: 2

## 2021-04-16 ASSESSMENT — PAIN DESCRIPTION - PROGRESSION
CLINICAL_PROGRESSION: GRADUALLY IMPROVING

## 2021-04-16 ASSESSMENT — PAIN DESCRIPTION - LOCATION: LOCATION: ABDOMEN

## 2021-04-16 ASSESSMENT — PAIN - FUNCTIONAL ASSESSMENT: PAIN_FUNCTIONAL_ASSESSMENT: PREVENTS OR INTERFERES SOME ACTIVE ACTIVITIES AND ADLS

## 2021-04-16 NOTE — PROGRESS NOTES
General Surgery:  Daily Progress Note                   PATIENT NAME: Moreno Atkins     TODAY'S DATE: 4/16/2021, 5:44 AM    SUBJECTIVE:     Pt seen and examined at bedside. Spiked fevers of 100.4F and 102F yesterday. Tachycardic in the 110-112 range, normotensive. Patient reports she now only has abdominal pain when she moves around but nothing at rest. Denies nausea or vomiting. Three loose stools yesterday, having flatus. Tolerating clear liquid diet. OBJECTIVE:   VITALS:  /72   Pulse 111   Temp 100.4 °F (38 °C) (Oral)   Resp 18   Ht 5' 5\" (1.651 m)   Wt (!) 342 lb 9.6 oz (155.4 kg)   LMP 04/10/2021   SpO2 95%   BMI 57.01 kg/m²      INTAKE/OUTPUT:      Intake/Output Summary (Last 24 hours) at 4/16/2021 0544  Last data filed at 4/16/2021 0528  Gross per 24 hour   Intake 5092.34 ml   Output 1650 ml   Net 3442.34 ml       PHYSICAL EXAM:  General Appearance:  awake, alert, oriented, in no acute distress  HEENT:  Normocephalic, atraumatic, mucus membranes moist   Skin:  Skin color, texture, turgor normal. No rashes or lesions. Lungs:  Normal effort. No respiratory distress. No accessory muscle use. Heart:  Tachycardia in 110-112 range. Regular rhythm. Abdomen:  Soft, ND, mild RLQ tenderness, nonperitoneal  Extremities: Extremities warm to touch, pink, with no edema.         Data:  CBC:   Lab Results   Component Value Date    WBC 12.4 04/15/2021    RBC 4.22 04/15/2021    RBC 4.02 05/14/2012    HGB 12.2 04/15/2021    HCT 37.2 04/15/2021    MCV 88.2 04/15/2021    MCH 28.9 04/15/2021    MCHC 32.8 04/15/2021    RDW 14.0 04/15/2021     04/15/2021     05/14/2012    MPV 9.7 04/15/2021     BMP:    Lab Results   Component Value Date     04/15/2021    K 4.2 04/15/2021     04/15/2021    CO2 24 04/15/2021    BUN 12 04/15/2021    LABALBU 3.7 04/14/2021    LABALBU 4.9 10/04/2011    CREATININE 0.84 04/15/2021    CALCIUM 8.3 04/15/2021    GFRAA >60 04/15/2021    LABGLOM >60 04/15/2021    GLUCOSE 125 04/15/2021    GLUCOSE 93 05/14/2012       Radiology Review:     CT Abdomen Pelvis:     1. Findings most compatible with a perforated cecal diverticulitis.  Fairly   normal appearing appendix courses through the area of inflammation. 2. Hepatic steatosis. Findings were discussed with Dr. Jan Reed at 1:55 p.m. on 4/14/2021. ASSESSMENT:  Active Hospital Problems    Diagnosis Date Noted    Acute diverticulitis [K57.92] 04/14/2021       33 y.o. female with likely acute cecal/ascending colon diverticulitis. Plan:  1. Diet: Transition to FLD  2. Continue IVF  3. Continue IV cipro/flagyl  4. Patient's abdominal pain continues to improve. She would benefit from having another day of IVF and IV abx. If she worsens, there is a possibility she could require emergent surgical intervention. At this time, no plans for surgical intervention this admission. Electronically signed by Tristan Rawls  on 4/16/2021 at 5:44 AM     General Surgery Resident Statement/Note:  I have discussed the case, including pertinent history and exam findings with the above medical student. I have personally seen the patient. Pt seen and examined at bedside. I performed both history and physical exam.     Note was edited and changes made by me. Assessment and plan reviewed and changes made by me. I agree with the assessment and plan as stated above. Tmax 102.4F yesterday with some tachycardia 100-110. Patient states her pain is improving and she is tolerating clears. +several loose stools yesterday. Abdomen is soft with minimal RLQ tenderness (improving)    Agree with plan above. Will increase to FLD. Continue IV abx. Will follow up morning labs.        Shelly Bae, DO  General Surgery PGY-2

## 2021-04-16 NOTE — PLAN OF CARE
Problem: Nutritional:  Goal: Ability to follow a diet with enough fiber (20 to 30 grams) for normal bowel function will improve  Description: Ability to follow a diet with enough fiber (20 to 30 grams) for normal bowel function will improve  4/16/2021 0406 by Edgar Prakash RN  Outcome: Ongoing  4/15/2021 1412 by Ajit Chandler  Outcome: Ongoing     Problem: Skin Integrity:  Goal: Risk for impaired skin integrity will decrease  Description: Risk for impaired skin integrity will decrease  4/16/2021 0406 by Edgar Prakash RN  Outcome: Ongoing  4/15/2021 1412 by Ajit Chandler  Outcome: Ongoing     Problem:  Activity:  Goal: Risk for activity intolerance will decrease  Description: Risk for activity intolerance will decrease  4/16/2021 0406 by Edgar Prakash RN  Outcome: Ongoing  4/15/2021 1412 by Ajit Chandler  Outcome: Ongoing     Problem: Fluid Volume:  Goal: Maintenance of adequate hydration will improve  Description: Maintenance of adequate hydration will improve  4/16/2021 0406 by Edgar Prakash RN  Outcome: Ongoing  4/15/2021 1412 by Ajit Chandler  Outcome: Ongoing     Problem: Health Behavior:  Goal: Ability to state signs and symptoms to report to health care provider will improve  Description: Ability to state signs and symptoms to report to health care provider will improve  4/16/2021 0406 by Edgar Prakash RN  Outcome: Ongoing  4/15/2021 1412 by Ajit Chandler  Outcome: Ongoing     Problem: Physical Regulation:  Goal: Complications related to the disease process, condition or treatment will be avoided or minimized  Description: Complications related to the disease process, condition or treatment will be avoided or minimized  4/16/2021 0406 by Edgar Prakash RN  Outcome: Ongoing  4/15/2021 1412 by Ajit Chandler  Outcome: Ongoing  Goal: Ability to maintain clinical measurements within normal limits will improve  Description: Ability to maintain clinical measurements within normal limits will improve  4/16/2021 0406 by Ashli Yao RN  Outcome: Ongoing  4/15/2021 1412 by Vikram Solorzano  Outcome: Ongoing

## 2021-04-17 VITALS
RESPIRATION RATE: 18 BRPM | HEIGHT: 65 IN | WEIGHT: 293 LBS | TEMPERATURE: 97.6 F | BODY MASS INDEX: 48.82 KG/M2 | SYSTOLIC BLOOD PRESSURE: 124 MMHG | DIASTOLIC BLOOD PRESSURE: 81 MMHG | OXYGEN SATURATION: 94 % | HEART RATE: 79 BPM

## 2021-04-17 PROBLEM — K57.32 DIVERTICULITIS OF CECUM: Status: ACTIVE | Noted: 2021-04-17

## 2021-04-17 LAB
ABSOLUTE EOS #: 0.29 K/UL (ref 0–0.44)
ABSOLUTE IMMATURE GRANULOCYTE: <0.03 K/UL (ref 0–0.3)
ABSOLUTE LYMPH #: 1.18 K/UL (ref 1.1–3.7)
ABSOLUTE MONO #: 0.79 K/UL (ref 0.1–1.2)
ANION GAP SERPL CALCULATED.3IONS-SCNC: 8 MMOL/L (ref 9–17)
BASOPHILS # BLD: 0 % (ref 0–2)
BASOPHILS ABSOLUTE: <0.03 K/UL (ref 0–0.2)
BUN BLDV-MCNC: 6 MG/DL (ref 6–20)
BUN/CREAT BLD: ABNORMAL (ref 9–20)
CALCIUM SERPL-MCNC: 8.4 MG/DL (ref 8.6–10.4)
CHLORIDE BLD-SCNC: 106 MMOL/L (ref 98–107)
CO2: 24 MMOL/L (ref 20–31)
CREAT SERPL-MCNC: 0.63 MG/DL (ref 0.5–0.9)
DIFFERENTIAL TYPE: ABNORMAL
EOSINOPHILS RELATIVE PERCENT: 4 % (ref 1–4)
GFR AFRICAN AMERICAN: >60 ML/MIN
GFR NON-AFRICAN AMERICAN: >60 ML/MIN
GFR SERPL CREATININE-BSD FRML MDRD: ABNORMAL ML/MIN/{1.73_M2}
GFR SERPL CREATININE-BSD FRML MDRD: ABNORMAL ML/MIN/{1.73_M2}
GLUCOSE BLD-MCNC: 124 MG/DL (ref 70–99)
HCT VFR BLD CALC: 33.7 % (ref 36.3–47.1)
HEMOGLOBIN: 10.3 G/DL (ref 11.9–15.1)
IMMATURE GRANULOCYTES: 0 %
LYMPHOCYTES # BLD: 18 % (ref 24–43)
MCH RBC QN AUTO: 28.5 PG (ref 25.2–33.5)
MCHC RBC AUTO-ENTMCNC: 30.6 G/DL (ref 28.4–34.8)
MCV RBC AUTO: 93.4 FL (ref 82.6–102.9)
MONOCYTES # BLD: 12 % (ref 3–12)
NRBC AUTOMATED: 0 PER 100 WBC
PDW BLD-RTO: 13.6 % (ref 11.8–14.4)
PLATELET # BLD: 324 K/UL (ref 138–453)
PLATELET ESTIMATE: ABNORMAL
PMV BLD AUTO: 9.8 FL (ref 8.1–13.5)
POTASSIUM SERPL-SCNC: 3.8 MMOL/L (ref 3.7–5.3)
RBC # BLD: 3.61 M/UL (ref 3.95–5.11)
RBC # BLD: ABNORMAL 10*6/UL
SEG NEUTROPHILS: 66 % (ref 36–65)
SEGMENTED NEUTROPHILS ABSOLUTE COUNT: 4.41 K/UL (ref 1.5–8.1)
SODIUM BLD-SCNC: 138 MMOL/L (ref 135–144)
WBC # BLD: 6.7 K/UL (ref 3.5–11.3)
WBC # BLD: ABNORMAL 10*3/UL

## 2021-04-17 PROCEDURE — 6360000002 HC RX W HCPCS: Performed by: STUDENT IN AN ORGANIZED HEALTH CARE EDUCATION/TRAINING PROGRAM

## 2021-04-17 PROCEDURE — 2500000003 HC RX 250 WO HCPCS: Performed by: STUDENT IN AN ORGANIZED HEALTH CARE EDUCATION/TRAINING PROGRAM

## 2021-04-17 PROCEDURE — 6370000000 HC RX 637 (ALT 250 FOR IP): Performed by: STUDENT IN AN ORGANIZED HEALTH CARE EDUCATION/TRAINING PROGRAM

## 2021-04-17 PROCEDURE — 85025 COMPLETE CBC W/AUTO DIFF WBC: CPT

## 2021-04-17 PROCEDURE — 80048 BASIC METABOLIC PNL TOTAL CA: CPT

## 2021-04-17 PROCEDURE — 94640 AIRWAY INHALATION TREATMENT: CPT

## 2021-04-17 PROCEDURE — 36415 COLL VENOUS BLD VENIPUNCTURE: CPT

## 2021-04-17 RX ORDER — CIPROFLOXACIN 500 MG/1
500 TABLET, FILM COATED ORAL EVERY 12 HOURS SCHEDULED
Status: DISCONTINUED | OUTPATIENT
Start: 2021-04-17 | End: 2021-04-17

## 2021-04-17 RX ORDER — CIPROFLOXACIN 500 MG/1
500 TABLET, FILM COATED ORAL EVERY 12 HOURS SCHEDULED
Qty: 14 TABLET | Refills: 0 | Status: SHIPPED | OUTPATIENT
Start: 2021-04-17 | End: 2021-04-24

## 2021-04-17 RX ORDER — OXYCODONE HYDROCHLORIDE 5 MG/1
5 TABLET ORAL EVERY 6 HOURS PRN
Status: DISCONTINUED | OUTPATIENT
Start: 2021-04-17 | End: 2021-04-17 | Stop reason: HOSPADM

## 2021-04-17 RX ORDER — OXYCODONE HYDROCHLORIDE 5 MG/1
5 TABLET ORAL EVERY 6 HOURS PRN
Qty: 16 TABLET | Refills: 0 | Status: SHIPPED | OUTPATIENT
Start: 2021-04-17 | End: 2021-04-21

## 2021-04-17 RX ORDER — METRONIDAZOLE 500 MG/1
500 TABLET ORAL EVERY 8 HOURS SCHEDULED
Status: DISCONTINUED | OUTPATIENT
Start: 2021-04-17 | End: 2021-04-17 | Stop reason: HOSPADM

## 2021-04-17 RX ORDER — METRONIDAZOLE 500 MG/1
500 TABLET ORAL EVERY 8 HOURS SCHEDULED
Status: DISCONTINUED | OUTPATIENT
Start: 2021-04-17 | End: 2021-04-17

## 2021-04-17 RX ORDER — ACETAMINOPHEN 500 MG
1000 TABLET ORAL EVERY 8 HOURS
Qty: 30 TABLET | Refills: 0 | Status: SHIPPED | OUTPATIENT
Start: 2021-04-17 | End: 2021-08-12 | Stop reason: SDUPTHER

## 2021-04-17 RX ORDER — METRONIDAZOLE 500 MG/1
500 TABLET ORAL EVERY 8 HOURS SCHEDULED
Qty: 21 TABLET | Refills: 0 | Status: SHIPPED | OUTPATIENT
Start: 2021-04-17 | End: 2021-04-24

## 2021-04-17 RX ORDER — CIPROFLOXACIN 500 MG/1
500 TABLET, FILM COATED ORAL EVERY 12 HOURS SCHEDULED
Status: DISCONTINUED | OUTPATIENT
Start: 2021-04-17 | End: 2021-04-17 | Stop reason: HOSPADM

## 2021-04-17 RX ADMIN — METRONIDAZOLE 500 MG: 500 TABLET, FILM COATED ORAL at 16:45

## 2021-04-17 RX ADMIN — SERTRALINE 150 MG: 50 TABLET, FILM COATED ORAL at 08:42

## 2021-04-17 RX ADMIN — BUDESONIDE AND FORMOTEROL FUMARATE DIHYDRATE 2 PUFF: 160; 4.5 AEROSOL RESPIRATORY (INHALATION) at 08:50

## 2021-04-17 RX ADMIN — CIPROFLOXACIN 500 MG: 500 TABLET, FILM COATED ORAL at 13:18

## 2021-04-17 RX ADMIN — CIPROFLOXACIN 400 MG: 2 INJECTION, SOLUTION INTRAVENOUS at 01:55

## 2021-04-17 RX ADMIN — METRONIDAZOLE 500 MG: 500 INJECTION, SOLUTION INTRAVENOUS at 08:42

## 2021-04-17 RX ADMIN — ENOXAPARIN SODIUM 40 MG: 40 INJECTION SUBCUTANEOUS at 08:42

## 2021-04-17 ASSESSMENT — PAIN DESCRIPTION - PROGRESSION
CLINICAL_PROGRESSION: GRADUALLY IMPROVING

## 2021-04-17 NOTE — PLAN OF CARE
Problem:  Bowel/Gastric:  Goal: Control of bowel function will improve  Description: Control of bowel function will improve  4/17/2021 1719 by Suraj Leblanc RN  Outcome: Completed  4/17/2021 0620 by Airam Anna RN  Outcome: Ongoing  Goal: Ability to achieve a regular elimination pattern will improve  Description: Ability to achieve a regular elimination pattern will improve  4/17/2021 1719 by Suraj Leblanc RN  Outcome: Completed  4/17/2021 0620 by Airam Anna RN  Outcome: Ongoing  Goal: Bowel function will improve  Description: Bowel function will improve  4/17/2021 1719 by Suraj Leblanc RN  Outcome: Completed  4/17/2021 0620 by Airam Anna RN  Outcome: Ongoing  Goal: Diagnostic test results will improve  Description: Diagnostic test results will improve  4/17/2021 1719 by Suraj Leblanc RN  Outcome: Completed  4/17/2021 0620 by Airam Anna RN  Outcome: Ongoing  Goal: Occurrences of nausea will decrease  Description: Occurrences of nausea will decrease  4/17/2021 1719 by Suraj Leblanc RN  Outcome: Completed  4/17/2021 0620 by Airam Anna RN  Outcome: Ongoing  Goal: Occurrences of vomiting will decrease  Description: Occurrences of vomiting will decrease  4/17/2021 1719 by Suraj Leblanc RN  Outcome: Completed  4/17/2021 0620 by Airam Anna RN  Outcome: Ongoing

## 2021-04-17 NOTE — PROGRESS NOTES
Discussed with the patient and all questioned fully answered. She will call me if any problems arise.  Pt educated on discharge information regarding new medications, and encouraged to make appointment with Dr. Maryellen Real within a week

## 2021-04-17 NOTE — PROGRESS NOTES
General Surgery:  Daily Progress Note                 PATIENT NAME: Frida Andersen     TODAY'S DATE: 4/17/2021  SUBJECTIVE:     Pt seen and examined at bedside. Afebrile, VSS. Abdominal pain improving and minimal. Denies nausea and emesis. Tolerated a full liquid diet yesterday. Leukocytosis resolved. OBJECTIVE:   VITALS:  /68   Pulse 90   Temp 98.9 °F (37.2 °C) (Oral)   Resp 16   Ht 5' 5\" (1.651 m)   Wt (!) 342 lb 9.6 oz (155.4 kg)   LMP 04/10/2021   SpO2 96%   BMI 57.01 kg/m²      INTAKE/OUTPUT:      Intake/Output Summary (Last 24 hours) at 4/17/2021 8424  Last data filed at 4/16/2021 2345  Gross per 24 hour   Intake 600 ml   Output 1750 ml   Net -1150 ml       PHYSICAL EXAM:  General Appearance:  awake, alert, oriented, in no acute distress  HEENT:  Normocephalic, atraumatic, mucus membranes moist   Skin:  Skin color, texture, turgor normal. No rashes or lesions. Lungs:  Normal effort. No respiratory distress. No accessory muscle use. Heart:  Regular rate and rhythm   Abdomen:  Soft, ND, minimal RLQ tenderness, nonperitoneal  Extremities: Extremities warm to touch, pink, with no edema. Data:  CBC:   Lab Results   Component Value Date    WBC 7.1 04/16/2021    RBC 3.56 04/16/2021    RBC 4.02 05/14/2012    HGB 10.3 04/16/2021    HCT 34.9 04/16/2021    MCV 98.0 04/16/2021    MCH 28.9 04/16/2021    MCHC 29.5 04/16/2021    RDW 13.7 04/16/2021     04/16/2021     05/14/2012    MPV 9.8 04/16/2021     BMP:    Lab Results   Component Value Date     04/16/2021    K 3.7 04/16/2021     04/16/2021    CO2 21 04/16/2021    BUN 6 04/16/2021    LABALBU 3.7 04/14/2021    LABALBU 4.9 10/04/2011    CREATININE 0.69 04/16/2021    CALCIUM 8.1 04/16/2021    GFRAA >60 04/16/2021    LABGLOM >60 04/16/2021    GLUCOSE 126 04/16/2021    GLUCOSE 93 05/14/2012       Radiology Review:     CT Abdomen Pelvis:     1.  Findings most compatible with a perforated cecal diverticulitis. Amanda Aus normal appearing appendix courses through the area of inflammation. 2. Hepatic steatosis. Findings were discussed with Dr. Telly John at 1:55 p.m. on 4/14/2021. ASSESSMENT:  Active Hospital Problems    Diagnosis Date Noted    Acute diverticulitis [K57.92] 04/14/2021       33 y.o. female with likely acute cecal/ascending colon diverticulitis. Plan:  1. Diet: Advance to general diet, dc IVF  2. Transition to PO abx for a total of 10 day course of antibiotics   3. Patient's abdominal pain continues to improve. Likely discharge within the next 24 hours if continues to progress. She will have close follow up with Dr. Yandel Walls in 1 week.       Willi De La Fuente, DO  General Surgery PGY-2

## 2021-04-17 NOTE — FLOWSHEET NOTE
Assessment: The patient was calm and approachable. Intervention:  engaged in active listening.  asked if they would like prayer and informed them chaplains are available 24/7. Outcome: They engaged in the conversation. Chaplains will remain available to offer spiritual and emotional support as needed. 04/17/21 1333   Encounter Summary   Services provided to: Patient   Referral/Consult From: [facility]   (04/17/21)   Complexity of Encounter Moderate   Length of Encounter 15 minutes   Spiritual Assessment Completed Yes   Routine   Type Initial   Assessment Calm; Approachable   Intervention Active listening   Outcome Engaged in conversation

## 2021-04-19 NOTE — DISCHARGE SUMMARY
symptoms improve. aspirin-acetaminophen-caffeine (EXCEDRIN MIGRAINE) 250-250-65 MG per tablet  Take 1 tablet by mouth every 6 hours as needed for Headaches             budesonide-formoterol (SYMBICORT) 160-4.5 MCG/ACT AERO  Inhale 2 puffs into the lungs 2 times daily             cariprazine hcl (VRAYLAR) 3 MG CAPS capsule  Take 1 capsule by mouth daily             ciprofloxacin (CIPRO) 500 MG tablet  Take 1 tablet by mouth every 12 hours for 7 days             metroNIDAZOLE (FLAGYL) 500 MG tablet  Take 1 tablet by mouth every 8 hours for 7 days             oxyCODONE (ROXICODONE) 5 MG immediate release tablet  Take 1 tablet by mouth every 6 hours as needed for Pain for up to 4 days. sertraline (ZOLOFT) 50 MG tablet  Take 3 tablets by mouth daily             traZODone (DESYREL) 50 MG tablet  Take 1 tablet by mouth nightly as needed for Sleep                  HPI and Hospital Course:   33 y.o. female presented on 4/14/2021 for right lower quadrant abdominal pain. She was found to have cecal diverticulitis on CT scan. She was treated with IV antibiotics cipro/flagyl and her abdominal exam improved throughout her admission. She was tolerating a regular diet and abdominal pain had resolved at time of discharge. She was transitioned to oral antibiotics and will follow up with Dr. Lorri Carreon in 1-2 weeks.      Electronically signed by Bisi Chino DO

## 2021-08-12 ENCOUNTER — OFFICE VISIT (OUTPATIENT)
Dept: FAMILY MEDICINE CLINIC | Age: 31
End: 2021-08-12
Payer: COMMERCIAL

## 2021-08-12 VITALS
WEIGHT: 293 LBS | SYSTOLIC BLOOD PRESSURE: 129 MMHG | DIASTOLIC BLOOD PRESSURE: 85 MMHG | BODY MASS INDEX: 58.91 KG/M2 | HEART RATE: 129 BPM

## 2021-08-12 DIAGNOSIS — R73.03 PREDIABETES: ICD-10-CM

## 2021-08-12 DIAGNOSIS — J45.20 MILD INTERMITTENT ASTHMA IN ADULT WITHOUT COMPLICATION: Primary | ICD-10-CM

## 2021-08-12 DIAGNOSIS — Z11.59 NEED FOR HEPATITIS C SCREENING TEST: ICD-10-CM

## 2021-08-12 DIAGNOSIS — G89.29 CHRONIC MIDLINE LOW BACK PAIN WITHOUT SCIATICA: ICD-10-CM

## 2021-08-12 DIAGNOSIS — M54.50 CHRONIC MIDLINE LOW BACK PAIN WITHOUT SCIATICA: ICD-10-CM

## 2021-08-12 DIAGNOSIS — D50.0 IRON DEFICIENCY ANEMIA DUE TO CHRONIC BLOOD LOSS: ICD-10-CM

## 2021-08-12 DIAGNOSIS — Z13.220 SCREENING FOR HYPERLIPIDEMIA: ICD-10-CM

## 2021-08-12 PROCEDURE — 99203 OFFICE O/P NEW LOW 30 MIN: CPT | Performed by: STUDENT IN AN ORGANIZED HEALTH CARE EDUCATION/TRAINING PROGRAM

## 2021-08-12 RX ORDER — ACETAMINOPHEN 500 MG
1000 TABLET ORAL EVERY 8 HOURS
Qty: 30 TABLET | Refills: 0 | Status: SHIPPED | OUTPATIENT
Start: 2021-08-12 | End: 2022-03-17

## 2021-08-12 RX ORDER — ALBUTEROL SULFATE 90 UG/1
2 AEROSOL, METERED RESPIRATORY (INHALATION) EVERY 6 HOURS PRN
Qty: 2 INHALER | Refills: 0 | Status: SHIPPED | OUTPATIENT
Start: 2021-08-12 | End: 2021-12-20 | Stop reason: SDUPTHER

## 2021-08-12 RX ORDER — BUDESONIDE AND FORMOTEROL FUMARATE DIHYDRATE 160; 4.5 UG/1; UG/1
2 AEROSOL RESPIRATORY (INHALATION) 2 TIMES DAILY
Qty: 1 INHALER | Refills: 3 | Status: SHIPPED | OUTPATIENT
Start: 2021-08-12 | End: 2022-02-23 | Stop reason: SDUPTHER

## 2021-08-12 SDOH — ECONOMIC STABILITY: FOOD INSECURITY: WITHIN THE PAST 12 MONTHS, YOU WORRIED THAT YOUR FOOD WOULD RUN OUT BEFORE YOU GOT MONEY TO BUY MORE.: NEVER TRUE

## 2021-08-12 SDOH — ECONOMIC STABILITY: FOOD INSECURITY: WITHIN THE PAST 12 MONTHS, THE FOOD YOU BOUGHT JUST DIDN'T LAST AND YOU DIDN'T HAVE MONEY TO GET MORE.: NEVER TRUE

## 2021-08-12 ASSESSMENT — ENCOUNTER SYMPTOMS
BACK PAIN: 1
NAUSEA: 0
VOMITING: 0
ABDOMINAL PAIN: 0
SHORTNESS OF BREATH: 0

## 2021-08-12 ASSESSMENT — SOCIAL DETERMINANTS OF HEALTH (SDOH): HOW HARD IS IT FOR YOU TO PAY FOR THE VERY BASICS LIKE FOOD, HOUSING, MEDICAL CARE, AND HEATING?: NOT VERY HARD

## 2021-08-12 NOTE — PROGRESS NOTES
Visit Information    Have you changed or started any medications since your last visit including any over-the-counter medicines, vitamins, or herbal medicines? no   Have you stopped taking any of your medications? Is so, why? -  no  Are you having any side effects from any of your medications? - no    Have you seen any other physician or provider since your last visit?  no   Have you had any other diagnostic tests since your last visit?  no   Have you been seen in the emergency room and/or had an admission in a hospital since we last saw you?  no   Have you had your routine dental cleaning in the past 6 months?  no     Do you have an active MyChart account? If no, what is the barrier?   Yes    Patient Care Team:  Maryam Martinez MD as Consulting Physician (Obstetrics & Gynecology)    Medical History Review  Past Medical, Family, and Social History reviewed and does not contribute to the patient presenting condition    Health Maintenance   Topic Date Due    Hepatitis C screen  Never done    Varicella vaccine (1 of 2 - 2-dose childhood series) Never done    Diabetic foot exam  Never done    Diabetic retinal exam  Never done    COVID-19 Vaccine (1) Never done    Diabetic microalbuminuria test  Never done    Hepatitis B vaccine (1 of 3 - Risk 3-dose series) Never done    A1C test (Diabetic or Prediabetic)  08/14/2021    Lipid screen  08/14/2021    Flu vaccine (1) 09/01/2021    Cervical cancer screen  07/31/2024    DTaP/Tdap/Td vaccine (2 - Td or Tdap) 09/08/2024    Pneumococcal 0-64 years Vaccine (2 of 2 - PPSV23) 03/28/2053    HIV screen  Completed    Hepatitis A vaccine  Aged Out    Hib vaccine  Aged Out    Meningococcal (ACWY) vaccine  Aged Out

## 2021-08-12 NOTE — PROGRESS NOTES
Subjective:    Ivonne Espinoza is a 33 y.o. female with  has a past medical history of Asthma, Back pain, Bipolar 1 disorder ([facility] [address] Bipolar disorder ([facility] [address] Depression, Diabetes mellitus ([facility] [address] Dizziness, Fatty liver disease, nonalcoholic, Fatty liver disease, nonalcoholic, GERD (gastroesophageal reflux disease), and Obesity. Family History   Problem Relation Age of Onset    High Blood Pressure Mother     Diabetes Mother     Heart Disease Mother     Heart Disease Father     Early Death Father     Cancer Maternal Aunt         lung    Cancer Paternal Cousin         brain       Presented tothe office today for:  Chief Complaint   Patient presents with    New Patient    Hip Pain    Other     states had an abnormal EKG \"awhile ago\"        HPI  33year old female with a past medical history of morbid obesity, anxiety and bipolar disorder and asthma who is here for right hip pain and abnormal ekg. Patient was sent here by her doctor at [facility]. EKG  - States she had an EKG done and it was abnormal  - Patient has been to ER a few times for chest pain  - EKG's have shown sinus tachy and some non specific t wave changes  - EKG from march noted  - Trops in the past have all been within normal limits  - Currently not having chest pain    Right Hip Pain 2/2 Morbid Obesity  - BMI is 58.9, weights 354 lb  -     Asthma  - Uses symbicort  Twice daily  - Only requires her albuterol about once a week  - Well controlled    Review of Systems   Constitutional: Negative for activity change and unexpected weight change. Respiratory: Negative for shortness of breath. Cardiovascular: Negative for chest pain. Gastrointestinal: Negative for abdominal pain, nausea and vomiting. Genitourinary: Negative for difficulty urinating. Musculoskeletal: Positive for back pain. Neurological: Negative for weakness and numbness. All other systems reviewed and are negative.     Objective:    /85   Pulse 129   Wt (!) 354 lb (160.6 kg)   BMI 58.91 kg/m²    BP Readings from Last 3 Encounters:   08/12/21 129/85   04/17/21 124/81   03/10/21 138/75     Physical Exam  Vitals reviewed. Constitutional:       Appearance: She is morbidly obese. Cardiovascular:      Rate and Rhythm: Normal rate and regular rhythm. Heart sounds: Normal heart sounds. Pulmonary:      Effort: Pulmonary effort is normal.      Breath sounds: Normal breath sounds and air entry. Neurological:      Mental Status: She is alert. Psychiatric:         Behavior: Behavior is cooperative. Lab Results   Component Value Date    WBC 6.7 04/17/2021    HGB 10.3 (L) 04/17/2021    HCT 33.7 (L) 04/17/2021     04/17/2021    CHOL 167 08/14/2020    TRIG 79 08/14/2020    HDL 43 08/14/2020    ALT 30 04/14/2021    AST 26 04/14/2021     04/17/2021    K 3.8 04/17/2021     04/17/2021    CREATININE 0.63 04/17/2021    BUN 6 04/17/2021    CO2 24 04/17/2021    TSH 2.34 08/14/2020    GLUF 133 (H) 03/13/2015    LABA1C 5.8 08/14/2020     Lab Results   Component Value Date    CALCIUM 8.4 (L) 04/17/2021     Lab Results   Component Value Date    LDLCHOLESTEROL 108 08/14/2020       Assessment and Plan:    1. Screening for hyperlipidemia  - Lipid Panel; Future    2. Need for hepatitis C screening test  - Hepatitis C Antibody; Future    3. Mild intermittent asthma in adult without complication  - budesonide-formoterol (SYMBICORT) 160-4.5 MCG/ACT AERO; Inhale 2 puffs into the lungs 2 times daily  Dispense: 1 Inhaler; Refill: 3  - albuterol sulfate HFA (PROVENTIL HFA) 108 (90 Base) MCG/ACT inhaler; Inhale 2 puffs into the lungs every 6 hours as needed for Wheezing or Shortness of Breath (Space out to every 6 hours as symptoms improve) Space out to every 6 hours as symptoms improve. Dispense: 2 Inhaler; Refill: 0    4. Iron deficiency anemia due to chronic blood loss  - CBC With Auto Differential; Future  - Iron And TIBC; Future    5.  Chronic midline low back pain without sciatica  - acetaminophen (TYLENOL) 500 MG tablet; Take 2 tablets by mouth every 8 hours for 5 days  Dispense: 30 tablet; Refill: 0    6. Prediabetes  - Hemoglobin A1C; Future  - Microalbumin, Ur; Future  - HM DIABETES FOOT EXAM  - Last HbA1c 5.8    Requested Prescriptions     Signed Prescriptions Disp Refills    budesonide-formoterol (SYMBICORT) 160-4.5 MCG/ACT AERO 1 Inhaler 3     Sig: Inhale 2 puffs into the lungs 2 times daily    albuterol sulfate HFA (PROVENTIL HFA) 108 (90 Base) MCG/ACT inhaler 2 Inhaler 0     Sig: Inhale 2 puffs into the lungs every 6 hours as needed for Wheezing or Shortness of Breath (Space out to every 6 hours as symptoms improve) Space out to every 6 hours as symptoms improve.  acetaminophen (TYLENOL) 500 MG tablet 30 tablet 0     Sig: Take 2 tablets by mouth every 8 hours for 5 days       Medications Discontinued During This Encounter   Medication Reason    albuterol sulfate HFA (PROVENTIL HFA) 108 (90 Base) MCG/ACT inhaler REORDER    budesonide-formoterol (SYMBICORT) 160-4.5 MCG/ACT AERO REORDER    acetaminophen (TYLENOL) 500 MG tablet REORDER       Return in about 2 weeks (around 8/26/2021) for Asthma FU.

## 2021-08-12 NOTE — PROGRESS NOTES
I have reviewed and discussed key elements of [facility] Eloued with the resident including plan of care and follow up and agree with the care jc plan.

## 2021-09-16 ENCOUNTER — TELEPHONE (OUTPATIENT)
Dept: FAMILY MEDICINE CLINIC | Age: 31
End: 2021-09-16

## 2021-09-16 NOTE — TELEPHONE ENCOUNTER
Writer placed telephone call to patient in attempts to reschedule missed appointment on 09/15/21 with Dr. Florentino Landa . Unable to leave voicemail message as phone is not accepting incoming calls.  Message #2

## 2021-09-17 NOTE — TELEPHONE ENCOUNTER
Writer placed telephone call to patient in attempts to reschedule missed appointment on 09/15/21 with Dr. Chidi Ervin . Unable to leave voicemail message as phone is not accepting incoming calls. Message # 3.  Letter sent

## 2021-12-05 ENCOUNTER — HOSPITAL ENCOUNTER (EMERGENCY)
Age: 31
Discharge: HOME | End: 2021-12-05
Attending: EMERGENCY MEDICINE
Payer: COMMERCIAL

## 2021-12-05 VITALS
BODY MASS INDEX: 48.82 KG/M2 | RESPIRATION RATE: 16 BRPM | HEIGHT: 65 IN | HEART RATE: 99 BPM | TEMPERATURE: 97.3 F | DIASTOLIC BLOOD PRESSURE: 92 MMHG | OXYGEN SATURATION: 93 % | SYSTOLIC BLOOD PRESSURE: 140 MMHG | WEIGHT: 293 LBS

## 2021-12-05 DIAGNOSIS — H66.90 ACUTE OTITIS MEDIA, UNSPECIFIED OTITIS MEDIA TYPE: Primary | ICD-10-CM

## 2021-12-05 DIAGNOSIS — H65.193 ACUTE EFFUSION OF BOTH MIDDLE EARS: ICD-10-CM

## 2021-12-05 DIAGNOSIS — H60.392 INFECTIVE OTITIS EXTERNA OF LEFT EAR: ICD-10-CM

## 2021-12-05 DIAGNOSIS — K08.89 DENTALGIA: ICD-10-CM

## 2021-12-05 PROCEDURE — 6370000000 HC RX 637 (ALT 250 FOR IP): Performed by: STUDENT IN AN ORGANIZED HEALTH CARE EDUCATION/TRAINING PROGRAM

## 2021-12-05 PROCEDURE — 99285 EMERGENCY DEPT VISIT HI MDM: CPT

## 2021-12-05 RX ORDER — CIPROFLOXACIN AND DEXAMETHASONE 3; 1 MG/ML; MG/ML
4 SUSPENSION/ DROPS AURICULAR (OTIC) 2 TIMES DAILY
Qty: 7.5 ML | Refills: 0 | Status: SHIPPED | OUTPATIENT
Start: 2021-12-05 | End: 2021-12-12

## 2021-12-05 RX ORDER — IBUPROFEN 600 MG/1
600 TABLET ORAL 4 TIMES DAILY PRN
Qty: 28 TABLET | Refills: 0 | Status: SHIPPED | OUTPATIENT
Start: 2021-12-05 | End: 2022-04-21

## 2021-12-05 RX ORDER — CIPROFLOXACIN AND DEXAMETHASONE 3; 1 MG/ML; MG/ML
4 SUSPENSION/ DROPS AURICULAR (OTIC) ONCE
Status: COMPLETED | OUTPATIENT
Start: 2021-12-05 | End: 2021-12-05

## 2021-12-05 RX ORDER — ACETAMINOPHEN 500 MG
1000 TABLET ORAL ONCE
Status: COMPLETED | OUTPATIENT
Start: 2021-12-05 | End: 2021-12-05

## 2021-12-05 RX ORDER — ACETAMINOPHEN 500 MG
1000 TABLET ORAL EVERY 8 HOURS SCHEDULED
Qty: 42 TABLET | Refills: 0 | Status: SHIPPED | OUTPATIENT
Start: 2021-12-05 | End: 2022-04-21

## 2021-12-05 RX ORDER — CEFDINIR 300 MG/1
300 CAPSULE ORAL ONCE
Status: COMPLETED | OUTPATIENT
Start: 2021-12-05 | End: 2021-12-05

## 2021-12-05 RX ORDER — CEFDINIR 300 MG/1
300 CAPSULE ORAL 2 TIMES DAILY
Qty: 14 CAPSULE | Refills: 0 | Status: SHIPPED | OUTPATIENT
Start: 2021-12-05 | End: 2021-12-12

## 2021-12-05 RX ORDER — IBUPROFEN 800 MG/1
800 TABLET ORAL ONCE
Status: COMPLETED | OUTPATIENT
Start: 2021-12-05 | End: 2021-12-05

## 2021-12-05 RX ADMIN — IBUPROFEN 800 MG: 800 TABLET, FILM COATED ORAL at 05:33

## 2021-12-05 RX ADMIN — CIPROFLOXACIN AND DEXAMETHASONE 4 DROP: 3; 1 SUSPENSION/ DROPS AURICULAR (OTIC) at 05:33

## 2021-12-05 RX ADMIN — CEFDINIR 300 MG: 300 CAPSULE ORAL at 05:33

## 2021-12-05 RX ADMIN — ACETAMINOPHEN 1000 MG: 500 TABLET ORAL at 05:33

## 2021-12-05 ASSESSMENT — PAIN DESCRIPTION - LOCATION: LOCATION: TEETH;EAR

## 2021-12-05 ASSESSMENT — ENCOUNTER SYMPTOMS
ABDOMINAL PAIN: 0
NAUSEA: 0
VOMITING: 0

## 2021-12-05 ASSESSMENT — PAIN DESCRIPTION - ORIENTATION: ORIENTATION: LEFT

## 2021-12-05 ASSESSMENT — PAIN SCALES - GENERAL
PAINLEVEL_OUTOF10: 10
PAINLEVEL_OUTOF10: 10

## 2021-12-05 ASSESSMENT — PAIN DESCRIPTION - PAIN TYPE: TYPE: ACUTE PAIN

## 2021-12-05 NOTE — ED PROVIDER NOTES
[facility]     Emergency Department     Faculty Note/ Attestation      Pt Name: Alexis Riddle                                       MRN: [id number]  [place] 1990  Date of evaluation: 12/5/2021    Patients PCP:    Elliott Haq MD      Attestation  I performed a history and physical examination of the patient and discussed management with the resident. I reviewed the residents note and agree with the documented findings and plan of care. Any areas of disagreement are noted on the chart. I was personally present for the key portions of any procedures. I have documented in the chart those procedures where I was not present during the key portions. I have reviewed the emergency nurses triage note. I agree with the chief complaint, past medical history, past surgical history, allergies, medications, social and family history as documented unless otherwise noted below. For Physician Assistant/ Nurse Practitioner cases/documentation I have personally evaluated this patient and have completed at least one if not all key elements of the E/M (history, physical exam, and MDM). Additional findings are as noted. Initial Screens:             Vitals:    Vitals:    12/05/21 0403   BP: (!) 140/92   Pulse: 118   Resp: 16   Temp: 97.3 °F (36.3 °C)   TempSrc: Oral   SpO2: 93%   Weight: (!) 334 lb (151.5 kg)   Height: 5' 5\" (1.651 m)       CHIEF COMPLAINT     No chief complaint on file.             DIAGNOSTIC RESULTS             RADIOLOGY:   No orders to display         LABS:  Labs Reviewed - No data to display      EMERGENCY DEPARTMENT COURSE:     -------------------------  BP: (!) 140/92, Temp: 97.3 °F (36.3 °C), Pulse: 118, Resp: 16      Comments    Anterior ear pain, c/f externa  bilat effusions - serous  Also L sided dental pain    L sided OM likely, no drainable abscess  Plan for ABX, sx control, d/c with f/u tomorrow    (Please note that portions of this note were completed with a voice recognition program.  Efforts were made to edit the dictations but occasionally words are mis-transcribed.)      Romero Crawley MD,, MD  Attending Emergency Physician         Romero Crawley MD  12/05/21 2659

## 2021-12-05 NOTE — ED PROVIDER NOTES
[facility] ED  Emergency Department Encounter  EmergencyMedicine Resident     Pt Name:Meño Brumfield  MRN: [id number]  [place] 1990  Date of evaluation: 21  PCP:  Mia Cloud MD    [address] [place] [address]       Chief Complaint   Patient presents with    Otalgia       HISTORY OF PRESENT ILLNESS  (Location/Symptom, Timing/Onset, Context/Setting, Quality, Duration, Modifying Factors, Severity.)      Robin Rivera is a 33 y.o. female who presents with left dental pain, left ear pain for the past few days. Patient has a history of poor dentition, status post multiple tooth extractions and fillings. Additionally complains of left ear pain. No hearing deficits. No headache or neck stiffness. No falls. PAST MEDICAL / SURGICAL / SOCIAL / FAMILY HISTORY      has a past medical history of Asthma, Back pain, Bipolar 1 disorder ([address] Bipolar disorder ([address] Depression, Diabetes mellitus ([address] Dizziness, Fatty liver disease, nonalcoholic, Fatty liver disease, nonalcoholic, GERD (gastroesophageal reflux disease), and Obesity. has a past surgical history that includes  section and LEEP.       Social History     Socioeconomic History    Marital status:      Spouse name: Not on file    Number of children: Not on file    Years of education: Not on file    Highest education level: Not on file   Occupational History    Not on file   Tobacco Use    Smoking status: Former Smoker     Types: Cigarettes    Smokeless tobacco: Never Used   Vaping Use    Vaping Use: Never used   Substance and Sexual Activity    Alcohol use: Not on file     Comment: rarely    Drug use: Not Currently     Types: Marijuana Ary Chin)     Comment: quit    Sexual activity: Not Currently   Other Topics Concern    Not on file   Social History Narrative    Not on file     Social Determinants of Health     Financial Resource Strain: Low Risk     Difficulty of Paying Living Expenses: Not very hard   Food Insecurity: No Food Insecurity    Worried About Running Out of Food in the Last Year: Never true    Bruno of Food in the Last Year: Never true   Transportation Needs:     Lack of Transportation (Medical): Not on file    Lack of Transportation (Non-Medical): Not on file   Physical Activity:     Days of Exercise per Week: Not on file    Minutes of Exercise per Session: Not on file   Stress:     Feeling of Stress : Not on file   Social Connections:     Frequency of Communication with Friends and Family: Not on file    Frequency of Social Gatherings with Friends and Family: Not on file    Attends Alevism Services: Not on file    Active Member of [address] [place] [address] [employer] or Organizations: Not on file    Attends Club or Organization Meetings: Not on file    Marital Status: Not on file   Intimate Partner Violence:     Fear of Current or Ex-Partner: Not on file    Emotionally Abused: Not on file    Physically Abused: Not on file    Sexually Abused: Not on file   Housing Stability:     Unable to Pay for Housing in the Last Year: Not on file    Number of [place] in the Last Year: Not on file    Unstable Housing in the Last Year: Not on file       Family History   Problem Relation Age of Onset    High Blood Pressure Mother     Diabetes Mother     Heart Disease Mother     Heart Disease Father     Early Death Father     Cancer Maternal Aunt         lung    Cancer Paternal Cousin         brain       Allergies:  Morphine, Pcn [penicillins], Amoxicillin, and Seasonal    Home Medications:  Prior to Admission medications    Medication Sig Start Date End Date Taking?  Authorizing Provider   acetaminophen (TYLENOL) 500 MG tablet Take 2 tablets by mouth every 8 hours for 7 days 12/5/21 12/12/21 Yes Latrell Andino,    ibuprofen (ADVIL;MOTRIN) 600 MG tablet Take 1 tablet by mouth 4 times daily as needed for Pain 12/5/21 12/12/21 Yes Jerman Ovalle DO   cefdinir (OMNICEF) 300 MG capsule Take 1 capsule by mouth 2 times daily for 7 days 12/5/21 12/12/21 Yes Jerman Ovalle, DO   ciprofloxacin-dexamethasone (CIPRODEX) 0.3-0.1 % otic suspension Place 4 drops into the left ear 2 times daily for 7 days 12/5/21 12/12/21 Yes Devon Heath, DO   budesonide-formoterol [facility]) 160-4.5 MCG/ACT AERO Inhale 2 puffs into the lungs 2 times daily 8/12/21   Johnathon Modi MD   albuterol sulfate HFA (PROVENTIL HFA) 108 (90 Base) MCG/ACT inhaler Inhale 2 puffs into the lungs every 6 hours as needed for Wheezing or Shortness of Breath (Space out to every 6 hours as symptoms improve) Space out to every 6 hours as symptoms improve. 8/12/21   Johnathon Modi MD   acetaminophen (TYLENOL) 500 MG tablet Take 2 tablets by mouth every 8 hours for 5 days 8/12/21 8/17/21  Johnathon Modi MD   aspirin-acetaminophen-caffeine (EXCEDRIN MIGRAINE) 335-744-29 MG per tablet Take 1 tablet by mouth every 6 hours as needed for Headaches 3/17/21   Wolf Macias MD   sertraline (ZOLOFT) 50 MG tablet Take 3 tablets by mouth daily 3/18/21   Wolf Macias MD   cariprazine hcl (VRAYLAR) 3 MG CAPS capsule Take 1 capsule by mouth daily 3/18/21   Wolf Macias MD   traZODone (DESYREL) 50 MG tablet Take 1 tablet by mouth nightly as needed for Sleep 8/16/20   JUNE Villa - CNP       REVIEW OF SYSTEMS    (2-9 systems for level 4, 10 or more for level 5)      Review of Systems   Constitutional: Negative for chills and fever. HENT: Positive for dental problem and ear pain. Negative for ear discharge and hearing loss. Cardiovascular: Negative for chest pain. Gastrointestinal: Negative for abdominal pain, nausea and vomiting. Musculoskeletal: Negative for neck pain and neck stiffness. Neurological: Negative for weakness and headaches.         PHYSICAL EXAM   (up to 7 for level 4, 8 or more for level 5)      INITIAL VITALS:   BP (!) 140/92   Pulse 99   Temp 97.3 °F (36.3 °C) (Oral)   Resp 16   Ht 5' 5\" (1.651 m)   Wt (!) 334 lb (151.5 kg)   SpO2 93%   BMI 55.58 kg/m²      Vitals:    12/05/21 0403 12/05/21 0513   BP: (!) 140/92    Pulse: 118 99   Resp: 16    Temp: 97.3 °F (36.3 °C)    TempSrc: Oral    SpO2: 93%    Weight: (!) 334 lb (151.5 kg)    Height: 5' 5\" (1.651 m)         Physical Exam  Vitals reviewed. Constitutional:       General: She is not in acute distress. Appearance: She is well-developed. She is not ill-appearing. HENT:      Head: Normocephalic and atraumatic. Comments: Bilateral serous effusions. Tympanic membranes are not bulging. Left tympanic membrane slightly erythematous. Left EAC also showing some erythema, excoriations. Right EAC normal.    No dental caries, no evidence of periapical abscess, uvula midline, tonsils unremarkable. Eyes:      Extraocular Movements: Extraocular movements intact. Pupils: Pupils are equal, round, and reactive to light. Cardiovascular:      Rate and Rhythm: Normal rate and regular rhythm. Pulmonary:      Effort: Pulmonary effort is normal.      Breath sounds: Normal breath sounds. Abdominal:      General: There is no distension. Palpations: Abdomen is soft. Tenderness: There is no abdominal tenderness. Musculoskeletal:         General: Normal range of motion. Cervical back: Normal range of motion and neck supple. Skin:     General: Skin is warm and dry. Neurological:      Mental Status: She is alert and oriented to person, place, and time.          DIFFERENTIAL  DIAGNOSIS     PLAN (LABS / IMAGING / EKG):  Orders Placed This Encounter   Procedures    Misc nursing order (specify)       MEDICATIONS ORDERED:  Orders Placed This Encounter   Medications    acetaminophen (TYLENOL) tablet 1,000 mg    ibuprofen (ADVIL;MOTRIN) tablet 800 mg    ciprofloxacin-dexamethasone (CIPRODEX) otic suspension 4 drop    cefdinir (OMNICEF) capsule 300 mg     Order Specific Question:   Antimicrobial Indications     Answer:   Head and Neck Infection    acetaminophen (TYLENOL) 500 MG tablet     Sig: Take 2 tablets by mouth every 8 hours for 7 days     Dispense:  42 tablet     Refill:  0    ibuprofen (ADVIL;MOTRIN) 600 MG tablet     Sig: Take 1 tablet by mouth 4 times daily as needed for Pain     Dispense:  28 tablet     Refill:  0    cefdinir (OMNICEF) 300 MG capsule     Sig: Take 1 capsule by mouth 2 times daily for 7 days     Dispense:  14 capsule     Refill:  0    ciprofloxacin-dexamethasone (CIPRODEX) 0.3-0.1 % otic suspension     Sig: Place 4 drops into the left ear 2 times daily for 7 days     Dispense:  7.5 mL     Refill:  0       DIAGNOSTIC RESULTS / EMERGENCY DEPARTMENT COURSE / MDM   LAB RESULTS:  No results found for this visit on 12/05/21. RADIOLOGY:  No orders to display        [place]:    Patient has signs of otitis media as well as otitis externa. No dental infection was appreciated. Initially was tachycardic but has been complaining of pain, on reevaluation tachycardia resolved. Administered and then prescribed cefdinir and Ciprodex for her ear infections. Patient states she has a follow-up appointment to her dentist and PCP on Monday. PROCEDURES:      CONSULTS:  None    CRITICAL CARE:  Please see attending note    FINAL IMPRESSION      1. Acute otitis media, unspecified otitis media type    2. Infective otitis externa of left ear    3. Acute effusion of both middle ears    4. Dentalgia          DISPOSITION / PLAN     DISPOSITION Decision To Discharge 12/05/2021 04:29:42 AM      PATIENT REFERRED TO:  No follow-up provider specified.     DISCHARGE MEDICATIONS:  Discharge Medication List as of 12/5/2021  5:07 AM      START taking these medications    Details   ibuprofen (ADVIL;MOTRIN) 600 MG tablet Take 1 tablet by mouth 4 times daily as needed for Pain, Disp-28 tablet, R-0Print      cefdinir (OMNICEF) 300 MG capsule Take 1 capsule by mouth 2 times daily for 7 days, Disp-14 capsule, R-0Print      ciprofloxacin-dexamethasone (CIPRODEX) 0.3-0.1 % otic suspension Place 4 drops into the left ear 2 times daily for 7 days, Disp-7.5 mL, R-0Print             Bright Killian DO  Emergency Medicine Resident    (Please note that portions of thisnote were completed with a voice recognition program.  Efforts were made to edit the dictations but occasionally words are mis-transcribed.)        Bright Killian DO  Resident  12/05/21 2089

## 2021-12-20 DIAGNOSIS — J45.20 MILD INTERMITTENT ASTHMA IN ADULT WITHOUT COMPLICATION: ICD-10-CM

## 2021-12-20 RX ORDER — ALBUTEROL SULFATE 90 UG/1
AEROSOL, METERED RESPIRATORY (INHALATION)
Qty: 1 EACH | Refills: 2 | Status: SHIPPED | OUTPATIENT
Start: 2021-12-20 | End: 2022-03-12 | Stop reason: SDUPTHER

## 2021-12-20 NOTE — TELEPHONE ENCOUNTER
Mild intermittent asthma with acute exacerbation     Depression     Fatty liver disease, nonalcoholic     Abdominal pain     Menorrhagia     Back pain     Pedal edema     Medication refill     RUQ abdominal pain     Diverticulitis of large intestine with perforation without abscess or bleeding     Calculus of gallbladder with chronic cholecystitis without obstruction     Type 2 diabetes mellitus without complication (HCC)     Leukocytosis     Bipolar disorder (HCC)     Major depressive disorder, recurrent (HCC)     Shortness of breath     Depression with suicidal ideation     MDD (major depressive disorder), recurrent severe, without psychosis ([address]     MDD (major depressive disorder), recurrent, severe, with psychosis ([address]     Moderate persistent asthma     Acute diverticulitis     Diverticulitis of cecum

## 2022-02-23 DIAGNOSIS — J45.20 MILD INTERMITTENT ASTHMA IN ADULT WITHOUT COMPLICATION: ICD-10-CM

## 2022-02-23 RX ORDER — BUDESONIDE AND FORMOTEROL FUMARATE DIHYDRATE 160; 4.5 UG/1; UG/1
2 AEROSOL RESPIRATORY (INHALATION) 2 TIMES DAILY
Qty: 1 EACH | Refills: 2 | Status: SHIPPED | OUTPATIENT
Start: 2022-02-23 | End: 2022-03-24 | Stop reason: SDUPTHER

## 2022-02-23 NOTE — TELEPHONE ENCOUNTER
with acute exacerbation     Depression     Fatty liver disease, nonalcoholic     Abdominal pain     Menorrhagia     Back pain     Pedal edema     Medication refill     RUQ abdominal pain     Diverticulitis of large intestine with perforation without abscess or bleeding     Calculus of gallbladder with chronic cholecystitis without obstruction     Type 2 diabetes mellitus without complication (HCC)     Leukocytosis     Bipolar disorder (HCC)     Major depressive disorder, recurrent (HCC)     Shortness of breath     Depression with suicidal ideation     MDD (major depressive disorder), recurrent severe, without psychosis ([address]     MDD (major depressive disorder), recurrent, severe, with psychosis ([address]     Moderate persistent asthma     Acute diverticulitis     Diverticulitis of cecum

## 2022-03-03 ENCOUNTER — APPOINTMENT (OUTPATIENT)
Dept: GENERAL RADIOLOGY | Age: 32
End: 2022-03-03
Payer: COMMERCIAL

## 2022-03-03 ENCOUNTER — HOSPITAL ENCOUNTER (EMERGENCY)
Age: 32
Discharge: HOME | End: 2022-03-04
Attending: EMERGENCY MEDICINE
Payer: COMMERCIAL

## 2022-03-03 VITALS
BODY MASS INDEX: 48.82 KG/M2 | WEIGHT: 293 LBS | DIASTOLIC BLOOD PRESSURE: 93 MMHG | HEART RATE: 125 BPM | HEIGHT: 65 IN | OXYGEN SATURATION: 91 % | SYSTOLIC BLOOD PRESSURE: 161 MMHG | RESPIRATION RATE: 26 BRPM | TEMPERATURE: 98.1 F

## 2022-03-03 DIAGNOSIS — J45.21 MILD INTERMITTENT ASTHMA WITH EXACERBATION: Primary | ICD-10-CM

## 2022-03-03 PROCEDURE — 96365 THER/PROPH/DIAG IV INF INIT: CPT

## 2022-03-03 PROCEDURE — 96366 THER/PROPH/DIAG IV INF ADDON: CPT

## 2022-03-03 PROCEDURE — 6360000002 HC RX W HCPCS: Performed by: STUDENT IN AN ORGANIZED HEALTH CARE EDUCATION/TRAINING PROGRAM

## 2022-03-03 PROCEDURE — 96375 TX/PRO/DX INJ NEW DRUG ADDON: CPT

## 2022-03-03 PROCEDURE — 71045 X-RAY EXAM CHEST 1 VIEW: CPT

## 2022-03-03 PROCEDURE — 94640 AIRWAY INHALATION TREATMENT: CPT

## 2022-03-03 PROCEDURE — 2700000000 HC OXYGEN THERAPY PER DAY

## 2022-03-03 PROCEDURE — 99282 EMERGENCY DEPT VISIT SF MDM: CPT

## 2022-03-03 RX ORDER — DIPHENHYDRAMINE HYDROCHLORIDE 50 MG/ML
50 INJECTION INTRAMUSCULAR; INTRAVENOUS ONCE
Status: COMPLETED | OUTPATIENT
Start: 2022-03-03 | End: 2022-03-03

## 2022-03-03 RX ORDER — MAGNESIUM SULFATE IN WATER 40 MG/ML
2000 INJECTION, SOLUTION INTRAVENOUS ONCE
Status: COMPLETED | OUTPATIENT
Start: 2022-03-03 | End: 2022-03-04

## 2022-03-03 RX ORDER — PROCHLORPERAZINE EDISYLATE 5 MG/ML
10 INJECTION INTRAMUSCULAR; INTRAVENOUS ONCE
Status: COMPLETED | OUTPATIENT
Start: 2022-03-03 | End: 2022-03-03

## 2022-03-03 RX ORDER — BENZONATATE 100 MG/1
100 CAPSULE ORAL ONCE
Status: DISCONTINUED | OUTPATIENT
Start: 2022-03-04 | End: 2022-03-04 | Stop reason: HOSPADM

## 2022-03-03 RX ORDER — DEXAMETHASONE SODIUM PHOSPHATE 10 MG/ML
10 INJECTION INTRAMUSCULAR; INTRAVENOUS ONCE
Status: COMPLETED | OUTPATIENT
Start: 2022-03-03 | End: 2022-03-03

## 2022-03-03 RX ADMIN — PROCHLORPERAZINE EDISYLATE 10 MG: 5 INJECTION INTRAMUSCULAR; INTRAVENOUS at 23:33

## 2022-03-03 RX ADMIN — ALBUTEROL SULFATE 15 MG: 5 SOLUTION RESPIRATORY (INHALATION) at 21:55

## 2022-03-03 RX ADMIN — MAGNESIUM SULFATE 2000 MG: 2 INJECTION INTRAVENOUS at 23:04

## 2022-03-03 RX ADMIN — IPRATROPIUM BROMIDE 0.5 MG: 0.5 SOLUTION RESPIRATORY (INHALATION) at 21:55

## 2022-03-03 RX ADMIN — DEXAMETHASONE SODIUM PHOSPHATE 10 MG: 10 INJECTION INTRAMUSCULAR; INTRAVENOUS at 23:04

## 2022-03-03 RX ADMIN — DIPHENHYDRAMINE HYDROCHLORIDE 50 MG: 50 INJECTION, SOLUTION INTRAMUSCULAR; INTRAVENOUS at 23:33

## 2022-03-03 RX ADMIN — ALBUTEROL SULFATE 10 MG: 5 SOLUTION RESPIRATORY (INHALATION) at 23:53

## 2022-03-04 RX ORDER — PREDNISONE 10 MG/1
TABLET ORAL
Qty: 20 TABLET | Refills: 0 | Status: SHIPPED | OUTPATIENT
Start: 2022-03-04 | End: 2022-03-12

## 2022-03-04 ASSESSMENT — ENCOUNTER SYMPTOMS
WHEEZING: 1
CONSTIPATION: 0
CHEST TIGHTNESS: 0
SHORTNESS OF BREATH: 1
SORE THROAT: 0
BACK PAIN: 0
ABDOMINAL DISTENTION: 0
COUGH: 1
RHINORRHEA: 0
PHOTOPHOBIA: 0
TROUBLE SWALLOWING: 0
NAUSEA: 0
ABDOMINAL PAIN: 0
DIARRHEA: 0
VOMITING: 0

## 2022-03-04 NOTE — ED PROVIDER NOTES
[facility] ED  Emergency Department Encounter  Emergency Medicine Resident     Pt Name: Jefferson Ballesteros  MRN: [id number]  [place] 1990  Date of evaluation: 3/3/22  PCP:  Yessy oFrd MD    [address] [place] [address]       Chief Complaint   Patient presents with    Wheezing    Asthma    Shortness of Breath       HISTORY [facility]  (Location/Symptom, Timing/Onset, Context/Setting, Quality, Duration, Modifying Factors,Severity.)      Jefferson Ballesteros is a 33 y.o. female who presents with concerns or worsening shortness of breath since Friday. Patient has a known history of asthma, states it has been without her albuterol inhaler, states that she has been doing breathing treatments every 2 hours for the past day, states that he is here secondary to chest wall pain as well as fatigue, wheezing, shortness of breath. Patient denies Raynaud's, sore throat, has received with her Covid vaccinations, states that she has not been around anyone with similar symptoms. Patient does state that she has 14 kids, states that the reason that she was not able to come to the hospital sooner is that she has been taking care of her family. PAST MEDICAL / SURGICAL / SOCIAL / FAMILY HISTORY      has a past medical history of Asthma, Back pain, Bipolar 1 disorder ([address] Bipolar disorder ([address] Depression, Diabetes mellitus ([address] Dizziness, Fatty liver disease, nonalcoholic, Fatty liver disease, nonalcoholic, GERD (gastroesophageal reflux disease), and Obesity. has a past surgical history that includes  section and LEEP.      Social History     Socioeconomic History    Marital status:      Spouse name: Not on file    Number of children: Not on file    Years of education: Not on file    Highest education level: Not on file   Occupational History    Not on file   Tobacco Use    Smoking status: Former Smoker     Types: Cigarettes    Smokeless tobacco: Never Used   Vaping Use  Vaping Use: Never used   Substance and Sexual Activity    Alcohol use: Not on file     Comment: rarely    Drug use: Not Currently     Types: Marijuana Dolph [place]flower)     Comment: quit    Sexual activity: Not Currently   Other Topics Concern    Not on file   Social History Narrative    Not on file     Social Determinants of Health     Financial Resource Strain: Low Risk     Difficulty of Paying Living Expenses: Not very hard   Food Insecurity: No Food Insecurity    Worried About Running Out of Food in the Last Year: Never true    Bruno of Food in the Last Year: Never true   Transportation Needs:     Lack of Transportation (Medical): Not on file    Lack of Transportation (Non-Medical):  Not on file   Physical Activity:     Days of Exercise per Week: Not on file    Minutes of Exercise per Session: Not on file   Stress:     Feeling of Stress : Not on file   Social Connections:     Frequency of Communication with Friends and Family: Not on file    Frequency of Social Gatherings with Friends and Family: Not on file    Attends Sikh Services: Not on file    Active Member of [address] [place] [address] or Organizations: Not on file    Attends Club or Organization Meetings: Not on file    Marital Status: Not on file   Intimate Partner Violence:     Fear of Current or Ex-Partner: Not on file    Emotionally Abused: Not on file    Physically Abused: Not on file    Sexually Abused: Not on file   Housing Stability:     Unable to Pay for Housing in the Last Year: Not on file    Number of [place] in the Last Year: Not on file    Unstable Housing in the Last Year: Not on file       Family History   Problem Relation Age of Onset    High Blood Pressure Mother     Diabetes Mother     Heart Disease Mother     Heart Disease Father     Early Death Father     Cancer Maternal Aunt         lung    Cancer Paternal Cousin         brain        Allergies:  Morphine, Pcn [penicillins], Amoxicillin, and Seasonal    Home Medications:  Prior to Admission medications    Medication Sig Start Date End Date Taking? Authorizing Provider   olodaterol (STRIVERDI RESPIMAT) 2.5 MCG/ACT inhaler Inhale 2 puffs into the lungs daily 3/4/22  Yes Maritza Koch MD   predniSONE (DELTASONE) 10 MG tablet Take 4 tablets by mouth once daily for 5 days 3/4/22 3/14/22 Yes Maritza Koch MD   budesonide-formoterol [facility]) 160-4.5 MCG/ACT AERO Inhale 2 puffs into the lungs 2 times daily 2/23/22   Ganesh Cooper MD   albuterol sulfate HFA (PROVENTIL HFA) 108 (90 Base) MCG/ACT inhaler Space out to every 6 hours as symptoms improve. 12/20/21   Cathie Penaloza MD   acetaminophen (TYLENOL) 500 MG tablet Take 2 tablets by mouth every 8 hours for 7 days 12/5/21 12/12/21  Deniz Queen DO   ibuprofen (ADVIL;MOTRIN) 600 MG tablet Take 1 tablet by mouth 4 times daily as needed for Pain 12/5/21 12/12/21  Deniz Queen DO   acetaminophen (TYLENOL) 500 MG tablet Take 2 tablets by mouth every 8 hours for 5 days 8/12/21 8/17/21  Sharon Jennings MD   aspirin-acetaminophen-caffeine (EXCEDRIN MIGRAINE) 578-900-82 MG per tablet Take 1 tablet by mouth every 6 hours as needed for Headaches 3/17/21   Stephanie Sanchez MD   sertraline (ZOLOFT) 50 MG tablet Take 3 tablets by mouth daily 3/18/21   Stephanie Sanchez MD   cariprazine hcl (VRAYLAR) 3 MG CAPS capsule Take 1 capsule by mouth daily 3/18/21   Stephanie Sanchez MD   traZODone (DESYREL) 50 MG tablet Take 1 tablet by mouth nightly as needed for Sleep 8/16/20   Dwain Yadav, APRN - CNP       REVIEW OFSYSTEMS    (2-9 systems for level 4, 10 or more for level 5)      Review of Systems   Constitutional: Positive for fatigue. Negative for chills and fever. HENT: Negative for hearing loss, rhinorrhea, sneezing, sore throat, tinnitus and trouble swallowing. Eyes: Negative for photophobia and visual disturbance. Respiratory: Positive for cough, shortness of breath and wheezing.  Negative for chest tightness. Cardiovascular: Negative for chest pain and palpitations. Gastrointestinal: Negative for abdominal distention, abdominal pain, constipation, diarrhea, nausea and vomiting. Endocrine: Negative for polyuria. Genitourinary: Negative for dysuria, flank pain, frequency, vaginal bleeding and vaginal discharge. Musculoskeletal: Negative for arthralgias, back pain, gait problem and neck pain. Neurological: Negative for dizziness, tremors, seizures, syncope, facial asymmetry, numbness and headaches. Psychiatric/Behavioral: Negative for self-injury and suicidal ideas. PHYSICAL EXAM   (up to 7 for level 4, 8 or more forlevel 5)      INITIAL VITALS:   ED Triage Vitals [03/03/22 2138]   BP Temp Temp Source Pulse Resp SpO2 Height Weight   (!) 161/93 98.1 °F (36.7 °C) Oral 125 26 92 % 5' 5\" (1.651 m) (!) 334 lb (151.5 kg)       Physical Exam  Constitutional:       Appearance: She is not ill-appearing or diaphoretic. HENT:      Head: Normocephalic and atraumatic. Right Ear: External ear normal.      Left Ear: External ear normal.      Nose: No rhinorrhea. Mouth/Throat:      Mouth: Mucous membranes are moist.   Eyes:      Extraocular Movements: Extraocular movements intact. Conjunctiva/sclera: Conjunctivae normal.   Cardiovascular:      Rate and Rhythm: Normal rate and regular rhythm. Pulmonary:      Effort: Pulmonary effort is normal. No respiratory distress. Breath sounds: Examination of the right-upper field reveals wheezing. Examination of the left-upper field reveals wheezing. Examination of the right-middle field reveals wheezing. Examination of the left-middle field reveals wheezing. Examination of the right-lower field reveals wheezing. Examination of the left-lower field reveals wheezing. Wheezing present. Abdominal:      General: Abdomen is flat. Musculoskeletal:         General: No tenderness or signs of injury. Normal range of motion.    Skin: General: Skin is warm. Capillary Refill: Capillary refill takes less than 2 seconds. Neurological:      Mental Status: She is alert and oriented to person, place, and time. Mental status is at baseline. DIFFERENTIAL  DIAGNOSIS     PLAN (LABS / IMAGING / EKG):  Orders Placed This Encounter   Procedures    XR CHEST PORTABLE       MEDICATIONS ORDERED:  Orders Placed This Encounter   Medications    dexamethasone (DECADRON) injection 10 mg    magnesium sulfate 2000 mg in 50 mL IVPB premix    DISCONTD: albuterol (PROVENTIL) nebulizer solution 10 mg     Order Specific Question:   Initiate RT Bronchodilator Protocol     Answer:   No    DISCONTD: ipratropium (ATROVENT) 0.02 % nebulizer solution 0.5 mg     Order Specific Question:   Initiate RT Bronchodilator Protocol     Answer:   No    prochlorperazine (COMPAZINE) injection 10 mg    diphenhydrAMINE (BENADRYL) injection 50 mg    DISCONTD: benzonatate (TESSALON) capsule 100 mg    olodaterol (STRIVERDI RESPIMAT) 2.5 MCG/ACT inhaler     Sig: Inhale 2 puffs into the lungs daily     Dispense:  4 g     Refill:  0    predniSONE (DELTASONE) 10 MG tablet     Sig: Take 4 tablets by mouth once daily for 5 days     Dispense:  20 tablet     Refill:  0       DDX: Asthma exacerbation, pneumonia    Initial MDM/Plan/ED COURSE:    33 y.o. female who presents with concerns for symptoms of asthma patient in for wheezing generalized worsened over the past 4 to 5 days, concerns for potential viral infection versus over asthma exacerbation. Plan treat patient with Decadron, albuterol, Compazine as well as magnesium on the emergency department. ED Course as of 03/07/22 0728   Thu Mar 03, 2022   1646 18-gauge IV placed under ultrasound guidance of the right forearm. [GP]      ED Course User Index  [GP] Hilario Hall MD   Patient symptomatically improved, x-ray does not show signs of acute infectious etiology, does not show signs of pneumothorax.   Patient symptomatically improved, discharged with prednisone burst pack as well as discharged with refill of her inhaler. Patient no questions at time of discharge.:     DIAGNOSTIC RESULTS / EMERGENCYDEPARTMENT COURSE / MDM     LABS:  Labs Reviewed - No data to display        No results found. PROCEDURES:  None    CONSULTS:  None    CRITICAL CARE:  Please see attending note    FINAL IMPRESSION      1.  Mild intermittent asthma with exacerbation        DISPOSITION / PLAN     DISPOSITION Decision To Discharge 03/04/2022 01:31:55 AM      PATIENT REFERRED TO:  [facility] ED  [address] [place] [phone]    As needed      DISCHARGE MEDICATIONS:  Discharge Medication List as of 3/4/2022  1:33 AM      START taking these medications    Details   olodaterol (STRIVERDI RESPIMAT) 2.5 MCG/ACT inhaler Inhale 2 puffs into the lungs daily, Disp-4 g, R-0Print      predniSONE (DELTASONE) 10 MG tablet Take 4 tablets by mouth once daily for 5 days, Disp-20 tablet, R-0Print             Cynthia Rodriguez MD  Emergency Medicine Resident    (Please note that portions of this note were completed with a voice recognition program.Efforts were made to edit the dictations but occasionally words are mis-transcribed.)        Cynthia Rodriguez MD  Resident  03/07/22 9201

## 2022-03-04 NOTE — ED NOTES
Pt. Presents to the ED with asthma exacerbation. Pt. States that she has been dealing with it since Friday. Pt. Has an inhaler at home that she has been using. Pt. Presents breathing rapidly with audible wheezing.      Flory Crews RN  03/03/22 0191

## 2022-03-04 NOTE — ED PROVIDER NOTES
[facility]     Emergency Department     Faculty Attestation    I performed a history and physical examination of the patient and discussed management with the resident. I reviewed the residents note and agree with the documented findings including all diagnostic interpretations and plan of care. Any areas of disagreement are noted on the chart. I was personally present for the key portions of any procedures. I have documented in the chart those procedures where I was not present during the key portions. I have reviewed the emergency nurses triage note. I agree with the chief complaint, past medical history, past surgical history, allergies, medications, social and family history as documented unless otherwise noted below. Documentation of the HPI, Physical Exam and Medical Decision Making performed by scribes is based on my personal performance of the HPI, PE and MDM. For Physician Assistant/ Nurse Practitioner cases/documentation I have personally evaluated this patient and have completed at least one if not all key elements of the E/M (history, physical exam, and MDM). Additional findings are as noted. This patient was evaluated in the Emergency Department for symptoms described in the history of present illness. He/she was evaluated in the context of the global COVID-19 pandemic, which necessitated consideration that the patient might be at risk for infection with the SARS-CoV-2 virus that causes COVID-19. Institutional protocols and algorithms that pertain to the evaluation of patients at risk for COVID-19 are in a state of rapid change based on information released by regulatory bodies including the CDC and federal and state organizations. These policies and algorithms were followed during the patient's care in the ED. Primary Care Physician: Liset Luther MD    History:  This is a 33 y.o. female who presents to the Emergency Department with complaint of shortness of breath, headache. Increasing shortness of breath over the past several days. Occasional cough. History of asthma and has noted wheezing. She reports she has been having progressive headache, no change from her typical headaches. Physical:     height is 5' 5\" (1.651 m) and weight is 334 lb (151.5 kg) (abnormal). Her oral temperature is 98.1 °F (36.7 °C). Her blood pressure is 161/93 (abnormal) and her pulse is 125. Her respiration is 26 and oxygen saturation is 92%.    33 y.o. female no acute distress, appears uncomfortable, no photophobia, no facial droop no dysarthria. Strength is 5 out of 5 in all 4 extremities. Sensation is intact compared left to right.   Cardiac exam tachycardic regular, pulmonary shows some end phase expiratory wheezes, mildly tachypneic improved from original presentation    Impression: Asthma exacerbation, headache    Plan: Symptomatic treatment, chest x-ray, steroids, reassess       Yaz Galindo MD, Soledad Morrison  Attending Emergency Physician         Cori Schwab, MD  03/03/22 8446

## 2022-03-04 NOTE — ED PROVIDER NOTES
[facility]   Emergency Department  Emergency Medicine Attending Sign-out     Care of Yovany Landaverde was assumed from previous attending Dr. Fer Shah and is being seen for Wheezing, Asthma, and Shortness of Breath  . The patient's initial evaluation and plan have been discussed with the prior provider who initially evaluated the patient. Attestation  I was available and discussed any additional care issues that arose and coordinated the management plans with the resident(s) caring for the patient during my duty period. Any areas of disagreement with resident's documentation of care or procedures are noted on the chart. I was personally present for the key portions of any/all procedures, during my duty period. I have documented in the chart those procedures where I was not present during the key portions. BRIEF PATIENT SUMMARY/MDM COURSE PER INITIAL PROVIDER:   RECENT VITALS:     Temp: 98.1 °F (36.7 °C),  Pulse: 125, Resp: 26, BP: (!) 161/93, SpO2: 92 %    This patient is a 33 y.o. Female with headache. Has a history of migraines, also having wheezing. Patient getting symptomatic treatment. Reevaluation. Likely discharge home.     DIAGNOSTICS/MEDICATIONS:     MEDICATIONS GIVEN:  ED Medication Orders (From admission, onward)    Start Ordered     Status Ordering Provider    03/03/22 2315 03/03/22 2304  prochlorperazine (COMPAZINE) injection 10 mg  ONCE         Last MAR action: Given - by Jose Barker on 03/03/22 at [place] [id number]    03/03/22 2315 03/03/22 2304  diphenhydrAMINE (BENADRYL) injection 50 mg  ONCE         Last MAR action: Given - by Jose Barker on 03/03/22 at [place] [id number]    03/03/22 2200 03/03/22 2145  dexamethasone (DECADRON) injection 10 mg  ONCE         Last MAR action: Given - by Jose Barker on 03/03/22 at [id number] [facility], [place]    03/03/22 2154 03/03/22 2154  ipratropium (ATROVENT) 0.02 % nebulizer solution 0.5 mg  EVERY 6 HOURS PRN

## 2022-03-11 ENCOUNTER — TELEPHONE (OUTPATIENT)
Dept: FAMILY MEDICINE CLINIC | Age: 32
End: 2022-03-11

## 2022-03-11 NOTE — TELEPHONE ENCOUNTER
Pt contacted office and updated that no longer comes to our practice will be following [facility].

## 2022-03-12 ENCOUNTER — HOSPITAL ENCOUNTER (EMERGENCY)
Age: 32
Discharge: LEFT BEFORE DISCHARGE | End: 2022-03-12
Attending: EMERGENCY MEDICINE
Payer: COMMERCIAL

## 2022-03-12 ENCOUNTER — APPOINTMENT (OUTPATIENT)
Dept: GENERAL RADIOLOGY | Age: 32
End: 2022-03-12
Payer: COMMERCIAL

## 2022-03-12 VITALS
HEART RATE: 137 BPM | SYSTOLIC BLOOD PRESSURE: 163 MMHG | WEIGHT: 293 LBS | OXYGEN SATURATION: 93 % | BODY MASS INDEX: 48.82 KG/M2 | DIASTOLIC BLOOD PRESSURE: 63 MMHG | RESPIRATION RATE: 24 BRPM | TEMPERATURE: 99.6 F | HEIGHT: 65 IN

## 2022-03-12 DIAGNOSIS — J45.20 MILD INTERMITTENT ASTHMA IN ADULT WITHOUT COMPLICATION: ICD-10-CM

## 2022-03-12 DIAGNOSIS — J45.51 SEVERE PERSISTENT ASTHMA WITH EXACERBATION: Primary | ICD-10-CM

## 2022-03-12 LAB
ABSOLUTE EOS #: 0.41 K/UL (ref 0–0.44)
ABSOLUTE IMMATURE GRANULOCYTE: 0.05 K/UL (ref 0–0.3)
ABSOLUTE LYMPH #: 1.5 K/UL (ref 1.1–3.7)
ABSOLUTE MONO #: 1.17 K/UL (ref 0.1–1.2)
ALBUMIN SERPL-MCNC: 3.8 G/DL (ref 3.5–5.2)
ALBUMIN/GLOBULIN RATIO: 1 (ref 1–2.5)
ALP BLD-CCNC: 103 U/L (ref 35–104)
ALT SERPL-CCNC: 46 U/L (ref 5–33)
ANION GAP SERPL CALCULATED.3IONS-SCNC: 13 MMOL/L (ref 9–17)
AST SERPL-CCNC: 39 U/L
BASOPHILS # BLD: 0 % (ref 0–2)
BASOPHILS ABSOLUTE: 0.04 K/UL (ref 0–0.2)
BILIRUB SERPL-MCNC: 0.52 MG/DL (ref 0.3–1.2)
BUN BLDV-MCNC: 9 MG/DL (ref 6–20)
CALCIUM SERPL-MCNC: 9 MG/DL (ref 8.6–10.4)
CARBOXYHEMOGLOBIN: 2.5 % (ref 0–5)
CHLORIDE BLD-SCNC: 101 MMOL/L (ref 98–107)
CO2: 21 MMOL/L (ref 20–31)
CREAT SERPL-MCNC: 0.77 MG/DL (ref 0.5–0.9)
EOSINOPHILS RELATIVE PERCENT: 3 % (ref 1–4)
FIO2: ABNORMAL
GFR AFRICAN AMERICAN: >60 ML/MIN
GFR NON-AFRICAN AMERICAN: >60 ML/MIN
GFR SERPL CREATININE-BSD FRML MDRD: ABNORMAL ML/MIN/{1.73_M2}
GLUCOSE BLD-MCNC: 204 MG/DL (ref 70–99)
HCG QUALITATIVE: NEGATIVE
HCO3 VENOUS: 21.7 MMOL/L (ref 24–30)
HCT VFR BLD CALC: 42.7 % (ref 36.3–47.1)
HEMOGLOBIN: 13.9 G/DL (ref 11.9–15.1)
IMMATURE GRANULOCYTES: 0 %
LIPASE: 27 U/L (ref 13–60)
LYMPHOCYTES # BLD: 11 % (ref 24–43)
MCH RBC QN AUTO: 30.5 PG (ref 25.2–33.5)
MCHC RBC AUTO-ENTMCNC: 32.6 G/DL (ref 28.4–34.8)
MCV RBC AUTO: 93.6 FL (ref 82.6–102.9)
MONOCYTES # BLD: 8 % (ref 3–12)
NEGATIVE BASE EXCESS, VEN: 3.4 MMOL/L (ref 0–2)
NRBC AUTOMATED: 0 PER 100 WBC
O2 SAT, VEN: 78.7 % (ref 60–85)
PATIENT TEMP: 37
PCO2, VEN: 41.6 (ref 39–55)
PDW BLD-RTO: 13.4 % (ref 11.8–14.4)
PH VENOUS: 7.34 (ref 7.32–7.42)
PLATELET # BLD: 400 K/UL (ref 138–453)
PMV BLD AUTO: 9.9 FL (ref 8.1–13.5)
PO2, VEN: 46.7 (ref 30–50)
POTASSIUM SERPL-SCNC: 4.3 MMOL/L (ref 3.7–5.3)
RBC # BLD: 4.56 M/UL (ref 3.95–5.11)
SEG NEUTROPHILS: 78 % (ref 36–65)
SEGMENTED NEUTROPHILS ABSOLUTE COUNT: 10.94 K/UL (ref 1.5–8.1)
SODIUM BLD-SCNC: 135 MMOL/L (ref 135–144)
TOTAL PROTEIN: 7.6 G/DL (ref 6.4–8.3)
TROPONIN, HIGH SENSITIVITY: <6 NG/L (ref 0–14)
WBC # BLD: 14.1 K/UL (ref 3.5–11.3)

## 2022-03-12 PROCEDURE — 96375 TX/PRO/DX INJ NEW DRUG ADDON: CPT

## 2022-03-12 PROCEDURE — 99284 EMERGENCY DEPT VISIT MOD MDM: CPT

## 2022-03-12 PROCEDURE — 96365 THER/PROPH/DIAG IV INF INIT: CPT

## 2022-03-12 PROCEDURE — 80053 COMPREHEN METABOLIC PANEL: CPT

## 2022-03-12 PROCEDURE — 94644 CONT INHLJ TX 1ST HOUR: CPT

## 2022-03-12 PROCEDURE — 83690 ASSAY OF LIPASE: CPT

## 2022-03-12 PROCEDURE — 36415 COLL VENOUS BLD VENIPUNCTURE: CPT

## 2022-03-12 PROCEDURE — 94761 N-INVAS EAR/PLS OXIMETRY MLT: CPT

## 2022-03-12 PROCEDURE — 84484 ASSAY OF TROPONIN QUANT: CPT

## 2022-03-12 PROCEDURE — 2700000000 HC OXYGEN THERAPY PER DAY

## 2022-03-12 PROCEDURE — 6360000002 HC RX W HCPCS: Performed by: STUDENT IN AN ORGANIZED HEALTH CARE EDUCATION/TRAINING PROGRAM

## 2022-03-12 PROCEDURE — 71045 X-RAY EXAM CHEST 1 VIEW: CPT

## 2022-03-12 PROCEDURE — 6360000002 HC RX W HCPCS

## 2022-03-12 PROCEDURE — 93005 ELECTROCARDIOGRAM TRACING: CPT | Performed by: STUDENT IN AN ORGANIZED HEALTH CARE EDUCATION/TRAINING PROGRAM

## 2022-03-12 PROCEDURE — 82805 BLOOD GASES W/O2 SATURATION: CPT

## 2022-03-12 PROCEDURE — 84703 CHORIONIC GONADOTROPIN ASSAY: CPT

## 2022-03-12 PROCEDURE — 85025 COMPLETE CBC W/AUTO DIFF WBC: CPT

## 2022-03-12 RX ORDER — METHYLPREDNISOLONE SODIUM SUCCINATE 125 MG/2ML
125 INJECTION, POWDER, LYOPHILIZED, FOR SOLUTION INTRAMUSCULAR; INTRAVENOUS ONCE
Status: COMPLETED | OUTPATIENT
Start: 2022-03-12 | End: 2022-03-12

## 2022-03-12 RX ORDER — ALBUTEROL SULFATE 2.5 MG/3ML
2.5 SOLUTION RESPIRATORY (INHALATION) EVERY 6 HOURS PRN
Qty: 120 EACH | Refills: 0 | Status: SHIPPED | OUTPATIENT
Start: 2022-03-12 | End: 2022-04-21

## 2022-03-12 RX ORDER — ALBUTEROL SULFATE 90 UG/1
AEROSOL, METERED RESPIRATORY (INHALATION)
Qty: 1 EACH | Refills: 2 | Status: SHIPPED | OUTPATIENT
Start: 2022-03-12 | End: 2022-03-24 | Stop reason: SDUPTHER

## 2022-03-12 RX ORDER — ONDANSETRON 2 MG/ML
INJECTION INTRAMUSCULAR; INTRAVENOUS
Status: COMPLETED
Start: 2022-03-12 | End: 2022-03-12

## 2022-03-12 RX ORDER — METHYLPREDNISOLONE SODIUM SUCCINATE 125 MG/2ML
INJECTION, POWDER, LYOPHILIZED, FOR SOLUTION INTRAMUSCULAR; INTRAVENOUS
Status: COMPLETED
Start: 2022-03-12 | End: 2022-03-12

## 2022-03-12 RX ORDER — PREDNISONE 20 MG/1
40 TABLET ORAL DAILY
Qty: 10 TABLET | Refills: 0 | Status: SHIPPED | OUTPATIENT
Start: 2022-03-12 | End: 2022-03-17

## 2022-03-12 RX ORDER — ONDANSETRON 2 MG/ML
4 INJECTION INTRAMUSCULAR; INTRAVENOUS ONCE
Status: COMPLETED | OUTPATIENT
Start: 2022-03-12 | End: 2022-03-12

## 2022-03-12 RX ORDER — MAGNESIUM SULFATE IN WATER 40 MG/ML
2000 INJECTION, SOLUTION INTRAVENOUS ONCE
Status: COMPLETED | OUTPATIENT
Start: 2022-03-12 | End: 2022-03-12

## 2022-03-12 RX ADMIN — METHYLPREDNISOLONE SODIUM SUCCINATE 125 MG: 125 INJECTION, POWDER, LYOPHILIZED, FOR SOLUTION INTRAMUSCULAR; INTRAVENOUS at 19:44

## 2022-03-12 RX ADMIN — MAGNESIUM SULFATE 2000 MG: 2 INJECTION INTRAVENOUS at 19:54

## 2022-03-12 RX ADMIN — ALBUTEROL SULFATE 18 MG: 5 SOLUTION RESPIRATORY (INHALATION) at 19:50

## 2022-03-12 RX ADMIN — METHYLPREDNISOLONE SODIUM SUCCINATE 125 MG: 125 INJECTION, POWDER, FOR SOLUTION INTRAMUSCULAR; INTRAVENOUS at 19:44

## 2022-03-12 RX ADMIN — ONDANSETRON 4 MG: 2 INJECTION INTRAMUSCULAR; INTRAVENOUS at 19:43

## 2022-03-12 ASSESSMENT — PAIN DESCRIPTION - LOCATION: LOCATION: CHEST

## 2022-03-12 ASSESSMENT — PAIN DESCRIPTION - PAIN TYPE: TYPE: ACUTE PAIN

## 2022-03-12 ASSESSMENT — PAIN DESCRIPTION - FREQUENCY: FREQUENCY: CONTINUOUS

## 2022-03-12 ASSESSMENT — ENCOUNTER SYMPTOMS
SORE THROAT: 0
VOMITING: 0
RHINORRHEA: 0
DIARRHEA: 0
WHEEZING: 1
NAUSEA: 0
ABDOMINAL PAIN: 0
STRIDOR: 0
SHORTNESS OF BREATH: 1
COUGH: 1

## 2022-03-12 ASSESSMENT — PAIN DESCRIPTION - ONSET: ONSET: PROGRESSIVE

## 2022-03-12 ASSESSMENT — PAIN DESCRIPTION - PROGRESSION: CLINICAL_PROGRESSION: GRADUALLY WORSENING

## 2022-03-12 ASSESSMENT — PAIN SCALES - GENERAL: PAINLEVEL_OUTOF10: 10

## 2022-03-12 ASSESSMENT — PAIN DESCRIPTION - DESCRIPTORS: DESCRIPTORS: TIGHTNESS

## 2022-03-13 NOTE — ED NOTES
The following labs labeled with pt sticker and tubed to lab:     [x] Blue     [x] Lavender   [] on ice  [x] Green/yellow  [x] Green/black [] on ice  [] Yellow  [] Red  [] Pink      [] COVID-19 swab    [] Rapid  [] PCR  [] Flu swab  [] Peds Viral Panel     [] Urine Sample  [] Pelvic Cultures  [] Blood Cultures            Isidoro Smith RN  03/12/22 2001

## 2022-03-13 NOTE — ED TRIAGE NOTES
PT. Has hx of asthma and started to increase her breathing treatments at home more than normal the last few days. PT. Today was out to dinner when her SOB became worse.

## 2022-03-13 NOTE — ED PROVIDER NOTES
[facility]     Emergency Department     Faculty Attestation    I performed a history and physical examination of the patient and discussed management with the resident. I reviewed the resident´s note and agree with the documented findings and plan of care. Any areas of disagreement are noted on the chart. I was personally present for the key portions of any procedures. I have documented in the chart those procedures where I was not present during the key portions. I have reviewed the emergency nurses triage note. I agree with the chief complaint, past medical history, past surgical history, allergies, medications, social and family history as documented unless otherwise noted below. For Physician Assistant/ Nurse Practitioner cases/documentation I have personally evaluated this patient and have completed at least one if not all key elements of the E/M (history, physical exam, and MDM). Additional findings are as noted. History of asthma, patient has been vaccinated for Covid, moderate respiratory distress, diffuse end expiratory wheezes.      Margot Camejo MD  03/12/22 1958       EKG Interpretation    Interpreted by emergency department physician    Rhythm: normal sinus   Rate: 154  Axis: normal 32  Ectopy: none  Conduction: normal  ST Segments: Inferior ST depression  T Waves: no acute change  Q Waves: none    Clinical Impression: Abnormal EKG    Margot Camejo, CHRISSY Camejo MD  03/12/22 2014

## 2022-03-13 NOTE — ED PROVIDER NOTES
[facility] ED  Emergency Department Encounter  EmergencyMedicine Resident     Pt Name:Meño Singh  MRN: [id number]  [place] 1990  Date of evaluation: 3/12/22  PCP:  No primary care provider on file. This patient was evaluated in the Emergency Department for symptoms described in the history of present illness. The patient was evaluated in the context of the global COVID-19 pandemic, which necessitated consideration that the patient might be at risk for infection with the SARS-CoV-2 virus that causes COVID-19. Institutional protocols and algorithms that pertain to the evaluation of patients at risk for COVID-19 are in a state of rapid change based on information released by regulatory bodies including the CDC and federal and state organizations. These policies and algorithms were followed during the patient's care in the ED. CHIEF COMPLAINT       Chief Complaint   Patient presents with    Shortness of Breath     hx of asthma, inhaler not working       HISTORY OF PRESENT ILLNESS  (Location/Symptom, Timing/Onset, Context/Setting, Quality, Duration, Modifying Factors, Severity.)      Jefferson Ballesteros is a 33 y.o. female who presents with shortness of breath. Patient presents in respiratory distress, worsening for the past few days, associated with cough, wheezing. Patient denies fevers, chills, congestion, rhinorrhea, sore throat, vision changes, chest pain, abdominal pain, nausea, vomiting, diarrhea, lower extremity swelling or pain. Patient has history of asthma, was recently seen in the emergency department 2 weeks ago, received prednisone burst, improvement in symptoms, developed the symptoms again, unresponsive to home albuterol. Patient also has history of diabetes, obesity, no cardiac history.     PAST MEDICAL / SURGICAL / SOCIAL / FAMILY HISTORY      has a past medical history of Asthma, Back pain, Bipolar 1 disorder ([facility] [address] Bipolar disorder ([facility] [address] Depression, Diabetes mellitus ([facility] [address] Dizziness, Fatty liver disease, nonalcoholic, Fatty liver disease, nonalcoholic, GERD (gastroesophageal reflux disease), and Obesity. has a past surgical history that includes  section and LEEP. Social History     Socioeconomic History    Marital status:      Spouse name: Not on file    Number of children: Not on file    Years of education: Not on file    Highest education level: Not on file   Occupational History    Not on file   Tobacco Use    Smoking status: Former Smoker     Types: Cigarettes    Smokeless tobacco: Never Used   Vaping Use    Vaping Use: Never used   Substance and Sexual Activity    Alcohol use: Not on file     Comment: rarely    Drug use: Not Currently     Types: Marijuana Jaycee Bachelor)     Comment: quit    Sexual activity: Not Currently   Other Topics Concern    Not on file   Social History Narrative    Not on file     Social Determinants of Health     Financial Resource Strain: Low Risk     Difficulty of Paying Living Expenses: Not very hard   Food Insecurity: No Food Insecurity    Worried About Running Out of Food in the Last Year: Never true    [address] in the Last Year: Never true   Transportation Needs:     Lack of Transportation (Medical): Not on file    Lack of Transportation (Non-Medical):  Not on file   Physical Activity:     Days of Exercise per Week: Not on file    Minutes of Exercise per Session: Not on file   Stress:     Feeling of Stress : Not on file   Social Connections:     Frequency of Communication with Friends and Family: Not on file    Frequency of Social Gatherings with Friends and Family: Not on file    Attends Denominational Services: Not on file    Active Member of Clubs or Organizations: Not on file    Attends Club or Organization Meetings: Not on file    Marital Status: Not on file   Intimate Partner Violence:     Fear of Current or Ex-Partner: Not on file    Emotionally Abused: Not on file   Svetlana Dwyer MD   sertraline (ZOLOFT) 50 MG tablet Take 3 tablets by mouth daily 3/18/21   Nicola Felton MD   cariprazine hcl (VRAYLAR) 3 MG CAPS capsule Take 1 capsule by mouth daily 3/18/21   Nicola Felton MD   traZODone (DESYREL) 50 MG tablet Take 1 tablet by mouth nightly as needed for Sleep 8/16/20   Lynn Kelly, APRN - CNP       REVIEW OF SYSTEMS    (2-9 systems for level 4, 10 or more for level 5)      Review of Systems   Constitutional: Negative for chills, diaphoresis, fatigue and fever. HENT: Negative for congestion, rhinorrhea and sore throat. Eyes: Negative for visual disturbance. Respiratory: Positive for cough, shortness of breath and wheezing. Negative for stridor. Cardiovascular: Negative for chest pain. Gastrointestinal: Negative for abdominal pain, diarrhea, nausea and vomiting. Genitourinary: Negative for dysuria and hematuria. Musculoskeletal: Negative for neck pain and neck stiffness. Skin: Negative for pallor and rash. Neurological: Negative for dizziness, syncope, weakness, light-headedness and headaches. Psychiatric/Behavioral: Negative for confusion. PHYSICAL EXAM   (up to 7 for level 4, 8 or more for level 5)      INITIAL VITALS:   BP (!) 163/63   Pulse 137   Temp 99.6 °F (37.6 °C) (Oral)   Resp 24   Ht 5' 5\" (1.651 m)   Wt (!) 334 lb (151.5 kg)   LMP 02/12/2022   SpO2 93%   BMI 55.58 kg/m²     Physical Exam  Constitutional:       General: She is in acute distress. Appearance: She is well-developed. She is ill-appearing. She is not toxic-appearing or diaphoretic. HENT:      Head: Normocephalic and atraumatic. Right Ear: External ear normal.      Left Ear: External ear normal.      Nose: Nose normal. No congestion or rhinorrhea. Eyes:      General:         Right eye: No discharge. Left eye: No discharge. Extraocular Movements: Extraocular movements intact. Pupils: Pupils are equal, round, and reactive to light.    Neck:      Vascular: No JVD. Trachea: No tracheal deviation. Cardiovascular:      Rate and Rhythm: Normal rate and regular rhythm. Pulses: Normal pulses. Heart sounds: Normal heart sounds. No murmur heard. No friction rub. No gallop. Pulmonary:      Effort: Respiratory distress present. Breath sounds: No stridor. Wheezing present. No rhonchi or rales. Chest:      Chest wall: No tenderness. Abdominal:      General: There is no distension. Palpations: Abdomen is soft. There is no mass. Tenderness: There is no abdominal tenderness. There is no right CVA tenderness, left CVA tenderness or guarding. Musculoskeletal:         General: Normal range of motion. Cervical back: Normal range of motion and neck supple. Right lower leg: No edema. Left lower leg: No edema. Skin:     General: Skin is warm. Capillary Refill: Capillary refill takes less than 2 seconds. Neurological:      Mental Status: She is alert and oriented to person, place, and time.    Psychiatric:         Mood and Affect: Mood normal.         Behavior: Behavior normal.         DIFFERENTIAL  DIAGNOSIS     PLAN (LABS / IMAGING / EKG):  Orders Placed This Encounter   Procedures    XR CHEST PORTABLE    CBC with Auto Differential    Comprehensive Metabolic Panel w/ Reflex to MG    Lipase    Blood Gas, Venous    HCG Qualitative, Serum    Troponin    EKG 12 Lead       MEDICATIONS ORDERED:  Orders Placed This Encounter   Medications    ondansetron (ZOFRAN) 4 MG/2ML injection     Adriel Carroll: cabinet override    methylPREDNISolone sodium (SOLU-MEDROL) 125 MG injection     Adriel Carroll: cabinet override    ondansetron (ZOFRAN) injection 4 mg    methylPREDNISolone sodium (SOLU-MEDROL) injection 125 mg    magnesium sulfate 2000 mg in 50 mL IVPB premix    DISCONTD: albuterol (PROVENTIL) nebulizer solution 18 mg     Order Specific Question:   Initiate RT Bronchodilator Protocol     Answer:   No    albuterol (PROVENTIL) (2.5 MG/3ML) 0.083% nebulizer solution     Sig: Take 3 mLs by nebulization every 6 hours as needed for Wheezing     Dispense:  120 each     Refill:  0    albuterol sulfate HFA (PROVENTIL HFA) 108 (90 Base) MCG/ACT inhaler     Sig: Space out to every 6 hours as symptoms improve.      Dispense:  1 each     Refill:  2    predniSONE (DELTASONE) 20 MG tablet     Sig: Take 2 tablets by mouth daily for 5 days     Dispense:  10 tablet     Refill:  0       DDX: Asthma exacerbation, ACS    DIAGNOSTIC RESULTS / EMERGENCY DEPARTMENT COURSE / MDM   LAB RESULTS:  Results for orders placed or performed during the hospital encounter of 03/12/22   CBC with Auto Differential   Result Value Ref Range    WBC 14.1 (H) 3.5 - 11.3 k/uL    RBC 4.56 3.95 - 5.11 m/uL    Hemoglobin 13.9 11.9 - 15.1 g/dL    Hematocrit 42.7 36.3 - 47.1 %    MCV 93.6 82.6 - 102.9 fL    MCH 30.5 25.2 - 33.5 pg    MCHC 32.6 28.4 - 34.8 g/dL    RDW 13.4 11.8 - 14.4 %    Platelets 935 056 - 868 k/uL    MPV 9.9 8.1 - 13.5 fL    NRBC Automated 0.0 0.0 per 100 WBC    Seg Neutrophils 78 (H) 36 - 65 %    Lymphocytes 11 (L) 24 - 43 %    Monocytes 8 3 - 12 %    Eosinophils % 3 1 - 4 %    Basophils 0 0 - 2 %    Immature Granulocytes 0 0 %    Segs Absolute 10.94 (H) 1.50 - 8.10 k/uL    Absolute Lymph # 1.50 1.10 - 3.70 k/uL    Absolute Mono # 1.17 0.10 - 1.20 k/uL    Absolute Eos # 0.41 0.00 - 0.44 k/uL    Basophils Absolute 0.04 0.00 - 0.20 k/uL    Absolute Immature Granulocyte 0.05 0.00 - 0.30 k/uL   Comprehensive Metabolic Panel w/ Reflex to MG   Result Value Ref Range    Glucose 204 (H) 70 - 99 mg/dL    BUN 9 6 - 20 mg/dL    CREATININE 0.77 0.50 - 0.90 mg/dL    Calcium 9.0 8.6 - 10.4 mg/dL    Sodium 135 135 - 144 mmol/L    Potassium 4.3 3.7 - 5.3 mmol/L    Chloride 101 98 - 107 mmol/L    CO2 21 20 - 31 mmol/L    Anion Gap 13 9 - 17 mmol/L    Alkaline Phosphatase 103 35 - 104 U/L    ALT 46 (H) 5 - 33 U/L    AST 39 (H) <32 U/L    Total Bilirubin 0.52 0.3 - 1.2 mg/dL    Total Protein 7.6 6.4 - 8.3 g/dL    Albumin 3.8 3.5 - 5.2 g/dL    Albumin/Globulin Ratio 1.0 1.0 - 2.5    GFR Non-African American >60 >60 mL/min    GFR African American >60 >60 mL/min    GFR Comment         Lipase   Result Value Ref Range    Lipase 27 13 - 60 U/L   Blood Gas, Venous   Result Value Ref Range    pH, Kirt 7.337 7.320 - 7.420    pCO2, Kirt 41.6 39 - 55    pO2, Kirt 46.7 30 - 50    HCO3, Venous 21.7 (L) 24 - 30 mmol/L    Negative Base Excess, Kirt 3.4 (H) 0.0 - 2.0 mmol/L    O2 Sat, Kirt 78.7 60.0 - 85.0 %    Carboxyhemoglobin 2.5 0 - 5 %    Pt Temp 37.0     FIO2 UNKNOWN    HCG Qualitative, Serum   Result Value Ref Range    hCG Qual NEGATIVE NEGATIVE   Troponin   Result Value Ref Range    Troponin, High Sensitivity <6 0 - 14 ng/L       IMPRESSION: 68-year-old female with history of asthma presenting in respiratory distress, patient is unable to speak in full sentences, significant inspiratory expiratory wheezes, diminished breath sounds, unable to lie back secondary respiratory distress, will administer Solu-Medrol, magnesium, breathing treatments, reassess. Will obtain chest x-ray, cardiac work-up to include troponins and EKG. Will obtain VBG, CBC, BMP to evaluate for electrolyte abnormality, anemia. RADIOLOGY:  XR CHEST PORTABLE    Result Date: 3/12/2022  EXAMINATION: ONE XRAY VIEW OF THE CHEST 3/12/2022 7:22 pm COMPARISON: March 3, 2022. HISTORY: ORDERING SYSTEM PROVIDED HISTORY: Dyspnea TECHNOLOGIST PROVIDED HISTORY: Dyspnea Reason for Exam: upr,sob,hx asthma,anxiety FINDINGS: A portable upright frontal view chest radiograph was obtained. The heart is top-normal in size. The mediastinal contour and pleural spaces are otherwise within normal limits. The lungs are grossly clear. There is no focal consolidation or pneumothorax. The pulmonary vascular pattern is within normal limits. No acute thoracic osseous abnormality. Clear lungs. No acute cardiopulmonary abnormality. EKG  EKG Interpretation    Interpreted by me    Rhythm: Sinus tachycardia  Rate: 154  Axis: normal  Ectopy: none  Conduction: normal  ST Segments: Inferior ST depression  T Waves: no acute change  Q Waves: none    Clinical Impression: Inferior ST depressions    All EKG's are interpreted by the Emergency Department Physician who either signs or Co-signs this chart in the absence of a cardiologist.    EMERGENCY DEPARTMENT COURSE:  Patient came to emergency department, HPI and physical exam were conducted. All nursing notes were reviewed. EKG showed sinus tachycardia, no acute changes, troponin negative, no concern for ACS. Labs are stable. On reevaluation, patient is still mildly short of breath, inspiratory and expiratory wheezing. However, patient reports that she has to go home, will not stay, is leaving the emergency [address] The patient is clinically not intoxicated, free from distracting pain, appears to have intact insight, judgment and reason and in my medical opinion has the capacity to make decisions. The patient is also not under any duress to leave the hospital. In this scenario, it would be battery to subject a patient to treatment against his/her will. I have voiced my concerns for the patient's health given that a full evaluation and treatment had not occurred. I have discussed the need for continued evaluation to determine if their symptoms are caused by a condition that present risk of death or morbidity. Risks including but not limited to death, permanent disability, prolonged hospitalization, prolonged illness, were discussed. I tried offering alternative options in hopes that the patient might be amenable to partial evaluation and treatment which would be medically beneficial to the patient, though the patient declined my options and insisted on leaving.  Because I have been unable to convince the patient to stay, I answered all of their questions about their condition and asked them to return to the ED as soon as possible to complete their evaluation, especially if their symptoms worsen or do not improve. I emphasized that leaving against medical advice does not preclude returning here for further evaluation. I asked the patient to return if they change their mind about the further evaluation and treatment. I strongly encouraged the patient to return to this Emergency Department or any Emergency Department at any time, particularly with worsening symptoms. PROCEDURES:      CONSULTS:  None    CRITICAL CARE:      FINAL IMPRESSION      1. Severe persistent asthma with exacerbation    2.  Mild intermittent asthma in adult without complication          DISPOSITION / PLAN     DISPOSITION AMA      PATIENT REFERRED TO:  [facility]  [address] [address]  [phone]  Go to   As needed, If symptoms worsen    [facility]  [address] [address]  [phone]  Schedule an appointment as soon as possible for a visit in 3 days  For reassessment, For establishment of care      DISCHARGE MEDICATIONS:  Discharge Medication List as of 3/12/2022  9:53 PM      START taking these medications    Details   albuterol (PROVENTIL) (2.5 MG/3ML) 0.083% nebulizer solution Take 3 mLs by nebulization every 6 hours as needed for Wheezing, Disp-120 each, R-0Print             Clovis Santiago MD  Emergency Medicine Resident    (Please note that portions of thisnote were completed with a voice recognition program.  Efforts were made to edit the dictations but occasionally words are mis-transcribed.)        Clovis Santiago MD  Resident  03/14/22 [id number]

## 2022-03-13 NOTE — ED NOTES
PT. Does not want to stay and be adimitted. PT. understands she is signing out AMA.  PT. Is A&O times 3     Aide Gill RN  03/12/22 [id number]

## 2022-03-15 LAB
EKG ATRIAL RATE: 154 BPM
EKG P AXIS: 68 DEGREES
EKG P-R INTERVAL: 124 MS
EKG Q-T INTERVAL: 248 MS
EKG QRS DURATION: 74 MS
EKG QTC CALCULATION (BAZETT): 397 MS
EKG R AXIS: 32 DEGREES
EKG T AXIS: -147 DEGREES
EKG VENTRICULAR RATE: 154 BPM

## 2022-03-17 ENCOUNTER — OFFICE VISIT (OUTPATIENT)
Dept: INTERNAL MEDICINE | Age: 32
End: 2022-03-17
Payer: COMMERCIAL

## 2022-03-17 VITALS
BODY MASS INDEX: 48.82 KG/M2 | DIASTOLIC BLOOD PRESSURE: 78 MMHG | WEIGHT: 293 LBS | HEIGHT: 65 IN | TEMPERATURE: 98.6 F | HEART RATE: 81 BPM | SYSTOLIC BLOOD PRESSURE: 130 MMHG

## 2022-03-17 DIAGNOSIS — J45.51 SEVERE PERSISTENT ASTHMA WITH ACUTE EXACERBATION: ICD-10-CM

## 2022-03-17 DIAGNOSIS — J02.9 SORE THROAT: ICD-10-CM

## 2022-03-17 DIAGNOSIS — Z23 FLU VACCINE NEED: ICD-10-CM

## 2022-03-17 DIAGNOSIS — E11.9 TYPE 2 DIABETES MELLITUS WITHOUT COMPLICATION, WITHOUT LONG-TERM CURRENT USE OF INSULIN (HCC): ICD-10-CM

## 2022-03-17 DIAGNOSIS — M25.472 ANKLE SWELLING, LEFT: ICD-10-CM

## 2022-03-17 DIAGNOSIS — K21.9 GASTROESOPHAGEAL REFLUX DISEASE, UNSPECIFIED WHETHER ESOPHAGITIS PRESENT: ICD-10-CM

## 2022-03-17 DIAGNOSIS — Z13.220 SCREENING FOR HYPERLIPIDEMIA: ICD-10-CM

## 2022-03-17 DIAGNOSIS — Z23 NEED FOR HEPATITIS B VACCINATION: ICD-10-CM

## 2022-03-17 DIAGNOSIS — K57.92 DIVERTICULITIS: Primary | ICD-10-CM

## 2022-03-17 DIAGNOSIS — F32.A DEPRESSION WITH SUICIDAL IDEATION: ICD-10-CM

## 2022-03-17 DIAGNOSIS — E88.89 STEATOSIS (HCC): ICD-10-CM

## 2022-03-17 DIAGNOSIS — G47.33 OBSTRUCTIVE SLEEP APNEA SYNDROME: ICD-10-CM

## 2022-03-17 DIAGNOSIS — K80.20 CALCULUS OF GALLBLADDER WITHOUT CHOLECYSTITIS WITHOUT OBSTRUCTION: ICD-10-CM

## 2022-03-17 DIAGNOSIS — Z11.59 NEED FOR HEPATITIS C SCREENING TEST: ICD-10-CM

## 2022-03-17 DIAGNOSIS — R45.851 DEPRESSION WITH SUICIDAL IDEATION: ICD-10-CM

## 2022-03-17 LAB — HBA1C MFR BLD: 7.3 %

## 2022-03-17 PROCEDURE — 90746 HEPB VACCINE 3 DOSE ADULT IM: CPT | Performed by: INTERNAL MEDICINE

## 2022-03-17 PROCEDURE — 99204 OFFICE O/P NEW MOD 45 MIN: CPT | Performed by: STUDENT IN AN ORGANIZED HEALTH CARE EDUCATION/TRAINING PROGRAM

## 2022-03-17 PROCEDURE — 83036 HEMOGLOBIN GLYCOSYLATED A1C: CPT | Performed by: STUDENT IN AN ORGANIZED HEALTH CARE EDUCATION/TRAINING PROGRAM

## 2022-03-17 PROCEDURE — 3051F HG A1C>EQUAL 7.0%<8.0%: CPT | Performed by: STUDENT IN AN ORGANIZED HEALTH CARE EDUCATION/TRAINING PROGRAM

## 2022-03-17 PROCEDURE — 99211 OFF/OP EST MAY X REQ PHY/QHP: CPT | Performed by: INTERNAL MEDICINE

## 2022-03-17 PROCEDURE — 90686 IIV4 VACC NO PRSV 0.5 ML IM: CPT | Performed by: INTERNAL MEDICINE

## 2022-03-17 RX ORDER — PANTOPRAZOLE SODIUM 40 MG/1
40 TABLET, DELAYED RELEASE ORAL
Qty: 90 TABLET | Refills: 1 | Status: SHIPPED | OUTPATIENT
Start: 2022-03-17 | End: 2022-03-24 | Stop reason: SDUPTHER

## 2022-03-17 RX ORDER — HYDROXYZINE PAMOATE 50 MG/1
50 CAPSULE ORAL 2 TIMES DAILY
COMMUNITY
Start: 2022-03-08 | End: 2022-04-21

## 2022-03-17 RX ORDER — ARIPIPRAZOLE 10 MG/1
10 TABLET ORAL DAILY
COMMUNITY
Start: 2022-03-08 | End: 2022-08-10 | Stop reason: SDUPTHER

## 2022-03-17 RX ORDER — BUPROPION HYDROCHLORIDE 150 MG/1
150 TABLET ORAL DAILY
COMMUNITY
Start: 2022-03-08 | End: 2022-04-21

## 2022-03-17 ASSESSMENT — ENCOUNTER SYMPTOMS
DIARRHEA: 0
STRIDOR: 0
WHEEZING: 0
NAUSEA: 1
ABDOMINAL PAIN: 1
APNEA: 1
SHORTNESS OF BREATH: 1
COUGH: 1
ABDOMINAL DISTENTION: 0
CHOKING: 0
BLOOD IN STOOL: 0
BACK PAIN: 1
CONSTIPATION: 0
VOMITING: 0

## 2022-03-17 ASSESSMENT — COLUMBIA-SUICIDE SEVERITY RATING SCALE - C-SSRS
6. HAVE YOU EVER DONE ANYTHING, STARTED TO DO ANYTHING, OR PREPARED TO DO ANYTHING TO END YOUR LIFE?: NO
1. WITHIN THE PAST MONTH, HAVE YOU WISHED YOU WERE DEAD OR WISHED YOU COULD GO TO SLEEP AND NOT WAKE UP?: YES
5. HAVE YOU STARTED TO WORK OUT OR WORKED OUT THE DETAILS OF HOW TO KILL YOURSELF? DO YOU INTEND TO CARRY OUT THIS PLAN?: NO
3. HAVE YOU BEEN THINKING ABOUT HOW YOU MIGHT KILL YOURSELF?: YES
2. HAVE YOU ACTUALLY HAD ANY THOUGHTS OF KILLING YOURSELF?: YES

## 2022-03-17 ASSESSMENT — PATIENT HEALTH QUESTIONNAIRE - PHQ9
SUM OF ALL RESPONSES TO PHQ QUESTIONS 1-9: 18
8. MOVING OR SPEAKING SO SLOWLY THAT OTHER PEOPLE COULD HAVE NOTICED. OR THE OPPOSITE, BEING SO FIGETY OR RESTLESS THAT YOU HAVE BEEN MOVING AROUND A LOT MORE THAN USUAL: 0
SUM OF ALL RESPONSES TO PHQ9 QUESTIONS 1 & 2: 6
6. FEELING BAD ABOUT YOURSELF - OR THAT YOU ARE A FAILURE OR HAVE LET YOURSELF OR YOUR FAMILY DOWN: 3
2. FEELING DOWN, DEPRESSED OR HOPELESS: 3
SUM OF ALL RESPONSES TO PHQ QUESTIONS 1-9: 19
4. FEELING TIRED OR HAVING LITTLE ENERGY: 3
7. TROUBLE CONCENTRATING ON THINGS, SUCH AS READING THE NEWSPAPER OR WATCHING TELEVISION: 1
1. LITTLE INTEREST OR PLEASURE IN DOING THINGS: 3
3. TROUBLE FALLING OR STAYING ASLEEP: 3
SUM OF ALL RESPONSES TO PHQ QUESTIONS 1-9: 19
SUM OF ALL RESPONSES TO PHQ QUESTIONS 1-9: 19
9. THOUGHTS THAT YOU WOULD BE BETTER OFF DEAD, OR OF HURTING YOURSELF: 1
5. POOR APPETITE OR OVEREATING: 2
10. IF YOU CHECKED OFF ANY PROBLEMS, HOW DIFFICULT HAVE THESE PROBLEMS MADE IT FOR YOU TO DO YOUR WORK, TAKE CARE OF THINGS AT HOME, OR GET ALONG WITH OTHER PEOPLE: 1

## 2022-03-17 NOTE — PATIENT INSTRUCTIONS
Return To Clinic 3/24/2022 for 1 week follow up. Please bring all of your medications with you to this appointment. After Visit Summary given and reviewed. The medication list included in this document is our record of what you are currently taking, including any changes that were made at today's visit. If you find any differences when compared to your medications at home, or have any questions that were not answered at your visit, please contact the office. It is very important for your care that you keep your appointment. If for some reason you are unable to keep your appointment, it is equally important that you call our office at [phone] to cancel your appointment and reschedule. Failure to do so may result in your termination from our practice. Medications have been e-scribed to pharmacy of patients choice. LABORATORY INSTRUCTIONS    Your doctor has ordered blood or urine testing. You can get this testing done at the Lab located on the first floor of the [facility], or at any other [facility]. Please stop at Main Registration, before going to the lab, as you must be registered first.     Please get this lab done before your next visit. You may eat or drink before this test.    TESTING INSTRUCTIONS    Your doctor has ordered a/an Echocardiogram for you, this will be done at any [facility] location of your choice    You will be called by the Scheduling Department to schedule your testing. If you do not hear from them within a week, please call them at [phone] to schedule the appointment. On the day of your appointment, please report to the Admitting Department, located on the main floor of the [facility] center behind the information desk. If you cannot keep this appointment, please call [phone] to cancel and reschedule your appointment.     TESTING INSTRUCTIONS    Your doctor has ordered a/an Gallbladder US for you, this will be done at any [facility] location of your choice    You will be called by the Scheduling Department to schedule your testing. If you do not hear from them within a week, please call them at [phone] to schedule the appointment. On the day of your appointment, please report to the Admitting Department, located on the main floor of the [facility] behind the information desk. If you cannot keep this appointment, please call [phone] to cancel and reschedule your appointment. TESTING INSTRUCTIONS    Your doctor has ordered a/an Abdominal/Pelvis CT for you, this will be done at any [address] [facility] location of your choice    You will be called by the Scheduling Department to schedule your testing. If you do not hear from them within a week, please call them at [phone] to schedule the appointment. On the day of your appointment, please report to the Admitting Department, located on the main floor of the [facility] behind the information desk. If you cannot keep this appointment, please call [phone] to cancel and reschedule your appointment. A sleep study has been ordered by your physician. Please contact [phone] to schedule this test. Sleep Center will only attempt 1 time to contact patient, then patient must contact office to schedule. Referral for Pulmonology sent to [facility]) - [facility] - Joseph Freeman MD. Specialty office will contact patient for appointment. A copy of referral with contact information and address given to patient. Patient should contact specialist office if no contact has been made to patient within 1 week. Referral for Gastroenterology sent to [facility]. Specialty office will contact patient for appointment. A copy of referral with contact information and address given to patient. Patient should contact specialist office if no contact has been made to patient within 1 week.      Patient was administered vaccination(s) for Hepatitis B and Influenza in the office. VIS given to patient for each vaccination given.     -BENNETT Vigil       Patient Education        Influenza (Flu) Vaccine: Care Instructions  Your Care Instructions     Influenza (flu) is an infection in the lungs and breathing passages. It is caused by the influenza virus. There are different strains, or types, of the flu virus every year. The flu comes on quickly. It can cause a cough, stuffy nose, fever, chills, tiredness, and aches and pains. These symptoms may last up to 10 days. The flu can make you feel very sick, but most of the time it doesn't lead to other problems. But it can cause serious problems in people who are older or who have a long-term illness, such as heart disease or diabetes. You can help prevent the flu by getting a flu vaccine every year, as soon as it is available. You cannot get the flu from the vaccine. The vaccine prevents most cases of the flu. But even when the vaccine doesn't prevent the flu, it can make symptoms less severe and reduce the chance of problems from the flu. Sometimes, young children and people who have an immune system problem may have a slight fever or muscle aches or pains 6 to 12 hours after getting the shot. These symptoms usually last 1 or 2 days. Follow-up care is a key part of your treatment and safety. Be sure to make and go to all appointments, and call your doctor if you are having problems. It's also a good idea to know your test results and keep a list of the medicines you take. Who should get the flu vaccine? Everyone age 7 months or older should get a flu vaccine each year. It lowers the chance of getting and spreading the flu. The vaccine is very important for people who are at high risk for getting other health problems from the flu. This includes:  · Anyone 48years of age or older. · People who live in a long-term care center, such as a nursing home.   · All children 6 months through 18 years of age.  · Adults and children 6 months and older who have long-term heart or lung problems, such as asthma. · Adults and children 6 months and older who needed medical care or were in a hospital during the past year because of diabetes, chronic kidney disease, or a weak immune system (including HIV or AIDS). · Women who will be pregnant during the flu season. · People who have any condition that can make it hard to breathe or swallow (such as a brain injury or muscle disorders). · People who can give the flu to others who are at high risk for problems from the flu. This includes all health care workers and close contacts of people age 72 or older. Who should not get the flu vaccine? The person who gives the vaccine may tell you not to get it if you:  · Have a severe allergy to eggs or any part of the vaccine. · Have had a severe reaction to a flu vaccine in the past.  · Have had Guillain-Barré syndrome (GBS). · Are sick with a fever. (Get the vaccine when symptoms are gone.)  How can you care for yourself at home? · If you or your child has a sore arm or a slight fever after the vaccine, take an over-the-counter pain medicine, such as acetaminophen (Tylenol) or ibuprofen (Advil, Motrin). Read and follow all instructions on the label. Do not give aspirin to anyone younger than 20. It has been linked to Reye syndrome, a serious illness. · Do not take two or more pain medicines at the same time unless the doctor told you to. Many pain medicines have acetaminophen, which is Tylenol. Too much acetaminophen (Tylenol) can be harmful. When should you call for help? Call 911 anytime you think you may need emergency care. For example, call if after getting the flu vaccine:    · You have symptoms of a severe reaction to the flu vaccine. Symptoms of a severe reaction may include:  ? Severe difficulty breathing. ? Sudden raised, red areas (hives) all over your body. ? Severe lightheadedness.    Call your doctor now or seek immediate medical care if after getting the flu vaccine:    · You think you are having a reaction to the flu vaccine, such as a new fever. Watch closely for changes in your health, and be sure to contact your doctor if you have any problems. Where can you learn more? Go to https://[email or web address].[email or web address] org and sign in to your [employer] account. Enter C961 in the [employer] box to learn more about \"Influenza (Flu) Vaccine: Care Instructions. \"     If you do not have an account, please click on the \"Sign Up Now\" link. Current as of: February 10, 2021               Content Version: 13.1  © 2006-2021 Healthwise, [facility]. Care instructions adapted under license by [facility] ([facility]). If you have questions about a medical condition or this instruction, always ask your healthcare professional. [address] any warranty or liability for your use of this information.

## 2022-03-17 NOTE — PROGRESS NOTES
Internal Medicine Clinic New Patient Note    Date of patient's visit: 3/17/2022  Name:  Marisol Pace  Primary Care Physician: Maude Humphrey MD    Reason for visit: First Visit, establish care   Chief Complaint   Patient presents with   [id number] [facility] [employer] Maintenance     labs pended, eye exam due, vaccines pended    Diabetes     a1c running,     Asthma     getting worse    Sleep Apnea     snores when sleeping, stops breathing,     Abdominal Pain     left sided abdomen pain     Depression     phq9 score - 19         HISTORY OF PRESENTING ILLNESS:    History was obtained from the patient. Marisol Pace is a 33 y.o. is here to establish care. Patient presents with multiple complaints. She has been having a L sided abdominal pain has a hx of perforated diverticulitis. Patient also has a hx of liver steatosis which was never treated. She also is complaining of abdominal and chest pain after eating. Patient today also reported that she had suicidal ideation a few days ago but is currently not having them. Follows with kailey. PHQ9 score is 19 today    Has been having multiple asthma exacerbations. Most recently 3/15/21. She is very obese with BMI of 55, A1c is 7 today, is a candidate for bariatric surgery but will need psych clearance    Reports waking up from bed gasping for breath. States that she had a sleep study a long time ago    Have been having sore throat. Step son had recent strep throat infection    States that she has had LE pitting edema since she was very young    Former smoker, no alcohol no illicit drugs.       PAST MEDICAL HISTORY:          Diagnosis Date    Asthma     Back pain 9/16/2014    Bipolar 1 disorder (HCC)     Bipolar disorder ([facility] [address] 12/8/2017    Depression     Diabetes mellitus (HCC)     Dizziness     Fatty liver disease, nonalcoholic     Fatty liver disease, nonalcoholic     GERD (gastroesophageal reflux disease)     Obesity        PAST SURGICAL HISTORY:          Procedure Laterality Date     SECTION      LEEP         ALLERGIES:      Allergies   Allergen Reactions    Morphine Anaphylaxis    Pcn [Penicillins] Hives and Shortness Of Breath    Amoxicillin Hives    Seasonal          HOME MEDICATION:      Current Outpatient Medications on File Prior to Visit   Medication Sig Dispense Refill    ARIPiprazole (ABILIFY) 10 MG tablet Take 10 mg by mouth daily      buPROPion (WELLBUTRIN XL) 150 MG extended release tablet Take 150 mg by mouth daily      hydrOXYzine (VISTARIL) 50 MG capsule Take 50 mg by mouth in the morning and at bedtime      albuterol (PROVENTIL) (2.5 MG/3ML) 0.083% nebulizer solution Take 3 mLs by nebulization every 6 hours as needed for Wheezing 120 each 0    albuterol sulfate HFA (PROVENTIL HFA) 108 (90 Base) MCG/ACT inhaler Space out to every 6 hours as symptoms improve.  1 each 2    predniSONE (DELTASONE) 20 MG tablet Take 2 tablets by mouth daily for 5 days 10 tablet 0    olodaterol (STRIVERDI RESPIMAT) 2.5 MCG/ACT inhaler Inhale 2 puffs into the lungs daily 4 g 0    budesonide-formoterol (SYMBICORT) 160-4.5 MCG/ACT AERO Inhale 2 puffs into the lungs 2 times daily (Patient not taking: Reported on 3/17/2022) 1 each 2    acetaminophen (TYLENOL) 500 MG tablet Take 2 tablets by mouth every 8 hours for 7 days (Patient not taking: Reported on 3/17/2022) 42 tablet 0    ibuprofen (ADVIL;MOTRIN) 600 MG tablet Take 1 tablet by mouth 4 times daily as needed for Pain (Patient not taking: Reported on 3/17/2022) 28 tablet 0    aspirin-acetaminophen-caffeine (EXCEDRIN MIGRAINE) 590-660-48 MG per tablet Take 1 tablet by mouth every 6 hours as needed for Headaches (Patient not taking: Reported on 3/17/2022) 90 tablet 3    sertraline (ZOLOFT) 50 MG tablet Take 3 tablets by mouth daily (Patient not taking: Reported on 3/17/2022) 90 tablet 3    cariprazine hcl (VRAYLAR) 3 MG CAPS capsule Take 1 capsule by mouth daily (Patient not taking: Fear of Current or Ex-Partner: Not on file    Emotionally Abused: Not on file    Physically Abused: Not on file    Sexually Abused: Not on file   Housing Stability:     Unable to Pay for Housing in the Last Year: Not on file    Number of Places Lived in the Last Year: Not on file    Unstable Housing in the Last Year: Not on file         FAMILY HISTORY:          Problem Relation Age of Onset    High Blood Pressure Mother     Diabetes Mother     Heart Disease Mother     Heart Disease Father     Early Death Father     Cancer Maternal Aunt         lung    Cancer Paternal Cousin         brain       Review of Systems   Constitutional: Negative for chills, diaphoresis, fatigue and fever. Respiratory: Positive for apnea, cough and shortness of breath. Negative for choking, wheezing and stridor. Cardiovascular: Positive for leg swelling. Negative for chest pain. Gastrointestinal: Positive for abdominal pain and nausea. Negative for abdominal distention, blood in stool, constipation, diarrhea and vomiting. Genitourinary: Negative for decreased urine volume, dysuria and urgency. Musculoskeletal: Positive for back pain. Skin: Negative for pallor and wound. Neurological: Negative for syncope, weakness, numbness and headaches. Psychiatric/Behavioral: Negative for agitation, behavioral problems, confusion, decreased concentration and self-injury. PHYSICAL EXAM:      Vitals:    03/17/22 1433   BP: 130/78   Pulse: 81   Temp: 98.6 °F (37 °C)      Physical Exam  Constitutional:       General: She is not in acute distress. Appearance: Normal appearance. She is obese. She is not ill-appearing, toxic-appearing or diaphoretic. HENT:      Mouth/Throat:      Mouth: Mucous membranes are moist.      Pharynx: Oropharynx is clear. Eyes:      General: No scleral icterus. Cardiovascular:      Pulses: Normal pulses. Heart sounds: Normal heart sounds. No murmur heard. No friction rub. No gallop. Pulmonary:      Effort: Pulmonary effort is normal.      Breath sounds: Normal breath sounds. Abdominal:      General: Abdomen is flat. Bowel sounds are normal. There is no distension. Palpations: Abdomen is soft. There is no mass. Tenderness: There is abdominal tenderness. There is no guarding or rebound. Hernia: No hernia is present. Musculoskeletal:         General: No swelling, tenderness, deformity or signs of injury. Right lower leg: Edema present. Left lower leg: Edema present. Skin:     General: Skin is warm and dry. Coloration: Skin is not jaundiced or pale. Findings: No bruising. Neurological:      General: No focal deficit present. Mental Status: She is alert and oriented to person, place, and time. Cranial Nerves: No cranial nerve deficit. Motor: No weakness. Psychiatric:         Mood and Affect: Mood normal.         Behavior: Behavior normal.         Thought Content:  Thought content normal.         Judgment: Judgment normal.      Comments: Hx of suicidal ideation          LABORATORY FINDINGS:    CBC:   Lab Results   Component Value Date    WBC 14.1 03/12/2022    HGB 13.9 03/12/2022     03/12/2022     05/14/2012     BMP:    Lab Results   Component Value Date     03/12/2022    K 4.3 03/12/2022     03/12/2022    CO2 21 03/12/2022    BUN 9 03/12/2022    CREATININE 0.77 03/12/2022    GLUCOSE 204 03/12/2022    GLUCOSE 93 05/14/2012     Hemoglobin A1C:   Lab Results   Component Value Date    LABA1C 7.3 03/17/2022     Lipid profile:   Lab Results   Component Value Date    CHOL 167 08/14/2020    TRIG 79 08/14/2020    HDL 43 08/14/2020     Thyroid functions:   Lab Results   Component Value Date    TSH 2.34 08/14/2020      Hepatic functions:   Lab Results   Component Value Date    ALT 46 03/12/2022    AST 39 03/12/2022    PROT 7.6 03/12/2022    BILITOT 0.52 03/12/2022    BILIDIR 0.20 11/29/2017    LABALBU 3.8 03/12/2022 LABALBU 4.9 10/04/2011       Any additional findings:    Assessment and Plan:      Diagnosis Orders   1. Diverticulitis  Matilde Israel MD, Gastroenterology, [facility]    CT ABDOMEN PELVIS W IV CONTRAST Additional Contrast? None   2. Screening for hyperlipidemia  Lipid Panel   3. Type 2 diabetes mellitus without complication, without long-term current use of insulin (HCC)  POCT glycosylated hemoglobin (Hb A1C)    Microalbumin, Ur    Creatinine, Random Urine    CBC with Auto Differential    Comprehensive Metabolic Panel   4. Need for hepatitis B vaccination  Hep B Vaccine Adult (ENGERIX-B)    [place] IMMUNIZ ADMIN,1 SINGLE/COMB VAC/TOXOID   5. Flu vaccine need  INFLUENZA, QUADV, 3 YRS AND OLDER, IM PF, PREFILL SYR OR SDV, 0.5ML (AFLURIA QUADV, PF)    [place] ADMIN INFLUENZA VIRUS VAC   6. Ankle swelling, left  ECHO Complete 2D W Doppler W Color   7. Gastroesophageal reflux disease, unspecified whether esophagitis present  pantoprazole (PROTONIX) 40 MG tablet   8. Calculus of gallbladder without cholecystitis without obstruction  US GALLBLADDER RUQ   9. Steatosis  April - Addison Clark MD, Gastroenterology, [facility]   10. Obstructive sleep apnea syndrome  Baseline Diagnostic Sleep Study   11. Sore throat  Rapid Strep Screen    Cetylpyridinium Chloride (CEPACOL MOUTHWASH/GARGLE) 0.05 % LIQD   12. Severe persistent asthma with acute exacerbation  Feliciano Coffman MD, Pulmonology, [facility]   13. Need for hepatitis C screening test  Hepatitis C Antibody       Halley Garza was seen today for establish care, health maintenance, diabetes, asthma, sleep apnea, abdominal pain and depression. Diagnoses and all orders for this visit:    Diverticulitis  -     Matilde Israel MD, Gastroenterology, [facility]  -     CT ABDOMEN PELVIS W IV CONTRAST Additional Contrast? None; Future    Screening for hyperlipidemia  -     Lipid Panel;  Future    Type 2 diabetes mellitus without complication, without long-term current use of insulin (HCC)  -     POCT glycosylated hemoglobin (Hb A1C)  -     Microalbumin, Ur; Future  -     Creatinine, Random Urine; Future  -     CBC with Auto Differential; Future  -     Comprehensive Metabolic Panel; Future    Need for hepatitis B vaccination  -     Hep B Vaccine Adult (ENGERIX-B)  -     DE IMMUNIZ ADMIN,1 SINGLE/COMB VAC/TOXOID    Flu vaccine need  -     INFLUENZA, QUADV, 3 YRS AND OLDER, IM PF, PREFILL SYR OR SDV, 0.5ML (AFLURIA QUADV, PF)  -     DE ADMIN INFLUENZA VIRUS VAC    Ankle swelling, left  -     ECHO Complete 2D W Doppler W Color; Future    Gastroesophageal reflux disease, unspecified whether esophagitis present  -     pantoprazole (PROTONIX) 40 MG tablet; Take 1 tablet by mouth every morning (before breakfast)    Calculus of gallbladder without cholecystitis without obstruction  -     US GALLBLADDER RUQ; Future    Steatosis  -     April - Miguel Angel Gooden MD, Gastroenterology, [facility]    Obstructive sleep apnea syndrome  -     Baseline Diagnostic Sleep Study; Future    Sore throat  -     Rapid Strep Screen  -     Cetylpyridinium Chloride (CEPACOL MOUTHWASH/GARGLE) 0.05 % LIQD; Take 1 each by mouth as needed (sore throat)    Severe persistent asthma with acute exacerbation  -     Praful Burris MD, Pulmonology, [place]    Need for hepatitis C screening test  -     Hepatitis C Antibody; Future    Depression with suicidal ideation  - patient is not actively having any suicidal ideation at this moment but did a few days ago. She states that she will f/u with psych. Follow-up:   1. Follow up with me in 1 week     Nat Nicely received counseling on the following healthy behaviors: nutrition and exercise    Discussed use, benefit, and side effects of prescribed medications. Barriers to medication compliance addressed. All patient questions answered. Pt voiced understanding.      Joaquín Ibanez MD  PGY-1  Internal Medicine  [facility]   9:[address] [place] 3/17/2022 Attending Physician Statement  I have discussed the care of [facility], including pertinent history and exam findings,  with the resident. I have seen and examined the patient and the key elements of all parts of the encounter have been performed by me. I agree with the assessment, plan and orders as documented by the resident. (GC Modifier)    Here to establish care. Multiple recent ER visits for asthma. Also C/O chronic, intermittent abdominal pain, history of diverticulitis. History of depression, currently denies any active SI. Check CT abdomen, referral to GI for history of diverticulitis. Referral to pulmonology for persistent asthma. Continue follow up with psychiatrist. Jolynn Ashwini onset diabetes, A1c 7.3 in office today. Counseled on diet/lifestyle modification. Start medications at next A1c check if still elevated.      Electronically signed by Sharon Linton MD on 3/17/2022 at 3:45 PM

## 2022-03-17 NOTE — PROGRESS NOTES
Vaccine Information Sheet, \"Influenza - Inactivated\"  given to Kellie Pipes, or parent/legal guardian of  Kellie Pipes and verbalized understanding. Patient responses:    Is the person being vaccinated sick today? No    Does the person to be vaccinated have an allergy to a component of the vaccine? No    Has the person to be vaccinated ever had a reaction to the flu vaccine in the past?  No    Have you ever had Guillian Valencia Syndrome? No    Flu vaccine given per order. Please see immunization tab.

## 2022-03-22 ENCOUNTER — HOSPITAL ENCOUNTER (OUTPATIENT)
Age: 32
Discharge: HOME | End: 2022-03-22
Payer: COMMERCIAL

## 2022-03-22 DIAGNOSIS — E11.9 TYPE 2 DIABETES MELLITUS WITHOUT COMPLICATION, WITHOUT LONG-TERM CURRENT USE OF INSULIN (HCC): ICD-10-CM

## 2022-03-22 DIAGNOSIS — Z11.59 NEED FOR HEPATITIS C SCREENING TEST: ICD-10-CM

## 2022-03-22 DIAGNOSIS — Z13.220 SCREENING FOR HYPERLIPIDEMIA: ICD-10-CM

## 2022-03-22 LAB
ABSOLUTE EOS #: 0.43 K/UL (ref 0–0.44)
ABSOLUTE IMMATURE GRANULOCYTE: 0.04 K/UL (ref 0–0.3)
ABSOLUTE LYMPH #: 2.05 K/UL (ref 1.1–3.7)
ABSOLUTE MONO #: 0.62 K/UL (ref 0.1–1.2)
ALBUMIN SERPL-MCNC: 3.8 G/DL (ref 3.5–5.2)
ALBUMIN/GLOBULIN RATIO: 1.1 (ref 1–2.5)
ALP BLD-CCNC: 114 U/L (ref 35–104)
ALT SERPL-CCNC: 75 U/L (ref 5–33)
ANION GAP SERPL CALCULATED.3IONS-SCNC: 16 MMOL/L (ref 9–17)
AST SERPL-CCNC: 61 U/L
BASOPHILS # BLD: 1 % (ref 0–2)
BASOPHILS ABSOLUTE: 0.04 K/UL (ref 0–0.2)
BILIRUB SERPL-MCNC: 0.65 MG/DL (ref 0.3–1.2)
BUN BLDV-MCNC: 9 MG/DL (ref 6–20)
CALCIUM SERPL-MCNC: 9.6 MG/DL (ref 8.6–10.4)
CHLORIDE BLD-SCNC: 101 MMOL/L (ref 98–107)
CHOLESTEROL/HDL RATIO: 4.5
CHOLESTEROL: 191 MG/DL
CO2: 24 MMOL/L (ref 20–31)
CREAT SERPL-MCNC: 0.79 MG/DL (ref 0.5–0.9)
CREATININE URINE: 172.8 MG/DL (ref 28–217)
CREATININE URINE: 175.7 MG/DL (ref 28–217)
EOSINOPHILS RELATIVE PERCENT: 5 % (ref 1–4)
GFR AFRICAN AMERICAN: >60 ML/MIN
GFR NON-AFRICAN AMERICAN: >60 ML/MIN
GFR SERPL CREATININE-BSD FRML MDRD: ABNORMAL ML/MIN/{1.73_M2}
GLUCOSE BLD-MCNC: 146 MG/DL (ref 70–99)
HCT VFR BLD CALC: 43.2 % (ref 36.3–47.1)
HDLC SERPL-MCNC: 42 MG/DL
HEMOGLOBIN: 13.7 G/DL (ref 11.9–15.1)
HEPATITIS C ANTIBODY: NONREACTIVE
IMMATURE GRANULOCYTES: 1 %
LDL CHOLESTEROL: 115 MG/DL (ref 0–130)
LYMPHOCYTES # BLD: 25 % (ref 24–43)
MCH RBC QN AUTO: 30.1 PG (ref 25.2–33.5)
MCHC RBC AUTO-ENTMCNC: 31.7 G/DL (ref 28.4–34.8)
MCV RBC AUTO: 94.9 FL (ref 82.6–102.9)
MICROALBUMIN/CREAT 24H UR: <12 MG/L
MICROALBUMIN/CREAT UR-RTO: NORMAL MCG/MG CREAT
MONOCYTES # BLD: 8 % (ref 3–12)
NRBC AUTOMATED: 0 PER 100 WBC
PDW BLD-RTO: 13.2 % (ref 11.8–14.4)
PLATELET # BLD: 368 K/UL (ref 138–453)
PMV BLD AUTO: 10 FL (ref 8.1–13.5)
POTASSIUM SERPL-SCNC: 4.2 MMOL/L (ref 3.7–5.3)
RBC # BLD: 4.55 M/UL (ref 3.95–5.11)
SEG NEUTROPHILS: 62 % (ref 36–65)
SEGMENTED NEUTROPHILS ABSOLUTE COUNT: 5.13 K/UL (ref 1.5–8.1)
SODIUM BLD-SCNC: 141 MMOL/L (ref 135–144)
TOTAL PROTEIN: 7.4 G/DL (ref 6.4–8.3)
TRIGL SERPL-MCNC: 170 MG/DL
WBC # BLD: 8.3 K/UL (ref 3.5–11.3)

## 2022-03-22 PROCEDURE — 86803 HEPATITIS C AB TEST: CPT

## 2022-03-22 PROCEDURE — 80061 LIPID PANEL: CPT

## 2022-03-22 PROCEDURE — 87880 STREP A ASSAY W/OPTIC: CPT

## 2022-03-22 PROCEDURE — 36415 COLL VENOUS BLD VENIPUNCTURE: CPT

## 2022-03-22 PROCEDURE — 82570 ASSAY OF URINE CREATININE: CPT

## 2022-03-22 PROCEDURE — 80053 COMPREHEN METABOLIC PANEL: CPT

## 2022-03-22 PROCEDURE — 82043 UR ALBUMIN QUANTITATIVE: CPT

## 2022-03-22 PROCEDURE — 85025 COMPLETE CBC W/AUTO DIFF WBC: CPT

## 2022-03-24 ENCOUNTER — OFFICE VISIT (OUTPATIENT)
Dept: INTERNAL MEDICINE | Age: 32
End: 2022-03-24
Payer: COMMERCIAL

## 2022-03-24 VITALS
SYSTOLIC BLOOD PRESSURE: 112 MMHG | OXYGEN SATURATION: 96 % | HEART RATE: 108 BPM | WEIGHT: 293 LBS | BODY MASS INDEX: 48.82 KG/M2 | HEIGHT: 65 IN | DIASTOLIC BLOOD PRESSURE: 69 MMHG

## 2022-03-24 DIAGNOSIS — K21.9 GASTROESOPHAGEAL REFLUX DISEASE, UNSPECIFIED WHETHER ESOPHAGITIS PRESENT: ICD-10-CM

## 2022-03-24 DIAGNOSIS — R51.9 CHRONIC NONINTRACTABLE HEADACHE, UNSPECIFIED HEADACHE TYPE: ICD-10-CM

## 2022-03-24 DIAGNOSIS — E66.01 CLASS 3 SEVERE OBESITY WITH BODY MASS INDEX (BMI) OF 50.0 TO 59.9 IN ADULT, UNSPECIFIED OBESITY TYPE, UNSPECIFIED WHETHER SERIOUS COMORBIDITY PRESENT (HCC): ICD-10-CM
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
## 2022-03-24 DIAGNOSIS — E11.9 TYPE 2 DIABETES MELLITUS WITHOUT COMPLICATION, WITHOUT LONG-TERM CURRENT USE OF INSULIN (HCC): ICD-10-CM

## 2022-03-24 DIAGNOSIS — L70.9 ACNE, UNSPECIFIED ACNE TYPE: ICD-10-CM

## 2022-03-24 DIAGNOSIS — G89.29 CHRONIC NONINTRACTABLE HEADACHE, UNSPECIFIED HEADACHE TYPE: ICD-10-CM

## 2022-03-24 DIAGNOSIS — J45.20 MILD INTERMITTENT ASTHMA IN ADULT WITHOUT COMPLICATION: ICD-10-CM

## 2022-03-24 PROCEDURE — 99214 OFFICE O/P EST MOD 30 MIN: CPT | Performed by: STUDENT IN AN ORGANIZED HEALTH CARE EDUCATION/TRAINING PROGRAM

## 2022-03-24 PROCEDURE — G8482 FLU IMMUNIZE ORDER/ADMIN: HCPCS | Performed by: STUDENT IN AN ORGANIZED HEALTH CARE EDUCATION/TRAINING PROGRAM

## 2022-03-24 PROCEDURE — 1036F TOBACCO NON-USER: CPT | Performed by: STUDENT IN AN ORGANIZED HEALTH CARE EDUCATION/TRAINING PROGRAM

## 2022-03-24 PROCEDURE — G8427 DOCREV CUR MEDS BY ELIG CLIN: HCPCS | Performed by: STUDENT IN AN ORGANIZED HEALTH CARE EDUCATION/TRAINING PROGRAM

## 2022-03-24 PROCEDURE — 3051F HG A1C>EQUAL 7.0%<8.0%: CPT | Performed by: STUDENT IN AN ORGANIZED HEALTH CARE EDUCATION/TRAINING PROGRAM

## 2022-03-24 PROCEDURE — 2022F DILAT RTA XM EVC RTNOPTHY: CPT | Performed by: STUDENT IN AN ORGANIZED HEALTH CARE EDUCATION/TRAINING PROGRAM

## 2022-03-24 PROCEDURE — G8417 CALC BMI ABV UP PARAM F/U: HCPCS | Performed by: STUDENT IN AN ORGANIZED HEALTH CARE EDUCATION/TRAINING PROGRAM

## 2022-03-24 PROCEDURE — 99211 OFF/OP EST MAY X REQ PHY/QHP: CPT | Performed by: INTERNAL MEDICINE

## 2022-03-24 RX ORDER — BUDESONIDE AND FORMOTEROL FUMARATE DIHYDRATE 160; 4.5 UG/1; UG/1
2 AEROSOL RESPIRATORY (INHALATION) 2 TIMES DAILY
Qty: 1 EACH | Refills: 2 | Status: SHIPPED | OUTPATIENT
Start: 2022-03-24 | End: 2022-04-21

## 2022-03-24 RX ORDER — ALBUTEROL SULFATE 90 UG/1
AEROSOL, METERED RESPIRATORY (INHALATION)
Qty: 1 EACH | Refills: 2 | Status: SHIPPED | OUTPATIENT
Start: 2022-03-24 | End: 2022-03-25 | Stop reason: SDUPTHER

## 2022-03-24 RX ORDER — ACETAMINOPHEN, ASPIRIN AND CAFFEINE 250; 250; 65 MG/1; MG/1; MG/1
1 TABLET, FILM COATED ORAL EVERY 8 HOURS PRN
Qty: 90 TABLET | Refills: 0 | Status: ON HOLD | OUTPATIENT
Start: 2022-03-24 | End: 2022-08-02 | Stop reason: HOSPADM

## 2022-03-24 RX ORDER — BACITRACIN, NEOMYCIN, POLYMYXIN B 400; 3.5; 5 [USP'U]/G; MG/G; [USP'U]/G
OINTMENT TOPICAL
Qty: 1 EACH | Refills: 0 | Status: SHIPPED | OUTPATIENT
Start: 2022-03-24 | End: 2022-04-03

## 2022-03-24 RX ORDER — PANTOPRAZOLE SODIUM 40 MG/1
40 TABLET, DELAYED RELEASE ORAL
Qty: 90 TABLET | Refills: 0 | Status: SHIPPED | OUTPATIENT
Start: 2022-03-24 | End: 2022-04-21

## 2022-03-24 RX ORDER — BUPROPION HYDROCHLORIDE 150 MG/1
150 TABLET ORAL DAILY
Qty: 30 TABLET | Refills: 2 | Status: CANCELLED | OUTPATIENT
Start: 2022-03-24

## 2022-03-24 RX ORDER — HYDROXYZINE PAMOATE 50 MG/1
50 CAPSULE ORAL 2 TIMES DAILY
Refills: 2 | Status: CANCELLED | OUTPATIENT
Start: 2022-03-24

## 2022-03-24 RX ORDER — IBUPROFEN 600 MG/1
600 TABLET ORAL 4 TIMES DAILY PRN
Qty: 28 TABLET | Refills: 0 | Status: CANCELLED | OUTPATIENT
Start: 2022-03-24 | End: 2022-03-31

## 2022-03-24 RX ORDER — TRAZODONE HYDROCHLORIDE 50 MG/1
50 TABLET ORAL NIGHTLY PRN
Qty: 14 TABLET | Refills: 0 | Status: CANCELLED | OUTPATIENT
Start: 2022-03-24

## 2022-03-24 RX ORDER — ARIPIPRAZOLE 10 MG/1
10 TABLET ORAL DAILY
Qty: 30 TABLET | Refills: 2 | Status: CANCELLED | OUTPATIENT
Start: 2022-03-24

## 2022-03-24 ASSESSMENT — ENCOUNTER SYMPTOMS
NAUSEA: 1
COUGH: 1
ABDOMINAL DISTENTION: 0
ABDOMINAL PAIN: 1
APNEA: 1
CHOKING: 0
CONSTIPATION: 0
STRIDOR: 0
DIARRHEA: 0
WHEEZING: 0
VOMITING: 0
BLOOD IN STOOL: 0
BACK PAIN: 1
SHORTNESS OF BREATH: 1

## 2022-03-24 NOTE — PROGRESS NOTES
Attending Physician Statement  I have discussed the care of [facility], including pertinent history and exam findings,  with the resident. I have seen and examined the patient and the key elements of all parts of the encounter have been performed by me. I agree with the assessment, plan and orders as documented by the resident.   (GC Modifier)

## 2022-03-24 NOTE — PROGRESS NOTES
Internal Medicine Clinic New Patient Note    Date of patient's visit: 3/24/2022  Name:  Malorie Gomez  Primary Care Physician: Fatemeh Arteaga MD    Reason for visit: First Visit, establish care   Chief Complaint   Patient presents with   Elias Hart     from the 3/22    Knee Pain    Ankle Pain     x 6 year, broken from fall down stairs    Health Maintenance     pt states she going to schedule a appt soon for diabetic eye exam        HISTORY OF PRESENTING ILLNESS:    History was obtained from the patient. Malorie Gomez is a 33 y.o. is here to establish care. Patient presents with multiple complaints. States that she recently fell a month ago and is complaining of R ankle pain. Tenderness is elicited upon palpation of lateral maleolus, unable to transfer weight on her R foot. She has new multiple skin wounds that started out as a pimple that she popped. She is concerned it might be MRSA    Discussed with her about weight loss. She is agreeable to start a healthier diet. And start exercising more.      PAST MEDICAL HISTORY:          Diagnosis Date    Asthma     Back pain 2014    [facility]     B[facility]    Depression     Diabetes mellitus ([facility])     Dizziness     Fatty liver disease, nonalcoholic     Fatty liver disease, nonalcoholic     GERD (gastroesophageal reflux disease)     Obesity        PAST SURGICAL HISTORY:          Procedure Laterality Date     SECTION      LEEP         ALLERGIES:      Allergies   Allergen Reactions    Morphine Anaphylaxis    Pcn [Penicillins] Hives and Shortness Of Breath    Amoxicillin Hives    Seasonal          HOME MEDICATION:      Current Outpatient Medications on File Prior to Visit   Medication Sig Dispense Refill    ARIPiprazole (ABILIFY) 10 MG tablet Take 10 mg by mouth daily      buPROPion (WELLBUTRIN XL) 150 MG extended release tablet Take 150 mg by mouth daily      hydrOXYzine (VISTARIL) 50 MG capsule Take 50 mg by mouth in the morning and at bedtime      Cetylpyridinium Chloride (CEPACOL MOUTHWASH/GARGLE) 0.05 % LIQD Take 1 each by mouth as needed (sore throat) 20 each 0    albuterol (PROVENTIL) (2.5 MG/3ML) 0.083% nebulizer solution Take 3 mLs by nebulization every 6 hours as needed for Wheezing 120 each 0    olodaterol (STRIVERDI RESPIMAT) 2.5 MCG/ACT inhaler Inhale 2 puffs into the lungs daily 4 g 0    sertraline (ZOLOFT) 50 MG tablet Take 3 tablets by mouth daily 90 tablet 3    cariprazine hcl (VRAYLAR) 3 MG CAPS capsule Take 1 capsule by mouth daily 30 capsule 0    traZODone (DESYREL) 50 MG tablet Take 1 tablet by mouth nightly as needed for Sleep 14 tablet 0    acetaminophen (TYLENOL) 500 MG tablet Take 2 tablets by mouth every 8 hours for 7 days (Patient not taking: Reported on 3/17/2022) 42 tablet 0    ibuprofen (ADVIL;MOTRIN) 600 MG tablet Take 1 tablet by mouth 4 times daily as needed for Pain (Patient not taking: Reported on 3/17/2022) 28 tablet 0     No current facility-administered medications on file prior to visit.        SOCIAL HISTORY:      Social History     Socioeconomic History    Marital status:      Spouse name: Not on file    Number of children: Not on file    Years of education: Not on file    Highest education level: Not on file   Occupational History    Not on file   Tobacco Use    Smoking status: Former Smoker     Types: Cigarettes    Smokeless tobacco: Never Used   Vaping Use    Vaping Use: Never used   Substance and Sexual Activity    Alcohol use: Not Currently     Alcohol/week: 0.0 standard drinks     Comment: rarely    Drug use: Not Currently     Types: Marijuana Yair Don)     Comment: quit    Sexual activity: Not Currently   Other Topics Concern    Not on file   Social History Narrative    Not on file     Social Determinants of Health     Financial Resource Strain: Low Risk     Difficulty of Paying Living Expenses: Not very hard   Food Insecurity: No Food Insecurity    Worried About Running Out of Food in the Last Year: Never true    Bruno of Food in the Last Year: Never true   Transportation Needs:     Lack of Transportation (Medical): Not on file    Lack of Transportation (Non-Medical): Not on file   Physical Activity:     Days of Exercise per Week: Not on file    Minutes of Exercise per Session: Not on file   Stress:     Feeling of Stress : Not on file   Social Connections:     Frequency of Communication with Friends and Family: Not on file    Frequency of Social Gatherings with Friends and Family: Not on file    Attends [employer] Services: Not on file    Active Member of [address] [place] [address] [employer] or Organizations: Not on file    Attends Club or Organization Meetings: Not on file    Marital Status: Not on file   Intimate Partner Violence:     Fear of Current or Ex-Partner: Not on file    Emotionally Abused: Not on file    Physically Abused: Not on file    Sexually Abused: Not on file   Housing Stability:     Unable to Pay for Housing in the Last Year: Not on file    Number of [place] in the Last Year: Not on file    Unstable Housing in the Last Year: Not on file         FAMILY HISTORY:          Problem Relation Age of Onset    High Blood Pressure Mother     Diabetes Mother     Heart Disease Mother     Heart Disease Father     Early Death Father     Cancer Maternal Aunt         lung    Cancer Paternal Cousin         brain       Review of Systems   Constitutional: Negative for chills, diaphoresis, fatigue and fever. Respiratory: Positive for apnea, cough and shortness of breath. Negative for choking, wheezing and stridor. Cardiovascular: Positive for leg swelling. Negative for chest pain. Gastrointestinal: Positive for abdominal pain and nausea. Negative for abdominal distention, blood in stool, constipation, diarrhea and vomiting. Genitourinary: Negative for decreased urine volume, dysuria and urgency. Musculoskeletal: Positive for back pain. Skin: Negative for pallor and wound. Neurological: Negative for syncope, weakness, numbness and headaches. Psychiatric/Behavioral: Negative for agitation, behavioral problems, confusion, decreased concentration and self-injury. PHYSICAL EXAM:      Vitals:    03/24/22 1332   BP: 112/69   Pulse: 108   SpO2: 96%      Physical Exam  Constitutional:       General: She is not in acute distress. Appearance: Normal appearance. She is obese. She is not ill-appearing, toxic-appearing or diaphoretic. HENT:      Mouth/Throat:      Mouth: Mucous membranes are moist.      Pharynx: Oropharynx is clear. Eyes:      General: No scleral icterus. Cardiovascular:      Pulses: Normal pulses. Heart sounds: Normal heart sounds. No murmur heard. No friction rub. No gallop. Pulmonary:      Effort: Pulmonary effort is normal.      Breath sounds: Normal breath sounds. Abdominal:      General: Abdomen is flat. Bowel sounds are normal. There is no distension. Palpations: Abdomen is soft. There is no mass. Tenderness: There is abdominal tenderness. There is no guarding or rebound. Hernia: No hernia is present. Musculoskeletal:         General: No swelling, tenderness, deformity or signs of injury. Right lower leg: Edema present. Left lower leg: Edema present. Skin:     General: Skin is warm and dry. Coloration: Skin is not jaundiced or pale. Findings: No bruising. Neurological:      General: No focal deficit present. Mental Status: She is alert and oriented to person, place, and time. Cranial Nerves: No cranial nerve deficit. Motor: No weakness. Psychiatric:         Mood and Affect: Mood normal.         Behavior: Behavior normal.         Thought Content:  Thought content normal.         Judgment: Judgment normal.      Comments: Hx of suicidal ideation          LABORATORY FINDINGS:    CBC:   Lab Results   Component Value Date    WBC 8.3 03/22/2022    HGB 13.7 03/22/2022     03/22/2022     05/14/2012     BMP:    Lab Results   Component Value Date     03/22/2022    K 4.2 03/22/2022     03/22/2022    CO2 24 03/22/2022    BUN 9 03/22/2022    CREATININE 0.79 03/22/2022    GLUCOSE 146 03/22/2022    GLUCOSE 93 05/14/2012     Hemoglobin A1C:   Lab Results   Component Value Date    LABA1C 7.3 03/17/2022     Lipid profile:   Lab Results   Component Value Date    CHOL 191 03/22/2022    TRIG 170 03/22/2022    HDL 42 03/22/2022     Thyroid functions:   Lab Results   Component Value Date    TSH 2.34 08/14/2020      Hepatic functions:   Lab Results   Component Value Date    ALT 75 03/22/2022    AST 61 03/22/2022    PROT 7.4 03/22/2022    BILITOT 0.65 03/22/2022    BILIDIR 0.20 11/29/2017    LABALBU 3.8 03/22/2022    LABALBU 4.9 10/04/2011       Any additional findings:    Assessment and Plan:      Diagnosis Orders   1. Fall, subsequent encounter  XR ANKLE RIGHT (2 VIEWS)   2. Type 2 diabetes mellitus without complication, without long-term current use of insulin (HCC)  metFORMIN (GLUCOPHAGE) 500 MG tablet    April DELONG DO, Weight Management and [facility]   3. Gastroesophageal reflux disease, unspecified whether esophagitis present  pantoprazole (PROTONIX) 40 MG tablet   4. Chronic nonintractable headache, unspecified headache type  aspirin-acetaminophen-caffeine (EXCEDRIN MIGRAINE) 250-250-65 MG per tablet   5. Mild intermittent asthma in adult without complication  budesonide-formoterol (SYMBICORT) 160-4.5 MCG/ACT AERO    albuterol sulfate HFA (PROVENTIL HFA) 108 (90 Base) MCG/ACT inhaler   6. Acne, unspecified acne type  neomycin-bacitracin-polymyxin (NEOSPORIN) 400-5-5000 ointment   7.  Class 3 severe obesity with body mass index (BMI) of 50.0 to 59.9 in adult, unspecified obesity type, unspecified whether serious comorbidity present [facility])  [facility]    April Kim DO, [employer] [facility] and Milagro Peters was seen today for establish care, health maintenance, diabetes, asthma, sleep apnea, abdominal pain and depression. Diagnoses and all orders for this visit:  Navneet Bhakta was seen today for check-up, discuss labs, knee pain, ankle pain and health maintenance. Diagnoses and all orders for this visit:    Fall, subsequent encounter  -     XR ANKLE RIGHT (2 VIEWS); Future    Type 2 diabetes mellitus without complication, without long-term current use of insulin (HCC)  -     metFORMIN (GLUCOPHAGE) 500 MG tablet; Take 1 tablet by mouth 2 times daily (with meals)  -     April Angeles, Inocencia Yip DO, Weight Management and Bariatrics, [place]    Gastroesophageal reflux disease, unspecified whether esophagitis present  -     pantoprazole (PROTONIX) 40 MG tablet; Take 1 tablet by mouth every morning (before breakfast)    Chronic nonintractable headache, unspecified headache type  -     aspirin-acetaminophen-caffeine (EXCEDRIN MIGRAINE) 250-250-65 MG per tablet; Take 1 tablet by mouth every 8 hours as needed for Headaches    Mild intermittent asthma in adult without complication  -     budesonide-formoterol (SYMBICORT) 160-4.5 MCG/ACT AERO; Inhale 2 puffs into the lungs 2 times daily  -     albuterol sulfate HFA (PROVENTIL HFA) 108 (90 Base) MCG/ACT inhaler; Space out to every 6 hours as symptoms improve. Acne, unspecified acne type  -     neomycin-bacitracin-polymyxin (NEOSPORIN) 400-5-5000 ointment; Apply topically 2 times daily. Class 3 severe obesity with body mass index (BMI) of 50.0 to 59.9 in adult, unspecified obesity type, unspecified whether serious comorbidity present [facility])  -     [facility] and [address], Yonny DELONG DO, [employer] [facility] and [facility]            Follow-up:   1. Follow up with me in 3 months.  Due to hx of depression with suicidal ideation. Patient was given contact infor for the national suicide hotline at the end of the visit in case of mental status crisis. Herrera  received counseling on the following healthy behaviors: nutrition and exercise    Discussed use, benefit, and side effects of prescribed medications. Barriers to medication compliance addressed. All patient questions answered. Pt voiced understanding.      Bry To MD  PGY-1  Internal Medicine  [facility]   2:37 [place] 3/24/2022

## 2022-03-24 NOTE — PATIENT INSTRUCTIONS
Return To Clinic in June for 3 month follow up. Patient was added to wait list. Patient will be contacted by office to schedule upcoming appointment with the physician. After Visit Summary given and reviewed. The medication list included in this document is our record of what you are currently taking, including any changes that were made at today's visit. If you find any differences when compared to your medications at home, or have any questions that were not answered at your visit, please contact the office. It is very important for your care that you keep your appointment. If for some reason you are unable to keep your appointment, it is equally important that you call our office at [phone] to cancel your appointment and reschedule. Failure to do so may result in your termination from our practice. National Suicide Prevention Lifeline  Hours: Available 24 hours. [phone]    Medications have been e-scribed to pharmacy of patients choice. X-RAY INSTRUCTIONS    Your doctor has ordered an X-Ray for you. This can be done at any [facility] without an appointment. [address] [facility] [address] [place] [facility] [address] [facility] [address] [place] [address]  [facility] [address] [place] [id number]    Report to the Admitting Department, located on the main floor of the medical center behind the information desk. Please remember to bring your X-Ray order with you. FOR THIS TESTING, YOU DO NOT NEED AN APPOINTMENT. Referral for Bariatrics sent to [facility] and DO Jules. Specialty office will contact patient for appointment. A copy of referral with contact information and address given to patient.  Patient should contact specialist office if no contact has been made to patient within 1 week.     BENNETT Browning

## 2022-03-25 DIAGNOSIS — J45.20 MILD INTERMITTENT ASTHMA IN ADULT WITHOUT COMPLICATION: ICD-10-CM

## 2022-03-25 RX ORDER — ALBUTEROL SULFATE 90 UG/1
AEROSOL, METERED RESPIRATORY (INHALATION)
Qty: 1 EACH | Refills: 2 | Status: SHIPPED | OUTPATIENT
Start: 2022-03-25 | End: 2022-04-21

## 2022-03-25 NOTE — TELEPHONE ENCOUNTER
[facility] is requesting clarification on inhaler medication, pharmacy states, please send new Rx with complete direction.  Thanks    Scanned Rx to pt chart

## 2022-04-04 ENCOUNTER — HOSPITAL ENCOUNTER (OUTPATIENT)
Dept: ULTRASOUND IMAGING | Age: 32
Discharge: HOME | End: 2022-04-06
Payer: COMMERCIAL

## 2022-04-04 ENCOUNTER — HOSPITAL ENCOUNTER (OUTPATIENT)
Dept: CT IMAGING | Age: 32
Discharge: HOME | End: 2022-04-06
Payer: COMMERCIAL

## 2022-04-04 DIAGNOSIS — K80.20 CALCULUS OF GALLBLADDER WITHOUT CHOLECYSTITIS WITHOUT OBSTRUCTION: ICD-10-CM

## 2022-04-04 DIAGNOSIS — K57.92 DIVERTICULITIS: ICD-10-CM

## 2022-04-04 PROCEDURE — 76705 ECHO EXAM OF ABDOMEN: CPT

## 2022-04-04 PROCEDURE — 6360000004 HC RX CONTRAST MEDICATION: Performed by: STUDENT IN AN ORGANIZED HEALTH CARE EDUCATION/TRAINING PROGRAM

## 2022-04-04 PROCEDURE — 74177 CT ABD & PELVIS W/CONTRAST: CPT

## 2022-04-04 RX ADMIN — IOPAMIDOL 75 ML: 755 INJECTION, SOLUTION INTRAVENOUS at 10:26

## 2022-04-04 RX ADMIN — IOHEXOL 50 ML: 240 INJECTION, SOLUTION INTRATHECAL; INTRAVASCULAR; INTRAVENOUS; ORAL at 10:27

## 2022-04-10 ENCOUNTER — HOSPITAL ENCOUNTER (OUTPATIENT)
Dept: SLEEP CENTER | Age: 32
Discharge: HOME | End: 2022-04-12
Payer: COMMERCIAL

## 2022-04-10 DIAGNOSIS — G47.33 OBSTRUCTIVE SLEEP APNEA SYNDROME: ICD-10-CM

## 2022-04-10 PROCEDURE — 95810 POLYSOM 6/> YRS 4/> PARAM: CPT

## 2022-04-11 VITALS
HEART RATE: 89 BPM | HEIGHT: 65 IN | BODY MASS INDEX: 48.82 KG/M2 | OXYGEN SATURATION: 93 % | RESPIRATION RATE: 14 BRPM | TEMPERATURE: 96.3 F | WEIGHT: 293 LBS

## 2022-04-21 ENCOUNTER — OFFICE VISIT (OUTPATIENT)
Dept: INTERNAL MEDICINE | Age: 32
End: 2022-04-21
Payer: COMMERCIAL

## 2022-04-21 VITALS
OXYGEN SATURATION: 98 % | TEMPERATURE: 98 F | HEIGHT: 65 IN | DIASTOLIC BLOOD PRESSURE: 74 MMHG | WEIGHT: 293 LBS | HEART RATE: 94 BPM | BODY MASS INDEX: 48.82 KG/M2 | SYSTOLIC BLOOD PRESSURE: 118 MMHG

## 2022-04-21 DIAGNOSIS — E66.01 CLASS 3 SEVERE OBESITY DUE TO EXCESS CALORIES WITH BODY MASS INDEX (BMI) OF 50.0 TO 59.9 IN ADULT, UNSPECIFIED WHETHER SERIOUS COMORBIDITY PRESENT (HCC): ICD-10-CM

## 2022-04-21 DIAGNOSIS — J45.40 MODERATE PERSISTENT ASTHMA, UNSPECIFIED WHETHER COMPLICATED: ICD-10-CM

## 2022-04-21 DIAGNOSIS — K76.0 FATTY LIVER DISEASE, NONALCOHOLIC: ICD-10-CM

## 2022-04-21 DIAGNOSIS — M54.50 ACUTE LOW BACK PAIN WITHOUT SCIATICA, UNSPECIFIED BACK PAIN LATERALITY: ICD-10-CM

## 2022-04-21 DIAGNOSIS — E11.9 TYPE 2 DIABETES MELLITUS WITHOUT COMPLICATION, WITHOUT LONG-TERM CURRENT USE OF INSULIN (HCC): Primary | ICD-10-CM

## 2022-04-21 DIAGNOSIS — Z23 NEED FOR HEPATITIS B VACCINATION: ICD-10-CM

## 2022-04-21 DIAGNOSIS — G47.33 OBSTRUCTIVE SLEEP APNEA SYNDROME: ICD-10-CM

## 2022-04-21 PROCEDURE — 99213 OFFICE O/P EST LOW 20 MIN: CPT | Performed by: STUDENT IN AN ORGANIZED HEALTH CARE EDUCATION/TRAINING PROGRAM

## 2022-04-21 PROCEDURE — G8427 DOCREV CUR MEDS BY ELIG CLIN: HCPCS | Performed by: STUDENT IN AN ORGANIZED HEALTH CARE EDUCATION/TRAINING PROGRAM

## 2022-04-21 PROCEDURE — 99211 OFF/OP EST MAY X REQ PHY/QHP: CPT | Performed by: INTERNAL MEDICINE

## 2022-04-21 PROCEDURE — 90746 HEPB VACCINE 3 DOSE ADULT IM: CPT | Performed by: INTERNAL MEDICINE

## 2022-04-21 PROCEDURE — 1036F TOBACCO NON-USER: CPT | Performed by: STUDENT IN AN ORGANIZED HEALTH CARE EDUCATION/TRAINING PROGRAM

## 2022-04-21 PROCEDURE — G8417 CALC BMI ABV UP PARAM F/U: HCPCS | Performed by: STUDENT IN AN ORGANIZED HEALTH CARE EDUCATION/TRAINING PROGRAM

## 2022-04-21 PROCEDURE — 2022F DILAT RTA XM EVC RTNOPTHY: CPT | Performed by: STUDENT IN AN ORGANIZED HEALTH CARE EDUCATION/TRAINING PROGRAM

## 2022-04-21 PROCEDURE — 3051F HG A1C>EQUAL 7.0%<8.0%: CPT | Performed by: STUDENT IN AN ORGANIZED HEALTH CARE EDUCATION/TRAINING PROGRAM

## 2022-04-21 RX ORDER — LANCETS 30 GAUGE
1 EACH MISCELLANEOUS DAILY
Qty: 100 EACH | Refills: 5 | Status: SHIPPED | OUTPATIENT
Start: 2022-04-21

## 2022-04-21 RX ORDER — GLUCOSAMINE HCL/CHONDROITIN SU 500-400 MG
CAPSULE ORAL
Qty: 100 STRIP | Refills: 11 | Status: SHIPPED | OUTPATIENT
Start: 2022-04-21 | End: 2022-08-10 | Stop reason: SDUPTHER

## 2022-04-21 RX ORDER — DULAGLUTIDE 0.75 MG/.5ML
0.75 INJECTION, SOLUTION SUBCUTANEOUS WEEKLY
Qty: 4 PEN | Refills: 1 | Status: SHIPPED | OUTPATIENT
Start: 2022-04-21 | End: 2022-05-19

## 2022-04-21 RX ORDER — LANCING DEVICE/LANCETS
KIT MISCELLANEOUS
Qty: 2 KIT | Refills: 3 | Status: SHIPPED | OUTPATIENT
Start: 2022-04-21

## 2022-04-21 ASSESSMENT — ENCOUNTER SYMPTOMS
CONSTIPATION: 0
SHORTNESS OF BREATH: 1
ABDOMINAL DISTENTION: 0
CHOKING: 0
WHEEZING: 0
ABDOMINAL PAIN: 1
BLOOD IN STOOL: 0
STRIDOR: 0
COUGH: 1
VOMITING: 0
APNEA: 0
DIARRHEA: 0
NAUSEA: 1
BACK PAIN: 1

## 2022-04-21 NOTE — PROGRESS NOTES
Writer was requested to give pt education on use of Trulicity pen. Steps reviewed with patient multiple time, pt able to give proper repeat demonstration of correct use of pen. Writer provided pt with phone number in case she has questions or concerns r/t medication. Pt verbalized understanding that medication is once a week on the same day each week.

## 2022-04-21 NOTE — PATIENT INSTRUCTIONS
Return To Clinic 5/19/2022 for 1 month follow up. Please bring all of your medications with you to this appointment. After Visit Summary given and reviewed. The medication list included in this document is our record of what you are currently taking, including any changes that were made at today's visit. If you find any differences when compared to your medications at home, or have any questions that were not answered at your visit, please contact the office. It is very important for your care that you keep your appointment. If for some reason you are unable to keep your appointment, it is equally important that you call our office at [phone] to cancel your appointment and reschedule. Failure to do so may result in your termination from our practice. Medications have been e-scribed to pharmacy of patients choice. X-RAY INSTRUCTIONS    Your doctor has ordered an X-Ray for you. This can be done at any [facility] without an appointment. [address] [facility] [address] [facility]  [address] [place] [address]  [facility] [address] [facility] [address]    Report to the Admitting Department, located on the main floor of the medical center behind the information desk. Please remember to bring your X-Ray order with you. FOR THIS TESTING, YOU DO NOT NEED AN APPOINTMENT. Patient was administered vaccination(s) for Hepatitis B in the office. VIS given to patient for each vaccination given.     BENNETT Browning       Patient Education        Hepatitis B Vaccine: Care Instructions  Your Care Instructions     The hepatitis B vaccine protects against infection with the hepatitis B virus. A hepatitis B infection can damage the liver and lead to liver cancer. The vaccine is given to adults in three doses. You receive the shots in your upper arm. You should get the second shot at least 1 month after the first one.  The third shot is most often given about 6 months after the first one. After you get all three doses, you will be protected for at least 15 years. Thisvaccine is safe for women who are pregnant or breastfeeding. If you are exposed to hepatitis B before you get all three shots, you may need a hepatitis B immunoglobulin (HBIG) shot. This gives you instant protection. The HBIG shot will prevent infection until your hepatitis B vaccine takeseffect. The vaccine may cause pain at the injection site. It can also cause a mild fever for a short time. You should not get the hepatitis B vaccine if you are allergic to baker's yeast. This is the kind of yeast used to make bread. And you should not get a second or third dose if you had a bad reaction to thefirst shot. Follow-up care is a key part of your treatment and safety. Be sure to make and go to all appointments, and call your doctor if you are having problems. It's also a good idea to know your test results and keep alist of the medicines you take. How can you care for yourself at home?  Take an over-the-counter pain medicine, such as acetaminophen (Tylenol), ibuprofen (Advil, Motrin), or naproxen (Aleve), if your arm is sore after the shot. Be safe with medicines. Read and follow all instructions on the label.  Do not take two or more pain medicines at the same time unless the doctor told you to. Many pain medicines have acetaminophen, which is Tylenol. Too much acetaminophen (Tylenol) can be harmful.  Put ice or a cold pack on the sore area for 10 to 20 minutes at a time. Put a thin cloth between the ice and your skin. When should you call for help? Call 911 anytime you think you may need emergency care. For example, call if:     You have a seizure.      You have symptoms of a severe allergic reaction. These may include:  ? Sudden raised, red areas (hives) all over your body. ? Swelling of the throat, mouth, lips, or tongue. ? Trouble breathing. ? Passing out (losing consciousness).  Or you may feel very lightheaded or suddenly feel weak, confused, or restless. Call your doctor now or seek immediate medical care if:     You have symptoms of an allergic reaction, such as:  ? A rash or hives (raised, red areas on the skin). ? Itching. ? Swelling. ? Belly pain, nausea, or vomiting.      You have a high fever. Watch closely for changes in your health, and be sure to contact your doctor ifyou have any problems. Where can you learn more? Go to https://[employer]PIK.[employer]. org and sign in to your [employer] account. Enter X522 in the [address] box to learn more about \"Hepatitis B Vaccine: Care Instructions. \"     If you do not have an account, please click on the \"Sign Up Now\" link. Current as of: February 10, 2021               Content Version: 13.2  © 3995-3293 Healthwise, Incorporated. Care instructions adapted under license by [facility] ([facility]). If you have questions about a medical condition or this instruction, always ask your healthcare professional. [address] any warranty or liability for your use of this information.

## 2022-04-21 NOTE — PROGRESS NOTES
Start Trulicity. Continue metformin. Bring blood sugars to visit. Bariatric clinic. Attending Physician Statement  I have discussed the care of [facility], including pertinent history and exam findings,  with the resident. I have reviewed the key elements of all parts of the encounter with the resident. I agree with the assessment, plan and orders as documented by the resident.   (GE Modifier)

## 2022-04-21 NOTE — PROGRESS NOTES
Internal Medicine Clinic New Patient Note    Date of patient's visit: 2022  Name:  Halie Villegas  Primary Care Physician: Natty Jimenez MD    Reason for visit: First Visit, establish care   Chief Complaint   Patient presents with   Zannie Cowden Diabetes    Health Maintenance     eye exam due, vaccines hep b pended - pneum declined         HISTORY OF PRESENTING ILLNESS:    History was obtained from the patient. Halie Villegas is a 27 y.o. is here for follow up. Patient presents with multiple complaints. States that she recently fell a month ago and is complaining of R ankle pain. Tenderness is elicited upon palpation of lateral maleolus, unable to transfer weight on her R foot. Awaiting R ankle xr. States that she has been increasingly itchy lately. Has been having increased fatigue. States that she has been donating plasma 2x a week.    PAST MEDICAL HISTORY:          Diagnosis Date    Asthma     Back pain 2014    Bipolar 1 disorder (HCC)     Bipolar disorder ([facility] [address] 2017    Depression     Diabetes mellitus (HCC)     Dizziness     Fatty liver disease, nonalcoholic     Fatty liver disease, nonalcoholic     GERD (gastroesophageal reflux disease)     Obesity        PAST SURGICAL HISTORY:          Procedure Laterality Date     SECTION      LEEP         ALLERGIES:      Allergies   Allergen Reactions    Morphine Anaphylaxis    Pcn [Penicillins] Hives and Shortness Of Breath    Amoxicillin Hives    Seasonal          HOME MEDICATION:      Current Outpatient Medications on File Prior to Visit   Medication Sig Dispense Refill    aspirin-acetaminophen-caffeine (EXCEDRIN MIGRAINE) 250-250-65 MG per tablet Take 1 tablet by mouth every 8 hours as needed for Headaches 90 tablet 0    metFORMIN (GLUCOPHAGE) 500 MG tablet Take 1 tablet by mouth 2 times daily (with meals) 60 tablet 5    ARIPiprazole (ABILIFY) 10 MG tablet Take 10 mg by mouth daily       No current facility-administered medications on file prior to visit. SOCIAL HISTORY:      Social History     Socioeconomic History    Marital status:      Spouse name: Not on file    Number of children: Not on file    Years of education: Not on file    Highest education level: Not on file   Occupational History    Not on file   Tobacco Use    Smoking status: Former Smoker     Types: Cigarettes    Smokeless tobacco: Never Used   Vaping Use    Vaping Use: Never used   Substance and Sexual Activity    Alcohol use: Not Currently     Alcohol/week: 0.0 standard drinks     Comment: rarely    Drug use: Not Currently     Types: Marijuana Aurelio Parjeison)     Comment: quit    Sexual activity: Not Currently   Other Topics Concern    Not on file   Social History Narrative    Not on file     Social Determinants of Health     Financial Resource Strain: Low Risk     Difficulty of Paying Living Expenses: Not very hard   Food Insecurity: No Food Insecurity    Worried About Running Out of Food in the Last Year: Never true    Bruno of Food in the Last Year: Never true   Transportation Needs:     Lack of Transportation (Medical): Not on file    Lack of Transportation (Non-Medical):  Not on file   Physical Activity:     Days of Exercise per Week: Not on file    Minutes of Exercise per Session: Not on file   Stress:     Feeling of Stress : Not on file   Social Connections:     Frequency of Communication with Friends and Family: Not on file    Frequency of Social Gatherings with Friends and Family: Not on file    Attends Taoism Services: Not on file    Active Member of Clubs or Organizations: Not on file    Attends Club or Organization Meetings: Not on file    Marital Status: Not on file   Intimate Partner Violence:     Fear of Current or Ex-Partner: Not on file    Emotionally Abused: Not on file    Physically Abused: Not on file    Sexually Abused: Not on file   Housing Stability:     Unable to Pay for Housing in the Last Year: Not on file    Number of Places Lived in the Last Year: Not on file    Unstable Housing in the Last Year: Not on file         FAMILY HISTORY:          Problem Relation Age of Onset    High Blood Pressure Mother     Diabetes Mother     Heart Disease Mother     Heart Disease Father     Early Death Father     Cancer Maternal Aunt         lung    Cancer Paternal Cousin         brain       Review of Systems   Constitutional: Positive for fatigue. Negative for chills, diaphoresis and fever. HENT: Negative for tinnitus. Respiratory: Positive for cough and shortness of breath. Negative for apnea, choking, wheezing and stridor. Cardiovascular: Positive for leg swelling. Negative for chest pain. Gastrointestinal: Positive for abdominal pain and nausea. Negative for abdominal distention, blood in stool, constipation, diarrhea and vomiting. Genitourinary: Negative for decreased urine volume, dysuria and urgency. Musculoskeletal: Positive for back pain. Skin: Negative for pallor and wound. Neurological: Negative for syncope, weakness, numbness and headaches. Psychiatric/Behavioral: Negative for agitation, behavioral problems, confusion, decreased concentration and self-injury. PHYSICAL EXAM:      Vitals:    04/21/22 1507   BP: 118/74   Pulse: 94   Temp: 98 °F (36.7 °C)   SpO2: 98%      Physical Exam  Constitutional:       General: She is not in acute distress. Appearance: Normal appearance. She is obese. She is not ill-appearing, toxic-appearing or diaphoretic. HENT:      Mouth/Throat:      Mouth: Mucous membranes are moist.      Pharynx: Oropharynx is clear. Eyes:      General: No scleral icterus. Cardiovascular:      Rate and Rhythm: Normal rate and regular rhythm. Pulses: Normal pulses. Heart sounds: Normal heart sounds. No murmur heard. No friction rub. No gallop. Pulmonary:      Effort: Pulmonary effort is normal.      Breath sounds: Normal breath sounds.    Abdominal: General: Abdomen is flat. Bowel sounds are normal. There is no distension. Palpations: Abdomen is soft. There is no mass. Tenderness: There is abdominal tenderness. There is no guarding or rebound. Hernia: No hernia is present. Musculoskeletal:         General: No swelling, tenderness, deformity or signs of injury. Right lower leg: Edema present. Left lower leg: Edema present. Skin:     General: Skin is warm and dry. Coloration: Skin is not jaundiced or pale. Findings: No bruising. Neurological:      General: No focal deficit present. Mental Status: She is alert and oriented to person, place, and time. Cranial Nerves: No cranial nerve deficit. Motor: No weakness. Psychiatric:         Mood and Affect: Mood normal.         Behavior: Behavior normal.         Thought Content: Thought content normal.         Judgment: Judgment normal.      Comments: Hx of suicidal ideation          LABORATORY FINDINGS:    CBC:   Lab Results   Component Value Date    WBC 8.3 03/22/2022    HGB 13.7 03/22/2022     03/22/2022     05/14/2012     BMP:    Lab Results   Component Value Date     03/22/2022    K 4.2 03/22/2022     03/22/2022    CO2 24 03/22/2022    BUN 9 03/22/2022    CREATININE 0.79 03/22/2022    GLUCOSE 146 03/22/2022    GLUCOSE 93 05/14/2012     Hemoglobin A1C:   Lab Results   Component Value Date    LABA1C 7.3 03/17/2022     Lipid profile:   Lab Results   Component Value Date    CHOL 191 03/22/2022    TRIG 170 03/22/2022    HDL 42 03/22/2022     Thyroid functions:   Lab Results   Component Value Date    TSH 2.34 08/14/2020      Hepatic functions:   Lab Results   Component Value Date    ALT 75 03/22/2022    AST 61 03/22/2022    PROT 7.4 03/22/2022    BILITOT 0.65 03/22/2022    BILIDIR 0.20 11/29/2017    LABALBU 3.8 03/22/2022    LABALBU 4.9 10/04/2011       Any additional findings:    Assessment and Plan:      Diagnosis Orders   1.  Need for hepatitis B vaccination     2. Type 2 diabetes mellitus without complication, without long-term current use of insulin ([facility] [address]     3. Acute low back pain without sciatica, unspecified back pain laterality     4. Fatty liver disease, nonalcoholic     5. Generalized abdominal pain     6. Depression with suicidal ideation     7. Class 3 severe obesity due to excess calories with body mass index (BMI) of 50.0 to 59.9 in adult, unspecified whether serious comorbidity present ([facility] [address]     8. Moderate persistent asthma, unspecified whether complicated         Radha Mcgraw was seen today for establish care, health maintenance, diabetes, asthma, sleep apnea, abdominal pain and depression. Diagnoses and all orders for this visit:  Radha Mcgraw was seen today for diabetes and health maintenance. Diagnoses and all orders for this visit:    Type 2 diabetes mellitus without complication, without long-term current use of insulin (HCC)  -     Dulaglutide (TRULICITY) 5.21 BARRAZA/3.5PR SOPN; Inject 0.75 mg into the skin once a week  -     blood glucose monitor kit and supplies; Dispense sufficient amount for indicated testing frequency plus additional to accommodate PRN testing needs. -     blood glucose monitor strips; Test 3  times daily Insulin Dependent mellitus  -     Lancets MISC; 1 each by Does not apply route daily  -     Lancets Misc. (ACCU-CHEK SOFTCLIX LANCET DEV) KIT; Use as directed for glucose checks    Acute low back pain without sciatica, unspecified back pain laterality  -     XR LUMBAR SPINE (2-3 VIEWS);  Future    Fatty liver disease, nonalcoholic    Class 3 severe obesity due to excess calories with body mass index (BMI) of 50.0 to 59.9 in adult, unspecified whether serious comorbidity present (HCC)  -     Dulaglutide (TRULICITY) 3.05 IE/2.5CL SOPN; Inject 0.75 mg into the skin once a week    Moderate persistent asthma, unspecified whether complicated    Need for hepatitis B vaccination  -     Hep B Vaccine Adult (ENGERIX-B)  - SC IMMUNIZ ADMIN,1 SINGLE/COMB VAC/TOXOID    Fatigue  - instructed to stop donating plasma  - f/u echo             Follow-up:   1. Follow up with me in 1 months. Due to hx of depression with suicidal ideation. Patient was given contact infor for the national suicide hotline at the end of the visit in case of mental status crisis. Surekha Schroeder received counseling on the following healthy behaviors: nutrition and exercise    Discussed use, benefit, and side effects of prescribed medications. Barriers to medication compliance addressed. All patient questions answered. Pt voiced understanding.      Eugenia Espinoza MD  PGY-1  Internal Medicine  [address]    3:49 [place] 4/21/2022

## 2022-04-21 NOTE — LETTER
Please tell patient that she has to follow up with pulm about her sleep test. Will need CPAP mask.  Referral is made

## 2022-04-25 LAB — STATUS: NORMAL

## 2022-05-05 DIAGNOSIS — G47.33 OBSTRUCTIVE SLEEP APNEA SYNDROME: Primary | ICD-10-CM

## 2022-05-12 ENCOUNTER — TELEPHONE (OUTPATIENT)
Dept: INTERNAL MEDICINE | Age: 32
End: 2022-05-12

## 2022-05-12 NOTE — TELEPHONE ENCOUNTER
----- Message from Iglesia Robbins sent at 5/12/2022  1:26 PM EDT -----  Subject: Message to Provider    QUESTIONS  Information for Provider? Pt calling in about the a letter she received to   call in to get her test results. Updated her phone number in the chart.   ---------------------------------------------------------------------------  --------------  [address]  What is the best way for the office to contact you? OK to leave message on   voicemail  Preferred Call Back Phone Number?  [phone]  ---------------------------------------------------------------------------  --------------  SCRIPT ANSWERS  undefined

## 2022-05-17 NOTE — TELEPHONE ENCOUNTER
Please inform that her labs are unremarkable and we will discuss them at her next visit this Thursday.    Thanks      Jorge Collins MD  Internal Medicine Resident  [address] [facility]  5/17/2022 7:11 AM

## 2022-05-19 ENCOUNTER — OFFICE VISIT (OUTPATIENT)
Dept: INTERNAL MEDICINE | Age: 32
End: 2022-05-19
Payer: COMMERCIAL

## 2022-05-19 VITALS
WEIGHT: 293 LBS | SYSTOLIC BLOOD PRESSURE: 121 MMHG | OXYGEN SATURATION: 97 % | HEART RATE: 100 BPM | TEMPERATURE: 97.9 F | BODY MASS INDEX: 48.82 KG/M2 | DIASTOLIC BLOOD PRESSURE: 82 MMHG | HEIGHT: 65 IN

## 2022-05-19 DIAGNOSIS — Z52.008 PLASMA DONOR: ICD-10-CM

## 2022-05-19 DIAGNOSIS — H93.8X3 CONGESTION OF BOTH EARS: ICD-10-CM

## 2022-05-19 DIAGNOSIS — N39.46 MIXED STRESS AND URGE URINARY INCONTINENCE: ICD-10-CM

## 2022-05-19 DIAGNOSIS — L29.9 ITCHING: ICD-10-CM

## 2022-05-19 DIAGNOSIS — R53.83 FATIGUE, UNSPECIFIED TYPE: ICD-10-CM

## 2022-05-19 DIAGNOSIS — M25.561 CHRONIC PAIN OF RIGHT KNEE: ICD-10-CM

## 2022-05-19 DIAGNOSIS — E11.9 TYPE 2 DIABETES MELLITUS WITHOUT COMPLICATION, WITHOUT LONG-TERM CURRENT USE OF INSULIN (HCC): Primary | ICD-10-CM

## 2022-05-19 DIAGNOSIS — E66.01 CLASS 3 SEVERE OBESITY DUE TO EXCESS CALORIES WITH BODY MASS INDEX (BMI) OF 50.0 TO 59.9 IN ADULT, UNSPECIFIED WHETHER SERIOUS COMORBIDITY PRESENT (HCC): ICD-10-CM

## 2022-05-19 DIAGNOSIS — G89.29 CHRONIC PAIN OF RIGHT KNEE: ICD-10-CM

## 2022-05-19 PROCEDURE — 99213 OFFICE O/P EST LOW 20 MIN: CPT | Performed by: STUDENT IN AN ORGANIZED HEALTH CARE EDUCATION/TRAINING PROGRAM

## 2022-05-19 PROCEDURE — 3051F HG A1C>EQUAL 7.0%<8.0%: CPT | Performed by: STUDENT IN AN ORGANIZED HEALTH CARE EDUCATION/TRAINING PROGRAM

## 2022-05-19 RX ORDER — FLUTICASONE PROPIONATE 50 MCG
2 SPRAY, SUSPENSION (ML) NASAL DAILY
Qty: 16 G | Refills: 0 | Status: SHIPPED | OUTPATIENT
Start: 2022-05-19 | End: 2022-08-10 | Stop reason: SDUPTHER

## 2022-05-19 RX ORDER — BUDESONIDE AND FORMOTEROL FUMARATE DIHYDRATE 160; 4.5 UG/1; UG/1
AEROSOL RESPIRATORY (INHALATION)
COMMUNITY
Start: 2022-05-02 | End: 2022-08-10 | Stop reason: SDUPTHER

## 2022-05-19 RX ORDER — BUPROPION HYDROCHLORIDE 150 MG/1
TABLET ORAL
COMMUNITY
Start: 2022-05-01 | End: 2022-08-10 | Stop reason: SDUPTHER

## 2022-05-19 RX ORDER — PANTOPRAZOLE SODIUM 40 MG/1
TABLET, DELAYED RELEASE ORAL
COMMUNITY
Start: 2022-05-01 | End: 2022-07-18

## 2022-05-19 RX ORDER — DIPHENHYDRAMINE HYDROCHLORIDE, ZINC ACETATE 2; .1 G/100G; G/100G
1 CREAM TOPICAL DAILY PRN
Qty: 60 G | Refills: 0 | Status: SHIPPED | OUTPATIENT
Start: 2022-05-19 | End: 2022-06-16

## 2022-05-19 RX ORDER — DULAGLUTIDE 1.5 MG/.5ML
1.5 INJECTION, SOLUTION SUBCUTANEOUS WEEKLY
Qty: 4 PEN | Refills: 2 | Status: SHIPPED | OUTPATIENT
Start: 2022-05-19 | End: 2022-08-10 | Stop reason: SDUPTHER

## 2022-05-19 RX ORDER — HYDROXYZINE PAMOATE 50 MG/1
50 CAPSULE ORAL
COMMUNITY
Start: 2022-05-01 | End: 2022-08-10 | Stop reason: SDUPTHER

## 2022-05-19 ASSESSMENT — ENCOUNTER SYMPTOMS
STRIDOR: 0
BLOOD IN STOOL: 0
APNEA: 0
NAUSEA: 1
CONSTIPATION: 0
VOMITING: 0
CHOKING: 0
ABDOMINAL PAIN: 1
WHEEZING: 0
COUGH: 1
BACK PAIN: 1
DIARRHEA: 0
SHORTNESS OF BREATH: 1
ABDOMINAL DISTENTION: 0

## 2022-05-19 NOTE — PROGRESS NOTES
Attending Physician Statement  I have discussed the care of [facility], including pertinent history and exam findings,  with the resident. I have reviewed the key elements of all parts of the encounter with the resident. I agree with the assessment, plan and orders as documented by the resident.   (GE Modifier)

## 2022-05-19 NOTE — PATIENT INSTRUCTIONS
Patient was put on a wait list and will be contacted to schedule their next follow up appointment once the schedule is available. If the patient is in need of an appointment before their next visit please call the office at [phone]. After Visit Summary  given and reviewed.

## 2022-05-19 NOTE — PROGRESS NOTES
Internal Medicine Clinic progress note    Date of patient's visit: 2022  Name:  Rachel Cruz  Primary Care Physician: Yariel Dale MD    Reason for visit: First Visit, establish care   Chief Complaint   Patient presents with    Diabetes    Ear Fullness     right ear    Health Maintenance     will provide list of eye doctors        HISTORY OF PRESENTING ILLNESS:    History was obtained from the patient. Rachel Cruz is a 27 y.o. is here for follow up. Patient presents with multiple complaints. Currently on trulicity and metformin, blood sugars have been below 200. BP is stable. she also is stating that she has been having congestion and \"fullness in her ears\" upon inspection of the ear drums, tympanic membrane was clear no fluid or exudates were seen. Also complaining of itchy skin on her arm. Has been having     States that she has been increasingly itchy lately. Has been having increased fatigue. Started to donate plasma again. I requested that she stop. PAST MEDICAL HISTORY:          Diagnosis Date    Asthma     Back pain 2014    Bipolar 1 disorder (HCC)     [facility]    Depression     Diabetes mellitus (HCC)     Dizziness     Fatty liver disease, nonalcoholic     Fatty liver disease, nonalcoholic     GERD (gastroesophageal reflux disease)     Obesity        PAST SURGICAL HISTORY:          Procedure Laterality Date     SECTION      LEEP         ALLERGIES:      Allergies   Allergen Reactions    Morphine Anaphylaxis    Pcn [Penicillins] Hives and Shortness Of Breath    Amoxicillin Hives    Seasonal          HOME MEDICATION:      Current Outpatient Medications on File Prior to Visit   Medication Sig Dispense Refill    blood glucose monitor kit and supplies Dispense sufficient amount for indicated testing frequency plus additional to accommodate PRN testing needs.  1 kit 0    blood glucose monitor strips Test 3  times daily Insulin Dependent mellitus 100 strip 11    Lancets MISC 1 each by Does not apply route daily 100 each 5    Lancets Misc. (ACCU-CHEK SOFTCLIX LANCET DEV) KIT Use as directed for glucose checks 2 kit 3    aspirin-acetaminophen-caffeine (EXCEDRIN MIGRAINE) 250-250-65 MG per tablet Take 1 tablet by mouth every 8 hours as needed for Headaches 90 tablet 0    metFORMIN (GLUCOPHAGE) 500 MG tablet Take 1 tablet by mouth 2 times daily (with meals) 60 tablet 5    ARIPiprazole (ABILIFY) 10 MG tablet Take 10 mg by mouth daily       No current facility-administered medications on file prior to visit. SOCIAL HISTORY:      Social History     Socioeconomic History    Marital status:      Spouse name: Not on file    Number of children: Not on file    Years of education: Not on file    Highest education level: Not on file   Occupational History    Not on file   Tobacco Use    Smoking status: Former Smoker     Types: Cigarettes    Smokeless tobacco: Never Used   Vaping Use    Vaping Use: Never used   Substance and Sexual Activity    Alcohol use: Not Currently     Alcohol/week: 0.0 standard drinks     Comment: rarely    Drug use: Not Currently     Types: Marijuana Aurelio Swanson)     Comment: quit    Sexual activity: Not Currently   Other Topics Concern    Not on file   Social History Narrative    Not on file     Social Determinants of Health     Financial Resource Strain: Low Risk     Difficulty of Paying Living Expenses: Not very hard   Food Insecurity: No Food Insecurity    Worried About Running Out of Food in the Last Year: Never true    Bruno of Food in the Last Year: Never true   Transportation Needs:     Lack of Transportation (Medical): Not on file    Lack of Transportation (Non-Medical):  Not on file   Physical Activity:     Days of Exercise per Week: Not on file    Minutes of Exercise per Session: Not on file   Stress:     Feeling of Stress : Not on file   Social Connections:     Frequency of Communication with Friends and Family: Not on file    Frequency of Social Gatherings with Friends and Family: Not on file    Attends Yazidi Services: Not on file    Active Member of Clubs or Organizations: Not on file    Attends Club or Organization Meetings: Not on file    Marital Status: Not on file   Intimate Partner Violence:     Fear of Current or Ex-Partner: Not on file    Emotionally Abused: Not on file    Physically Abused: Not on file    Sexually Abused: Not on file   Housing Stability:     Unable to Pay for Housing in the Last Year: Not on file    Number of [place] in the Last Year: Not on file    Unstable Housing in the Last Year: Not on file         FAMILY HISTORY:          Problem Relation Age of Onset    High Blood Pressure Mother     Diabetes Mother     Heart Disease Mother     Heart Disease Father     Early Death Father     Cancer Maternal Aunt         lung    Cancer Paternal Cousin         brain       Review of Systems   Constitutional: Positive for fatigue. Negative for chills, diaphoresis and fever. HENT: Negative for tinnitus. Respiratory: Positive for cough and shortness of breath. Negative for apnea, choking, wheezing and stridor. Cardiovascular: Positive for leg swelling. Negative for chest pain. Gastrointestinal: Positive for abdominal pain and nausea. Negative for abdominal distention, blood in stool, constipation, diarrhea and vomiting. Genitourinary: Negative for decreased urine volume, dysuria and urgency. Musculoskeletal: Positive for back pain. Skin: Negative for pallor and wound. Neurological: Negative for syncope, weakness, numbness and headaches. Psychiatric/Behavioral: Negative for agitation, behavioral problems, confusion, decreased concentration and self-injury.       PHYSICAL EXAM:      Vitals:    05/19/22 1451   BP: 121/82   Pulse: 100   Temp:    SpO2:       Physical Exam  Constitutional:       General: She is not in acute distress. Appearance: Normal appearance. She is obese. She is not ill-appearing, toxic-appearing or diaphoretic. HENT:      Mouth/Throat:      Mouth: Mucous membranes are moist.      Pharynx: Oropharynx is clear. Eyes:      General: No scleral icterus. Cardiovascular:      Rate and Rhythm: Normal rate and regular rhythm. Pulses: Normal pulses. Heart sounds: Normal heart sounds. No murmur heard. No friction rub. No gallop. Pulmonary:      Effort: Pulmonary effort is normal.      Breath sounds: Normal breath sounds. Abdominal:      General: Abdomen is flat. Bowel sounds are normal. There is no distension. Palpations: Abdomen is soft. There is no mass. Tenderness: There is abdominal tenderness. There is no guarding or rebound. Hernia: No hernia is present. Musculoskeletal:         General: No swelling, tenderness, deformity or signs of injury. Right lower leg: No edema. Left lower leg: No edema. Skin:     General: Skin is warm and dry. Coloration: Skin is not jaundiced or pale. Findings: No bruising. Neurological:      General: No focal deficit present. Mental Status: She is alert and oriented to person, place, and time. Cranial Nerves: No cranial nerve deficit. Motor: No weakness. Psychiatric:         Mood and Affect: Mood normal.         Behavior: Behavior normal.         Thought Content:  Thought content normal.         Judgment: Judgment normal.      Comments: Hx of suicidal ideation          LABORATORY FINDINGS:    CBC:   Lab Results   Component Value Date    WBC 8.3 03/22/2022    HGB 13.7 03/22/2022     03/22/2022     05/14/2012     BMP:    Lab Results   Component Value Date     03/22/2022    K 4.2 03/22/2022     03/22/2022    CO2 24 03/22/2022    BUN 9 03/22/2022    CREATININE 0.79 03/22/2022    GLUCOSE 146 03/22/2022    GLUCOSE 93 05/14/2012     Hemoglobin A1C:   Lab Results   Component Value Date LABA1C 7.3 03/17/2022     Lipid profile:   Lab Results   Component Value Date    CHOL 191 03/22/2022    TRIG 170 03/22/2022    HDL 42 03/22/2022     Thyroid functions:   Lab Results   Component Value Date    TSH 2.34 08/14/2020      Hepatic functions:   Lab Results   Component Value Date    ALT 75 03/22/2022    AST 61 03/22/2022    PROT 7.4 03/22/2022    BILITOT 0.65 03/22/2022    BILIDIR 0.20 11/29/2017    LABALBU 3.8 03/22/2022    LABALBU 4.9 10/04/2011       Any additional findings:    Assessment and Plan:      Diagnosis Orders   1. Type 2 diabetes mellitus without complication, without long-term current use of insulin ([facility] [address]     2. Itching  diphenhydrAMINE-zinc acetate (BENADRYL EXTRA STRENGTH) 2-0.1 % cream   3. Congestion of both ears  fluticasone (FLONASE) 50 MCG/ACT nasal spray   4. Chronic pain of right knee  XR KNEE RIGHT (3 VIEWS)   5. Plasma donor  Iron and TIBC    CBC with Auto Differential    Ferritin   6. Class 3 severe obesity due to excess calories with body mass index (BMI) of 50.0 to 59.9 in adult, unspecified whether serious comorbidity present ([facility] [address]     7. Fatigue, unspecified type  Ferritin   8. Mixed stress and urge urinary incontinence  Urinalysis with Reflex to Culture       Ana Felton was seen today for establish care, health maintenance, diabetes, asthma, sleep apnea, abdominal pain and depression. Diagnoses and all orders for this visit:  Ana Felton was seen today for diabetes and health maintenance. Diagnoses and all orders for this visit:    Ana Felton was seen today for diabetes, ear fullness and health maintenance. Diagnoses and all orders for this visit:    Type 2 diabetes mellitus without complication, without long-term current use of insulin (HCC)    Itching  -     diphenhydrAMINE-zinc acetate (BENADRYL EXTRA STRENGTH) 2-0.1 % cream; Apply 0.0357 Tubes topically daily as needed for Itching Apply topically 3 times daily as needed.     Congestion of both ears  -     fluticasone (FLONASE) 50 MCG/ACT nasal spray; 2 sprays by Each Nostril route daily    Chronic pain of right knee  -     XR KNEE RIGHT (3 VIEWS); Future    Plasma donor  -     Iron and TIBC; Future  -     CBC with Auto Differential; Future  -     Ferritin; Future    Class 3 severe obesity due to excess calories with body mass index (BMI) of 50.0 to 59.9 in adult, unspecified whether serious comorbidity present (HCC)    Fatigue, unspecified type  -     Ferritin; Future    Mixed stress and urge urinary incontinence  -     Urinalysis with Reflex to Culture; Future    Other orders  -     Dulaglutide (TRULICITY) 1.5 AD/4.8BG SOPN; Inject 1.5 mg into the skin once a week. This is increased in dose                Follow-up:   Follow up with me in 3 months. Ledy Desouza received counseling on the following healthy behaviors: nutrition and exercise    Discussed use, benefit, and side effects of prescribed medications. Barriers to medication compliance addressed. All patient questions answered. Pt voiced understanding.      Idania Trevino MD  PGY-1  Internal Medicine  [facility]   2:36 [place] 5/19/2022

## 2022-06-13 ENCOUNTER — HOSPITAL ENCOUNTER (OUTPATIENT)
Dept: SLEEP CENTER | Age: 32
Discharge: HOME | End: 2022-06-15
Payer: COMMERCIAL

## 2022-06-13 VITALS — BODY MASS INDEX: 48.82 KG/M2 | HEIGHT: 65 IN | WEIGHT: 293 LBS

## 2022-06-13 DIAGNOSIS — G47.33 OBSTRUCTIVE SLEEP APNEA SYNDROME: ICD-10-CM

## 2022-06-13 PROCEDURE — 95810 POLYSOM 6/> YRS 4/> PARAM: CPT

## 2022-06-13 PROCEDURE — 95811 POLYSOM 6/>YRS CPAP 4/> PARM: CPT

## 2022-06-29 LAB — STATUS: NORMAL

## 2022-07-18 RX ORDER — PANTOPRAZOLE SODIUM 40 MG/1
40 TABLET, DELAYED RELEASE ORAL DAILY
Qty: 30 TABLET | Refills: 0 | Status: SHIPPED | OUTPATIENT
Start: 2022-07-18 | End: 2022-08-10 | Stop reason: SDUPTHER

## 2022-07-18 NOTE — TELEPHONE ENCOUNTER
Request for Pantoprazole. Next Visit Date:  No future appointments. Health Maintenance   Topic Date Due    Diabetic retinal exam  Never done    COVID-19 Vaccine (3 - Booster for Moderna series) 03/20/2022    Pneumococcal 0-64 years Vaccine (2 - PCV) 10/21/2022 (Originally 8/18/2018)    Diabetic foot exam  08/12/2022    Hepatitis B vaccine (3 of 3 - Risk 3-dose series) 08/21/2022    Flu vaccine (1) 09/01/2022    A1C test (Diabetic or Prediabetic)  03/17/2023    Depression Monitoring  03/17/2023    Diabetic microalbuminuria test  03/22/2023    Lipids  03/22/2023    Cervical cancer screen  07/31/2024    DTaP/Tdap/Td vaccine (2 - Td or Tdap) 09/08/2024    Hepatitis C screen  Completed    HIV screen  Completed    Hepatitis A vaccine  Aged Out    Hib vaccine  Aged Out    Meningococcal (ACWY) vaccine  Aged Out    Varicella vaccine  Discontinued       Hemoglobin A1C (%)   Date Value   03/17/2022 7.3   08/14/2020 5.8   09/17/2018 5.2             ( goal A1C is < 7)   Microalb/Crt.  Ratio (mcg/mg creat)   Date Value   03/22/2022 Can not be calculated     LDL Cholesterol (mg/dL)   Date Value   03/22/2022 115       (goal LDL is <100)   AST (U/L)   Date Value   03/22/2022 61 (H)     ALT (U/L)   Date Value   03/22/2022 75 (H)     BUN (mg/dL)   Date Value   03/22/2022 9     BP Readings from Last 3 Encounters:   05/19/22 121/82   04/21/22 118/74   03/24/22 112/69          (goal 120/80)    All Future Testing planned in [facility]  Lab Frequency Next Occurrence   XR ANKLE RIGHT (2 VIEWS) Once 07/08/2022   XR LUMBAR SPINE (2-3 VIEWS) Once 04/21/2022   Iron and TIBC Once 05/19/2022   XR KNEE RIGHT (3 VIEWS) Once 05/19/2022   CBC with Auto Differential Once 05/19/2022   Ferritin Once 08/20/2022   Urinalysis with Reflex to Culture Once 05/19/2022         Patient Active Problem List:     Obesity     Gastroesophageal reflux disease without esophagitis     Mild intermittent asthma with acute exacerbation     Depression     Fatty liver disease, nonalcoholic     Abdominal pain     Menorrhagia     Back pain     Pedal edema     Medication refill     RUQ abdominal pain     Diverticulitis of large intestine with perforation without abscess or bleeding     Calculus of gallbladder with chronic cholecystitis without obstruction     Type 2 diabetes mellitus without complication (HCC)     Leukocytosis     Bipolar disorder (HCC)     Major depressive disorder, recurrent (HCC)     Shortness of breath     Depression with suicidal ideation     MDD (major depressive disorder), recurrent severe, without psychosis ([facility] [address]     MDD (major depressive disorder), recurrent, severe, with psychosis ([address]     Moderate persistent asthma     Acute diverticulitis     Diverticulitis of cecum

## 2022-07-31 ENCOUNTER — APPOINTMENT (OUTPATIENT)
Dept: GENERAL RADIOLOGY | Age: 32
DRG: 720 | End: 2022-07-31
Payer: COMMERCIAL

## 2022-07-31 ENCOUNTER — APPOINTMENT (OUTPATIENT)
Dept: CT IMAGING | Age: 32
DRG: 720 | End: 2022-07-31
Payer: COMMERCIAL

## 2022-07-31 ENCOUNTER — HOSPITAL ENCOUNTER (INPATIENT)
Age: 32
LOS: 2 days | Discharge: HOME | DRG: 720 | End: 2022-08-02
Attending: EMERGENCY MEDICINE | Admitting: INTERNAL MEDICINE
Payer: COMMERCIAL

## 2022-07-31 DIAGNOSIS — R65.10 SIRS (SYSTEMIC INFLAMMATORY RESPONSE SYNDROME) (HCC): Primary | ICD-10-CM

## 2022-07-31 PROBLEM — E83.42 HYPOMAGNESEMIA: Status: ACTIVE | Noted: 2022-07-31

## 2022-07-31 PROBLEM — N30.00 ACUTE CYSTITIS: Status: ACTIVE | Noted: 2022-07-31

## 2022-07-31 PROBLEM — E87.1 HYPONATREMIA: Status: ACTIVE | Noted: 2022-07-31

## 2022-07-31 PROBLEM — A41.9 SEPSIS (HCC): Status: ACTIVE | Noted: 2022-07-31

## 2022-07-31 LAB
-: ABNORMAL
ABSOLUTE EOS #: 0 K/UL (ref 0–0.4)
ABSOLUTE IMMATURE GRANULOCYTE: 0 K/UL (ref 0–0.3)
ABSOLUTE LYMPH #: 0.76 K/UL (ref 1–4.8)
ABSOLUTE MONO #: 0 K/UL (ref 0.1–0.8)
ALBUMIN SERPL-MCNC: 3.6 G/DL (ref 3.5–5.2)
ALBUMIN/GLOBULIN RATIO: 1.3 (ref 1–2.5)
ALP BLD-CCNC: 77 U/L (ref 35–104)
ALT SERPL-CCNC: 27 U/L (ref 5–33)
ANION GAP SERPL CALCULATED.3IONS-SCNC: 12 MMOL/L (ref 9–17)
AST SERPL-CCNC: 26 U/L
BACTERIA: ABNORMAL
BASOPHILS # BLD: 0 % (ref 0–2)
BASOPHILS ABSOLUTE: 0 K/UL (ref 0–0.2)
BILIRUB SERPL-MCNC: 0.51 MG/DL (ref 0.3–1.2)
BILIRUBIN URINE: NEGATIVE
BUN BLDV-MCNC: 12 MG/DL (ref 6–20)
CALCIUM SERPL-MCNC: 8.4 MG/DL (ref 8.6–10.4)
CHLORIDE BLD-SCNC: 100 MMOL/L (ref 98–107)
CO2: 21 MMOL/L (ref 20–31)
COLOR: YELLOW
CREAT SERPL-MCNC: 0.85 MG/DL (ref 0.5–0.9)
EOSINOPHILS RELATIVE PERCENT: 0 % (ref 1–4)
EPITHELIAL CELLS UA: ABNORMAL /HPF (ref 0–5)
GFR AFRICAN AMERICAN: >60 ML/MIN
GFR NON-AFRICAN AMERICAN: >60 ML/MIN
GFR SERPL CREATININE-BSD FRML MDRD: ABNORMAL ML/MIN/{1.73_M2}
GLUCOSE BLD-MCNC: 181 MG/DL (ref 65–105)
GLUCOSE BLD-MCNC: 181 MG/DL (ref 70–99)
GLUCOSE URINE: NEGATIVE
HCG QUALITATIVE: NEGATIVE
HCT VFR BLD CALC: 38.1 % (ref 36.3–47.1)
HEMOGLOBIN: 12.6 G/DL (ref 11.9–15.1)
IMMATURE GRANULOCYTES: 0 %
INR BLD: 1
KETONES, URINE: NEGATIVE
LACTIC ACID, SEPSIS WHOLE BLOOD: 3.4 MMOL/L (ref 0.5–1.9)
LACTIC ACID, WHOLE BLOOD: 1.9 MMOL/L (ref 0.7–2.1)
LEUKOCYTE ESTERASE, URINE: ABNORMAL
LYMPHOCYTES # BLD: 7 % (ref 24–44)
MAGNESIUM: 1.4 MG/DL (ref 1.6–2.6)
MCH RBC QN AUTO: 29.9 PG (ref 25.2–33.5)
MCHC RBC AUTO-ENTMCNC: 33.1 G/DL (ref 28.4–34.8)
MCV RBC AUTO: 90.5 FL (ref 82.6–102.9)
MONOCYTES # BLD: 0 % (ref 1–7)
MORPHOLOGY: NORMAL
NITRITE, URINE: NEGATIVE
NRBC AUTOMATED: 0 PER 100 WBC
PARTIAL THROMBOPLASTIN TIME: 23 SEC (ref 20.5–30.5)
PDW BLD-RTO: 13 % (ref 11.8–14.4)
PH UA: 7.5 (ref 5–8)
PLATELET # BLD: 396 K/UL (ref 138–453)
PMV BLD AUTO: 9.7 FL (ref 8.1–13.5)
POTASSIUM SERPL-SCNC: 4 MMOL/L (ref 3.7–5.3)
PROTEIN UA: NEGATIVE
PROTHROMBIN TIME: 10.6 SEC (ref 9.1–12.3)
RBC # BLD: 4.21 M/UL (ref 3.95–5.11)
RBC UA: ABNORMAL /HPF (ref 0–2)
SARS-COV-2, RAPID: NOT DETECTED
SEG NEUTROPHILS: 93 % (ref 36–66)
SEGMENTED NEUTROPHILS ABSOLUTE COUNT: 10.14 K/UL (ref 1.8–7.7)
SODIUM BLD-SCNC: 133 MMOL/L (ref 135–144)
SPECIFIC GRAVITY UA: 1.04 (ref 1–1.03)
SPECIMEN DESCRIPTION: NORMAL
TOTAL PROTEIN: 6.4 G/DL (ref 6.4–8.3)
TSH SERPL DL<=0.05 MIU/L-ACNC: 1.31 UIU/ML (ref 0.3–5)
TURBIDITY: CLEAR
URINE HGB: NEGATIVE
UROBILINOGEN, URINE: NORMAL
WBC # BLD: 10.9 K/UL (ref 3.5–11.3)
WBC UA: ABNORMAL /HPF (ref 0–5)

## 2022-07-31 PROCEDURE — 87086 URINE CULTURE/COLONY COUNT: CPT

## 2022-07-31 PROCEDURE — 2580000003 HC RX 258: Performed by: EMERGENCY MEDICINE

## 2022-07-31 PROCEDURE — 81001 URINALYSIS AUTO W/SCOPE: CPT

## 2022-07-31 PROCEDURE — 2060000000 HC ICU INTERMEDIATE R&B

## 2022-07-31 PROCEDURE — 87186 SC STD MICRODIL/AGAR DIL: CPT

## 2022-07-31 PROCEDURE — 87077 CULTURE AEROBIC IDENTIFY: CPT

## 2022-07-31 PROCEDURE — 93005 ELECTROCARDIOGRAM TRACING: CPT | Performed by: INTERNAL MEDICINE

## 2022-07-31 PROCEDURE — 85730 THROMBOPLASTIN TIME PARTIAL: CPT

## 2022-07-31 PROCEDURE — 6360000004 HC RX CONTRAST MEDICATION: Performed by: EMERGENCY MEDICINE

## 2022-07-31 PROCEDURE — 87635 SARS-COV-2 COVID-19 AMP PRB: CPT

## 2022-07-31 PROCEDURE — 82947 ASSAY GLUCOSE BLOOD QUANT: CPT

## 2022-07-31 PROCEDURE — 71045 X-RAY EXAM CHEST 1 VIEW: CPT

## 2022-07-31 PROCEDURE — 85610 PROTHROMBIN TIME: CPT

## 2022-07-31 PROCEDURE — 74177 CT ABD & PELVIS W/CONTRAST: CPT

## 2022-07-31 PROCEDURE — 84703 CHORIONIC GONADOTROPIN ASSAY: CPT

## 2022-07-31 PROCEDURE — 83605 ASSAY OF LACTIC ACID: CPT

## 2022-07-31 PROCEDURE — 96366 THER/PROPH/DIAG IV INF ADDON: CPT

## 2022-07-31 PROCEDURE — 83735 ASSAY OF MAGNESIUM: CPT

## 2022-07-31 PROCEDURE — 96365 THER/PROPH/DIAG IV INF INIT: CPT

## 2022-07-31 PROCEDURE — 96375 TX/PRO/DX INJ NEW DRUG ADDON: CPT

## 2022-07-31 PROCEDURE — 6370000000 HC RX 637 (ALT 250 FOR IP): Performed by: EMERGENCY MEDICINE

## 2022-07-31 PROCEDURE — 36415 COLL VENOUS BLD VENIPUNCTURE: CPT

## 2022-07-31 PROCEDURE — 85025 COMPLETE CBC W/AUTO DIFF WBC: CPT

## 2022-07-31 PROCEDURE — 84443 ASSAY THYROID STIM HORMONE: CPT

## 2022-07-31 PROCEDURE — 71260 CT THORAX DX C+: CPT | Performed by: EMERGENCY MEDICINE

## 2022-07-31 PROCEDURE — 6360000002 HC RX W HCPCS: Performed by: EMERGENCY MEDICINE

## 2022-07-31 PROCEDURE — 99285 EMERGENCY DEPT VISIT HI MDM: CPT

## 2022-07-31 PROCEDURE — 6360000002 HC RX W HCPCS

## 2022-07-31 PROCEDURE — 87040 BLOOD CULTURE FOR BACTERIA: CPT

## 2022-07-31 PROCEDURE — 80053 COMPREHEN METABOLIC PANEL: CPT

## 2022-07-31 RX ORDER — MAGNESIUM SULFATE IN WATER 40 MG/ML
2000 INJECTION, SOLUTION INTRAVENOUS ONCE
Status: COMPLETED | OUTPATIENT
Start: 2022-07-31 | End: 2022-07-31

## 2022-07-31 RX ORDER — KETOROLAC TROMETHAMINE 30 MG/ML
30 INJECTION, SOLUTION INTRAMUSCULAR; INTRAVENOUS ONCE
Status: COMPLETED | OUTPATIENT
Start: 2022-07-31 | End: 2022-07-31

## 2022-07-31 RX ORDER — MIDAZOLAM HYDROCHLORIDE 1 MG/ML
INJECTION INTRAMUSCULAR; INTRAVENOUS
Status: COMPLETED
Start: 2022-07-31 | End: 2022-07-31

## 2022-07-31 RX ORDER — MIDAZOLAM HYDROCHLORIDE 2 MG/2ML
2 INJECTION, SOLUTION INTRAMUSCULAR; INTRAVENOUS ONCE
Status: COMPLETED | OUTPATIENT
Start: 2022-07-31 | End: 2022-07-31

## 2022-07-31 RX ORDER — ACETAMINOPHEN 500 MG
1000 TABLET ORAL ONCE
Status: COMPLETED | OUTPATIENT
Start: 2022-07-31 | End: 2022-07-31

## 2022-07-31 RX ORDER — SODIUM CHLORIDE, SODIUM LACTATE, POTASSIUM CHLORIDE, AND CALCIUM CHLORIDE .6; .31; .03; .02 G/100ML; G/100ML; G/100ML; G/100ML
1000 INJECTION, SOLUTION INTRAVENOUS ONCE
Status: COMPLETED | OUTPATIENT
Start: 2022-07-31 | End: 2022-07-31

## 2022-07-31 RX ORDER — SODIUM CHLORIDE, SODIUM LACTATE, POTASSIUM CHLORIDE, AND CALCIUM CHLORIDE .6; .31; .03; .02 G/100ML; G/100ML; G/100ML; G/100ML
1000 INJECTION, SOLUTION INTRAVENOUS ONCE
Status: COMPLETED | OUTPATIENT
Start: 2022-07-31 | End: 2022-08-01

## 2022-07-31 RX ADMIN — MIDAZOLAM HYDROCHLORIDE 2 MG: 2 INJECTION, SOLUTION INTRAMUSCULAR; INTRAVENOUS at 17:00

## 2022-07-31 RX ADMIN — MAGNESIUM SULFATE HEPTAHYDRATE 2000 MG: 40 INJECTION, SOLUTION INTRAVENOUS at 18:09

## 2022-07-31 RX ADMIN — KETOROLAC TROMETHAMINE 30 MG: 30 INJECTION, SOLUTION INTRAMUSCULAR at 20:46

## 2022-07-31 RX ADMIN — SODIUM CHLORIDE, POTASSIUM CHLORIDE, SODIUM LACTATE AND CALCIUM CHLORIDE 1000 ML: 600; 310; 30; 20 INJECTION, SOLUTION INTRAVENOUS at 17:22

## 2022-07-31 RX ADMIN — MEROPENEM 1000 MG: 1 INJECTION, POWDER, FOR SOLUTION INTRAVENOUS at 22:53

## 2022-07-31 RX ADMIN — IOPAMIDOL 75 ML: 755 INJECTION, SOLUTION INTRAVENOUS at 18:52

## 2022-07-31 RX ADMIN — ACETAMINOPHEN 1000 MG: 500 TABLET ORAL at 18:27

## 2022-07-31 RX ADMIN — SODIUM CHLORIDE, POTASSIUM CHLORIDE, SODIUM LACTATE AND CALCIUM CHLORIDE 1000 ML: 600; 310; 30; 20 INJECTION, SOLUTION INTRAVENOUS at 20:27

## 2022-07-31 RX ADMIN — MIDAZOLAM HYDROCHLORIDE 2 MG: 1 INJECTION, SOLUTION INTRAMUSCULAR; INTRAVENOUS at 17:00

## 2022-07-31 ASSESSMENT — PAIN DESCRIPTION - LOCATION: LOCATION: EAR;TEETH

## 2022-07-31 ASSESSMENT — PAIN DESCRIPTION - ORIENTATION: ORIENTATION: LEFT

## 2022-07-31 ASSESSMENT — ENCOUNTER SYMPTOMS: TACHYPNEA: 1

## 2022-07-31 ASSESSMENT — PAIN - FUNCTIONAL ASSESSMENT: PAIN_FUNCTIONAL_ASSESSMENT: 0-10

## 2022-07-31 ASSESSMENT — PAIN SCALES - GENERAL: PAINLEVEL_OUTOF10: 10

## 2022-07-31 NOTE — ED NOTES
Bedside glucose [address] [facility], [facility]  07/31/22 3134

## 2022-07-31 NOTE — ED PROVIDER NOTES
[address] [facility]  ED  Emergency Department Encounter  EmergencyMedicine Resident     Pt Name:Meño Shepard  MRN: [id number]  [place] 1990  Date of evaluation: 22  PCP:  Jared Paulson MD    [address] [place] [address]       Chief Complaint   Patient presents with    Shortness of Breath    Anxiety    Abdominal Pain    Emesis       HISTORY OF PRESENT ILLNESS  (Location/Symptom, Timing/Onset, Context/Setting, Quality, Duration, Modifying Factors, Severity.)      Mariam Haney is a 27 y.o. female who presents with nausea, vomiting, abdominal pain has been going on for 2 days. Patient states that her biggest cause of pain though is her left upper tooth and her left ear. Patient states that she has felt warm and had some fevers and chills. Patient also reports some shortness of breath, cough. Patient denies any chest pain. Patient also states that she has right upper quadrant abdominal pain, patient has history of hepatic steatosis. Patient has a history of anxiety, states that she has some anxiety right now due to having a father passed away in this hospital a year ago. Patient states that she has some lightheadedness denies any dizziness. Denies any sore throat. PAST MEDICAL / SURGICAL / SOCIAL / FAMILY HISTORY      has a past medical history of Asthma, Back pain, Bipolar 1 disorder ([address] Bipolar disorder ([address] Depression, Diabetes mellitus ([address] Dizziness, Fatty liver disease, nonalcoholic, Fatty liver disease, nonalcoholic, GERD (gastroesophageal reflux disease), and Obesity. No additional pertinent     has a past surgical history that includes  section and LEEP.   No additional pertinent    Social History     Socioeconomic History    Marital status:      Spouse name: Not on file    Number of children: Not on file    Years of education: Not on file    Highest education level: Not on file   Occupational History    Not on file   Tobacco Use    Smoking status: Former Types: Cigarettes    Smokeless tobacco: Never   Vaping Use    Vaping Use: Never used   Substance and Sexual Activity    Alcohol use: Not Currently     Alcohol/week: 0.0 standard drinks     Comment: rarely    Drug use: Not Currently     Types: Marijuana [place])     Comment: quit    Sexual activity: Not Currently   Other Topics Concern    Not on file   Social History Narrative    Not on file     Social Determinants of Health     Financial Resource Strain: Low Risk     Difficulty of Paying Living Expenses: Not very hard   Food Insecurity: No Food Insecurity    Worried About Running Out of Food in the Last Year: Never true    Ran Out of Food in the Last Year: Never true   Transportation Needs: Not on file   Physical Activity: Not on file   Stress: Not on file   Social Connections: Not on file   Intimate Partner Violence: Not on file   Housing Stability: Not on file       Family History   Problem Relation Age of Onset    High Blood Pressure Mother     Diabetes Mother     Heart Disease Mother     Heart Disease Father     Early Death Father     Cancer Maternal Aunt         lung    Cancer Paternal Cousin         brain       Allergies:  Morphine, Pcn [penicillins], Amoxicillin, and Seasonal    Home Medications:  Prior to Admission medications    Medication Sig Start Date End Date Taking? Authorizing Provider   pantoprazole (PROTONIX) 40 MG tablet Take 1 tablet by mouth in the morning.  7/18/22 8/17/22  Lenore Saxena MD   hydrOXYzine (VISTARIL) 50 MG capsule take 1 capsule by mouth twice a day if needed for anxiety 5/1/22   Historical Provider, MD   buPROPion (WELLBUTRIN XL) 150 MG extended release tablet take 1 tablet by mouth once daily 5/1/22   Historical Provider, MD   SYMBICORT 160-4.5 MCG/ACT AERO inhale 2 puffs by mouth twice a day 5/2/22   Historical Provider, MD   PROAIR  (90 Base) MCG/ACT inhaler SPACE OUT TO EVERY 6 HOURS AS SYMPTOMS IMPROVE 4/29/22   Historical Provider, MD   fluticasone (FLONASE) 50 MCG/ACT nasal spray 2 sprays by Each Nostril route daily 5/19/22   Debi Jonas MD   Dulaglutide (TRULICITY) 1.5 OJ/1.5KV SOPN Inject 1.5 mg into the skin once a week 5/19/22   Debi Jonas MD   blood glucose monitor kit and supplies Dispense sufficient amount for indicated testing frequency plus additional to accommodate PRN testing needs. 4/21/22   Debi Jonas MD   blood glucose monitor strips Test 3  times daily Insulin Dependent mellitus 4/21/22   Tanja Anne MD   Lancets MISC 1 each by Does not apply route daily 4/21/22   Tanja Anne MD   Lancets Misc. (ACCU-CHEK SOFTCLIX LANCET DEV) KIT Use as directed for glucose checks 4/21/22   Tanja Anne MD   aspirin-acetaminophen-caffeine [facility] MIGRAINE) 556-915-87 MG per tablet Take 1 tablet by mouth every 8 hours as needed for Headaches 3/24/22   Debi Jonas MD   metFORMIN (GLUCOPHAGE) 500 MG tablet Take 1 tablet by mouth 2 times daily (with meals) 3/24/22   Debi Jonas MD   ARIPiprazole (ABILIFY) 10 MG tablet Take 10 mg by mouth daily 3/8/22   Historical Provider, MD       REVIEW OF SYSTEMS    (2-9 systems for level 4, 10 or more for level 5)      Review of Systems   Constitutional:  Positive for fatigue. Negative for chills and fever. HENT:  Positive for dental problem and ear pain. Negative for congestion. Respiratory:  Positive for cough and shortness of breath. Negative for chest tightness. Cardiovascular:  Negative for chest pain and leg swelling. Gastrointestinal:  Positive for abdominal pain, nausea and vomiting. Negative for constipation and diarrhea. Endocrine: Negative for polyuria. Genitourinary:  Negative for difficulty urinating. Skin:  Negative for color change. Neurological:  Positive for light-headedness and headaches. Negative for dizziness and weakness. Psychiatric/Behavioral:  Negative for confusion.       PHYSICAL EXAM   (up to 7 for level 4, 8 or more for level 5)      INITIAL VITALS:   /72 Pulse (!) 113   Temp 100.3 °F (37.9 °C) (Oral)   Resp 19   Ht 5' 5\" (1.651 m)   Wt (!) 352 lb (159.7 kg)   SpO2 97%   BMI 58.58 kg/m²     Physical Exam  Constitutional:       Appearance: Normal appearance. She is obese. HENT:      Head: Normocephalic and atraumatic. Right Ear: Tympanic membrane is erythematous and bulging. Mouth/Throat:      Mouth: Mucous membranes are moist.      Pharynx: Oropharynx is clear. Eyes:      Extraocular Movements: Extraocular movements intact. Conjunctiva/sclera: Conjunctivae normal.   Cardiovascular:      Rate and Rhythm: Regular rhythm. Tachycardia present. Pulses: Normal pulses. Heart sounds: Normal heart sounds. No murmur heard. Pulmonary:      Effort: Pulmonary effort is normal. Tachypnea present. Breath sounds: Normal breath sounds. No decreased breath sounds or wheezing. Abdominal:      General: Bowel sounds are normal. There is no distension. Tenderness: There is abdominal tenderness (Right upper quadrant). There is no guarding. Musculoskeletal:         General: Normal range of motion. Right lower leg: Edema present. Left lower leg: Edema present. Comments: Range of motion noted to be normal with patient's natural movements   Skin:     General: Skin is warm and dry. Findings: No rash (On exposed skin). Neurological:      General: No focal deficit present. Mental Status: She is alert and oriented to person, place, and time. Psychiatric:         Mood and Affect: Mood is anxious.          Behavior: Behavior normal.       DIFFERENTIAL  DIAGNOSIS     PLAN (LABS / IMAGING / EKG):  Orders Placed This Encounter   Procedures    COVID-19, Rapid    Blood Culture 1    Blood Culture 2    Culture, Urine    XR CHEST PORTABLE    CT CHEST PULMONARY EMBOLISM W CONTRAST    CT ABDOMEN PELVIS W IV CONTRAST Additional Contrast? None    CMP    CBC with Auto Differential    HCG Qualitative, Serum    Magnesium    Lactate, Sepsis    Protime-INR    APTT    TSH    Urinalysis with Microscopic    Lactic Acid    Inpatient consult to Internal Medicine    POC Glucose Fingerstick    ADMIT TO INPATIENT       MEDICATIONS ORDERED:  Orders Placed This Encounter   Medications    midazolam (VERSED) 2 MG/2ML injection     Jenn Enriquez: cabinet override    midazolam PF (VERSED) injection 2 mg    lactated ringers bolus    magnesium sulfate 2000 mg in 50 mL IVPB premix    lactated ringers bolus    acetaminophen (TYLENOL) tablet 1,000 mg    iopamidol (ISOVUE-370) 76 % injection 75 mL    ketorolac (TORADOL) injection 30 mg    meropenem (MERREM) 1,000 mg in sodium chloride 0.9 % 100 mL IVPB (mini-bag)     Order Specific Question:   Antimicrobial Indications     Answer:   Sepsis of Unknown Etiology       DIAGNOSTIC RESULTS / EMERGENCY DEPARTMENT COURSE / MDM   LAB RESULTS:  Results for orders placed or performed during the hospital encounter of 07/31/22   COVID-19, Rapid    Specimen: Nasopharyngeal Swab   Result Value Ref Range    Specimen Description . NASOPHARYNGEAL SWAB     SARS-CoV-2, Rapid Not Detected Not Detected   Blood Culture 1    Specimen: Blood   Result Value Ref Range    Specimen Description . BLOOD     Special Requests L WRIST 10 ML     Culture NO GROWTH <24 HRS    Blood Culture 2    Specimen: Blood   Result Value Ref Range    Specimen Description . BLOOD     Special Requests R FA 10 ML     Culture NO GROWTH <24 HRS    CMP   Result Value Ref Range    Glucose 181 (H) 70 - 99 mg/dL    BUN 12 6 - 20 mg/dL    Creatinine 0.85 0.50 - 0.90 mg/dL    Calcium 8.4 (L) 8.6 - 10.4 mg/dL    Sodium 133 (L) 135 - 144 mmol/L    Potassium 4.0 3.7 - 5.3 mmol/L    Chloride 100 98 - 107 mmol/L    CO2 21 20 - 31 mmol/L    Anion Gap 12 9 - 17 mmol/L    Alkaline Phosphatase 77 35 - 104 U/L    ALT 27 5 - 33 U/L    AST 26 <32 U/L    Total Bilirubin 0.51 0.3 - 1.2 mg/dL    Total Protein 6.4 6.4 - 8.3 g/dL    Albumin 3.6 3.5 - 5.2 g/dL    Albumin/Globulin Ratio 1.3 1.0 - 2.5    GFR Non-African American >60 >60 mL/min    GFR African American >60 >60 mL/min    GFR Comment         CBC with Auto Differential   Result Value Ref Range    WBC 10.9 3.5 - 11.3 k/uL    RBC 4.21 3.95 - 5.11 m/uL    Hemoglobin 12.6 11.9 - 15.1 g/dL    Hematocrit 38.1 36.3 - 47.1 %    MCV 90.5 82.6 - 102.9 fL    MCH 29.9 25.2 - 33.5 pg    MCHC 33.1 28.4 - 34.8 g/dL    RDW 13.0 11.8 - 14.4 %    Platelets 870 841 - 974 k/uL    MPV 9.7 8.1 - 13.5 fL    NRBC Automated 0.0 0.0 per 100 WBC    Immature Granulocytes 0 0 %    Seg Neutrophils 93 (H) 36 - 66 %    Lymphocytes 7 (L) 24 - 44 %    Monocytes 0 (L) 1 - 7 %    Eosinophils % 0 (L) 1 - 4 %    Basophils 0 0 - 2 %    Absolute Immature Granulocyte 0.00 0.00 - 0.30 k/uL    Segs Absolute 10.14 (H) 1.8 - 7.7 k/uL    Absolute Lymph # 0.76 (L) 1.0 - 4.8 k/uL    Absolute Mono # 0.00 (L) 0.1 - 0.8 k/uL    Absolute Eos # 0.00 0.0 - 0.4 k/uL    Basophils Absolute 0.00 0.0 - 0.2 k/uL    Morphology Normal    HCG Qualitative, Serum   Result Value Ref Range    hCG Qual NEGATIVE NEGATIVE   Magnesium   Result Value Ref Range    Magnesium 1.4 (L) 1.6 - 2.6 mg/dL   Lactate, Sepsis   Result Value Ref Range    Lactic Acid, Sepsis, Whole Blood 3.4 (H) 0.5 - 1.9 mmol/L   Protime-INR   Result Value Ref Range    Protime 10.6 9.1 - 12.3 sec    INR 1.0    APTT   Result Value Ref Range    PTT 23.0 20.5 - 30.5 sec   TSH   Result Value Ref Range    TSH 1.31 0.30 - 5.00 uIU/mL   Urinalysis with Microscopic   Result Value Ref Range    Color, UA Yellow Yellow    Turbidity UA Clear Clear    Glucose, Ur NEGATIVE NEGATIVE    Bilirubin Urine NEGATIVE NEGATIVE    Ketones, Urine NEGATIVE NEGATIVE    Specific Gravity, UA 1.038 (H) 1.005 - 1.030    Urine Hgb NEGATIVE NEGATIVE    pH, UA 7.5 5.0 - 8.0    Protein, UA NEGATIVE NEGATIVE    Urobilinogen, Urine Normal Normal    Nitrite, Urine NEGATIVE NEGATIVE    Leukocyte Esterase, Urine SMALL (A) NEGATIVE    -          WBC, UA 2 TO 5 0 - 5 /HPF    RBC, UA 10 TO 20 0 - 2 /HPF    Epithelial Cells UA 2 TO 5 0 - 5 /HPF    Bacteria, UA MODERATE (A) None   Lactic Acid   Result Value Ref Range    Lactic Acid, Whole Blood 1.9 0.7 - 2.1 mmol/L   POC Glucose Fingerstick   Result Value Ref Range    POC Glucose 181 (H) 65 - 105 mg/dL       RADIOLOGY:  CT CHEST PULMONARY EMBOLISM W CONTRAST   Final Result   1. No evidence of pulmonary embolism or acute pulmonary disease   2. Diffuse hepatic steatosis   3. Patchy air trapping noted within the lungs. Differential considerations   would include reactive airway disease versus respiratory bronchiolitis         CT ABDOMEN PELVIS W IV CONTRAST Additional Contrast? None   Final Result   1. No acute findings within the abdomen or pelvis   2. Resolving bibasilar opacities, suggesting resolving atelectasis and or   pneumonia   3. Diffuse hepatic steatosis   4. Nonobstructing 6 mm calculus, upper pole left kidney   5. Normal appendix         XR CHEST PORTABLE   Final Result   No acute cardiopulmonary disease                EKG  EKG Interpretation    Interpreted by emergency department physician    Rhythm: sinus tachycardia  Rate: tachycardia  Axis: normal  Ectopy: none  Conduction: normal  ST Segments: no acute change  T Waves: normal  Q Waves: nonspecific    Clinical Impression: non-specific EKG    Segundo Crow,      All EKG's are interpreted by the Emergency Department Physician who either signs or Co-signs this chart in the absence of a cardiologist.      PROCEDURES:  none    CONSULTS:  IP CONSULT TO INTERNAL MEDICINE  IP CONSULT TO CASE MANAGEMENT    MEDICAL DECISION MAKING:  Patient presenting with tachycardia, this responded to fluids initially by EMS. On arrival patient was noted to be anxious and tachycardic, patient was given Versed for concerns of anxiety induced tachycardia. Patient had no response to the Versed, patient did appear, was yawning after the Versed but the heart rate did not improve.   At this time he was concerned that the heart rate was causing other symptoms. Evaluation of the patient did demonstrate some erythema of the left tympanic membrane. Patient did noted to have dental caries in the mouth, no abscess noted, patient had no Preeti's angina, retropharyngeal abscess, or infection of the oropharynx at could be causing patient's tachycardia. Patient had clear breath sounds bilaterally, abdomen was tender in the right upper quadrant. With patient's tachycardia there was concern for pulmonary embolism. CT PE was obtained which was demonstrated to demonstrate no concerns of pulmonary embolism. Patient continued to have good O2 saturations. CT imaging did not demonstrate improvement of groundglass opacities that was seen on imaging from February. Patient continued to have abdominal pain, and had worsening complaints of headache. Patient was given Toradol for pain control with improvement of her pain. Patient continued to be tachycardic after receiving multiple liters of IV fluids, there was concern that patient may be septic. Unknown source though. Patient was started on meropenem and for septic of unknown origin as she has allergies to penicillins and amoxicillin's. Patient tolerated the antibiotic without any allergic reaction. Patient was admitted to internal medicine for further work-up and evaluation of SIRS criteria, concerns for sepsis. Initial lactate was obtained and improved for the second lactate after IV fluid administration. Patient had no concerns for ACS on evaluation.     Sepsis Times and Checklist  Vital Signs: BP: 111/72  Heart Rate: (!) 113  Resp: 19  Temp: 100.3 °F (37.9 °C) SpO2: 97 %  SIRS (>2) Non Pregnant       Temp > 38.3C or < 36C   HR > 90   RR > 20   WBC > 12 or < 4 or >10% bands  SIRS (>2) Pregnant 20 weeks until 3 days postpartum   Temp > 38C or <36C   HR >110   RR > 24   WBC >15 or < 4 or >10% bands   SIRS (>2) and confirmed or suspected source of infection = Sepsis  Is Sepsis due to likely bacterial infection?: Yes    Sepsis Identified:  Date: 7/31/2022 Time: 2023  Sepsis Orders:   CBC(required): Yes   CMP: Yes   PT/PTT: Yes   Blood Cultures x2(required): Yes   Urinalysis and Urine Culture: Yes   Lactate(required): Yes   Antibiotics Given (within 3 hours of sepsis identification, after blood cultures):  Yes    (If unable to obtain IV access for IV antibiotics within 3 hours of identification of sepsis, IM antibiotics is acceptable.)              If lactate >2.0 MUST repeat within 6 hours    If elevated, is elevated lactate from a likely infectious source?: Yes. IV Fluid Bolus:  Is lactate > 4.0:  No    Septic Shock Identified (Initial lactate > 4.0 or 2 Hypotensive Blood Pressure readings within the first 6 hours): For septic shock sepsis focus physical exam must be completed AND documented (within 6 hours). Date: 7/31/2022 Time: 2245      Sepsis focus exam completed. For persistent hypotension after 30ml/kg fluid bolus vasopressors must be started (within 6 hours)   CRITICAL CARE:  Please see attending note    FINAL IMPRESSION      1. SIRS (systemic inflammatory response syndrome) ([facility] [address]          DISPOSITION / PLAN     DISPOSITION Admitted 07/31/2022 10:26:38 PM      PATIENT REFERRED TO:  No follow-up provider specified.     DISCHARGE MEDICATIONS:  New Prescriptions    No medications on file       Archie Romano, [facility], DO  Emergency Medicine Resident    (Please note that portions of thisnote were completed with a voice recognition program.  Efforts were made to edit the dictations but occasionally words are mis-transcribed.)        Izzy Farley DO  Resident  08/01/22 [id number]

## 2022-07-31 NOTE — ED NOTES
The following labs labeled with pt sticker and tubed to lab:     [] Blue     [] Lavender   [] on ice  [] Green/yellow  [] Green/black [] on ice  [] Yellow  [] Red  [] Pink      [] COVID-19 swab    [] Rapid  [] PCR  [] Flu swab  [] Peds Viral Panel     [] Urine Sample  [] Pelvic Cultures  [x] Blood Cultures          Lennox Tirado RN  07/31/22 [id number]

## 2022-07-31 NOTE — ED NOTES
Pt to ed via EMS from home. Pt states last night into early this am she started vomiting. Pt reports with the frequent vomiting she has developed some diffuse abdominal pain. Pt has history of anxiety, states she is feeling anxious and a bit sob. Pt states she has a bit of chest pressure with the anxiety. Pt is alert, oriented, speaking in full, complete sentences. Pt states she has left ear pain, dental pain, has rotted tooth with appointment at the dentist in the am but unsure if she will make it. Pt states the pain from the tooth/ear is a 10/10. EMS states first responders on scene documented pt /s, EMS established IV access, administered 400 ml fluid bolus with improvement to 130 HR.       Sole Jaramillo RN  07/31/22 2399

## 2022-07-31 NOTE — ED PROVIDER NOTES
[facility]     Emergency Department     Faculty Note/ Attestation      Pt Name: Jr Rainey                                       MRN: [id number]  [place] 1990  Date of evaluation: 7/31/2022    Patients PCP:    Sarina Apple MD      Attestation  I performed a history and physical examination of the patient and discussed management with the resident. I reviewed the residents note and agree with the documented findings and plan of care. Any areas of disagreement are noted on the chart. I was personally present for the key portions of any procedures. I have documented in the chart those procedures where I was not present during the key portions. I have reviewed the emergency nurses triage note. I agree with the chief complaint, past medical history, past surgical history, allergies, medications, social and family history as documented unless otherwise noted below. For Physician Assistant/ Nurse Practitioner cases/documentation I have personally evaluated this patient and have completed at least one if not all key elements of the E/M (history, physical exam, and MDM). Additional findings are as noted.       Initial Screens:        Yung Coma Scale  Eye Opening: Spontaneous  Best Verbal Response: Oriented  Best Motor Response: Obeys commands  Yung Coma Scale Score: 15    Vitals:    Vitals:    07/31/22 1659 07/31/22 1702 07/31/22 1706 07/31/22 1720   BP: 131/76 (!) 119/45     Pulse: (!) 133 (!) 135 (!) 131 (!) 134   Resp: 24 26 29 25   Temp:       TempSrc:       SpO2: 98% 96% 98% 100%   Weight:       Height:           CHIEF COMPLAINT       Chief Complaint   Patient presents with    Shortness of Breath    Anxiety    Abdominal Pain    Emesis             DIAGNOSTIC RESULTS             RADIOLOGY:   XR CHEST PORTABLE   Final Result   No acute cardiopulmonary disease         CT CHEST PULMONARY EMBOLISM W CONTRAST    (Results Pending)         LABS:  Labs Reviewed   COMPREHENSIVE METABOLIC PANEL - Abnormal; Notable for the following components:       Result Value    Glucose 181 (*)     Calcium 8.4 (*)     Sodium 133 (*)     All other components within normal limits   MAGNESIUM - Abnormal; Notable for the following components:    Magnesium 1.4 (*)     All other components within normal limits   LACTATE, SEPSIS - Abnormal; Notable for the following components:    Lactic Acid, Sepsis, Whole Blood 3.4 (*)     All other components within normal limits   POC GLUCOSE FINGERSTICK - Abnormal; Notable for the following components:    POC Glucose 181 (*)     All other components within normal limits   CULTURE, BLOOD 1   CULTURE, BLOOD 1   COVID-19, RAPID   CULTURE, URINE   CBC WITH AUTO DIFFERENTIAL   PROTIME-INR   APTT   HCG, SERUM, QUALITATIVE   TSH   URINALYSIS WITH MICROSCOPIC         EMERGENCY DEPARTMENT COURSE:     -------------------------  BP: (!) 119/45, Temp: 100.3 °F (37.9 °C), Heart Rate: (!) 134, Resp: 25      Comments    Nausea, vomiting, L upper tooth pain, tachy to 150s per EMS  Given fluids, versed for anxiolysis  Temp 100.3, mag 1.7, lact 3.4    More fluids, CTA PE, CT Abd/pelvis with contrast.  Reassess after fluids, low threshold to keep overnight    (Please note that portions of this note were completed with a voice recognition program.  Efforts were made to edit the dictations but occasionally words are mis-transcribed.)      Romero Crawley MD,, MD  Attending Emergency Physician         Romero Crawley MD  07/31/22 4295

## 2022-08-01 PROBLEM — R65.10 SIRS (SYSTEMIC INFLAMMATORY RESPONSE SYNDROME) (HCC): Status: ACTIVE | Noted: 2022-08-01

## 2022-08-01 PROBLEM — H66.90 ACUTE OTITIS MEDIA: Status: ACTIVE | Noted: 2022-08-01

## 2022-08-01 PROBLEM — Z88.0 PENICILLIN ALLERGY: Status: ACTIVE | Noted: 2022-08-01

## 2022-08-01 LAB
ABSOLUTE EOS #: 0.33 K/UL (ref 0–0.44)
ABSOLUTE IMMATURE GRANULOCYTE: <0.03 K/UL (ref 0–0.3)
ABSOLUTE LYMPH #: 1.5 K/UL (ref 1.1–3.7)
ABSOLUTE MONO #: 0.64 K/UL (ref 0.1–1.2)
ANION GAP SERPL CALCULATED.3IONS-SCNC: 10 MMOL/L (ref 9–17)
BASOPHILS # BLD: 0 % (ref 0–2)
BASOPHILS ABSOLUTE: <0.03 K/UL (ref 0–0.2)
BUN BLDV-MCNC: 10 MG/DL (ref 6–20)
CALCIUM SERPL-MCNC: 7.9 MG/DL (ref 8.6–10.4)
CHLORIDE BLD-SCNC: 106 MMOL/L (ref 98–107)
CO2: 20 MMOL/L (ref 20–31)
CREAT SERPL-MCNC: 0.8 MG/DL (ref 0.5–0.9)
EOSINOPHILS RELATIVE PERCENT: 5 % (ref 1–4)
GFR AFRICAN AMERICAN: >60 ML/MIN
GFR NON-AFRICAN AMERICAN: >60 ML/MIN
GFR SERPL CREATININE-BSD FRML MDRD: ABNORMAL ML/MIN/{1.73_M2}
GLUCOSE BLD-MCNC: 118 MG/DL (ref 65–105)
GLUCOSE BLD-MCNC: 118 MG/DL (ref 65–105)
GLUCOSE BLD-MCNC: 127 MG/DL (ref 65–105)
GLUCOSE BLD-MCNC: 138 MG/DL (ref 70–99)
GLUCOSE BLD-MCNC: 142 MG/DL (ref 65–105)
HCT VFR BLD CALC: 34.6 % (ref 36.3–47.1)
HEMOGLOBIN: 11.2 G/DL (ref 11.9–15.1)
IMMATURE GRANULOCYTES: 0 %
LYMPHOCYTES # BLD: 21 % (ref 24–43)
MCH RBC QN AUTO: 30.6 PG (ref 25.2–33.5)
MCHC RBC AUTO-ENTMCNC: 32.4 G/DL (ref 28.4–34.8)
MCV RBC AUTO: 94.5 FL (ref 82.6–102.9)
MONOCYTES # BLD: 9 % (ref 3–12)
NRBC AUTOMATED: 0 PER 100 WBC
PDW BLD-RTO: 13.3 % (ref 11.8–14.4)
PLATELET # BLD: 327 K/UL (ref 138–453)
PMV BLD AUTO: 9.7 FL (ref 8.1–13.5)
POTASSIUM SERPL-SCNC: 3.8 MMOL/L (ref 3.7–5.3)
RBC # BLD: 3.66 M/UL (ref 3.95–5.11)
SEG NEUTROPHILS: 65 % (ref 36–65)
SEGMENTED NEUTROPHILS ABSOLUTE COUNT: 4.72 K/UL (ref 1.5–8.1)
SODIUM BLD-SCNC: 136 MMOL/L (ref 135–144)
WBC # BLD: 7.2 K/UL (ref 3.5–11.3)

## 2022-08-01 PROCEDURE — 6370000000 HC RX 637 (ALT 250 FOR IP): Performed by: INTERNAL MEDICINE

## 2022-08-01 PROCEDURE — 97530 THERAPEUTIC ACTIVITIES: CPT

## 2022-08-01 PROCEDURE — 6360000002 HC RX W HCPCS

## 2022-08-01 PROCEDURE — 6360000002 HC RX W HCPCS: Performed by: STUDENT IN AN ORGANIZED HEALTH CARE EDUCATION/TRAINING PROGRAM

## 2022-08-01 PROCEDURE — 6370000000 HC RX 637 (ALT 250 FOR IP)

## 2022-08-01 PROCEDURE — 85025 COMPLETE CBC W/AUTO DIFF WBC: CPT

## 2022-08-01 PROCEDURE — 93005 ELECTROCARDIOGRAM TRACING: CPT | Performed by: INTERNAL MEDICINE

## 2022-08-01 PROCEDURE — 2580000003 HC RX 258

## 2022-08-01 PROCEDURE — 99254 IP/OBS CNSLTJ NEW/EST MOD 60: CPT | Performed by: INTERNAL MEDICINE

## 2022-08-01 PROCEDURE — 99222 1ST HOSP IP/OBS MODERATE 55: CPT | Performed by: INTERNAL MEDICINE

## 2022-08-01 PROCEDURE — 2060000000 HC ICU INTERMEDIATE R&B

## 2022-08-01 PROCEDURE — 82947 ASSAY GLUCOSE BLOOD QUANT: CPT

## 2022-08-01 PROCEDURE — 80048 BASIC METABOLIC PNL TOTAL CA: CPT

## 2022-08-01 PROCEDURE — 97166 OT EVAL MOD COMPLEX 45 MIN: CPT

## 2022-08-01 PROCEDURE — 83605 ASSAY OF LACTIC ACID: CPT

## 2022-08-01 PROCEDURE — 36415 COLL VENOUS BLD VENIPUNCTURE: CPT

## 2022-08-01 PROCEDURE — 2500000003 HC RX 250 WO HCPCS: Performed by: STUDENT IN AN ORGANIZED HEALTH CARE EDUCATION/TRAINING PROGRAM

## 2022-08-01 PROCEDURE — 94761 N-INVAS EAR/PLS OXIMETRY MLT: CPT

## 2022-08-01 PROCEDURE — 97161 PT EVAL LOW COMPLEX 20 MIN: CPT

## 2022-08-01 PROCEDURE — 94640 AIRWAY INHALATION TREATMENT: CPT

## 2022-08-01 RX ORDER — ARIPIPRAZOLE 10 MG/1
10 TABLET ORAL DAILY
Status: DISCONTINUED | OUTPATIENT
Start: 2022-08-01 | End: 2022-08-02 | Stop reason: HOSPADM

## 2022-08-01 RX ORDER — INSULIN LISPRO 100 [IU]/ML
0-4 INJECTION, SOLUTION INTRAVENOUS; SUBCUTANEOUS NIGHTLY
Status: DISCONTINUED | OUTPATIENT
Start: 2022-08-01 | End: 2022-08-02 | Stop reason: HOSPADM

## 2022-08-01 RX ORDER — INSULIN LISPRO 100 [IU]/ML
0-8 INJECTION, SOLUTION INTRAVENOUS; SUBCUTANEOUS
Status: DISCONTINUED | OUTPATIENT
Start: 2022-08-01 | End: 2022-08-02 | Stop reason: HOSPADM

## 2022-08-01 RX ORDER — DEXTROSE MONOHYDRATE 100 MG/ML
INJECTION, SOLUTION INTRAVENOUS CONTINUOUS PRN
Status: DISCONTINUED | OUTPATIENT
Start: 2022-08-01 | End: 2022-08-02 | Stop reason: HOSPADM

## 2022-08-01 RX ORDER — POLYETHYLENE GLYCOL 3350 17 G/17G
17 POWDER, FOR SOLUTION ORAL DAILY PRN
Status: DISCONTINUED | OUTPATIENT
Start: 2022-08-01 | End: 2022-08-02 | Stop reason: HOSPADM

## 2022-08-01 RX ORDER — SODIUM CHLORIDE 0.9 % (FLUSH) 0.9 %
5-40 SYRINGE (ML) INJECTION PRN
Status: DISCONTINUED | OUTPATIENT
Start: 2022-08-01 | End: 2022-08-02 | Stop reason: HOSPADM

## 2022-08-01 RX ORDER — ALBUTEROL SULFATE 90 UG/1
2 AEROSOL, METERED RESPIRATORY (INHALATION) EVERY 4 HOURS PRN
Status: DISCONTINUED | OUTPATIENT
Start: 2022-08-01 | End: 2022-08-02 | Stop reason: HOSPADM

## 2022-08-01 RX ORDER — SODIUM CHLORIDE 9 MG/ML
INJECTION, SOLUTION INTRAVENOUS PRN
Status: DISCONTINUED | OUTPATIENT
Start: 2022-08-01 | End: 2022-08-02 | Stop reason: HOSPADM

## 2022-08-01 RX ORDER — ONDANSETRON 2 MG/ML
4 INJECTION INTRAMUSCULAR; INTRAVENOUS EVERY 6 HOURS PRN
Status: DISCONTINUED | OUTPATIENT
Start: 2022-08-01 | End: 2022-08-02 | Stop reason: HOSPADM

## 2022-08-01 RX ORDER — ONDANSETRON 4 MG/1
4 TABLET, ORALLY DISINTEGRATING ORAL EVERY 8 HOURS PRN
Status: DISCONTINUED | OUTPATIENT
Start: 2022-08-01 | End: 2022-08-02 | Stop reason: HOSPADM

## 2022-08-01 RX ORDER — BUPROPION HYDROCHLORIDE 150 MG/1
150 TABLET ORAL DAILY
Status: DISCONTINUED | OUTPATIENT
Start: 2022-08-01 | End: 2022-08-02 | Stop reason: HOSPADM

## 2022-08-01 RX ORDER — BUDESONIDE AND FORMOTEROL FUMARATE DIHYDRATE 160; 4.5 UG/1; UG/1
2 AEROSOL RESPIRATORY (INHALATION) DAILY
Status: DISCONTINUED | OUTPATIENT
Start: 2022-08-01 | End: 2022-08-02 | Stop reason: HOSPADM

## 2022-08-01 RX ORDER — SODIUM CHLORIDE 0.9 % (FLUSH) 0.9 %
5-40 SYRINGE (ML) INJECTION EVERY 12 HOURS SCHEDULED
Status: DISCONTINUED | OUTPATIENT
Start: 2022-08-01 | End: 2022-08-02 | Stop reason: HOSPADM

## 2022-08-01 RX ORDER — HYDROXYZINE 50 MG/1
50 TABLET, FILM COATED ORAL 2 TIMES DAILY PRN
Status: DISCONTINUED | OUTPATIENT
Start: 2022-08-01 | End: 2022-08-02 | Stop reason: HOSPADM

## 2022-08-01 RX ORDER — SODIUM CHLORIDE 9 MG/ML
INJECTION, SOLUTION INTRAVENOUS CONTINUOUS
Status: DISCONTINUED | OUTPATIENT
Start: 2022-08-01 | End: 2022-08-01

## 2022-08-01 RX ORDER — ENOXAPARIN SODIUM 100 MG/ML
40 INJECTION SUBCUTANEOUS 2 TIMES DAILY
Status: DISCONTINUED | OUTPATIENT
Start: 2022-08-01 | End: 2022-08-02 | Stop reason: HOSPADM

## 2022-08-01 RX ORDER — LEVOFLOXACIN 500 MG/1
500 TABLET, FILM COATED ORAL DAILY
Status: DISCONTINUED | OUTPATIENT
Start: 2022-08-01 | End: 2022-08-02 | Stop reason: HOSPADM

## 2022-08-01 RX ORDER — ACETAMINOPHEN 650 MG/1
650 SUPPOSITORY RECTAL EVERY 6 HOURS PRN
Status: DISCONTINUED | OUTPATIENT
Start: 2022-08-01 | End: 2022-08-02 | Stop reason: HOSPADM

## 2022-08-01 RX ORDER — ACETAMINOPHEN 325 MG/1
650 TABLET ORAL EVERY 6 HOURS PRN
Status: DISCONTINUED | OUTPATIENT
Start: 2022-08-01 | End: 2022-08-02 | Stop reason: HOSPADM

## 2022-08-01 RX ORDER — PANTOPRAZOLE SODIUM 40 MG/1
40 TABLET, DELAYED RELEASE ORAL DAILY
Status: DISCONTINUED | OUTPATIENT
Start: 2022-08-01 | End: 2022-08-02 | Stop reason: HOSPADM

## 2022-08-01 RX ADMIN — CEFTRIAXONE 2000 MG: 2 INJECTION, POWDER, FOR SOLUTION INTRAMUSCULAR; INTRAVENOUS at 08:18

## 2022-08-01 RX ADMIN — ARIPIPRAZOLE 10 MG: 10 TABLET ORAL at 08:20

## 2022-08-01 RX ADMIN — BUPROPION HYDROCHLORIDE 150 MG: 150 TABLET, EXTENDED RELEASE ORAL at 08:20

## 2022-08-01 RX ADMIN — SODIUM CHLORIDE: 9 INJECTION, SOLUTION INTRAVENOUS at 05:12

## 2022-08-01 RX ADMIN — LEVOFLOXACIN 500 MG: 500 TABLET, FILM COATED ORAL at 17:15

## 2022-08-01 RX ADMIN — ENOXAPARIN SODIUM 40 MG: 100 INJECTION SUBCUTANEOUS at 08:20

## 2022-08-01 RX ADMIN — ACETAMINOPHEN 650 MG: 325 TABLET ORAL at 19:42

## 2022-08-01 RX ADMIN — ENOXAPARIN SODIUM 40 MG: 100 INJECTION SUBCUTANEOUS at 20:11

## 2022-08-01 RX ADMIN — BUDESONIDE AND FORMOTEROL FUMARATE DIHYDRATE 2 PUFF: 160; 4.5 AEROSOL RESPIRATORY (INHALATION) at 08:32

## 2022-08-01 RX ADMIN — SODIUM CHLORIDE, PRESERVATIVE FREE 10 ML: 5 INJECTION INTRAVENOUS at 20:12

## 2022-08-01 RX ADMIN — PANTOPRAZOLE SODIUM 40 MG: 40 TABLET, DELAYED RELEASE ORAL at 08:20

## 2022-08-01 RX ADMIN — SODIUM CHLORIDE, PRESERVATIVE FREE 5 ML: 5 INJECTION INTRAVENOUS at 08:21

## 2022-08-01 RX ADMIN — ACETAMINOPHEN 650 MG: 325 TABLET ORAL at 06:32

## 2022-08-01 ASSESSMENT — PAIN DESCRIPTION - LOCATION
LOCATION: HEAD
LOCATION: FOOT
LOCATION: MOUTH

## 2022-08-01 ASSESSMENT — PAIN SCALES - GENERAL
PAINLEVEL_OUTOF10: 0
PAINLEVEL_OUTOF10: 4
PAINLEVEL_OUTOF10: 5
PAINLEVEL_OUTOF10: 0
PAINLEVEL_OUTOF10: 5
PAINLEVEL_OUTOF10: 0

## 2022-08-01 ASSESSMENT — ENCOUNTER SYMPTOMS
COLOR CHANGE: 0
VOMITING: 1
SHORTNESS OF BREATH: 1
COUGH: 1
ABDOMINAL PAIN: 1
CONSTIPATION: 0
DIARRHEA: 0
NAUSEA: 1
CHEST TIGHTNESS: 0

## 2022-08-01 ASSESSMENT — PAIN DESCRIPTION - PAIN TYPE: TYPE: CHRONIC PAIN

## 2022-08-01 ASSESSMENT — PAIN DESCRIPTION - DESCRIPTORS: DESCRIPTORS: THROBBING

## 2022-08-01 ASSESSMENT — PAIN DESCRIPTION - ORIENTATION: ORIENTATION: RIGHT

## 2022-08-01 NOTE — CONSULTS
[facility] - Initial Consult Note  Today's Date and Time: 8/1/2022, 11:46 AM    Impression :   Acute Lt otitis media  SIRS  DM 2  Penicillin allergy- Severe    Recommendations:   D/C Meropenem  Levaquin 500 mg po q day. Stop date 8-8-22    Medical Decision Making/Summary/Discussion:8/1/2022       Infection Control Recommendations   Agar Precautions    Antimicrobial Stewardship Recommendations     Targeted therapy  IV to oral conversion    Coordination of Outpatient Care:   Estimated Length of IV antimicrobials: None  Patient will need Midline Catheter Insertion: No  Patient will need PICC line Insertion: No  Patient will need: Home IV , [place],  SNF,  LTAC: TBD  Patient will need outpatient wound care:No    Chief complaint/reason for consultation:   Shortness of breath  Fever  Penicillin allergy        History of Present Illness:   Soila Suarez is a 27y.o.-year-old female who was initially admitted on 7/31/2022. Patient seen at the request of Dr. Prem Segura. INITIAL HISTORY:    Patient states that she has had nausea and vomiting with abdominal pain for 2 days. Patient has also had fever and chills with SOB. No chest pain. Patient also has a history of anxiety. She has had lightheadedness and dizziness. Patient states that today she is feeling ok but has had some nausea this morning without vomiting. Her appetite is increasing    CURRENT EVALUATION : 8/1/2022     Afebrile  VS stable    Patient feels better  No complaints  No new issues per RN    Labs, X rays reviewed: 8/1/2022    Chest XR: No acute cardiopulmonary disease  CT Chest: No evidence of pulmonary embolism, diffuse hepatic steatosis, patchy air trapping noted within the lungs  CT Abdomen: No acute findings in abdomen or pelvis    BUN: 12  Cr: .85    WBC: 10.9  Hb: 12.6  Plat: 396    Cultures:  Urine:  7-31-22:  In progress  Blood:  7-31-22: No growth x2   Sputum :    Wound:      Covid 22: negative     Discussed with patient, RN, family. I have personally reviewed the past medical history, past surgical history, medications, social history, and family history, and I have updated the database accordingly.   Past Medical History:     Past Medical History:   Diagnosis Date    Asthma     Back pain 2014    Bipolar 1 disorder ([facility]     Bipolar disorder ([facility] 2017    Depression     Diabetes mellitus (HCC)     Dizziness     Fatty liver disease, nonalcoholic     Fatty liver disease, nonalcoholic     GERD (gastroesophageal reflux disease)     Obesity        Past Surgical  History:     Past Surgical History:   Procedure Laterality Date     SECTION      LEEP         Medications:      ARIPiprazole  10 mg Oral Daily    buPROPion  150 mg Oral Daily    pantoprazole  40 mg Oral Daily    budesonide-formoterol  2 puff Inhalation Daily    sodium chloride flush  5-40 mL IntraVENous 2 times per day    insulin lispro  0-8 Units SubCUTAneous TID WC    insulin lispro  0-4 Units SubCUTAneous Nightly    enoxaparin  40 mg SubCUTAneous BID    cefTRIAXone (ROCEPHIN) IV  2,000 mg IntraVENous Q24H       Social History:     Social History     Socioeconomic History    Marital status:      Spouse name: Not on file    Number of children: Not on file    Years of education: Not on file    Highest education level: Not on file   Occupational History    Not on file   Tobacco Use    Smoking status: Former     Types: Cigarettes    Smokeless tobacco: Never   Vaping Use    Vaping Use: Never used   Substance and Sexual Activity    Alcohol use: Not Currently     Alcohol/week: 0.0 standard drinks     Comment: rarely    Drug use: Not Currently     Types: Marijuana Charmayne Stai)     Comment: quit    Sexual activity: Not Currently   Other Topics Concern    Not on file   Social History Narrative    Not on file     Social Determinants of Health     Financial Resource Strain: Low Risk     Difficulty of Paying Living Expenses: Not very hard   Food Insecurity: No Food Insecurity    Worried About Running Out of Food in the Last Year: Never true    Ran Out of Food in the Last Year: Never true   Transportation Needs: Not on file   Physical Activity: Not on file   Stress: Not on file   Social Connections: Not on file   Intimate Partner Violence: Not on file   Housing Stability: Not on file       Family History:     Family History   Problem Relation Age of Onset    High Blood Pressure Mother     Diabetes Mother     Heart Disease Mother     Heart Disease Father     Early Death Father     Cancer Maternal Aunt         lung    Cancer Paternal Cousin         brain        Allergies:   Morphine, Pcn [penicillins], Amoxicillin, and Seasonal     Review of Systems:   Constitutional: No fevers or chills. No systemic complaints  Head: No headaches  Eyes: No double vision or blurry vision. No conjunctival inflammation. ENT: No sore throat or runny nose. . No hearing loss, tinnitus or vertigo. Lt ear pain. Cardiovascular: No chest pain or palpitations. Shortness of breath. No HERRERA  Lung: Shortness of breath on admission. No cough. No sputum production  Abdomen: Nausea, vomiting on admission. diarrhea, or abdominal pain. Margurette Coombs No cramps. Genitourinary: No increased urinary frequency, or dysuria. No hematuria. No suprapubic or CVA pain  Musculoskeletal: No muscle aches or pains. No joint effusions, swelling or deformities  Hematologic: No bleeding or bruising. Neurologic: No headache, weakness, numbness, or tingling. Integument: No rash, no ulcers. Psychiatric: No depression. Endocrine: No polyuria, no polydipsia, no polyphagia.     Physical Examination :   Patient Vitals for the past 8 hrs:   BP Temp Temp src Pulse Resp SpO2 Height Weight   08/01/22 1129 -- 97.7 °F (36.5 °C) Oral 82 16 96 % -- --   08/01/22 0758 127/87 97.5 °F (36.4 °C) Oral 91 16 97 % -- --   08/01/22 0614 119/84 97.8 °F (36.6 °C) Oral 95 20 95 % 5' 5\" (1.651 m) (!) 352 lb 4.7 oz (159.8 kg) 08/01/22 0517 128/74 -- -- 97 18 97 % -- --   08/01/22 0513 131/81 -- -- 97 22 96 % -- --   08/01/22 0401 (!) 147/97 -- -- 94 22 97 % -- --     General Appearance: Awake, alert, and in no apparent distress  Head:  Normocephalic, no trauma  Eyes: Pupils equal, round, reactive to light and accommodation; extraocular movements intact; sclera anicteric; conjunctivae pink. No embolic phenomena. ENT: Oropharynx clear, without erythema, exudate, or thrush. No tenderness of sinuses. Mouth/throat: mucosa pink and moist. No lesions. Dentition in good repair. Lt TM inflamed. .   Neck:Supple, without lymphadenopathy. Thyroid normal, No bruits. Pulmonary/Chest: Clear to auscultation, without wheezes, rales, or rhonchi. No dullness to percussion. Cardiovascular: Regular rate and rhythm without murmurs, rubs, or gallops. Abdomen: Soft, non tender. Bowel sounds normal. No organomegaly  All four Extremities: No cyanosis, clubbing, edema, or effusions. Neurologic: No gross sensory or motor deficits. Skin: Warm and dry with good turgor. No signs of peripheral arterial or venous insufficiency. No ulcerations. No open wounds. Medical Decision Making -Laboratory:   I have independently reviewed/ordered the following labs:    CBC with Differential:   Recent Labs     07/31/22  1715   WBC 10.9   HGB 12.6   HCT 38.1      LYMPHOPCT 7*   MONOPCT 0*     BMP:   Recent Labs     07/31/22  1715   *   K 4.0      CO2 21   BUN 12   CREATININE 0.85   MG 1.4*     Hepatic Function Panel:   Recent Labs     07/31/22  1715   PROT 6.4   LABALBU 3.6   BILITOT 0.51   ALKPHOS 77   ALT 27   AST 26     No results for input(s): RPR in the last 72 hours. No results for input(s): HIV in the last 72 hours. No results for input(s): BC in the last 72 hours.   Lab Results   Component Value Date/Time    MUCUS NOT REPORTED 03/13/2021 11:05 AM    RBC 4.21 07/31/2022 05:15 PM    RBC 4.02 05/14/2012 11:03 AM    TRICHOMONAS NOT REPORTED 03/13/2021 11:05 AM    WBC 10.9 07/31/2022 05:15 PM    YEAST NOT REPORTED 03/13/2021 11:05 AM    TURBIDITY Clear 07/31/2022 08:20 PM     Lab Results   Component Value Date/Time    CREATININE 0.85 07/31/2022 05:15 PM    GLUCOSE 181 07/31/2022 05:15 PM    GLUCOSE 93 05/14/2012 11:03 AM       Medical Decision Making-Imaging:     EXAMINATION:   CTA OF THE CHEST 7/31/2022 3:41 pm       TECHNIQUE:   CTA of the chest was performed after the administration of intravenous   contrast.  Multiplanar reformatted images are provided for review. MIP   images are provided for review. Automated exposure control, iterative   reconstruction, and/or weight based adjustment of the mA/kV was utilized to   reduce the radiation dose to as low as reasonably achievable. COMPARISON:   CT PA dated February 10, 2021, portable chest dated July 31, 2022       HISTORY:   ORDERING SYSTEM PROVIDED HISTORY: eval for PE   TECHNOLOGIST PROVIDED HISTORY:   eval for PE   Decision Support Exception - unselect if not a suspected or confirmed   emergency medical condition->Emergency Medical Condition (MA)       FINDINGS:   Pulmonary Arteries: Pulmonary arteries are adequately opacified for   evaluation. No evidence of intraluminal filling defect to suggest pulmonary   embolism. Main pulmonary artery is enlarged, measuring 36 mm, suggesting   pulmonary arterial hypertension. .       Mediastinum: Benign-appearing mediastinal lymph nodes are present. Mediastinal lipomatosis is present. .  The heart and pericardium demonstrate   no acute abnormality. There is no acute abnormality of the thoracic aorta. Lungs/pleura: Patchy air trapping is present within the lungs. Previously   noted ground-glass opacities of near completely resolved. No pneumothorax or   pleural effusion is present. Upper Abdomen: Images through the upper abdomen demonstrate diffuse hepatic   steatosis. Left adrenal adenoma is unchanged.        Soft Tissues/Bones: No acute bone or soft tissue abnormality. Impression   1. No evidence of pulmonary embolism or acute pulmonary disease   2. Diffuse hepatic steatosis   3. Patchy air trapping noted within the lungs. Differential considerations   would include reactive airway disease versus respiratory bronchiolitis         EXAMINATION:   CT OF THE ABDOMEN AND PELVIS WITH CONTRAST 7/31/2022 3:41 pm       TECHNIQUE:   CT of the abdomen and pelvis was performed with the administration of   intravenous contrast. Multiplanar reformatted images are provided for review. Automated exposure control, iterative reconstruction, and/or weight based   adjustment of the mA/kV was utilized to reduce the radiation dose to as low   as reasonably achievable. COMPARISON:   CT scan dated April 4, 2022       HISTORY:   ORDERING SYSTEM PROVIDED HISTORY: Eval for infectious process, right upper   quadrant pain, tachy, elevated lactate, with nausea and vommiting. TECHNOLOGIST PROVIDED HISTORY:   Eval for infectious process, right upper quadrant pain, tachy, elevated   lactate, with nausea and vommiting. Decision Support Exception - unselect if not a suspected or confirmed   emergency medical condition->Emergency Medical Condition (MA)       FINDINGS:   Lower Chest: Improved aeration is present within the bibasilar opacities,   suggesting resolving atelectasis or pneumonia. No pneumothorax or pleural   effusion is present. Organs: Diffuse hepatic steatosis is again demonstrated. The gallbladder,   pancreas, spleen, adrenal glands, and kidneys demonstrate no acute   abnormality. 6 mm calculus is again noted within the upper pole collecting   system of the left kidney. No hydronephrosis is present. 1.4 cm left   adrenal adenoma is unchanged. No follow-up imaging is recommended. GI/Bowel: Stomach appears normal.  No evidence of small-bowel obstruction is   present.   The appendix is normal.  Sigmoid diverticulosis is present without   evidence of diverticulitis. Pelvis: Urinary bladder, uterus, and adnexal regions appear normal.       Peritoneum/Retroperitoneum: No free fluid or free air is present in the   abdomen or pelvis. No aneurysm formation is noted. No pathological   adenopathy is present. .       Bones/Soft Tissues: No acute osseous abnormality is present           Impression   1. No acute findings within the abdomen or pelvis   2. Resolving bibasilar opacities, suggesting resolving atelectasis and or   pneumonia   3. Diffuse hepatic steatosis   4. Nonobstructing 6 mm calculus, upper pole left kidney   5. Normal appendix             Medical Decision [id number]:       Medical Decision Making-Other:     Note:  Labs, medications, radiologic studies were reviewed with personal review of films  Large amounts of data were reviewed  Discussed with nursing Staff, Discharge planner  Infection Control and Prevention measures reviewed  All prior entries were reviewed  Administer medications as ordered  Prognosis: Good  Discharge planning reviewed  Follow up as outpatient. Thank you for allowing us to participate in the care of this patient. Please call with questions. Bobby Sabillon DPM    ATTESTATION:    I have discussed the case, including pertinent history and exam findings with the residents and students. I have seen and examined the patient and the key elements of the encounter have been performed by me. I was present when the student obtained his information or examined the patient. I have reviewed the laboratory data, other diagnostic studies and discussed them with the residents. I have updated the medical record where necessary. I agree with the assessment, plan and orders as documented by the resident/ student.     Farzana Ta MD.    Pager: [phone] - Office: [phone]

## 2022-08-01 NOTE — PROGRESS NOTES
Physical Therapy  Facility/Department: [address] STEPDOWN  Physical Therapy Initial Assessment    Name: Shawnee Malhotra  : 1990  MRN: [id number]  Date of Service: 2022  Chief Complaint   Patient presents with    Shortness of Breath    Anxiety    Abdominal Pain    Emesis      Discharge Recommendations:  Patient would benefit from continued therapy after discharge          Patient Diagnosis(es): The encounter diagnosis was SIRS (systemic inflammatory response syndrome) ([facility] [address] Past Medical History:  has a past medical history of Asthma, Back pain, Bipolar 1 disorder ([address] Bipolar disorder ([address] Depression, Diabetes mellitus ([facility] [address] Dizziness, Fatty liver disease, nonalcoholic, Fatty liver disease, nonalcoholic, GERD (gastroesophageal reflux disease), and Obesity. Past Surgical History:  has a past surgical history that includes  section and LEEP. Assessment   Assessment: Patient is independent with gait, no device. Reports some previous dizzy symptoms, but at current[facility]. No need for further PT either in acute or at home after discharge.   Therapy Prognosis: Good  Decision Making: Low Complexity  Clinical Presentation: stable  Requires PT Follow-Up: Yes  Activity Tolerance  Activity Tolerance: Patient limited by fatigue     Plan   Safety Devices  Type of Devices: Call light within reach, Nurse notified, All fall risk precautions in place, Left in bed  Restraints  Restraints Initially in Place: No     Restrictions  Restrictions/Precautions  Restrictions/Precautions: Fall Risk, General Precautions, Up as Tolerated  Required Braces or Orthoses?: No  Position Activity Restriction  Other position/activity restrictions: Otitis media     Subjective   General  Chart Reviewed: Yes  Patient assessed for rehabilitation services?: Yes  Family / Caregiver Present: No  Follows Commands: Within Functional Limits         Social/Functional History  Social/Functional History  Lives With: Spouse  Type of Home: Apartment  Home Layout: Two level, Bed/Bath upstairs  Home Access: Level entry  Bathroom Shower/Tub: Tub/Shower unit  Bathroom Toilet: Standard  Has the patient had two or more falls in the past year or any fall with injury in the past year?: No  ADL Assistance: Independent  Homemaking Assistance: Independent  Homemaking Responsibilities: Yes  Ambulation Assistance: Independent  Transfer Assistance: Independent  Active : No  Patient's  Info:   Occupation: Unemployed  Leisure & Hobbies: spending time with   Additional Comments:  does not work, able to assist when needed. Cognition   Orientation  Overall Orientation Status: Within Functional Limits  Cognition  Overall Cognitive Status: WFL     Objective   Heart Rate: 95  Heart Rate Source: Monitor  BP: (!) 149/97  BP Location: Left upper arm  BP Method: Automatic  Patient Position: Semi fowlers  MAP (Calculated): 114.33  Resp: 18  SpO2: 96 %  O2 Device: None (Room air)      Strength RLE  Comment:   Strength LLE  Comment:         Bed mobility  Supine to Sit: Independent  Sit to Supine: Independent  Transfers  Sit to Stand: Independent  Stand to sit: Independent  Ambulation  Surface: level tile  Device: No Device  Assistance: Supervision  Distance: 200'  Comments: Mildly fatigued during gait. Steady. Balance  Sitting - Static: Good  Sitting - Dynamic: Good  Standing - Static: Good;-  Standing - Dynamic: Fair;+  Single Leg Stance R Le  Single Leg Stance L Le  Comments: Able to  a narrow base of support, no UE support, eyes closed 10 seconds. Mild sway, independently controlled.          AM-PAC Score  AM-PAC Inpatient Mobility Raw Score : 23 (22 1504)  AM-PAC Inpatient T-Scale Score : 56.93 (22 1504)  Mobility Inpatient CMS 0-100% Score: 11.2 (22 1504)  Mobility Inpatient CMS G-Code Modifier : CI (22 1504)     Education  Patient Education  Education Given To: Patient  Education Provided: Role of Therapy;Plan of Care  Education Method: Demonstration;Verbal  Barriers to Learning: None  Education Outcome: Verbalized understanding;Demonstrated understanding      Therapy Time   Individual Concurrent Group Co-treatment   Time In 1450         Time Out 1502         Minutes 12                 Sara Chandler, PT

## 2022-08-01 NOTE — ED NOTES
Pt resting on stretcher. A&Ox4. RR even and unlabored. No distress noted. Pt denies any needs at this time. Call light within reach.         Gregg Rosa RN  08/01/22 3200

## 2022-08-01 NOTE — PROGRESS NOTES
Pharmacy Note     Enoxaparin Dose Adjustment    Zoraida Velasco is a 27 y.o. female. Pharmacist assessment of enoxaparin dose for VTE prophylaxis. Recent Labs     07/31/22  1715   BUN 12       Recent Labs     07/31/22  1715   CREATININE 0.85       Estimated Creatinine Clearance: 144 mL/min (based on SCr of 0.85 mg/dL). Estimated CrCl using Ideal Body Weight: 84 mL/min (based on IBW 57 kg)    Height:   Ht Readings from Last 1 Encounters:   07/31/22 5' 5\" (1.651 m)     Weight:  Wt Readings from Last 1 Encounters:   07/31/22 (!) 352 lb (159.7 kg)       The following enoxaparin dose has been adjusted based upon renal function and/or patient weight per P&T Guidelines:             Enoxaparin 30 mg subcutaneously twice daily changed to Enoxaparin 40 mg subcutaneously twice daily.

## 2022-08-01 NOTE — H&P
mentioned above were negative  Musculoskeletal ROS:  Completed and except as mentioned above were negative  Neurological ROS:  Completed and except as mentioned above were negative  Skin & Dermatological ROS:  Completed and except as mentioned above were negative  Psychological ROS:  Completed and except as mentioned above were negative    PAST MEDICAL HISTORY     Past Medical History:   Diagnosis Date    Asthma     Back pain 2014    Bipolar 1 disorder ([facility]     Bipolar disorder ([facility] 2017    Depression     Diabetes mellitus ([facility]     Dizziness     Fatty liver disease, nonalcoholic     Fatty liver disease, nonalcoholic     GERD (gastroesophageal reflux disease)     Obesity        PAST SURGICAL HISTORY     Past Surgical History:   Procedure Laterality Date     SECTION      LEEP         ALLERGIES     Morphine, Pcn [penicillins], Amoxicillin, and Seasonal    MEDICATIONS PRIOR TO ADMISSION     Prior to Admission medications    Medication Sig Start Date End Date Taking? Authorizing Provider   pantoprazole (PROTONIX) 40 MG tablet Take 1 tablet by mouth in the morning.  22  Joyce Truong MD   hydrOXYzine (VISTARIL) 50 MG capsule take 1 capsule by mouth twice a day if needed for anxiety 22   Historical Provider, MD   buPROPion (WELLBUTRIN XL) 150 MG extended release tablet take 1 tablet by mouth once daily 22   Historical Provider, MD   SYMBICORT 160-4.5 MCG/ACT AERO inhale 2 puffs by mouth twice a day 22   Historical Provider, MD   PROAIR  (90 Base) MCG/ACT inhaler SPACE OUT TO EVERY 6 HOURS AS SYMPTOMS IMPROVE 22   Historical Provider, MD   fluticasone Juliacaryl Pryor) 50 MCG/ACT nasal spray 2 sprays by Each Nostril route daily 22   Teodoro Bartholomew MD   Dulaglutide (TRULICITY) 1.5 PT/3.8CO SOPN Inject 1.5 mg into the skin once a week 22   Teodoro Bartholomew MD   blood glucose monitor kit and supplies Dispense sufficient amount for indicated testing frequency plus additional to accommodate PRN testing needs. 22   Ca Thakur MD   blood glucose monitor strips Test 3  times daily Insulin Dependent mellitus 22   Jared Paulson MD   Lancets MISC 1 each by Does not apply route daily 22   Jared Paulson MD   Lancets Misc. (ACCU-CHEK SOFTCLIX LANCET DEV) KIT Use as directed for glucose checks 22   Jared Paulson MD   aspirin-acetaminophen-caffeine (EXCEDRIN MIGRAINE) 289-599-50 MG per tablet Take 1 tablet by mouth every 8 hours as needed for Headaches 3/24/22   Ca Thakur MD   metFORMIN (GLUCOPHAGE) 500 MG tablet Take 1 tablet by mouth 2 times daily (with meals) 3/24/22   Ca Thakur MD   ARIPiprazole (ABILIFY) 10 MG tablet Take 10 mg by mouth daily 3/8/22   Historical Provider, MD       SOCIAL HISTORY     Tobacco: Former smoker  Alcohol: Not currently  Illicits: Not currently    FAMILY HISTORY     Family History   Problem Relation Age of Onset    High Blood Pressure Mother     Diabetes Mother     Heart Disease Mother     Heart Disease Father     Early Death Father     Cancer Maternal Aunt         lung    Cancer Paternal Cousin         brain       PHYSICAL EXAM     Vitals: /72   Pulse (!) 113   Temp 100.3 °F (37.9 °C) (Oral)   Resp 19   Ht 5' 5\" (1.651 m)   Wt (!) 352 lb (159.7 kg)   SpO2 97%   BMI 58.58 kg/m²   Tmax: Temp (24hrs), Av.3 °F (37.9 °C), Min:100.3 °F (37.9 °C), Max:100.3 °F (37.9 °C)    Last Body weight:   Wt Readings from Last 3 Encounters:   22 (!) 352 lb (159.7 kg)   22 (!) 351 lb (159.2 kg)   22 (!) 351 lb (159.2 kg)     Body Mass Index : Body mass index is 58.58 kg/m². PHYSICAL EXAMINATION:  Constitutional: This is a well developed, well nourished, Greater than 40 - Morbid Obesity / Extreme Obesity / Grade III 27y.o. year old female who is alert, oriented, cooperative and in no apparent distress. Head:normocephalic and atraumatic. EENT: left tympanic membrane is red and bulging. PERRLA.   No conjunctival injections. Septum was midline, mucosa was without erythema, exudates or cobblestoning. No thrush was noted. Neck: Supple without thyromegaly. No elevated JVP. Trachea was midline. Respiratory: Chest was symmetrical without dullness to percussion. Breath sounds bilaterally were clear to auscultation. There were no wheezes, rhonchi or rales. There is no intercostal retraction or use of accessory muscles. No egophony noted. Cardiovascular: Tachycardia is present. regular without murmur, clicks, gallops or rubs. Abdomen: Slightly rounded and soft without organomegaly. No rebound, rigidity or guarding was appreciated. Lymphatic: No lymphadenopathy. Musculoskeletal: Normal curvature of the spine. No gross muscle weakness. Extremities: Bilateral lower extremity edema is present. no lower extremity ulcerations, tenderness, varicosities or erythema. Muscle size, tone and strength are normal.  No involuntary movements are noted. Skin:  Warm and dry. Good color, turgor and pigmentation. No lesions or scars.   No cyanosis or clubbing  Neurological/Psychiatric: The patient's general behavior, level of consciousness, thought content and emotional status is normal.          INVESTIGATIONS     Laboratory Testing:     Recent Results (from the past 24 hour(s))   POC Glucose Fingerstick    Collection Time: 07/31/22  5:06 PM   Result Value Ref Range    POC Glucose 181 (H) 65 - 105 mg/dL   CMP    Collection Time: 07/31/22  5:15 PM   Result Value Ref Range    Glucose 181 (H) 70 - 99 mg/dL    BUN 12 6 - 20 mg/dL    Creatinine 0.85 0.50 - 0.90 mg/dL    Calcium 8.4 (L) 8.6 - 10.4 mg/dL    Sodium 133 (L) 135 - 144 mmol/L    Potassium 4.0 3.7 - 5.3 mmol/L    Chloride 100 98 - 107 mmol/L    CO2 21 20 - 31 mmol/L    Anion Gap 12 9 - 17 mmol/L    Alkaline Phosphatase 77 35 - 104 U/L    ALT 27 5 - 33 U/L    AST 26 <32 U/L    Total Bilirubin 0.51 0.3 - 1.2 mg/dL    Total Protein 6.4 6.4 - 8.3 g/dL    Albumin 3.6 3.5 - 5.2 g/dL    Albumin/Globulin Ratio 1.3 1.0 - 2.5    GFR Non-African American >60 >60 mL/min    GFR African American >60 >60 mL/min    GFR Comment         CBC with Auto Differential    Collection Time: 07/31/22  5:15 PM   Result Value Ref Range    WBC 10.9 3.5 - 11.3 k/uL    RBC 4.21 3.95 - 5.11 m/uL    Hemoglobin 12.6 11.9 - 15.1 g/dL    Hematocrit 38.1 36.3 - 47.1 %    MCV 90.5 82.6 - 102.9 fL    MCH 29.9 25.2 - 33.5 pg    MCHC 33.1 28.4 - 34.8 g/dL    RDW 13.0 11.8 - 14.4 %    Platelets 345 056 - 594 k/uL    MPV 9.7 8.1 - 13.5 fL    NRBC Automated 0.0 0.0 per 100 WBC    Immature Granulocytes 0 0 %    Seg Neutrophils 93 (H) 36 - 66 %    Lymphocytes 7 (L) 24 - 44 %    Monocytes 0 (L) 1 - 7 %    Eosinophils % 0 (L) 1 - 4 %    Basophils 0 0 - 2 %    Absolute Immature Granulocyte 0.00 0.00 - 0.30 k/uL    Segs Absolute 10.14 (H) 1.8 - 7.7 k/uL    Absolute Lymph # 0.76 (L) 1.0 - 4.8 k/uL    Absolute Mono # 0.00 (L) 0.1 - 0.8 k/uL    Absolute Eos # 0.00 0.0 - 0.4 k/uL    Basophils Absolute 0.00 0.0 - 0.2 k/uL    Morphology Normal    HCG Qualitative, Serum    Collection Time: 07/31/22  5:15 PM   Result Value Ref Range    hCG Qual NEGATIVE NEGATIVE   Magnesium    Collection Time: 07/31/22  5:15 PM   Result Value Ref Range    Magnesium 1.4 (L) 1.6 - 2.6 mg/dL   Lactate, Sepsis    Collection Time: 07/31/22  5:15 PM   Result Value Ref Range    Lactic Acid, Sepsis, Whole Blood 3.4 (H) 0.5 - 1.9 mmol/L   Protime-INR    Collection Time: 07/31/22  5:15 PM   Result Value Ref Range    Protime 10.6 9.1 - 12.3 sec    INR 1.0    APTT    Collection Time: 07/31/22  5:15 PM   Result Value Ref Range    PTT 23.0 20.5 - 30.5 sec   Blood Culture 1    Collection Time: 07/31/22  5:15 PM    Specimen: Blood   Result Value Ref Range    Specimen Description . BLOOD     Special Requests L WRIST 10 ML     Culture NO GROWTH <24 HRS    TSH    Collection Time: 07/31/22  5:15 PM   Result Value Ref Range    TSH 1.31 0.30 - 5.00 uIU/mL   Blood Culture 2    Collection Time: 07/31/22  5:20 PM    Specimen: Blood   Result Value Ref Range    Specimen Description . BLOOD     Special Requests R FA 10 ML     Culture NO GROWTH <24 HRS    COVID-19, Rapid    Collection Time: 07/31/22  5:23 PM    Specimen: Nasopharyngeal Swab   Result Value Ref Range    Specimen Description . NASOPHARYNGEAL SWAB     SARS-CoV-2, Rapid Not Detected Not Detected   Urinalysis with Microscopic    Collection Time: 07/31/22  8:20 PM   Result Value Ref Range    Color, UA Yellow Yellow    Turbidity UA Clear Clear    Glucose, Ur NEGATIVE NEGATIVE    Bilirubin Urine NEGATIVE NEGATIVE    Ketones, Urine NEGATIVE NEGATIVE    Specific Gravity, UA 1.038 (H) 1.005 - 1.030    Urine Hgb NEGATIVE NEGATIVE    pH, UA 7.5 5.0 - 8.0    Protein, UA NEGATIVE NEGATIVE    Urobilinogen, Urine Normal Normal    Nitrite, Urine NEGATIVE NEGATIVE    Leukocyte Esterase, Urine SMALL (A) NEGATIVE    -          WBC, UA 2 TO 5 0 - 5 /HPF    RBC, UA 10 TO 20 0 - 2 /HPF    Epithelial Cells UA 2 TO 5 0 - 5 /HPF    Bacteria, UA MODERATE (A) None   Lactic Acid    Collection Time: 07/31/22  8:51 PM   Result Value Ref Range    Lactic Acid, Whole Blood 1.9 0.7 - 2.1 mmol/L       Imaging:   CT ABDOMEN PELVIS W IV CONTRAST Additional Contrast? None    Result Date: 7/31/2022  1. No acute findings within the abdomen or pelvis 2. Resolving bibasilar opacities, suggesting resolving atelectasis and or pneumonia 3. Diffuse hepatic steatosis 4. Nonobstructing 6 mm calculus, upper pole left kidney 5. Normal appendix     XR CHEST PORTABLE    Result Date: 7/31/2022  No acute cardiopulmonary disease     CT CHEST PULMONARY EMBOLISM W CONTRAST    Result Date: 7/31/2022  1. No evidence of pulmonary embolism or acute pulmonary disease 2. Diffuse hepatic steatosis 3. Patchy air trapping noted within the lungs.   Differential considerations would include reactive airway disease versus respiratory bronchiolitis       ASSESSMENT & PLAN     ASSESSMENT / PLAN:     IMPRESSION  This is a 27 y.o. female who presented with nausea and vomiting and found to have lactate 3.4. Patient admitted to inpatient status for the management of sepsis    Principal Problem:    Sepsis ([address]  Plan: Lactate was 3.4  -Urinalysis showed moderate bacteria and small amount of leukocyte esterase  -We will treat with IV fluids and meropenem  -We will follow-up on urine and blood culture  -Zofran as needed  -Protonix 40 mg    Active Problems:  Otitis media-  - on meropenum currently  - allergic to penicillin       Hypomagnesemia  Plan: Magnesium is 1.4. We will replace      Hyponatremia  Plan: Sodium is 133. Will treat with normal saline. Continue to monitor. Acute cystitis  Plan: Urinalysis is positive for moderate bacteria and small amount of leukocyte esterase  - We will follow-up on urine culture  - we will treat with IV fluids and meropenem. Gastroesophageal reflux disease without esophagitis  Plan: Protonix 40 mg      Mild intermittent asthma with acute exacerbation  Plan: On Symbicort. With as needed Proventil      Depression  Plan: On bupropion and aripiprazole      Fatty liver disease, nonalcoholic  Plan: CT abdomen showed hepatic steatosis  - LFTs normal.  Will monitor      Type 2 diabetes mellitus without complication (HCC)  Plan: Insulin lispro medium dose scheduling 3 times a day with meals and nightly. DVT ppx: On Lovenox  GI ppx: On Protonix    PT/OT/SW-consulted  Discharge Planning: Consulted. we will follow-up    Armando Gill MD  Internal Medicine Resident, PGY-1  [address], [place]  7/31/2022, 10:59 PM

## 2022-08-01 NOTE — ED NOTES
Pt provided ice water and peyton crackers. Pt denies any further needs. RR even and unlabored.       Dona Roy RN  08/01/22 8129

## 2022-08-01 NOTE — PROGRESS NOTES
Patient evaluated per oxygen therapy protocol. Patient is at 97% on Room Air. No oxygen needed at this time.     Bronson Hurt, MALINDA  08/01/22 8216

## 2022-08-01 NOTE — PROGRESS NOTES
Occupational Therapy  [facility] [address] [facility]  Occupational Therapy Initial Assessment    Name: Finley Councilman  : 1990  MRN: [id number]  Date of Service: 2022    Discharge Recommendations:  Patient would benefit from continued therapy after discharge  OT Equipment Recommendations  Equipment Needed: No       Patient Diagnosis(es): The encounter diagnosis was SIRS (systemic inflammatory response syndrome) ([facility] Past Medical History:  has a past medical history of Asthma, Back pain, Bipolar 1 disorder ([facility] Bipolar disorder ([address] Depression, Diabetes mellitus ([facility] Dizziness, Fatty liver disease, nonalcoholic, Fatty liver disease, nonalcoholic, GERD (gastroesophageal reflux disease), and Obesity. Past Surgical History:  has a past surgical history that includes  section and LEEP. Assessment   Performance deficits / Impairments: Decreased functional mobility ; Decreased ADL status; Decreased endurance;Decreased balance;Decreased high-level IADLs  Prognosis: Good  Decision Making: Medium Complexity  REQUIRES OT FOLLOW-UP: Yes  Activity Tolerance  Activity Tolerance: Patient Tolerated treatment well  Activity Tolerance Comments: pt limited d/t SOB and increased nausea        Plan   Plan  Times per Week: 2-4x  Current Treatment Recommendations: Balance training, Functional mobility training, Endurance training, Gait training, Safety education & training, Patient/Caregiver education & training, Self-Care / ADL, Home management training     Restrictions  Restrictions/Precautions  Restrictions/Precautions: Fall Risk, General Precautions, Up as Tolerated  Required Braces or Orthoses?: No    Subjective   General  Patient assessed for rehabilitation services?: Yes  Family / Caregiver Present: No  Diagnosis: SIRS, SOB, nausea, sepsis  Subjective  Subjective: pt reported no pain throughout entire session.      Social/Functional History  Social/Functional History  Lives With: Bathing: Stand by assistance;Setup; Increased time to complete  LE Bathing: Contact guard assistance;Setup; Increased time to complete  UE Dressing: Stand by assistance;Setup; Increased time to complete  LE Dressing: Setup; Increased time to complete;Contact guard assistance  Toileting: Contact guard assistance  Additional Comments: Pt began session supine in bed. Pt sat unsupported EOB, demo'd donning R/L socks, by bringing foot up to bed at [facility]. Pt demo'd func mob around room to chair without use of LRD at [facility]. Pt stood chairside at [facility] and made chair with sheet and chuckpad, verbalizing SOB and nausea. Pt ended session seated in recliner. Vision  Vision: Within Functional Limits  Hearing  Hearing: Within functional limits      Cognition  Overall Cognitive Status: WFL  Orientation  Overall Orientation Status: Within Functional Limits                  Education Given To: Patient  Education Provided: Role of Therapy;Plan of Care;Transfer Training;Energy Conservation; Fall Prevention Strategies  Education Provided Comments: Pt educated on breathing techniques and EC/WS techniques  Education Method: Demonstration;Verbal  Barriers to Learning: None  Education Outcome: Verbalized understanding;Demonstrated understanding  LUE AROM (degrees)  LUE AROM : WFL (L elbow and shoulder AROM WFL)  Left Hand AROM (degrees)  Left Hand AROM: WFL  RUE AROM (degrees)  RUE AROM : WFL (R elbow and shoudler AROM WFL)  Right Hand AROM (degrees)  Right Hand AROM: [facility]                           AM-PAC Score        AM-[facility] Inpatient Daily Activity Raw Score: 19 (08/01/22 0952)  AM-PAC Inpatient ADL T-Scale Score : 40.22 (08/01/22 0952)  ADL Inpatient CMS 0-100% Score: 42.8 (08/01/22 0952)  ADL Inpatient CMS G-Code Modifier : CK (08/01/22 7877)         Goals  Short Term Goals  Time Frame for Short term goals: Pt will by discharge  Short Term Goal 1: demo UB/LB bathing/dressing independently.   Short Term Goal 2: demo good safety awareness during func mob with ADL tasks at SUP, without use of LRD. Short Term Goal 3: demo dynamic standing during func activity for 12+ mins independently, without use of LRD, no rest breaks. Short Term Goal 4: demo use of 2 breathing techniques with no cues. Short Term Goal 5: demo bending/reaching while standing during func task for 6+ mins, at SUP, without use of LRD, no rest breaks.        Therapy Time   Individual Concurrent Group Co-treatment   Time In 7100         Time Out 0917         Minutes 20         Timed Code Treatment Minutes: [address] [facility] S/OT

## 2022-08-02 VITALS
HEIGHT: 65 IN | OXYGEN SATURATION: 96 % | DIASTOLIC BLOOD PRESSURE: 92 MMHG | RESPIRATION RATE: 18 BRPM | WEIGHT: 293 LBS | HEART RATE: 100 BPM | SYSTOLIC BLOOD PRESSURE: 140 MMHG | TEMPERATURE: 97.9 F | BODY MASS INDEX: 48.82 KG/M2

## 2022-08-02 LAB
CULTURE: ABNORMAL
EKG ATRIAL RATE: 133 BPM
EKG ATRIAL RATE: 91 BPM
EKG P AXIS: 22 DEGREES
EKG P AXIS: 36 DEGREES
EKG P-R INTERVAL: 126 MS
EKG P-R INTERVAL: 148 MS
EKG Q-T INTERVAL: 298 MS
EKG Q-T INTERVAL: 370 MS
EKG QRS DURATION: 74 MS
EKG QRS DURATION: 88 MS
EKG QTC CALCULATION (BAZETT): 443 MS
EKG QTC CALCULATION (BAZETT): 455 MS
EKG R AXIS: -2 DEGREES
EKG R AXIS: -2 DEGREES
EKG T AXIS: -4 DEGREES
EKG T AXIS: 31 DEGREES
EKG VENTRICULAR RATE: 133 BPM
EKG VENTRICULAR RATE: 91 BPM
GLUCOSE BLD-MCNC: 128 MG/DL (ref 65–105)
INTERVENTION: NORMAL
LACTIC ACID, WHOLE BLOOD: 1.1 MMOL/L (ref 0.7–2.1)
LACTIC ACID, WHOLE BLOOD: 1.8 MMOL/L (ref 0.7–2.1)
MAGNESIUM: 2.1 MG/DL (ref 1.6–2.6)
SPECIMEN DESCRIPTION: ABNORMAL

## 2022-08-02 PROCEDURE — 36415 COLL VENOUS BLD VENIPUNCTURE: CPT

## 2022-08-02 PROCEDURE — 94761 N-INVAS EAR/PLS OXIMETRY MLT: CPT

## 2022-08-02 PROCEDURE — 94640 AIRWAY INHALATION TREATMENT: CPT

## 2022-08-02 PROCEDURE — 6370000000 HC RX 637 (ALT 250 FOR IP)

## 2022-08-02 PROCEDURE — 6360000002 HC RX W HCPCS

## 2022-08-02 PROCEDURE — 2580000003 HC RX 258

## 2022-08-02 PROCEDURE — 82947 ASSAY GLUCOSE BLOOD QUANT: CPT

## 2022-08-02 PROCEDURE — 93010 ELECTROCARDIOGRAM REPORT: CPT | Performed by: INTERNAL MEDICINE

## 2022-08-02 PROCEDURE — 83735 ASSAY OF MAGNESIUM: CPT

## 2022-08-02 PROCEDURE — 99232 SBSQ HOSP IP/OBS MODERATE 35: CPT | Performed by: INTERNAL MEDICINE

## 2022-08-02 PROCEDURE — 6370000000 HC RX 637 (ALT 250 FOR IP): Performed by: STUDENT IN AN ORGANIZED HEALTH CARE EDUCATION/TRAINING PROGRAM

## 2022-08-02 PROCEDURE — 83605 ASSAY OF LACTIC ACID: CPT

## 2022-08-02 PROCEDURE — 6370000000 HC RX 637 (ALT 250 FOR IP): Performed by: INTERNAL MEDICINE

## 2022-08-02 RX ORDER — CHLORHEXIDINE GLUCONATE 0.12 MG/ML
15 RINSE ORAL 2 TIMES DAILY
Qty: 420 ML | Refills: 0 | Status: SHIPPED | OUTPATIENT
Start: 2022-08-02 | End: 2022-08-10 | Stop reason: SDUPTHER

## 2022-08-02 RX ORDER — CHLORHEXIDINE GLUCONATE 0.12 MG/ML
15 RINSE ORAL 2 TIMES DAILY
Status: DISCONTINUED | OUTPATIENT
Start: 2022-08-02 | End: 2022-08-02 | Stop reason: HOSPADM

## 2022-08-02 RX ORDER — LEVOFLOXACIN 500 MG/1
500 TABLET, FILM COATED ORAL DAILY
Qty: 5 TABLET | Refills: 0 | Status: SHIPPED | OUTPATIENT
Start: 2022-08-03 | End: 2022-08-08

## 2022-08-02 RX ADMIN — ENOXAPARIN SODIUM 40 MG: 100 INJECTION SUBCUTANEOUS at 08:51

## 2022-08-02 RX ADMIN — ARIPIPRAZOLE 10 MG: 10 TABLET ORAL at 08:52

## 2022-08-02 RX ADMIN — PANTOPRAZOLE SODIUM 40 MG: 40 TABLET, DELAYED RELEASE ORAL at 08:52

## 2022-08-02 RX ADMIN — ACETAMINOPHEN 650 MG: 325 TABLET ORAL at 02:56

## 2022-08-02 RX ADMIN — BUDESONIDE AND FORMOTEROL FUMARATE DIHYDRATE 2 PUFF: 160; 4.5 AEROSOL RESPIRATORY (INHALATION) at 08:21

## 2022-08-02 RX ADMIN — LEVOFLOXACIN 500 MG: 500 TABLET, FILM COATED ORAL at 08:52

## 2022-08-02 RX ADMIN — CHLORHEXIDINE GLUCONATE 15 ML: 1.2 SOLUTION ORAL at 12:01

## 2022-08-02 RX ADMIN — SODIUM CHLORIDE, PRESERVATIVE FREE 10 ML: 5 INJECTION INTRAVENOUS at 08:52

## 2022-08-02 RX ADMIN — BUPROPION HYDROCHLORIDE 150 MG: 150 TABLET, EXTENDED RELEASE ORAL at 08:52

## 2022-08-02 ASSESSMENT — PAIN SCALES - GENERAL
PAINLEVEL_OUTOF10: 0
PAINLEVEL_OUTOF10: 6

## 2022-08-02 ASSESSMENT — PAIN DESCRIPTION - DESCRIPTORS: DESCRIPTORS: ACHING

## 2022-08-02 ASSESSMENT — PAIN DESCRIPTION - LOCATION: LOCATION: HEAD

## 2022-08-02 NOTE — PROGRESS NOTES
[facility] - Progress Note  Today's Date and Time: 8/2/2022, 11:31 AM    Impression :   Acute Lt otitis media  UTI  SIRS  DM 2  Penicillin allergy- Severe    Recommendations:   D/C Meropenem  Levaquin 500 mg po q day. Stop date 8-8-22    Medical Decision Making/Summary/Discussion:8/2/2022       Infection Control Recommendations   [place] Precautions    Antimicrobial Stewardship Recommendations     Targeted therapy  IV to oral conversion    Coordination of Outpatient Care:   Estimated Length of IV antimicrobials: None  Patient will need Midline Catheter Insertion: No  Patient will need PICC line Insertion: No  Patient will need: Home IV , [place],  SNF,  LTAC: TBD  Patient will need outpatient wound care:No    Chief complaint/reason for consultation:   Shortness of breath  Fever  Penicillin allergy      History of Present Illness:   Nikhil West is a 27y.o.-year-old female who was initially admitted on 7/31/2022. Patient seen at the request of Dr. Jennifer Rivera. INITIAL HISTORY:    Patient states that she has had nausea and vomiting with abdominal pain for 2 days. Patient has also had fever and chills with SOB. No chest pain. Patient also has a history of anxiety. She has had lightheadedness and dizziness. Patient states that today she is feeling ok but has had some nausea this morning without vomiting.  Her appetite is increasing    CURRENT EVALUATION : 8/2/2022     Afebrile  VS stable    Patient feels better  No complaints  No new issues per RN    Labs, X rays reviewed: 8/2/2022    Chest XR: No acute cardiopulmonary disease  CT Chest: No evidence of pulmonary embolism, diffuse hepatic steatosis, patchy air trapping noted within the lungs  CT Abdomen: No acute findings in abdomen or pelvis    BUN: 12-> 10  Cr: .85-> 0.80    WBC: 10.9-> 7.2  Hb: 12.6-> 11.2  Plat: 396-> 327    Cultures:  Urine:  7-31-22: Gram negative Rods 10-50,000 CFU/ML  Blood:  7-31-22: No growth x2   Sputum :    Wound:      Covid 22: negative     Discussed with patient, RN, family. I have personally reviewed the past medical history, past surgical history, medications, social history, and family history, and I have updated the database accordingly.   Past Medical History:     Past Medical History:   Diagnosis Date    Asthma     Back pain 2014    Bipolar 1 disorder ([facility]     Bipolar disorder ([facility] 2017    Depression     Diabetes mellitus (HCC)     Dizziness     Fatty liver disease, nonalcoholic     Fatty liver disease, nonalcoholic     GERD (gastroesophageal reflux disease)     Obesity        Past Surgical  History:     Past Surgical History:   Procedure Laterality Date     SECTION      LEEP         Medications:      chlorhexidine  15 mL Mouth/Throat BID    ARIPiprazole  10 mg Oral Daily    buPROPion  150 mg Oral Daily    pantoprazole  40 mg Oral Daily    budesonide-formoterol  2 puff Inhalation Daily    sodium chloride flush  5-40 mL IntraVENous 2 times per day    insulin lispro  0-8 Units SubCUTAneous TID WC    insulin lispro  0-4 Units SubCUTAneous Nightly    enoxaparin  40 mg SubCUTAneous BID    levoFLOXacin  500 mg Oral Daily       Social History:     Social History     Socioeconomic History    Marital status:      Spouse name: Not on file    Number of children: Not on file    Years of education: Not on file    Highest education level: Not on file   Occupational History    Not on file   Tobacco Use    Smoking status: Former     Types: Cigarettes    Smokeless tobacco: Never   Vaping Use    Vaping Use: Never used   Substance and Sexual Activity    Alcohol use: Not Currently     Alcohol/week: 0.0 standard drinks     Comment: rarely    Drug use: Not Currently     Types: Marijuana Charmayne Stagilbert)     Comment: quit    Sexual activity: Not Currently   Other Topics Concern    Not on file   Social History Narrative    Not on file     Social Determinants of Health     Financial Resource Strain: Low Risk     Difficulty of Paying Living Expenses: Not very hard   Food Insecurity: No Food Insecurity    Worried About Running Out of Food in the Last Year: Never true    Ran Out of Food in the Last Year: Never true   Transportation Needs: Not on file   Physical Activity: Not on file   Stress: Not on file   Social Connections: Not on file   Intimate Partner Violence: Not on file   Housing Stability: Not on file       Family History:     Family History   Problem Relation Age of Onset    High Blood Pressure Mother     Diabetes Mother     Heart Disease Mother     Heart Disease Father     Early Death Father     Cancer Maternal Aunt         lung    Cancer Paternal Cousin         brain        Allergies:   Morphine, Pcn [penicillins], Amoxicillin, and Seasonal     Review of Systems:   Constitutional: No fevers or chills. No systemic complaints  Head: No headaches  Eyes: No double vision or blurry vision. No conjunctival inflammation. ENT: No sore throat or runny nose. . No hearing loss, tinnitus or vertigo. Lt ear pain. Cardiovascular: No chest pain or palpitations. Shortness of breath. No HERRERA  Lung: Shortness of breath on admission. No cough. No sputum production  Abdomen: Nausea, vomiting on admission. diarrhea, or abdominal pain. Erleen Archibald No cramps. Genitourinary: No increased urinary frequency, or dysuria. No hematuria. No suprapubic or CVA pain  Musculoskeletal: No muscle aches or pains. No joint effusions, swelling or deformities  Hematologic: No bleeding or bruising. Neurologic: No headache, weakness, numbness, or tingling. Integument: No rash, no ulcers. Psychiatric: No depression. Endocrine: No polyuria, no polydipsia, no polyphagia.     Physical Examination :   Patient Vitals for the past 8 hrs:   BP Temp Temp src Pulse Resp SpO2   08/02/22 0748 120/79 97.4 °F (36.3 °C) Temporal 81 18 96 %       General Appearance: Awake, alert, and in no apparent distress  Head:  Normocephalic, no trauma  Eyes: Pupils equal, round, reactive to light and accommodation; extraocular movements intact; sclera anicteric; conjunctivae pink. No embolic phenomena. ENT: Oropharynx clear, without erythema, exudate, or thrush. No tenderness of sinuses. Mouth/throat: mucosa pink and moist. No lesions. Dentition in good repair. Lt TM inflamed. .   Neck:Supple, without lymphadenopathy. Thyroid normal, No bruits. Pulmonary/Chest: Clear to auscultation, without wheezes, rales, or rhonchi. No dullness to percussion. Cardiovascular: Regular rate and rhythm without murmurs, rubs, or gallops. Abdomen: Soft, non tender. Bowel sounds normal. No organomegaly  All four Extremities: No cyanosis, clubbing, edema, or effusions. Neurologic: No gross sensory or motor deficits. Skin: Warm and dry with good turgor. No signs of peripheral arterial or venous insufficiency. No ulcerations. No open wounds. Medical Decision Making -Laboratory:   I have independently reviewed/ordered the following labs:    CBC with Differential:   Recent Labs     07/31/22 1715 08/01/22 2242   WBC 10.9 7.2   HGB 12.6 11.2*   HCT 38.1 34.6*    327   LYMPHOPCT 7* 21*   MONOPCT 0* 9       BMP:   Recent Labs     07/31/22 1715 08/01/22 2242 08/02/22  0745   * 136  --    K 4.0 3.8  --     106  --    CO2 21 20  --    BUN 12 10  --    CREATININE 0.85 0.80  --    MG 1.4*  --  2.1       Hepatic Function Panel:   Recent Labs     07/31/22 1715   PROT 6.4   LABALBU 3.6   BILITOT 0.51   ALKPHOS 77   ALT 27   AST 26       No results for input(s): RPR in the last 72 hours. No results for input(s): HIV in the last 72 hours. No results for input(s): BC in the last 72 hours.   Lab Results   Component Value Date/Time    MUCUS NOT REPORTED 03/13/2021 11:05 AM    RBC 3.66 08/01/2022 10:42 PM    RBC 4.02 05/14/2012 11:03 AM    TRICHOMONAS NOT REPORTED 03/13/2021 11:05 AM    WBC 7.2 08/01/2022 10:42 PM    YEAST NOT REPORTED 03/13/2021 11:05 AM    TURBIDITY Clear 07/31/2022 08:20 PM     Lab Results   Component Value Date/Time    CREATININE 0.80 08/01/2022 10:42 PM    GLUCOSE 138 08/01/2022 10:42 PM    GLUCOSE 93 05/14/2012 11:03 AM       Medical Decision Making-Imaging:     EXAMINATION:   CTA OF THE CHEST 7/31/2022 3:41 pm       TECHNIQUE:   CTA of the chest was performed after the administration of intravenous   contrast.  Multiplanar reformatted images are provided for review. MIP   images are provided for review. Automated exposure control, iterative   reconstruction, and/or weight based adjustment of the mA/kV was utilized to   reduce the radiation dose to as low as reasonably achievable. COMPARISON:   CT PA dated February 10, 2021, portable chest dated July 31, 2022       HISTORY:   ORDERING SYSTEM PROVIDED HISTORY: eval for PE   TECHNOLOGIST PROVIDED HISTORY:   eval for PE   Decision Support Exception - unselect if not a suspected or confirmed   emergency medical condition->Emergency Medical Condition (MA)       FINDINGS:   Pulmonary Arteries: Pulmonary arteries are adequately opacified for   evaluation. No evidence of intraluminal filling defect to suggest pulmonary   embolism. Main pulmonary artery is enlarged, measuring 36 mm, suggesting   pulmonary arterial hypertension. .       Mediastinum: Benign-appearing mediastinal lymph nodes are present. Mediastinal lipomatosis is present. .  The heart and pericardium demonstrate   no acute abnormality. There is no acute abnormality of the thoracic aorta. Lungs/pleura: Patchy air trapping is present within the lungs. Previously   noted ground-glass opacities of near completely resolved. No pneumothorax or   pleural effusion is present. Upper Abdomen: Images through the upper abdomen demonstrate diffuse hepatic   steatosis. Left adrenal adenoma is unchanged. Soft Tissues/Bones: No acute bone or soft tissue abnormality. Impression   1.  No evidence of pulmonary embolism or acute pulmonary disease   2. Diffuse hepatic steatosis   3. Patchy air trapping noted within the lungs. Differential considerations   would include reactive airway disease versus respiratory bronchiolitis         EXAMINATION:   CT OF THE ABDOMEN AND PELVIS WITH CONTRAST 7/31/2022 3:41 pm       TECHNIQUE:   CT of the abdomen and pelvis was performed with the administration of   intravenous contrast. Multiplanar reformatted images are provided for review. Automated exposure control, iterative reconstruction, and/or weight based   adjustment of the mA/kV was utilized to reduce the radiation dose to as low   as reasonably achievable. COMPARISON:   CT scan dated April 4, 2022       HISTORY:   ORDERING SYSTEM PROVIDED HISTORY: Eval for infectious process, right upper   quadrant pain, tachy, elevated lactate, with nausea and vommiting. TECHNOLOGIST PROVIDED HISTORY:   Eval for infectious process, right upper quadrant pain, tachy, elevated   lactate, with nausea and vommiting. Decision Support Exception - unselect if not a suspected or confirmed   emergency medical condition->Emergency Medical Condition (MA)       FINDINGS:   Lower Chest: Improved aeration is present within the bibasilar opacities,   suggesting resolving atelectasis or pneumonia. No pneumothorax or pleural   effusion is present. Organs: Diffuse hepatic steatosis is again demonstrated. The gallbladder,   pancreas, spleen, adrenal glands, and kidneys demonstrate no acute   abnormality. 6 mm calculus is again noted within the upper pole collecting   system of the left kidney. No hydronephrosis is present. 1.4 cm left   adrenal adenoma is unchanged. No follow-up imaging is recommended. GI/Bowel: Stomach appears normal.  No evidence of small-bowel obstruction is   present. The appendix is normal.  Sigmoid diverticulosis is present without   evidence of diverticulitis.        Pelvis: Urinary bladder, uterus, and adnexal regions appear normal.       Peritoneum/Retroperitoneum: No free fluid or free air is present in the   abdomen or pelvis. No aneurysm formation is noted. No pathological   adenopathy is present. .       Bones/Soft Tissues: No acute osseous abnormality is present           Impression   1. No acute findings within the abdomen or pelvis   2. Resolving bibasilar opacities, suggesting resolving atelectasis and or   pneumonia   3. Diffuse hepatic steatosis   4. Nonobstructing 6 mm calculus, upper pole left kidney   5. Normal appendix             Medical Decision [id number]:       Medical Decision Making-Other:     Note:  Labs, medications, radiologic studies were reviewed with personal review of films  Large amounts of data were reviewed  Discussed with nursing Staff, Discharge planner  Infection Control and Prevention measures reviewed  All prior entries were reviewed  Administer medications as ordered  Prognosis: Good  Discharge planning reviewed  Follow up as outpatient. Thank you for allowing us to participate in the care of this patient. Please call with questions. Connor Becerra MD  Internal Medicine Resident, PGY-2  [address]  3/2/9418,75:00 AM    ATTESTATION:    I have discussed the case, including pertinent history and exam findings with the residents and students. I have seen and examined the patient and the key elements of the encounter have been performed by me. I was present when the student obtained his information or examined the patient. I have reviewed the laboratory data, other diagnostic studies and discussed them with the residents. I have updated the medical record where necessary. I agree with the assessment, plan and orders as documented by the resident/ student.     Giselle Meyers MD.

## 2022-08-02 NOTE — PROGRESS NOTES
CLINICAL PHARMACY NOTE: MEDS TO BEDS    Total # of Prescriptions Filled: 2   The following medications were delivered to the patient:  Levofloxacin  chlorhexidine    Additional Documentation:

## 2022-08-02 NOTE — PLAN OF CARE
Problem: Discharge Planning  Goal: Discharge to home or other facility with appropriate resources  Outcome: Progressing  Flowsheets (Taken 8/1/2022 1600 by Alonso Campbell RN)  Discharge to home or other facility with appropriate resources: Refer to discharge planning if patient needs post-hospital services based on physician order or complex needs related to functional status, cognitive ability or social support system     Problem: Pain  Goal: Verbalizes/displays adequate comfort level or baseline comfort level  Outcome: Progressing     Problem: Respiratory - Adult  Goal: Achieves optimal ventilation and oxygenation  Outcome: Progressing     Problem: Chronic Conditions and Co-morbidities  Goal: Patient's chronic conditions and co-morbidity symptoms are monitored and maintained or improved  Outcome: Progressing  Flowsheets (Taken 8/1/2022 1600 by Alonso Campbell RN)  Care Plan - Patient's Chronic Conditions and Co-Morbidity Symptoms are Monitored and Maintained or Improved: Monitor and assess patient's chronic conditions and comorbid symptoms for stability, deterioration, or improvement

## 2022-08-02 NOTE — PROGRESS NOTES
[facility]  Internal Medicine Teaching Residency Program  Inpatient Daily Progress Note  ______________________________________________________________________________    Patient: Lindsey Hua  YOB: 1990   Ivette Paris: [de-identified]     Room: [id number]  Admit date: 7/31/2022  Today's date: 08/02/22  Number of days in the hospital: 2    SUBJECTIVE   Admitting Diagnosis: Sepsis ([address]  CC: Nausea and vomiting  Pt examined at bedside. Chart & results reviewed. No acute events overnight  Patient denies any new complaints  Patient reports that she is feeling better  Patient denies of having nausea and vomiting  Patient reports that her ear pain is better  Patient reports that her appetite is normal and she is tolerating her food really well. ROS:  Constitutional:  negative for chills, fevers, sweats  Respiratory:  negative for cough, dyspnea on exertion, hemoptysis, shortness of breath, wheezing  Cardiovascular:  negative for chest pain, chest pressure/discomfort, lower extremity edema, palpitations  Gastrointestinal:  negative for abdominal pain, constipation, diarrhea, nausea, vomiting  Neurological:  negative for dizziness, headache  BRIEF HISTORY   Patient, 27 years of male, female presented to the hospital with a complains of having nausea and vomiting for 1 day. She also complains of having left teeth and left ear pain for 1 day. Patient also reports of having fever and chills along with these presenting complaints. In the emergency department patient had a heart rate of 113 and temperature of 100.3. Urinalysis was done which showed small amount of leukocyte esterase and moderate amount of bacteria. CT abdomen was unremarkable CT chest was unremarkable. Patient was meeting 2 out of 4 for SIRS, and was admitted to treat the suspected source of infection.     OBJECTIVE     Vital Signs:  /79   Pulse 81   Temp 97.4 °F (36.3 °C) (Temporal)   Resp 18   Ht 5' 5\" (1.651 m)   Wt (!) 352 lb 4.7 oz (159.8 kg)   SpO2 96%   BMI 58.62 kg/m²     Temp (24hrs), Av °F (36.7 °C), Min:97.4 °F (36.3 °C), Max:98.5 °F (36.9 °C)    In: 1713   Out: -     Physical Exam:  Constitutional: This is a well developed, well nourished, Greater than 40 - Morbid Obesity / Extreme Obesity / Grade III 56 [address] year old female who is alert, oriented, cooperative and in no apparent distress. Head:normocephalic and atraumatic. EENT:    No conjunctival injections. Septum was midline, mucosa was without erythema, exudates or cobblestoning. No thrush was noted. Neck: Supple without thyromegaly. No elevated JVP. Trachea was midline. Respiratory: Chest was symmetrical without dullness to percussion. Breath sounds bilaterally were clear to auscultation. There were no wheezes, rhonchi or rales. There is no intercostal retraction or use of accessory muscles. No egophony noted. Cardiovascular: Regular without murmur, clicks, gallops or rubs. Abdomen: Slightly rounded and soft without organomegaly. No rebound, rigidity or guarding was appreciated. Lymphatic: No lymphadenopathy. Musculoskeletal: Normal curvature of the spine. No gross muscle weakness. Extremities:  No lower extremity edema, ulcerations, tenderness, varicosities or erythema. Muscle size, tone and strength are normal.  No involuntary movements are noted. Skin:  Warm and dry. Good color, turgor and pigmentation. No lesions or scars.   No cyanosis or clubbing  Neurological/Psychiatric: The patient's general behavior, level of consciousness, thought content and emotional status is normal.        Medications:  Scheduled Medications:    chlorhexidine  15 mL Mouth/Throat BID    ARIPiprazole  10 mg Oral Daily    buPROPion  150 mg Oral Daily    pantoprazole  40 mg Oral Daily    budesonide-formoterol  2 puff Inhalation Daily    sodium chloride flush  5-40 mL IntraVENous 2 times per day insulin lispro  0-8 Units SubCUTAneous TID     insulin lispro  0-4 Units SubCUTAneous Nightly    enoxaparin  40 mg SubCUTAneous BID    levoFLOXacin  500 mg Oral Daily     Continuous Infusions:    sodium chloride      dextrose       PRN Medicationsalbuterol sulfate HFA, 2 puff, Q4H PRN  hydrOXYzine HCl, 50 mg, BID PRN  sodium chloride flush, 5-40 mL, PRN  sodium chloride, , PRN  ondansetron, 4 mg, Q8H PRN   Or  ondansetron, 4 mg, Q6H PRN  polyethylene glycol, 17 g, Daily PRN  acetaminophen, 650 mg, Q6H PRN   Or  acetaminophen, 650 mg, Q6H PRN  glucose, 4 tablet, PRN  dextrose bolus, 125 mL, PRN   Or  dextrose bolus, 250 mL, PRN  glucagon (rDNA), 1 mg, PRN  dextrose, , Continuous PRN        Diagnostic Labs:  CBC:   Recent Labs     07/31/22 1715 08/01/22  2242   WBC 10.9 7.2   RBC 4.21 3.66*   HGB 12.6 11.2*   HCT 38.1 34.6*   MCV 90.5 94.5   RDW 13.0 13.3    327     BMP:   Recent Labs     07/31/22 1715 08/01/22  2242   * 136   K 4.0 3.8    106   CO2 21 20   BUN 12 10   CREATININE 0.85 0.80     BNP: No results for input(s): BNP in the last 72 hours. PT/INR:   Recent Labs     07/31/22 1715   PROTIME 10.6   INR 1.0     APTT:   Recent Labs     07/31/22 1715   APTT 23.0     CARDIAC ENZYMES: No results for input(s): CKMB, CKMBINDEX, TROPONINI in the last 72 hours. Invalid input(s): CKTOTAL;3  FASTING LIPID PANEL:  Lab Results   Component Value Date    CHOL 191 03/22/2022    HDL 42 03/22/2022    TRIG 170 (H) 03/22/2022     LIVER PROFILE:   Recent Labs     07/31/22 1715   AST 26   ALT 27   BILITOT 0.51   ALKPHOS 77      MICROBIOLOGY:   Lab Results   Component Value Date/Time    CULTURE ESCHERICHIA COLI 10 to 50,000 CFU/ML (A) 07/31/2022 08:20 PM       Imaging:    CT ABDOMEN PELVIS W IV CONTRAST Additional Contrast? None    Result Date: 7/31/2022  1. No acute findings within the abdomen or pelvis 2. Resolving bibasilar opacities, suggesting resolving atelectasis and or pneumonia 3.  Diffuse

## 2022-08-02 NOTE — PROGRESS NOTES
Discharged to [facility] ambulatory per self. Left at 1508. Left with dc instructions and meds. Verbalized understanding of instructions.

## 2022-08-02 NOTE — DISCHARGE INSTRUCTIONS
You were admitted to the hospital for concerns of sepsis and IV antibiotic therapy. You are being prescribed Levaquin 500mg for upper respiratory infection, to be taken once daily until 08/08/2022. Please avoid pregnancy while on this medication. You are at risk for aortic dissection. Please go to the emergency department if you experience chest pain or pain radiating to the back between the shoulder blades.    You are also being prescribed Peridex mouthwash to be used in the morning at at night (swish and spit)  Please continue to take all home medications as prescribed   Please follow up with your PCP in one week for post-admission follow up   Please follow up with your dentist within one week   Please continue good oral hygiene   Please return to the emergency department if symptoms return

## 2022-08-03 ENCOUNTER — TELEPHONE (OUTPATIENT)
Dept: INTERNAL MEDICINE | Age: 32
End: 2022-08-03

## 2022-08-03 NOTE — TELEPHONE ENCOUNTER
[facility] Initial Follow Up Call    Outreach made within 2 business days of discharge: Yes    Patient: Jero Goodman   Patient : 1990   MRN: [id number]    Reason for Admission: sepsis  Discharge Date: 2022       Spoke with: Mike Jorgensen    Discharge department/facility: [facility] Stepdown    TCM Interactive Patient Contact:  Was patient able to fill all prescriptions: Yes  Was patient instructed to bring all medications to the follow-up visit: Yes  Is patient taking all medications as directed in the discharge summary? Yes  Does patient understand their discharge instructions: Yes  Does patient have questions or concerns that need addressed prior to 7-14 day follow up office visit: no    Scheduled appointment with PCP within 7-14 days    Spoke with pt, states she's feeling ok today. Pt has f/u dental appt on  to address rotten tooth, advised to continue chlorhexidine mouthwash twice a day as directed, pt agreeable. PCP not available until September, team available but pt needs afternoon appt. Scheduled with PCR for next week. Follow Up  No future appointments.     Damir Comer RN

## 2022-08-03 NOTE — DISCHARGE SUMMARY
any symptom. Patient was discharged and also was advised to follow with an oral surgeon. Patient was started on peridex mouthwash. Procedures/ Significant Interventions:     None    Consults:     Consults:     Final Specialist Recommendations/Findings:   IP CONSULT TO INTERNAL MEDICINE  IP CONSULT TO CASE MANAGEMENT  IP CONSULT TO INFECTIOUS DISEASES      Any Hospital Acquired Infections: none    Discharge Functional Status:  stable    DISCHARGE PLAN     Disposition: home    Patient Instructions:   Discharge Medication List as of 8/2/2022  1:16 PM        START taking these medications    Details   levoFLOXacin (LEVAQUIN) 500 MG tablet Take 1 tablet by mouth in the morning for 5 doses. , Disp-5 tablet, R-0Normal      chlorhexidine (PERIDEX) 0.12 % solution Take 15 mLs by mouth in the morning and 15 mLs before bedtime. Do all this for 14 days. , Disp-420 mL, R-0Normal           CONTINUE these medications which have NOT CHANGED    Details   pantoprazole (PROTONIX) 40 MG tablet Take 1 tablet by mouth in the morning., Disp-30 tablet, R-0Normal      hydrOXYzine (VISTARIL) 50 MG capsule 50 mg Pt states she only takes it once a day but not sure of doseHistorical Med      buPROPion (WELLBUTRIN XL) 150 MG extended release tablet take 1 tablet by mouth once dailyHistorical Med      SYMBICORT 160-4.5 MCG/ACT AERO inhale 2 puffs by mouth twice a day, DAWHistorical Med      PROAIR  (90 Base) MCG/ACT inhaler SPACE OUT TO EVERY 6 HOURS AS SYMPTOMS IMPROVE, DAWHistorical Med      fluticasone (FLONASE) 50 MCG/ACT nasal spray 2 sprays by Each Nostril route daily, Disp-16 g, R-0Normal      Dulaglutide (TRULICITY) 1.5 VZ/7.0PN SOPN Inject 1.5 mg into the skin once a week, Disp-4 pen, R-2Normal      blood glucose monitor kit and supplies Dispense sufficient amount for indicated testing frequency plus additional to accommodate PRN testing needs. , Disp-1 kit, R-0, Normal      blood glucose monitor strips Test 3  times daily Insulin Dependent mellitus, Disp-100 strip, R-11, Normal      Lancets MISC DAILY Starting u 4/21/2022, Disp-100 each, R-5, Normal      Lancets Misc. (ACCU-CHEK SOFTCLIX LANCET DEV) KIT Disp-2 kit, R-3, NormalUse as directed for glucose checks      metFORMIN (GLUCOPHAGE) 500 MG tablet Take 1 tablet by mouth 2 times daily (with meals), Disp-60 tablet, R-5Normal      ARIPiprazole (ABILIFY) 10 MG tablet Take 10 mg by mouth dailyHistorical Med           STOP taking these medications       aspirin-acetaminophen-caffeine (EXCEDRIN MIGRAINE) 250-250-65 MG per tablet Comments:   Reason for Stopping:               Activity: activity as tolerated    Diet: regular diet    Follow-up:    Justin Berg MD  [facility]  [phone]    Call in 1 week(s)  Post-hospital follow up    Dentist    Schedule an appointment as soon as possible for a visit  F/U on tooth pain and for dental cleaning      Patient Instructions: Take Levaquin 500 mg. Start Peridex mouthwash.  Follow up with oral surgeon  Follow up labs: none  Follow up imaging: none    Note that over 30 minutes was spent in preparing discharge papers, discussing discharge with patient, medication review, etc.      Kurtis Castleman, MD, MD  Internal Medicine Resident, PGY-1  [address], [place].  8/3/2022, 2:46 PM

## 2022-08-05 LAB
CULTURE: NORMAL
CULTURE: NORMAL
Lab: NORMAL
Lab: NORMAL
SPECIMEN DESCRIPTION: NORMAL
SPECIMEN DESCRIPTION: NORMAL

## 2022-08-10 ENCOUNTER — OFFICE VISIT (OUTPATIENT)
Dept: INTERNAL MEDICINE | Age: 32
End: 2022-08-10
Payer: COMMERCIAL

## 2022-08-10 VITALS
WEIGHT: 293 LBS | DIASTOLIC BLOOD PRESSURE: 88 MMHG | OXYGEN SATURATION: 97 % | SYSTOLIC BLOOD PRESSURE: 124 MMHG | HEIGHT: 65 IN | BODY MASS INDEX: 48.82 KG/M2 | HEART RATE: 100 BPM | TEMPERATURE: 97.9 F

## 2022-08-10 DIAGNOSIS — Z09 HOSPITAL DISCHARGE FOLLOW-UP: Primary | ICD-10-CM

## 2022-08-10 DIAGNOSIS — K21.9 GASTROESOPHAGEAL REFLUX DISEASE, UNSPECIFIED WHETHER ESOPHAGITIS PRESENT: ICD-10-CM

## 2022-08-10 DIAGNOSIS — G89.29 CHRONIC PAIN OF RIGHT KNEE: ICD-10-CM

## 2022-08-10 DIAGNOSIS — J45.40 MODERATE PERSISTENT ASTHMA, UNSPECIFIED WHETHER COMPLICATED: ICD-10-CM

## 2022-08-10 DIAGNOSIS — L03.317 CELLULITIS OF BUTTOCK: ICD-10-CM

## 2022-08-10 DIAGNOSIS — E11.9 TYPE 2 DIABETES MELLITUS WITHOUT COMPLICATION, WITHOUT LONG-TERM CURRENT USE OF INSULIN (HCC): ICD-10-CM

## 2022-08-10 DIAGNOSIS — E66.01 CLASS 3 SEVERE OBESITY DUE TO EXCESS CALORIES WITH BODY MASS INDEX (BMI) OF 50.0 TO 59.9 IN ADULT, UNSPECIFIED WHETHER SERIOUS COMORBIDITY PRESENT (HCC): ICD-10-CM

## 2022-08-10 DIAGNOSIS — M25.561 CHRONIC PAIN OF RIGHT KNEE: ICD-10-CM

## 2022-08-10 DIAGNOSIS — F32.89 OTHER DEPRESSION: ICD-10-CM

## 2022-08-10 DIAGNOSIS — L08.9 SUPERFICIAL SKIN INFECTION: ICD-10-CM

## 2022-08-10 DIAGNOSIS — H93.8X3 CONGESTION OF BOTH EARS: ICD-10-CM

## 2022-08-10 LAB — HBA1C MFR BLD: 6.1 %

## 2022-08-10 PROCEDURE — G8417 CALC BMI ABV UP PARAM F/U: HCPCS

## 2022-08-10 PROCEDURE — 83036 HEMOGLOBIN GLYCOSYLATED A1C: CPT

## 2022-08-10 PROCEDURE — 99211 OFF/OP EST MAY X REQ PHY/QHP: CPT | Performed by: STUDENT IN AN ORGANIZED HEALTH CARE EDUCATION/TRAINING PROGRAM

## 2022-08-10 PROCEDURE — 1111F DSCHRG MED/CURRENT MED MERGE: CPT

## 2022-08-10 PROCEDURE — G8427 DOCREV CUR MEDS BY ELIG CLIN: HCPCS

## 2022-08-10 PROCEDURE — 1036F TOBACCO NON-USER: CPT

## 2022-08-10 PROCEDURE — 99214 OFFICE O/P EST MOD 30 MIN: CPT

## 2022-08-10 PROCEDURE — 2022F DILAT RTA XM EVC RTNOPTHY: CPT

## 2022-08-10 PROCEDURE — 3044F HG A1C LEVEL LT 7.0%: CPT

## 2022-08-10 RX ORDER — BUPROPION HYDROCHLORIDE 150 MG/1
TABLET ORAL
Qty: 30 TABLET | Refills: 1 | Status: SHIPPED | OUTPATIENT
Start: 2022-08-10

## 2022-08-10 RX ORDER — HYDROXYZINE PAMOATE 50 MG/1
CAPSULE ORAL
Qty: 30 CAPSULE | Refills: 1 | Status: SHIPPED | OUTPATIENT
Start: 2022-08-10

## 2022-08-10 RX ORDER — BUDESONIDE AND FORMOTEROL FUMARATE DIHYDRATE 160; 4.5 UG/1; UG/1
AEROSOL RESPIRATORY (INHALATION)
Qty: 10.2 G | Refills: 2 | Status: SHIPPED | OUTPATIENT
Start: 2022-08-10

## 2022-08-10 RX ORDER — CEPHALEXIN 500 MG/1
500 CAPSULE ORAL 2 TIMES DAILY
Qty: 5 CAPSULE | Refills: 0 | Status: SHIPPED | OUTPATIENT
Start: 2022-08-10

## 2022-08-10 RX ORDER — DULAGLUTIDE 1.5 MG/.5ML
0.75 INJECTION, SOLUTION SUBCUTANEOUS WEEKLY
Qty: 4 PEN | Refills: 2 | Status: SHIPPED | OUTPATIENT
Start: 2022-08-10

## 2022-08-10 RX ORDER — PANTOPRAZOLE SODIUM 40 MG/1
40 TABLET, DELAYED RELEASE ORAL DAILY
Qty: 30 TABLET | Refills: 0 | Status: SHIPPED | OUTPATIENT
Start: 2022-08-10 | End: 2022-09-09

## 2022-08-10 RX ORDER — CHLORHEXIDINE GLUCONATE 0.12 MG/ML
15 RINSE ORAL 2 TIMES DAILY
Qty: 420 ML | Refills: 0 | Status: SHIPPED | OUTPATIENT
Start: 2022-08-10 | End: 2022-08-24

## 2022-08-10 RX ORDER — ARIPIPRAZOLE 10 MG/1
10 TABLET ORAL DAILY
Qty: 30 TABLET | Refills: 0 | Status: SHIPPED | OUTPATIENT
Start: 2022-08-10 | End: 2022-09-09

## 2022-08-10 RX ORDER — GLUCOSAMINE HCL/CHONDROITIN SU 500-400 MG
CAPSULE ORAL
Qty: 100 STRIP | Refills: 11 | Status: SHIPPED | OUTPATIENT
Start: 2022-08-10

## 2022-08-10 RX ORDER — FLUTICASONE PROPIONATE 50 MCG
2 SPRAY, SUSPENSION (ML) NASAL DAILY
Qty: 16 G | Refills: 0 | Status: SHIPPED | OUTPATIENT
Start: 2022-08-10 | End: 2022-10-20

## 2022-08-10 ASSESSMENT — ENCOUNTER SYMPTOMS
SHORTNESS OF BREATH: 0
SINUS PAIN: 0
WHEEZING: 0

## 2022-08-10 NOTE — PROGRESS NOTES
[facility] Post-Discharge Transitional Care Follow Up      Lance Malin   YOB: 1990    Date of Office Visit:  8/10/2022  Date of Hospital Admission: 7/31/22  Date of Hospital Discharge: 8/2/22  Readmission Risk Score (high >=14%. Medium >=10%):Readmission Risk Score: 9.7      Care management risk score Rising risk (score 2-5) and Complex Care (Scores >=6): No Risk Score On File     Non face to face  following discharge, date last encounter closed (first attempt may have been earlier): 08/03/2022     Call initiated 2 business days of discharge: Yes     Hospital discharge follow-up  -     MO DISCHARGE MEDS RECONCILED W/ CURRENT OUTPATIENT MED LIST  Type 2 diabetes mellitus without complication, without long-term current use of insulin (HCC)  -     metFORMIN (GLUCOPHAGE) 500 MG tablet; Take 1 tablet by mouth in the morning and 1 tablet in the evening. Take with meals. , Disp-60 tablet, R-5Normal  -     Dulaglutide (TRULICITY) 1.5 NF/9.3ZJ SOPN; Inject 0.75 mg into the skin once a week, Disp-4 pen, R-2Normal  -     blood glucose monitor strips; Test 3  times daily Insulin Dependent mellitus, Disp-100 strip, R-11, Normal  Congestion of both ears  -     fluticasone (FLONASE) 50 MCG/ACT nasal spray; 2 sprays by Each Nostril route in the morning., Disp-16 g, R-0Normal  -     POCT glycosylated hemoglobin (Hb A1C)  Chronic pain of right knee  -     XR KNEE RIGHT (1-2 VIEWS);  Future  Class 3 severe obesity due to excess calories with body mass index (BMI) of 50.0 to 59.9 in adult, unspecified whether serious comorbidity present (HCC)  Moderate persistent asthma, unspecified whether complicated  -     SYMBICORT 160-4.5 MCG/ACT AERO; inhale 2 puffs by mouth twice a day, Disp-10.2 g, R-2, DAWNormal  -     PROAIR  (90 Base) MCG/ACT inhaler; SPACE OUT TO EVERY 6 HOURS AS SYMPTOMS IMPROVE, Disp-18 g, R-2, DAWNormal  Gastroesophageal reflux disease, unspecified whether esophagitis present  Other depression  -     buPROPion (WELLBUTRIN XL) 150 MG extended release tablet; take 1 tablet by mouth once daily, Disp-30 tablet, R-1Normal  -     ARIPiprazole (ABILIFY) 10 MG tablet; Take 1 tablet by mouth in the morning., Disp-30 tablet, R-0Normal  Ear discharge of left ear  Superficial skin infection  -     mupirocin (BACTROBAN) 2 % ointment; Apply topically 3 times daily. , Disp-1 each, R-0, Normal      Medical Decision Making: moderate complexity  Return if symptoms worsen or fail to improve. Subjective:   HPI    Jim Palumbo is a 27 y.o. female who was admitted for the management of Sepsis secondary to UTI, suspected ear infection/dental infection. She was started on IV fluids and meropenem. Zofran as needed and Protonix 40 mg. ID was consulted and recommendations were done to Discontinue Meropenem and start levaquin 500mg PO. Patient was examined on 08/02/2022. Patient was tolerating diet well. Patient denied any symptom. Patient was discharged and also was advised to follow with an oral surgeon. Patient was started on peridex mouthwash. Inpatient course: Discharge summary reviewed- see chart. Interval history/Current status:     Patient was treated at the hospital for UTI, left ear pain, and left sided tooth pain. Patient was given IV meropenem which was switched to Levaquin which ended on 08/08/2022. And stated she finished her course of antibiotics. She denies any fevers chills. Patient stated her symptoms of UTI including burning has resolved. Her pain in the left ear and the dental pain has resolved as well. She does complain of discharge from the ear when she lays down. On examination there was no tenderness on ear exam or discharge seen in the left ear canal.  Patient needs to follow-up with her dentist.  Wax was observed in the ear canal which is likely causing the itching in the ear.   Patient is afebrile today    Patient also complained of some redness at site of right buttocks. Patient has some redness and 2 whiteheads at right buttocks. No tenderness at the site of redness. Patient also complained of right knee pain. Likely secondary to arthritis. Past medical history of type 2 diabetes -patient takes metformin 500 twice daily and Trulicity 1.5 mg weekly. Asthma - pro-air and symbicort    DM type 2 - controlled: hba1c 6.1 today; decreased trulicity to 7.56 mg weekly  Cellulitis of R buttocks - keflex prescribed. Patient Active Problem List   Diagnosis    Obesity    Gastroesophageal reflux disease without esophagitis    Mild intermittent asthma with acute exacerbation    Depression    Fatty liver disease, nonalcoholic    Abdominal pain    Menorrhagia    Back pain    Pedal edema    Medication refill    RUQ abdominal pain    Diverticulitis of large intestine with perforation without abscess or bleeding    Calculus of gallbladder with chronic cholecystitis without obstruction    Type 2 diabetes mellitus without complication (HCC)    Leukocytosis    Bipolar disorder (HCC)    Major depressive disorder, recurrent (HCC)    Shortness of breath    Depression with suicidal ideation    MDD (major depressive disorder), recurrent severe, without psychosis ([address]    MDD (major depressive disorder), recurrent, severe, with psychosis ([address]    Moderate persistent asthma    Acute diverticulitis    Diverticulitis of cecum    Sepsis ([address]    Hypomagnesemia    Hyponatremia    Acute cystitis    Acute otitis media    SIRS (systemic inflammatory response syndrome) ([address]    Penicillin allergy       Medications listed as ordered at the time of discharge from hospital     Medication List            Accurate as of August 10, 2022  3:49 PM. If you have any questions, ask your nurse or doctor. START taking these medications      cephALEXin 500 MG capsule  Commonly known as: KEFLEX  Take 1 capsule by mouth in the morning and 1 capsule before bedtime.   Started by: Shasta San Nacho Richardson MD     mupirocin 2 % ointment  Commonly known as: BACTROBAN  Apply topically 3 times daily. Started by: Royce Boogie MD            CHANGE how you take these medications      Trulicity 1.5 ME/5.5SI Sopn  Generic drug: Dulaglutide  Inject 0.75 mg into the skin once a week  What changed: how much to take  Changed by: Royce Boogie MD            CONTINUE taking these medications      Accu-Chek Softclix Lancet Dev Kit  Use as directed for glucose checks     ARIPiprazole 10 MG tablet  Commonly known as: ABILIFY  Take 1 tablet by mouth in the morning. blood glucose monitor kit and supplies  Dispense sufficient amount for indicated testing frequency plus additional to accommodate PRN testing needs. blood glucose test strips  Test 3  times daily Insulin Dependent mellitus     buPROPion 150 MG extended release tablet  Commonly known as: WELLBUTRIN XL  take 1 tablet by mouth once daily     chlorhexidine 0.12 % solution  Commonly known as: PERIDEX  Take 15 mLs by mouth in the morning and 15 mLs before bedtime. Do all this for 14 days. fluticasone 50 MCG/ACT nasal spray  Commonly known as: FLONASE  2 sprays by Each Nostril route in the morning.     hydrOXYzine pamoate 50 MG capsule  Commonly known as: VISTARIL  50 mg Pt states she only takes it once a day but not sure of dose     Lancets Misc  1 each by Does not apply route daily     metFORMIN 500 MG tablet  Commonly known as: GLUCOPHAGE  Take 1 tablet by mouth in the morning and 1 tablet in the evening. Take with meals. pantoprazole 40 MG tablet  Commonly known as: PROTONIX  Take 1 tablet by mouth in the morning.      ProAir  (90 Base) MCG/ACT inhaler  Generic drug: albuterol sulfate HFA  SPACE OUT TO EVERY 6 HOURS AS SYMPTOMS IMPROVE     Symbicort 160-4.5 MCG/ACT Aero  Generic drug: budesonide-formoterol  inhale 2 puffs by mouth twice a day               Where to Get Your Medications        Information about where to get these medications is not yet available    Ask your nurse or doctor about these medications  ARIPiprazole 10 MG tablet  blood glucose test strips  buPROPion 150 MG extended release tablet  cephALEXin 500 MG capsule  chlorhexidine 0.12 % solution  fluticasone 50 MCG/ACT nasal spray  hydrOXYzine pamoate 50 MG capsule  metFORMIN 500 MG tablet  mupirocin 2 % ointment  pantoprazole 40 MG tablet  ProAir  (90 Base) MCG/ACT inhaler  Symbicort 160-4.5 MCG/ACT Aero  Trulicity 1.5 KK/7.1HA Sopn          Medications marked \"taking\" at this time  Outpatient Medications Marked as Taking for the 8/10/22 encounter (Office Visit) with Ra Hernandez MD   Medication Sig Dispense Refill    pantoprazole (PROTONIX) 40 MG tablet Take 1 tablet by mouth in the morning. 30 tablet 0    metFORMIN (GLUCOPHAGE) 500 MG tablet Take 1 tablet by mouth in the morning and 1 tablet in the evening. Take with meals. 60 tablet 5    fluticasone (FLONASE) 50 MCG/ACT nasal spray 2 sprays by Each Nostril route in the morning. 16 g 0    Dulaglutide (TRULICITY) 1.5 GT/5.3BF SOPN Inject 0.75 mg into the skin once a week 4 pen 2    chlorhexidine (PERIDEX) 0.12 % solution Take 15 mLs by mouth in the morning and 15 mLs before bedtime. Do all this for 14 days. 420 mL 0    blood glucose monitor strips Test 3  times daily Insulin Dependent mellitus 100 strip 11    SYMBICORT 160-4.5 MCG/ACT AERO inhale 2 puffs by mouth twice a day 10.2 g 2    PROAIR  (90 Base) MCG/ACT inhaler SPACE OUT TO EVERY 6 HOURS AS SYMPTOMS IMPROVE 18 g 2    hydrOXYzine pamoate (VISTARIL) 50 MG capsule 50 mg Pt states she only takes it once a day but not sure of dose 30 capsule 1    buPROPion (WELLBUTRIN XL) 150 MG extended release tablet take 1 tablet by mouth once daily 30 tablet 1    ARIPiprazole (ABILIFY) 10 MG tablet Take 1 tablet by mouth in the morning. 30 tablet 0    mupirocin (BACTROBAN) 2 % ointment Apply topically 3 times daily.  1 each 0    cephALEXin (KEFLEX) 500 MG capsule Take 1 capsule by mouth in the morning and 1 capsule before bedtime. 5 capsule 0    blood glucose monitor kit and supplies Dispense sufficient amount for indicated testing frequency plus additional to accommodate PRN testing needs. 1 kit 0    Lancets MISC 1 each by Does not apply route daily 100 each 5    Lancets Misc. (ACCU-CHEK SOFTCLIX LANCET DEV) KIT Use as directed for glucose checks 2 kit 3        Medications patient taking as of now reconciled against medications ordered at time of hospital discharge: Yes    Review of Systems   Constitutional:  Negative for activity change, appetite change, chills, fatigue and fever. HENT:  Positive for ear discharge. Negative for congestion, ear pain and sinus pain. Respiratory:  Negative for shortness of breath and wheezing. Cardiovascular:  Negative for chest pain. Genitourinary:  Negative for difficulty urinating, dysuria and urgency. Musculoskeletal:  Positive for arthralgias. Neurological:  Negative for dizziness and light-headedness. Psychiatric/Behavioral:  Negative for agitation and behavioral problems. Objective:    /88 (Site: Left Upper Arm, Position: Sitting, Cuff Size: Large Adult)   Pulse 100   Temp 97.9 °F (36.6 °C)   Ht 5' 5\" (1.651 m)   Wt (!) 365 lb 9.6 oz (165.8 kg)   SpO2 97%   BMI 60.84 kg/m²   Physical Exam  Constitutional:       Appearance: Normal appearance. HENT:      Head: Normocephalic and atraumatic. Left Ear: Ear canal and external ear normal.   Cardiovascular:      Rate and Rhythm: Normal rate and regular rhythm. Pulmonary:      Effort: Pulmonary effort is normal.      Breath sounds: Normal breath sounds. Skin:     Findings: Rash present. Neurological:      General: No focal deficit present. Mental Status: She is alert.    Psychiatric:         Mood and Affect: Mood normal.         Behavior: Behavior normal.         Judgment: Judgment normal.       An electronic signature was used to authenticate this note.  --Дмитрий Lima MD

## 2022-08-10 NOTE — PROGRESS NOTES
Attending Physician Statement  I have discussed the care of [facility], including pertinent history and exam findings with the resident. I have reviewed the key elements of all parts of the encounter with the resident. I have seen and examined the patient with the resident and the key elements of all parts of the encounter have been performed by me. I agree with the assessment, and status of the problem list as documented. The plan and orders should include   Orders Placed This Encounter   Procedures    XR KNEE RIGHT (1-2 VIEWS)    POCT glycosylated hemoglobin (Hb A1C)    ME DISCHARGE MEDS RECONCILED W/ CURRENT OUTPATIENT MED LIST    and this was also documented by the resident. I agree with the referral to dentist.The medication list was reviewed with the resident and is up to date. The return visit should be in  3 months     Diagnosis Orders   1. Hospital discharge follow-up  ME DISCHARGE MEDS RECONCILED W/ CURRENT OUTPATIENT MED LIST      2. Type 2 diabetes mellitus without complication, without long-term current use of insulin (HCC)  metFORMIN (GLUCOPHAGE) 500 MG tablet    Dulaglutide (TRULICITY) 1.5 PINA/9.7HE SOPN    blood glucose monitor strips      3. Congestion of both ears  fluticasone (FLONASE) 50 MCG/ACT nasal spray    POCT glycosylated hemoglobin (Hb A1C)      4. Chronic pain of right knee  XR KNEE RIGHT (1-2 VIEWS)      5. Class 3 severe obesity due to excess calories with body mass index (BMI) of 50.0 to 59.9 in adult, unspecified whether serious comorbidity present [address]        6. Moderate persistent asthma, unspecified whether complicated  SYMBICORT 664-9.8 MCG/ACT AERO    PROAIR  (90 Base) MCG/ACT inhaler      7. Gastroesophageal reflux disease, unspecified whether esophagitis present        8. Other depression  buPROPion (WELLBUTRIN XL) 150 MG extended release tablet    ARIPiprazole (ABILIFY) 10 MG tablet      9. Ear discharge of left ear        10.  Superficial skin infection mupirocin (BACTROBAN) 2 % ointment      11.  Cellulitis of buttock             Lara Cullen MD   Attending Physician, [address] [facility], Internal Medicine Residency Program  [address] [place] [address]

## 2022-10-19 DIAGNOSIS — H93.8X3 CONGESTION OF BOTH EARS: ICD-10-CM

## 2022-10-19 NOTE — TELEPHONE ENCOUNTER
Refill request for fluticasone (FLONASE) 50 MCG/ACT nasal spray. If appropriate please send medication(s) to patients pharmacy. Next appt: Patient is currently on wait list for provider. Last appt: 8/10/2022    Health Maintenance   Topic Date Due    Diabetic retinal exam  Never done    COVID-19 Vaccine (3 - Booster for Moderna series) 03/20/2022    Flu vaccine (1) 08/01/2022    Diabetic foot exam  08/12/2022    Hepatitis B vaccine (3 of 3 - Risk 3-dose series) 08/21/2022    Depression Monitoring  03/17/2023    Diabetic microalbuminuria test  03/22/2023    Lipids  03/22/2023    A1C test (Diabetic or Prediabetic)  08/10/2023    Cervical cancer screen  07/31/2024    DTaP/Tdap/Td vaccine (2 - Td or Tdap) 09/08/2024    Pneumococcal 0-64 years Vaccine  Completed    Hepatitis C screen  Completed    HIV screen  Completed    Hepatitis A vaccine  Aged Out    Hib vaccine  Aged Out    Meningococcal (ACWY) vaccine  Aged Out    Varicella vaccine  Discontinued       Hemoglobin A1C (%)   Date Value   08/10/2022 6.1   03/17/2022 7.3   08/14/2020 5.8             ( goal A1C is < 7)   Microalb/Crt.  Ratio (mcg/mg creat)   Date Value   03/22/2022 Can not be calculated     LDL Cholesterol (mg/dL)   Date Value   03/22/2022 115       (goal LDL is <100)   AST (U/L)   Date Value   07/31/2022 26     ALT (U/L)   Date Value   07/31/2022 27     BUN (mg/dL)   Date Value   08/01/2022 10     BP Readings from Last 3 Encounters:   08/10/22 124/88   08/02/22 (!) 140/92   05/19/22 121/82          (goal 120/80)          Patient Active Problem List:     Obesity     Gastroesophageal reflux disease without esophagitis     Mild intermittent asthma with acute exacerbation     Depression     Fatty liver disease, nonalcoholic     Abdominal pain     Menorrhagia     Back pain     Pedal edema     Medication refill     RUQ abdominal pain     Diverticulitis of large intestine with perforation without abscess or bleeding     Calculus of gallbladder with chronic cholecystitis without obstruction     Type 2 diabetes mellitus without complication (HCC)     Leukocytosis     Bipolar disorder (HCC)     Major depressive disorder, recurrent (HCC)     Shortness of breath     Depression with suicidal ideation     MDD (major depressive disorder), recurrent severe, without psychosis ([address]     MDD (major depressive disorder), recurrent, severe, with psychosis ([facility] [address]     Moderate persistent asthma     Acute diverticulitis     Diverticulitis of cecum     Sepsis ([address]     Hypomagnesemia     Hyponatremia     Acute cystitis     Acute otitis media     SIRS (systemic inflammatory response syndrome) (HCC)     Penicillin allergy

## 2022-10-20 RX ORDER — FLUTICASONE PROPIONATE 50 MCG
SPRAY, SUSPENSION (ML) NASAL
Qty: 16 G | Refills: 0 | Status: SHIPPED | OUTPATIENT
Start: 2022-10-20

## 2022-11-10 ENCOUNTER — TELEPHONE (OUTPATIENT)
Dept: INTERNAL MEDICINE | Age: 32
End: 2022-11-10

## 2022-11-10 NOTE — TELEPHONE ENCOUNTER
PC to pt to schedule from wait list for 3 month follow up. Pt states she is finding a new doctor. Writer offered to schedule pt with different provider in office, pt declined states she is leaving office. Writer advised pt to schedule as soon as possible so that she does not run into a situation where she needs medications refilled but provider requires pt to be seen for refills. Pt verbalized understanding.

## 2022-12-26 DIAGNOSIS — F32.89 OTHER DEPRESSION: ICD-10-CM

## 2022-12-26 DIAGNOSIS — H93.8X3 CONGESTION OF BOTH EARS: ICD-10-CM

## 2023-01-05 RX ORDER — FLUTICASONE PROPIONATE 50 MCG
SPRAY, SUSPENSION (ML) NASAL
Qty: 16 G | Refills: 0 | Status: SHIPPED | OUTPATIENT
Start: 2023-01-05

## 2023-01-05 RX ORDER — ARIPIPRAZOLE 10 MG/1
TABLET ORAL
Qty: 30 TABLET | Refills: 0 | Status: SHIPPED | OUTPATIENT
Start: 2023-01-05

## 2023-01-05 NOTE — TELEPHONE ENCOUNTER
Request for flonase. Next Visit Date:  No future appointments. Health Maintenance   Topic Date Due    Diabetic Alb to Cr ratio (uACR) test  Never done    Diabetic retinal exam  Never done    COVID-19 Vaccine (3 - Booster for Moderna series) 12/15/2021    Flu vaccine (1) 08/01/2022    Diabetic foot exam  08/12/2022    Hepatitis B vaccine (3 of 3 - Risk 3-dose series) 08/21/2022    Depression Monitoring  03/17/2023    Lipids  03/22/2023    GFR test (Diabetes, CKD 3-4, OR last GFR 15-59)  08/01/2023    A1C test (Diabetic or Prediabetic)  08/10/2023    Cervical cancer screen  07/31/2024    DTaP/Tdap/Td vaccine (2 - Td or Tdap) 09/08/2024    Pneumococcal 0-64 years Vaccine  Completed    Hepatitis C screen  Completed    HIV screen  Completed    Hepatitis A vaccine  Aged Out    Hib vaccine  Aged Out    Meningococcal (ACWY) vaccine  Aged Out    Varicella vaccine  Discontinued       Hemoglobin A1C (%)   Date Value   08/10/2022 6.1   03/17/2022 7.3   08/14/2020 5.8             ( goal A1C is < 7)   Microalb/Crt.  Ratio (mcg/mg creat)   Date Value   03/22/2022 Can not be calculated     LDL Cholesterol (mg/dL)   Date Value   03/22/2022 115       (goal LDL is <100)   AST (U/L)   Date Value   07/31/2022 26     ALT (U/L)   Date Value   07/31/2022 27     BUN (mg/dL)   Date Value   08/01/2022 10     BP Readings from Last 3 Encounters:   08/10/22 124/88   08/02/22 (!) 140/92   05/19/22 121/82          (goal 120/80)    All Future Testing planned in [facility]  Lab Frequency Next Occurrence   XR ANKLE RIGHT (2 VIEWS) Once 07/08/2022   XR LUMBAR SPINE (2-3 VIEWS) Once 04/21/2022   Iron and TIBC Once 05/19/2022   XR KNEE RIGHT (3 VIEWS) Once 05/19/2022   Ferritin Once 08/20/2022   XR KNEE RIGHT (1-2 VIEWS) Once 08/10/2022         Patient Active Problem List:     Obesity     Gastroesophageal reflux disease without esophagitis     Mild intermittent asthma with acute exacerbation     Depression     Fatty liver disease, nonalcoholic Abdominal pain     Menorrhagia     Back pain     Pedal edema     Medication refill     RUQ abdominal pain     Diverticulitis of large intestine with perforation without abscess or bleeding     Calculus of gallbladder with chronic cholecystitis without obstruction     Type 2 diabetes mellitus without complication (HCC)     Leukocytosis     Bipolar disorder (HCC)     Major depressive disorder, recurrent (HCC)     Shortness of breath     Depression with suicidal ideation     MDD (major depressive disorder), recurrent severe, without psychosis ([address]     MDD (major depressive disorder), recurrent, severe, with psychosis ([address]     Moderate persistent asthma     Acute diverticulitis     Diverticulitis of cecum     Sepsis ([address]     Hypomagnesemia     Hyponatremia     Acute cystitis     Acute otitis media     SIRS (systemic inflammatory response syndrome) (HCC)     Penicillin allergy

## 2023-01-18 ENCOUNTER — OFFICE VISIT (OUTPATIENT)
Dept: FAMILY MEDICINE CLINIC | Age: 33
End: 2023-01-18

## 2023-01-18 ENCOUNTER — HOSPITAL ENCOUNTER (OUTPATIENT)
Age: 33
Discharge: HOME | End: 2023-01-18
Payer: COMMERCIAL

## 2023-01-18 VITALS
BODY MASS INDEX: 47.09 KG/M2 | RESPIRATION RATE: 20 BRPM | HEIGHT: 66 IN | HEART RATE: 102 BPM | WEIGHT: 293 LBS | SYSTOLIC BLOOD PRESSURE: 122 MMHG | OXYGEN SATURATION: 96 % | DIASTOLIC BLOOD PRESSURE: 86 MMHG

## 2023-01-18 DIAGNOSIS — F31.31 BIPOLAR AFFECTIVE DISORDER, CURRENTLY DEPRESSED, MILD (HCC): ICD-10-CM

## 2023-01-18 DIAGNOSIS — F32.89 OTHER DEPRESSION: ICD-10-CM

## 2023-01-18 DIAGNOSIS — F31.9 BIPOLAR DEPRESSION (HCC): ICD-10-CM

## 2023-01-18 DIAGNOSIS — K76.0 FATTY LIVER DISEASE, NONALCOHOLIC: ICD-10-CM

## 2023-01-18 DIAGNOSIS — M15.9 PRIMARY OSTEOARTHRITIS INVOLVING MULTIPLE JOINTS: Primary | ICD-10-CM

## 2023-01-18 DIAGNOSIS — E66.01 CLASS 3 SEVERE OBESITY DUE TO EXCESS CALORIES WITH BODY MASS INDEX (BMI) OF 50.0 TO 59.9 IN ADULT, UNSPECIFIED WHETHER SERIOUS COMORBIDITY PRESENT (HCC): ICD-10-CM

## 2023-01-18 DIAGNOSIS — J45.40 MODERATE PERSISTENT ASTHMA, UNSPECIFIED WHETHER COMPLICATED: ICD-10-CM

## 2023-01-18 DIAGNOSIS — E11.9 TYPE 2 DIABETES MELLITUS WITHOUT COMPLICATION, WITHOUT LONG-TERM CURRENT USE OF INSULIN (HCC): ICD-10-CM

## 2023-01-18 DIAGNOSIS — K21.9 GASTROESOPHAGEAL REFLUX DISEASE WITHOUT ESOPHAGITIS: ICD-10-CM

## 2023-01-18 PROBLEM — F33.3 MDD (MAJOR DEPRESSIVE DISORDER), RECURRENT, SEVERE, WITH PSYCHOSIS (HCC): Status: RESOLVED | Noted: 2021-03-14 | Resolved: 2023-01-18

## 2023-01-18 PROBLEM — F33.9 MAJOR DEPRESSIVE DISORDER, RECURRENT (HCC): Status: RESOLVED | Noted: 2020-08-13 | Resolved: 2023-01-18

## 2023-01-18 PROBLEM — E87.1 HYPONATREMIA: Status: RESOLVED | Noted: 2022-07-31 | Resolved: 2023-01-18

## 2023-01-18 PROBLEM — F32.A DEPRESSION WITH SUICIDAL IDEATION: Status: RESOLVED | Noted: 2021-03-13 | Resolved: 2023-01-18

## 2023-01-18 PROBLEM — H66.90 ACUTE OTITIS MEDIA: Status: RESOLVED | Noted: 2022-08-01 | Resolved: 2023-01-18

## 2023-01-18 PROBLEM — K57.20 DIVERTICULITIS OF LARGE INTESTINE WITH PERFORATION WITHOUT ABSCESS OR BLEEDING: Status: RESOLVED | Noted: 2017-11-30 | Resolved: 2023-01-18

## 2023-01-18 PROBLEM — E83.42 HYPOMAGNESEMIA: Status: RESOLVED | Noted: 2022-07-31 | Resolved: 2023-01-18

## 2023-01-18 PROBLEM — A41.9 SEPSIS (HCC): Status: RESOLVED | Noted: 2022-07-31 | Resolved: 2023-01-18

## 2023-01-18 PROBLEM — K57.32 DIVERTICULITIS OF CECUM: Status: RESOLVED | Noted: 2021-04-17 | Resolved: 2023-01-18

## 2023-01-18 PROBLEM — Z88.0 PENICILLIN ALLERGY: Status: RESOLVED | Noted: 2022-08-01 | Resolved: 2023-01-18

## 2023-01-18 PROBLEM — R65.10 SIRS (SYSTEMIC INFLAMMATORY RESPONSE SYNDROME) (HCC): Status: RESOLVED | Noted: 2022-08-01 | Resolved: 2023-01-18

## 2023-01-18 PROBLEM — F33.2 MDD (MAJOR DEPRESSIVE DISORDER), RECURRENT SEVERE, WITHOUT PSYCHOSIS (HCC): Status: RESOLVED | Noted: 2021-03-14 | Resolved: 2023-01-18

## 2023-01-18 PROBLEM — R45.851 DEPRESSION WITH SUICIDAL IDEATION: Status: RESOLVED | Noted: 2021-03-13 | Resolved: 2023-01-18

## 2023-01-18 PROBLEM — K80.10 CALCULUS OF GALLBLADDER WITH CHRONIC CHOLECYSTITIS WITHOUT OBSTRUCTION: Status: RESOLVED | Noted: 2017-11-30 | Resolved: 2023-01-18

## 2023-01-18 PROBLEM — R06.02 SHORTNESS OF BREATH: Status: RESOLVED | Noted: 2020-09-12 | Resolved: 2023-01-18

## 2023-01-18 PROBLEM — N30.00 ACUTE CYSTITIS: Status: RESOLVED | Noted: 2022-07-31 | Resolved: 2023-01-18

## 2023-01-18 PROBLEM — K57.92 ACUTE DIVERTICULITIS: Status: RESOLVED | Noted: 2021-04-14 | Resolved: 2023-01-18

## 2023-01-18 PROBLEM — R10.11 RUQ ABDOMINAL PAIN: Status: RESOLVED | Noted: 2017-11-29 | Resolved: 2023-01-18

## 2023-01-18 LAB
ABSOLUTE EOS #: 0.4 K/UL (ref 0–0.4)
ABSOLUTE LYMPH #: 1.5 K/UL (ref 1–4.8)
ABSOLUTE MONO #: 0.7 K/UL (ref 0–1)
ALBUMIN SERPL-MCNC: 3.9 G/DL (ref 3.5–5.2)
ALP BLD-CCNC: 118 U/L (ref 35–104)
ALT SERPL-CCNC: 35 U/L (ref 5–33)
ANION GAP SERPL CALCULATED.3IONS-SCNC: 9 MMOL/L (ref 9–17)
AST SERPL-CCNC: 35 U/L
BASOPHILS # BLD: 1 % (ref 0–2)
BASOPHILS ABSOLUTE: 0 K/UL (ref 0–0.2)
BILIRUB SERPL-MCNC: 0.4 MG/DL (ref 0.3–1.2)
BUN BLDV-MCNC: 9 MG/DL (ref 6–20)
BUN/CREAT BLD: 10 (ref 9–20)
CALCIUM SERPL-MCNC: 9.3 MG/DL (ref 8.6–10.4)
CHLORIDE BLD-SCNC: 103 MMOL/L (ref 98–107)
CO2: 26 MMOL/L (ref 20–31)
CREAT SERPL-MCNC: 0.9 MG/DL (ref 0.5–0.9)
DIFFERENTIAL TYPE: YES
EOSINOPHILS RELATIVE PERCENT: 5 % (ref 0–5)
GFR SERPL CREATININE-BSD FRML MDRD: >60 ML/MIN/1.73M2
GLUCOSE BLD-MCNC: 116 MG/DL (ref 70–99)
HBA1C MFR BLD: 6.9 %
HCT VFR BLD CALC: 37.5 % (ref 36–46)
HEMOGLOBIN: 12.4 G/DL (ref 12–16)
LYMPHOCYTES # BLD: 19 % (ref 15–40)
MAGNESIUM: 2 MG/DL (ref 1.6–2.6)
MCH RBC QN AUTO: 29.2 PG (ref 26–34)
MCHC RBC AUTO-ENTMCNC: 33.1 G/DL (ref 31–37)
MCV RBC AUTO: 88.3 FL (ref 80–100)
MONOCYTES # BLD: 9 % (ref 4–8)
PDW BLD-RTO: 13.6 % (ref 12.1–15.2)
PLATELET # BLD: 398 K/UL (ref 140–450)
POTASSIUM SERPL-SCNC: 4.2 MMOL/L (ref 3.7–5.3)
RBC # BLD: 4.25 M/UL (ref 4–5.2)
SEG NEUTROPHILS: 66 % (ref 47–75)
SEGMENTED NEUTROPHILS ABSOLUTE COUNT: 5.3 K/UL (ref 2.5–7)
SODIUM BLD-SCNC: 138 MMOL/L (ref 135–144)
TOTAL PROTEIN: 7.6 G/DL (ref 6.4–8.3)
TSH SERPL DL<=0.05 MIU/L-ACNC: 1.82 UIU/ML (ref 0.3–5)
WBC # BLD: 8 K/UL (ref 3.5–11)

## 2023-01-18 PROCEDURE — 85025 COMPLETE CBC W/AUTO DIFF WBC: CPT

## 2023-01-18 PROCEDURE — 83735 ASSAY OF MAGNESIUM: CPT

## 2023-01-18 PROCEDURE — 36415 COLL VENOUS BLD VENIPUNCTURE: CPT

## 2023-01-18 PROCEDURE — 84443 ASSAY THYROID STIM HORMONE: CPT

## 2023-01-18 PROCEDURE — 80053 COMPREHEN METABOLIC PANEL: CPT

## 2023-01-18 RX ORDER — HYDROXYZINE PAMOATE 50 MG/1
CAPSULE ORAL
Qty: 30 CAPSULE | Refills: 1 | Status: SHIPPED | OUTPATIENT
Start: 2023-01-18

## 2023-01-18 RX ORDER — BUPROPION HYDROCHLORIDE 150 MG/1
TABLET ORAL
Qty: 30 TABLET | Refills: 1 | Status: SHIPPED | OUTPATIENT
Start: 2023-01-18

## 2023-01-18 RX ORDER — ARIPIPRAZOLE 10 MG/1
TABLET ORAL
Qty: 30 TABLET | Refills: 2 | Status: SHIPPED | OUTPATIENT
Start: 2023-01-18

## 2023-01-18 RX ORDER — PANTOPRAZOLE SODIUM 40 MG/1
40 TABLET, DELAYED RELEASE ORAL DAILY
Qty: 30 TABLET | Refills: 0 | Status: SHIPPED | OUTPATIENT
Start: 2023-01-18 | End: 2023-02-17

## 2023-01-18 RX ORDER — GLUCOSAMINE HCL/CHONDROITIN SU 500-400 MG
CAPSULE ORAL
Qty: 100 STRIP | Refills: 11 | Status: SHIPPED | OUTPATIENT
Start: 2023-01-18

## 2023-01-18 RX ORDER — BUDESONIDE AND FORMOTEROL FUMARATE DIHYDRATE 160; 4.5 UG/1; UG/1
AEROSOL RESPIRATORY (INHALATION)
Qty: 10.2 G | Refills: 2 | Status: SHIPPED | OUTPATIENT
Start: 2023-01-18

## 2023-01-18 RX ORDER — LANCING DEVICE/LANCETS
KIT MISCELLANEOUS
Qty: 2 KIT | Refills: 3 | Status: SHIPPED | OUTPATIENT
Start: 2023-01-18

## 2023-01-18 SDOH — ECONOMIC STABILITY: FOOD INSECURITY: WITHIN THE PAST 12 MONTHS, YOU WORRIED THAT YOUR FOOD WOULD RUN OUT BEFORE YOU GOT MONEY TO BUY MORE.: NEVER TRUE

## 2023-01-18 SDOH — ECONOMIC STABILITY: FOOD INSECURITY: WITHIN THE PAST 12 MONTHS, THE FOOD YOU BOUGHT JUST DIDN'T LAST AND YOU DIDN'T HAVE MONEY TO GET MORE.: NEVER TRUE

## 2023-01-18 ASSESSMENT — PATIENT HEALTH QUESTIONNAIRE - PHQ9
1. LITTLE INTEREST OR PLEASURE IN DOING THINGS: 0
SUM OF ALL RESPONSES TO PHQ QUESTIONS 1-9: 11
8. MOVING OR SPEAKING SO SLOWLY THAT OTHER PEOPLE COULD HAVE NOTICED. OR THE OPPOSITE, BEING SO FIGETY OR RESTLESS THAT YOU HAVE BEEN MOVING AROUND A LOT MORE THAN USUAL: 1
7. TROUBLE CONCENTRATING ON THINGS, SUCH AS READING THE NEWSPAPER OR WATCHING TELEVISION: 1
4. FEELING TIRED OR HAVING LITTLE ENERGY: 0
9. THOUGHTS THAT YOU WOULD BE BETTER OFF DEAD, OR OF HURTING YOURSELF: 0
SUM OF ALL RESPONSES TO PHQ QUESTIONS 1-9: 11
2. FEELING DOWN, DEPRESSED OR HOPELESS: 0
SUM OF ALL RESPONSES TO PHQ QUESTIONS 1-9: 11
10. IF YOU CHECKED OFF ANY PROBLEMS, HOW DIFFICULT HAVE THESE PROBLEMS MADE IT FOR YOU TO DO YOUR WORK, TAKE CARE OF THINGS AT HOME, OR GET ALONG WITH OTHER PEOPLE: 1
3. TROUBLE FALLING OR STAYING ASLEEP: 3
SUM OF ALL RESPONSES TO PHQ QUESTIONS 1-9: 11
6. FEELING BAD ABOUT YOURSELF - OR THAT YOU ARE A FAILURE OR HAVE LET YOURSELF OR YOUR FAMILY DOWN: 3
SUM OF ALL RESPONSES TO PHQ9 QUESTIONS 1 & 2: 0
5. POOR APPETITE OR OVEREATING: 3

## 2023-01-18 ASSESSMENT — ENCOUNTER SYMPTOMS
SINUS PAIN: 0
RHINORRHEA: 0
NAUSEA: 0
BACK PAIN: 0
WHEEZING: 0
VOMITING: 0
ABDOMINAL PAIN: 0
COUGH: 0
DIARRHEA: 0

## 2023-01-18 ASSESSMENT — SOCIAL DETERMINANTS OF HEALTH (SDOH): HOW HARD IS IT FOR YOU TO PAY FOR THE VERY BASICS LIKE FOOD, HOUSING, MEDICAL CARE, AND HEATING?: NOT HARD AT ALL

## 2023-01-18 NOTE — PATIENT INSTRUCTIONS
Derick Ribera you for coming to see me today! I believe that our main problem is getting linked up with me as your new primary doctor. Here's what I would recommend we do:    GERD: take Protonix every day  Asthma: use Symbicort twice a day every day. Even on days you feel better. Bipolar disorder: take Abilify and Wellbutrin and go see Dr. Shell Hernandez    We also discussed your knee and ankle pain. I think that this is arthritis. Go get over the counter voltaren gel and naproxen. Take naproxen twice a day for 7-14 days as needed for the pain. Rub on the voltaren gel four times a day as needed for pain reduction. Go get labs done right away please    Please review the documents I'm attaching related to what we discussed today. Please give me a call or message me on [employer] if you have any problems or questions, and I will see you at your next visit. Dr. Arambula [place]:    Con Goldstein may be receiving a survey from [employer] regarding your visit today. Please complete the survey to enable us to provide the highest quality of care to you and your family. If you cannot score us a very good on any question, please call the office to discuss how we could of made your experience a very good one. Thank you.       Clinical Care Team:     Dr. Miranda Valle CANDELARIA      ClericalTeam:     [id number] [facility]

## 2023-01-18 NOTE — PROGRESS NOTES
HPI Notes    Name: Husam Yoon  : 1990         Chief Complaint:     Chief Complaint   Patient presents with    New Patient     Comes from Peyton Yarbrough MD in [facility] office     Mental Health Problem    Diabetes       History of Present Illness:      HPI    This is a fairly unhealthy 75-year-old woman presenting to establish care with new primary care provider. She has numerous medical problems, including type 2 diabetes mellitus, obesity bipolar depression, GERD, moderate persistent asthma. All of these conditions are currently medically managed, to various specialties. Of note, she was recently hospitalized due to suicidal ideations at the beginning of this calendar year. She is in need of medication refills as well. She is interested in seeing a psychiatrist as well as a bariatric surgeon. Principle concerns she wishes to discuss are chronic right ankle and knee pain. They are worse pain with movement and relieved somewhat by rest. It is also negatively affected by the weather. She has a history of right ankle fracture many years ago but no historic trauma to the knee. She has attempted to improve her condition by being more active and many yeas ago had done PT for these problems.      Past Medical History:     Past Medical History:   Diagnosis Date    Asthma     Back pain 2014    Bipolar 1 disorder ([facility] [address]     Bipolar disorder ([facility] [address] 2017    Depression     Diabetes mellitus ([facility] [address]     Dizziness     Fatty liver disease, nonalcoholic     Fatty liver disease, nonalcoholic     GERD (gastroesophageal reflux disease)     Obesity       Reviewed all health maintenance requirements and ordered appropriate tests  Health Maintenance Due   Topic Date Due    Diabetic retinal exam  Never done    COVID-19 Vaccine (3 - Booster for Moderna series) 12/15/2021    Flu vaccine (1) 2022    Diabetic foot exam  2022    Hepatitis B vaccine (3 of 3 - Risk 3-dose series) 2022       Past Surgical History:     Past Surgical History:   Procedure Laterality Date     SECTION      LEEP          Medications:       Prior to Admission medications    Medication Sig Start Date End Date Taking? Authorizing Provider   SYMBICORT 160-4.5 MCG/ACT AERO inhale 2 puffs by mouth twice a day 23  Yes Bronwyn Bermeo DO   PROAIR  (61 Base) MCG/ACT inhaler SPACE OUT TO EVERY 6 HOURS AS SYMPTOMS IMPROVE 23  Yes Bronwyn Bermeo DO   pantoprazole (PROTONIX) 40 MG tablet Take 1 tablet by mouth daily 23 Yes Mira Christina DO   Lancets Misc. (ACCU-CHEK SOFTCLIX LANCET DEV) KIT Use as directed for glucose checks 23  Yes Mira Christina DO   blood glucose monitor strips Test 3  times daily Insulin Dependent mellitus 23  Yes Mira Christina DO   blood glucose monitor kit and supplies Dispense sufficient amount for indicated testing frequency plus additional to accommodate PRN testing needs. 23  Yes Mira Christina DO   ARIPiprazole (ABILIFY) 10 MG tablet take 1 tablet by mouth every morning 23  Yes Mira Christina DO   buPROPion (WELLBUTRIN XL) 150 MG extended release tablet take 1 tablet by mouth once daily 23  Yes Bronwyn Bermeo DO   hydrOXYzine pamoate (VISTARIL) 50 MG capsule 50 mg Pt states she only takes it once a day but not sure of dose 23  Yes Bronwyn Bermeo DO   fluticasone (FLONASE) 50 MCG/ACT nasal spray instill 2 sprays into each nostril IN THE MORNING 23  Yes Kp Burgess MD   metFORMIN (GLUCOPHAGE) 500 MG tablet Take 1 tablet by mouth in the morning and 1 tablet in the evening. Take with meals.  8/10/22  Yes Edson Gonzales MD   Dulaglutide (TRULICITY) 1.5 BM/6.0UY SOPN Inject 0.75 mg into the skin once a week 8/10/22  Yes Edson Gonzales MD   aspirin-acetaminophen-caffeine [facility] MIGRAINE) 470-113-03 MG per tablet Take 1 tablet by mouth every 8 hours as needed for Headaches 3/24/22 8/2/22  Ann Olvera MD Allergies:       Morphine, Pcn [penicillins], Amoxicillin, and Seasonal    Social History:     Tobacco:    reports that she quit smoking about 8 years ago. Her smoking use included cigarettes. She has never used smokeless tobacco.  Alcohol:      reports that she does not currently use alcohol. Drug Use:  reports that she does not currently use drugs after having used the following drugs: Marijuana Ayan Feliz). Family History:     Family History   Problem Relation Age of Onset    High Blood Pressure Mother     Diabetes Mother     Heart Disease Mother     Heart Disease Father     Early Death Father     Cancer Maternal Aunt         lung    Cancer Paternal Cousin         brain       Review of Systems:       Review of Systems   Constitutional:  Negative for fever. HENT:  Negative for rhinorrhea, sinus pain and sneezing. Respiratory:  Negative for cough and wheezing. Cardiovascular:  Negative for chest pain. Gastrointestinal:  Negative for abdominal pain, diarrhea, nausea and vomiting. Musculoskeletal:  Positive for arthralgias. Negative for back pain. Skin:  Negative for rash. Neurological:  Negative for headaches. Psychiatric/Behavioral:  Negative for sleep disturbance. Physical Exam:     Vitals:  /86 (Site: Left Upper Arm, Position: Sitting, Cuff Size: Large Adult)   Pulse (!) 102   Resp 20   Ht 5' 5.5\" (1.664 m)   Wt (!) 341 lb 8 oz (154.9 kg)   SpO2 96%   BMI 55.96 kg/m²       Physical Exam  Vitals and nursing note reviewed. Constitutional:       General: She is not in acute distress. Appearance: Normal appearance. She is obese. HENT:      Right Ear: Ear canal and external ear normal. There is no impacted cerumen. Left Ear: Tympanic membrane, ear canal and external ear normal. There is no impacted cerumen. Ears:      Comments: OME right side     Nose: Nose normal.      Mouth/Throat:      Mouth: Mucous membranes are moist.      Pharynx: Oropharynx is clear.  No posterior oropharyngeal erythema.   Eyes:      Conjunctiva/sclera: Conjunctivae normal.      Pupils: Pupils are equal, round, and reactive to light.   Cardiovascular:      Rate and Rhythm: Normal rate and regular rhythm.      Pulses: Normal pulses.      Heart sounds: Normal heart sounds. No murmur heard.  Pulmonary:      Effort: Pulmonary effort is normal.      Breath sounds: Normal breath sounds. No wheezing.   Abdominal:      General: Abdomen is flat.      Palpations: Abdomen is soft.      Tenderness: There is no abdominal tenderness.   Musculoskeletal:      Cervical back: Normal range of motion and neck supple.   Skin:     General: Skin is warm and dry.      Capillary Refill: Capillary refill takes less than 2 seconds.   Neurological:      General: No focal deficit present.      Mental Status: She is alert and oriented to person, place, and time. Mental status is at baseline.   Psychiatric:         Mood and Affect: Mood normal.         Behavior: Behavior normal.       Data:     Lab Results   Component Value Date/Time     08/01/2022 10:42 PM    K 3.8 08/01/2022 10:42 PM     08/01/2022 10:42 PM    CO2 20 08/01/2022 10:42 PM    BUN 10 08/01/2022 10:42 PM    CREATININE 0.80 08/01/2022 10:42 PM    GLUCOSE 138 08/01/2022 10:42 PM    GLUCOSE 93 05/14/2012 11:03 AM    PROT 6.4 07/31/2022 05:15 PM    LABALBU 3.6 07/31/2022 05:15 PM    LABALBU 4.9 10/04/2011 10:36 AM    BILITOT 0.51 07/31/2022 05:15 PM    ALKPHOS 77 07/31/2022 05:15 PM    AST 26 07/31/2022 05:15 PM    ALT 27 07/31/2022 05:15 PM     Lab Results   Component Value Date/Time    WBC 7.2 08/01/2022 10:42 PM    RBC 3.66 08/01/2022 10:42 PM    RBC 4.02 05/14/2012 11:03 AM    HGB 11.2 08/01/2022 10:42 PM    HCT 34.6 08/01/2022 10:42 PM    MCV 94.5 08/01/2022 10:42 PM    MCH 30.6 08/01/2022 10:42 PM    MCHC 32.4 08/01/2022 10:42 PM    RDW 13.3 08/01/2022 10:42 PM     08/01/2022 10:42 PM     05/14/2012 11:03 AM    MPV 9.7 08/01/2022 10:42 PM  Lab Results   Component Value Date/Time    TSH 1.31 07/31/2022 05:15 PM     Lab Results   Component Value Date/Time    CHOL 191 03/22/2022 03:03 PM    HDL 42 03/22/2022 03:03 PM    LABA1C 6.1 08/10/2022 03:44 PM          Assessment & Plan      Diagnosis Orders   1. Primary osteoarthritis involving multiple joints        2. Moderate persistent asthma, unspecified whether complicated  SYMBICORT 058-2.9 MCG/ACT AERO    PROAIR  (90 Base) MCG/ACT inhaler      3. Gastroesophageal reflux disease without esophagitis  pantoprazole (PROTONIX) 40 MG tablet      4. Type 2 diabetes mellitus without complication, without long-term current use of insulin ([facility])  POCT glycosylated hemoglobin (Hb A1C)    Lancets Misc. (ACCU-CHEK SOFTCLIX LANCET DEV) KIT    blood glucose monitor strips    blood glucose monitor kit and supplies      5. Other depression  ARIPiprazole (ABILIFY) 10 MG tablet    buPROPion (WELLBUTRIN XL) 150 MG extended release tablet    hydrOXYzine pamoate (VISTARIL) 50 MG capsule      6. Bipolar depression ([facility] [address]  External Referral To Psychiatry    ARIPiprazole (ABILIFY) 10 MG tablet    buPROPion (WELLBUTRIN XL) 150 MG extended release tablet    hydrOXYzine pamoate (VISTARIL) 50 MG capsule    Comprehensive Metabolic Panel    CBC with Auto Differential    Lipid Panel    TSH With Reflex Ft4    Magnesium      7. Bipolar affective disorder, currently depressed, mild ([address]        8. Fatty liver disease, nonalcoholic        9. Class 3 severe obesity due to excess calories with body mass index (BMI) of 50.0 to 59.9 in adult, unspecified whether serious comorbidity present ([facility] [address]          Based on history, physical examination, I believe this to be osteoarthritis, traumatic arthritis in the ankle and primary osteoarthritis in the knee. I would recommend over-the-counter NSAIDs, Voltaren gel and weight reduction. 2.  Restart Symbicort, albuterol. 3.  Restart Protonix. 4.  Hemoglobin A1c is 6.9%.   Increase Trulicity to 1.5 mg subcutaneous once per week, continue metformin. Recheck in 3 months. 5.  We will recheck CMP today. 6.  As her BMI is greater than 45 kg/m², I did offer her referral to bariatrics, which is actually something she had been considering for several years now. She elected to complete the referral and would like to at least discuss risks and benefits of bariatric surgery. 7.  Currently managed with Abilify, Wellbutrin. I would recommend that she seek definitive management with a psychiatrist and have referred her to Dr. Littlejohn Section. Follow up in 6 months for interval medical visit     Completed Refills   Requested Prescriptions     Signed Prescriptions Disp Refills    SYMBICORT 160-4.5 MCG/ACT AERO 10.2 g 2     Sig: inhale 2 puffs by mouth twice a day    PROAIR  (90 Base) MCG/ACT inhaler 18 g 2     Sig: SPACE OUT TO EVERY 6 HOURS AS SYMPTOMS IMPROVE    pantoprazole (PROTONIX) 40 MG tablet 30 tablet 0     Sig: Take 1 tablet by mouth daily    Lancets Misc. (ACCU-CHEK SOFTCLIX LANCET DEV) KIT 2 kit 3     Sig: Use as directed for glucose checks    blood glucose monitor strips 100 strip 11     Sig: Test 3  times daily Insulin Dependent mellitus    blood glucose monitor kit and supplies 1 kit 0     Sig: Dispense sufficient amount for indicated testing frequency plus additional to accommodate PRN testing needs. ARIPiprazole (ABILIFY) 10 MG tablet 30 tablet 2     Sig: take 1 tablet by mouth every morning    buPROPion (WELLBUTRIN XL) 150 MG extended release tablet 30 tablet 1     Sig: take 1 tablet by mouth once daily    hydrOXYzine pamoate (VISTARIL) 50 MG capsule 30 capsule 1     Si mg Pt states she only takes it once a day but not sure of dose     No follow-ups on file.   Orders Placed This Encounter   Medications    SYMBICORT 160-4.5 MCG/ACT AERO     Sig: inhale 2 puffs by mouth twice a day     Dispense:  10.2 g     Refill:  2    PROAIR  (90 Base) MCG/ACT inhaler     Sig: SPACE OUT TO EVERY 6 HOURS AS SYMPTOMS IMPROVE     Dispense:  18 g     Refill:  2    pantoprazole (PROTONIX) 40 MG tablet     Sig: Take 1 tablet by mouth daily     Dispense:  30 tablet     Refill:  0    Lancets Misc. (ACCU-CHEK SOFTCLIX LANCET DEV) KIT     Sig: Use as directed for glucose checks     Dispense:  2 kit     Refill:  3    blood glucose monitor strips     Sig: Test 3  times daily Insulin Dependent mellitus     Dispense:  100 strip     Refill:  11    blood glucose monitor kit and supplies     Sig: Dispense sufficient amount for indicated testing frequency plus additional to accommodate PRN testing needs.      Dispense:  1 kit     Refill:  0    ARIPiprazole (ABILIFY) 10 MG tablet     Sig: take 1 tablet by mouth every morning     Dispense:  30 tablet     Refill:  2    buPROPion (WELLBUTRIN XL) 150 MG extended release tablet     Sig: take 1 tablet by mouth once daily     Dispense:  30 tablet     Refill:  1    hydrOXYzine pamoate (VISTARIL) 50 MG capsule     Si mg Pt states she only takes it once a day but not sure of dose     Dispense:  30 capsule     Refill:  1     Orders Placed This Encounter   Procedures    Comprehensive Metabolic Panel     Standing Status:   Future     Standing Expiration Date:   2024    CBC with Auto Differential     Standing Status:   Future     Standing Expiration Date:   2024    Lipid Panel     Standing Status:   Future     Standing Expiration Date:   2024     Order Specific Question:   Is Patient Fasting?/# of Hours     Answer:   no    TSH With Reflex Ft4     Standing Status:   Future     Standing Expiration Date:   2024    Magnesium     Standing Status:   Future     Standing Expiration Date:   2024    External Referral To Psychiatry     Referral Priority:   Routine     Referral Type:   Eval and Treat     Referral Reason:   Specialty Services Required     Referred to Provider:   Steffen Horta     Requested Specialty:   Psychiatry     Number of Visits Requested:   1    POCT glycosylated hemoglobin (Hb A1C)         Patient Instructions   Al Gruber,    Thank you for coming to see me today! I believe that our main problem is getting linked up with me as your new primary doctor. Here's what I would recommend we do:    GERD: take Protonix every day  Asthma: use Symbicort twice a day every day. Even on days you feel better. Bipolar disorder: take Abilify and Wellbutrin and go see Dr. Littlejohn Section    We also discussed your knee and ankle pain. I think that this is arthritis. Go get over the counter voltaren gel and naproxen. Take naproxen twice a day for 7-14 days as needed for the pain. Rub on the voltaren gel four times a day as needed for pain reduction. Please review the documents I'm attaching related to what we discussed today. Please give me a call or message me on [employer] if you have any problems or questions, and I will see you at your next visit. Dr. Audra Villa:    Adam Cordova may be receiving a survey from [employer] regarding your visit today. Please complete the survey to enable us to provide the highest quality of care to you and your family. If you cannot score us a very good on any question, please call the office to discuss how we could of made your experience a very good one. Thank you.       Clinical Care Team:     Dr. Chaya Cuevas, BENNETT Roger:     Juliette Real   Electronically signed by Jocelyne Jennings DO on 1/18/2023 at 1:40 PM     Completed Refills   Requested Prescriptions     Signed Prescriptions Disp Refills    SYMBICORT 160-4.5 MCG/ACT AERO 10.2 g 2     Sig: inhale 2 puffs by mouth twice a day    PROAIR  (90 Base) MCG/ACT inhaler 18 g 2     Sig: SPACE OUT TO EVERY 6 HOURS AS SYMPTOMS IMPROVE    pantoprazole (PROTONIX) 40 MG tablet 30 tablet 0     Sig: Take 1 tablet by mouth daily    Lancets Misc. (ACCU-CHEK SOFTCLIX LANCET DEV) KIT 2 kit 3     Sig: Use as directed for glucose checks    blood glucose monitor strips 100 strip 11     Sig: Test 3  times daily Insulin Dependent mellitus    blood glucose monitor kit and supplies 1 kit 0     Sig: Dispense sufficient amount for indicated testing frequency plus additional to accommodate PRN testing needs.    ARIPiprazole (ABILIFY) 10 MG tablet 30 tablet 2     Sig: take 1 tablet by mouth every morning    buPROPion (WELLBUTRIN XL) 150 MG extended release tablet 30 tablet 1     Sig: take 1 tablet by mouth once daily    hydrOXYzine pamoate (VISTARIL) 50 MG capsule 30 capsule 1     Si mg Pt states she only takes it once a day but not sure of dose

## 2023-03-30 ENCOUNTER — TELEPHONE (OUTPATIENT)
Dept: FAMILY MEDICINE CLINIC | Age: 33
End: 2023-03-30

## 2023-03-30 DIAGNOSIS — E11.9 TYPE 2 DIABETES MELLITUS WITHOUT COMPLICATION, WITHOUT LONG-TERM CURRENT USE OF INSULIN (HCC): ICD-10-CM

## 2023-03-30 DIAGNOSIS — J45.40 MODERATE PERSISTENT ASTHMA, UNSPECIFIED WHETHER COMPLICATED: ICD-10-CM

## 2023-03-30 NOTE — TELEPHONE ENCOUNTER
----- Message from Pamela Gomez sent at 3/30/2023  2:21 PM EDT -----  Subject: Medication Problem    Medication: blood glucose monitor kit and supplies  Dosage: blood glucose monitor kit and supplies Dispense sufficient amount   for indicated testing frequency plus additional to accommodate PRN testing   needs.   Ordering Provider: Farzana Eddy     Question/Problem: Patient is need a new machine patient called in sugar   machine stated that is was cracked please advise and may need the strips   blood glucose monitor strips       Pharmacy: [address] [place] [address] [phone]    ---------------------------------------------------------------------------  --------------  Serena Rabia INFO  [phone]; OK to leave message on voicemail  ---------------------------------------------------------------------------  --------------    SCRIPT ANSWERS  Relationship to Patient: Self

## 2023-03-31 RX ORDER — GLUCOSAMINE HCL/CHONDROITIN SU 500-400 MG
CAPSULE ORAL
Qty: 100 STRIP | Refills: 11 | Status: SHIPPED | OUTPATIENT
Start: 2023-03-31

## 2023-04-05 DIAGNOSIS — J45.40 MODERATE PERSISTENT ASTHMA, UNSPECIFIED WHETHER COMPLICATED: ICD-10-CM

## 2023-04-05 DIAGNOSIS — F32.89 OTHER DEPRESSION: ICD-10-CM

## 2023-04-05 DIAGNOSIS — F31.9 BIPOLAR DEPRESSION (HCC): ICD-10-CM

## 2023-04-05 DIAGNOSIS — H93.8X3 CONGESTION OF BOTH EARS: ICD-10-CM

## 2023-04-05 DIAGNOSIS — K21.9 GASTROESOPHAGEAL REFLUX DISEASE WITHOUT ESOPHAGITIS: ICD-10-CM

## 2023-04-05 RX ORDER — BUPROPION HYDROCHLORIDE 150 MG/1
TABLET ORAL
Qty: 30 TABLET | Refills: 5 | Status: SHIPPED | OUTPATIENT
Start: 2023-04-05

## 2023-04-05 RX ORDER — ARIPIPRAZOLE 10 MG/1
TABLET ORAL
Qty: 30 TABLET | Refills: 5 | Status: SHIPPED | OUTPATIENT
Start: 2023-04-05

## 2023-04-05 RX ORDER — BUDESONIDE AND FORMOTEROL FUMARATE DIHYDRATE 160; 4.5 UG/1; UG/1
AEROSOL RESPIRATORY (INHALATION)
Qty: 10.2 G | Refills: 2 | Status: SHIPPED | OUTPATIENT
Start: 2023-04-05

## 2023-04-05 RX ORDER — FLUTICASONE PROPIONATE 50 MCG
SPRAY, SUSPENSION (ML) NASAL
Qty: 16 G | Refills: 2 | Status: SHIPPED | OUTPATIENT
Start: 2023-04-05

## 2023-04-05 RX ORDER — PANTOPRAZOLE SODIUM 40 MG/1
40 TABLET, DELAYED RELEASE ORAL DAILY
Qty: 30 TABLET | Refills: 5 | Status: SHIPPED | OUTPATIENT
Start: 2023-04-05 | End: 2023-05-05

## 2023-04-05 RX ORDER — HYDROXYZINE PAMOATE 50 MG/1
CAPSULE ORAL
Qty: 30 CAPSULE | Refills: 1 | Status: SHIPPED | OUTPATIENT
Start: 2023-04-05

## 2023-04-05 NOTE — TELEPHONE ENCOUNTER
Last OV: 1/18/2023 NP   Last RX:    Next scheduled apt: 4/5/2023 3 months DM             Surescript requesting a refill

## 2023-04-05 NOTE — TELEPHONE ENCOUNTER
Last OV: 1/18/2023  NP  Last RX:    Next scheduled apt: 4/19/2023  3 MONTH DM           Pt requesting a refill

## 2023-04-05 NOTE — TELEPHONE ENCOUNTER
Flonase 50 mcg  Symbicort inhaler  Protonix 40 mg  Abilify 10 mg  Wellbutrin 150 mg  Vistaril 50 mg      New patient appointment 1/18/23  Check up 4/19/23    MERY cooley    Health Maintenance   Topic Date Due    Diabetic retinal exam  Never done    COVID-19 Vaccine (3 - Booster for Moderna series) 12/15/2021    Diabetic foot exam  08/12/2022    Hepatitis B vaccine (3 of 3 - Risk 3-dose series) 08/21/2022    Diabetic Alb to Cr ratio (uACR) test  03/22/2023    Lipids  03/22/2023    Flu vaccine (Season Ended) 08/01/2023    A1C test (Diabetic or Prediabetic)  01/18/2024    Depression Monitoring  01/18/2024    GFR test (Diabetes, CKD 3-4, OR last GFR 15-59)  01/18/2024    Cervical cancer screen  07/31/2024    DTaP/Tdap/Td vaccine (2 - Td or Tdap) 09/08/2024    Pneumococcal 0-64 years Vaccine  Completed    Hepatitis C screen  Completed    HIV screen  Completed    Hepatitis A vaccine  Aged Out    Hib vaccine  Aged Out    Meningococcal (ACWY) vaccine  Aged Out    Varicella vaccine  Discontinued             (applicable per patient's age: Cancer Screenings, Depression Screening, Fall Risk Screening, Immunizations)    Hemoglobin A1C (%)   Date Value   01/18/2023 6.9   08/10/2022 6.1   03/17/2022 7.3     Microalb/Crt.  Ratio (mcg/mg creat)   Date Value   03/22/2022 Can not be calculated     LDL Cholesterol (mg/dL)   Date Value   03/22/2022 115     AST (U/L)   Date Value   01/18/2023 35 (H)     ALT (U/L)   Date Value   01/18/2023 35 (H)     BUN (mg/dL)   Date Value   01/18/2023 9      (goal A1C is < 7)   (goal LDL is <100) need 30-50% reduction from baseline     BP Readings from Last 3 Encounters:   01/18/23 122/86   08/10/22 124/88   08/02/22 (!) 140/92    (goal /80)      All Future Testing planned in [facility]:  Lab Frequency Next Occurrence   XR LUMBAR SPINE (2-3 VIEWS) Once 04/21/2022   Iron and TIBC Once 05/19/2022   XR KNEE RIGHT (3 VIEWS) Once 05/19/2022   Ferritin Once 08/20/2022   XR KNEE RIGHT (1-2 VIEWS) Once

## 2023-08-14 ENCOUNTER — HOSPITAL ENCOUNTER (EMERGENCY)
Age: 33
Discharge: HOME | End: 2023-08-15
Attending: STUDENT IN AN ORGANIZED HEALTH CARE EDUCATION/TRAINING PROGRAM
Payer: COMMERCIAL

## 2023-08-14 ENCOUNTER — APPOINTMENT (OUTPATIENT)
Dept: CT IMAGING | Age: 33
End: 2023-08-14
Payer: COMMERCIAL

## 2023-08-14 ENCOUNTER — APPOINTMENT (OUTPATIENT)
Dept: GENERAL RADIOLOGY | Age: 33
End: 2023-08-14
Payer: COMMERCIAL

## 2023-08-14 DIAGNOSIS — M25.562 ACUTE PAIN OF LEFT KNEE: ICD-10-CM

## 2023-08-14 DIAGNOSIS — R11.2 NAUSEA AND VOMITING, UNSPECIFIED VOMITING TYPE: ICD-10-CM

## 2023-08-14 DIAGNOSIS — R10.12 LEFT UPPER QUADRANT ABDOMINAL PAIN: Primary | ICD-10-CM

## 2023-08-14 LAB
ALBUMIN SERPL-MCNC: 3.5 G/DL (ref 3.5–5.2)
ALP SERPL-CCNC: 104 U/L (ref 35–104)
ALT SERPL-CCNC: 25 U/L (ref 5–33)
ANION GAP SERPL CALCULATED.3IONS-SCNC: 9 MMOL/L (ref 9–17)
AST SERPL-CCNC: 26 U/L
BACTERIA URNS QL MICRO: NORMAL
BASOPHILS # BLD: 0.1 K/UL (ref 0–0.2)
BASOPHILS NFR BLD: 1 % (ref 0–2)
BILIRUB DIRECT SERPL-MCNC: <0.1 MG/DL
BILIRUB INDIRECT SERPL-MCNC: NORMAL MG/DL (ref 0–1)
BILIRUB SERPL-MCNC: 0.3 MG/DL (ref 0.3–1.2)
BILIRUB UR QL STRIP: NEGATIVE
BUN SERPL-MCNC: 10 MG/DL (ref 6–20)
CALCIUM SERPL-MCNC: 9.1 MG/DL (ref 8.6–10.4)
CASTS #/AREA URNS LPF: NORMAL /LPF
CHLORIDE SERPL-SCNC: 106 MMOL/L (ref 98–107)
CLARITY UR: CLEAR
CO2 SERPL-SCNC: 25 MMOL/L (ref 20–31)
COLOR UR: YELLOW
CREAT SERPL-MCNC: 0.9 MG/DL (ref 0.5–0.9)
EOSINOPHIL # BLD: 0.4 K/UL (ref 0–0.4)
EOSINOPHILS RELATIVE PERCENT: 4 % (ref 0–4)
EPI CELLS #/AREA URNS HPF: NORMAL /HPF
ERYTHROCYTE [DISTWIDTH] IN BLOOD BY AUTOMATED COUNT: 13.7 % (ref 11.5–14.9)
GFR SERPL CREATININE-BSD FRML MDRD: >60 ML/MIN/1.73M2
GLUCOSE SERPL-MCNC: 153 MG/DL (ref 70–99)
GLUCOSE UR STRIP-MCNC: NEGATIVE MG/DL
HCG SERPL QL: NEGATIVE
HCG UR QL: NEGATIVE
HCT VFR BLD AUTO: 34.7 % (ref 36–46)
HGB BLD-MCNC: 11.5 G/DL (ref 12–16)
HGB UR QL STRIP.AUTO: ABNORMAL
KETONES UR STRIP-MCNC: NEGATIVE MG/DL
LEUKOCYTE ESTERASE UR QL STRIP: NEGATIVE
LIPASE SERPL-CCNC: 29 U/L (ref 13–60)
LYMPHOCYTES NFR BLD: 2.5 K/UL (ref 1–4.8)
LYMPHOCYTES RELATIVE PERCENT: 24 % (ref 24–44)
MAGNESIUM SERPL-MCNC: 2.2 MG/DL (ref 1.6–2.6)
MCH RBC QN AUTO: 29.5 PG (ref 26–34)
MCHC RBC AUTO-ENTMCNC: 33.1 G/DL (ref 31–37)
MCV RBC AUTO: 89.2 FL (ref 80–100)
MONOCYTES NFR BLD: 0.7 K/UL (ref 0.1–1.3)
MONOCYTES NFR BLD: 6 % (ref 1–7)
NEUTROPHILS NFR BLD: 65 % (ref 36–66)
NEUTS SEG NFR BLD: 7 K/UL (ref 1.3–9.1)
NITRITE UR QL STRIP: NEGATIVE
PH UR STRIP: 5.5 [PH] (ref 5–8)
PLATELET # BLD AUTO: 333 K/UL (ref 150–450)
PMV BLD AUTO: 7.7 FL (ref 6–12)
POTASSIUM SERPL-SCNC: 4.2 MMOL/L (ref 3.7–5.3)
PROT SERPL-MCNC: 7.3 G/DL (ref 6.4–8.3)
PROT UR STRIP-MCNC: NEGATIVE MG/DL
RBC # BLD AUTO: 3.89 M/UL (ref 4–5.2)
RBC #/AREA URNS HPF: NORMAL /HPF
SODIUM SERPL-SCNC: 140 MMOL/L (ref 135–144)
SP GR UR STRIP: 1.02 (ref 1–1.03)
UROBILINOGEN UR STRIP-ACNC: NORMAL EU/DL (ref 0–1)
WBC #/AREA URNS HPF: NORMAL /HPF
WBC OTHER # BLD: 10.7 K/UL (ref 3.5–11)

## 2023-08-14 PROCEDURE — 74177 CT ABD & PELVIS W/CONTRAST: CPT

## 2023-08-14 PROCEDURE — 2500000003 HC RX 250 WO HCPCS: Performed by: STUDENT IN AN ORGANIZED HEALTH CARE EDUCATION/TRAINING PROGRAM

## 2023-08-14 PROCEDURE — 83690 ASSAY OF LIPASE: CPT

## 2023-08-14 PROCEDURE — 6360000004 HC RX CONTRAST MEDICATION: Performed by: STUDENT IN AN ORGANIZED HEALTH CARE EDUCATION/TRAINING PROGRAM

## 2023-08-14 PROCEDURE — 73562 X-RAY EXAM OF KNEE 3: CPT

## 2023-08-14 PROCEDURE — 36415 COLL VENOUS BLD VENIPUNCTURE: CPT

## 2023-08-14 PROCEDURE — 85025 COMPLETE CBC W/AUTO DIFF WBC: CPT

## 2023-08-14 PROCEDURE — 81001 URINALYSIS AUTO W/SCOPE: CPT

## 2023-08-14 PROCEDURE — 99285 EMERGENCY DEPT VISIT HI MDM: CPT

## 2023-08-14 PROCEDURE — A4216 STERILE WATER/SALINE, 10 ML: HCPCS | Performed by: STUDENT IN AN ORGANIZED HEALTH CARE EDUCATION/TRAINING PROGRAM

## 2023-08-14 PROCEDURE — 84703 CHORIONIC GONADOTROPIN ASSAY: CPT

## 2023-08-14 PROCEDURE — 83735 ASSAY OF MAGNESIUM: CPT

## 2023-08-14 PROCEDURE — 80076 HEPATIC FUNCTION PANEL: CPT

## 2023-08-14 PROCEDURE — 80048 BASIC METABOLIC PNL TOTAL CA: CPT

## 2023-08-14 PROCEDURE — 96375 TX/PRO/DX INJ NEW DRUG ADDON: CPT

## 2023-08-14 PROCEDURE — 2580000003 HC RX 258: Performed by: STUDENT IN AN ORGANIZED HEALTH CARE EDUCATION/TRAINING PROGRAM

## 2023-08-14 PROCEDURE — 6360000002 HC RX W HCPCS: Performed by: STUDENT IN AN ORGANIZED HEALTH CARE EDUCATION/TRAINING PROGRAM

## 2023-08-14 PROCEDURE — 96374 THER/PROPH/DIAG INJ IV PUSH: CPT

## 2023-08-14 PROCEDURE — 81025 URINE PREGNANCY TEST: CPT

## 2023-08-14 RX ORDER — FENTANYL CITRATE 0.05 MG/ML
50 INJECTION, SOLUTION INTRAMUSCULAR; INTRAVENOUS ONCE
Status: COMPLETED | OUTPATIENT
Start: 2023-08-14 | End: 2023-08-14

## 2023-08-14 RX ORDER — SODIUM CHLORIDE 0.9 % (FLUSH) 0.9 %
10 SYRINGE (ML) INJECTION PRN
Status: COMPLETED | OUTPATIENT
Start: 2023-08-14 | End: 2023-08-14

## 2023-08-14 RX ORDER — 0.9 % SODIUM CHLORIDE 0.9 %
1000 INTRAVENOUS SOLUTION INTRAVENOUS ONCE
Status: COMPLETED | OUTPATIENT
Start: 2023-08-14 | End: 2023-08-15

## 2023-08-14 RX ORDER — ONDANSETRON 2 MG/ML
4 INJECTION INTRAMUSCULAR; INTRAVENOUS ONCE
Status: COMPLETED | OUTPATIENT
Start: 2023-08-14 | End: 2023-08-14

## 2023-08-14 RX ORDER — 0.9 % SODIUM CHLORIDE 0.9 %
100 INTRAVENOUS SOLUTION INTRAVENOUS ONCE
Status: COMPLETED | OUTPATIENT
Start: 2023-08-14 | End: 2023-08-14

## 2023-08-14 RX ADMIN — SODIUM CHLORIDE 100 ML: 9 INJECTION, SOLUTION INTRAVENOUS at 23:08

## 2023-08-14 RX ADMIN — FAMOTIDINE 20 MG: 10 INJECTION INTRAVENOUS at 21:53

## 2023-08-14 RX ADMIN — SODIUM CHLORIDE, PRESERVATIVE FREE 10 ML: 5 INJECTION INTRAVENOUS at 23:09

## 2023-08-14 RX ADMIN — IOPAMIDOL 75 ML: 755 INJECTION, SOLUTION INTRAVENOUS at 23:09

## 2023-08-14 RX ADMIN — FENTANYL CITRATE 50 MCG: 50 INJECTION INTRAMUSCULAR; INTRAVENOUS at 21:53

## 2023-08-14 RX ADMIN — SODIUM CHLORIDE 1000 ML: 9 INJECTION, SOLUTION INTRAVENOUS at 21:53

## 2023-08-14 RX ADMIN — ONDANSETRON 4 MG: 2 INJECTION INTRAMUSCULAR; INTRAVENOUS at 21:53

## 2023-08-14 ASSESSMENT — ENCOUNTER SYMPTOMS
RHINORRHEA: 0
SHORTNESS OF BREATH: 0
DIARRHEA: 0
SORE THROAT: 0
NAUSEA: 1
EYE REDNESS: 0
EYE DISCHARGE: 0
ABDOMINAL PAIN: 1
VOMITING: 0

## 2023-08-14 ASSESSMENT — PAIN DESCRIPTION - DESCRIPTORS: DESCRIPTORS: ACHING;SHARP;SHOOTING

## 2023-08-14 ASSESSMENT — PAIN DESCRIPTION - ORIENTATION: ORIENTATION: LEFT

## 2023-08-14 ASSESSMENT — PAIN SCALES - GENERAL: PAINLEVEL_OUTOF10: 5

## 2023-08-14 ASSESSMENT — PAIN - FUNCTIONAL ASSESSMENT: PAIN_FUNCTIONAL_ASSESSMENT: 0-10

## 2023-08-14 ASSESSMENT — PAIN DESCRIPTION - LOCATION: LOCATION: FLANK

## 2023-08-15 VITALS
HEIGHT: 65 IN | BODY MASS INDEX: 48.82 KG/M2 | SYSTOLIC BLOOD PRESSURE: 127 MMHG | DIASTOLIC BLOOD PRESSURE: 82 MMHG | WEIGHT: 293 LBS | RESPIRATION RATE: 20 BRPM | OXYGEN SATURATION: 99 % | TEMPERATURE: 98.1 F | HEART RATE: 79 BPM

## 2023-08-15 RX ORDER — FAMOTIDINE 20 MG/1
20 TABLET, FILM COATED ORAL 2 TIMES DAILY
Qty: 60 TABLET | Refills: 0 | Status: SHIPPED | OUTPATIENT
Start: 2023-08-15

## 2023-08-15 RX ORDER — ONDANSETRON 4 MG/1
4 TABLET, ORALLY DISINTEGRATING ORAL 3 TIMES DAILY PRN
Qty: 21 TABLET | Refills: 0 | Status: SHIPPED | OUTPATIENT
Start: 2023-08-15

## 2023-08-15 NOTE — ED PROVIDER NOTES
EMERGENCY DEPARTMENT ENCOUNTER    Pt Name: Juno Barraza  MRN: [id number]  [address] [facility] [place] 1990  Date of evaluation: 8/14/23  CHIEF COMPLAINT       Chief Complaint   Patient presents with    Flank Pain     L side pain that started 2 days ago     Nausea    Dizziness    Fatigue     HISTORY OF PRESENT ILLNESS   27 yo F w/ abdominal pain and knee pain    Patient with some luq abdominal pain. Severe. Constant. Pain going on for a few days  nausea but no vomiting    And lightheaded. Also with some left knee pain. No specific injury no numbness tingling weakness fevers chills redness or            REVIEW OF SYSTEMS     Review of Systems   Constitutional:  Negative for chills and fever. HENT:  Negative for rhinorrhea and sore throat. Eyes:  Negative for discharge and redness. Respiratory:  Negative for shortness of breath. Cardiovascular:  Negative for chest pain. Gastrointestinal:  Positive for abdominal pain and nausea. Negative for diarrhea and vomiting. Genitourinary:  Negative for dysuria, frequency and urgency. Musculoskeletal:  Negative for arthralgias and myalgias. Knee pain   Skin:  Negative for rash. Neurological:  Negative for weakness and numbness. Psychiatric/Behavioral:  Negative for suicidal ideas. All other systems reviewed and are negative.   PASTMEDICAL HISTORY     Past Medical History:   Diagnosis Date    Asthma     Back pain 9/16/2014    Bipolar 1 disorder ([address])     Bipolar disorder ([address]) 12/8/2017    Depression     Diabetes mellitus ([address])     Dizziness     Fatty liver disease, nonalcoholic     Fatty liver disease, nonalcoholic     GERD (gastroesophageal reflux disease)     Obesity      Past Problem List  Patient Active Problem List   Diagnosis Code    Obesity E66.9    Gastroesophageal reflux disease without esophagitis K21.9    Fatty liver disease, nonalcoholic W29.8    Type 2 diabetes mellitus without complication ([address]) L75.6    Bipolar affective disorder, currently

## 2023-08-22 ENCOUNTER — HOSPITAL ENCOUNTER (OUTPATIENT)
Age: 33
Setting detail: SPECIMEN
Discharge: HOME | End: 2023-08-22

## 2023-08-22 ENCOUNTER — OFFICE VISIT (OUTPATIENT)
Dept: OBGYN CLINIC | Age: 33
End: 2023-08-22
Payer: COMMERCIAL

## 2023-08-22 VITALS
SYSTOLIC BLOOD PRESSURE: 112 MMHG | DIASTOLIC BLOOD PRESSURE: 84 MMHG | HEIGHT: 65 IN | WEIGHT: 293 LBS | HEART RATE: 78 BPM | BODY MASS INDEX: 48.82 KG/M2

## 2023-08-22 DIAGNOSIS — Z11.51 SCREENING FOR HUMAN PAPILLOMAVIRUS: ICD-10-CM

## 2023-08-22 DIAGNOSIS — Z01.419 PAP SMEAR, AS PART OF ROUTINE GYNECOLOGICAL EXAMINATION: ICD-10-CM

## 2023-08-22 DIAGNOSIS — N76.0 ACUTE VAGINITIS: ICD-10-CM

## 2023-08-22 DIAGNOSIS — Z80.0 FAMILY HISTORY OF PANCREATIC CANCER: Primary | ICD-10-CM

## 2023-08-22 PROCEDURE — 99385 PREV VISIT NEW AGE 18-39: CPT | Performed by: SPECIALIST

## 2023-08-22 ASSESSMENT — ENCOUNTER SYMPTOMS
EYE PAIN: 0
COUGH: 0
ABDOMINAL DISTENTION: 0
ABDOMINAL PAIN: 0
VOMITING: 0
APNEA: 0
NAUSEA: 0
CONSTIPATION: 0
DIARRHEA: 0

## 2023-08-23 DIAGNOSIS — N76.0 ACUTE VAGINITIS: ICD-10-CM

## 2023-08-23 LAB
CANDIDA SPECIES: NEGATIVE
GARDNERELLA VAGINALIS: POSITIVE
SOURCE: ABNORMAL
TRICHOMONAS: NEGATIVE

## 2023-08-23 RX ORDER — METRONIDAZOLE 500 MG/1
500 TABLET ORAL 2 TIMES DAILY
Qty: 14 TABLET | Refills: 0 | Status: SHIPPED | OUTPATIENT
Start: 2023-08-23 | End: 2023-08-30

## 2023-08-24 LAB
C TRACH DNA SPEC QL PROBE+SIG AMP: NEGATIVE
CYTOLOGY REPORT: NORMAL
N GONORRHOEA DNA SPEC QL PROBE+SIG AMP: NEGATIVE
SPECIMEN DESCRIPTION: NORMAL

## 2023-08-25 LAB
HPV I/H RISK 4 DNA CVX QL NAA+PROBE: NOT DETECTED
HPV SAMPLE: NORMAL
HPV, INTERPRETATION: NORMAL
HPV16 DNA CVX QL NAA+PROBE: NOT DETECTED
HPV18 DNA CVX QL NAA+PROBE: NOT DETECTED
SPECIMEN DESCRIPTION: NORMAL

## 2023-09-12 DIAGNOSIS — Z80.0 FAMILY HISTORY OF PANCREATIC CANCER: ICD-10-CM

## 2023-09-21 PROBLEM — Z11.51 SCREENING FOR HUMAN PAPILLOMAVIRUS: Status: RESOLVED | Noted: 2023-08-22 | Resolved: 2023-09-21

## 2023-09-21 PROBLEM — Z01.419 PAP SMEAR, AS PART OF ROUTINE GYNECOLOGICAL EXAMINATION: Status: RESOLVED | Noted: 2023-08-22 | Resolved: 2023-09-21

## 2023-10-10 ENCOUNTER — NURSE TRIAGE (OUTPATIENT)
Dept: OTHER | Facility: CLINIC | Age: 33
End: 2023-10-10

## 2023-10-11 ENCOUNTER — TELEPHONE (OUTPATIENT)
Dept: INTERNAL MEDICINE | Age: 33
End: 2023-10-11

## 2023-10-12 ENCOUNTER — OFFICE VISIT (OUTPATIENT)
Dept: OBGYN CLINIC | Age: 33
End: 2023-10-12

## 2023-10-12 VITALS
DIASTOLIC BLOOD PRESSURE: 82 MMHG | HEART RATE: 94 BPM | WEIGHT: 293 LBS | BODY MASS INDEX: 48.82 KG/M2 | SYSTOLIC BLOOD PRESSURE: 116 MMHG | HEIGHT: 65 IN

## 2023-10-12 DIAGNOSIS — Z80.0 FAMILY HISTORY OF PANCREATIC CANCER: ICD-10-CM

## 2023-10-12 DIAGNOSIS — Z71.2 ENCOUNTER TO DISCUSS TEST RESULTS: Primary | ICD-10-CM

## 2023-10-12 SDOH — ECONOMIC STABILITY: HOUSING INSECURITY
IN THE LAST 12 MONTHS, WAS THERE A TIME WHEN YOU DID NOT HAVE A STEADY PLACE TO SLEEP OR SLEPT IN A SHELTER (INCLUDING NOW)?: NO

## 2023-10-12 SDOH — ECONOMIC STABILITY: INCOME INSECURITY: HOW HARD IS IT FOR YOU TO PAY FOR THE VERY BASICS LIKE FOOD, HOUSING, MEDICAL CARE, AND HEATING?: NOT HARD AT ALL

## 2023-10-12 SDOH — ECONOMIC STABILITY: FOOD INSECURITY: WITHIN THE PAST 12 MONTHS, THE FOOD YOU BOUGHT JUST DIDN'T LAST AND YOU DIDN'T HAVE MONEY TO GET MORE.: SOMETIMES TRUE

## 2023-10-12 SDOH — ECONOMIC STABILITY: FOOD INSECURITY: WITHIN THE PAST 12 MONTHS, YOU WORRIED THAT YOUR FOOD WOULD RUN OUT BEFORE YOU GOT MONEY TO BUY MORE.: NEVER TRUE

## 2023-10-12 SDOH — ECONOMIC STABILITY: FOOD INSECURITY: WITHIN THE PAST 12 MONTHS, THE FOOD YOU BOUGHT JUST DIDN'T LAST AND YOU DIDN'T HAVE MONEY TO GET MORE.: NEVER TRUE

## 2023-10-12 SDOH — ECONOMIC STABILITY: FOOD INSECURITY: WITHIN THE PAST 12 MONTHS, YOU WORRIED THAT YOUR FOOD WOULD RUN OUT BEFORE YOU GOT MONEY TO BUY MORE.: SOMETIMES TRUE

## 2023-10-12 ASSESSMENT — ENCOUNTER SYMPTOMS
EYES NEGATIVE: 1
RESPIRATORY NEGATIVE: 1
ALLERGIC/IMMUNOLOGIC NEGATIVE: 1
GASTROINTESTINAL NEGATIVE: 1

## 2023-10-12 NOTE — PROGRESS NOTES
None    Highest education level: None   Tobacco Use    Smoking status: Former     Years: 15     Types: Cigarettes     Quit date: 2015     Years since quittin.7    Smokeless tobacco: Never   Vaping Use    Vaping Use: Never used   Substance and Sexual Activity    Alcohol use: Not Currently     Alcohol/week: 0.0 standard drinks of alcohol     Comment: rarely    Drug use: Not Currently     Types: Marijuana Julee Larve)     Comment: quit    Sexual activity: Not Currently     Partners: Male     Social Determinants of Health     Financial Resource Strain: Low Risk  (10/12/2023)    Overall Financial Resource Strain (CARDIA)     Difficulty of Paying Living Expenses: Not hard at all   Food Insecurity: [address] Present (10/12/2023)    Hunger Vital Sign     Worried About Running Out of Food in the Last Year: Sometimes true     Ran Out of Food in the Last Year: Sometimes true   Transportation Needs: Unknown (10/12/2023)    [facility] - Transportation     Lack of Transportation (Non-Medical): No   Housing Stability: Unknown (10/12/2023)    Housing Stability Vital Sign     Unstable Housing in the Last Year: No      Current Outpatient Medications   Medication Sig Dispense Refill    ondansetron (ZOFRAN-ODT) 4 MG disintegrating tablet Take 1 tablet by mouth 3 times daily as needed for Nausea or Vomiting 21 tablet 0    famotidine (PEPCID) 20 MG tablet Take 1 tablet by mouth 2 times daily 60 tablet 0    hydrOXYzine pamoate (VISTARIL) 50 MG capsule take 1 capsule by mouth once daily for sleep 30 capsule 1    fluticasone (FLONASE) 50 MCG/ACT nasal spray instill 2 sprays into each nostril IN THE MORNING 16 g 2    SYMBICORT 160-4.5 MCG/ACT AERO inhale 2 puffs by mouth twice a day 10.2 g 2    ARIPiprazole (ABILIFY) 10 MG tablet take 1 tablet by mouth every morning 30 tablet 5    PROAIR  (90 Base) MCG/ACT inhaler Inhale one puff into the lungs q6 hr PRN for SOB (4 puffs/day) 18 g 2    Lancets Misc. (ACCU-CHEK SOFTCLIX LANCET

## 2023-10-13 ENCOUNTER — TELEPHONE (OUTPATIENT)
Dept: INTERNAL MEDICINE | Age: 33
End: 2023-10-13

## 2023-10-13 NOTE — TELEPHONE ENCOUNTER
Received triage call from [facility] (KARLY), pt was calling to cancel and reschedule her acute care appt this afternoon. Pt states she stepped on glass \"some time ago\" and that it is still in her foot. Pt reporting now that her foot is swollen and painful. Writer unsure why she was not directed to go to urgent care or ED first as the glass needs to be removed and pt should have testing done to check for infection. Pt is also diabetic. Writer strenuously recommended pt report this evening or tomorrow at latest to the ED for evaluation of retained glass in foot. Pt verbalized understanding, agreeable to writer reaching out Monday to check on pt and schedule for ED f/u visit.

## 2023-10-16 NOTE — TELEPHONE ENCOUNTER
Writer reviewed chart to see if pt went to ED as advised, pt reported to [facility] and was told to call Podiatry and her PCP for f/u appts. Writer then noted that pt established care with Dr. Octavio Braga in January of 2023. PC to pt to remind her she established care elsewhere and that she needs to contact their office for f/u visit, no answer. Unable to [facility] mailbox not set up. Pt returned call, she wants to re-establish care in our office, pt scheduled for soonest available NP appt in afternoon, pt advised to call podiatry referral from ED to get an appt scheduled as well - pt verbalized understanding.

## 2023-11-25 DIAGNOSIS — E11.9 TYPE 2 DIABETES MELLITUS WITHOUT COMPLICATION, WITHOUT LONG-TERM CURRENT USE OF INSULIN (HCC): ICD-10-CM

## 2023-11-27 RX ORDER — DULAGLUTIDE 1.5 MG/.5ML
INJECTION, SOLUTION SUBCUTANEOUS
Qty: 2 ML | Refills: 2 | Status: ON HOLD | OUTPATIENT
Start: 2023-11-27

## 2023-11-27 NOTE — TELEPHONE ENCOUNTER
Last OV: 1/18/2023    Next scheduled apt: Visit date not found      Patient will need to reschedule office visit for medication refill.

## 2023-11-30 DIAGNOSIS — F31.9 BIPOLAR DEPRESSION (HCC): ICD-10-CM

## 2023-11-30 DIAGNOSIS — F32.89 OTHER DEPRESSION: ICD-10-CM

## 2023-11-30 RX ORDER — BUPROPION HYDROCHLORIDE 150 MG/1
TABLET ORAL
Qty: 30 TABLET | Refills: 1 | OUTPATIENT
Start: 2023-11-30

## 2023-12-01 ENCOUNTER — APPOINTMENT (OUTPATIENT)
Dept: CT IMAGING | Age: 33
DRG: 193 | End: 2023-12-01
Payer: COMMERCIAL

## 2023-12-01 ENCOUNTER — APPOINTMENT (OUTPATIENT)
Dept: GENERAL RADIOLOGY | Age: 33
DRG: 193 | End: 2023-12-01
Attending: EMERGENCY MEDICINE
Payer: COMMERCIAL

## 2023-12-01 ENCOUNTER — HOSPITAL ENCOUNTER (INPATIENT)
Age: 33
LOS: 4 days | Discharge: HOME | DRG: 193 | End: 2023-12-05
Attending: EMERGENCY MEDICINE | Admitting: INTERNAL MEDICINE
Payer: COMMERCIAL

## 2023-12-01 DIAGNOSIS — J45.901 EXACERBATION OF ASTHMA, UNSPECIFIED ASTHMA SEVERITY, UNSPECIFIED WHETHER PERSISTENT: Primary | ICD-10-CM

## 2023-12-01 DIAGNOSIS — J18.9 PNEUMONIA DUE TO INFECTIOUS ORGANISM, UNSPECIFIED LATERALITY, UNSPECIFIED PART OF LUNG: ICD-10-CM

## 2023-12-01 LAB
ABSOLUTE BANDS #: 0.44 K/UL (ref 0–1)
ALBUMIN SERPL-MCNC: 3.3 G/DL (ref 3.5–5.2)
ALP SERPL-CCNC: 118 U/L (ref 35–104)
ALT SERPL-CCNC: 21 U/L (ref 5–33)
ANION GAP SERPL CALCULATED.3IONS-SCNC: 10 MMOL/L (ref 9–17)
ARTERIAL PATENCY WRIST A: ABNORMAL
AST SERPL-CCNC: 24 U/L
BANDS: 3 % (ref 0–10)
BASOPHILS # BLD: 0 K/UL (ref 0–0.2)
BASOPHILS NFR BLD: 0 % (ref 0–2)
BDY SITE: ABNORMAL
BILIRUB SERPL-MCNC: 0.5 MG/DL (ref 0.3–1.2)
BNP SERPL-MCNC: 37 PG/ML
BODY TEMPERATURE: 37
BUN SERPL-MCNC: 10 MG/DL (ref 6–20)
CALCIUM SERPL-MCNC: 9 MG/DL (ref 8.6–10.4)
CHLORIDE SERPL-SCNC: 101 MMOL/L (ref 98–107)
CO2 SERPL-SCNC: 24 MMOL/L (ref 20–31)
COHGB MFR BLD: 4.4 % (ref 0–5)
CREAT SERPL-MCNC: 0.8 MG/DL (ref 0.5–0.9)
EOSINOPHIL # BLD: 0 K/UL (ref 0–0.4)
EOSINOPHILS RELATIVE PERCENT: 0 % (ref 0–4)
ERYTHROCYTE [DISTWIDTH] IN BLOOD BY AUTOMATED COUNT: 13.8 % (ref 11.5–14.9)
FLUAV RNA RESP QL NAA+PROBE: NOT DETECTED
FLUBV RNA RESP QL NAA+PROBE: NOT DETECTED
GFR SERPL CREATININE-BSD FRML MDRD: >60 ML/MIN/1.73M2
GLUCOSE SERPL-MCNC: 128 MG/DL (ref 70–99)
HCO3 VENOUS: 26.2 MMOL/L (ref 24–30)
HCT VFR BLD AUTO: 37.8 % (ref 36–46)
HGB BLD-MCNC: 12.2 G/DL (ref 12–16)
INR PPP: 1.1
LIPASE SERPL-CCNC: 16 U/L (ref 13–60)
LYMPHOCYTES NFR BLD: 0.89 K/UL (ref 1–4.8)
LYMPHOCYTES RELATIVE PERCENT: 6 % (ref 24–44)
MAGNESIUM SERPL-MCNC: 2.1 MG/DL (ref 1.6–2.6)
MCH RBC QN AUTO: 29.1 PG (ref 26–34)
MCHC RBC AUTO-ENTMCNC: 32.1 G/DL (ref 31–37)
MCV RBC AUTO: 90.7 FL (ref 80–100)
METHEMOGLOBIN: 0.1 % (ref 0–1.9)
MONOCYTES NFR BLD: 0.15 K/UL (ref 0.1–1.3)
MONOCYTES NFR BLD: 1 % (ref 1–7)
MORPHOLOGY: NORMAL
NEUTROPHILS NFR BLD: 90 % (ref 36–66)
NEUTS SEG NFR BLD: 13.32 K/UL (ref 1.3–9.1)
O2 SAT, VEN: 87.6 % (ref 60–85)
PCO2, VEN: 35.3 MM HG (ref 39–55)
PH VENOUS: 7.48 (ref 7.32–7.42)
PLATELET # BLD AUTO: 399 K/UL (ref 150–450)
PMV BLD AUTO: 7.7 FL (ref 6–12)
PO2, VEN: 54.5 MM HG (ref 30–50)
POSITIVE BASE EXCESS, VEN: 2.7 MMOL/L (ref 0–2)
POTASSIUM SERPL-SCNC: 4.2 MMOL/L (ref 3.7–5.3)
PROT SERPL-MCNC: 7.2 G/DL (ref 6.4–8.3)
PROTHROMBIN TIME: 14 SEC (ref 11.8–14.6)
RBC # BLD AUTO: 4.17 M/UL (ref 4–5.2)
SARS-COV-2 RNA RESP QL NAA+PROBE: NOT DETECTED
SODIUM SERPL-SCNC: 135 MMOL/L (ref 135–144)
SOURCE: NORMAL
SPECIMEN DESCRIPTION: NORMAL
TROPONIN I SERPL HS-MCNC: 6 NG/L (ref 0–14)
TROPONIN I SERPL HS-MCNC: 7 NG/L (ref 0–14)
WBC OTHER # BLD: 14.8 K/UL (ref 3.5–11)

## 2023-12-01 PROCEDURE — 94640 AIRWAY INHALATION TREATMENT: CPT

## 2023-12-01 PROCEDURE — 71260 CT THORAX DX C+: CPT

## 2023-12-01 PROCEDURE — 6370000000 HC RX 637 (ALT 250 FOR IP): Performed by: NURSE PRACTITIONER

## 2023-12-01 PROCEDURE — 84484 ASSAY OF TROPONIN QUANT: CPT

## 2023-12-01 PROCEDURE — 70450 CT HEAD/BRAIN W/O DYE: CPT

## 2023-12-01 PROCEDURE — 96374 THER/PROPH/DIAG INJ IV PUSH: CPT

## 2023-12-01 PROCEDURE — 83690 ASSAY OF LIPASE: CPT

## 2023-12-01 PROCEDURE — 71045 X-RAY EXAM CHEST 1 VIEW: CPT

## 2023-12-01 PROCEDURE — 85025 COMPLETE CBC W/AUTO DIFF WBC: CPT

## 2023-12-01 PROCEDURE — 6360000002 HC RX W HCPCS: Performed by: EMERGENCY MEDICINE

## 2023-12-01 PROCEDURE — 2580000003 HC RX 258: Performed by: EMERGENCY MEDICINE

## 2023-12-01 PROCEDURE — 96365 THER/PROPH/DIAG IV INF INIT: CPT

## 2023-12-01 PROCEDURE — 6360000004 HC RX CONTRAST MEDICATION: Performed by: EMERGENCY MEDICINE

## 2023-12-01 PROCEDURE — 6360000002 HC RX W HCPCS: Performed by: NURSE PRACTITIONER

## 2023-12-01 PROCEDURE — 99285 EMERGENCY DEPT VISIT HI MDM: CPT

## 2023-12-01 PROCEDURE — 83735 ASSAY OF MAGNESIUM: CPT

## 2023-12-01 PROCEDURE — 82800 BLOOD PH: CPT

## 2023-12-01 PROCEDURE — 2700000000 HC OXYGEN THERAPY PER DAY

## 2023-12-01 PROCEDURE — 6370000000 HC RX 637 (ALT 250 FOR IP): Performed by: EMERGENCY MEDICINE

## 2023-12-01 PROCEDURE — 93005 ELECTROCARDIOGRAM TRACING: CPT

## 2023-12-01 PROCEDURE — 85610 PROTHROMBIN TIME: CPT

## 2023-12-01 PROCEDURE — 83880 ASSAY OF NATRIURETIC PEPTIDE: CPT

## 2023-12-01 PROCEDURE — 94761 N-INVAS EAR/PLS OXIMETRY MLT: CPT

## 2023-12-01 PROCEDURE — 2580000003 HC RX 258: Performed by: NURSE PRACTITIONER

## 2023-12-01 PROCEDURE — 36415 COLL VENOUS BLD VENIPUNCTURE: CPT

## 2023-12-01 PROCEDURE — 82805 BLOOD GASES W/O2 SATURATION: CPT

## 2023-12-01 PROCEDURE — 87636 SARSCOV2 & INF A&B AMP PRB: CPT

## 2023-12-01 PROCEDURE — 2060000000 HC ICU INTERMEDIATE R&B

## 2023-12-01 PROCEDURE — 80053 COMPREHEN METABOLIC PANEL: CPT

## 2023-12-01 PROCEDURE — 96361 HYDRATE IV INFUSION ADD-ON: CPT

## 2023-12-01 RX ORDER — 0.9 % SODIUM CHLORIDE 0.9 %
100 INTRAVENOUS SOLUTION INTRAVENOUS ONCE
Status: COMPLETED | OUTPATIENT
Start: 2023-12-01 | End: 2023-12-01

## 2023-12-01 RX ORDER — IPRATROPIUM BROMIDE AND ALBUTEROL SULFATE 2.5; .5 MG/3ML; MG/3ML
1 SOLUTION RESPIRATORY (INHALATION) EVERY 4 HOURS PRN
Status: DISCONTINUED | OUTPATIENT
Start: 2023-12-01 | End: 2023-12-05

## 2023-12-01 RX ORDER — IPRATROPIUM BROMIDE AND ALBUTEROL SULFATE 2.5; .5 MG/3ML; MG/3ML
1 SOLUTION RESPIRATORY (INHALATION)
Status: DISCONTINUED | OUTPATIENT
Start: 2023-12-02 | End: 2023-12-03

## 2023-12-01 RX ORDER — ALBUTEROL SULFATE 2.5 MG/3ML
2.5 SOLUTION RESPIRATORY (INHALATION)
Status: DISCONTINUED | OUTPATIENT
Start: 2023-12-01 | End: 2023-12-05 | Stop reason: HOSPADM

## 2023-12-01 RX ORDER — ACETAMINOPHEN 325 MG/1
650 TABLET ORAL EVERY 6 HOURS PRN
Status: DISCONTINUED | OUTPATIENT
Start: 2023-12-01 | End: 2023-12-05 | Stop reason: HOSPADM

## 2023-12-01 RX ORDER — ENOXAPARIN SODIUM 100 MG/ML
30 INJECTION SUBCUTANEOUS 2 TIMES DAILY
Status: DISCONTINUED | OUTPATIENT
Start: 2023-12-01 | End: 2023-12-05 | Stop reason: HOSPADM

## 2023-12-01 RX ORDER — SODIUM CHLORIDE 0.9 % (FLUSH) 0.9 %
10 SYRINGE (ML) INJECTION PRN
Status: DISCONTINUED | OUTPATIENT
Start: 2023-12-01 | End: 2023-12-05 | Stop reason: HOSPADM

## 2023-12-01 RX ORDER — GUAIFENESIN 600 MG/1
1200 TABLET, EXTENDED RELEASE ORAL 2 TIMES DAILY
Status: DISCONTINUED | OUTPATIENT
Start: 2023-12-01 | End: 2023-12-05 | Stop reason: HOSPADM

## 2023-12-01 RX ORDER — IPRATROPIUM BROMIDE AND ALBUTEROL SULFATE 2.5; .5 MG/3ML; MG/3ML
1 SOLUTION RESPIRATORY (INHALATION)
Status: DISCONTINUED | OUTPATIENT
Start: 2023-12-02 | End: 2023-12-01

## 2023-12-01 RX ORDER — ONDANSETRON 2 MG/ML
4 INJECTION INTRAMUSCULAR; INTRAVENOUS EVERY 6 HOURS PRN
Status: DISCONTINUED | OUTPATIENT
Start: 2023-12-01 | End: 2023-12-05 | Stop reason: HOSPADM

## 2023-12-01 RX ORDER — ACETAMINOPHEN 650 MG/1
650 SUPPOSITORY RECTAL EVERY 6 HOURS PRN
Status: DISCONTINUED | OUTPATIENT
Start: 2023-12-01 | End: 2023-12-05 | Stop reason: HOSPADM

## 2023-12-01 RX ORDER — SODIUM CHLORIDE 0.9 % (FLUSH) 0.9 %
5-40 SYRINGE (ML) INJECTION EVERY 12 HOURS SCHEDULED
Status: DISCONTINUED | OUTPATIENT
Start: 2023-12-01 | End: 2023-12-05 | Stop reason: HOSPADM

## 2023-12-01 RX ORDER — SODIUM CHLORIDE 9 MG/ML
INJECTION, SOLUTION INTRAVENOUS PRN
Status: DISCONTINUED | OUTPATIENT
Start: 2023-12-01 | End: 2023-12-05 | Stop reason: HOSPADM

## 2023-12-01 RX ORDER — ONDANSETRON 4 MG/1
4 TABLET, ORALLY DISINTEGRATING ORAL EVERY 8 HOURS PRN
Status: DISCONTINUED | OUTPATIENT
Start: 2023-12-01 | End: 2023-12-05 | Stop reason: HOSPADM

## 2023-12-01 RX ORDER — SODIUM CHLORIDE 9 MG/ML
INJECTION, SOLUTION INTRAVENOUS CONTINUOUS
Status: DISCONTINUED | OUTPATIENT
Start: 2023-12-01 | End: 2023-12-04

## 2023-12-01 RX ADMIN — ENOXAPARIN SODIUM 30 MG: 100 INJECTION SUBCUTANEOUS at 21:07

## 2023-12-01 RX ADMIN — SODIUM CHLORIDE 100 ML: 9 INJECTION, SOLUTION INTRAVENOUS at 17:04

## 2023-12-01 RX ADMIN — SODIUM CHLORIDE, PRESERVATIVE FREE 10 ML: 5 INJECTION INTRAVENOUS at 17:03

## 2023-12-01 RX ADMIN — GUAIFENESIN 1200 MG: 600 TABLET, EXTENDED RELEASE ORAL at 21:09

## 2023-12-01 RX ADMIN — IPRATROPIUM BROMIDE AND ALBUTEROL SULFATE 1 DOSE: 2.5; .5 SOLUTION RESPIRATORY (INHALATION) at 21:39

## 2023-12-01 RX ADMIN — SODIUM CHLORIDE, PRESERVATIVE FREE 10 ML: 5 INJECTION INTRAVENOUS at 21:11

## 2023-12-01 RX ADMIN — IOPAMIDOL 75 ML: 755 INJECTION, SOLUTION INTRAVENOUS at 17:03

## 2023-12-01 RX ADMIN — WATER 60 MG: 1 INJECTION INTRAMUSCULAR; INTRAVENOUS; SUBCUTANEOUS at 21:07

## 2023-12-01 RX ADMIN — AZITHROMYCIN MONOHYDRATE 500 MG: 500 INJECTION, POWDER, LYOPHILIZED, FOR SOLUTION INTRAVENOUS at 19:40

## 2023-12-01 RX ADMIN — WATER 125 MG: 1 INJECTION INTRAMUSCULAR; INTRAVENOUS; SUBCUTANEOUS at 16:18

## 2023-12-01 RX ADMIN — SODIUM CHLORIDE: 9 INJECTION, SOLUTION INTRAVENOUS at 21:15

## 2023-12-01 RX ADMIN — IPRATROPIUM BROMIDE AND ALBUTEROL SULFATE 1 DOSE: 2.5; .5 SOLUTION RESPIRATORY (INHALATION) at 16:05

## 2023-12-01 RX ADMIN — CEFTRIAXONE SODIUM 1000 MG: 1 INJECTION, POWDER, FOR SOLUTION INTRAMUSCULAR; INTRAVENOUS at 18:11

## 2023-12-01 ASSESSMENT — PAIN DESCRIPTION - ORIENTATION: ORIENTATION: LEFT

## 2023-12-01 ASSESSMENT — PAIN SCALES - GENERAL
PAINLEVEL_OUTOF10: 10
PAINLEVEL_OUTOF10: 5

## 2023-12-01 ASSESSMENT — LIFESTYLE VARIABLES
HOW OFTEN DO YOU HAVE A DRINK CONTAINING ALCOHOL: MONTHLY OR LESS
HOW MANY STANDARD DRINKS CONTAINING ALCOHOL DO YOU HAVE ON A TYPICAL DAY: 1 OR 2

## 2023-12-01 ASSESSMENT — ENCOUNTER SYMPTOMS
ABDOMINAL PAIN: 0
BACK PAIN: 0
SHORTNESS OF BREATH: 1
EYE PAIN: 0
COLOR CHANGE: 0

## 2023-12-01 ASSESSMENT — PAIN - FUNCTIONAL ASSESSMENT: PAIN_FUNCTIONAL_ASSESSMENT: 0-10

## 2023-12-01 NOTE — ED TRIAGE NOTES
Mode of arrival (squad #, walk in, police, etc) : Squad        Chief complaint(s): SOB, Fatigue, L side pain        Arrival Note (brief scenario, treatment PTA, etc). : Patient states s/s began around 4 weeks ago, and patient is complaining of SOB, fatigue, and L sided rib pain. Patient was at doctor appointment at [facility] when it became noticeable that she was having SOB. Pulse OX was in high 70s, so ambulance was called. Patient currently on NC at 2L. C= \"Have you ever felt that you should Cut down on your drinking? \"  No  A= \"Have people Annoyed you by criticizing your drinking? \"  No  G= \"Have you ever felt bad or Guilty about your drinking? \"  No  E= \"Have you ever had a drink as an Eye-opener first thing in the morning to steady your nerves or to help a hangover? \"  No      Deferred []      Reason for deferring: N/A    *If yes to two or more: probable alcohol abuse. *

## 2023-12-01 NOTE — ED PROVIDER NOTES
EMERGENCY DEPARTMENT ENCOUNTER    Pt Name: Irma Beltre  MRN: [id number]  [address] [place] 1990  Date of evaluation: 12/1/23  CHIEF COMPLAINT       Chief Complaint   Patient presents with    Shortness of Breath     HISTORY OF PRESENT ILLNESS   64-year-old female presents for complaints of shortness of breath. Patient reports that she has been having shortness of breath for approximately the last 4 weeks reports is gotten worse recently. Sure was at a outpatient office visit today and was complaining of shortness of breath, reportedly her SpO2 was in the 70s and she was sent for evaluation. Patient does report some cough has a history of asthma denies any fevers or chills. She is complaining of some left-sided chest pain which she reports has been on going for the last few weeks as well. Denies any history of blood clots or cardiac history. The history is provided by the patient. REVIEW OF SYSTEMS     Review of Systems   Constitutional:  Negative for fever. HENT:  Negative for congestion and ear pain. Eyes:  Negative for pain. Respiratory:  Positive for shortness of breath. Cardiovascular:  Negative for chest pain, palpitations and leg swelling. Gastrointestinal:  Negative for abdominal pain. Genitourinary:  Negative for dysuria and flank pain. Musculoskeletal:  Negative for back pain. Skin:  Negative for color change. Neurological:  Negative for numbness and headaches. Psychiatric/Behavioral:  Negative for confusion. All other systems reviewed and are negative.     PASTMEDICAL HISTORY     Past Medical History:   Diagnosis Date    Asthma     Back pain 9/16/2014    Bipolar 1 disorder ([address])     Bipolar disorder ([address]) 12/8/2017    Depression     Diabetes mellitus ([address])     Dizziness     Fatty liver disease, nonalcoholic     Fatty liver disease, nonalcoholic     GERD (gastroesophageal reflux disease)     Obesity      Past Problem List  Patient Active Problem List   Diagnosis Code

## 2023-12-02 LAB
ANION GAP SERPL CALCULATED.3IONS-SCNC: 13 MMOL/L (ref 9–17)
B PARAP IS1001 DNA NPH QL NAA+NON-PROBE: NOT DETECTED
B PERT DNA SPEC QL NAA+PROBE: NOT DETECTED
BUN SERPL-MCNC: 11 MG/DL (ref 6–20)
C PNEUM DNA NPH QL NAA+NON-PROBE: NOT DETECTED
CALCIUM SERPL-MCNC: 9.4 MG/DL (ref 8.6–10.4)
CHLORIDE SERPL-SCNC: 99 MMOL/L (ref 98–107)
CO2 SERPL-SCNC: 23 MMOL/L (ref 20–31)
CREAT SERPL-MCNC: 0.8 MG/DL (ref 0.5–0.9)
D DIMER PPP FEU-MCNC: 0.7 UG/ML FEU (ref 0–0.59)
ERYTHROCYTE [DISTWIDTH] IN BLOOD BY AUTOMATED COUNT: 13.8 % (ref 11.5–14.9)
FLUAV RNA NPH QL NAA+NON-PROBE: NOT DETECTED
FLUBV RNA NPH QL NAA+NON-PROBE: NOT DETECTED
GFR SERPL CREATININE-BSD FRML MDRD: >60 ML/MIN/1.73M2
GLUCOSE BLD-MCNC: 312 MG/DL (ref 65–105)
GLUCOSE SERPL-MCNC: 298 MG/DL (ref 70–99)
HADV DNA NPH QL NAA+NON-PROBE: NOT DETECTED
HCOV 229E RNA NPH QL NAA+NON-PROBE: NOT DETECTED
HCOV HKU1 RNA NPH QL NAA+NON-PROBE: NOT DETECTED
HCOV NL63 RNA NPH QL NAA+NON-PROBE: NOT DETECTED
HCOV OC43 RNA NPH QL NAA+NON-PROBE: NOT DETECTED
HCT VFR BLD AUTO: 37.7 % (ref 36–46)
HGB BLD-MCNC: 12 G/DL (ref 12–16)
HMPV RNA NPH QL NAA+NON-PROBE: NOT DETECTED
HPIV1 RNA NPH QL NAA+NON-PROBE: NOT DETECTED
HPIV2 RNA NPH QL NAA+NON-PROBE: NOT DETECTED
HPIV3 RNA NPH QL NAA+NON-PROBE: NOT DETECTED
HPIV4 RNA NPH QL NAA+NON-PROBE: NOT DETECTED
M PNEUMO DNA NPH QL NAA+NON-PROBE: NOT DETECTED
MCH RBC QN AUTO: 29.1 PG (ref 26–34)
MCHC RBC AUTO-ENTMCNC: 32 G/DL (ref 31–37)
MCV RBC AUTO: 91 FL (ref 80–100)
PLATELET # BLD AUTO: 427 K/UL (ref 150–450)
PMV BLD AUTO: 8.2 FL (ref 6–12)
POTASSIUM SERPL-SCNC: 4.4 MMOL/L (ref 3.7–5.3)
RBC # BLD AUTO: 4.14 M/UL (ref 4–5.2)
RSV RNA NPH QL NAA+NON-PROBE: NOT DETECTED
RV+EV RNA NPH QL NAA+NON-PROBE: NOT DETECTED
SARS-COV-2 RNA NPH QL NAA+NON-PROBE: NOT DETECTED
SODIUM SERPL-SCNC: 135 MMOL/L (ref 135–144)
SPECIMEN DESCRIPTION: NORMAL
WBC OTHER # BLD: 10.6 K/UL (ref 3.5–11)

## 2023-12-02 PROCEDURE — 99223 1ST HOSP IP/OBS HIGH 75: CPT | Performed by: INTERNAL MEDICINE

## 2023-12-02 PROCEDURE — 5A09357 ASSISTANCE WITH RESPIRATORY VENTILATION, LESS THAN 24 CONSECUTIVE HOURS, CONTINUOUS POSITIVE AIRWAY PRESSURE: ICD-10-PCS | Performed by: INTERNAL MEDICINE

## 2023-12-02 PROCEDURE — 2060000000 HC ICU INTERMEDIATE R&B

## 2023-12-02 PROCEDURE — 6360000002 HC RX W HCPCS: Performed by: NURSE PRACTITIONER

## 2023-12-02 PROCEDURE — 94640 AIRWAY INHALATION TREATMENT: CPT

## 2023-12-02 PROCEDURE — 6370000000 HC RX 637 (ALT 250 FOR IP): Performed by: INTERNAL MEDICINE

## 2023-12-02 PROCEDURE — 2580000003 HC RX 258: Performed by: NURSE PRACTITIONER

## 2023-12-02 PROCEDURE — 94660 CPAP INITIATION&MGMT: CPT

## 2023-12-02 PROCEDURE — 6370000000 HC RX 637 (ALT 250 FOR IP): Performed by: NURSE PRACTITIONER

## 2023-12-02 PROCEDURE — 6360000002 HC RX W HCPCS: Performed by: INTERNAL MEDICINE

## 2023-12-02 PROCEDURE — 80048 BASIC METABOLIC PNL TOTAL CA: CPT

## 2023-12-02 PROCEDURE — 85027 COMPLETE CBC AUTOMATED: CPT

## 2023-12-02 PROCEDURE — 36415 COLL VENOUS BLD VENIPUNCTURE: CPT

## 2023-12-02 PROCEDURE — 82947 ASSAY GLUCOSE BLOOD QUANT: CPT

## 2023-12-02 PROCEDURE — 2580000003 HC RX 258: Performed by: INTERNAL MEDICINE

## 2023-12-02 PROCEDURE — 85379 FIBRIN DEGRADATION QUANT: CPT

## 2023-12-02 PROCEDURE — 94761 N-INVAS EAR/PLS OXIMETRY MLT: CPT

## 2023-12-02 PROCEDURE — 97162 PT EVAL MOD COMPLEX 30 MIN: CPT

## 2023-12-02 PROCEDURE — 0202U NFCT DS 22 TRGT SARS-COV-2: CPT

## 2023-12-02 RX ORDER — INSULIN LISPRO 100 [IU]/ML
0-8 INJECTION, SOLUTION INTRAVENOUS; SUBCUTANEOUS
Status: DISCONTINUED | OUTPATIENT
Start: 2023-12-02 | End: 2023-12-03

## 2023-12-02 RX ORDER — INSULIN GLARGINE 100 [IU]/ML
10 INJECTION, SOLUTION SUBCUTANEOUS NIGHTLY
Status: DISCONTINUED | OUTPATIENT
Start: 2023-12-02 | End: 2023-12-03

## 2023-12-02 RX ORDER — LANOLIN ALCOHOL/MO/W.PET/CERES
3 CREAM (GRAM) TOPICAL NIGHTLY PRN
Status: DISCONTINUED | OUTPATIENT
Start: 2023-12-02 | End: 2023-12-05 | Stop reason: HOSPADM

## 2023-12-02 RX ORDER — INSULIN LISPRO 100 [IU]/ML
0-4 INJECTION, SOLUTION INTRAVENOUS; SUBCUTANEOUS NIGHTLY
Status: DISCONTINUED | OUTPATIENT
Start: 2023-12-02 | End: 2023-12-03

## 2023-12-02 RX ORDER — BENZONATATE 200 MG/1
200 CAPSULE ORAL 3 TIMES DAILY PRN
Status: DISCONTINUED | OUTPATIENT
Start: 2023-12-02 | End: 2023-12-05 | Stop reason: HOSPADM

## 2023-12-02 RX ORDER — DEXTROSE MONOHYDRATE 100 MG/ML
INJECTION, SOLUTION INTRAVENOUS CONTINUOUS PRN
Status: DISCONTINUED | OUTPATIENT
Start: 2023-12-02 | End: 2023-12-05 | Stop reason: HOSPADM

## 2023-12-02 RX ADMIN — ENOXAPARIN SODIUM 30 MG: 100 INJECTION SUBCUTANEOUS at 07:40

## 2023-12-02 RX ADMIN — GUAIFENESIN 1200 MG: 600 TABLET, EXTENDED RELEASE ORAL at 07:40

## 2023-12-02 RX ADMIN — IPRATROPIUM BROMIDE AND ALBUTEROL SULFATE 1 DOSE: 2.5; .5 SOLUTION RESPIRATORY (INHALATION) at 00:30

## 2023-12-02 RX ADMIN — INSULIN LISPRO 6 UNITS: 100 INJECTION, SOLUTION INTRAVENOUS; SUBCUTANEOUS at 18:43

## 2023-12-02 RX ADMIN — INSULIN LISPRO 4 UNITS: 100 INJECTION, SOLUTION INTRAVENOUS; SUBCUTANEOUS at 21:50

## 2023-12-02 RX ADMIN — IPRATROPIUM BROMIDE AND ALBUTEROL SULFATE 1 DOSE: 2.5; .5 SOLUTION RESPIRATORY (INHALATION) at 18:57

## 2023-12-02 RX ADMIN — IPRATROPIUM BROMIDE AND ALBUTEROL SULFATE 1 DOSE: 2.5; .5 SOLUTION RESPIRATORY (INHALATION) at 03:50

## 2023-12-02 RX ADMIN — ACETAMINOPHEN 650 MG: 325 TABLET ORAL at 01:55

## 2023-12-02 RX ADMIN — Medication 3 MG: at 02:48

## 2023-12-02 RX ADMIN — ENOXAPARIN SODIUM 30 MG: 100 INJECTION SUBCUTANEOUS at 21:43

## 2023-12-02 RX ADMIN — CEFTRIAXONE SODIUM 1000 MG: 1 INJECTION, POWDER, FOR SOLUTION INTRAMUSCULAR; INTRAVENOUS at 17:52

## 2023-12-02 RX ADMIN — IPRATROPIUM BROMIDE AND ALBUTEROL SULFATE 1 DOSE: 2.5; .5 SOLUTION RESPIRATORY (INHALATION) at 11:19

## 2023-12-02 RX ADMIN — IPRATROPIUM BROMIDE AND ALBUTEROL SULFATE 1 DOSE: 2.5; .5 SOLUTION RESPIRATORY (INHALATION) at 23:07

## 2023-12-02 RX ADMIN — WATER 60 MG: 1 INJECTION INTRAMUSCULAR; INTRAVENOUS; SUBCUTANEOUS at 03:59

## 2023-12-02 RX ADMIN — SODIUM CHLORIDE, PRESERVATIVE FREE 10 ML: 5 INJECTION INTRAVENOUS at 07:41

## 2023-12-02 RX ADMIN — WATER 40 MG: 1 INJECTION INTRAMUSCULAR; INTRAVENOUS; SUBCUTANEOUS at 16:02

## 2023-12-02 RX ADMIN — ACETAMINOPHEN 650 MG: 325 TABLET ORAL at 07:53

## 2023-12-02 RX ADMIN — GUAIFENESIN 1200 MG: 600 TABLET, EXTENDED RELEASE ORAL at 21:43

## 2023-12-02 RX ADMIN — BENZONATATE 200 MG: 200 CAPSULE ORAL at 05:51

## 2023-12-02 RX ADMIN — IPRATROPIUM BROMIDE AND ALBUTEROL SULFATE 1 DOSE: 2.5; .5 SOLUTION RESPIRATORY (INHALATION) at 14:15

## 2023-12-02 RX ADMIN — SODIUM CHLORIDE, PRESERVATIVE FREE 10 ML: 5 INJECTION INTRAVENOUS at 19:35

## 2023-12-02 RX ADMIN — ACETAMINOPHEN 650 MG: 325 TABLET ORAL at 16:01

## 2023-12-02 RX ADMIN — WATER 60 MG: 1 INJECTION INTRAMUSCULAR; INTRAVENOUS; SUBCUTANEOUS at 07:40

## 2023-12-02 RX ADMIN — BENZONATATE 200 MG: 200 CAPSULE ORAL at 12:30

## 2023-12-02 RX ADMIN — IPRATROPIUM BROMIDE AND ALBUTEROL SULFATE 1 DOSE: 2.5; .5 SOLUTION RESPIRATORY (INHALATION) at 08:15

## 2023-12-02 RX ADMIN — INSULIN GLARGINE 10 UNITS: 100 INJECTION, SOLUTION SUBCUTANEOUS at 21:44

## 2023-12-02 RX ADMIN — WATER 40 MG: 1 INJECTION INTRAMUSCULAR; INTRAVENOUS; SUBCUTANEOUS at 21:44

## 2023-12-02 RX ADMIN — SODIUM CHLORIDE: 9 INJECTION, SOLUTION INTRAVENOUS at 10:16

## 2023-12-02 RX ADMIN — ONDANSETRON 4 MG: 2 INJECTION INTRAMUSCULAR; INTRAVENOUS at 07:53

## 2023-12-02 RX ADMIN — AZITHROMYCIN MONOHYDRATE 500 MG: 500 INJECTION, POWDER, LYOPHILIZED, FOR SOLUTION INTRAVENOUS at 18:46

## 2023-12-02 ASSESSMENT — PAIN DESCRIPTION - LOCATION
LOCATION: HEAD
LOCATION: CHEST

## 2023-12-02 ASSESSMENT — PAIN SCALES - GENERAL
PAINLEVEL_OUTOF10: 0
PAINLEVEL_OUTOF10: 2
PAINLEVEL_OUTOF10: 4
PAINLEVEL_OUTOF10: 0

## 2023-12-02 ASSESSMENT — PAIN DESCRIPTION - DESCRIPTORS
DESCRIPTORS: OTHER (COMMENT)
DESCRIPTORS: ACHING

## 2023-12-02 ASSESSMENT — PAIN - FUNCTIONAL ASSESSMENT: PAIN_FUNCTIONAL_ASSESSMENT: ACTIVITIES ARE NOT PREVENTED

## 2023-12-02 NOTE — H&P
Diagnosis Date    Asthma     Back pain 2014    Bipolar 1 disorder ([address])     Bipolar disorder ([address]) 2017    Depression     Diabetes mellitus (HCC)     Dizziness     Fatty liver disease, nonalcoholic     Fatty liver disease, nonalcoholic     GERD (gastroesophageal reflux disease)     Obesity         Past Surgical History:     Past Surgical History:   Procedure Laterality Date     SECTION      LEEP          Medications Prior to Admission:     Prior to Admission medications    Medication Sig Start Date End Date Taking?  Authorizing Provider   TRULICITY 1.5 GS/1.6GQ SC injection inject 0.5 milliliters subcutaneously every week 23   JUNE Baldwin - CNP   ondansetron (ZOFRAN-ODT) 4 MG disintegrating tablet Take 1 tablet by mouth 3 times daily as needed for Nausea or Vomiting 8/15/23   Yoly Walton MD   famotidine (PEPCID) 20 MG tablet Take 1 tablet by mouth 2 times daily 8/15/23   Yoly Walton MD   hydrOXYzine pamoate (VISTARIL) 50 MG capsule take 1 capsule by mouth once daily for sleep 23   Zack RICO DO   fluticasone Nubia Dyers) 50 MCG/ACT nasal spray instill 2 sprays into each nostril IN THE MORNING 23   Elza Bermeo DO   SYMBICORT 160-4.5 MCG/ACT AERO inhale 2 puffs by mouth twice a day 23   Milagro Medley DO   pantoprazole (PROTONIX) 40 MG tablet Take 1 tablet by mouth daily 23  Elza Bermeo DO   ARIPiprazole (ABILIFY) 10 MG tablet take 1 tablet by mouth every morning 23   Elza Bermeo DO   PROAIR  (77 Base) MCG/ACT inhaler Inhale one puff into the lungs q6 hr PRN for SOB (4 puffs/day) 3/31/23   Elza Bermeo DO   Lancets Misc. (ACCU-CHEK SOFTCLIX LANCET DEV) KIT Use as directed for glucose checks  Patient not taking: Reported on 2023   Milagro Medley DO   metFORMIN (GLUCOPHAGE) 500 MG tablet Take 1 tablet by mouth 2 times daily (with meals) 23   Zack RICO, DO

## 2023-12-03 ENCOUNTER — APPOINTMENT (OUTPATIENT)
Dept: GENERAL RADIOLOGY | Age: 33
DRG: 193 | End: 2023-12-03
Payer: COMMERCIAL

## 2023-12-03 LAB
ANION GAP SERPL CALCULATED.3IONS-SCNC: 9 MMOL/L (ref 9–17)
BUN SERPL-MCNC: 14 MG/DL (ref 6–20)
CALCIUM SERPL-MCNC: 9.1 MG/DL (ref 8.6–10.4)
CHLORIDE SERPL-SCNC: 100 MMOL/L (ref 98–107)
CO2 SERPL-SCNC: 24 MMOL/L (ref 20–31)
CREAT SERPL-MCNC: 0.7 MG/DL (ref 0.5–0.9)
ERYTHROCYTE [DISTWIDTH] IN BLOOD BY AUTOMATED COUNT: 13.7 % (ref 11.5–14.9)
EST. AVERAGE GLUCOSE BLD GHB EST-MCNC: 140 MG/DL
GFR SERPL CREATININE-BSD FRML MDRD: >60 ML/MIN/1.73M2
GLUCOSE BLD-MCNC: 254 MG/DL (ref 65–105)
GLUCOSE BLD-MCNC: 322 MG/DL (ref 65–105)
GLUCOSE BLD-MCNC: 335 MG/DL (ref 65–105)
GLUCOSE BLD-MCNC: 337 MG/DL (ref 65–105)
GLUCOSE SERPL-MCNC: 321 MG/DL (ref 70–99)
HBA1C MFR BLD: 6.5 % (ref 4–6)
HCT VFR BLD AUTO: 35 % (ref 36–46)
HGB BLD-MCNC: 11.2 G/DL (ref 12–16)
MCH RBC QN AUTO: 29.2 PG (ref 26–34)
MCHC RBC AUTO-ENTMCNC: 32 G/DL (ref 31–37)
MCV RBC AUTO: 91.3 FL (ref 80–100)
PLATELET # BLD AUTO: 416 K/UL (ref 150–450)
PMV BLD AUTO: 7.8 FL (ref 6–12)
POTASSIUM SERPL-SCNC: 5.1 MMOL/L (ref 3.7–5.3)
RBC # BLD AUTO: 3.84 M/UL (ref 4–5.2)
SODIUM SERPL-SCNC: 133 MMOL/L (ref 135–144)
WBC OTHER # BLD: 18.6 K/UL (ref 3.5–11)

## 2023-12-03 PROCEDURE — 6370000000 HC RX 637 (ALT 250 FOR IP): Performed by: NURSE PRACTITIONER

## 2023-12-03 PROCEDURE — 94640 AIRWAY INHALATION TREATMENT: CPT

## 2023-12-03 PROCEDURE — 2580000003 HC RX 258: Performed by: INTERNAL MEDICINE

## 2023-12-03 PROCEDURE — 36415 COLL VENOUS BLD VENIPUNCTURE: CPT

## 2023-12-03 PROCEDURE — 83036 HEMOGLOBIN GLYCOSYLATED A1C: CPT

## 2023-12-03 PROCEDURE — 71045 X-RAY EXAM CHEST 1 VIEW: CPT

## 2023-12-03 PROCEDURE — 6360000002 HC RX W HCPCS: Performed by: INTERNAL MEDICINE

## 2023-12-03 PROCEDURE — 6360000002 HC RX W HCPCS: Performed by: NURSE PRACTITIONER

## 2023-12-03 PROCEDURE — 2060000000 HC ICU INTERMEDIATE R&B

## 2023-12-03 PROCEDURE — 97116 GAIT TRAINING THERAPY: CPT

## 2023-12-03 PROCEDURE — 80048 BASIC METABOLIC PNL TOTAL CA: CPT

## 2023-12-03 PROCEDURE — 2580000003 HC RX 258: Performed by: NURSE PRACTITIONER

## 2023-12-03 PROCEDURE — 94660 CPAP INITIATION&MGMT: CPT

## 2023-12-03 PROCEDURE — 2700000000 HC OXYGEN THERAPY PER DAY

## 2023-12-03 PROCEDURE — 85027 COMPLETE CBC AUTOMATED: CPT

## 2023-12-03 PROCEDURE — 82947 ASSAY GLUCOSE BLOOD QUANT: CPT

## 2023-12-03 PROCEDURE — 94761 N-INVAS EAR/PLS OXIMETRY MLT: CPT

## 2023-12-03 PROCEDURE — 6370000000 HC RX 637 (ALT 250 FOR IP): Performed by: INTERNAL MEDICINE

## 2023-12-03 PROCEDURE — 99233 SBSQ HOSP IP/OBS HIGH 50: CPT | Performed by: INTERNAL MEDICINE

## 2023-12-03 RX ORDER — NICOTINE 21 MG/24HR
1 PATCH, TRANSDERMAL 24 HOURS TRANSDERMAL DAILY
Status: DISCONTINUED | OUTPATIENT
Start: 2023-12-03 | End: 2023-12-05 | Stop reason: HOSPADM

## 2023-12-03 RX ORDER — ARIPIPRAZOLE 10 MG/1
10 TABLET ORAL DAILY
Status: DISCONTINUED | OUTPATIENT
Start: 2023-12-04 | End: 2023-12-05 | Stop reason: HOSPADM

## 2023-12-03 RX ORDER — INSULIN LISPRO 100 [IU]/ML
0-8 INJECTION, SOLUTION INTRAVENOUS; SUBCUTANEOUS
Status: DISCONTINUED | OUTPATIENT
Start: 2023-12-03 | End: 2023-12-05 | Stop reason: HOSPADM

## 2023-12-03 RX ORDER — HYDROXYZINE 50 MG/1
50 TABLET, FILM COATED ORAL NIGHTLY PRN
Status: DISCONTINUED | OUTPATIENT
Start: 2023-12-03 | End: 2023-12-05 | Stop reason: HOSPADM

## 2023-12-03 RX ORDER — ALBUTEROL SULFATE 2.5 MG/3ML
2.5 SOLUTION RESPIRATORY (INHALATION)
Status: DISCONTINUED | OUTPATIENT
Start: 2023-12-03 | End: 2023-12-05 | Stop reason: HOSPADM

## 2023-12-03 RX ORDER — INSULIN LISPRO 100 [IU]/ML
0-4 INJECTION, SOLUTION INTRAVENOUS; SUBCUTANEOUS NIGHTLY
Status: DISCONTINUED | OUTPATIENT
Start: 2023-12-03 | End: 2023-12-05 | Stop reason: HOSPADM

## 2023-12-03 RX ORDER — INSULIN GLARGINE 100 [IU]/ML
15 INJECTION, SOLUTION SUBCUTANEOUS NIGHTLY
Status: DISCONTINUED | OUTPATIENT
Start: 2023-12-03 | End: 2023-12-05 | Stop reason: HOSPADM

## 2023-12-03 RX ADMIN — ENOXAPARIN SODIUM 30 MG: 100 INJECTION SUBCUTANEOUS at 08:24

## 2023-12-03 RX ADMIN — WATER 40 MG: 1 INJECTION INTRAMUSCULAR; INTRAVENOUS; SUBCUTANEOUS at 10:05

## 2023-12-03 RX ADMIN — SODIUM CHLORIDE, PRESERVATIVE FREE 10 ML: 5 INJECTION INTRAVENOUS at 18:39

## 2023-12-03 RX ADMIN — CEFTRIAXONE SODIUM 1000 MG: 1 INJECTION, POWDER, FOR SOLUTION INTRAMUSCULAR; INTRAVENOUS at 17:42

## 2023-12-03 RX ADMIN — SODIUM CHLORIDE: 9 INJECTION, SOLUTION INTRAVENOUS at 02:31

## 2023-12-03 RX ADMIN — SODIUM CHLORIDE, PRESERVATIVE FREE 10 ML: 5 INJECTION INTRAVENOUS at 10:05

## 2023-12-03 RX ADMIN — ACETAMINOPHEN 650 MG: 325 TABLET ORAL at 08:16

## 2023-12-03 RX ADMIN — AZITHROMYCIN MONOHYDRATE 500 MG: 500 INJECTION, POWDER, LYOPHILIZED, FOR SOLUTION INTRAVENOUS at 18:28

## 2023-12-03 RX ADMIN — INSULIN LISPRO 4 UNITS: 100 INJECTION, SOLUTION INTRAVENOUS; SUBCUTANEOUS at 08:24

## 2023-12-03 RX ADMIN — ALBUTEROL SULFATE 2.5 MG: 2.5 SOLUTION RESPIRATORY (INHALATION) at 18:59

## 2023-12-03 RX ADMIN — WATER 40 MG: 1 INJECTION INTRAMUSCULAR; INTRAVENOUS; SUBCUTANEOUS at 18:37

## 2023-12-03 RX ADMIN — ALBUTEROL SULFATE 2.5 MG: 2.5 SOLUTION RESPIRATORY (INHALATION) at 14:47

## 2023-12-03 RX ADMIN — IPRATROPIUM BROMIDE AND ALBUTEROL SULFATE 1 DOSE: 2.5; .5 SOLUTION RESPIRATORY (INHALATION) at 10:52

## 2023-12-03 RX ADMIN — GUAIFENESIN 1200 MG: 600 TABLET, EXTENDED RELEASE ORAL at 19:45

## 2023-12-03 RX ADMIN — GUAIFENESIN 1200 MG: 600 TABLET, EXTENDED RELEASE ORAL at 08:16

## 2023-12-03 RX ADMIN — SODIUM CHLORIDE, PRESERVATIVE FREE 10 ML: 5 INJECTION INTRAVENOUS at 19:44

## 2023-12-03 RX ADMIN — INSULIN LISPRO 4 UNITS: 100 INJECTION, SOLUTION INTRAVENOUS; SUBCUTANEOUS at 20:04

## 2023-12-03 RX ADMIN — BENZONATATE 200 MG: 200 CAPSULE ORAL at 06:01

## 2023-12-03 RX ADMIN — INSULIN LISPRO 6 UNITS: 100 INJECTION, SOLUTION INTRAVENOUS; SUBCUTANEOUS at 12:50

## 2023-12-03 RX ADMIN — WATER 40 MG: 1 INJECTION INTRAMUSCULAR; INTRAVENOUS; SUBCUTANEOUS at 03:51

## 2023-12-03 RX ADMIN — BENZONATATE 200 MG: 200 CAPSULE ORAL at 22:32

## 2023-12-03 RX ADMIN — INSULIN GLARGINE 15 UNITS: 100 INJECTION, SOLUTION SUBCUTANEOUS at 21:09

## 2023-12-03 RX ADMIN — IPRATROPIUM BROMIDE AND ALBUTEROL SULFATE 1 DOSE: 2.5; .5 SOLUTION RESPIRATORY (INHALATION) at 07:11

## 2023-12-03 RX ADMIN — IPRATROPIUM BROMIDE AND ALBUTEROL SULFATE 1 DOSE: 2.5; .5 SOLUTION RESPIRATORY (INHALATION) at 03:10

## 2023-12-03 RX ADMIN — HYDROXYZINE HYDROCHLORIDE 50 MG: 50 TABLET, FILM COATED ORAL at 21:10

## 2023-12-03 RX ADMIN — INSULIN LISPRO 6 UNITS: 100 INJECTION, SOLUTION INTRAVENOUS; SUBCUTANEOUS at 17:21

## 2023-12-03 RX ADMIN — ENOXAPARIN SODIUM 30 MG: 100 INJECTION SUBCUTANEOUS at 19:44

## 2023-12-03 ASSESSMENT — PAIN SCALES - GENERAL
PAINLEVEL_OUTOF10: 0
PAINLEVEL_OUTOF10: 5
PAINLEVEL_OUTOF10: 0
PAINLEVEL_OUTOF10: 0

## 2023-12-03 ASSESSMENT — PAIN DESCRIPTION - LOCATION: LOCATION: CHEST;OTHER (COMMENT)

## 2023-12-03 ASSESSMENT — PAIN DESCRIPTION - ORIENTATION: ORIENTATION: LEFT

## 2023-12-03 ASSESSMENT — PAIN - FUNCTIONAL ASSESSMENT: PAIN_FUNCTIONAL_ASSESSMENT: ACTIVITIES ARE NOT PREVENTED

## 2023-12-03 ASSESSMENT — PAIN DESCRIPTION - DESCRIPTORS: DESCRIPTORS: OTHER (COMMENT)

## 2023-12-03 NOTE — CARE COORDINATION
Case Management Assessment  Initial Evaluation    Date/Time of Evaluation: 12/3/2023 6:10 PM  Assessment Completed by: Chema Ansari RN    If patient is discharged prior to next notation, then this note serves as note for discharge by case management. Patient Name: Milagro Carreno                   YOB: 1990  Diagnosis: Pneumonia due to infectious organism [J18.9]  Pneumonia due to infectious organism, unspecified laterality, unspecified part of lung [J18.9]  Exacerbation of asthma, unspecified asthma severity, unspecified whether persistent [J45.901]                   Date / Time: 12/1/2023  2:51 PM    Patient Admission Status: Inpatient   Readmission Risk (Low < 19, Mod (19-27), High > 27): Readmission Risk Score: 10.6    Current PCP: No primary care provider on file. PCP verified by CM? Yes    Chart Reviewed: Yes      History Provided by: Patient  Patient Orientation: Alert and Oriented    Patient Cognition: Alert    Hospitalization in the last 30 days (Readmission):  No    If yes, Readmission Assessment in CM Navigator will be completed. Advance Directives:      Code Status: Full Code   Patient's Primary Decision Maker is: Named in Scanned ACP Document    Primary Decision MakerChester Ambrosio Spouse - [phone]    Discharge Planning:    Patient lives with: Spouse/Significant Other Type of Home: House  Primary Care Giver: Self  Patient Support Systems include: Spouse/Significant Other   Current Financial resources: Medicaid  Current community resources:    Current services prior to admission: None            Current DME:              Type of Home Care services:  None    ADLS  Prior functional level: Independent in ADLs/IADLs  Current functional level: Independent in ADLs/IADLs    PT AM-PAC: 21 /24  OT AM-PAC:   /24    Family can provide assistance at DC:  Yes  Would you like Case Management to discuss the discharge plan with any other family members/significant others, and if so,

## 2023-12-03 NOTE — PLAN OF CARE
Problem: Discharge Planning  Goal: Discharge to home or other facility with appropriate resources  Outcome: Progressing  Flowsheets  Taken 12/2/2023 2000  Discharge to home or other facility with appropriate resources: Identify barriers to discharge with patient and caregiver  Note: Not yet fit for discharge. Problem: Pain  Goal: Verbalizes/displays adequate comfort level or baseline comfort level  Outcome: Progressing  Flowsheets  Taken 12/2/2023 2000  Verbalizes/displays adequate comfort level or baseline comfort level:   Encourage patient to monitor pain and request assistance   Assess pain using appropriate pain scale  Note: Assessed the patient for any complain of pain throughout the shift - no complains made. Problem: ABCDS Injury Assessment  Goal: Absence of physical injury  Outcome: Progressing  Flowsheets (Taken 12/2/2023 2000)  Absence of Physical Injury: Implement safety measures based on patient assessment     Problem: Chronic Conditions and Co-morbidities  Goal: Patient's chronic conditions and co-morbidity symptoms are monitored and maintained or improved  Outcome: Progressing  Flowsheets  Taken 12/2/2023 2000  Care Plan - Patient's Chronic Conditions and Co-Morbidity Symptoms are Monitored and Maintained or Improved:   Monitor and assess patient's chronic conditions and comorbid symptoms for stability, deterioration, or improvement   Collaborate with multidisciplinary team to address chronic and comorbid conditions and prevent exacerbation or deterioration     Problem: Safety - Adult  Goal: Free from fall injury  Outcome: Progressing  Flowsheets (Taken 12/2/2023 2000)  Free From Fall Injury: Instruct family/caregiver on patient safety  Note: Bed was locked and in lowest position. Raised side rails for safety. Call light within reach.

## 2023-12-03 NOTE — PLAN OF CARE
Problem: Discharge Planning  Goal: Discharge to home or other facility with appropriate resources  12/3/2023 1749 by Kevyn Rodriguez RN  Outcome: Progressing  Flowsheets (Taken 12/3/2023 0800)  Discharge to home or other facility with appropriate resources: Identify barriers to discharge with patient and caregiver  12/3/2023 0522 by Gracie Kelley RN  Outcome: Progressing  Flowsheets  Taken 12/3/2023 0400  Discharge to home or other facility with appropriate resources: Identify barriers to discharge with patient and caregiver  Taken 12/3/2023 0000  Discharge to home or other facility with appropriate resources: Identify barriers to discharge with patient and caregiver  Taken 12/2/2023 2000  Discharge to home or other facility with appropriate resources: Identify barriers to discharge with patient and caregiver  Note: Not yet fit for discharge.      Problem: Pain  Goal: Verbalizes/displays adequate comfort level or baseline comfort level  12/3/2023 1749 by Kevyn Rodriguez RN  Outcome: Progressing  Flowsheets (Taken 12/3/2023 0800)  Verbalizes/displays adequate comfort level or baseline comfort level:   Encourage patient to monitor pain and request assistance   Assess pain using appropriate pain scale   Administer analgesics based on type and severity of pain and evaluate response   Implement non-pharmacological measures as appropriate and evaluate response   Consider cultural and social influences on pain and pain management   Notify Licensed Independent Practitioner if interventions unsuccessful or patient reports new pain  12/3/2023 0522 by Gracie Kelley RN  Outcome: Progressing  Flowsheets  Taken 12/3/2023 0400  Verbalizes/displays adequate comfort level or baseline comfort level:   Encourage patient to monitor pain and request assistance   Assess pain using appropriate pain scale  Taken 12/3/2023 0000  Verbalizes/displays adequate comfort level or baseline comfort level:   Encourage patient to monitor

## 2023-12-04 LAB
ANION GAP SERPL CALCULATED.3IONS-SCNC: 9 MMOL/L (ref 9–17)
BUN SERPL-MCNC: 17 MG/DL (ref 6–20)
CALCIUM SERPL-MCNC: 8.8 MG/DL (ref 8.6–10.4)
CHLORIDE SERPL-SCNC: 102 MMOL/L (ref 98–107)
CO2 SERPL-SCNC: 26 MMOL/L (ref 20–31)
CREAT SERPL-MCNC: 0.7 MG/DL (ref 0.5–0.9)
ERYTHROCYTE [DISTWIDTH] IN BLOOD BY AUTOMATED COUNT: 13.9 % (ref 11.5–14.9)
GFR SERPL CREATININE-BSD FRML MDRD: >60 ML/MIN/1.73M2
GLUCOSE BLD-MCNC: 301 MG/DL (ref 65–105)
GLUCOSE BLD-MCNC: 336 MG/DL (ref 65–105)
GLUCOSE BLD-MCNC: 438 MG/DL (ref 65–105)
GLUCOSE SERPL-MCNC: 282 MG/DL (ref 70–99)
HCT VFR BLD AUTO: 34.6 % (ref 36–46)
HGB BLD-MCNC: 11.2 G/DL (ref 12–16)
MCH RBC QN AUTO: 29.5 PG (ref 26–34)
MCHC RBC AUTO-ENTMCNC: 32.3 G/DL (ref 31–37)
MCV RBC AUTO: 91.2 FL (ref 80–100)
PLATELET # BLD AUTO: 388 K/UL (ref 150–450)
PMV BLD AUTO: 8 FL (ref 6–12)
POTASSIUM SERPL-SCNC: 4.7 MMOL/L (ref 3.7–5.3)
RBC # BLD AUTO: 3.79 M/UL (ref 4–5.2)
SODIUM SERPL-SCNC: 137 MMOL/L (ref 135–144)
WBC OTHER # BLD: 14.7 K/UL (ref 3.5–11)

## 2023-12-04 PROCEDURE — 2580000003 HC RX 258: Performed by: INTERNAL MEDICINE

## 2023-12-04 PROCEDURE — 94761 N-INVAS EAR/PLS OXIMETRY MLT: CPT

## 2023-12-04 PROCEDURE — 94640 AIRWAY INHALATION TREATMENT: CPT

## 2023-12-04 PROCEDURE — 6360000002 HC RX W HCPCS: Performed by: INTERNAL MEDICINE

## 2023-12-04 PROCEDURE — 6360000002 HC RX W HCPCS: Performed by: NURSE PRACTITIONER

## 2023-12-04 PROCEDURE — 6370000000 HC RX 637 (ALT 250 FOR IP): Performed by: NURSE PRACTITIONER

## 2023-12-04 PROCEDURE — 2580000003 HC RX 258: Performed by: NURSE PRACTITIONER

## 2023-12-04 PROCEDURE — 36415 COLL VENOUS BLD VENIPUNCTURE: CPT

## 2023-12-04 PROCEDURE — 82947 ASSAY GLUCOSE BLOOD QUANT: CPT

## 2023-12-04 PROCEDURE — 80048 BASIC METABOLIC PNL TOTAL CA: CPT

## 2023-12-04 PROCEDURE — 99232 SBSQ HOSP IP/OBS MODERATE 35: CPT | Performed by: INTERNAL MEDICINE

## 2023-12-04 PROCEDURE — 85027 COMPLETE CBC AUTOMATED: CPT

## 2023-12-04 PROCEDURE — 97110 THERAPEUTIC EXERCISES: CPT

## 2023-12-04 PROCEDURE — 2700000000 HC OXYGEN THERAPY PER DAY

## 2023-12-04 PROCEDURE — 2060000000 HC ICU INTERMEDIATE R&B

## 2023-12-04 PROCEDURE — 97116 GAIT TRAINING THERAPY: CPT

## 2023-12-04 PROCEDURE — 6370000000 HC RX 637 (ALT 250 FOR IP): Performed by: INTERNAL MEDICINE

## 2023-12-04 RX ADMIN — INSULIN LISPRO 4 UNITS: 100 INJECTION, SOLUTION INTRAVENOUS; SUBCUTANEOUS at 20:23

## 2023-12-04 RX ADMIN — ALBUTEROL SULFATE 2.5 MG: 2.5 SOLUTION RESPIRATORY (INHALATION) at 19:44

## 2023-12-04 RX ADMIN — ENOXAPARIN SODIUM 30 MG: 100 INJECTION SUBCUTANEOUS at 20:22

## 2023-12-04 RX ADMIN — WATER 40 MG: 1 INJECTION INTRAMUSCULAR; INTRAVENOUS; SUBCUTANEOUS at 20:22

## 2023-12-04 RX ADMIN — AZITHROMYCIN MONOHYDRATE 500 MG: 500 INJECTION, POWDER, LYOPHILIZED, FOR SOLUTION INTRAVENOUS at 17:52

## 2023-12-04 RX ADMIN — ACETAMINOPHEN 650 MG: 325 TABLET ORAL at 06:42

## 2023-12-04 RX ADMIN — ALBUTEROL SULFATE 2.5 MG: 2.5 SOLUTION RESPIRATORY (INHALATION) at 07:10

## 2023-12-04 RX ADMIN — GUAIFENESIN 1200 MG: 600 TABLET, EXTENDED RELEASE ORAL at 20:22

## 2023-12-04 RX ADMIN — INSULIN GLARGINE 15 UNITS: 100 INJECTION, SOLUTION SUBCUTANEOUS at 20:22

## 2023-12-04 RX ADMIN — ARIPIPRAZOLE 10 MG: 10 TABLET ORAL at 09:33

## 2023-12-04 RX ADMIN — GUAIFENESIN 1200 MG: 600 TABLET, EXTENDED RELEASE ORAL at 09:28

## 2023-12-04 RX ADMIN — INSULIN LISPRO 4 UNITS: 100 INJECTION, SOLUTION INTRAVENOUS; SUBCUTANEOUS at 09:55

## 2023-12-04 RX ADMIN — WATER 40 MG: 1 INJECTION INTRAMUSCULAR; INTRAVENOUS; SUBCUTANEOUS at 09:30

## 2023-12-04 RX ADMIN — WATER 40 MG: 1 INJECTION INTRAMUSCULAR; INTRAVENOUS; SUBCUTANEOUS at 01:47

## 2023-12-04 RX ADMIN — BENZONATATE 200 MG: 200 CAPSULE ORAL at 14:18

## 2023-12-04 RX ADMIN — ALBUTEROL SULFATE 2.5 MG: 2.5 SOLUTION RESPIRATORY (INHALATION) at 15:38

## 2023-12-04 RX ADMIN — INSULIN LISPRO 8 UNITS: 100 INJECTION, SOLUTION INTRAVENOUS; SUBCUTANEOUS at 17:07

## 2023-12-04 RX ADMIN — SODIUM CHLORIDE, PRESERVATIVE FREE 10 ML: 5 INJECTION INTRAVENOUS at 09:29

## 2023-12-04 RX ADMIN — ALBUTEROL SULFATE 2.5 MG: 2.5 SOLUTION RESPIRATORY (INHALATION) at 01:12

## 2023-12-04 RX ADMIN — CEFTRIAXONE SODIUM 1000 MG: 1 INJECTION, POWDER, FOR SOLUTION INTRAMUSCULAR; INTRAVENOUS at 17:13

## 2023-12-04 RX ADMIN — INSULIN LISPRO 6 UNITS: 100 INJECTION, SOLUTION INTRAVENOUS; SUBCUTANEOUS at 12:09

## 2023-12-04 RX ADMIN — ENOXAPARIN SODIUM 30 MG: 100 INJECTION SUBCUTANEOUS at 09:29

## 2023-12-04 ASSESSMENT — PAIN SCALES - GENERAL
PAINLEVEL_OUTOF10: 0

## 2023-12-04 NOTE — PLAN OF CARE
Problem: Discharge Planning  Goal: Discharge to home or other facility with appropriate resources  12/4/2023 0503 by Julian Clinton RN  Outcome: Progressing  Flowsheets  Taken 12/4/2023 0400  Discharge to home or other facility with appropriate resources: Identify barriers to discharge with patient and caregiver  Note: Not yet fit for discharge. For possible transfer out to PCU. Problem: Pain  Goal: Verbalizes/displays adequate comfort level or baseline comfort level  12/4/2023 0503 by Julian Clinton RN  Outcome: Progressing  Flowsheets  Taken 12/4/2023 0400  Verbalizes/displays adequate comfort level or baseline comfort level:   Encourage patient to monitor pain and request assistance   Assess pain using appropriate pain scale  Note: No complain of pain throughout the shift. Problem: ABCDS Injury Assessment  Goal: Absence of physical injury  12/4/2023 0503 by Julian Clinton RN  Outcome: Progressing  Flowsheets (Taken 12/3/2023 2000)  Absence of Physical Injury: Implement safety measures based on patient assessment  Problem: Chronic Conditions and Co-morbidities  Goal: Patient's chronic conditions and co-morbidity symptoms are monitored and maintained or improved  12/4/2023 0503 by Julian Clinton RN  Outcome: Progressing  Flowsheets  Taken 12/4/2023 0400  Care Plan - Patient's Chronic Conditions and Co-Morbidity Symptoms are Monitored and Maintained or Improved:   Monitor and assess patient's chronic conditions and comorbid symptoms for stability, deterioration, or improvement   Collaborate with multidisciplinary team to address chronic and comorbid conditions and prevent exacerbation or deterioration     Problem: Safety - Adult  Goal: Free from fall injury  12/4/2023 0503 by Julian Clinton RN  Outcome: Progressing  Flowsheets (Taken 12/3/2023 2000)  Free From Fall Injury: Instruct family/caregiver on patient safety  Note: Bed was locked and in lowest position.  Raised side rails

## 2023-12-04 NOTE — CARE COORDINATION
ONGOING DISCHARGE PLAN:    Patient is alert and oriented x4. Spoke with patient regarding discharge plan and patient confirms that plan is still home. DME nebulizer (has solutions) states needs inhalers  Pulmonology consult  IV steroids 40 Q 8    95% on 6L, follow for home O2    Will continue to follow for additional discharge needs. If patient is discharged prior to next notation, then this note serves as note for discharge by case management.     Electronically signed by Juan Carlos Acharya RN on 12/4/2023 at 9:10 AM

## 2023-12-05 VITALS
HEART RATE: 86 BPM | DIASTOLIC BLOOD PRESSURE: 92 MMHG | TEMPERATURE: 98.2 F | HEIGHT: 65 IN | BODY MASS INDEX: 48.82 KG/M2 | RESPIRATION RATE: 15 BRPM | WEIGHT: 293 LBS | SYSTOLIC BLOOD PRESSURE: 139 MMHG | OXYGEN SATURATION: 90 %

## 2023-12-05 LAB
GLUCOSE BLD-MCNC: 215 MG/DL (ref 65–105)
GLUCOSE BLD-MCNC: 355 MG/DL (ref 65–105)

## 2023-12-05 PROCEDURE — 94761 N-INVAS EAR/PLS OXIMETRY MLT: CPT

## 2023-12-05 PROCEDURE — 94640 AIRWAY INHALATION TREATMENT: CPT

## 2023-12-05 PROCEDURE — 2700000000 HC OXYGEN THERAPY PER DAY

## 2023-12-05 PROCEDURE — 6370000000 HC RX 637 (ALT 250 FOR IP): Performed by: NURSE PRACTITIONER

## 2023-12-05 PROCEDURE — 6360000002 HC RX W HCPCS: Performed by: INTERNAL MEDICINE

## 2023-12-05 PROCEDURE — 2580000003 HC RX 258: Performed by: INTERNAL MEDICINE

## 2023-12-05 PROCEDURE — 6370000000 HC RX 637 (ALT 250 FOR IP): Performed by: INTERNAL MEDICINE

## 2023-12-05 PROCEDURE — 6360000002 HC RX W HCPCS: Performed by: NURSE PRACTITIONER

## 2023-12-05 PROCEDURE — 99239 HOSP IP/OBS DSCHRG MGMT >30: CPT | Performed by: INTERNAL MEDICINE

## 2023-12-05 PROCEDURE — 94664 DEMO&/EVAL PT USE INHALER: CPT

## 2023-12-05 PROCEDURE — 82947 ASSAY GLUCOSE BLOOD QUANT: CPT

## 2023-12-05 PROCEDURE — 2580000003 HC RX 258: Performed by: NURSE PRACTITIONER

## 2023-12-05 PROCEDURE — 94660 CPAP INITIATION&MGMT: CPT

## 2023-12-05 PROCEDURE — 97116 GAIT TRAINING THERAPY: CPT

## 2023-12-05 RX ORDER — PREDNISONE 20 MG/1
20 TABLET ORAL 2 TIMES DAILY
Qty: 14 TABLET | Refills: 0 | Status: SHIPPED | OUTPATIENT
Start: 2023-12-05 | End: 2023-12-12

## 2023-12-05 RX ORDER — BENZONATATE 200 MG/1
200 CAPSULE ORAL 3 TIMES DAILY PRN
Qty: 30 CAPSULE | Refills: 0 | Status: SHIPPED | OUTPATIENT
Start: 2023-12-05 | End: 2023-12-12

## 2023-12-05 RX ORDER — PREDNISONE 20 MG/1
40 TABLET ORAL DAILY
Status: DISCONTINUED | OUTPATIENT
Start: 2023-12-06 | End: 2023-12-05 | Stop reason: HOSPADM

## 2023-12-05 RX ORDER — ONDANSETRON 4 MG/1
4 TABLET, ORALLY DISINTEGRATING ORAL EVERY 8 HOURS PRN
Qty: 30 TABLET | Refills: 0 | Status: SHIPPED | OUTPATIENT
Start: 2023-12-05

## 2023-12-05 RX ADMIN — ACETAMINOPHEN 650 MG: 325 TABLET ORAL at 18:26

## 2023-12-05 RX ADMIN — CEFTRIAXONE SODIUM 1000 MG: 1 INJECTION, POWDER, FOR SOLUTION INTRAMUSCULAR; INTRAVENOUS at 18:31

## 2023-12-05 RX ADMIN — ENOXAPARIN SODIUM 30 MG: 100 INJECTION SUBCUTANEOUS at 09:03

## 2023-12-05 RX ADMIN — INSULIN LISPRO 2 UNITS: 100 INJECTION, SOLUTION INTRAVENOUS; SUBCUTANEOUS at 12:35

## 2023-12-05 RX ADMIN — ALBUTEROL SULFATE 2.5 MG: 2.5 SOLUTION RESPIRATORY (INHALATION) at 08:25

## 2023-12-05 RX ADMIN — WATER 40 MG: 1 INJECTION INTRAMUSCULAR; INTRAVENOUS; SUBCUTANEOUS at 09:02

## 2023-12-05 RX ADMIN — INSULIN LISPRO 2 UNITS: 100 INJECTION, SOLUTION INTRAVENOUS; SUBCUTANEOUS at 09:03

## 2023-12-05 RX ADMIN — ARIPIPRAZOLE 10 MG: 10 TABLET ORAL at 12:35

## 2023-12-05 RX ADMIN — SODIUM CHLORIDE, PRESERVATIVE FREE 10 ML: 5 INJECTION INTRAVENOUS at 09:03

## 2023-12-05 RX ADMIN — GUAIFENESIN 1200 MG: 600 TABLET, EXTENDED RELEASE ORAL at 09:03

## 2023-12-05 RX ADMIN — INSULIN LISPRO 8 UNITS: 100 INJECTION, SOLUTION INTRAVENOUS; SUBCUTANEOUS at 18:21

## 2023-12-05 RX ADMIN — ALBUTEROL SULFATE 2.5 MG: 2.5 SOLUTION RESPIRATORY (INHALATION) at 14:36

## 2023-12-05 RX ADMIN — ALBUTEROL SULFATE 2.5 MG: 2.5 SOLUTION RESPIRATORY (INHALATION) at 11:21

## 2023-12-05 ASSESSMENT — PAIN SCALES - GENERAL: PAINLEVEL_OUTOF10: 5

## 2023-12-05 NOTE — PLAN OF CARE
Problem: Discharge Planning  Goal: Discharge to home or other facility with appropriate resources  12/4/2023 2257 by Ronie Hammans, RN  Outcome: Progressing     Problem: Pain  Goal: Verbalizes/displays adequate comfort level or baseline comfort level  12/4/2023 2257 by Ronie Hammans, RN  Outcome: Progressing  12/4/2023 1613 by Jaycee Sorto RN  Outcome: Progressing     Problem: Safety - Adult  Goal: Free from fall injury  12/4/2023 2257 by Ronie Hammans, RN  Outcome: Progressing

## 2023-12-05 NOTE — PLAN OF CARE
Problem: Discharge Planning  Goal: Discharge to home or other facility with appropriate resources  Outcome: Progressing     Problem: Pain  Goal: Verbalizes/displays adequate comfort level or baseline comfort level  Outcome: Progressing     Problem: ABCDS Injury Assessment  Goal: Absence of physical injury  Outcome: Progressing  Flowsheets (Taken 12/5/2023 8623)  Absence of Physical Injury: Implement safety measures based on patient assessment     Problem: Safety - Adult  Goal: Free from fall injury  Outcome: Progressing

## 2023-12-05 NOTE — CARE COORDINATION
ONGOING DISCHARGE PLAN:    Patient is alert and oriented x4. Spoke with patient regarding discharge plan and patient confirms that plan is still to discharge to home with no needs    Home oxygen eval does not qualify for home oxygen    Oral steroids     Will continue to follow for additional discharge needs. If patient is discharged prior to next notation, then this note serves as note for discharge by case management.     Electronically signed by Maryellen Mercedes RN on 12/5/2023 at 3:46 PM

## 2023-12-07 LAB
EKG ATRIAL RATE: 106 BPM
EKG P AXIS: 47 DEGREES
EKG P-R INTERVAL: 140 MS
EKG Q-T INTERVAL: 360 MS
EKG QRS DURATION: 86 MS
EKG QTC CALCULATION (BAZETT): 478 MS
EKG R AXIS: 15 DEGREES
EKG T AXIS: 43 DEGREES
EKG VENTRICULAR RATE: 106 BPM

## 2023-12-12 NOTE — PROGRESS NOTES
Problem: Adult Inpatient Plan of Care  Goal: Plan of Care Review  12/12/2023 0506 by Chana Boyle LPN  Outcome: Ongoing, Progressing  12/12/2023 0505 by Chana Boyle LPN  Outcome: Ongoing, Progressing     Problem: Infection  Goal: Absence of Infection Signs and Symptoms  12/12/2023 0506 by Chana Boyle LPN  Outcome: Ongoing, Progressing  12/12/2023 0505 by Chana Boyle LPN  Outcome: Ongoing, Progressing       Problem: Skin Injury Risk Increased  Goal: Skin Health and Integrity  12/12/2023 0506 by Chana Boyle LPN  Outcome: Ongoing, Progressing  12/12/2023 0505 by Chana Boyle LPN  Outcome: Ongoing, Progressing     Problem: Fall Injury Risk  Goal: Absence of Fall and Fall-Related Injury  12/12/2023 0506 by Chana Boyle LPN  Outcome: Ongoing, Progressing  12/12/2023 0505 by Chana Boyle LPN  Outcome: Ongoing, Progressing     Problem: Pain Acute  Goal: Acceptable Pain Control and Functional Ability  12/12/2023 0506 by Chana Boyle LPN  Outcome: Ongoing, Progressing  12/12/2023 0505 by Chana Boyle LPN  Outcome: Ongoing, Progressing     Problem: Fatigue  Goal: Improved Activity Tolerance  12/12/2023 0506 by Chana Boyle LPN  Outcome: Ongoing, Progressing  12/12/2023 0505 by Chana Boyle LPN  Outcome: Ongoing, Progressing     Problem: UTI (Urinary Tract Infection)  Goal: Improved Infection Symptoms  12/12/2023 0506 by Chana Boyle LPN  Outcome: Ongoing, Progressing  12/12/2023 0505 by Chana Boyle LPN  Outcome: Ongoing, Progressing      CDU Daily Progress Note  Attending Physician       Pt Name: Lisa Mendez  MRN: [id number]  [place] 1990  Date of evaluation: 11/30/17    I performed a history and physical examination of the patient and discussed management with the resident. I reviewed the residents note and agree with the documented findings and plan of care. Any areas of disagreement are noted on the chart. I was personally present for the key portions of any procedures. I have documented in the chart those procedures where I was not present during the key portions. I have reviewed the emergency nurses triage note. I agree with the chief complaint, past medical history, past surgical history, allergies, medications, social and family history as documented unless otherwise noted below. Documentation of the HPI, Physical Exam and Medical Decision Making performed by medical students or scribes is based on my personal performance of the HPI, PE and MDM. For Physician Assistant/ Nurse Practitioner cases/documentation I have personally evaluated this patient and have completed at least one if not all key elements of the E/M (history, physical exam, and MDM). Additional findings are as noted. The Family History, Social History and Review of Systems are unchanged from the previous day. No significant events overnight. Patient with ruptured diverticulum. Transferred to medicine.     Dominic Martin MD  Attending Physician  Critical Decision Unit

## 2023-12-23 DIAGNOSIS — F32.89 OTHER DEPRESSION: ICD-10-CM

## 2023-12-23 DIAGNOSIS — J45.40 MODERATE PERSISTENT ASTHMA, UNSPECIFIED WHETHER COMPLICATED: ICD-10-CM

## 2023-12-23 DIAGNOSIS — E11.9 TYPE 2 DIABETES MELLITUS WITHOUT COMPLICATION, WITHOUT LONG-TERM CURRENT USE OF INSULIN (HCC): ICD-10-CM

## 2023-12-23 DIAGNOSIS — F31.9 BIPOLAR DEPRESSION (HCC): ICD-10-CM

## 2023-12-26 RX ORDER — ONDANSETRON 4 MG/1
TABLET, ORALLY DISINTEGRATING ORAL
Qty: 30 TABLET | Refills: 0 | OUTPATIENT
Start: 2023-12-26

## 2023-12-26 RX ORDER — BENZONATATE 200 MG/1
CAPSULE ORAL
Qty: 30 CAPSULE | Refills: 0 | OUTPATIENT
Start: 2023-12-26

## 2023-12-26 RX ORDER — PREDNISONE 20 MG/1
20 TABLET ORAL 2 TIMES DAILY
Qty: 14 TABLET | Refills: 0 | OUTPATIENT
Start: 2023-12-26 | End: 2024-01-02

## 2023-12-26 NOTE — TELEPHONE ENCOUNTER
A Refill Has Been Requested for Meño Goodman    Medication Requested  Requested Prescriptions     Pending Prescriptions Disp Refills    SYMBICORT 160-4.5 MCG/ACT AERO [Pharmacy Med Name: SYMBICORT 160-4.5 MCG INHALER] 10.2 g 2     Sig: inhale 2 puffs by mouth twice a day       Last Visit Date (If Applicable)  8/10/2022    Next Visit Date (If Applicable)  Visit date not found

## 2023-12-26 NOTE — TELEPHONE ENCOUNTER
Last OV: 1/18/2023    Next scheduled apt: Visit date not found      Surescripts requesting refill  Patient was seen in the office as new patient. No showed 3 times after visit.    Left vm to return call to office

## 2023-12-27 RX ORDER — BUPROPION HYDROCHLORIDE 150 MG/1
TABLET ORAL
Qty: 30 TABLET | Refills: 1 | OUTPATIENT
Start: 2023-12-27

## 2023-12-27 RX ORDER — BUDESONIDE AND FORMOTEROL FUMARATE DIHYDRATE 160; 4.5 UG/1; UG/1
AEROSOL RESPIRATORY (INHALATION)
Qty: 10.2 G | Refills: 2 | OUTPATIENT
Start: 2023-12-27

## 2024-01-11 ENCOUNTER — HOSPITAL ENCOUNTER (INPATIENT)
Age: 34
LOS: 2 days | Discharge: HOME | End: 2024-01-13
Attending: EMERGENCY MEDICINE | Admitting: INTERNAL MEDICINE
Payer: COMMERCIAL

## 2024-01-11 ENCOUNTER — APPOINTMENT (OUTPATIENT)
Dept: GENERAL RADIOLOGY | Age: 34
End: 2024-01-11
Payer: COMMERCIAL

## 2024-01-11 DIAGNOSIS — J45.41 MODERATE PERSISTENT ASTHMA WITH EXACERBATION: Primary | ICD-10-CM

## 2024-01-11 PROBLEM — J96.90 RESPIRATORY FAILURE (HCC): Status: ACTIVE | Noted: 2024-01-11

## 2024-01-11 LAB
ALBUMIN SERPL-MCNC: 4.1 G/DL (ref 3.5–5.2)
ALP SERPL-CCNC: 126 U/L (ref 35–104)
ALT SERPL-CCNC: 36 U/L (ref 5–33)
ANION GAP SERPL CALCULATED.3IONS-SCNC: 11 MMOL/L (ref 9–17)
ARTERIAL PATENCY WRIST A: ABNORMAL
AST SERPL-CCNC: 33 U/L
BASOPHILS # BLD: 0.1 K/UL (ref 0–0.2)
BASOPHILS NFR BLD: 1 % (ref 0–2)
BDY SITE: ABNORMAL
BILIRUB SERPL-MCNC: 0.4 MG/DL (ref 0.3–1.2)
BODY TEMPERATURE: 37
BUN SERPL-MCNC: 7 MG/DL (ref 6–20)
CALCIUM SERPL-MCNC: 9.2 MG/DL (ref 8.6–10.4)
CHLORIDE SERPL-SCNC: 103 MMOL/L (ref 98–107)
CO2 SERPL-SCNC: 25 MMOL/L (ref 20–31)
COHGB MFR BLD: 2.2 % (ref 0–5)
CREAT SERPL-MCNC: 0.7 MG/DL (ref 0.5–0.9)
EOSINOPHIL # BLD: 0.3 K/UL (ref 0–0.4)
EOSINOPHILS RELATIVE PERCENT: 3 % (ref 0–4)
ERYTHROCYTE [DISTWIDTH] IN BLOOD BY AUTOMATED COUNT: 14.1 % (ref 11.5–14.9)
FLUAV RNA RESP QL NAA+PROBE: NOT DETECTED
FLUBV RNA RESP QL NAA+PROBE: NOT DETECTED
GAS FLOW.O2 O2 DELIVERY SYS: ABNORMAL L/MIN
GFR SERPL CREATININE-BSD FRML MDRD: >60 ML/MIN/1.73M2
GLUCOSE SERPL-MCNC: 176 MG/DL (ref 70–99)
HCO3 ARTERIAL: 26.7 MMOL/L (ref 22–26)
HCT VFR BLD AUTO: 40.3 % (ref 36–46)
HGB BLD-MCNC: 13 G/DL (ref 12–16)
LACTATE BLDV-SCNC: 1.1 MMOL/L (ref 0.5–2.2)
LACTATE BLDV-SCNC: 1.2 MMOL/L (ref 0.5–2.2)
LIPASE SERPL-CCNC: 35 U/L (ref 13–60)
LYMPHOCYTES NFR BLD: 2.9 K/UL (ref 1–4.8)
LYMPHOCYTES RELATIVE PERCENT: 23 % (ref 24–44)
MCH RBC QN AUTO: 29.2 PG (ref 26–34)
MCHC RBC AUTO-ENTMCNC: 32.3 G/DL (ref 31–37)
MCV RBC AUTO: 90.6 FL (ref 80–100)
METHEMOGLOBIN: 0.2 % (ref 0–1.9)
MONOCYTES NFR BLD: 0.8 K/UL (ref 0.1–1.3)
MONOCYTES NFR BLD: 7 % (ref 1–7)
NEUTROPHILS NFR BLD: 66 % (ref 36–66)
NEUTS SEG NFR BLD: 8.2 K/UL (ref 1.3–9.1)
O2 SAT, ARTERIAL: 92.1 % (ref 95–98)
PCO2 ARTERIAL: 47.5 MMHG (ref 35–45)
PH ARTERIAL: 7.36 (ref 7.35–7.45)
PLATELET # BLD AUTO: 453 K/UL (ref 150–450)
PMV BLD AUTO: 7.8 FL (ref 6–12)
PO2 ARTERIAL: 68.1 MMHG (ref 80–100)
POSITIVE BASE EXCESS, ART: 1.2 MMOL/L (ref 0–2)
POTASSIUM SERPL-SCNC: 4 MMOL/L (ref 3.7–5.3)
PROT SERPL-MCNC: 7.8 G/DL (ref 6.4–8.3)
PT. POSITION: ABNORMAL
RBC # BLD AUTO: 4.45 M/UL (ref 4–5.2)
RESPIRATORY RATE: 20
SARS-COV-2 RNA RESP QL NAA+PROBE: NOT DETECTED
SODIUM SERPL-SCNC: 139 MMOL/L (ref 135–144)
SOURCE: NORMAL
SPECIMEN DESCRIPTION: NORMAL
TEXT FOR RESPIRATORY: ABNORMAL
TROPONIN I SERPL HS-MCNC: 8 NG/L (ref 0–14)
TROPONIN I SERPL HS-MCNC: <6 NG/L (ref 0–14)
WBC OTHER # BLD: 12.2 K/UL (ref 3.5–11)

## 2024-01-11 PROCEDURE — 6360000002 HC RX W HCPCS

## 2024-01-11 PROCEDURE — 94664 DEMO&/EVAL PT USE INHALER: CPT

## 2024-01-11 PROCEDURE — 2580000003 HC RX 258

## 2024-01-11 PROCEDURE — 36415 COLL VENOUS BLD VENIPUNCTURE: CPT

## 2024-01-11 PROCEDURE — 82805 BLOOD GASES W/O2 SATURATION: CPT

## 2024-01-11 PROCEDURE — 85025 COMPLETE CBC W/AUTO DIFF WBC: CPT

## 2024-01-11 PROCEDURE — 96375 TX/PRO/DX INJ NEW DRUG ADDON: CPT

## 2024-01-11 PROCEDURE — 94640 AIRWAY INHALATION TREATMENT: CPT

## 2024-01-11 PROCEDURE — 96367 TX/PROPH/DG ADDL SEQ IV INF: CPT

## 2024-01-11 PROCEDURE — 94761 N-INVAS EAR/PLS OXIMETRY MLT: CPT

## 2024-01-11 PROCEDURE — 99285 EMERGENCY DEPT VISIT HI MDM: CPT

## 2024-01-11 PROCEDURE — 96365 THER/PROPH/DIAG IV INF INIT: CPT

## 2024-01-11 PROCEDURE — 84484 ASSAY OF TROPONIN QUANT: CPT

## 2024-01-11 PROCEDURE — 2060000000 HC ICU INTERMEDIATE R&B

## 2024-01-11 PROCEDURE — 6370000000 HC RX 637 (ALT 250 FOR IP)

## 2024-01-11 PROCEDURE — 87636 SARSCOV2 & INF A&B AMP PRB: CPT

## 2024-01-11 PROCEDURE — 2500000003 HC RX 250 WO HCPCS

## 2024-01-11 PROCEDURE — 87040 BLOOD CULTURE FOR BACTERIA: CPT

## 2024-01-11 PROCEDURE — 80053 COMPREHEN METABOLIC PANEL: CPT

## 2024-01-11 PROCEDURE — 83690 ASSAY OF LIPASE: CPT

## 2024-01-11 PROCEDURE — 2700000000 HC OXYGEN THERAPY PER DAY

## 2024-01-11 PROCEDURE — 36600 WITHDRAWAL OF ARTERIAL BLOOD: CPT

## 2024-01-11 PROCEDURE — 93005 ELECTROCARDIOGRAM TRACING: CPT

## 2024-01-11 PROCEDURE — 71045 X-RAY EXAM CHEST 1 VIEW: CPT

## 2024-01-11 PROCEDURE — 83605 ASSAY OF LACTIC ACID: CPT

## 2024-01-11 RX ORDER — IPRATROPIUM BROMIDE AND ALBUTEROL SULFATE 2.5; .5 MG/3ML; MG/3ML
1 SOLUTION RESPIRATORY (INHALATION)
Status: DISCONTINUED | OUTPATIENT
Start: 2024-01-12 | End: 2024-01-13

## 2024-01-11 RX ORDER — IPRATROPIUM BROMIDE AND ALBUTEROL SULFATE 2.5; .5 MG/3ML; MG/3ML
1 SOLUTION RESPIRATORY (INHALATION) ONCE
Status: COMPLETED | OUTPATIENT
Start: 2024-01-11 | End: 2024-01-11

## 2024-01-11 RX ORDER — ARIPIPRAZOLE 400 MG
400 KIT INTRAMUSCULAR
COMMUNITY

## 2024-01-11 RX ORDER — LORAZEPAM 1 MG/1
1 TABLET ORAL EVERY 4 HOURS PRN
Status: DISCONTINUED | OUTPATIENT
Start: 2024-01-11 | End: 2024-01-13 | Stop reason: HOSPADM

## 2024-01-11 RX ORDER — ONDANSETRON 2 MG/ML
4 INJECTION INTRAMUSCULAR; INTRAVENOUS ONCE
Status: COMPLETED | OUTPATIENT
Start: 2024-01-11 | End: 2024-01-11

## 2024-01-11 RX ORDER — IPRATROPIUM BROMIDE AND ALBUTEROL SULFATE 2.5; .5 MG/3ML; MG/3ML
1 SOLUTION RESPIRATORY (INHALATION) ONCE
Status: DISCONTINUED | OUTPATIENT
Start: 2024-01-11 | End: 2024-01-12

## 2024-01-11 RX ORDER — ARIPIPRAZOLE 15 MG/1
7.5 TABLET ORAL DAILY
COMMUNITY

## 2024-01-11 RX ORDER — MAGNESIUM SULFATE HEPTAHYDRATE 40 MG/ML
2000 INJECTION, SOLUTION INTRAVENOUS ONCE
Status: COMPLETED | OUTPATIENT
Start: 2024-01-11 | End: 2024-01-11

## 2024-01-11 RX ORDER — IPRATROPIUM BROMIDE AND ALBUTEROL SULFATE 2.5; .5 MG/3ML; MG/3ML
1 SOLUTION RESPIRATORY (INHALATION)
Status: DISCONTINUED | OUTPATIENT
Start: 2024-01-11 | End: 2024-01-11

## 2024-01-11 RX ADMIN — MAGNESIUM SULFATE HEPTAHYDRATE 2000 MG: 40 INJECTION, SOLUTION INTRAVENOUS at 18:43

## 2024-01-11 RX ADMIN — CEFTRIAXONE SODIUM 1000 MG: 1 INJECTION, POWDER, FOR SOLUTION INTRAMUSCULAR; INTRAVENOUS at 16:31

## 2024-01-11 RX ADMIN — IPRATROPIUM BROMIDE AND ALBUTEROL SULFATE 1 DOSE: 2.5; .5 SOLUTION RESPIRATORY (INHALATION) at 19:46

## 2024-01-11 RX ADMIN — WATER 125 MG: 1 INJECTION INTRAMUSCULAR; INTRAVENOUS; SUBCUTANEOUS at 15:58

## 2024-01-11 RX ADMIN — IPRATROPIUM BROMIDE AND ALBUTEROL SULFATE 1 DOSE: 2.5; .5 SOLUTION RESPIRATORY (INHALATION) at 17:09

## 2024-01-11 RX ADMIN — ONDANSETRON 4 MG: 2 INJECTION INTRAMUSCULAR; INTRAVENOUS at 15:58

## 2024-01-11 RX ADMIN — AZITHROMYCIN MONOHYDRATE 500 MG: 500 INJECTION, POWDER, LYOPHILIZED, FOR SOLUTION INTRAVENOUS at 17:08

## 2024-01-11 RX ADMIN — IPRATROPIUM BROMIDE AND ALBUTEROL SULFATE 1 DOSE: 2.5; .5 SOLUTION RESPIRATORY (INHALATION) at 16:10

## 2024-01-11 ASSESSMENT — LIFESTYLE VARIABLES
HOW MANY STANDARD DRINKS CONTAINING ALCOHOL DO YOU HAVE ON A TYPICAL DAY: PATIENT DOES NOT DRINK
HOW OFTEN DO YOU HAVE A DRINK CONTAINING ALCOHOL: NEVER

## 2024-01-11 ASSESSMENT — ENCOUNTER SYMPTOMS
COUGH: 1
WHEEZING: 1
SHORTNESS OF BREATH: 1

## 2024-01-11 ASSESSMENT — PAIN - FUNCTIONAL ASSESSMENT: PAIN_FUNCTIONAL_ASSESSMENT: NONE - DENIES PAIN

## 2024-01-11 NOTE — ED TRIAGE NOTES
Mode of arrival (squad #, walk in, police, etc) : walk-in        Chief complaint(s): SOB, cough        Arrival Note (brief scenario, treatment PTA, etc).: patient has felt this way for a few days, does not have her inhaler anymore         C= \"Have you ever felt that you should Cut down on your drinking?\"  No  A= \"Have people Annoyed you by criticizing your drinking?\"  No  G= \"Have you ever felt bad or Guilty about your drinking?\"  No  E= \"Have you ever had a drink as an Eye-opener first thing in the morning to steady your nerves or to help a hangover?\"  No      Deferred []      Reason for deferring: N/A    *If yes to two or more: probable alcohol abuse.*

## 2024-01-11 NOTE — ED PROVIDER NOTES
[facility] ED  Emergency Department Encounter  Emergency Medicine Resident     Pt Name:Meño Goodman  MRN: [id number]  Birthdate 1990  Date of evaluation: 24  PCP:  No primary care provider on file.  Note Started: 3:52 PM EST      CHIEF COMPLAINT       Chief Complaint   Patient presents with    Shortness of Breath    Wheezing       HISTORY OF PRESENT ILLNESS  (Location/Symptom, Timing/Onset, Context/Setting, Quality, Duration, Modifying Factors, Severity.)      Meño Goodman is a 33 y.o. female who presents with shortness of breath.  States that she has a history of asthma has been out of her inhalers for years now.  States that she has had shortness of breath wheezing productive cough with yellow sputum for the past 1 week.  States that she has some chest tightness.  Reports also having some abdominal pain nausea vomiting also for 1 week.  This is more upper abdominal pain denies any vaginal bleeding discharge or urinary symptoms.    PAST MEDICAL / SURGICAL / SOCIAL / FAMILY HISTORY      has a past medical history of Asthma, Back pain, Bipolar 1 disorder (HCC), Bipolar disorder (HCC), Depression, Diabetes mellitus (HCC), Dizziness, Fatty liver disease, nonalcoholic, Fatty liver disease, nonalcoholic, GERD (gastroesophageal reflux disease), and Obesity.       has a past surgical history that includes  section and LEEP.      Social History     Socioeconomic History    Marital status:      Spouse name: Not on file    Number of children: Not on file    Years of education: Not on file    Highest education level: Not on file   Occupational History    Not on file   Tobacco Use    Smoking status: Former     Current packs/day: 0.00     Types: Cigarettes     Start date: 2000     Quit date: 2015     Years since quittin.0    Smokeless tobacco: Never   Vaping Use    Vaping Use: Never used   Substance and Sexual Activity    Alcohol use: Not Currently     Alcohol/week: 0.0

## 2024-01-12 ENCOUNTER — APPOINTMENT (OUTPATIENT)
Dept: GENERAL RADIOLOGY | Age: 34
End: 2024-01-12
Payer: COMMERCIAL

## 2024-01-12 LAB
ANION GAP SERPL CALCULATED.3IONS-SCNC: 13 MMOL/L (ref 9–17)
BASOPHILS # BLD: 0 K/UL (ref 0–0.2)
BASOPHILS NFR BLD: 0 % (ref 0–2)
BUN SERPL-MCNC: 14 MG/DL (ref 6–20)
CALCIUM SERPL-MCNC: 9.6 MG/DL (ref 8.6–10.4)
CHLORIDE SERPL-SCNC: 99 MMOL/L (ref 98–107)
CO2 SERPL-SCNC: 21 MMOL/L (ref 20–31)
CREAT SERPL-MCNC: 1 MG/DL (ref 0.5–0.9)
EKG ATRIAL RATE: 132 BPM
EKG P AXIS: 56 DEGREES
EKG P-R INTERVAL: 132 MS
EKG Q-T INTERVAL: 308 MS
EKG QRS DURATION: 80 MS
EKG QTC CALCULATION (BAZETT): 456 MS
EKG R AXIS: 5 DEGREES
EKG T AXIS: 73 DEGREES
EKG VENTRICULAR RATE: 132 BPM
EOSINOPHIL # BLD: 0 K/UL (ref 0–0.4)
EOSINOPHILS RELATIVE PERCENT: 0 % (ref 0–4)
ERYTHROCYTE [DISTWIDTH] IN BLOOD BY AUTOMATED COUNT: 14.6 % (ref 11.5–14.9)
EST. AVERAGE GLUCOSE BLD GHB EST-MCNC: 163 MG/DL
GFR SERPL CREATININE-BSD FRML MDRD: >60 ML/MIN/1.73M2
GLUCOSE BLD-MCNC: 305 MG/DL (ref 65–105)
GLUCOSE BLD-MCNC: 381 MG/DL (ref 65–105)
GLUCOSE BLD-MCNC: 416 MG/DL (ref 65–105)
GLUCOSE BLD-MCNC: 424 MG/DL (ref 65–105)
GLUCOSE BLD-MCNC: 426 MG/DL (ref 65–105)
GLUCOSE SERPL-MCNC: 481 MG/DL (ref 70–99)
HBA1C MFR BLD: 7.3 % (ref 4–6)
HCT VFR BLD AUTO: 38.6 % (ref 36–46)
HGB BLD-MCNC: 12.3 G/DL (ref 12–16)
LYMPHOCYTES NFR BLD: 0.83 K/UL (ref 1–4.8)
LYMPHOCYTES RELATIVE PERCENT: 6 % (ref 24–44)
MCH RBC QN AUTO: 29.2 PG (ref 26–34)
MCHC RBC AUTO-ENTMCNC: 32 G/DL (ref 31–37)
MCV RBC AUTO: 91.3 FL (ref 80–100)
METAMYELOCYTES ABSOLUTE COUNT: 0.14 K/UL
METAMYELOCYTES: 1 %
MONOCYTES NFR BLD: 0 % (ref 1–7)
MONOCYTES NFR BLD: 0 K/UL (ref 0.1–1.3)
MORPHOLOGY: NORMAL
NEUTROPHILS NFR BLD: 93 % (ref 36–66)
NEUTS SEG NFR BLD: 12.93 K/UL (ref 1.3–9.1)
PLATELET # BLD AUTO: 451 K/UL (ref 150–450)
PMV BLD AUTO: 8.1 FL (ref 6–12)
POTASSIUM SERPL-SCNC: 5 MMOL/L (ref 3.7–5.3)
RBC # BLD AUTO: 4.22 M/UL (ref 4–5.2)
SODIUM SERPL-SCNC: 133 MMOL/L (ref 135–144)
WBC OTHER # BLD: 13.9 K/UL (ref 3.5–11)

## 2024-01-12 PROCEDURE — 87040 BLOOD CULTURE FOR BACTERIA: CPT

## 2024-01-12 PROCEDURE — 71045 X-RAY EXAM CHEST 1 VIEW: CPT

## 2024-01-12 PROCEDURE — 94761 N-INVAS EAR/PLS OXIMETRY MLT: CPT

## 2024-01-12 PROCEDURE — 2060000000 HC ICU INTERMEDIATE R&B

## 2024-01-12 PROCEDURE — 80048 BASIC METABOLIC PNL TOTAL CA: CPT

## 2024-01-12 PROCEDURE — 6370000000 HC RX 637 (ALT 250 FOR IP): Performed by: NURSE PRACTITIONER

## 2024-01-12 PROCEDURE — 6360000002 HC RX W HCPCS

## 2024-01-12 PROCEDURE — 83036 HEMOGLOBIN GLYCOSYLATED A1C: CPT

## 2024-01-12 PROCEDURE — 2700000000 HC OXYGEN THERAPY PER DAY

## 2024-01-12 PROCEDURE — 6370000000 HC RX 637 (ALT 250 FOR IP)

## 2024-01-12 PROCEDURE — 82947 ASSAY GLUCOSE BLOOD QUANT: CPT

## 2024-01-12 PROCEDURE — 6370000000 HC RX 637 (ALT 250 FOR IP): Performed by: INTERNAL MEDICINE

## 2024-01-12 PROCEDURE — 2580000003 HC RX 258

## 2024-01-12 PROCEDURE — 36415 COLL VENOUS BLD VENIPUNCTURE: CPT

## 2024-01-12 PROCEDURE — 85025 COMPLETE CBC W/AUTO DIFF WBC: CPT

## 2024-01-12 PROCEDURE — 93010 ELECTROCARDIOGRAM REPORT: CPT | Performed by: INTERNAL MEDICINE

## 2024-01-12 PROCEDURE — 94640 AIRWAY INHALATION TREATMENT: CPT

## 2024-01-12 PROCEDURE — 94664 DEMO&/EVAL PT USE INHALER: CPT

## 2024-01-12 RX ORDER — ACETAMINOPHEN 325 MG/1
650 TABLET ORAL EVERY 6 HOURS PRN
Status: DISCONTINUED | OUTPATIENT
Start: 2024-01-12 | End: 2024-01-13 | Stop reason: HOSPADM

## 2024-01-12 RX ORDER — INSULIN GLARGINE 100 [IU]/ML
0.15 INJECTION, SOLUTION SUBCUTANEOUS NIGHTLY
Status: DISCONTINUED | OUTPATIENT
Start: 2024-01-12 | End: 2024-01-13 | Stop reason: HOSPADM

## 2024-01-12 RX ORDER — ONDANSETRON 4 MG/1
4 TABLET, ORALLY DISINTEGRATING ORAL EVERY 8 HOURS PRN
Status: DISCONTINUED | OUTPATIENT
Start: 2024-01-12 | End: 2024-01-13 | Stop reason: HOSPADM

## 2024-01-12 RX ORDER — INSULIN LISPRO 100 [IU]/ML
0-4 INJECTION, SOLUTION INTRAVENOUS; SUBCUTANEOUS
Status: DISCONTINUED | OUTPATIENT
Start: 2024-01-12 | End: 2024-01-12

## 2024-01-12 RX ORDER — POTASSIUM CHLORIDE 7.45 MG/ML
10 INJECTION INTRAVENOUS PRN
Status: DISCONTINUED | OUTPATIENT
Start: 2024-01-12 | End: 2024-01-13 | Stop reason: HOSPADM

## 2024-01-12 RX ORDER — SODIUM CHLORIDE 0.9 % (FLUSH) 0.9 %
5-40 SYRINGE (ML) INJECTION EVERY 12 HOURS SCHEDULED
Status: DISCONTINUED | OUTPATIENT
Start: 2024-01-12 | End: 2024-01-13 | Stop reason: HOSPADM

## 2024-01-12 RX ORDER — ACETAMINOPHEN 650 MG/1
650 SUPPOSITORY RECTAL EVERY 6 HOURS PRN
Status: DISCONTINUED | OUTPATIENT
Start: 2024-01-12 | End: 2024-01-13 | Stop reason: HOSPADM

## 2024-01-12 RX ORDER — BUDESONIDE AND FORMOTEROL FUMARATE DIHYDRATE 160; 4.5 UG/1; UG/1
2 AEROSOL RESPIRATORY (INHALATION)
Status: DISCONTINUED | OUTPATIENT
Start: 2024-01-12 | End: 2024-01-13 | Stop reason: HOSPADM

## 2024-01-12 RX ORDER — FLUTICASONE PROPIONATE 50 MCG
2 SPRAY, SUSPENSION (ML) NASAL DAILY
Status: DISCONTINUED | OUTPATIENT
Start: 2024-01-12 | End: 2024-01-13 | Stop reason: HOSPADM

## 2024-01-12 RX ORDER — MAGNESIUM SULFATE HEPTAHYDRATE 40 MG/ML
2000 INJECTION, SOLUTION INTRAVENOUS PRN
Status: DISCONTINUED | OUTPATIENT
Start: 2024-01-12 | End: 2024-01-13 | Stop reason: HOSPADM

## 2024-01-12 RX ORDER — INSULIN LISPRO 100 [IU]/ML
5 INJECTION, SOLUTION INTRAVENOUS; SUBCUTANEOUS ONCE
Status: COMPLETED | OUTPATIENT
Start: 2024-01-12 | End: 2024-01-12

## 2024-01-12 RX ORDER — FAMOTIDINE 20 MG/1
20 TABLET, FILM COATED ORAL 2 TIMES DAILY
Status: DISCONTINUED | OUTPATIENT
Start: 2024-01-12 | End: 2024-01-13 | Stop reason: HOSPADM

## 2024-01-12 RX ORDER — INSULIN LISPRO 100 [IU]/ML
0-4 INJECTION, SOLUTION INTRAVENOUS; SUBCUTANEOUS NIGHTLY
Status: DISCONTINUED | OUTPATIENT
Start: 2024-01-12 | End: 2024-01-12 | Stop reason: SDUPTHER

## 2024-01-12 RX ORDER — INSULIN LISPRO 100 [IU]/ML
0-16 INJECTION, SOLUTION INTRAVENOUS; SUBCUTANEOUS
Status: DISCONTINUED | OUTPATIENT
Start: 2024-01-12 | End: 2024-01-13 | Stop reason: HOSPADM

## 2024-01-12 RX ORDER — INSULIN LISPRO 100 [IU]/ML
4 INJECTION, SOLUTION INTRAVENOUS; SUBCUTANEOUS ONCE
Status: COMPLETED | OUTPATIENT
Start: 2024-01-12 | End: 2024-01-12

## 2024-01-12 RX ORDER — HYDROXYZINE 50 MG/1
50 TABLET, FILM COATED ORAL NIGHTLY PRN
Status: DISCONTINUED | OUTPATIENT
Start: 2024-01-12 | End: 2024-01-13 | Stop reason: HOSPADM

## 2024-01-12 RX ORDER — ALBUTEROL SULFATE 90 UG/1
2 AEROSOL, METERED RESPIRATORY (INHALATION) EVERY 4 HOURS PRN
Status: DISCONTINUED | OUTPATIENT
Start: 2024-01-12 | End: 2024-01-13 | Stop reason: HOSPADM

## 2024-01-12 RX ORDER — SODIUM CHLORIDE 9 MG/ML
INJECTION, SOLUTION INTRAVENOUS PRN
Status: DISCONTINUED | OUTPATIENT
Start: 2024-01-12 | End: 2024-01-13 | Stop reason: HOSPADM

## 2024-01-12 RX ORDER — DEXTROSE MONOHYDRATE 100 MG/ML
INJECTION, SOLUTION INTRAVENOUS CONTINUOUS PRN
Status: DISCONTINUED | OUTPATIENT
Start: 2024-01-12 | End: 2024-01-13 | Stop reason: HOSPADM

## 2024-01-12 RX ORDER — INSULIN LISPRO 100 [IU]/ML
0-4 INJECTION, SOLUTION INTRAVENOUS; SUBCUTANEOUS NIGHTLY
Status: DISCONTINUED | OUTPATIENT
Start: 2024-01-12 | End: 2024-01-13 | Stop reason: HOSPADM

## 2024-01-12 RX ORDER — SODIUM CHLORIDE 0.9 % (FLUSH) 0.9 %
10 SYRINGE (ML) INJECTION PRN
Status: DISCONTINUED | OUTPATIENT
Start: 2024-01-12 | End: 2024-01-13 | Stop reason: HOSPADM

## 2024-01-12 RX ORDER — IPRATROPIUM BROMIDE AND ALBUTEROL SULFATE 2.5; .5 MG/3ML; MG/3ML
1 SOLUTION RESPIRATORY (INHALATION) ONCE
Status: COMPLETED | OUTPATIENT
Start: 2024-01-12 | End: 2024-01-12

## 2024-01-12 RX ORDER — ENOXAPARIN SODIUM 100 MG/ML
30 INJECTION SUBCUTANEOUS 2 TIMES DAILY
Status: DISCONTINUED | OUTPATIENT
Start: 2024-01-12 | End: 2024-01-13 | Stop reason: HOSPADM

## 2024-01-12 RX ORDER — ARIPIPRAZOLE 15 MG/1
7.5 TABLET ORAL DAILY
Status: DISCONTINUED | OUTPATIENT
Start: 2024-01-12 | End: 2024-01-13 | Stop reason: HOSPADM

## 2024-01-12 RX ORDER — POTASSIUM CHLORIDE 20 MEQ/1
40 TABLET, EXTENDED RELEASE ORAL PRN
Status: DISCONTINUED | OUTPATIENT
Start: 2024-01-12 | End: 2024-01-13 | Stop reason: HOSPADM

## 2024-01-12 RX ADMIN — SODIUM CHLORIDE, PRESERVATIVE FREE 10 ML: 5 INJECTION INTRAVENOUS at 21:57

## 2024-01-12 RX ADMIN — INSULIN LISPRO 4 UNITS: 100 INJECTION, SOLUTION INTRAVENOUS; SUBCUTANEOUS at 12:13

## 2024-01-12 RX ADMIN — WATER 60 MG: 1 INJECTION INTRAMUSCULAR; INTRAVENOUS; SUBCUTANEOUS at 07:48

## 2024-01-12 RX ADMIN — INSULIN LISPRO 5 UNITS: 100 INJECTION, SOLUTION INTRAVENOUS; SUBCUTANEOUS at 05:02

## 2024-01-12 RX ADMIN — FLUTICASONE PROPIONATE 2 SPRAY: 50 SPRAY, METERED NASAL at 07:56

## 2024-01-12 RX ADMIN — BUDESONIDE AND FORMOTEROL FUMARATE DIHYDRATE 2 PUFF: 160; 4.5 AEROSOL RESPIRATORY (INHALATION) at 07:23

## 2024-01-12 RX ADMIN — IPRATROPIUM BROMIDE AND ALBUTEROL SULFATE 1 DOSE: 2.5; .5 SOLUTION RESPIRATORY (INHALATION) at 15:43

## 2024-01-12 RX ADMIN — IPRATROPIUM BROMIDE AND ALBUTEROL SULFATE 1 DOSE: 2.5; .5 SOLUTION RESPIRATORY (INHALATION) at 03:56

## 2024-01-12 RX ADMIN — IPRATROPIUM BROMIDE AND ALBUTEROL SULFATE 1 DOSE: 2.5; .5 SOLUTION RESPIRATORY (INHALATION) at 07:18

## 2024-01-12 RX ADMIN — SODIUM CHLORIDE, PRESERVATIVE FREE 10 ML: 5 INJECTION INTRAVENOUS at 07:49

## 2024-01-12 RX ADMIN — ENOXAPARIN SODIUM 30 MG: 100 INJECTION SUBCUTANEOUS at 07:47

## 2024-01-12 RX ADMIN — WATER 60 MG: 1 INJECTION INTRAMUSCULAR; INTRAVENOUS; SUBCUTANEOUS at 17:09

## 2024-01-12 RX ADMIN — CEFTRIAXONE SODIUM 1000 MG: 1 INJECTION, POWDER, FOR SOLUTION INTRAMUSCULAR; INTRAVENOUS at 17:29

## 2024-01-12 RX ADMIN — IPRATROPIUM BROMIDE AND ALBUTEROL SULFATE 1 DOSE: 2.5; .5 SOLUTION RESPIRATORY (INHALATION) at 19:11

## 2024-01-12 RX ADMIN — FAMOTIDINE 20 MG: 20 TABLET, FILM COATED ORAL at 21:57

## 2024-01-12 RX ADMIN — FAMOTIDINE 20 MG: 20 TABLET, FILM COATED ORAL at 07:47

## 2024-01-12 RX ADMIN — INSULIN LISPRO 4 UNITS: 100 INJECTION, SOLUTION INTRAVENOUS; SUBCUTANEOUS at 21:56

## 2024-01-12 RX ADMIN — INSULIN LISPRO 4 UNITS: 100 INJECTION, SOLUTION INTRAVENOUS; SUBCUTANEOUS at 21:58

## 2024-01-12 RX ADMIN — BUDESONIDE AND FORMOTEROL FUMARATE DIHYDRATE 2 PUFF: 160; 4.5 AEROSOL RESPIRATORY (INHALATION) at 19:11

## 2024-01-12 RX ADMIN — INSULIN LISPRO 16 UNITS: 100 INJECTION, SOLUTION INTRAVENOUS; SUBCUTANEOUS at 17:10

## 2024-01-12 RX ADMIN — WATER 60 MG: 1 INJECTION INTRAMUSCULAR; INTRAVENOUS; SUBCUTANEOUS at 21:56

## 2024-01-12 RX ADMIN — ENOXAPARIN SODIUM 30 MG: 100 INJECTION SUBCUTANEOUS at 21:57

## 2024-01-12 RX ADMIN — ACETAMINOPHEN 650 MG: 325 TABLET ORAL at 21:56

## 2024-01-12 RX ADMIN — AZITHROMYCIN MONOHYDRATE 500 MG: 500 INJECTION, POWDER, LYOPHILIZED, FOR SOLUTION INTRAVENOUS at 18:25

## 2024-01-12 RX ADMIN — INSULIN GLARGINE 22 UNITS: 100 INJECTION, SOLUTION SUBCUTANEOUS at 21:57

## 2024-01-12 RX ADMIN — IPRATROPIUM BROMIDE AND ALBUTEROL SULFATE 1 DOSE: 2.5; .5 SOLUTION RESPIRATORY (INHALATION) at 11:01

## 2024-01-12 RX ADMIN — ARIPIPRAZOLE 7.5 MG: 15 TABLET ORAL at 07:56

## 2024-01-12 RX ADMIN — WATER 60 MG: 1 INJECTION INTRAMUSCULAR; INTRAVENOUS; SUBCUTANEOUS at 03:58

## 2024-01-12 RX ADMIN — INSULIN LISPRO 3 UNITS: 100 INJECTION, SOLUTION INTRAVENOUS; SUBCUTANEOUS at 07:48

## 2024-01-12 ASSESSMENT — LIFESTYLE VARIABLES
HOW OFTEN DO YOU HAVE A DRINK CONTAINING ALCOHOL: NEVER
HOW MANY STANDARD DRINKS CONTAINING ALCOHOL DO YOU HAVE ON A TYPICAL DAY: PATIENT DOES NOT DRINK

## 2024-01-12 ASSESSMENT — PAIN DESCRIPTION - DESCRIPTORS: DESCRIPTORS: ACHING;POUNDING

## 2024-01-12 ASSESSMENT — PAIN SCALES - GENERAL
PAINLEVEL_OUTOF10: 0
PAINLEVEL_OUTOF10: 6

## 2024-01-12 ASSESSMENT — PAIN DESCRIPTION - LOCATION: LOCATION: HEAD

## 2024-01-12 NOTE — CARE COORDINATION
Case Management Assessment  Initial Evaluation    Date/Time of Evaluation: 1/12/2024 12:58 PM  Assessment Completed by: Glo Boyd RN    If patient is discharged prior to next notation, then this note serves as note for discharge by case management.    Patient Name: Meño Goodman                   YOB: 1990  Diagnosis: Respiratory failure (HCC) [J96.90]  Moderate persistent asthma with exacerbation [J45.41]                   Date / Time: 1/11/2024  3:43 PM    Patient Admission Status: Inpatient   Readmission Risk (Low < 19, Mod (19-27), High > 27): Readmission Risk Score: 15.1    Current PCP: No primary care provider on file.  PCP verified by ? Yes (None at the Present, Will set her up / [facility])    Chart Reviewed: Yes      History Provided by: Patient  Patient Orientation: Alert and Oriented    Patient Cognition: Alert    Hospitalization in the last 30 days (Readmission):  No    If yes, Readmission Assessment in CM Navigator will be completed.    Advance Directives:      Code Status: Full Code   Patient's Primary Decision Maker is: Legal Next of Kin    Primary Decision Maker: Russ Goodman - Spouse - [phone]    Discharge Planning:    Patient lives with: Spouse/Significant Other Type of Home: House  Primary Care Giver: Self  Patient Support Systems include: Spouse/Significant Other, Family Members   Current Financial resources: Medicaid  Current community resources: None  Current services prior to admission: Durable Medical Equipment            Current DME: Home Aerosol            Type of Home Care services:  None    ADLS  Prior functional level: Independent in ADLs/IADLs  Current functional level: Independent in ADLs/IADLs    PT AM-PAC:   /24  OT AM-PAC:   /24    Family can provide assistance at DC: Yes  Would you like Case Management to discuss the discharge plan with any other family members/significant others, and if so, who? No  Plans to Return to Present Housing:

## 2024-01-12 NOTE — ED NOTES
Report given to GERALD Hernandez from [facility].   Report method by phone   The following was reviewed with receiving RN:   Current vital signs:  /79   Pulse (!) 104   Temp 97.5 °F (36.4 °C) (Oral)   Resp 18   Ht 1.651 m (5' 5\")   Wt (!) 149.7 kg (330 lb)   SpO2 91%   BMI 54.91 kg/m²                MEWS Score: 3     Any medication or safety alerts were reviewed. Any pending diagnostics and notifications were also reviewed, as well as any safety concerns or issues, abnormal labs, abnormal imaging, and abnormal assessment findings. Questions were answered.

## 2024-01-12 NOTE — PROGRESS NOTES
Pharmacy Medication History Note      List of current medications patient is taking is complete.     Source of information: patient, dispense report, OARRS    Changes made to medication list:  Medications flagged for removal (include reason, ex. noncompliance):  Zofran 4mg - duplicate on list    Medications removed (include reason, ex. therapy complete or physician discontinued):  None    Medications added/doses adjusted:  Abilify - per dispense report, 0.5 of the 15mg tablet daily  Abilify injection - per dispense report, Abilify injection 400mg at [facility] last reported by patient on 12/14/23. Per dispense report, dose is accurate, just unable to confirm last injection date.    Other notes (ex. Recent course of antibiotics, Coumadin dosing):  OARRS negative  Per patient, she reports she has been out of a number of medications for about a month now. These include: Zofran 4mg, PROAIR HFA, Symbicort, and Trulicity 1.5mg/0.5mL  Per patient, she also reports smoking marijuana    Denies use of other OTC or herbal medications.      Allergies clarified    Medication list provided to the patient:No  Medication education provided to the patient:none      Electronically signed by Jem Winters on 1/11/2024 at 7:30 PM

## 2024-01-12 NOTE — ED NOTES
Patient urinated in bed and all over her personal clothing. Writer did complete linen change, placed the patient in a hospital gown and brief.

## 2024-01-12 NOTE — PROCEDURES
Home Oxygen Evaluation    Home Oxygen Evaluation completed    Resting SpO2 on room air = 95%    SpO2 on room air with exercise = 92%    Patient does not qualify for oxygen.    Zacarias Segovia RCP  3:54 PM

## 2024-01-12 NOTE — DISCHARGE INSTR - COC
List:[id number]}    Rehab Therapies: {THERAPEUTIC INTERVENTION:1571625307}  Weight Bearing Status/Restrictions: { CC Weight Bearin}  Other Medical Equipment (for information only, NOT a DME order):  {EQUIPMENT:141443945}  Other Treatments: ***    Patient's personal belongings (please select all that are sent with patient):  {CHP DME Belongings:313639585}    RN SIGNATURE:  {Esignature:909424903}    CASE MANAGEMENT/SOCIAL WORK SECTION    Inpatient Status Date: ***    Readmission Risk Assessment Score:  Readmission Risk              Risk of Unplanned Readmission:  18           Discharging to Facility/ Agency   Name:   Address:  Phone:  Fax:    Dialysis Facility (if applicable)   Name:  Address:  Dialysis Schedule:  Phone:  Fax:    / signature: {Esignature:476227521}    PHYSICIAN SECTION    Prognosis: {Prognosis:4393146979}    Condition at Discharge: { Patient Condition:234780068}    Rehab Potential (if transferring to Rehab): {Prognosis:6188763781}    Recommended Labs or Other Treatments After Discharge: ***    Physician Certification: I certify the above information and transfer of Meño Goodman  is necessary for the continuing treatment of the diagnosis listed and that she requires {Admit to Appropriate Level of Care:18151} for {GREATER/LESS:457997940} 30 days.     Update Admission H&P: {CHP DME Changes in HandP:431515280}    PHYSICIAN SIGNATURE:  {Esignature:027268157}

## 2024-01-12 NOTE — PLAN OF CARE
Problem: Discharge Planning  Goal: Discharge to home or other facility with appropriate resources  1/12/2024 1737 by Shari Christina RN  Outcome: Progressing     Problem: Safety - Adult  Goal: Free from fall injury  Outcome: Progressing     Problem: Chronic Conditions and Co-morbidities  Goal: Patient's chronic conditions and co-morbidity symptoms are monitored and maintained or improved  Outcome: Progressing

## 2024-01-12 NOTE — H&P
- 2 %    Neutrophils Absolute 8.20 1.3 - 9.1 k/uL    Lymphocytes Absolute 2.90 1.0 - 4.8 k/uL    Monocytes Absolute 0.80 0.1 - 1.3 k/uL    Eosinophils Absolute 0.30 0.0 - 0.4 k/uL    Basophils Absolute 0.10 0.0 - 0.2 k/uL   Troponin    Collection Time: 01/11/24  3:56 PM   Result Value Ref Range    Troponin, High Sensitivity 8 0 - 14 ng/L   COVID-19 & Influenza Combo    Collection Time: 01/11/24  3:56 PM    Specimen: Nasopharyngeal Swab   Result Value Ref Range    Specimen Description .NASOPHARYNGEAL SWAB     Source .NASOPHARYNGEAL SWAB     SARS-CoV-2 RNA, RT PCR Not Detected Not Detected    INFLUENZA A Not Detected Not Detected    INFLUENZA B Not Detected Not Detected   Comprehensive Metabolic Panel    Collection Time: 01/11/24  3:56 PM   Result Value Ref Range    Sodium 139 135 - 144 mmol/L    Potassium 4.0 3.7 - 5.3 mmol/L    Chloride 103 98 - 107 mmol/L    CO2 25 20 - 31 mmol/L    Anion Gap 11 9 - 17 mmol/L    Glucose 176 (H) 70 - 99 mg/dL    BUN 7 6 - 20 mg/dL    Creatinine 0.7 0.5 - 0.9 mg/dL    Est, Glom Filt Rate >60 >60 mL/min/1.73m2    Calcium 9.2 8.6 - 10.4 mg/dL    Total Protein 7.8 6.4 - 8.3 g/dL    Albumin 4.1 3.5 - 5.2 g/dL    Total Bilirubin 0.4 0.3 - 1.2 mg/dL    Alkaline Phosphatase 126 (H) 35 - 104 U/L    ALT 36 (H) 5 - 33 U/L    AST 33 (H) <32 U/L   Lipase    Collection Time: 01/11/24  3:56 PM   Result Value Ref Range    Lipase 35 13 - 60 U/L   EKG 12 Lead    Collection Time: 01/11/24  3:59 PM   Result Value Ref Range    Ventricular Rate 132 BPM    Atrial Rate 132 BPM    P-R Interval 132 ms    QRS Duration 80 ms    Q-T Interval 308 ms    QTc Calculation (Bazett) 456 ms    P Axis 56 degrees    R Axis 5 degrees    T Axis 73 degrees   Culture, Blood 1    Collection Time: 01/11/24  4:04 PM    Specimen: Blood   Result Value Ref Range    Specimen Description .BLOOD     Special Requests LAC     Culture NO GROWTH 12 HOURS    Lactic Acid    Collection Time: 01/11/24  4:04 PM   Result Value Ref Range     Problems             Last Modified POA    * (Principal) Respiratory failure (HCC) 1/11/2024 Yes     Plan:     Patient status inpatient in the Progressive Unit/Step down      Acute respiratory failure; diverse etiology  -White blood cell 12.2  - Blood cultures  - Empiric antibiotics  - Checks x-ray negative for acute process  - Patient originally requiring 5 L oxygen wean down to 3 L  - will do home oxygen eval   - Patient has diagnosed asthma  - Negative for influenza A, influenza B, SARS-CoV-2  - Patient reported no respiratory symptoms  - Patient reports noncompliance with asthma medications  - IV steroids  - Respiratory evaluation and treatment; DuoNebs  - Consider consult to pulmonology    Diabetes Mellitus  -Continue home dose insulin  -hold oral hypoglycemics/metformin for now  -POCT ac and hs with sliding scale coverage       Elevated LFTs  - Alk phos 126, base 118  - Fatty hepatic steatosis  - Continue to monitor; patient education about risk factor improvement    Disposition 2-3 days    Consultations:   IP CONSULT TO INTERNAL MEDICINE     Patient is admitted as inpatient status because of co-morbidities listed above, severity of signs and symptoms as outlined, requirement for current medical therapies and most importantly because of direct risk to patient if care not provided in a hospital setting.  Expected length of stay > 48 hours.    Matt Limon MD  1/12/2024  3:45 PM    Copy sent to Dr. Burton primary care provider on file.    Please note that this chart was generated using voice recognition Dragon dictation software.  Although every effort was made to ensure the accuracy of this automated transcription, some errors in transcription may have occurred.    [address] [address].   Phone [phone]

## 2024-01-12 NOTE — PROGRESS NOTES
[facility] Internal Medicine  Chris Esqueda MD; Tio Ramsey MD; Naren Collins MD; MD Nataliya Ritter MD; Matt Limon MD  [facility] Internal Medicine   IN-PATIENT SERVICE  [facility]     HISTORY AND PHYSICAL EXAMINATION            Date:   1/12/2024  Patientname:  Meño Goodman  Date of admission:  1/11/2024  3:43 PM  MRN:   [id number]  Account:  [id number]  YOB: 1990  PCP:    No primary care provider on file.  Room:   12/12  Code Status:    Full      Chief Complaint:     Chief Complaint   Patient presents with    Shortness of Breath    Wheezing       History Obtained From:     patient    History of Present Illness:     Meño Goodman is a 33 y.o.  /  female who presents with Shortness of Breath and Wheezing   and is admitted to the hospital for the management of Respiratory failure (HCC).According to patient, According to patient, she has been experiencing an increase in her asthma symptoms which include chest pain, cough, and shortness of breath.  She states that she has been out of her asthma medications for the past 2 months because she is having difficulty getting an appointment with her primary care provider.  The patient appears comfortable at rest at this time; she admits to extreme issues of anxiety which have increased in the past 2 months.  She was unable to explain the causes of anxiety at this time.  In the ER, she originally reported abdominal pain which had resolved by the time of evaluation by internal medicine.  The patient reported feeling gassy and bloated; no other abdominal concerns reported.      Past Medical History:     Past Medical History:   Diagnosis Date    Asthma     Back pain 9/16/2014    Bipolar 1 disorder (HCC)     Bipolar disorder (HCC) 12/8/2017    Depression     Diabetes mellitus (HCC)     Dizziness     Fatty liver disease, nonalcoholic     Fatty liver disease, nonalcoholic     GERD  Blood Gases    Collection Time: 01/11/24  5:35 PM   Result Value Ref Range    pH, Arterial 7.358 7.350 - 7.450    pCO2, Arterial 47.5 (H) 35.0 - 45.0 mmHg    pO2, Arterial 68.1 (L) 80.0 - 100.0 mmHg    HCO3, Arterial 26.7 (H) 22.0 - 26.0 mmol/L    Positive Base Excess, Art 1.2 0.0 - 2.0 mmol/L    O2 Sat, Arterial 92.1 (L) 95 - 98 %    Carboxyhemoglobin 2.2 0 - 5 %    Methemoglobin 0.2 0.0 - 1.9 %    Pt Temp 37.0     O2 Device/Flow/% Cannula     Respiratory Rate 20     Raghu Test PASS     Sample Site Right Radial Artery     Pt. Position SEMI-FOWLERS     Text for Respiratory 3LPM CANNULA    Troponin    Collection Time: 01/11/24  6:43 PM   Result Value Ref Range    Troponin, High Sensitivity <6 0 - 14 ng/L   Lactic Acid    Collection Time: 01/11/24  6:43 PM   Result Value Ref Range    Lactic Acid 1.2 0.5 - 2.2 mmol/L       Imaging/Diagnostics:  XR CHEST PORTABLE    Result Date: 1/11/2024  No acute process.       Assessment :      Hospital Problems             Last Modified POA    * (Principal) Respiratory failure (HCC) 1/11/2024 Yes       Plan:     Patient status inpatient in the Progressive Unit/Step down      Acute respiratory failure; diverse etiology  -White blood cell 12.2  - Blood cultures  - Empiric antibiotics  - Checks x-ray negative for acute process  - Patient originally requiring 5 L oxygen wean down to 2 L  - Patient has diagnosed asthma  - Negative for influenza A, influenza B, SARS-CoV-2  - Patient reported no respiratory symptoms  - Patient reports noncompliance with asthma medications  - IV steroids  - Respiratory evaluation and treatment; DuoNebs  - Consider consult to pulmonology    Diabetes Mellitus  -Continue home dose insulin  -hold oral hypoglycemics/metformin for now  -POCT ac and hs with sliding scale coverage       Elevated LFTs  - Alk phos 126, base 118  - Fatty hepatic steatosis  - Continue to monitor; patient education about risk factor improvement    Disposition 2-3 days    Consultations:

## 2024-01-13 VITALS
OXYGEN SATURATION: 92 % | TEMPERATURE: 97.7 F | DIASTOLIC BLOOD PRESSURE: 89 MMHG | BODY MASS INDEX: 48.82 KG/M2 | SYSTOLIC BLOOD PRESSURE: 122 MMHG | HEIGHT: 65 IN | RESPIRATION RATE: 17 BRPM | WEIGHT: 293 LBS | HEART RATE: 95 BPM

## 2024-01-13 LAB
ALBUMIN SERPL-MCNC: 3.8 G/DL (ref 3.5–5.2)
ALP SERPL-CCNC: 108 U/L (ref 35–104)
ALT SERPL-CCNC: 33 U/L (ref 5–33)
ANION GAP SERPL CALCULATED.3IONS-SCNC: 14 MMOL/L (ref 9–17)
AST SERPL-CCNC: 18 U/L
BASOPHILS # BLD: 0 K/UL (ref 0–0.2)
BASOPHILS NFR BLD: 0 % (ref 0–2)
BILIRUB SERPL-MCNC: 0.3 MG/DL (ref 0.3–1.2)
BUN SERPL-MCNC: 18 MG/DL (ref 6–20)
CALCIUM SERPL-MCNC: 9.9 MG/DL (ref 8.6–10.4)
CHLORIDE SERPL-SCNC: 100 MMOL/L (ref 98–107)
CO2 SERPL-SCNC: 23 MMOL/L (ref 20–31)
CREAT SERPL-MCNC: 0.9 MG/DL (ref 0.5–0.9)
EOSINOPHIL # BLD: 0 K/UL (ref 0–0.4)
EOSINOPHILS RELATIVE PERCENT: 0 % (ref 0–4)
ERYTHROCYTE [DISTWIDTH] IN BLOOD BY AUTOMATED COUNT: 14.5 % (ref 11.5–14.9)
GFR SERPL CREATININE-BSD FRML MDRD: >60 ML/MIN/1.73M2
GLUCOSE BLD-MCNC: 324 MG/DL (ref 65–105)
GLUCOSE BLD-MCNC: 350 MG/DL (ref 65–105)
GLUCOSE SERPL-MCNC: 351 MG/DL (ref 70–99)
HCT VFR BLD AUTO: 35.9 % (ref 36–46)
HGB BLD-MCNC: 11.6 G/DL (ref 12–16)
L PNEUMO1 AG UR QL IA.RAPID: NEGATIVE
LYMPHOCYTES NFR BLD: 0.94 K/UL (ref 1–4.8)
LYMPHOCYTES RELATIVE PERCENT: 5 % (ref 24–44)
MCH RBC QN AUTO: 29.4 PG (ref 26–34)
MCHC RBC AUTO-ENTMCNC: 32.2 G/DL (ref 31–37)
MCV RBC AUTO: 91.3 FL (ref 80–100)
MONOCYTES NFR BLD: 0.56 K/UL (ref 0.1–1.3)
MONOCYTES NFR BLD: 3 % (ref 1–7)
MORPHOLOGY: NORMAL
NEUTROPHILS NFR BLD: 92 % (ref 36–66)
NEUTS SEG NFR BLD: 17.3 K/UL (ref 1.3–9.1)
PLATELET # BLD AUTO: 422 K/UL (ref 150–450)
PMV BLD AUTO: 8.8 FL (ref 6–12)
POTASSIUM SERPL-SCNC: 4.5 MMOL/L (ref 3.7–5.3)
PROT SERPL-MCNC: 7.5 G/DL (ref 6.4–8.3)
RBC # BLD AUTO: 3.93 M/UL (ref 4–5.2)
S PNEUM AG SPEC QL: NEGATIVE
SODIUM SERPL-SCNC: 137 MMOL/L (ref 135–144)
SPECIMEN SOURCE: NORMAL
WBC OTHER # BLD: 18.8 K/UL (ref 3.5–11)

## 2024-01-13 PROCEDURE — 6370000000 HC RX 637 (ALT 250 FOR IP): Performed by: INTERNAL MEDICINE

## 2024-01-13 PROCEDURE — 87205 SMEAR GRAM STAIN: CPT

## 2024-01-13 PROCEDURE — 2580000003 HC RX 258

## 2024-01-13 PROCEDURE — 6360000002 HC RX W HCPCS

## 2024-01-13 PROCEDURE — 94640 AIRWAY INHALATION TREATMENT: CPT

## 2024-01-13 PROCEDURE — 87899 AGENT NOS ASSAY W/OPTIC: CPT

## 2024-01-13 PROCEDURE — 6370000000 HC RX 637 (ALT 250 FOR IP): Performed by: NURSE PRACTITIONER

## 2024-01-13 PROCEDURE — 87449 NOS EACH ORGANISM AG IA: CPT

## 2024-01-13 PROCEDURE — 6370000000 HC RX 637 (ALT 250 FOR IP)

## 2024-01-13 PROCEDURE — 2700000000 HC OXYGEN THERAPY PER DAY

## 2024-01-13 PROCEDURE — 94761 N-INVAS EAR/PLS OXIMETRY MLT: CPT

## 2024-01-13 PROCEDURE — 80053 COMPREHEN METABOLIC PANEL: CPT

## 2024-01-13 PROCEDURE — 85025 COMPLETE CBC W/AUTO DIFF WBC: CPT

## 2024-01-13 PROCEDURE — 87070 CULTURE OTHR SPECIMN AEROBIC: CPT

## 2024-01-13 PROCEDURE — 82947 ASSAY GLUCOSE BLOOD QUANT: CPT

## 2024-01-13 PROCEDURE — 36415 COLL VENOUS BLD VENIPUNCTURE: CPT

## 2024-01-13 PROCEDURE — 0202U NFCT DS 22 TRGT SARS-COV-2: CPT

## 2024-01-13 RX ORDER — MONTELUKAST SODIUM 10 MG/1
10 TABLET ORAL NIGHTLY
Status: DISCONTINUED | OUTPATIENT
Start: 2024-01-13 | End: 2024-01-13 | Stop reason: HOSPADM

## 2024-01-13 RX ORDER — IPRATROPIUM BROMIDE AND ALBUTEROL SULFATE 2.5; .5 MG/3ML; MG/3ML
1 SOLUTION RESPIRATORY (INHALATION)
Status: DISCONTINUED | OUTPATIENT
Start: 2024-01-13 | End: 2024-01-13 | Stop reason: HOSPADM

## 2024-01-13 RX ORDER — IPRATROPIUM BROMIDE AND ALBUTEROL SULFATE 2.5; .5 MG/3ML; MG/3ML
3 SOLUTION RESPIRATORY (INHALATION)
Qty: 360 ML | Refills: 5 | Status: SHIPPED | OUTPATIENT
Start: 2024-01-13

## 2024-01-13 RX ORDER — LEVOFLOXACIN 500 MG/1
500 TABLET, FILM COATED ORAL DAILY
Qty: 5 TABLET | Refills: 0 | Status: SHIPPED | OUTPATIENT
Start: 2024-01-13 | End: 2024-01-18

## 2024-01-13 RX ORDER — METHYLPREDNISOLONE 4 MG/1
TABLET ORAL
Qty: 1 KIT | Refills: 0 | Status: SHIPPED | OUTPATIENT
Start: 2024-01-13 | End: 2024-01-19

## 2024-01-13 RX ORDER — MONTELUKAST SODIUM 10 MG/1
10 TABLET ORAL NIGHTLY
Qty: 30 TABLET | Refills: 3 | Status: SHIPPED | OUTPATIENT
Start: 2024-01-13

## 2024-01-13 RX ORDER — CETIRIZINE HYDROCHLORIDE 10 MG/1
10 TABLET ORAL DAILY
Status: DISCONTINUED | OUTPATIENT
Start: 2024-01-13 | End: 2024-01-13 | Stop reason: HOSPADM

## 2024-01-13 RX ADMIN — WATER 60 MG: 1 INJECTION INTRAMUSCULAR; INTRAVENOUS; SUBCUTANEOUS at 07:48

## 2024-01-13 RX ADMIN — IPRATROPIUM BROMIDE AND ALBUTEROL SULFATE 1 DOSE: 2.5; .5 SOLUTION RESPIRATORY (INHALATION) at 07:56

## 2024-01-13 RX ADMIN — ENOXAPARIN SODIUM 30 MG: 100 INJECTION SUBCUTANEOUS at 07:49

## 2024-01-13 RX ADMIN — IPRATROPIUM BROMIDE AND ALBUTEROL SULFATE 1 DOSE: 2.5; .5 SOLUTION RESPIRATORY (INHALATION) at 10:50

## 2024-01-13 RX ADMIN — ARIPIPRAZOLE 7.5 MG: 15 TABLET ORAL at 07:51

## 2024-01-13 RX ADMIN — INSULIN LISPRO 12 UNITS: 100 INJECTION, SOLUTION INTRAVENOUS; SUBCUTANEOUS at 07:49

## 2024-01-13 RX ADMIN — INSULIN LISPRO 12 UNITS: 100 INJECTION, SOLUTION INTRAVENOUS; SUBCUTANEOUS at 11:59

## 2024-01-13 RX ADMIN — CETIRIZINE HYDROCHLORIDE 10 MG: 10 TABLET, FILM COATED ORAL at 10:13

## 2024-01-13 RX ADMIN — FAMOTIDINE 20 MG: 20 TABLET, FILM COATED ORAL at 07:49

## 2024-01-13 RX ADMIN — WATER 60 MG: 1 INJECTION INTRAMUSCULAR; INTRAVENOUS; SUBCUTANEOUS at 05:49

## 2024-01-13 RX ADMIN — SODIUM CHLORIDE, PRESERVATIVE FREE 10 ML: 5 INJECTION INTRAVENOUS at 07:49

## 2024-01-13 RX ADMIN — BUDESONIDE AND FORMOTEROL FUMARATE DIHYDRATE 2 PUFF: 160; 4.5 AEROSOL RESPIRATORY (INHALATION) at 07:56

## 2024-01-13 RX ADMIN — FLUTICASONE PROPIONATE 2 SPRAY: 50 SPRAY, METERED NASAL at 07:51

## 2024-01-13 NOTE — PLAN OF CARE
Problem: Discharge Planning  Goal: Discharge to home or other facility with appropriate resources  1/13/2024 0556 by Sharri Flynn RN  Outcome: Progressing     Problem: Safety - Adult  Goal: Free from fall injury  1/13/2024 0556 by Sharri Flynn RN  Outcome: Progressing     Problem: Chronic Conditions and Co-morbidities  Goal: Patient's chronic conditions and co-morbidity symptoms are monitored and maintained or improved  1/13/2024 0556 by Sharri Flynn RN  Outcome: Progressing

## 2024-01-13 NOTE — PROGRESS NOTES
Pt maintaining 95% SPO2 on room air. Discharge orders noted. Pt looking for transportation home.    1220- pt getting picked up by family. Pt is agreeable and appropriate for discharge. Pt understands need to  prescriptions. Teaches back understanding of new medication doses, times, indication, and possible side effects.

## 2024-01-13 NOTE — DISCHARGE SUMMARY
[facility] Internal Medicine  Chris Esqueda MD; Tio Ramsey MD; Naren Collins MD; MD Nataliya Ritter MD; Matt Limon MD      [facility] Internal Medicine  IN-PATIENT SERVICE   [facility]    Discharge Summary     Patient ID: Meño Goodman  :  1990   MRN: [id number]     ACCOUNT:  [id number]   Patient's PCP: No primary care provider on file.  Admit Date: 2024   Discharge Date: 2024     Length of Stay: 2  Code Status:  Prior  Admitting Physician: Kaleb Martínez MD  Discharge Physician: Matt Limon MD     Active Discharge Diagnoses:     Hospital Problem Lists:  Principal Problem:    Respiratory failure (HCC)  Resolved Problems:    * No resolved hospital problems. *      Admission Condition:  serious     Discharged Condition: stable    Hospital Stay:     Hospital Course:    Meño Goodman is a 33 y.o.  /  female who presents with Shortness of Breath and Wheezing   and is admitted to the hospital for the management of Respiratory failure (HCC).According to patient, According to patient, she has been experiencing an increase in her asthma symptoms which include chest pain, cough, and shortness of breath.  She states that she has been out of her asthma medications for the past 2 months because she is having difficulty getting an appointment with her primary care provider.  The patient appears comfortable at rest at this time; she admits to extreme issues of anxiety which have increased in the past 2 months.  She was unable to explain the causes of anxiety at this time.  In the ER, she originally reported abdominal pain which had resolved by the time of evaluation by internal medicine.  The patient reported feeling gassy and bloated; no other abdominal concerns reported.  Breathing improved   Discharged home with follow up with PCP/pulmonary       Review of system:  Denies any nausea vomiting fever chills,  Denies any  MD  1/13/2024  6:47 PM      Thank you Dr. Burton primary care provider on file. for the opportunity to be involved in this patient's care.    Please note that this chart was generated using voice recognition Dragon dictation software.  Although every effort was made to ensure the accuracy of this automated transcription, some errors in transcription may have occurred.

## 2024-01-13 NOTE — CONSULTS
[facility] PULMONARY, CRITICAL CARE & SLEEP   Kristanika Hobson MD/ Tin Juarez MD/ Darinel WATKINS AGACNP-BC NP-C  Ruth WATKINS NP-C   CONSULT NOTE      Date of Admission: 1/11/2024  3:43 PM    Chief complaint: \"I could not breathe\"    Referring Physician: * No referring provider recorded for this case *  PCP: No primary care provider on file.     History of Present Illness: Meño Goodman is a 33 y.o. White (non-)   female who presents with shortness of breath, cough with yellow phlegm, she tells me this has been ongoing for about a week now.  She does have history of asthma she does not see a pulmonologist, she tells me she ran out of her inhalers and nebulized treatment medications 2 months ago, she was previously on Pulmicort, albuterol rescue inhaler and albuterol nebulizer.  She states there were other children in the house that were ill as well.  She did test negative for influenza and COVID.  She does have history of allergies, she tells me she does take Singulair.  She states she had increased chest tightness and wheezing.  She does admit to history of obstructive sleep apnea, she does not have a CPAP device at home.  She cannot tell me when her last sleep study was or where.  She is not normally on oxygen at home she is currently on 3 L.  She was initially admitted 1/11/2023, we were consulted.  She did require up to 5 L, initially upon presentation.  She was started on IV Solu-Medrol, there was concern for possible infiltrate she was also placed on empiric antibiotics.  While she was in the ER she had urinated on herself.  She did have some nausea vomiting prior to admission.    Initial ABG showed a pH 7.36, pCO2 48, pO2 68 bicarb 27 on nasal cannula.  Blood culture also been negative x 2.  No sputum culture has been obtained.  Chest x-ray yesterday shows a new right perihilar infiltrate initial white count was 12.2.  Absolute eosinophil count

## 2024-01-14 LAB
B PARAP IS1001 DNA NPH QL NAA+NON-PROBE: NOT DETECTED
B PERT DNA SPEC QL NAA+PROBE: NOT DETECTED
C PNEUM DNA NPH QL NAA+NON-PROBE: NOT DETECTED
FLUAV RNA NPH QL NAA+NON-PROBE: NOT DETECTED
FLUBV RNA NPH QL NAA+NON-PROBE: NOT DETECTED
HADV DNA NPH QL NAA+NON-PROBE: NOT DETECTED
HCOV 229E RNA NPH QL NAA+NON-PROBE: NOT DETECTED
HCOV HKU1 RNA NPH QL NAA+NON-PROBE: NOT DETECTED
HCOV NL63 RNA NPH QL NAA+NON-PROBE: NOT DETECTED
HCOV OC43 RNA NPH QL NAA+NON-PROBE: NOT DETECTED
HMPV RNA NPH QL NAA+NON-PROBE: NOT DETECTED
HPIV1 RNA NPH QL NAA+NON-PROBE: NOT DETECTED
HPIV2 RNA NPH QL NAA+NON-PROBE: NOT DETECTED
HPIV3 RNA NPH QL NAA+NON-PROBE: NOT DETECTED
HPIV4 RNA NPH QL NAA+NON-PROBE: NOT DETECTED
M PNEUMO DNA NPH QL NAA+NON-PROBE: NOT DETECTED
MICROORGANISM SPEC CULT: NORMAL
MICROORGANISM/AGENT SPEC: NORMAL
RSV RNA NPH QL NAA+NON-PROBE: NOT DETECTED
RV+EV RNA NPH QL NAA+NON-PROBE: NOT DETECTED
SARS-COV-2 RNA NPH QL NAA+NON-PROBE: NOT DETECTED
SERVICE CMNT-IMP: NORMAL
SPECIMEN DESCRIPTION: NORMAL

## 2024-01-15 LAB
MICROORGANISM SPEC CULT: NORMAL
MICROORGANISM/AGENT SPEC: NORMAL
SPECIMEN DESCRIPTION: NORMAL

## 2024-01-16 LAB
MICROORGANISM SPEC CULT: NORMAL
MICROORGANISM SPEC CULT: NORMAL
SERVICE CMNT-IMP: NORMAL
SERVICE CMNT-IMP: NORMAL
SPECIMEN DESCRIPTION: NORMAL
SPECIMEN DESCRIPTION: NORMAL

## 2024-01-17 LAB
MICROORGANISM SPEC CULT: NORMAL
SERVICE CMNT-IMP: NORMAL
SPECIMEN DESCRIPTION: NORMAL

## 2024-01-17 NOTE — PROGRESS NOTES
Physician Progress Note      PATIENT:               RINA RAHMAN  CSN #:                  [id number]  :                       1990  ADMIT DATE:       2024 3:43 PM  DISCH DATE:        2024 12:41 PM  RESPONDING  PROVIDER #:        Matt Limon MD          QUERY TEXT:    Pt admitted with Acute respiratory failure.  Noted documentation of Consults   by Kris Hobson MD at 2024  9:49 AM consult PULM:  did tell her she   had a new pneumonia, she is now on antibiotics.  She still has some wheezing   and is on oxygen which she is not normally on at home.  She also was a little   lethargic while I was talking to her, but able to carry on a conversation.  If   possible, please document in progress notes and discharge summary:    The medical record reflects the following:  Risk Factors: ?IN CONSULT NOTE STATES  Principal Problem: Acute exacerbation   asthma, community-acquired pneumonia versus aspiration pneumonia from vomiting  Clinical Indicators: CXR New right perihilar and mid lung field linear   opacities, likely representing subsegmental atelectasis.  ?WBC 18.8, elevated HR and resp/min  Treatment: ATBS    Thank you.  Verito Goncalves RN, Clinical Documentation Integrity, [employer]   [facility], [facility], [facility]  Options provided:  -- Pneumonia confirmed present on admission  -- Pneumonia ruled out  -- Other - I will add my own diagnosis  -- Disagree - Not applicable / Not valid  -- Disagree - Clinically unable to determine / Unknown  -- Refer to Clinical Documentation Reviewer    PROVIDER RESPONSE TEXT:    The diagnosis of Pneumonia was confirmed as present on admission.    Query created by: Verito Goncalves on 2024 7:14 AM      Electronically signed by:  Matt Limon MD 2024 2:06 PM

## 2024-01-25 ENCOUNTER — TELEPHONE (OUTPATIENT)
Dept: INTERNAL MEDICINE CLINIC | Age: 34
End: 2024-01-25

## 2024-04-06 DIAGNOSIS — J45.40 MODERATE PERSISTENT ASTHMA, UNSPECIFIED WHETHER COMPLICATED: ICD-10-CM

## 2024-04-08 RX ORDER — BUDESONIDE AND FORMOTEROL FUMARATE DIHYDRATE 160; 4.5 UG/1; UG/1
AEROSOL RESPIRATORY (INHALATION)
Qty: 10.2 G | Refills: 2 | Status: SHIPPED | OUTPATIENT
Start: 2024-04-08

## 2024-04-16 ENCOUNTER — HOSPITAL ENCOUNTER (EMERGENCY)
Age: 34
Discharge: HOME | End: 2024-04-16
Attending: EMERGENCY MEDICINE
Payer: COMMERCIAL

## 2024-04-16 ENCOUNTER — APPOINTMENT (OUTPATIENT)
Dept: GENERAL RADIOLOGY | Age: 34
End: 2024-04-16
Payer: COMMERCIAL

## 2024-04-16 VITALS
OXYGEN SATURATION: 94 % | DIASTOLIC BLOOD PRESSURE: 100 MMHG | RESPIRATION RATE: 21 BRPM | SYSTOLIC BLOOD PRESSURE: 136 MMHG | TEMPERATURE: 98.6 F | HEART RATE: 87 BPM

## 2024-04-16 DIAGNOSIS — J45.909 ASTHMA, UNSPECIFIED ASTHMA SEVERITY, UNSPECIFIED WHETHER COMPLICATED, UNSPECIFIED WHETHER PERSISTENT: Primary | ICD-10-CM

## 2024-04-16 DIAGNOSIS — J45.40 MODERATE PERSISTENT ASTHMA, UNSPECIFIED WHETHER COMPLICATED: ICD-10-CM

## 2024-04-16 LAB
ALBUMIN SERPL-MCNC: 3.7 G/DL (ref 3.5–5.2)
ALBUMIN/GLOB SERPL: 1 {RATIO} (ref 1–2.5)
ALP SERPL-CCNC: 112 U/L (ref 35–104)
ALT SERPL-CCNC: <5 U/L (ref 10–35)
ANION GAP SERPL CALCULATED.3IONS-SCNC: 14 MMOL/L (ref 9–16)
AST SERPL-CCNC: 56 U/L (ref 10–35)
BASOPHILS # BLD: 0.07 K/UL (ref 0–0.2)
BASOPHILS NFR BLD: 1 % (ref 0–2)
BILIRUB SERPL-MCNC: 0.2 MG/DL (ref 0–1.2)
BILIRUB UR QL STRIP: NEGATIVE
BUN SERPL-MCNC: 13 MG/DL (ref 6–20)
CALCIUM SERPL-MCNC: 9.1 MG/DL (ref 8.6–10.4)
CHLORIDE SERPL-SCNC: 105 MMOL/L (ref 98–107)
CLARITY UR: CLEAR
CO2 SERPL-SCNC: 22 MMOL/L (ref 20–31)
COLOR UR: YELLOW
COMMENT: NORMAL
CREAT SERPL-MCNC: 0.9 MG/DL (ref 0.5–0.9)
D DIMER PPP FEU-MCNC: 0.39 UG/ML FEU (ref 0–0.57)
EOSINOPHIL # BLD: 0.67 K/UL (ref 0–0.44)
EOSINOPHILS RELATIVE PERCENT: 5 % (ref 1–4)
ERYTHROCYTE [DISTWIDTH] IN BLOOD BY AUTOMATED COUNT: 12.1 % (ref 11.8–14.4)
GFR SERPL CREATININE-BSD FRML MDRD: 87 ML/MIN/1.73M2
GLUCOSE BLD-MCNC: 199 MG/DL (ref 65–105)
GLUCOSE SERPL-MCNC: 243 MG/DL (ref 74–99)
GLUCOSE UR STRIP-MCNC: NEGATIVE MG/DL
HCG SERPL QL: NEGATIVE
HCT VFR BLD AUTO: 39.3 % (ref 36.3–47.1)
HGB BLD-MCNC: 12.4 G/DL (ref 11.9–15.1)
HGB UR QL STRIP.AUTO: NEGATIVE
IMM GRANULOCYTES # BLD AUTO: 0.04 K/UL (ref 0–0.3)
IMM GRANULOCYTES NFR BLD: 0 %
KETONES UR STRIP-MCNC: NEGATIVE MG/DL
LEUKOCYTE ESTERASE UR QL STRIP: NEGATIVE
LIPASE SERPL-CCNC: 33 U/L (ref 13–60)
LYMPHOCYTES NFR BLD: 2.17 K/UL (ref 1.1–3.7)
LYMPHOCYTES RELATIVE PERCENT: 16 % (ref 24–43)
MCH RBC QN AUTO: 29.4 PG (ref 25.2–33.5)
MCHC RBC AUTO-ENTMCNC: 31.6 G/DL (ref 28.4–34.8)
MCV RBC AUTO: 93.1 FL (ref 82.6–102.9)
MONOCYTES NFR BLD: 0.72 K/UL (ref 0.1–1.2)
MONOCYTES NFR BLD: 5 % (ref 3–12)
NEUTROPHILS NFR BLD: 73 % (ref 36–65)
NEUTS SEG NFR BLD: 9.57 K/UL (ref 1.5–8.1)
NITRITE UR QL STRIP: NEGATIVE
NRBC BLD-RTO: 0 PER 100 WBC
PH UR STRIP: 5.5 [PH] (ref 5–8)
PLATELET # BLD AUTO: 376 K/UL (ref 138–453)
PMV BLD AUTO: 9.9 FL (ref 8.1–13.5)
POTASSIUM SERPL-SCNC: 4.3 MMOL/L (ref 3.7–5.3)
PROT SERPL-MCNC: 7.1 G/DL (ref 6.6–8.7)
PROT UR STRIP-MCNC: NEGATIVE MG/DL
RBC # BLD AUTO: 4.22 M/UL (ref 3.95–5.11)
SODIUM SERPL-SCNC: 141 MMOL/L (ref 136–145)
SP GR UR STRIP: 1.03 (ref 1–1.03)
TROPONIN I SERPL HS-MCNC: 8 NG/L (ref 0–14)
TROPONIN I SERPL HS-MCNC: <6 NG/L (ref 0–14)
UROBILINOGEN UR STRIP-ACNC: NORMAL EU/DL (ref 0–1)
WBC OTHER # BLD: 13.2 K/UL (ref 3.5–11.3)

## 2024-04-16 PROCEDURE — 82947 ASSAY GLUCOSE BLOOD QUANT: CPT

## 2024-04-16 PROCEDURE — 99285 EMERGENCY DEPT VISIT HI MDM: CPT

## 2024-04-16 PROCEDURE — 85025 COMPLETE CBC W/AUTO DIFF WBC: CPT

## 2024-04-16 PROCEDURE — 94640 AIRWAY INHALATION TREATMENT: CPT

## 2024-04-16 PROCEDURE — 6370000000 HC RX 637 (ALT 250 FOR IP)

## 2024-04-16 PROCEDURE — 96375 TX/PRO/DX INJ NEW DRUG ADDON: CPT

## 2024-04-16 PROCEDURE — 93005 ELECTROCARDIOGRAM TRACING: CPT

## 2024-04-16 PROCEDURE — 84484 ASSAY OF TROPONIN QUANT: CPT

## 2024-04-16 PROCEDURE — 2580000003 HC RX 258

## 2024-04-16 PROCEDURE — 94664 DEMO&/EVAL PT USE INHALER: CPT

## 2024-04-16 PROCEDURE — 96365 THER/PROPH/DIAG IV INF INIT: CPT

## 2024-04-16 PROCEDURE — 80053 COMPREHEN METABOLIC PANEL: CPT

## 2024-04-16 PROCEDURE — 81003 URINALYSIS AUTO W/O SCOPE: CPT

## 2024-04-16 PROCEDURE — 84703 CHORIONIC GONADOTROPIN ASSAY: CPT

## 2024-04-16 PROCEDURE — 85379 FIBRIN DEGRADATION QUANT: CPT

## 2024-04-16 PROCEDURE — 71046 X-RAY EXAM CHEST 2 VIEWS: CPT

## 2024-04-16 PROCEDURE — 94761 N-INVAS EAR/PLS OXIMETRY MLT: CPT

## 2024-04-16 PROCEDURE — 6360000002 HC RX W HCPCS

## 2024-04-16 PROCEDURE — 83690 ASSAY OF LIPASE: CPT

## 2024-04-16 RX ORDER — PREDNISONE 10 MG/1
TABLET ORAL
Qty: 20 TABLET | Refills: 0 | Status: SHIPPED | OUTPATIENT
Start: 2024-04-16 | End: 2024-04-26

## 2024-04-16 RX ORDER — ALBUTEROL SULFATE 2.5 MG/3ML
2.5 SOLUTION RESPIRATORY (INHALATION)
Status: DISCONTINUED | OUTPATIENT
Start: 2024-04-16 | End: 2024-04-16 | Stop reason: HOSPADM

## 2024-04-16 RX ORDER — PROCHLORPERAZINE EDISYLATE 5 MG/ML
10 INJECTION INTRAMUSCULAR; INTRAVENOUS ONCE
Status: COMPLETED | OUTPATIENT
Start: 2024-04-16 | End: 2024-04-16

## 2024-04-16 RX ORDER — MAGNESIUM SULFATE IN WATER 40 MG/ML
2000 INJECTION, SOLUTION INTRAVENOUS ONCE
Status: COMPLETED | OUTPATIENT
Start: 2024-04-16 | End: 2024-04-16

## 2024-04-16 RX ORDER — 0.9 % SODIUM CHLORIDE 0.9 %
1000 INTRAVENOUS SOLUTION INTRAVENOUS ONCE
Status: COMPLETED | OUTPATIENT
Start: 2024-04-16 | End: 2024-04-16

## 2024-04-16 RX ORDER — DIPHENHYDRAMINE HYDROCHLORIDE 50 MG/ML
25 INJECTION INTRAMUSCULAR; INTRAVENOUS ONCE
Status: COMPLETED | OUTPATIENT
Start: 2024-04-16 | End: 2024-04-16

## 2024-04-16 RX ORDER — IPRATROPIUM BROMIDE AND ALBUTEROL SULFATE 2.5; .5 MG/3ML; MG/3ML
1 SOLUTION RESPIRATORY (INHALATION)
Status: DISCONTINUED | OUTPATIENT
Start: 2024-04-16 | End: 2024-04-16 | Stop reason: HOSPADM

## 2024-04-16 RX ORDER — KETOROLAC TROMETHAMINE 30 MG/ML
30 INJECTION, SOLUTION INTRAMUSCULAR; INTRAVENOUS ONCE
Status: COMPLETED | OUTPATIENT
Start: 2024-04-16 | End: 2024-04-16

## 2024-04-16 RX ORDER — BUDESONIDE AND FORMOTEROL FUMARATE DIHYDRATE 160; 4.5 UG/1; UG/1
2 AEROSOL RESPIRATORY (INHALATION) 2 TIMES DAILY
Qty: 6 G | Refills: 2 | Status: SHIPPED | OUTPATIENT
Start: 2024-04-16 | End: 2024-04-30

## 2024-04-16 RX ORDER — PREDNISONE 20 MG/1
60 TABLET ORAL ONCE
Status: COMPLETED | OUTPATIENT
Start: 2024-04-16 | End: 2024-04-16

## 2024-04-16 RX ORDER — IPRATROPIUM BROMIDE AND ALBUTEROL SULFATE 2.5; .5 MG/3ML; MG/3ML
3 SOLUTION RESPIRATORY (INHALATION)
Qty: 168 ML | Refills: 0 | Status: SHIPPED | OUTPATIENT
Start: 2024-04-16 | End: 2024-04-30

## 2024-04-16 RX ADMIN — IPRATROPIUM BROMIDE AND ALBUTEROL SULFATE 1 DOSE: 2.5; .5 SOLUTION RESPIRATORY (INHALATION) at 02:20

## 2024-04-16 RX ADMIN — IPRATROPIUM BROMIDE AND ALBUTEROL SULFATE 1 DOSE: 2.5; .5 SOLUTION RESPIRATORY (INHALATION) at 04:47

## 2024-04-16 RX ADMIN — PREDNISONE 60 MG: 20 TABLET ORAL at 02:35

## 2024-04-16 RX ADMIN — PROCHLORPERAZINE EDISYLATE 10 MG: 5 INJECTION INTRAMUSCULAR; INTRAVENOUS at 04:16

## 2024-04-16 RX ADMIN — MAGNESIUM SULFATE HEPTAHYDRATE 2000 MG: 40 INJECTION, SOLUTION INTRAVENOUS at 05:06

## 2024-04-16 RX ADMIN — KETOROLAC TROMETHAMINE 30 MG: 30 INJECTION, SOLUTION INTRAMUSCULAR; INTRAVENOUS at 04:16

## 2024-04-16 RX ADMIN — SODIUM CHLORIDE 1000 ML: 9 INJECTION, SOLUTION INTRAVENOUS at 05:06

## 2024-04-16 RX ADMIN — DIPHENHYDRAMINE HYDROCHLORIDE 25 MG: 50 INJECTION, SOLUTION INTRAMUSCULAR; INTRAVENOUS at 04:15

## 2024-04-16 ASSESSMENT — HEART SCORE: ECG: NORMAL

## 2024-04-16 NOTE — ED PROVIDER NOTES
Factors: 1  Troponin: 0  Heart Score Total: 1       PROCEDURES:  None    CONSULTS:  None      FINAL IMPRESSION      1. Asthma, unspecified asthma severity, unspecified whether complicated, unspecified whether persistent    2. Moderate persistent asthma, unspecified whether complicated          DISPOSITION / PLAN     DISPOSITION Decision To Discharge 04/16/2024 05:18:56 AM      PATIENT REFERRED TO:  [facility]  [address] [address] [phone]  Call in 2 days      [facility] ED  [address] [id number] [phone]    As needed      DISCHARGE MEDICATIONS:  Discharge Medication List as of 4/16/2024  5:20 AM        START taking these medications    Details   predniSONE (DELTASONE) 10 MG tablet Take 4 tablets by mouth once daily for 5 days, Disp-20 tablet, R-0Print             Shari Tong MD  Emergency Medicine Resident    (Please note that portions of thisnote were completed with a voice recognition program.  Efforts were made to edit the dictations but occasionally words are mis-transcribed.)

## 2024-04-16 NOTE — ED TRIAGE NOTES
Pt brought to ED by EMS for complaints of difficulty breathing and mid-sternal chest pain x 2 days.  Pt states she ran out of inhaler 2 days ago.  Pt also reports some cough.  Pt received 1 breathing treatment on the way to ED.  Pt states she has history of Diabetes and states she has been urinating a lot lately.  Pt denies any recent sick contacts, any fevers or chills at home.   Pt's blood glucose checked at bedside, 199 mg/dl.    Pt is alert and oriented x4.  Pt hooked to full monitor, EKG obtained and IV established.  Dr. Tong at bedside to evaluate pt.

## 2024-04-16 NOTE — ED NOTES
SW asked by RN to assist with transport of pt discharged from ED.  [employer] utilized due to [facility]   not finding pt in system. Trip # [phone]

## 2024-04-16 NOTE — DISCHARGE INSTRUCTIONS
Thank you for visiting [facility] Emergency Department.    [facility].    Should you have any questions regarding your care or further treatment, please call [facility] Emergency Department at [phone].    Please return to emergency department for any new or worrisome symptoms including any return of shortness of breath, chest pain, vomiting, fever.

## 2024-04-17 LAB
EKG ATRIAL RATE: 112 BPM
EKG P AXIS: 25 DEGREES
EKG P-R INTERVAL: 144 MS
EKG Q-T INTERVAL: 344 MS
EKG QRS DURATION: 84 MS
EKG QTC CALCULATION (BAZETT): 469 MS
EKG R AXIS: 3 DEGREES
EKG T AXIS: 13 DEGREES
EKG VENTRICULAR RATE: 112 BPM

## 2024-04-17 PROCEDURE — 93010 ELECTROCARDIOGRAM REPORT: CPT | Performed by: INTERNAL MEDICINE

## 2024-04-25 ENCOUNTER — APPOINTMENT (OUTPATIENT)
Dept: GENERAL RADIOLOGY | Age: 34
End: 2024-04-25
Payer: COMMERCIAL

## 2024-04-25 ENCOUNTER — HOSPITAL ENCOUNTER (EMERGENCY)
Age: 34
Discharge: HOME | End: 2024-04-26
Attending: EMERGENCY MEDICINE
Payer: COMMERCIAL

## 2024-04-25 DIAGNOSIS — J45.21 MILD INTERMITTENT ASTHMA WITH EXACERBATION: ICD-10-CM

## 2024-04-25 DIAGNOSIS — R06.02 SHORTNESS OF BREATH: Primary | ICD-10-CM

## 2024-04-25 LAB
ALBUMIN SERPL-MCNC: 3.4 G/DL (ref 3.5–5.2)
ALBUMIN/GLOB SERPL: 1 {RATIO} (ref 1–2.5)
ALP SERPL-CCNC: 93 U/L (ref 35–104)
ALT SERPL-CCNC: 24 U/L (ref 10–35)
ANION GAP SERPL CALCULATED.3IONS-SCNC: 10 MMOL/L (ref 9–16)
AST SERPL-CCNC: 26 U/L (ref 10–35)
BASOPHILS # BLD: 0.06 K/UL (ref 0–0.2)
BASOPHILS NFR BLD: 1 % (ref 0–2)
BILIRUB SERPL-MCNC: 0.2 MG/DL (ref 0–1.2)
BNP SERPL-MCNC: 53 PG/ML (ref 0–300)
BUN SERPL-MCNC: 10 MG/DL (ref 6–20)
CALCIUM SERPL-MCNC: 7.8 MG/DL (ref 8.6–10.4)
CHLORIDE SERPL-SCNC: 105 MMOL/L (ref 98–107)
CO2 SERPL-SCNC: 21 MMOL/L (ref 20–31)
CREAT SERPL-MCNC: 0.7 MG/DL (ref 0.5–0.9)
D DIMER PPP FEU-MCNC: 0.34 UG/ML FEU (ref 0–0.57)
EOSINOPHIL # BLD: 0.67 K/UL (ref 0–0.44)
EOSINOPHILS RELATIVE PERCENT: 5 % (ref 1–4)
ERYTHROCYTE [DISTWIDTH] IN BLOOD BY AUTOMATED COUNT: 12.8 % (ref 11.8–14.4)
GFR SERPL CREATININE-BSD FRML MDRD: >90 ML/MIN/1.73M2
GLUCOSE SERPL-MCNC: 195 MG/DL (ref 74–99)
HCT VFR BLD AUTO: 36 % (ref 36.3–47.1)
HGB BLD-MCNC: 11.4 G/DL (ref 11.9–15.1)
IMM GRANULOCYTES # BLD AUTO: 0.05 K/UL (ref 0–0.3)
IMM GRANULOCYTES NFR BLD: 0 %
LYMPHOCYTES NFR BLD: 2.46 K/UL (ref 1.1–3.7)
LYMPHOCYTES RELATIVE PERCENT: 19 % (ref 24–43)
MAGNESIUM SERPL-MCNC: 2.6 MG/DL (ref 1.6–2.6)
MCH RBC QN AUTO: 29.5 PG (ref 25.2–33.5)
MCHC RBC AUTO-ENTMCNC: 31.7 G/DL (ref 28.4–34.8)
MCV RBC AUTO: 93.3 FL (ref 82.6–102.9)
MONOCYTES NFR BLD: 0.83 K/UL (ref 0.1–1.2)
MONOCYTES NFR BLD: 6 % (ref 3–12)
NEUTROPHILS NFR BLD: 69 % (ref 36–65)
NEUTS SEG NFR BLD: 8.85 K/UL (ref 1.5–8.1)
NRBC BLD-RTO: 0 PER 100 WBC
PLATELET # BLD AUTO: 362 K/UL (ref 138–453)
PMV BLD AUTO: 9.8 FL (ref 8.1–13.5)
POTASSIUM SERPL-SCNC: 3.4 MMOL/L (ref 3.7–5.3)
PROT SERPL-MCNC: 6.1 G/DL (ref 6.6–8.7)
RBC # BLD AUTO: 3.86 M/UL (ref 3.95–5.11)
SODIUM SERPL-SCNC: 136 MMOL/L (ref 136–145)
TROPONIN I SERPL HS-MCNC: <6 NG/L (ref 0–14)
WBC OTHER # BLD: 12.9 K/UL (ref 3.5–11.3)

## 2024-04-25 PROCEDURE — 85379 FIBRIN DEGRADATION QUANT: CPT

## 2024-04-25 PROCEDURE — 99285 EMERGENCY DEPT VISIT HI MDM: CPT

## 2024-04-25 PROCEDURE — 6370000000 HC RX 637 (ALT 250 FOR IP): Performed by: HEALTH CARE PROVIDER

## 2024-04-25 PROCEDURE — 93005 ELECTROCARDIOGRAM TRACING: CPT | Performed by: HEALTH CARE PROVIDER

## 2024-04-25 PROCEDURE — 84484 ASSAY OF TROPONIN QUANT: CPT

## 2024-04-25 PROCEDURE — 83880 ASSAY OF NATRIURETIC PEPTIDE: CPT

## 2024-04-25 PROCEDURE — 80053 COMPREHEN METABOLIC PANEL: CPT

## 2024-04-25 PROCEDURE — 71045 X-RAY EXAM CHEST 1 VIEW: CPT

## 2024-04-25 PROCEDURE — 94640 AIRWAY INHALATION TREATMENT: CPT

## 2024-04-25 PROCEDURE — 83735 ASSAY OF MAGNESIUM: CPT

## 2024-04-25 PROCEDURE — 85025 COMPLETE CBC W/AUTO DIFF WBC: CPT

## 2024-04-25 RX ORDER — IPRATROPIUM BROMIDE AND ALBUTEROL SULFATE 2.5; .5 MG/3ML; MG/3ML
1 SOLUTION RESPIRATORY (INHALATION) ONCE
Status: COMPLETED | OUTPATIENT
Start: 2024-04-25 | End: 2024-04-25

## 2024-04-25 RX ADMIN — IPRATROPIUM BROMIDE AND ALBUTEROL SULFATE 1 DOSE: .5; 3 SOLUTION RESPIRATORY (INHALATION) at 22:42

## 2024-04-26 VITALS
TEMPERATURE: 99 F | OXYGEN SATURATION: 93 % | DIASTOLIC BLOOD PRESSURE: 74 MMHG | HEART RATE: 104 BPM | SYSTOLIC BLOOD PRESSURE: 124 MMHG | RESPIRATION RATE: 20 BRPM

## 2024-04-26 LAB
EKG ATRIAL RATE: 105 BPM
EKG P AXIS: 29 DEGREES
EKG P-R INTERVAL: 140 MS
EKG Q-T INTERVAL: 336 MS
EKG QRS DURATION: 90 MS
EKG QTC CALCULATION (BAZETT): 444 MS
EKG R AXIS: 5 DEGREES
EKG T AXIS: 15 DEGREES
EKG VENTRICULAR RATE: 105 BPM

## 2024-04-26 PROCEDURE — 93010 ELECTROCARDIOGRAM REPORT: CPT | Performed by: INTERNAL MEDICINE

## 2024-04-26 RX ORDER — ALBUTEROL SULFATE 2.5 MG/3ML
15 SOLUTION RESPIRATORY (INHALATION)
Status: DISCONTINUED | OUTPATIENT
Start: 2024-04-26 | End: 2024-04-26 | Stop reason: HOSPADM

## 2024-04-26 RX ORDER — PREDNISONE 20 MG/1
60 TABLET ORAL DAILY
Qty: 15 TABLET | Refills: 0 | Status: SHIPPED | OUTPATIENT
Start: 2024-04-26 | End: 2024-05-01

## 2024-04-26 RX ORDER — ALBUTEROL SULFATE 2.5 MG/3ML
2.5 SOLUTION RESPIRATORY (INHALATION)
Status: DISCONTINUED | OUTPATIENT
Start: 2024-04-26 | End: 2024-04-26 | Stop reason: HOSPADM

## 2024-04-26 RX ORDER — IPRATROPIUM BROMIDE AND ALBUTEROL SULFATE 2.5; .5 MG/3ML; MG/3ML
1 SOLUTION RESPIRATORY (INHALATION)
Status: DISCONTINUED | OUTPATIENT
Start: 2024-04-26 | End: 2024-04-26 | Stop reason: HOSPADM

## 2024-04-26 RX ORDER — ALBUTEROL SULFATE 90 UG/1
1-2 AEROSOL, METERED RESPIRATORY (INHALATION) EVERY 4 HOURS PRN
Qty: 18 G | Refills: 0 | Status: SHIPPED | OUTPATIENT
Start: 2024-04-26

## 2024-04-26 ASSESSMENT — ENCOUNTER SYMPTOMS
DIARRHEA: 0
NAUSEA: 0
SHORTNESS OF BREATH: 1
CONSTIPATION: 0
ABDOMINAL PAIN: 0
VOMITING: 0
SORE THROAT: 0

## 2024-04-26 NOTE — ED PROVIDER NOTES
[facility]   Emergency Department  Emergency Medicine Attending Sign-out   Note started: 11:33 PM EDT    Care of Meño Goodman was assumed from previous attending Dr. Diallo at 11 PM and is being seen for Shortness of Breath  .  The patient's initial evaluation and plan have been discussed with the prior provider who initially evaluated the patient.     Attestation  I was available and discussed any additional care issues that arose and coordinated the management plans with the resident(s) caring for the patient during my duty period. Any areas of disagreement with resident's documentation of care or procedures are noted on the chart. I was personally present for the key portions of any/all procedures, during my duty period. I have documented in the chart those procedures where I was not present during the key portions.     BRIEF PATIENT SUMMARY/MDM COURSE PER INITIAL PROVIDER:   RECENT VITALS:     Temp: 99 °F (37.2 °C),  Pulse: (!) 116, Respirations: 15, BP: 124/74, SpO2: 91 %    This patient is a 34 y.o. Female with asthma exacerbation, in moderate distress upon arrival.  Awaiting reevaluation, likely require admission    DIAGNOSTICS/MEDICATIONS:     MEDICATIONS GIVEN:  ED Medication Orders (From admission, onward)      Start Ordered     Status Ordering Provider    04/25/24 2230 04/25/24 2226  ipratropium 0.5 mg-albuterol 2.5 mg (DUONEB) nebulizer solution 1 Dose  ONCE        Question:  Initiate RT Bronchodilator Protocol  Answer:  Yes - Inpatient Protocol    Last MAR action: Given - by DOREEN BOSWELL on 04/25/24 at 2242 DYLAN BARBOSA            LABS    Labs Reviewed   CBC WITH AUTO DIFFERENTIAL - Abnormal; Notable for the following components:       Result Value    WBC 12.9 (*)     RBC 3.86 (*)     Hemoglobin 11.4 (*)     Hematocrit 36.0 (*)     Neutrophils % 69 (*)     Lymphocytes % 19 (*)     Eosinophils % 5 (*)     Neutrophils Absolute 8.85 (*)     Eosinophils Absolute 0.67 (*)

## 2024-04-26 NOTE — ED PROVIDER NOTES
Note Started: 10:28 PM EDT         [facility]     Emergency Department     Faculty Attestation    I performed a history and physical examination of the patient and discussed management with the resident. I reviewed the resident’s note and agree with the documented findings and plan of care. Any areas of disagreement are noted on the chart. I was personally present for the key portions of any procedures. I have documented in the chart those procedures where I was not present during the key portions. I have reviewed the emergency nurses triage note. I agree with the chief complaint, past medical history, past surgical history, allergies, medications, social and family history as documented unless otherwise noted below.        For Physician Assistant/ Nurse Practitioner cases/documentation I have personally evaluated this patient and have completed at least one if not all key elements of the E/M (history, physical exam, and MDM). Additional findings are as noted.  I have personally seen and evaluated the patient.  I find the patient's history and physical exam are consistent with the NP/PA documentation.  I agree with the care provided, treatment rendered, disposition and follow-up plan.    34-year-old female with a history of SURAJ not on CPAP, admitted in January to [facility] with suspicion of asthma presenting with difficulty breathing.  Has been worsening over the last week.  Does not use inhalers or oxygen at home.  Required Solu-Medrol, DuoNebs, and mag by EMS prior to arrival.  No recent illness.  No swelling in the lower extremities.    Exam:  General : Laying on the bed, awake, alert, and in no acute distress  CV : Tachycardic and regular rhythm  Lungs : Wheezing across all lung fields, borderline hypoxic on room air  Abdomen : soft, non-tender, non-distended    DDx: Asthma exacerbation, CHF, pneumonia, PE    Plan:  Lab workup including CBC, electrolytes, troponin, BNP,  D-dimer  Chest x-ray  CT to rule out PE if dimer is positive  Continue aerosol treatments, no immediate need for BiPAP    Medical Decision Making  Amount and/or Complexity of Data Reviewed  Labs: ordered.  Radiology: ordered.  ECG/medicine tests: ordered.    Risk  Prescription drug management.        EKG: Interpreted by myself: Sinus tachycardia at 105 bpm.  Normal axis.  Normal intervals.  No ST segment elevation or depression.  No T wave inversions.  Nonspecific EKG.    Amisha Diallo MD   Attending Emergency Physician    (Please note that portions of this note were completed with a voice recognition program. Efforts were made to edit the dictations but occasionally words are mis-transcribed.)           Amisha Diallo MD  04/25/24 4960

## 2024-04-26 NOTE — DISCHARGE INSTRUCTIONS
You were seen here for difficulty breathing.  This is likely secondary to your asthma.  You need to take your albuterol inhaler as needed for difficulty breathing/wheezing.    You were given a prescription for prednisone, take this medication as prescribed.  This medication will help with your asthma and breathing.    You need to call and schedule follow-up appointment with your PCP for soon as possible.    Return to the emergency department immediately if you experience worsening symptoms, develop any other symptoms, or if you have any other concerns.

## 2024-04-26 NOTE — ED PROVIDER NOTES
[facility] ED  Emergency Department  Emergency Medicine Resident Sign-out     Care of Meoñ Goodman was assumed from Dr. Zelaya and is being seen for Shortness of Breath  .  The patient's initial evaluation and plan have been discussed with the prior provider who initially evaluated the patient.     EMERGENCY DEPARTMENT COURSE / MEDICAL DECISION MAKING:       MEDICATIONS GIVEN:  Orders Placed This Encounter   Medications    ipratropium 0.5 mg-albuterol 2.5 mg (DUONEB) nebulizer solution 1 Dose     Order Specific Question:   Initiate RT Bronchodilator Protocol     Answer:   Yes - Inpatient Protocol    OR Linked Order Group     ipratropium 0.5 mg-albuterol 2.5 mg (DUONEB) nebulizer solution 1 Dose      Order Specific Question:   Initiate RT Bronchodilator Protocol      Answer:   Yes - ED protocol     albuterol (PROVENTIL) (2.5 MG/3ML) 0.083% nebulizer solution 2.5 mg      Order Specific Question:   Initiate RT Bronchodilator Protocol      Answer:   Yes - ED protocol    AND Linked Order Group     albuterol (PROVENTIL) (2.5 MG/3ML) 0.083% nebulizer solution 15 mg      Order Specific Question:   Initiate RT Bronchodilator Protocol      Answer:   Yes - ED protocol     ipratropium (ATROVENT) 0.02 % nebulizer solution 0.5 mg      Order Specific Question:   Initiate RT Bronchodilator Protocol      Answer:   Yes - ED protocol    predniSONE (DELTASONE) 20 MG tablet     Sig: Take 3 tablets by mouth daily for 5 days     Dispense:  15 tablet     Refill:  0    albuterol sulfate HFA (PROVENTIL HFA) 108 (90 Base) MCG/ACT inhaler     Sig: Inhale 1-2 puffs into the lungs every 4 hours as needed for Wheezing or Shortness of Breath (Space out to every 6 hours as symptoms improve) Space out to every 6 hours as symptoms improve.     Dispense:  18 g     Refill:  0       LABS / RADIOLOGY:     Labs Reviewed   CBC WITH AUTO DIFFERENTIAL - Abnormal; Notable for the following components:       Result Value    WBC 12.9 (*)

## 2024-04-26 NOTE — ED PROVIDER NOTES
aspirin-acetaminophen-caffeine (EXCEDRIN MIGRAINE) 250-250-65 MG per tablet Take 1 tablet by mouth every 8 hours as needed for Headaches 3/24/22 8/2/22  Yeimi Rothman MD       REVIEW OF SYSTEMS       Review of Systems   Constitutional:  Negative for chills and fever.   HENT:  Negative for ear pain, hearing loss and sore throat.    Eyes:  Negative for visual disturbance.   Respiratory:  Positive for shortness of breath.    Cardiovascular:  Negative for chest pain.   Gastrointestinal:  Negative for abdominal pain, constipation, diarrhea, nausea and vomiting.   Genitourinary:  Negative for difficulty urinating and dysuria.   Musculoskeletal:  Negative for arthralgias and myalgias.   Neurological:  Negative for numbness.   Psychiatric/Behavioral:  Negative for agitation and confusion.        PHYSICAL EXAM      INITIAL VITALS:   /74   Pulse (!) 116   Temp 99 °F (37.2 °C) (Oral)   Resp 15   SpO2 91%     Physical Exam  Vitals and nursing note reviewed.   Constitutional:       General: She is not in acute distress.     Appearance: She is well-developed. She is not diaphoretic.   HENT:      Head: Normocephalic and atraumatic.      Right Ear: External ear normal.      Left Ear: External ear normal.      Nose: Nose normal.   Eyes:      Conjunctiva/sclera: Conjunctivae normal.   Neck:      Trachea: No tracheal deviation.   Cardiovascular:      Rate and Rhythm: Normal rate and regular rhythm.      Heart sounds: Normal heart sounds. No murmur heard.     No friction rub. No gallop.   Pulmonary:      Effort: Pulmonary effort is normal. No respiratory distress.      Breath sounds: Examination of the right-upper field reveals wheezing. Examination of the left-upper field reveals wheezing. Examination of the right-middle field reveals wheezing. Examination of the left-middle field reveals wheezing. Examination of the right-lower field reveals wheezing. Examination of the left-lower field reveals wheezing. Decreased  house/structural fires. Patient has no history of diabetes. Patient is neurologically intact at this time without any concerns of any other upper or lower extremity weakness, numbness or paresthesia.    Concern for likely asthma exacerbation. The patient will be further evaluated with basic laboratory work-up including CBC, BMP, magnesium level, chest x-ray.  Will have respiratory administer breathing treatment.  Has gotten steroids and mag. No concern for any cardiac causes, initial EKG without any acute ischemic changes, but will obtain troponins and BNP to r/o. Will obtain d-dimer to screen for PE.     Frequent reassessments. Disposition pending improvement.    Amount and/or Complexity of Data Reviewed  Labs: ordered. Decision-making details documented in ED Course.  Radiology: ordered.  ECG/medicine tests: ordered.    Risk  Prescription drug management.        EKG  EKG Interpretation    Interpreted by me    Rhythm: sinus   Rate: Tachycardia, 105  Axis: normal  Ectopy: none  Conduction: normal  ST Segments: no acute change  T Waves: no acute change  Q Waves: none    Clinical Impression: Sinus tachycardia    All EKG's are interpreted by the Emergency Department Physician who either signs or Co-signs this chart in the absence of a cardiologist.    EMERGENCY DEPARTMENT COURSE:    ED Course as of 04/26/24 0057   Thu Apr 25, 2024   2301 WBC(!): 12.9  From 13.2. [JS]   2301 Hemoglobin Quant(!): 11.4  Stable. [JS]   Fri Apr 26, 2024   0020 D-Dimer, Quant: 0.34 [JS]   0020 Pro-BNP: 53 [JS]   0021 IMPRESSION: No acute process.   [JS]   0024 Patient was read out at bedside.  Still with the persistent wheezing on examination although has improved at this time.  Oxygen saturations maintained in the mid 90s.  Will plan for another breathing treatment and some continued observation.  Patient updated on results of laboratory testing.  If wheezing continues to improve and patient is able to ambulate without any significant

## 2024-04-26 NOTE — ED NOTES
Pt presents to Ed by [facility], pt states SOB, was wheezing, used inhaler and didn't help.   Pt has asthma - started to flare up about a week ago, inhalers haven't been helping. Pt stated not complaint with meds.   Pt was given 2g mag, Solumedrol by [facility].   Pt alert x 4.

## 2024-04-26 NOTE — ED NOTES
[employer] used as [employer] representative could not find pt on file. Trip # [phone] Pt going to address on file. Pt informed to update insurance with new last name.

## 2024-07-29 NOTE — ED PROVIDER NOTES
[facility]  Emergency Department  Emergency Medicine Resident Sign-out     Care of Georgina Madera was assumed from Dr. Alejandra Mcknight and is being seen for Abdominal Pain (RLQ abdominal pain since yesterday)  . The patient's initial evaluation and plan have been discussed with the prior provider who initially evaluated the patient.      EMERGENCY DEPARTMENT COURSE / MEDICAL DECISION MAKING:       MEDICATIONS GIVEN:  Orders Placed This Encounter   Medications    fentaNYL (SUBLIMAZE) injection 25 mcg    ondansetron (ZOFRAN) injection 4 mg    0.9 % sodium chloride bolus    fentaNYL (SUBLIMAZE) injection 50 mcg    iopamidol (ISOVUE-370) 76 % injection 75 mL    HYDROmorphone (DILAUDID) injection 1 mg    ciprofloxacin (CIPRO) IVPB 400 mg     Order Specific Question:   Antimicrobial Indications     Answer:   Intra-Abdominal Infection    metronidazole (FLAGYL) 500 mg in NaCl 100 mL IVPB premix     Order Specific Question:   Antimicrobial Indications     Answer:   Intra-Abdominal Infection    fentaNYL (SUBLIMAZE) injection 50 mcg       LABS / RADIOLOGY:     Labs Reviewed   COMPREHENSIVE METABOLIC PANEL - Abnormal; Notable for the following components:       Result Value    Glucose 139 (*)     Sodium 132 (*)     All other components within normal limits   CBC - Abnormal; Notable for the following components:    WBC 16.5 (*)     All other components within normal limits   C-REACTIVE PROTEIN - Abnormal; Notable for the following components:    .4 (*)     All other components within normal limits   LACTIC ACID, WHOLE BLOOD - Abnormal; Notable for the following components:    Lactic Acid, Whole Blood 2.4 (*)     All other components within normal limits   COVID-19, RAPID   LIPASE   HCG, SERUM, QUALITATIVE       Ct Abdomen Pelvis W Iv Contrast Additional Contrast? None    Result Date: 4/14/2021  EXAMINATION: CT OF THE ABDOMEN AND PELVIS WITH CONTRAST 4/14/2021 12:39 pm TECHNIQUE: CT of the abdomen and pelvis was performed with the administration of intravenous contrast. Multiplanar reformatted images are provided for review. Dose modulation, iterative reconstruction, and/or weight based adjustment of the mA/kV was utilized to reduce the radiation dose to as low as reasonably achievable. COMPARISON: 01/25/2021 HISTORY: ORDERING SYSTEM PROVIDED HISTORY: Q abdominal pain TECHNOLOGIST PROVIDED HISTORY: RLQ abdominal pain Decision Support Exception->Emergency Medical Condition (MA) Reason for Exam: RLQ abdominal pain FINDINGS: Lower Chest: Patchy subsegmental atelectasis at the posterior lung bases. Organs: Hepatic steatosis. No focal liver lesion. The spleen, pancreas and kidneys enhance normally without significant abnormality. 11 mm left adrenal nodule unchanged dating back to at least February 2020 probably benign adenoma. No follow-up recommended. GI/Bowel: There is limited evaluation due to absence of oral contrast.  Small bowel loops normal in caliber showing no focal lesions. Appendix clearly identified on the prior. On the current exam normal appendix is noted in similar location. There is extensive right lower quadrant inflammation through which the appendix courses however the inflammation is not centered on the appendix. Inflammation appears to be centered on some extraluminal gas pockets along the posterior aspect of the cecum adjacent to the base of the appendix. Altogether findings are felt to be more representative of a contained perforated cecal diverticulitis rather than acute appendicitis. Rest of colon is unremarkable. Pelvis: Pelvic organs grossly normal Peritoneum/Retroperitoneum: No ascites. No significant lymphadenopathy. Bones/Soft Tissues: No acute abnormality of the bones. The superficial soft tissues show no significant abnormalities. 1. Findings most compatible with a perforated cecal diverticulitis.   Fairly normal appearing appendix courses through the area of inflammation. 2. Hepatic steatosis. Findings were discussed with Dr. Amy Soria at 1:55 p.m. on 4/14/2021. RECENT VITALS:     Temp: 99.9 °F (37.7 °C),  Pulse: 112, Resp: 18, BP: 116/76, SpO2: 96 %    This patient is a 33 y.o. Female with cecal diverticulitis with microperforation Patient was admitted to the general surgery service. She started on Cipro and Flagyl. OUTSTANDING TASKS / RECOMMENDATIONS:    1. awaiting bed     FINAL IMPRESSION:     1. Perforated diverticulum    2. Leukocytosis, unspecified type        DISPOSITION:         DISPOSITION:  []  Discharge   []  Transfer -    [x]  Admission -     []  Against Medical Advice   []  Eloped   FOLLOW-UP: No follow-up provider specified.    DISCHARGE MEDICATIONS: New Prescriptions    No medications on file           Cornell Crabtree MD  Emergency Medicine Resident  32 Ada Mcmahan MD  Resident  04/14/21 1970 [id number]

## 2024-08-23 PROBLEM — L29.9 PRURITIC DISORDER: Status: ACTIVE | Noted: 2019-07-16

## 2024-08-23 PROBLEM — R14.0 ABDOMINAL BLOATING: Status: ACTIVE | Noted: 2019-05-08

## 2024-08-23 PROBLEM — F41.1 GENERALIZED ANXIETY DISORDER: Status: ACTIVE | Noted: 2019-05-08

## 2024-08-23 PROBLEM — H92.09 OTALGIA: Status: ACTIVE | Noted: 2019-05-08

## 2024-08-31 ENCOUNTER — HOSPITAL ENCOUNTER (EMERGENCY)
Age: 34
Discharge: HOME | End: 2024-08-31
Attending: EMERGENCY MEDICINE
Payer: COMMERCIAL

## 2024-08-31 ENCOUNTER — APPOINTMENT (OUTPATIENT)
Dept: GENERAL RADIOLOGY | Age: 34
End: 2024-08-31
Payer: COMMERCIAL

## 2024-08-31 VITALS
HEIGHT: 65 IN | WEIGHT: 293 LBS | RESPIRATION RATE: 16 BRPM | OXYGEN SATURATION: 100 % | DIASTOLIC BLOOD PRESSURE: 77 MMHG | BODY MASS INDEX: 48.82 KG/M2 | HEART RATE: 72 BPM | TEMPERATURE: 98.3 F | SYSTOLIC BLOOD PRESSURE: 112 MMHG

## 2024-08-31 DIAGNOSIS — H93.8X3 CONGESTION OF BOTH EARS: ICD-10-CM

## 2024-08-31 DIAGNOSIS — J45.40 MODERATE PERSISTENT ASTHMA, UNSPECIFIED WHETHER COMPLICATED: ICD-10-CM

## 2024-08-31 DIAGNOSIS — J45.21 MILD INTERMITTENT ASTHMA WITH ACUTE EXACERBATION: Primary | ICD-10-CM

## 2024-08-31 PROCEDURE — 6360000002 HC RX W HCPCS

## 2024-08-31 PROCEDURE — 93005 ELECTROCARDIOGRAM TRACING: CPT

## 2024-08-31 PROCEDURE — 94640 AIRWAY INHALATION TREATMENT: CPT

## 2024-08-31 PROCEDURE — 71046 X-RAY EXAM CHEST 2 VIEWS: CPT

## 2024-08-31 PROCEDURE — 99284 EMERGENCY DEPT VISIT MOD MDM: CPT | Performed by: EMERGENCY MEDICINE

## 2024-08-31 RX ORDER — FLUTICASONE PROPIONATE 50 MCG
SPRAY, SUSPENSION (ML) NASAL
Qty: 16 G | Refills: 2 | Status: SHIPPED | OUTPATIENT
Start: 2024-08-31

## 2024-08-31 RX ORDER — MONTELUKAST SODIUM 10 MG/1
10 TABLET ORAL NIGHTLY
Qty: 30 TABLET | Refills: 3 | Status: SHIPPED | OUTPATIENT
Start: 2024-08-31

## 2024-08-31 RX ORDER — ALBUTEROL SULFATE 0.83 MG/ML
2.5 SOLUTION RESPIRATORY (INHALATION)
Status: DISCONTINUED | OUTPATIENT
Start: 2024-08-31 | End: 2024-08-31 | Stop reason: HOSPADM

## 2024-08-31 RX ORDER — BUDESONIDE AND FORMOTEROL FUMARATE DIHYDRATE 160; 4.5 UG/1; UG/1
2 AEROSOL RESPIRATORY (INHALATION) 2 TIMES DAILY
Qty: 6 G | Refills: 2 | Status: SHIPPED | OUTPATIENT
Start: 2024-08-31 | End: 2024-09-14

## 2024-08-31 RX ORDER — PREDNISONE 10 MG/1
TABLET ORAL
Qty: 20 TABLET | Refills: 0 | Status: SHIPPED | OUTPATIENT
Start: 2024-08-31 | End: 2024-09-10

## 2024-08-31 RX ORDER — IPRATROPIUM BROMIDE AND ALBUTEROL SULFATE 2.5; .5 MG/3ML; MG/3ML
3 SOLUTION RESPIRATORY (INHALATION)
Qty: 168 ML | Refills: 0 | Status: SHIPPED | OUTPATIENT
Start: 2024-08-31 | End: 2024-09-14

## 2024-08-31 RX ORDER — ALBUTEROL SULFATE 90 UG/1
1-2 AEROSOL, METERED RESPIRATORY (INHALATION) EVERY 4 HOURS PRN
Qty: 18 G | Refills: 0 | Status: SHIPPED | OUTPATIENT
Start: 2024-08-31

## 2024-08-31 RX ADMIN — IPRATROPIUM BROMIDE 0.5 MG: 0.5 SOLUTION RESPIRATORY (INHALATION) at 20:40

## 2024-08-31 RX ADMIN — ALBUTEROL SULFATE 2.5 MG: 2.5 SOLUTION RESPIRATORY (INHALATION) at 20:40

## 2024-08-31 ASSESSMENT — PAIN - FUNCTIONAL ASSESSMENT
PAIN_FUNCTIONAL_ASSESSMENT: PREVENTS OR INTERFERES SOME ACTIVE ACTIVITIES AND ADLS
PAIN_FUNCTIONAL_ASSESSMENT: 0-10

## 2024-08-31 ASSESSMENT — ENCOUNTER SYMPTOMS
RHINORRHEA: 1
WHEEZING: 1
VOMITING: 0
NAUSEA: 0
SHORTNESS OF BREATH: 1
ABDOMINAL PAIN: 0

## 2024-08-31 ASSESSMENT — PAIN DESCRIPTION - LOCATION: LOCATION: CHEST

## 2024-08-31 ASSESSMENT — PAIN DESCRIPTION - PAIN TYPE: TYPE: ACUTE PAIN

## 2024-08-31 ASSESSMENT — PAIN DESCRIPTION - ORIENTATION: ORIENTATION: MID

## 2024-08-31 ASSESSMENT — PAIN SCALES - GENERAL: PAINLEVEL_OUTOF10: 4

## 2024-08-31 NOTE — ED NOTES
Pt presents to the ED by EMS c/o SOB and asthma exacerbation.  Pt states she has been out of her inhalers at home.  Pt received an albuterol tx x 1, a duo neb x 1, and solumedrol enroute to the hospital by EMS.  Pt A&O x 4, does not appear in acute distress, RR even but labored.  Pt placed in a gown, on continuous monitor with alarms set, vitals and EKG obtained.   medical hx and allergies reviewed with pt.   Dr. Coburn at bedside.  Triage completed.

## 2024-09-01 LAB
EKG ATRIAL RATE: 96 BPM
EKG P AXIS: 28 DEGREES
EKG P-R INTERVAL: 136 MS
EKG Q-T INTERVAL: 350 MS
EKG QRS DURATION: 88 MS
EKG QTC CALCULATION (BAZETT): 442 MS
EKG R AXIS: 4 DEGREES
EKG T AXIS: 19 DEGREES
EKG VENTRICULAR RATE: 96 BPM

## 2024-09-01 NOTE — DISCHARGE INSTRUCTIONS
Take your prednisone as prescribed.  Take Pepcid (famotidine - over the counter) every day while you are taking the steroids.  If you are a diabetic, you should check your blood sugar more frequently while taking prednisone.  Use your inhaler or nebulizer as prescribed, or at minimum every 4 hours while you are having an asthma attack.    Being around people that smoke, gian houses, weather change can cause an asthma exacerbation.    PLEASE RETURN TO THE EMERGENCY DEPARTMENT IMMEDIATELY for worsening symptoms of shortness of breath, wheezing, change in the amount of sputum that you cough up or a change in the color of your sputum, using your inhaler more frequently or if your inhaler only lasts up to 2 hours, or if you develop any concerning symptoms such as: high fever not relieved by acetaminophen (Tylenol) and/or ibuprofen (Motrin / Advil), chills, shortness of breath, chest pain, feeling of your heart fluttering or racing, persistent nausea and/or vomiting, numbness, weakness or tingling in the arms or legs or change in color of the extremities, changes in mental status, persistent headache, blurry vision, unable to follow up with your physician, or other any other care or concern.

## 2024-09-01 NOTE — ED PROVIDER NOTES
Note Started: 9:21 PM EDT         [facility]     Emergency Department     Faculty Attestation    I performed a history and physical examination of the patient and discussed management with the resident. I reviewed the resident’s note and agree with the documented findings and plan of care. Any areas of disagreement are noted on the chart. I was personally present for the key portions of any procedures. I have documented in the chart those procedures where I was not present during the key portions. I have reviewed the emergency nurses triage note. I agree with the chief complaint, past medical history, past surgical history, allergies, medications, social and family history as documented unless otherwise noted below.        For Physician Assistant/ Nurse Practitioner cases/documentation I have personally evaluated this patient and have completed at least one if not all key elements of the E/M (history, physical exam, and MDM). Additional findings are as noted.  I have personally seen and evaluated the patient.  I find the patient's history and physical exam are consistent with the NP/PA documentation.  I agree with the care provided, treatment rendered, disposition and follow-up plan.    34-year-old female presenting with 3 days of shortness of breath.  Out of inhalers at home.  History of asthma.  Also complaining of left foot pain.  Has worsened over the last few days.  Has not had foot pain like this before.  No recent new exercise plan, new shoes, falls, or injury.    Exam:  General : Laying on the bed, awake, alert, and in no acute distress  CV : normal rate and regular rhythm  Lungs : Breathing comfortably on room air with no tachypnea, hypoxia, or increased work of breathing  Extremities: Tenderness at the insertion of the plantar fascia on the calcaneus to palpation.  No erythema or warmth.  No swelling.    DDx: Plantar fasciitis in the left foot, asthma exacerbation    Plan:  Patient 
mouth 2 times daily (with meals) 1/18/23   Earnest Bermeo, DO   aspirin-acetaminophen-caffeine (EXCEDRIN MIGRAINE) 250-250-65 MG per tablet Take 1 tablet by mouth every 8 hours as needed for Headaches 3/24/22 8/2/22  Yeimi Rothman MD       REVIEW OF SYSTEMS       Review of Systems   Constitutional:  Negative for fever.   HENT:  Positive for rhinorrhea. Negative for congestion.    Respiratory:  Positive for shortness of breath and wheezing.    Cardiovascular:  Negative for chest pain.   Gastrointestinal:  Negative for abdominal pain, nausea and vomiting.     PHYSICAL EXAM      INITIAL VITALS:   /77   Pulse 72   Temp 98.3 °F (36.8 °C) (Oral)   Resp 16   Ht 1.651 m (5' 5\")   Wt (!) 147.4 kg (325 lb)   LMP 07/31/2024 (Approximate)   SpO2 100%   BMI 54.08 kg/m²     Physical Exam  Constitutional:       General: She is not in acute distress.     Appearance: Normal appearance.   HENT:      Head: Normocephalic and atraumatic.      Right Ear: External ear normal.      Left Ear: External ear normal.      Nose: Nose normal.      Mouth/Throat:      Pharynx: Oropharynx is clear.   Eyes:      Conjunctiva/sclera: Conjunctivae normal.   Pulmonary:      Effort: Pulmonary effort is normal. No tachypnea or respiratory distress.      Breath sounds: Wheezing present.      Comments: Inspiratory and expiratory wheezes noted on auscultation.  No respiratory distress.  No significant tachypnea.  Saturating well on room air.  Musculoskeletal:         General: Normal range of motion.      Cervical back: Normal range of motion.   Skin:     General: Skin is warm and dry.   Neurological:      General: No focal deficit present.      Mental Status: She is alert. Mental status is at baseline.   Psychiatric:         Mood and Affect: Mood normal.         Behavior: Behavior normal.       DDX/DIAGNOSTIC RESULTS / EMERGENCY DEPARTMENT COURSE / MDM     Medical Decision Making  Patient is a 34-year-old female with past medical history

## 2024-09-05 ENCOUNTER — TELEPHONE (OUTPATIENT)
Dept: OBGYN CLINIC | Age: 34
End: 2024-09-05

## 2024-09-05 NOTE — TELEPHONE ENCOUNTER
Received recall letter with invalid address, [employer] is inactive. LVM for pt to return call to schedule overdue pap test.

## 2024-09-22 ENCOUNTER — HOSPITAL ENCOUNTER (OUTPATIENT)
Age: 34
Setting detail: OBSERVATION
Discharge: HOME | End: 2024-09-23
Attending: EMERGENCY MEDICINE | Admitting: EMERGENCY MEDICINE
Payer: COMMERCIAL

## 2024-09-22 ENCOUNTER — APPOINTMENT (OUTPATIENT)
Dept: GENERAL RADIOLOGY | Age: 34
End: 2024-09-22
Payer: COMMERCIAL

## 2024-09-22 DIAGNOSIS — J45.41 MODERATE PERSISTENT ASTHMA WITH EXACERBATION: Primary | ICD-10-CM

## 2024-09-22 PROBLEM — J45.901 ACUTE ASTHMA EXACERBATION: Status: ACTIVE | Noted: 2024-09-22

## 2024-09-22 LAB
ANION GAP SERPL CALCULATED.3IONS-SCNC: 14 MMOL/L (ref 9–16)
BASOPHILS # BLD: 0.05 K/UL (ref 0–0.2)
BASOPHILS NFR BLD: 1 % (ref 0–2)
BUN SERPL-MCNC: 10 MG/DL (ref 6–20)
CALCIUM SERPL-MCNC: 9 MG/DL (ref 8.6–10.4)
CHLORIDE SERPL-SCNC: 103 MMOL/L (ref 98–107)
CO2 SERPL-SCNC: 22 MMOL/L (ref 20–31)
CREAT SERPL-MCNC: 0.8 MG/DL (ref 0.5–0.9)
D DIMER PPP FEU-MCNC: 0.45 UG/ML FEU (ref 0–0.57)
EOSINOPHIL # BLD: 0.56 K/UL (ref 0–0.44)
EOSINOPHILS RELATIVE PERCENT: 6 % (ref 1–4)
ERYTHROCYTE [DISTWIDTH] IN BLOOD BY AUTOMATED COUNT: 13.2 % (ref 11.8–14.4)
GFR, ESTIMATED: >90 ML/MIN/1.73M2
GLUCOSE SERPL-MCNC: 224 MG/DL (ref 74–99)
HCG SERPL QL: NEGATIVE
HCT VFR BLD AUTO: 38.9 % (ref 36.3–47.1)
HGB BLD-MCNC: 12.5 G/DL (ref 11.9–15.1)
IMM GRANULOCYTES # BLD AUTO: 0.04 K/UL (ref 0–0.3)
IMM GRANULOCYTES NFR BLD: 0 %
LYMPHOCYTES NFR BLD: 2.37 K/UL (ref 1.1–3.7)
LYMPHOCYTES RELATIVE PERCENT: 25 % (ref 24–43)
MCH RBC QN AUTO: 29.3 PG (ref 25.2–33.5)
MCHC RBC AUTO-ENTMCNC: 32.1 G/DL (ref 28.4–34.8)
MCV RBC AUTO: 91.1 FL (ref 82.6–102.9)
MONOCYTES NFR BLD: 0.65 K/UL (ref 0.1–1.2)
MONOCYTES NFR BLD: 7 % (ref 3–12)
NEUTROPHILS NFR BLD: 61 % (ref 36–65)
NEUTS SEG NFR BLD: 5.72 K/UL (ref 1.5–8.1)
NRBC BLD-RTO: 0 PER 100 WBC
PLATELET # BLD AUTO: 317 K/UL (ref 138–453)
PMV BLD AUTO: 10.4 FL (ref 8.1–13.5)
POTASSIUM SERPL-SCNC: 4 MMOL/L (ref 3.7–5.3)
RBC # BLD AUTO: 4.27 M/UL (ref 3.95–5.11)
SARS-COV-2 RDRP RESP QL NAA+PROBE: NOT DETECTED
SODIUM SERPL-SCNC: 139 MMOL/L (ref 136–145)
SPECIMEN DESCRIPTION: NORMAL
TROPONIN I SERPL HS-MCNC: <6 NG/L (ref 0–14)
WBC OTHER # BLD: 9.4 K/UL (ref 3.5–11.3)

## 2024-09-22 PROCEDURE — 99285 EMERGENCY DEPT VISIT HI MDM: CPT

## 2024-09-22 PROCEDURE — 96366 THER/PROPH/DIAG IV INF ADDON: CPT

## 2024-09-22 PROCEDURE — 80048 BASIC METABOLIC PNL TOTAL CA: CPT

## 2024-09-22 PROCEDURE — 96365 THER/PROPH/DIAG IV INF INIT: CPT

## 2024-09-22 PROCEDURE — 85379 FIBRIN DEGRADATION QUANT: CPT

## 2024-09-22 PROCEDURE — 84484 ASSAY OF TROPONIN QUANT: CPT

## 2024-09-22 PROCEDURE — 2700000000 HC OXYGEN THERAPY PER DAY

## 2024-09-22 PROCEDURE — 71046 X-RAY EXAM CHEST 2 VIEWS: CPT

## 2024-09-22 PROCEDURE — G0378 HOSPITAL OBSERVATION PER HR: HCPCS

## 2024-09-22 PROCEDURE — 85025 COMPLETE CBC W/AUTO DIFF WBC: CPT

## 2024-09-22 PROCEDURE — 96374 THER/PROPH/DIAG INJ IV PUSH: CPT

## 2024-09-22 PROCEDURE — 94761 N-INVAS EAR/PLS OXIMETRY MLT: CPT

## 2024-09-22 PROCEDURE — 87635 SARS-COV-2 COVID-19 AMP PRB: CPT

## 2024-09-22 PROCEDURE — 6360000002 HC RX W HCPCS

## 2024-09-22 PROCEDURE — 93005 ELECTROCARDIOGRAM TRACING: CPT

## 2024-09-22 PROCEDURE — 94640 AIRWAY INHALATION TREATMENT: CPT

## 2024-09-22 PROCEDURE — 6370000000 HC RX 637 (ALT 250 FOR IP)

## 2024-09-22 PROCEDURE — 84703 CHORIONIC GONADOTROPIN ASSAY: CPT

## 2024-09-22 PROCEDURE — 96375 TX/PRO/DX INJ NEW DRUG ADDON: CPT

## 2024-09-22 PROCEDURE — 2580000003 HC RX 258

## 2024-09-22 RX ORDER — SODIUM CHLORIDE 9 MG/ML
INJECTION, SOLUTION INTRAVENOUS PRN
Status: DISCONTINUED | OUTPATIENT
Start: 2024-09-22 | End: 2024-09-23 | Stop reason: HOSPADM

## 2024-09-22 RX ORDER — ACETAMINOPHEN 325 MG/1
650 TABLET ORAL EVERY 4 HOURS PRN
Status: DISCONTINUED | OUTPATIENT
Start: 2024-09-22 | End: 2024-09-23 | Stop reason: HOSPADM

## 2024-09-22 RX ORDER — 0.9 % SODIUM CHLORIDE 0.9 %
1000 INTRAVENOUS SOLUTION INTRAVENOUS ONCE
Status: COMPLETED | OUTPATIENT
Start: 2024-09-22 | End: 2024-09-22

## 2024-09-22 RX ORDER — ENOXAPARIN SODIUM 100 MG/ML
30 INJECTION SUBCUTANEOUS 2 TIMES DAILY
Status: DISCONTINUED | OUTPATIENT
Start: 2024-09-22 | End: 2024-09-23 | Stop reason: HOSPADM

## 2024-09-22 RX ORDER — SODIUM CHLORIDE 0.9 % (FLUSH) 0.9 %
5-40 SYRINGE (ML) INJECTION PRN
Status: DISCONTINUED | OUTPATIENT
Start: 2024-09-22 | End: 2024-09-23 | Stop reason: HOSPADM

## 2024-09-22 RX ORDER — ONDANSETRON 2 MG/ML
4 INJECTION INTRAMUSCULAR; INTRAVENOUS EVERY 6 HOURS PRN
Status: DISCONTINUED | OUTPATIENT
Start: 2024-09-22 | End: 2024-09-23 | Stop reason: HOSPADM

## 2024-09-22 RX ORDER — MAGNESIUM SULFATE IN WATER 40 MG/ML
2000 INJECTION, SOLUTION INTRAVENOUS ONCE
Status: COMPLETED | OUTPATIENT
Start: 2024-09-22 | End: 2024-09-22

## 2024-09-22 RX ORDER — DEXAMETHASONE SODIUM PHOSPHATE 4 MG/ML
4 INJECTION, SOLUTION INTRA-ARTICULAR; INTRALESIONAL; INTRAMUSCULAR; INTRAVENOUS; SOFT TISSUE ONCE
Status: COMPLETED | OUTPATIENT
Start: 2024-09-22 | End: 2024-09-22

## 2024-09-22 RX ORDER — SODIUM CHLORIDE 0.9 % (FLUSH) 0.9 %
5-40 SYRINGE (ML) INJECTION EVERY 12 HOURS SCHEDULED
Status: DISCONTINUED | OUTPATIENT
Start: 2024-09-22 | End: 2024-09-23 | Stop reason: HOSPADM

## 2024-09-22 RX ORDER — ONDANSETRON 4 MG/1
4 TABLET, ORALLY DISINTEGRATING ORAL EVERY 8 HOURS PRN
Status: DISCONTINUED | OUTPATIENT
Start: 2024-09-22 | End: 2024-09-23 | Stop reason: HOSPADM

## 2024-09-22 RX ORDER — IPRATROPIUM BROMIDE AND ALBUTEROL SULFATE 2.5; .5 MG/3ML; MG/3ML
1 SOLUTION RESPIRATORY (INHALATION) PRN
Status: DISCONTINUED | OUTPATIENT
Start: 2024-09-22 | End: 2024-09-23 | Stop reason: HOSPADM

## 2024-09-22 RX ADMIN — DEXAMETHASONE SODIUM PHOSPHATE 4 MG: 4 INJECTION INTRA-ARTICULAR; INTRALESIONAL; INTRAMUSCULAR; INTRAVENOUS; SOFT TISSUE at 15:24

## 2024-09-22 RX ADMIN — MAGNESIUM SULFATE HEPTAHYDRATE 2000 MG: 40 INJECTION, SOLUTION INTRAVENOUS at 18:00

## 2024-09-22 RX ADMIN — IPRATROPIUM BROMIDE AND ALBUTEROL SULFATE 1 DOSE: .5; 2.5 SOLUTION RESPIRATORY (INHALATION) at 17:22

## 2024-09-22 RX ADMIN — SODIUM CHLORIDE 1000 ML: 9 INJECTION, SOLUTION INTRAVENOUS at 17:07

## 2024-09-23 VITALS
BODY MASS INDEX: 48.82 KG/M2 | TEMPERATURE: 97 F | WEIGHT: 293 LBS | HEIGHT: 65 IN | DIASTOLIC BLOOD PRESSURE: 73 MMHG | SYSTOLIC BLOOD PRESSURE: 97 MMHG | RESPIRATION RATE: 18 BRPM | OXYGEN SATURATION: 94 % | HEART RATE: 75 BPM

## 2024-09-23 PROBLEM — J45.901 ACUTE ASTHMA EXACERBATION: Status: RESOLVED | Noted: 2024-09-22 | Resolved: 2024-09-23

## 2024-09-23 LAB
EKG ATRIAL RATE: 109 BPM
EKG P AXIS: 38 DEGREES
EKG P-R INTERVAL: 130 MS
EKG Q-T INTERVAL: 360 MS
EKG QRS DURATION: 80 MS
EKG QTC CALCULATION (BAZETT): 484 MS
EKG R AXIS: -2 DEGREES
EKG T AXIS: 8 DEGREES
EKG VENTRICULAR RATE: 109 BPM
GLUCOSE BLD-MCNC: 239 MG/DL (ref 65–105)
GLUCOSE BLD-MCNC: 335 MG/DL (ref 65–105)

## 2024-09-23 PROCEDURE — 94760 N-INVAS EAR/PLS OXIMETRY 1: CPT

## 2024-09-23 PROCEDURE — G0378 HOSPITAL OBSERVATION PER HR: HCPCS

## 2024-09-23 PROCEDURE — 96372 THER/PROPH/DIAG INJ SC/IM: CPT

## 2024-09-23 PROCEDURE — 2700000000 HC OXYGEN THERAPY PER DAY

## 2024-09-23 PROCEDURE — 6370000000 HC RX 637 (ALT 250 FOR IP)

## 2024-09-23 PROCEDURE — 6360000002 HC RX W HCPCS

## 2024-09-23 PROCEDURE — 96376 TX/PRO/DX INJ SAME DRUG ADON: CPT

## 2024-09-23 PROCEDURE — 2580000003 HC RX 258

## 2024-09-23 PROCEDURE — 82947 ASSAY GLUCOSE BLOOD QUANT: CPT

## 2024-09-23 PROCEDURE — 94640 AIRWAY INHALATION TREATMENT: CPT

## 2024-09-23 RX ORDER — BUDESONIDE AND FORMOTEROL FUMARATE DIHYDRATE 160; 4.5 UG/1; UG/1
2 AEROSOL RESPIRATORY (INHALATION) 2 TIMES DAILY
Status: DISCONTINUED | OUTPATIENT
Start: 2024-09-23 | End: 2024-09-23 | Stop reason: HOSPADM

## 2024-09-23 RX ORDER — INSULIN LISPRO 100 [IU]/ML
0-4 INJECTION, SOLUTION INTRAVENOUS; SUBCUTANEOUS
Status: DISCONTINUED | OUTPATIENT
Start: 2024-09-23 | End: 2024-09-23 | Stop reason: HOSPADM

## 2024-09-23 RX ORDER — MONTELUKAST SODIUM 10 MG/1
10 TABLET ORAL NIGHTLY
Status: DISCONTINUED | OUTPATIENT
Start: 2024-09-23 | End: 2024-09-23 | Stop reason: HOSPADM

## 2024-09-23 RX ORDER — GLUCAGON 1 MG/ML
1 KIT INJECTION PRN
Status: DISCONTINUED | OUTPATIENT
Start: 2024-09-23 | End: 2024-09-23 | Stop reason: HOSPADM

## 2024-09-23 RX ORDER — IPRATROPIUM BROMIDE AND ALBUTEROL SULFATE 2.5; .5 MG/3ML; MG/3ML
1 SOLUTION RESPIRATORY (INHALATION)
Status: DISCONTINUED | OUTPATIENT
Start: 2024-09-23 | End: 2024-09-23 | Stop reason: HOSPADM

## 2024-09-23 RX ORDER — IPRATROPIUM BROMIDE AND ALBUTEROL SULFATE 2.5; .5 MG/3ML; MG/3ML
1 SOLUTION RESPIRATORY (INHALATION)
Status: DISCONTINUED | OUTPATIENT
Start: 2024-09-23 | End: 2024-09-23

## 2024-09-23 RX ORDER — DEXAMETHASONE SODIUM PHOSPHATE 4 MG/ML
4 INJECTION, SOLUTION INTRA-ARTICULAR; INTRALESIONAL; INTRAMUSCULAR; INTRAVENOUS; SOFT TISSUE EVERY 12 HOURS
Status: DISCONTINUED | OUTPATIENT
Start: 2024-09-23 | End: 2024-09-23 | Stop reason: HOSPADM

## 2024-09-23 RX ORDER — DEXTROSE MONOHYDRATE 100 MG/ML
INJECTION, SOLUTION INTRAVENOUS CONTINUOUS PRN
Status: DISCONTINUED | OUTPATIENT
Start: 2024-09-23 | End: 2024-09-23 | Stop reason: HOSPADM

## 2024-09-23 RX ORDER — PANTOPRAZOLE SODIUM 40 MG/1
40 TABLET, DELAYED RELEASE ORAL DAILY
Status: DISCONTINUED | OUTPATIENT
Start: 2024-09-23 | End: 2024-09-23 | Stop reason: HOSPADM

## 2024-09-23 RX ORDER — INSULIN LISPRO 100 [IU]/ML
0-4 INJECTION, SOLUTION INTRAVENOUS; SUBCUTANEOUS NIGHTLY
Status: DISCONTINUED | OUTPATIENT
Start: 2024-09-23 | End: 2024-09-23 | Stop reason: HOSPADM

## 2024-09-23 RX ORDER — GUAIFENESIN 600 MG/1
600 TABLET, EXTENDED RELEASE ORAL ONCE
Status: COMPLETED | OUTPATIENT
Start: 2024-09-23 | End: 2024-09-23

## 2024-09-23 RX ORDER — BENZONATATE 100 MG/1
100 CAPSULE ORAL ONCE
Status: COMPLETED | OUTPATIENT
Start: 2024-09-23 | End: 2024-09-23

## 2024-09-23 RX ADMIN — IPRATROPIUM BROMIDE AND ALBUTEROL SULFATE 1 DOSE: .5; 2.5 SOLUTION RESPIRATORY (INHALATION) at 14:40

## 2024-09-23 RX ADMIN — BENZONATATE 100 MG: 100 CAPSULE ORAL at 07:12

## 2024-09-23 RX ADMIN — PANTOPRAZOLE SODIUM 40 MG: 40 TABLET, DELAYED RELEASE ORAL at 08:31

## 2024-09-23 RX ADMIN — BUDESONIDE AND FORMOTEROL FUMARATE DIHYDRATE 2 PUFF: 160; 4.5 AEROSOL RESPIRATORY (INHALATION) at 09:26

## 2024-09-23 RX ADMIN — ACETAMINOPHEN 650 MG: 325 TABLET ORAL at 00:43

## 2024-09-23 RX ADMIN — INSULIN LISPRO 1 UNITS: 100 INJECTION, SOLUTION INTRAVENOUS; SUBCUTANEOUS at 08:24

## 2024-09-23 RX ADMIN — IPRATROPIUM BROMIDE AND ALBUTEROL SULFATE 1 DOSE: 2.5; .5 SOLUTION RESPIRATORY (INHALATION) at 09:25

## 2024-09-23 RX ADMIN — INSULIN LISPRO 3 UNITS: 100 INJECTION, SOLUTION INTRAVENOUS; SUBCUTANEOUS at 12:28

## 2024-09-23 RX ADMIN — METFORMIN HYDROCHLORIDE 500 MG: 500 TABLET ORAL at 08:25

## 2024-09-23 RX ADMIN — SODIUM CHLORIDE, PRESERVATIVE FREE 10 ML: 5 INJECTION INTRAVENOUS at 08:25

## 2024-09-23 RX ADMIN — DEXAMETHASONE SODIUM PHOSPHATE 4 MG: 4 INJECTION INTRA-ARTICULAR; INTRALESIONAL; INTRAMUSCULAR; INTRAVENOUS; SOFT TISSUE at 08:25

## 2024-09-23 RX ADMIN — ENOXAPARIN SODIUM 30 MG: 100 INJECTION SUBCUTANEOUS at 08:25

## 2024-09-23 RX ADMIN — SODIUM CHLORIDE, PRESERVATIVE FREE 10 ML: 5 INJECTION INTRAVENOUS at 00:38

## 2024-09-23 RX ADMIN — GUAIFENESIN 600 MG: 600 TABLET, EXTENDED RELEASE ORAL at 07:12

## 2024-09-23 ASSESSMENT — PAIN SCALES - GENERAL: PAINLEVEL_OUTOF10: 5

## 2024-09-23 ASSESSMENT — PAIN SCALES - WONG BAKER
WONGBAKER_NUMERICALRESPONSE: NO HURT

## 2024-09-23 ASSESSMENT — PAIN DESCRIPTION - LOCATION: LOCATION: EAR

## 2024-09-23 ASSESSMENT — PAIN DESCRIPTION - ORIENTATION: ORIENTATION: RIGHT;LEFT

## 2024-09-23 ASSESSMENT — PAIN DESCRIPTION - DESCRIPTORS: DESCRIPTORS: ACHING

## 2024-10-15 ENCOUNTER — HOSPITAL ENCOUNTER (EMERGENCY)
Age: 34
Discharge: HOME | End: 2024-10-15
Attending: EMERGENCY MEDICINE
Payer: COMMERCIAL

## 2024-10-15 ENCOUNTER — APPOINTMENT (OUTPATIENT)
Dept: CT IMAGING | Age: 34
End: 2024-10-15
Payer: COMMERCIAL

## 2024-10-15 VITALS
SYSTOLIC BLOOD PRESSURE: 135 MMHG | RESPIRATION RATE: 19 BRPM | TEMPERATURE: 97.8 F | DIASTOLIC BLOOD PRESSURE: 91 MMHG | OXYGEN SATURATION: 95 % | HEART RATE: 80 BPM

## 2024-10-15 DIAGNOSIS — R10.84 GENERALIZED ABDOMINAL PAIN: Primary | ICD-10-CM

## 2024-10-15 LAB
ALBUMIN SERPL-MCNC: 4.1 G/DL (ref 3.5–5.2)
ALBUMIN/GLOB SERPL: 1 {RATIO} (ref 1–2.5)
ALP SERPL-CCNC: 125 U/L (ref 35–104)
ALT SERPL-CCNC: 34 U/L (ref 10–35)
ANION GAP SERPL CALCULATED.3IONS-SCNC: 11 MMOL/L (ref 9–16)
AST SERPL-CCNC: 41 U/L (ref 10–35)
ATYPICAL LYMPHOCYTE ABSOLUTE COUNT: 0.24 K/UL
ATYPICAL LYMPHOCYTES: 2 %
BACTERIA URNS QL MICRO: ABNORMAL
BASOPHILS # BLD: 0.12 K/UL (ref 0–0.2)
BASOPHILS NFR BLD: 1 % (ref 0–2)
BILIRUB SERPL-MCNC: 0.5 MG/DL (ref 0–1.2)
BILIRUB UR QL STRIP: NEGATIVE
BUN SERPL-MCNC: 6 MG/DL (ref 6–20)
CALCIUM SERPL-MCNC: 9.3 MG/DL (ref 8.6–10.4)
CANDIDA SPECIES: NEGATIVE
CASTS #/AREA URNS LPF: ABNORMAL /LPF (ref 0–8)
CHLORIDE SERPL-SCNC: 101 MMOL/L (ref 98–107)
CLARITY UR: CLEAR
CO2 SERPL-SCNC: 25 MMOL/L (ref 20–31)
COLOR UR: YELLOW
CREAT SERPL-MCNC: 0.7 MG/DL (ref 0.5–0.9)
EOSINOPHIL # BLD: 0.47 K/UL (ref 0–0.4)
EOSINOPHILS RELATIVE PERCENT: 4 % (ref 1–4)
EPI CELLS #/AREA URNS HPF: ABNORMAL /HPF (ref 0–5)
ERYTHROCYTE [DISTWIDTH] IN BLOOD BY AUTOMATED COUNT: 12.9 % (ref 11.8–14.4)
GARDNERELLA VAGINALIS: POSITIVE
GFR, ESTIMATED: >90 ML/MIN/1.73M2
GLUCOSE SERPL-MCNC: 255 MG/DL (ref 74–99)
GLUCOSE UR STRIP-MCNC: ABNORMAL MG/DL
HCG SERPL QL: NEGATIVE
HCT VFR BLD AUTO: 41 % (ref 36.3–47.1)
HGB BLD-MCNC: 13.3 G/DL (ref 11.9–15.1)
HGB UR QL STRIP.AUTO: NEGATIVE
IMM GRANULOCYTES # BLD AUTO: 0 K/UL (ref 0–0.3)
IMM GRANULOCYTES NFR BLD: 0 %
KETONES UR STRIP-MCNC: NEGATIVE MG/DL
LEUKOCYTE ESTERASE UR QL STRIP: NEGATIVE
LIPASE SERPL-CCNC: 27 U/L (ref 13–60)
LYMPHOCYTES NFR BLD: 1.89 K/UL (ref 1–4.8)
LYMPHOCYTES RELATIVE PERCENT: 16 % (ref 24–44)
MCH RBC QN AUTO: 29.8 PG (ref 25.2–33.5)
MCHC RBC AUTO-ENTMCNC: 32.4 G/DL (ref 28.4–34.8)
MCV RBC AUTO: 91.9 FL (ref 82.6–102.9)
MONOCYTES NFR BLD: 1.06 K/UL (ref 0.1–0.8)
MONOCYTES NFR BLD: 9 % (ref 1–7)
MORPHOLOGY: NORMAL
NEUTROPHILS NFR BLD: 68 % (ref 36–66)
NEUTS SEG NFR BLD: 8.02 K/UL (ref 1.8–7.7)
NITRITE UR QL STRIP: NEGATIVE
NRBC BLD-RTO: 0 PER 100 WBC
PH UR STRIP: 6 [PH] (ref 5–8)
PLATELET # BLD AUTO: 339 K/UL (ref 138–453)
PMV BLD AUTO: 10.7 FL (ref 8.1–13.5)
POTASSIUM SERPL-SCNC: 3.7 MMOL/L (ref 3.7–5.3)
PROT SERPL-MCNC: 7.5 G/DL (ref 6.6–8.7)
PROT UR STRIP-MCNC: NEGATIVE MG/DL
RBC # BLD AUTO: 4.46 M/UL (ref 3.95–5.11)
RBC #/AREA URNS HPF: ABNORMAL /HPF (ref 0–4)
SODIUM SERPL-SCNC: 137 MMOL/L (ref 136–145)
SOURCE: ABNORMAL
SP GR UR STRIP: 1.01 (ref 1–1.03)
TRICHOMONAS: NEGATIVE
UROBILINOGEN UR STRIP-ACNC: NORMAL EU/DL (ref 0–1)
WBC #/AREA URNS HPF: ABNORMAL /HPF (ref 0–5)
WBC OTHER # BLD: 11.8 K/UL (ref 3.5–11.3)

## 2024-10-15 PROCEDURE — 84703 CHORIONIC GONADOTROPIN ASSAY: CPT

## 2024-10-15 PROCEDURE — 6360000004 HC RX CONTRAST MEDICATION

## 2024-10-15 PROCEDURE — 87480 CANDIDA DNA DIR PROBE: CPT

## 2024-10-15 PROCEDURE — 99285 EMERGENCY DEPT VISIT HI MDM: CPT | Performed by: EMERGENCY MEDICINE

## 2024-10-15 PROCEDURE — 74177 CT ABD & PELVIS W/CONTRAST: CPT

## 2024-10-15 PROCEDURE — 81001 URINALYSIS AUTO W/SCOPE: CPT

## 2024-10-15 PROCEDURE — 85025 COMPLETE CBC W/AUTO DIFF WBC: CPT

## 2024-10-15 PROCEDURE — 87660 TRICHOMONAS VAGIN DIR PROBE: CPT

## 2024-10-15 PROCEDURE — 80053 COMPREHEN METABOLIC PANEL: CPT

## 2024-10-15 PROCEDURE — 87510 GARDNER VAG DNA DIR PROBE: CPT

## 2024-10-15 PROCEDURE — 87491 CHLMYD TRACH DNA AMP PROBE: CPT

## 2024-10-15 PROCEDURE — 83690 ASSAY OF LIPASE: CPT

## 2024-10-15 PROCEDURE — 87591 N.GONORRHOEAE DNA AMP PROB: CPT

## 2024-10-15 RX ORDER — METRONIDAZOLE 500 MG/1
500 TABLET ORAL 2 TIMES DAILY
Qty: 14 TABLET | Refills: 0 | Status: SHIPPED | OUTPATIENT
Start: 2024-10-15 | End: 2024-10-22

## 2024-10-15 RX ORDER — METRONIDAZOLE 500 MG/1
500 TABLET ORAL 2 TIMES DAILY
Qty: 14 TABLET | Refills: 0 | Status: SHIPPED | OUTPATIENT
Start: 2024-10-15 | End: 2024-10-15

## 2024-10-15 RX ORDER — IOPAMIDOL 755 MG/ML
75 INJECTION, SOLUTION INTRAVASCULAR
Status: COMPLETED | OUTPATIENT
Start: 2024-10-15 | End: 2024-10-15

## 2024-10-15 RX ADMIN — IOPAMIDOL 75 ML: 755 INJECTION, SOLUTION INTRAVENOUS at 18:28

## 2024-10-15 ASSESSMENT — PAIN DESCRIPTION - DESCRIPTORS: DESCRIPTORS: ACHING

## 2024-10-15 ASSESSMENT — PAIN DESCRIPTION - LOCATION: LOCATION: ABDOMEN

## 2024-10-15 ASSESSMENT — PAIN SCALES - GENERAL: PAINLEVEL_OUTOF10: 6

## 2024-10-15 ASSESSMENT — PAIN - FUNCTIONAL ASSESSMENT: PAIN_FUNCTIONAL_ASSESSMENT: 0-10

## 2024-10-15 NOTE — ED NOTES
Pt came in to the ed via triage due to having abdominal pain and flank pain   Pt has a hx of gallbladder stones   Pt is Aox4 and ambulatory   Pt has no other C/O   Pt stated she has not ate today and feels weak   Pt stated she still has her gallbladder   RR are equal and regular

## 2024-10-16 LAB
C TRACH DNA SPEC QL PROBE+SIG AMP: NEGATIVE
N GONORRHOEA DNA SPEC QL PROBE+SIG AMP: NEGATIVE
SPECIMEN DESCRIPTION: NORMAL

## 2024-10-16 ASSESSMENT — ENCOUNTER SYMPTOMS
NAUSEA: 1
BLOOD IN STOOL: 0
CONSTIPATION: 0
DIARRHEA: 0
ABDOMINAL PAIN: 1
COUGH: 0
VOMITING: 0
CHEST TIGHTNESS: 0
SHORTNESS OF BREATH: 0

## 2024-10-16 NOTE — ED PROVIDER NOTES
Faculty Sign-Out Attestation  Handoff taken on the following patient from prior Attending Physician: Francheska  Note Started: 11:00 PM EDT    I was available and discussed any additional care issues that arose and coordinated the management plans with the resident(s) caring for the patient during my duty period. Any areas of disagreement with resident’s documentation of care or procedures are noted on the chart. I was personally present for the key portions of any/all procedures during my duty period. I have documented in the chart those procedures where I was not present during the key portions.    Abdominal pain, hcg-, ct stable,   Pelvic swab >>>> BV + > flagyl, pt ate box Saturnino corral DO  Attending Physician      Saturnino Kearney DO  10/15/24 2525    
treatment plan and disposition of the patient as recorded by the APC.    Jerson Pritchard MD  Attending Emergency  Physician       Jerson Pritchard MD  10/15/24 5786    
schedule an an appointment to establish care with a PCP.    [facility] ED  [address] [address]  [phone]    If symptoms worsen      DISCHARGE MEDICATIONS:  Discharge Medication List as of 10/15/2024 11:13 PM        START taking these medications    Details   metroNIDAZOLE (FLAGYL) 500 MG tablet Take 1 tablet by mouth 2 times daily for 7 days, Disp-14 tablet, R-0Print             Maria Alejandra Gan MD  Emergency Medicine Resident    (Please note that portions of this note were completed with a voice recognition program.  Efforts were made to edit the dictations but occasionally words are mis-transcribed.)

## 2024-10-16 NOTE — DISCHARGE INSTRUCTIONS
You were evaluated in the emergency department for abdominal pain.  Your CT scan showed bilateral kidney stones which are stable at this time.  You your lab work showed you are positive for bacterial vaginosis.  You are provided with a prescription for an antibiotic called Flagyl, please take 1 tablet twice daily for 7 days.  Please complete the entire course of antibiotics.    Have also given a phone number to schedule an appointment with [facility] to establish care with a PCP.  Please follow-up with them in the next few days for further evaluation and management of your symptoms.    For pain use ibuprofen (Motrin) or acetaminophen (Tylenol), unless prescribed medications that have acetaminophen in it.  You can take over the counter acetaminophen tablets (1 - 2 tablets of the 500-mg strength every 6 hours) or ibuprofen tablets (2 tablets every 4 hours)..    Please return to the emergency department if you experience worsening abdominal pain, severe vomiting and are unable to tolerate any food or liquids, blood in your vomit or stool or urine, shortness of breath, chest pain, inability to urinate, vaginal bleeding or severe vaginal discharge, or if you have any new issue or concern.

## 2024-10-16 NOTE — ED NOTES
Black and white voucher used due to extended wait with [employer] to obtain a ride. Trip #  [id number]

## 2024-10-21 ENCOUNTER — HOSPITAL ENCOUNTER (INPATIENT)
Age: 34
LOS: 6 days | Discharge: HOME | DRG: 753 | End: 2024-10-27
Attending: EMERGENCY MEDICINE | Admitting: PSYCHIATRY & NEUROLOGY
Payer: COMMERCIAL

## 2024-10-21 DIAGNOSIS — R45.851 DEPRESSION WITH SUICIDAL IDEATION: Primary | ICD-10-CM

## 2024-10-21 DIAGNOSIS — F32.A DEPRESSION WITH SUICIDAL IDEATION: Primary | ICD-10-CM

## 2024-10-21 DIAGNOSIS — E11.9 TYPE 2 DIABETES MELLITUS WITHOUT COMPLICATION, WITHOUT LONG-TERM CURRENT USE OF INSULIN (HCC): ICD-10-CM

## 2024-10-21 LAB
ALBUMIN SERPL-MCNC: 3.5 G/DL (ref 3.5–5.2)
ALP SERPL-CCNC: 105 U/L (ref 35–104)
ALT SERPL-CCNC: 39 U/L (ref 10–35)
AMPHET UR QL SCN: NEGATIVE
ANION GAP SERPL CALCULATED.3IONS-SCNC: 9 MMOL/L (ref 9–16)
APAP SERPL-MCNC: <5 UG/ML (ref 10–30)
AST SERPL-CCNC: 53 U/L (ref 10–35)
BARBITURATES UR QL SCN: NEGATIVE
BASOPHILS # BLD: 0.1 K/UL (ref 0–0.2)
BASOPHILS NFR BLD: 1 % (ref 0–2)
BENZODIAZ UR QL: NEGATIVE
BILIRUB SERPL-MCNC: 0.3 MG/DL (ref 0–1.2)
BUN SERPL-MCNC: 8 MG/DL (ref 6–20)
CALCIUM SERPL-MCNC: 9.1 MG/DL (ref 8.6–10.4)
CANNABINOIDS UR QL SCN: NEGATIVE
CHLORIDE SERPL-SCNC: 103 MMOL/L (ref 98–107)
CO2 SERPL-SCNC: 24 MMOL/L (ref 20–31)
COCAINE UR QL SCN: NEGATIVE
CREAT SERPL-MCNC: 0.9 MG/DL (ref 0.7–1.2)
EOSINOPHIL # BLD: 0.5 K/UL (ref 0–0.4)
EOSINOPHILS RELATIVE PERCENT: 5 % (ref 0–4)
ERYTHROCYTE [DISTWIDTH] IN BLOOD BY AUTOMATED COUNT: 13.9 % (ref 11.5–14.9)
ETHANOL PERCENT: NORMAL %
ETHANOLAMINE SERPL-MCNC: <10 MG/DL (ref 0–0.08)
FENTANYL UR QL: NEGATIVE
GFR, ESTIMATED: 85 ML/MIN/1.73M2
GLUCOSE SERPL-MCNC: 213 MG/DL (ref 74–99)
HCT VFR BLD AUTO: 37.9 % (ref 36–46)
HGB BLD-MCNC: 12.9 G/DL (ref 12–16)
LYMPHOCYTES NFR BLD: 1.9 K/UL (ref 1–4.8)
LYMPHOCYTES RELATIVE PERCENT: 19 % (ref 24–44)
MCH RBC QN AUTO: 30.8 PG (ref 26–34)
MCHC RBC AUTO-ENTMCNC: 33.9 G/DL (ref 31–37)
MCV RBC AUTO: 90.8 FL (ref 80–100)
METHADONE UR QL: NEGATIVE
MONOCYTES NFR BLD: 0.7 K/UL (ref 0.1–1.3)
MONOCYTES NFR BLD: 7 % (ref 1–7)
NEUTROPHILS NFR BLD: 68 % (ref 36–66)
NEUTS SEG NFR BLD: 7.2 K/UL (ref 1.3–9.1)
OPIATES UR QL SCN: NEGATIVE
OXYCODONE UR QL SCN: NEGATIVE
PCP UR QL SCN: NEGATIVE
PLATELET # BLD AUTO: 340 K/UL (ref 150–450)
PMV BLD AUTO: 8.6 FL (ref 6–12)
POTASSIUM SERPL-SCNC: 3.9 MMOL/L (ref 3.7–5.3)
PROT SERPL-MCNC: 7.2 G/DL (ref 6.6–8.7)
RBC # BLD AUTO: 4.18 M/UL (ref 4–5.2)
SALICYLATES SERPL-MCNC: <1 MG/DL (ref 0–10)
SODIUM SERPL-SCNC: 136 MMOL/L (ref 136–145)
TEST INFORMATION: NORMAL
WBC OTHER # BLD: 10.4 K/UL (ref 3.5–11)

## 2024-10-21 PROCEDURE — 99285 EMERGENCY DEPT VISIT HI MDM: CPT

## 2024-10-21 PROCEDURE — 84443 ASSAY THYROID STIM HORMONE: CPT

## 2024-10-21 PROCEDURE — G0480 DRUG TEST DEF 1-7 CLASSES: HCPCS

## 2024-10-21 PROCEDURE — 83036 HEMOGLOBIN GLYCOSYLATED A1C: CPT

## 2024-10-21 PROCEDURE — 85025 COMPLETE CBC W/AUTO DIFF WBC: CPT

## 2024-10-21 PROCEDURE — 80307 DRUG TEST PRSMV CHEM ANLYZR: CPT

## 2024-10-21 PROCEDURE — 36415 COLL VENOUS BLD VENIPUNCTURE: CPT

## 2024-10-21 PROCEDURE — 80143 DRUG ASSAY ACETAMINOPHEN: CPT

## 2024-10-21 PROCEDURE — 80053 COMPREHEN METABOLIC PANEL: CPT

## 2024-10-21 PROCEDURE — 6370000000 HC RX 637 (ALT 250 FOR IP): Performed by: PSYCHIATRY & NEUROLOGY

## 2024-10-21 PROCEDURE — 1240000000 HC EMOTIONAL WELLNESS R&B

## 2024-10-21 PROCEDURE — 80179 DRUG ASSAY SALICYLATE: CPT

## 2024-10-21 RX ORDER — TRAZODONE HYDROCHLORIDE 50 MG/1
50 TABLET, FILM COATED ORAL NIGHTLY PRN
Status: DISCONTINUED | OUTPATIENT
Start: 2024-10-21 | End: 2024-10-27 | Stop reason: HOSPADM

## 2024-10-21 RX ORDER — ESCITALOPRAM OXALATE 20 MG/1
20 TABLET ORAL NIGHTLY
Status: ON HOLD | COMMUNITY
End: 2024-10-26 | Stop reason: HOSPADM

## 2024-10-21 RX ORDER — ACETAMINOPHEN 325 MG/1
650 TABLET ORAL EVERY 6 HOURS PRN
Status: DISCONTINUED | OUTPATIENT
Start: 2024-10-21 | End: 2024-10-27 | Stop reason: HOSPADM

## 2024-10-21 RX ORDER — POLYETHYLENE GLYCOL 3350 17 G
2 POWDER IN PACKET (EA) ORAL
Status: DISCONTINUED | OUTPATIENT
Start: 2024-10-21 | End: 2024-10-27 | Stop reason: HOSPADM

## 2024-10-21 RX ORDER — POLYETHYLENE GLYCOL 3350 17 G/17G
17 POWDER, FOR SOLUTION ORAL DAILY PRN
Status: DISCONTINUED | OUTPATIENT
Start: 2024-10-21 | End: 2024-10-27 | Stop reason: HOSPADM

## 2024-10-21 RX ORDER — DULOXETIN HYDROCHLORIDE 20 MG/1
20 CAPSULE, DELAYED RELEASE ORAL DAILY
Status: ON HOLD | COMMUNITY
End: 2024-10-26 | Stop reason: HOSPADM

## 2024-10-21 RX ORDER — HALOPERIDOL 5 MG/ML
5 INJECTION INTRAMUSCULAR EVERY 6 HOURS PRN
Status: DISCONTINUED | OUTPATIENT
Start: 2024-10-21 | End: 2024-10-27 | Stop reason: HOSPADM

## 2024-10-21 RX ORDER — MAGNESIUM HYDROXIDE/ALUMINUM HYDROXICE/SIMETHICONE 120; 1200; 1200 MG/30ML; MG/30ML; MG/30ML
30 SUSPENSION ORAL EVERY 6 HOURS PRN
Status: DISCONTINUED | OUTPATIENT
Start: 2024-10-21 | End: 2024-10-27 | Stop reason: HOSPADM

## 2024-10-21 RX ORDER — HYDROXYZINE HYDROCHLORIDE 50 MG/1
50 TABLET, FILM COATED ORAL 3 TIMES DAILY PRN
Status: DISCONTINUED | OUTPATIENT
Start: 2024-10-21 | End: 2024-10-27 | Stop reason: HOSPADM

## 2024-10-21 RX ORDER — HALOPERIDOL 5 MG/1
5 TABLET ORAL EVERY 6 HOURS PRN
Status: DISCONTINUED | OUTPATIENT
Start: 2024-10-21 | End: 2024-10-27 | Stop reason: HOSPADM

## 2024-10-21 RX ORDER — DIPHENHYDRAMINE HYDROCHLORIDE 50 MG/ML
50 INJECTION INTRAMUSCULAR; INTRAVENOUS EVERY 6 HOURS PRN
Status: DISCONTINUED | OUTPATIENT
Start: 2024-10-21 | End: 2024-10-27 | Stop reason: HOSPADM

## 2024-10-21 RX ORDER — IBUPROFEN 400 MG/1
400 TABLET, FILM COATED ORAL EVERY 6 HOURS PRN
Status: DISCONTINUED | OUTPATIENT
Start: 2024-10-21 | End: 2024-10-27 | Stop reason: HOSPADM

## 2024-10-21 RX ADMIN — ACETAMINOPHEN 650 MG: 325 TABLET ORAL at 19:25

## 2024-10-21 RX ADMIN — HYDROXYZINE HYDROCHLORIDE 50 MG: 50 TABLET, FILM COATED ORAL at 20:54

## 2024-10-21 RX ADMIN — TRAZODONE HYDROCHLORIDE 50 MG: 50 TABLET ORAL at 20:54

## 2024-10-21 ASSESSMENT — PAIN DESCRIPTION - DESCRIPTORS: DESCRIPTORS: ACHING

## 2024-10-21 ASSESSMENT — SLEEP AND FATIGUE QUESTIONNAIRES
DO YOU USE A SLEEP AID: NO
AVERAGE NUMBER OF SLEEP HOURS: 2
DO YOU HAVE DIFFICULTY SLEEPING: NO
DO YOU HAVE DIFFICULTY SLEEPING: YES
SLEEP PATTERN: DISTURBED/INTERRUPTED SLEEP
SLEEP PATTERN: NORMAL
AVERAGE NUMBER OF SLEEP HOURS: 12
DO YOU USE A SLEEP AID: NO

## 2024-10-21 ASSESSMENT — LIFESTYLE VARIABLES
HOW MANY STANDARD DRINKS CONTAINING ALCOHOL DO YOU HAVE ON A TYPICAL DAY: PATIENT DOES NOT DRINK
HOW MANY STANDARD DRINKS CONTAINING ALCOHOL DO YOU HAVE ON A TYPICAL DAY: PATIENT DOES NOT DRINK
HOW OFTEN DO YOU HAVE A DRINK CONTAINING ALCOHOL: NEVER
HOW OFTEN DO YOU HAVE A DRINK CONTAINING ALCOHOL: NEVER

## 2024-10-21 ASSESSMENT — PATIENT HEALTH QUESTIONNAIRE - PHQ9
SUM OF ALL RESPONSES TO PHQ QUESTIONS 1-9: 2
1. LITTLE INTEREST OR PLEASURE IN DOING THINGS: SEVERAL DAYS
SUM OF ALL RESPONSES TO PHQ QUESTIONS 1-9: 2
2. FEELING DOWN, DEPRESSED OR HOPELESS: SEVERAL DAYS
SUM OF ALL RESPONSES TO PHQ9 QUESTIONS 1 & 2: 2
SUM OF ALL RESPONSES TO PHQ QUESTIONS 1-9: 2
SUM OF ALL RESPONSES TO PHQ QUESTIONS 1-9: 2

## 2024-10-21 ASSESSMENT — PAIN SCALES - GENERAL: PAINLEVEL_OUTOF10: 5

## 2024-10-21 ASSESSMENT — PAIN DESCRIPTION - LOCATION: LOCATION: HEAD

## 2024-10-21 ASSESSMENT — PAIN SCALES - WONG BAKER: WONGBAKER_NUMERICALRESPONSE: NO HURT

## 2024-10-21 NOTE — ED TRIAGE NOTES
Mode of arrival (squad #, walk in, police, etc) : walk-in        Chief complaint(s): suicidal         Arrival Note (brief scenario, treatment PTA, etc).: Pt reports she is suicidal with a plan to cut her wrists due to losing  her grandma.         C= \"Have you ever felt that you should Cut down on your drinking?\"  No  A= \"Have people Annoyed you by criticizing your drinking?\"  No  G= \"Have you ever felt bad or Guilty about your drinking?\"  No  E= \"Have you ever had a drink as an Eye-opener first thing in the morning to steady your nerves or to help a hangover?\"  No      Deferred []      Reason for deferring: N/A    *If yes to two or more: probable alcohol abuse.*

## 2024-10-21 NOTE — ED PROVIDER NOTES
[facility] ED  eMERGENCY dEPARTMENT eNCOUnter    Pt Name: Meño Goodman  MRN: [id number]  Birthdate 1990  Date of evaluation: 10/21/24  CHIEF COMPLAINT       Chief Complaint   Patient presents with    Suicidal     HISTORY OF PRESENT ILLNESS   HPI  PMH of DM, bipolar, currently being treated for bacterial vaginitis.   Patient presenting with SI with plan to cut wrists. Denies HI, AVH.  This has been going on for the last month, triggered by the recent death of her grandmother. She is compliant with her psych meds sometimes. She is currently being treated for BV but no other physical complaints.     REVIEW OF SYSTEMS     Review of Systems   Constitutional:  Negative for chills and fever.   HENT:  Negative for congestion, ear pain and sore throat.    Eyes:  Negative for pain, redness and visual disturbance.   Respiratory:  Negative for cough, chest tightness and shortness of breath.    Cardiovascular:  Negative for chest pain and palpitations.   Gastrointestinal:  Negative for abdominal pain, constipation, diarrhea, nausea and vomiting.   Genitourinary:  Negative for dysuria and vaginal discharge.   Musculoskeletal:  Negative for back pain and neck pain.   Skin:  Negative for rash and wound.   Neurological:  Negative for seizures, syncope and headaches.   Psychiatric/Behavioral:  Positive for dysphoric mood and suicidal ideas.      PASTMEDICAL HISTORY     Past Medical History:   Diagnosis Date    Asthma     Back pain 2014    Bipolar 1 disorder (HCC)     Bipolar disorder (HCC) 2017    Depression     Diabetes mellitus (HCC)     Dizziness     Fatty liver disease, nonalcoholic     Fatty liver disease, nonalcoholic     GERD (gastroesophageal reflux disease)     Obesity      SURGICAL HISTORY       Past Surgical History:   Procedure Laterality Date     SECTION      LEEP       CURRENT MEDICATIONS       Previous Medications    ALBUTEROL SULFATE HFA (PROVENTIL HFA) 108 (90 BASE) MCG/ACT INHALER

## 2024-10-21 NOTE — ED NOTES
Provisional Diagnosis:   Depression with SI     Psychosocial and Contextual Factors:   Patient presents at the ED due to suicidal ideations with a plan to cut herself.     C-SSRS Summary:  X    Patient: X  Family:    Agency:      Substance Abuse: Weed    Present Suicidal Behavior:  X    Verbal: X    Attempt:     Past Suicidal Behavior: X    Verbal: X    Attempt: X      Self-Injurious/Self-Mutilation:       Violence Current or Past        Trauma Identified:       Protective Factors:    Patient has supportive , linked      Risk Factors:           Clinical Summary:    Meño Goodman is a 34 y.o. female who presents at the ED due to suicidal ideations with a plan to cut herself.     Patient reports suicidal thoughts for around a month. Patient reports a recent death in her family, her grandmother, that she is struggling with. Patient stated that her grandmother was someone that she would always go to for guidance.     Patient stated that she also misses her son who is currently in a group home due to his behaviors. Patient reports that she has not been allowed to see him.     Patient reports command hallucinations of voices telling her to kill herself. Patient reports hearing her grandmother as well but stated that she is not giving commands but rather trying to help her. Patient reports visual hallucinations of shadows and glowing eyes.     Patient follows with Bill. Patient reports that she takes her medication not always as prescribed. Patient reports that she has a therapist that she sees twice a month.     Patient last admitted to [facility] 03/13/2021-03/17/2021.    Patient denies homicidal ideations.     Patient tearful and cooperative while in ED.     Level of Care Disposition:    Writer consulted with Dr. Chopra who recommends inpatient psychiatric admission for safety and stabilization. Patient voluntary signed in.

## 2024-10-21 NOTE — BH NOTE
Pt is a non-smoker. Smoking cessation education not necessary.   You are emptying your bladder well and your urine dipped negative for infection.  Your pelvic floor muscles were weak on exam.    You have stress urinary incontinence (caused by a lack of bladder neck/urethral support leading to leaking when you cough, sneeze, laugh, lift something heavy or exercise); as well as urge incontinence (caused by spasms of the bladder, leading to urgency and frequency, and urine loss with an urge).  We call this combination mixed urinary incontinence.     Dr. Dixon discussed treatment options:  1) Physical Therapy will address both the stress and urge incontinence.  Work with the Women's Health Physical Therapist ins [place]. She would work with you on increasing your pelvic floor muscle strength and tone.  This may include pelvic floor exercises, biofeedback, and/or nerve-stim.  The pelvic floor muscles and connective tissue help hold the pelvic organs in place. When the muscles and connective tissue are strong, the urethra and bladder are well supported. This helps keep the urethra closed, so urine doesn’t leak.       2) Medication will treat urgency, frequency, and urge incontinence by easing bladder contractions. Side-effects can include dry mouth and constipation. You can also use lemon drops, or an over the counter oral rinse called Biotene. You may also take an over the counter stool softener called Docusate Sodium 100mg, taken once or twice daily.    3) Outpatient surgery only treats stress incontinence.  Working vaginally, Dr. Dixon would place a sling under the urethra, to prevent stress urinary incontinence.  The surgery would take about 30min and you would go home the same day.  ___________________________  A referral has been placed for you to work with Sameera Mcghee PT at [facility] in [place].  You may call their office at [phone] to schedule.  ___________________________    Dr. Dixon would recommend some additional testing of your bladder filling  and function called Urodynamics.  The test was reviewed with you and written information is attached for your reference.      We will need to check your urine prior to this test, so we would ask that you arrive about 15 minutes early for this appointment.  You will be directed to the restroom as soon as you check in.    Urodynamic Testing      What is Urodynamic testing?  This is a test done to find out how well your bladder and urethra are working.  (The urethra is the tube through which urine drains from the bladder.) The test  takes about 45 minutes, and is done here in the office.    How do I prepare for this test?  You will be asked to provide a urine specimen upon arrival at your appointment, to check that you do not have a bladder infection.  Please arrive 15 minutes prior to your scheduled appointment to do this.    How is the test done?  Two small catheters about the size of a spaghetti noodle will be used. One will  be placed in your bladder and the other in your vagina. These allow us to tell how  well the nerves and muscles hold and release urine.  The catheters will also let us fill your bladder with sterile water. During this  time you will be asked some questions about what you feel. A computer records  the activity.  At the end of the procedure you will be asked to empty your bladder on a special  chair that records urine flow.

## 2024-10-21 NOTE — BH NOTE
[facility] [place]  Admission Note     Admission Type:   Admission Type: Voluntary    Reason for admission:  Reason for Admission: Patient presents to the ED with suicidal idealizations with a plan to cut self due to the recent passing of her grandmother. Upon admission patient is still suicidal with a plan but contracts for safety. Patient admits to seeing and hearing her  grandmother. Patient is tearful and withdrawn.      Addictive Behavior:   Addictive Behavior  In the Past 3 Months, Have You Felt or Has Someone Told You That You Have a Problem With  : None    Medical Problems:   Past Medical History:   Diagnosis Date    Asthma     Back pain 2014    Bipolar 1 disorder (HCC)     Bipolar disorder (HCC) 2017    Depression     Diabetes mellitus (HCC)     Dizziness     Fatty liver disease, nonalcoholic     Fatty liver disease, nonalcoholic     GERD (gastroesophageal reflux disease)     Obesity        Status EXAM:  Mental Status and Behavioral Exam  Normal: No  Level of Assistance: Independent/Self  Facial Expression: Sad  Affect: Blunt  Level of Consciousness: Alert  Frequency of Checks: 4 times per hour, close  Mood:Normal: No  Mood: Depressed, Anxious, Helpless, Sad, Worthless, low self-esteem  Motor Activity:Normal: Yes  Eye Contact: Good  Observed Behavior: Cooperative, Preoccupied, Tearful, Withdrawn  Sexual Misconduct History: Current - no  Preception: [place] to person, [place] to time, [place] to place, [place] to situation  Attention:Normal: No  Attention: Distractible  Thought Processes: Circumstantial  Thought Content:Normal: No  Thought Content: Preoccupations  Depression Symptoms: Feelings of helplessness, Feelings of hopelessess, Feelings of worthlessness, Crying, Appetite change  Anxiety Symptoms: Generalized  Rosaura Symptoms: No problems reported or observed.  Hallucinations: Auditory (comment), Visual (comment) (seeing/hearing dead grandmother)  Delusions: No  Memory:Normal:

## 2024-10-21 NOTE — ED NOTES
The following labs labeled with pt sticker and tubed to lab:     [] Blue     [x] Lavender   [] on ice  [x] Green/yellow  [] Green/black [] on ice  [] Yellow  [] Red  [] Pink      [] COVID-19 swab    [] Rapid  [] PCR  [] Flu swab  [] Peds Viral Panel     [x] Urine Sample  [] Pelvic Cultures  [] Blood Cultures

## 2024-10-22 PROBLEM — F25.1 SCHIZOAFFECTIVE DISORDER, DEPRESSIVE TYPE (HCC): Status: ACTIVE | Noted: 2024-10-21

## 2024-10-22 LAB
EST. AVERAGE GLUCOSE BLD GHB EST-MCNC: 217 MG/DL
GLUCOSE BLD-MCNC: 210 MG/DL (ref 65–105)
GLUCOSE BLD-MCNC: 227 MG/DL (ref 65–105)
HBA1C MFR BLD: 9.2 % (ref 4–6)
TSH SERPL DL<=0.05 MIU/L-ACNC: 2.47 UIU/ML (ref 0.27–4.2)

## 2024-10-22 PROCEDURE — 1240000000 HC EMOTIONAL WELLNESS R&B

## 2024-10-22 PROCEDURE — 6370000000 HC RX 637 (ALT 250 FOR IP): Performed by: PSYCHIATRY & NEUROLOGY

## 2024-10-22 PROCEDURE — 82947 ASSAY GLUCOSE BLOOD QUANT: CPT

## 2024-10-22 PROCEDURE — 99222 1ST HOSP IP/OBS MODERATE 55: CPT | Performed by: INTERNAL MEDICINE

## 2024-10-22 PROCEDURE — APPSS60 APP SPLIT SHARED TIME 46-60 MINUTES

## 2024-10-22 PROCEDURE — 99222 1ST HOSP IP/OBS MODERATE 55: CPT | Performed by: PSYCHIATRY & NEUROLOGY

## 2024-10-22 PROCEDURE — 6370000000 HC RX 637 (ALT 250 FOR IP): Performed by: INTERNAL MEDICINE

## 2024-10-22 RX ORDER — INSULIN LISPRO 100 [IU]/ML
0-8 INJECTION, SOLUTION INTRAVENOUS; SUBCUTANEOUS
Status: DISCONTINUED | OUTPATIENT
Start: 2024-10-22 | End: 2024-10-27 | Stop reason: HOSPADM

## 2024-10-22 RX ORDER — MONTELUKAST SODIUM 10 MG/1
10 TABLET ORAL NIGHTLY
Status: DISCONTINUED | OUTPATIENT
Start: 2024-10-22 | End: 2024-10-27 | Stop reason: HOSPADM

## 2024-10-22 RX ORDER — METRONIDAZOLE 500 MG/1
500 TABLET ORAL 2 TIMES DAILY
Status: COMPLETED | OUTPATIENT
Start: 2024-10-22 | End: 2024-10-23

## 2024-10-22 RX ORDER — BUDESONIDE AND FORMOTEROL FUMARATE DIHYDRATE 160; 4.5 UG/1; UG/1
2 AEROSOL RESPIRATORY (INHALATION) 2 TIMES DAILY
Status: DISCONTINUED | OUTPATIENT
Start: 2024-10-22 | End: 2024-10-27 | Stop reason: HOSPADM

## 2024-10-22 RX ORDER — ALBUTEROL SULFATE 90 UG/1
2 INHALANT RESPIRATORY (INHALATION) EVERY 4 HOURS PRN
Status: DISCONTINUED | OUTPATIENT
Start: 2024-10-22 | End: 2024-10-27 | Stop reason: HOSPADM

## 2024-10-22 RX ORDER — ESCITALOPRAM OXALATE 10 MG/1
15 TABLET ORAL NIGHTLY
Status: DISCONTINUED | OUTPATIENT
Start: 2024-10-22 | End: 2024-10-23

## 2024-10-22 RX ORDER — ONDANSETRON 4 MG/1
4 TABLET, ORALLY DISINTEGRATING ORAL EVERY 8 HOURS PRN
Status: DISCONTINUED | OUTPATIENT
Start: 2024-10-22 | End: 2024-10-27 | Stop reason: HOSPADM

## 2024-10-22 RX ORDER — FLUTICASONE PROPIONATE 50 MCG
2 SPRAY, SUSPENSION (ML) NASAL DAILY
Status: DISCONTINUED | OUTPATIENT
Start: 2024-10-22 | End: 2024-10-27 | Stop reason: HOSPADM

## 2024-10-22 RX ORDER — DULOXETIN HYDROCHLORIDE 20 MG/1
40 CAPSULE, DELAYED RELEASE ORAL DAILY
Status: DISCONTINUED | OUTPATIENT
Start: 2024-10-22 | End: 2024-10-23

## 2024-10-22 RX ORDER — FAMOTIDINE 20 MG/1
20 TABLET, FILM COATED ORAL 2 TIMES DAILY
Status: DISCONTINUED | OUTPATIENT
Start: 2024-10-22 | End: 2024-10-27 | Stop reason: HOSPADM

## 2024-10-22 RX ORDER — DEXTROSE MONOHYDRATE 100 MG/ML
INJECTION, SOLUTION INTRAVENOUS CONTINUOUS PRN
Status: DISCONTINUED | OUTPATIENT
Start: 2024-10-22 | End: 2024-10-27 | Stop reason: HOSPADM

## 2024-10-22 RX ORDER — PANTOPRAZOLE SODIUM 40 MG/1
40 TABLET, DELAYED RELEASE ORAL DAILY
Status: DISCONTINUED | OUTPATIENT
Start: 2024-10-22 | End: 2024-10-27 | Stop reason: HOSPADM

## 2024-10-22 RX ADMIN — METFORMIN HYDROCHLORIDE 500 MG: 500 TABLET ORAL at 16:54

## 2024-10-22 RX ADMIN — HYDROXYZINE HYDROCHLORIDE 50 MG: 50 TABLET, FILM COATED ORAL at 21:01

## 2024-10-22 RX ADMIN — MONTELUKAST 10 MG: 10 TABLET, FILM COATED ORAL at 21:01

## 2024-10-22 RX ADMIN — METRONIDAZOLE 500 MG: 500 TABLET ORAL at 08:56

## 2024-10-22 RX ADMIN — FAMOTIDINE 20 MG: 20 TABLET, FILM COATED ORAL at 20:58

## 2024-10-22 RX ADMIN — PANTOPRAZOLE SODIUM 40 MG: 40 TABLET, DELAYED RELEASE ORAL at 08:56

## 2024-10-22 RX ADMIN — BUDESONIDE AND FORMOTEROL FUMARATE DIHYDRATE 2 PUFF: 160; 4.5 AEROSOL RESPIRATORY (INHALATION) at 08:56

## 2024-10-22 RX ADMIN — TRAZODONE HYDROCHLORIDE 50 MG: 50 TABLET ORAL at 21:00

## 2024-10-22 RX ADMIN — DULOXETINE HYDROCHLORIDE 40 MG: 20 CAPSULE, DELAYED RELEASE ORAL at 08:56

## 2024-10-22 RX ADMIN — FLUTICASONE PROPIONATE 2 SPRAY: 50 SPRAY, METERED NASAL at 11:00

## 2024-10-22 RX ADMIN — INSULIN LISPRO 2 UNITS: 100 INJECTION, SOLUTION INTRAVENOUS; SUBCUTANEOUS at 16:54

## 2024-10-22 RX ADMIN — METFORMIN HYDROCHLORIDE 500 MG: 500 TABLET ORAL at 08:56

## 2024-10-22 RX ADMIN — INSULIN LISPRO 2 UNITS: 100 INJECTION, SOLUTION INTRAVENOUS; SUBCUTANEOUS at 21:02

## 2024-10-22 RX ADMIN — FAMOTIDINE 20 MG: 20 TABLET, FILM COATED ORAL at 08:56

## 2024-10-22 RX ADMIN — ESCITALOPRAM OXALATE 15 MG: 10 TABLET ORAL at 21:03

## 2024-10-22 RX ADMIN — METRONIDAZOLE 500 MG: 500 TABLET ORAL at 20:59

## 2024-10-22 RX ADMIN — IBUPROFEN 400 MG: 400 TABLET, FILM COATED ORAL at 07:35

## 2024-10-22 RX ADMIN — BUDESONIDE AND FORMOTEROL FUMARATE DIHYDRATE 2 PUFF: 160; 4.5 AEROSOL RESPIRATORY (INHALATION) at 21:02

## 2024-10-22 RX ADMIN — ACETAMINOPHEN 650 MG: 325 TABLET ORAL at 11:03

## 2024-10-22 ASSESSMENT — PAIN DESCRIPTION - LOCATION
LOCATION: HEAD
LOCATION: BACK

## 2024-10-22 ASSESSMENT — PAIN SCALES - GENERAL: PAINLEVEL_OUTOF10: 8

## 2024-10-22 NOTE — H&P
and Seasonal    Social History:     Tobacco:    reports that she quit smoking about 9 years ago. Her smoking use included cigarettes. She started smoking about 24 years ago. She has never used smokeless tobacco.  Alcohol:      reports that she does not currently use alcohol.  Drug Use:  reports that she does not currently use drugs after having used the following drugs: Marijuana (Weed).    Family History:     Family History   Problem Relation Age of Onset    Hypertension Maternal Grandmother     Heart Disease Father     Early Death Father     Cancer Mother     High Blood Pressure Mother     Diabetes Mother     Heart Disease Mother     Stroke Maternal Aunt     Ovarian Cancer Maternal Aunt     Cancer Maternal Aunt         lung    Cancer Paternal Cousin         brain       Review of Systems:     Positive and Negative as described in HPI.    CONSTITUTIONAL:  negative for fevers, chills, sweats, fatigue, weight loss  HEENT:  negative for vision, hearing changes, runny nose, throat pain  RESPIRATORY:  negative for shortness of breath, cough, congestion, wheezing.  CARDIOVASCULAR:  negative for chest pain, palpitations.  GASTROINTESTINAL:  negative for nausea, vomiting, diarrhea, constipation, change in bowel habits, abdominal pain   GENITOURINARY:  negative for difficulty of urination, burning with urination, frequency   INTEGUMENT:  negative for rash, skin lesions, easy bruising   HEMATOLOGIC/LYMPHATIC:  negative for swelling/edema   ALLERGIC/IMMUNOLOGIC:  negative for urticaria , itching  ENDOCRINE:  negative increase in drinking, increase in urination, hot or cold intolerance  MUSCULOSKELETAL:  negative joint pains, muscle aches, swelling of joints  NEUROLOGICAL:  negative for headaches, dizziness, lightheadedness, numbness, pain, tingling extremities      Physical Exam:     /65   Pulse 65   Temp 97 °F (36.1 °C) (Temporal)   Resp 18   Ht 1.651 m (5' 5\")   Wt 136.1 kg (300 lb)   SpO2 96%   BMI 49.92 kg/m² 
signs and symptoms, patient will understand benefits/risks and potential side effects from proposed medications, and patient will understand their role in recovery.  Family is not active in patient's care.   Patient assets that may be helpful during treatment include: Intent to participate and engage in treatment, sufficient fund of knowledge and intellect to understand and utilize treatments.    OUTCOME QUESTIONNAIRE (OQ 30.2):  [x] Completed [] Patient too ill to participate    Goals:    1) Remission of depression with suicidal ideation.  2) Stabilization of symptoms prior to discharge.  3) Establish efficacy and tolerability of medications.       Behavioral Services  Medicare Certification     Admission Day 1  I certify that this patient's inpatient psychiatric hospital admission is medically necessary for:    x (1) treatment which could reasonably be expected to improve this patient's condition, or    x (2) diagnostic study or its equivalent.     Time Spent: 60 minutes     Physicians Signature:  Electronically signed by JUNE Mendez CNP on 10/22/24 at 8:57 AM EDT is a 34 y.o. female being evaluated     --JUNE Mendez CNP on 10/22/2024 at 8:57 AM    An electronic signature was used to authenticate this note.     Please note that this chart was generated using voice recognition Dragon dictation software.  Although every effort was made to ensure the accuracy of this automated transcription, some errors in transcription may have occurred.    I independently saw and evaluated the patient.  I reviewed the nurse practitioners documentation above.  Principle diagnosis we are treating for is Schizoaffective disorder, depressive type (HCC). Any additional comments or changes to the nurse practitioners documentation are stated below otherwise agree with assessment.  Plan will be as follows:  Patient agreeable to cross titrate off of Lexapro onto Cymbalta.  Will increase Cymbalta to 40 mg while dropping

## 2024-10-22 NOTE — GROUP NOTE
Group Therapy Note    Date: 10/22/2024    Group Start Time: 1430  Group End Time: 1500  Group Topic: Recovery    Selma Osullivan        Group Therapy Note    Attendees: 5/11         Patient's Goal:  learn ways to cope with recovery     Notes:  afternoon recovery group    Status After Intervention:  Improved    Participation Level: Active Listener and Interactive    Participation Quality: Appropriate and Attentive      Speech:  normal      Thought Process/Content: Logical      Affective Functioning: Congruent      Mood: euthymic      Level of consciousness:  Alert, Oriented x4, and Attentive      Response to Learning: Able to verbalize current knowledge/experience and Able to verbalize/acknowledge new learning      Endings: None Reported    Modes of Intervention: Education and Support      Discipline Responsible: Behavorial Health Tech      Signature:  Selma Matute

## 2024-10-22 NOTE — GROUP NOTE
Group Therapy Note    Date: 10/22/2024    Group Start Time: 1105  Group End Time: 1150  Group Topic: Cognitive Skills    [facility] Priscila Castañeda [facility]        Group Therapy Note    Attendees: 10/12       Patient's Goal:  To improve interpersonal skills and decision making through collaborating with peers and demonstrating self-expression.     Notes:  Patient attended and participated in group. Patient was able to collaborate with peers and demonstrate self-expression. Patient was pleasant and cooperative.     Status After Intervention:  Improved    Participation Level: Active Listener and Interactive    Participation Quality: Appropriate, Attentive, Sharing, and Supportive      Speech:  normal      Thought Process/Content: Logical and Linear      Affective Functioning: Blunted, brightened       Mood: Euthymic       Level of consciousness:  Alert and Attentive      Response to Learning: Able to verbalize current knowledge/experience, Able to retain information, Capable of insight, and Progressing to goal      Endings: None Reported    Modes of Intervention: Support, Socialization, Exploration, and Activity      Discipline Responsible: Psychoeducational Specialist      Signature:  FRANKY Cifuentes

## 2024-10-22 NOTE — GROUP NOTE
Group Therapy Note    Date: 10/22/2024    Group Start Time: 1005  Group End Time: 1100  Group Topic: Psychotherapy    [facility]    Jennifer Oleary MSW, CORBY        Group Therapy Note    Attendees: 10/15       Patient's Goal:  Increase interpersonal relationship skills utilizing talk therapy while discussing positive coping skills for Depression.      Status After Intervention:  Improved    Participation Level: Active Listener and Interactive    Participation Quality: Appropriate, Attentive, and Sharing      Speech:  normal      Thought Process/Content: Logical      Affective Functioning: Congruent      Mood: euthymic      Level of consciousness:  Alert      Response to Learning: Able to verbalize current knowledge/experience, Able to verbalize/acknowledge new learning, and Able to retain information      Endings: None Reported    Modes of Intervention: Education, Support, and Socialization      Discipline Responsible: /Counselor      Signature:  MASOUD Cruz LSW

## 2024-10-22 NOTE — GROUP NOTE
Group Therapy Note    Date: 10/22/2024    Group Start Time: 0915  Group End Time: 0940  Group Topic: Community Meeting    Shahid Jha, GERALD        Group Therapy Note    Attendees: 9/15       Patient's Goal:  coping skills     Notes:  goals group    Status After Intervention:  Improved    Participation Level: Active Listener and Interactive    Participation Quality: Appropriate, Attentive, and Sharing      Speech:  normal      Thought Process/Content: Logical  Linear      Affective Functioning: Congruent      Mood: euthymic      Level of consciousness:  Alert, Oriented x4, and Attentive      Response to Learning: Progressing to goal      Endings: None Reported    Modes of Intervention: Education      Discipline Responsible: Registered Nurse      Signature:  Shahid Mondragon RN

## 2024-10-23 LAB
GLUCOSE BLD-MCNC: 157 MG/DL (ref 65–105)
GLUCOSE BLD-MCNC: 163 MG/DL (ref 65–105)
GLUCOSE BLD-MCNC: 170 MG/DL (ref 65–105)
GLUCOSE BLD-MCNC: 228 MG/DL (ref 65–105)
GLUCOSE BLD-MCNC: 316 MG/DL (ref 65–105)

## 2024-10-23 PROCEDURE — 6370000000 HC RX 637 (ALT 250 FOR IP): Performed by: INTERNAL MEDICINE

## 2024-10-23 PROCEDURE — APPSS30 APP SPLIT SHARED TIME 16-30 MINUTES

## 2024-10-23 PROCEDURE — 1240000000 HC EMOTIONAL WELLNESS R&B

## 2024-10-23 PROCEDURE — 90833 PSYTX W PT W E/M 30 MIN: CPT | Performed by: PSYCHIATRY & NEUROLOGY

## 2024-10-23 PROCEDURE — 99232 SBSQ HOSP IP/OBS MODERATE 35: CPT | Performed by: INTERNAL MEDICINE

## 2024-10-23 PROCEDURE — 99232 SBSQ HOSP IP/OBS MODERATE 35: CPT | Performed by: PSYCHIATRY & NEUROLOGY

## 2024-10-23 PROCEDURE — 82947 ASSAY GLUCOSE BLOOD QUANT: CPT

## 2024-10-23 PROCEDURE — 6370000000 HC RX 637 (ALT 250 FOR IP): Performed by: PSYCHIATRY & NEUROLOGY

## 2024-10-23 RX ORDER — ESCITALOPRAM OXALATE 10 MG/1
10 TABLET ORAL NIGHTLY
Status: DISCONTINUED | OUTPATIENT
Start: 2024-10-23 | End: 2024-10-24

## 2024-10-23 RX ORDER — INSULIN GLARGINE 100 [IU]/ML
10 INJECTION, SOLUTION SUBCUTANEOUS DAILY
Status: DISCONTINUED | OUTPATIENT
Start: 2024-10-23 | End: 2024-10-27 | Stop reason: HOSPADM

## 2024-10-23 RX ORDER — DULOXETIN HYDROCHLORIDE 60 MG/1
60 CAPSULE, DELAYED RELEASE ORAL DAILY
Status: DISCONTINUED | OUTPATIENT
Start: 2024-10-24 | End: 2024-10-24

## 2024-10-23 RX ADMIN — INSULIN LISPRO 2 UNITS: 100 INJECTION, SOLUTION INTRAVENOUS; SUBCUTANEOUS at 20:57

## 2024-10-23 RX ADMIN — METFORMIN HYDROCHLORIDE 1000 MG: 1000 TABLET ORAL at 18:27

## 2024-10-23 RX ADMIN — FAMOTIDINE 20 MG: 20 TABLET, FILM COATED ORAL at 20:58

## 2024-10-23 RX ADMIN — DULOXETINE HYDROCHLORIDE 40 MG: 20 CAPSULE, DELAYED RELEASE ORAL at 09:33

## 2024-10-23 RX ADMIN — ESCITALOPRAM OXALATE 10 MG: 10 TABLET ORAL at 20:58

## 2024-10-23 RX ADMIN — INSULIN LISPRO 6 UNITS: 100 INJECTION, SOLUTION INTRAVENOUS; SUBCUTANEOUS at 12:01

## 2024-10-23 RX ADMIN — ACETAMINOPHEN 650 MG: 325 TABLET ORAL at 09:32

## 2024-10-23 RX ADMIN — PANTOPRAZOLE SODIUM 40 MG: 40 TABLET, DELAYED RELEASE ORAL at 09:33

## 2024-10-23 RX ADMIN — HYDROXYZINE HYDROCHLORIDE 50 MG: 50 TABLET, FILM COATED ORAL at 20:58

## 2024-10-23 RX ADMIN — ONDANSETRON 4 MG: 4 TABLET, ORALLY DISINTEGRATING ORAL at 09:40

## 2024-10-23 RX ADMIN — METRONIDAZOLE 500 MG: 500 TABLET ORAL at 09:33

## 2024-10-23 RX ADMIN — TRAZODONE HYDROCHLORIDE 50 MG: 50 TABLET ORAL at 20:58

## 2024-10-23 RX ADMIN — MONTELUKAST 10 MG: 10 TABLET, FILM COATED ORAL at 20:58

## 2024-10-23 RX ADMIN — BUDESONIDE AND FORMOTEROL FUMARATE DIHYDRATE 2 PUFF: 160; 4.5 AEROSOL RESPIRATORY (INHALATION) at 09:33

## 2024-10-23 RX ADMIN — FAMOTIDINE 20 MG: 20 TABLET, FILM COATED ORAL at 09:33

## 2024-10-23 RX ADMIN — METFORMIN HYDROCHLORIDE 500 MG: 500 TABLET ORAL at 09:33

## 2024-10-23 RX ADMIN — BUDESONIDE AND FORMOTEROL FUMARATE DIHYDRATE 2 PUFF: 160; 4.5 AEROSOL RESPIRATORY (INHALATION) at 20:57

## 2024-10-23 RX ADMIN — INSULIN GLARGINE 10 UNITS: 100 INJECTION, SOLUTION SUBCUTANEOUS at 18:26

## 2024-10-23 RX ADMIN — ACETAMINOPHEN 650 MG: 325 TABLET ORAL at 20:58

## 2024-10-23 ASSESSMENT — PAIN DESCRIPTION - LOCATION
LOCATION: HIP
LOCATION: BACK

## 2024-10-23 ASSESSMENT — PAIN DESCRIPTION - ORIENTATION
ORIENTATION: LOWER
ORIENTATION: RIGHT

## 2024-10-23 ASSESSMENT — PAIN SCALES - GENERAL: PAINLEVEL_OUTOF10: 6

## 2024-10-23 ASSESSMENT — PAIN DESCRIPTION - DESCRIPTORS: DESCRIPTORS: DISCOMFORT

## 2024-10-23 NOTE — GROUP NOTE
Group Therapy Note    Date: 10/23/2024    Group Start Time: 1320  Group End Time: 1420  Group Topic: Cognitive Skills    [facility] Suzan Carlson [facility]        Group Therapy Note    Attendees: 10/14     Patient's Goal: To increase socialization, practice self expression, explore opportunities for positive change   in thoughts, feelings, environment, activities, using creative expression and discussion.      Notes: Pt was pleasant and cooperative and participated in group fully. Pt was able to practice self   expression, explore opportunities for positive change in thoughts, feelings, environment, activities, using creative expression and discussion.       Pt shared individually and engaged in group discussion. Pt was supportive of peers and able to relate to  some of their ideas/experiences, and appreciate new ideas from peers.        Status After Intervention:  Improved     Participation Level: Active Listener,  sharing , supportive     Participation Quality: Appropriate,  Attentive, sharing , supportive     Speech: Normal     Thought Process/Content: Logical, linear r/t task and topic .      Affective Functioning: Congruent, brightened     Mood: Euthymic      Level of consciousness:  Alert, and Attentive      Response to Learning:  Able to verbalize current knowledge ,able to   verbalize/acknowledge new learning, and Progressing to goal      Endings: None Reported     Modes of Intervention: Education, Support, Socialization, Exploration, Clarifying and Problem-solving      Discipline Responsible: Psychoeducational Specialist      Signature:  FRANKY WATTERS

## 2024-10-23 NOTE — GROUP NOTE
Group Therapy Note    Date: 10/23/2024    Group Start Time: 1000  Group End Time: 1030  Group Topic: Psychoeducation    Shahid Busby        Group Therapy Note    Attendees: 10/15       Patient's Goal:  PT will demonstrate increased interpersonal interaction and participate in group activities of discussing journaling     Notes:  Patient was an active listener durinf group discussion on this date     Status After Intervention:  Improved    Participation Level: Active Listener    Participation Quality: Appropriate and Attentive      Speech:  normal      Thought Process/Content: Logical      Affective Functioning: Flat      Mood: depressed      Level of consciousness:  Alert, Oriented x4, and Attentive      Response to Learning: Able to verbalize/acknowledge new learning and Progressing to goal      Endings: None Reported    Modes of Intervention: Education, Support, and Socialization      Discipline Responsible: /Counselor      Signature:  Shahid Salter

## 2024-10-23 NOTE — GROUP NOTE
Group Therapy Note    Date: 10/23/2024    Group Start Time: 1100  Group End Time: 1145  Group Topic: Cognitive Skills    [facility] Suzan Carlson CTRS        Group Therapy Note    Attendees: 10/15     Patient's Goal:  To increase socialization, practice decision making skills, and communication skills.          Notes: Pt was pleasant and cooperative and participated in group fully. Pt was able to practice decision making skills, and communication skills independently.         Status After Intervention:  Improved     Participation Level: Active Listener,  sharing , supportive     Participation Quality:  Attentive , sharing , supportive     Speech:  Normal      Thought Process/Content: Logical , linear     Affective Functioning: Congruent, brightened with positive feedback from peers for being up and not isolating     Mood: Euthymic, enjoyed humor in group.       Level of consciousness:  Alert, and Attentive      Response to Learning:  Able to verbalize current knowledge, able to verbalize/acknowledge new   learning,  and Progressing to goal      Endings: None Reported     Modes of Intervention: Education, Support, Socialization, Exploration, Clarifying and Problem-solving      Discipline Responsible: Psychoeducational Specialist      Signature:  FRANKY WATTERS

## 2024-10-23 NOTE — GROUP NOTE
Group Therapy Note    Date: 10/23/2024    Group Start Time: 0900  Group End Time: 0945  Group Topic: Group Documentation    [facility] BHPatricia Green LPN        Group Therapy Note    Attendees: 9/16       Patient's Goal:  coping skills    Notes:  tolerated    Status After Intervention:  Improved    Participation Level: Active Listener and Interactive    Participation Quality: Attentive      Speech:  normal      Thought Process/Content: Logical      Affective Functioning: Congruent      Mood: euthymic      Level of consciousness:  Alert      Response to Learning: Capable of insight      Endings: None Reported    Modes of Intervention: Education and Support      Discipline Responsible: Licensed Practical Nurse      Signature:  Patricia Smith LPN

## 2024-10-24 LAB
GLUCOSE BLD-MCNC: 143 MG/DL (ref 65–105)
GLUCOSE BLD-MCNC: 163 MG/DL (ref 65–105)
GLUCOSE BLD-MCNC: 204 MG/DL (ref 65–105)
GLUCOSE BLD-MCNC: 225 MG/DL (ref 65–105)

## 2024-10-24 PROCEDURE — 90833 PSYTX W PT W E/M 30 MIN: CPT | Performed by: PSYCHIATRY & NEUROLOGY

## 2024-10-24 PROCEDURE — 6370000000 HC RX 637 (ALT 250 FOR IP): Performed by: PSYCHIATRY & NEUROLOGY

## 2024-10-24 PROCEDURE — 6370000000 HC RX 637 (ALT 250 FOR IP): Performed by: INTERNAL MEDICINE

## 2024-10-24 PROCEDURE — APPSS30 APP SPLIT SHARED TIME 16-30 MINUTES: Performed by: NURSE PRACTITIONER

## 2024-10-24 PROCEDURE — 82947 ASSAY GLUCOSE BLOOD QUANT: CPT

## 2024-10-24 PROCEDURE — 1240000000 HC EMOTIONAL WELLNESS R&B

## 2024-10-24 PROCEDURE — 99232 SBSQ HOSP IP/OBS MODERATE 35: CPT | Performed by: INTERNAL MEDICINE

## 2024-10-24 PROCEDURE — 99232 SBSQ HOSP IP/OBS MODERATE 35: CPT | Performed by: PSYCHIATRY & NEUROLOGY

## 2024-10-24 RX ORDER — ESCITALOPRAM OXALATE 10 MG/1
5 TABLET ORAL NIGHTLY
Status: DISCONTINUED | OUTPATIENT
Start: 2024-10-24 | End: 2024-10-25

## 2024-10-24 RX ADMIN — BUDESONIDE AND FORMOTEROL FUMARATE DIHYDRATE 2 PUFF: 160; 4.5 AEROSOL RESPIRATORY (INHALATION) at 08:41

## 2024-10-24 RX ADMIN — INSULIN LISPRO 2 UNITS: 100 INJECTION, SOLUTION INTRAVENOUS; SUBCUTANEOUS at 12:00

## 2024-10-24 RX ADMIN — IBUPROFEN 400 MG: 400 TABLET, FILM COATED ORAL at 08:48

## 2024-10-24 RX ADMIN — HYDROXYZINE HYDROCHLORIDE 50 MG: 50 TABLET, FILM COATED ORAL at 20:44

## 2024-10-24 RX ADMIN — METFORMIN HYDROCHLORIDE 1000 MG: 1000 TABLET ORAL at 08:41

## 2024-10-24 RX ADMIN — FAMOTIDINE 20 MG: 20 TABLET, FILM COATED ORAL at 20:44

## 2024-10-24 RX ADMIN — FLUTICASONE PROPIONATE 2 SPRAY: 50 SPRAY, METERED NASAL at 08:41

## 2024-10-24 RX ADMIN — PANTOPRAZOLE SODIUM 40 MG: 40 TABLET, DELAYED RELEASE ORAL at 08:42

## 2024-10-24 RX ADMIN — MONTELUKAST 10 MG: 10 TABLET, FILM COATED ORAL at 20:44

## 2024-10-24 RX ADMIN — ESCITALOPRAM OXALATE 5 MG: 10 TABLET ORAL at 20:44

## 2024-10-24 RX ADMIN — INSULIN GLARGINE 10 UNITS: 100 INJECTION, SOLUTION SUBCUTANEOUS at 08:43

## 2024-10-24 RX ADMIN — ACETAMINOPHEN 650 MG: 325 TABLET ORAL at 18:53

## 2024-10-24 RX ADMIN — TRAZODONE HYDROCHLORIDE 50 MG: 50 TABLET ORAL at 20:44

## 2024-10-24 RX ADMIN — INSULIN LISPRO 2 UNITS: 100 INJECTION, SOLUTION INTRAVENOUS; SUBCUTANEOUS at 20:43

## 2024-10-24 RX ADMIN — BUDESONIDE AND FORMOTEROL FUMARATE DIHYDRATE 2 PUFF: 160; 4.5 AEROSOL RESPIRATORY (INHALATION) at 20:44

## 2024-10-24 RX ADMIN — DULOXETINE HYDROCHLORIDE 60 MG: 60 CAPSULE, DELAYED RELEASE ORAL at 08:41

## 2024-10-24 RX ADMIN — FAMOTIDINE 20 MG: 20 TABLET, FILM COATED ORAL at 08:41

## 2024-10-24 ASSESSMENT — PAIN DESCRIPTION - DESCRIPTORS
DESCRIPTORS: ACHING;DISCOMFORT
DESCRIPTORS: ACHING;DISCOMFORT

## 2024-10-24 ASSESSMENT — PAIN DESCRIPTION - LOCATION
LOCATION: BACK
LOCATION: HEAD

## 2024-10-24 ASSESSMENT — PAIN - FUNCTIONAL ASSESSMENT
PAIN_FUNCTIONAL_ASSESSMENT: ACTIVITIES ARE NOT PREVENTED
PAIN_FUNCTIONAL_ASSESSMENT: ACTIVITIES ARE NOT PREVENTED

## 2024-10-24 ASSESSMENT — PAIN SCALES - GENERAL
PAINLEVEL_OUTOF10: 6
PAINLEVEL_OUTOF10: 3

## 2024-10-24 NOTE — GROUP NOTE
Group Therapy Note    Date: 10/24/2024    Group Start Time: 1100  Group End Time: 1155  Group Topic: Cognitive Skills    [facility] Suzan Carlson CTRS        Group Therapy Note    Attendees: 12/15     Patient's Goal: To increase socialization, practice decision making and communication skills, explore perception.         Notes: Pt was pleasant and cooperative and participated in group fully. Pt was able to practice decision   making and communication skills, explore perception independently.         Status After Intervention:  Improved     Participation Level: Active Listener,  sharing , supportive     Participation Quality: Appropriate,  Attentive, sharing , supportive     Speech: Normal     Thought Process/Content: Logical, linear r/t task and topic .      Affective Functioning: Congruent, brightened     Mood: Euthymic      Level of consciousness:  Alert, and Attentive      Response to Learning:  Able to verbalize current knowledge ,able to   verbalize/acknowledge new learning, and Progressing to goal      Endings: None Reported     Modes of Intervention: Education, Support, Socialization, Exploration, Clarifying and Problem-solving      Discipline Responsible: Psychoeducational Specialist      Signature:  FRANKY WATTERS

## 2024-10-24 NOTE — GROUP NOTE
Group Therapy Note    Date: 10/24/2024    Group Start Time: 1000  Group End Time: 1045  Group Topic: Psychoeducation    Shahid Busby        Group Therapy Note    Attendees: 7/14       Patient's Goal:    PT will demonstrate increased interpersonal interaction and participate in group activities of discussing ways to build happiness.     Notes:  Patient was an active listener during group discussion on this date.     Status After Intervention:  Improved    Participation Level: Active Listener and Interactive    Participation Quality: Appropriate, Attentive, and Sharing      Speech:  normal      Thought Process/Content: Logical      Affective Functioning: Flat      Mood: depressed      Level of consciousness:  Alert, Oriented x4, and Attentive      Response to Learning: Able to verbalize/acknowledge new learning and Progressing to goal      Endings: None Reported    Modes of Intervention: Education, Support, and Socialization      Discipline Responsible: /Counselor      Signature:  Shahid Salter

## 2024-10-24 NOTE — GROUP NOTE
Group Therapy Note    Date: 10/24/2024    Group Start Time: 1430  Group End Time: 1530  Group Topic: Cognitive Skills    [facility] Suzan Carlson CTRS        Group Therapy Note    Attendees: 7/15     Patient's Goal: To increase socialization, practice decision making and impulse control        Notes: Pt was pleasant and cooperative and participated in group fully. Pt was able to practice decision   making and impulse control independently.         Status After Intervention:  Improved     Participation Level: Active Listener,  sharing , supportive     Participation Quality: Appropriate,  Attentive, sharing , supportive     Speech: Normal     Thought Process/Content: Logical, linear r/t task and topic .      Affective Functioning: Congruent, brightened     Mood: Euthymic      Level of consciousness:  Alert, and Attentive      Response to Learning:  Able to verbalize current knowledge ,able to   verbalize/acknowledge new learning, and Progressing to goal      Endings: None Reported     Modes of Intervention: Education, Support, Socialization, Exploration, Clarifying and Problem-solving      Discipline Responsible: Psychoeducational Specialist      Signature:  FRANKY WATTERS

## 2024-10-24 NOTE — GROUP NOTE
Group Therapy Note    Date: 10/24/2024    Group Start Time: 0900  Group End Time: 0930  Group Topic: Community Meeting    Saira Navarrete LPN             Patient's Goal:  work with her medication         Status After Intervention:  Unchanged    Participation Level: Active Listener    Participation Quality: Appropriate      Speech:  normal      Thought Process/Content: Logical      Affective Functioning: Flat      Mood: anxious      Level of consciousness:  Alert      Response to Learning: Able to verbalize current knowledge/experience      Endings: None Reported    Modes of Intervention: Support      Discipline Responsible: Licensed Practical Nurse      Signature:  Saira Etienne LPN

## 2024-10-25 LAB
GLUCOSE BLD-MCNC: 151 MG/DL (ref 65–105)
GLUCOSE BLD-MCNC: 203 MG/DL (ref 65–105)
GLUCOSE BLD-MCNC: 248 MG/DL (ref 65–105)
GLUCOSE BLD-MCNC: 250 MG/DL (ref 65–105)

## 2024-10-25 PROCEDURE — 82947 ASSAY GLUCOSE BLOOD QUANT: CPT

## 2024-10-25 PROCEDURE — 6370000000 HC RX 637 (ALT 250 FOR IP): Performed by: PSYCHIATRY & NEUROLOGY

## 2024-10-25 PROCEDURE — 90833 PSYTX W PT W E/M 30 MIN: CPT | Performed by: PSYCHIATRY & NEUROLOGY

## 2024-10-25 PROCEDURE — 6370000000 HC RX 637 (ALT 250 FOR IP): Performed by: INTERNAL MEDICINE

## 2024-10-25 PROCEDURE — 1240000000 HC EMOTIONAL WELLNESS R&B

## 2024-10-25 PROCEDURE — 99232 SBSQ HOSP IP/OBS MODERATE 35: CPT | Performed by: PSYCHIATRY & NEUROLOGY

## 2024-10-25 PROCEDURE — APPSS30 APP SPLIT SHARED TIME 16-30 MINUTES: Performed by: NURSE PRACTITIONER

## 2024-10-25 RX ORDER — GLIPIZIDE 5 MG/1
5 TABLET ORAL
Status: DISCONTINUED | OUTPATIENT
Start: 2024-10-25 | End: 2024-10-27 | Stop reason: HOSPADM

## 2024-10-25 RX ADMIN — TRAZODONE HYDROCHLORIDE 50 MG: 50 TABLET ORAL at 21:06

## 2024-10-25 RX ADMIN — BUDESONIDE AND FORMOTEROL FUMARATE DIHYDRATE 2 PUFF: 160; 4.5 AEROSOL RESPIRATORY (INHALATION) at 08:43

## 2024-10-25 RX ADMIN — INSULIN GLARGINE 10 UNITS: 100 INJECTION, SOLUTION SUBCUTANEOUS at 08:44

## 2024-10-25 RX ADMIN — PANTOPRAZOLE SODIUM 40 MG: 40 TABLET, DELAYED RELEASE ORAL at 08:44

## 2024-10-25 RX ADMIN — FAMOTIDINE 20 MG: 20 TABLET, FILM COATED ORAL at 21:06

## 2024-10-25 RX ADMIN — METFORMIN HYDROCHLORIDE 1000 MG: 1000 TABLET ORAL at 08:44

## 2024-10-25 RX ADMIN — INSULIN LISPRO 4 UNITS: 100 INJECTION, SOLUTION INTRAVENOUS; SUBCUTANEOUS at 12:07

## 2024-10-25 RX ADMIN — FLUTICASONE PROPIONATE 2 SPRAY: 50 SPRAY, METERED NASAL at 08:43

## 2024-10-25 RX ADMIN — FAMOTIDINE 20 MG: 20 TABLET, FILM COATED ORAL at 08:44

## 2024-10-25 RX ADMIN — METFORMIN HYDROCHLORIDE 1000 MG: 1000 TABLET ORAL at 17:17

## 2024-10-25 RX ADMIN — MONTELUKAST 10 MG: 10 TABLET, FILM COATED ORAL at 21:06

## 2024-10-25 RX ADMIN — HYDROXYZINE HYDROCHLORIDE 50 MG: 50 TABLET, FILM COATED ORAL at 21:06

## 2024-10-25 RX ADMIN — BUDESONIDE AND FORMOTEROL FUMARATE DIHYDRATE 2 PUFF: 160; 4.5 AEROSOL RESPIRATORY (INHALATION) at 21:06

## 2024-10-25 RX ADMIN — DULOXETINE HYDROCHLORIDE 80 MG: 60 CAPSULE, DELAYED RELEASE ORAL at 08:43

## 2024-10-25 RX ADMIN — INSULIN LISPRO 2 UNITS: 100 INJECTION, SOLUTION INTRAVENOUS; SUBCUTANEOUS at 17:18

## 2024-10-25 RX ADMIN — INSULIN LISPRO 2 UNITS: 100 INJECTION, SOLUTION INTRAVENOUS; SUBCUTANEOUS at 21:06

## 2024-10-25 ASSESSMENT — PAIN SCALES - GENERAL: PAINLEVEL_OUTOF10: 0

## 2024-10-25 NOTE — GROUP NOTE
Group Therapy Note    Date: 10/25/2024    Group Start Time: 1430  Group End Time: 1545  Group Topic: Topic Group    [facility]    Peng Martinez, RN        Group Therapy Note    Attendees: 6/13       Patient's Goal:  To actively engage in group and identify coping skills.     Notes:  Patient actively engaged in group and successfully identified coping skills they can utilize.     Status After Intervention:  Improved    Participation Level: Active Listener and Interactive    Participation Quality: Appropriate, Attentive, Sharing, and Supportive      Speech:  normal      Thought Process/Content: Logical  Linear      Affective Functioning: Congruent      Mood: euthymic      Level of consciousness:  Alert and Oriented x4      Response to Learning: Able to verbalize current knowledge/experience, Able to verbalize/acknowledge new learning, Able to retain information, and Capable of insight      Endings: None Reported    Modes of Intervention: Support, Socialization, Exploration, and Problem-solving      Discipline Responsible: Registered Nurse      Signature:  PENG MARTINEZ RN

## 2024-10-25 NOTE — GROUP NOTE
Group Therapy Note    Date: 10/25/2024    Group Start Time: 0900  Group End Time: 0930  Group Topic: Community Meeting    [facility] AUNDREA Maryellen Jay CTRS    Group Therapy Note    Attendees: 7/15     Patient's Goal:  Patient will identify daily goal and demonstrate understanding of unit schedule and guidelines.      Notes:  Patient attended group and participated.      Status After Intervention:  Improved    Participation Level: Active Listener and Interactive    Participation Quality: Appropriate, Attentive, Sharing      Speech:  normal      Thought Process/Content: Logical  Linear      Affective Functioning: Constricted      Mood: euthymic      Level of consciousness:  Alert, Oriented x4, and Attentive      Response to Learning: Able to verbalize current knowledge/experience, Able to verbalize/acknowledge new learning, Able to retain information, Capable of insight, Able to change behavior, and Progressing to goal      Endings: None Reported    Modes of Intervention: Education, Support, Socialization, and Exploration      Discipline Responsible: Psychoeducational Specialist      Signature:  FRANKY Aviles

## 2024-10-25 NOTE — GROUP NOTE
Group Therapy Note    Date: 10/25/2024    Group Start Time: 1330  Group End Time: 1415  Group Topic: Psychoeducation    [facility] Maryellen Jay CTRS    Group Therapy Note    Attendees: 7/14     Patient's Goal:  Patient will demonstrate improved interpersonal skills    Notes:  Patient attended group and participated    Status After Intervention:  Improved    Participation Level: Active Listener and Interactive    Participation Quality: Appropriate, Attentive, Sharing      Speech:  normal      Thought Process/Content: Logical  Linear      Affective Functioning: Constricted/Restricted      Mood: euthymic      Level of consciousness:  Alert, Oriented x4, and Attentive      Response to Learning: Able to verbalize current knowledge/experience, Able to verbalize/acknowledge new learning, Able to retain information, Able to change behavior    Endings: None Reported    Modes of Intervention: Education, Support, Socialization, and Exploration      Discipline Responsible: Psychoeducational Specialist      Signature:  FRANKY Aviles

## 2024-10-25 NOTE — GROUP NOTE
Group Therapy Note    Date: 10/25/2024    Group Start Time: 1000  Group End Time: 1050  Group Topic: Psychoeducation    Pratima Ortega, JAKEW        Group Therapy Note    Attendees: 6/16       patient refused to attend psychoeducation group at 10a after encouragement from staff.  1:1 talk time provided as alternative to group session

## 2024-10-26 LAB
GLUCOSE BLD-MCNC: 106 MG/DL (ref 65–105)
GLUCOSE BLD-MCNC: 145 MG/DL (ref 65–105)
GLUCOSE BLD-MCNC: 167 MG/DL (ref 65–105)
GLUCOSE BLD-MCNC: 177 MG/DL (ref 65–105)

## 2024-10-26 PROCEDURE — APPSS30 APP SPLIT SHARED TIME 16-30 MINUTES: Performed by: NURSE PRACTITIONER

## 2024-10-26 PROCEDURE — 90833 PSYTX W PT W E/M 30 MIN: CPT | Performed by: PSYCHIATRY & NEUROLOGY

## 2024-10-26 PROCEDURE — 6370000000 HC RX 637 (ALT 250 FOR IP): Performed by: PSYCHIATRY & NEUROLOGY

## 2024-10-26 PROCEDURE — 6370000000 HC RX 637 (ALT 250 FOR IP): Performed by: INTERNAL MEDICINE

## 2024-10-26 PROCEDURE — 82947 ASSAY GLUCOSE BLOOD QUANT: CPT

## 2024-10-26 PROCEDURE — 99232 SBSQ HOSP IP/OBS MODERATE 35: CPT | Performed by: INTERNAL MEDICINE

## 2024-10-26 PROCEDURE — 1240000000 HC EMOTIONAL WELLNESS R&B

## 2024-10-26 PROCEDURE — 99232 SBSQ HOSP IP/OBS MODERATE 35: CPT | Performed by: PSYCHIATRY & NEUROLOGY

## 2024-10-26 RX ORDER — MONTELUKAST SODIUM 10 MG/1
10 TABLET ORAL NIGHTLY
Qty: 30 TABLET | Refills: 0 | Status: SHIPPED | OUTPATIENT
Start: 2024-10-26

## 2024-10-26 RX ORDER — TRAZODONE HYDROCHLORIDE 50 MG/1
50 TABLET, FILM COATED ORAL NIGHTLY PRN
Qty: 30 TABLET | Refills: 0 | Status: SHIPPED | OUTPATIENT
Start: 2024-10-26

## 2024-10-26 RX ORDER — HYDROXYZINE HYDROCHLORIDE 50 MG/1
50 TABLET, FILM COATED ORAL 3 TIMES DAILY PRN
Qty: 30 TABLET | Refills: 0 | Status: SHIPPED | OUTPATIENT
Start: 2024-10-26 | End: 2024-11-05

## 2024-10-26 RX ORDER — DULOXETIN HYDROCHLORIDE 20 MG/1
100 CAPSULE, DELAYED RELEASE ORAL DAILY
Qty: 150 CAPSULE | Refills: 0 | Status: SHIPPED | OUTPATIENT
Start: 2024-10-27

## 2024-10-26 RX ORDER — BUDESONIDE AND FORMOTEROL FUMARATE DIHYDRATE 160; 4.5 UG/1; UG/1
2 AEROSOL RESPIRATORY (INHALATION) 2 TIMES DAILY
Qty: 10.2 G | Refills: 0 | Status: SHIPPED | OUTPATIENT
Start: 2024-10-26

## 2024-10-26 RX ORDER — INSULIN LISPRO 100 [IU]/ML
0-8 INJECTION, SOLUTION INTRAVENOUS; SUBCUTANEOUS
Qty: 10 ML | Refills: 0 | Status: SHIPPED | OUTPATIENT
Start: 2024-10-26

## 2024-10-26 RX ORDER — ALBUTEROL SULFATE 90 UG/1
2 INHALANT RESPIRATORY (INHALATION) EVERY 4 HOURS PRN
Qty: 18 G | Refills: 0 | Status: SHIPPED | OUTPATIENT
Start: 2024-10-26

## 2024-10-26 RX ORDER — INSULIN GLARGINE 100 [IU]/ML
10 INJECTION, SOLUTION SUBCUTANEOUS DAILY
Qty: 10 ML | Refills: 0 | Status: SHIPPED | OUTPATIENT
Start: 2024-10-27

## 2024-10-26 RX ORDER — GLIPIZIDE 5 MG/1
5 TABLET ORAL
Qty: 60 TABLET | Refills: 3 | Status: SHIPPED | OUTPATIENT
Start: 2024-10-27

## 2024-10-26 RX ORDER — FAMOTIDINE 20 MG/1
20 TABLET, FILM COATED ORAL 2 TIMES DAILY
Qty: 60 TABLET | Refills: 0 | Status: SHIPPED | OUTPATIENT
Start: 2024-10-26

## 2024-10-26 RX ORDER — FLUTICASONE PROPIONATE 50 MCG
2 SPRAY, SUSPENSION (ML) NASAL DAILY
Qty: 16 G | Refills: 0 | Status: SHIPPED | OUTPATIENT
Start: 2024-10-27

## 2024-10-26 RX ADMIN — FAMOTIDINE 20 MG: 20 TABLET, FILM COATED ORAL at 08:38

## 2024-10-26 RX ADMIN — GLIPIZIDE 5 MG: 5 TABLET ORAL at 16:22

## 2024-10-26 RX ADMIN — PANTOPRAZOLE SODIUM 40 MG: 40 TABLET, DELAYED RELEASE ORAL at 08:38

## 2024-10-26 RX ADMIN — METFORMIN HYDROCHLORIDE 1000 MG: 1000 TABLET ORAL at 16:22

## 2024-10-26 RX ADMIN — IBUPROFEN 400 MG: 400 TABLET, FILM COATED ORAL at 16:22

## 2024-10-26 RX ADMIN — BUDESONIDE AND FORMOTEROL FUMARATE DIHYDRATE 2 PUFF: 160; 4.5 AEROSOL RESPIRATORY (INHALATION) at 08:38

## 2024-10-26 RX ADMIN — BUDESONIDE AND FORMOTEROL FUMARATE DIHYDRATE 2 PUFF: 160; 4.5 AEROSOL RESPIRATORY (INHALATION) at 20:47

## 2024-10-26 RX ADMIN — METFORMIN HYDROCHLORIDE 1000 MG: 1000 TABLET ORAL at 08:38

## 2024-10-26 RX ADMIN — TRAZODONE HYDROCHLORIDE 50 MG: 50 TABLET ORAL at 20:48

## 2024-10-26 RX ADMIN — GLIPIZIDE 5 MG: 5 TABLET ORAL at 08:38

## 2024-10-26 RX ADMIN — FAMOTIDINE 20 MG: 20 TABLET, FILM COATED ORAL at 20:47

## 2024-10-26 RX ADMIN — FLUTICASONE PROPIONATE 2 SPRAY: 50 SPRAY, METERED NASAL at 08:39

## 2024-10-26 RX ADMIN — INSULIN GLARGINE 10 UNITS: 100 INJECTION, SOLUTION SUBCUTANEOUS at 08:46

## 2024-10-26 RX ADMIN — MONTELUKAST 10 MG: 10 TABLET, FILM COATED ORAL at 20:48

## 2024-10-26 RX ADMIN — DULOXETINE HYDROCHLORIDE 100 MG: 60 CAPSULE, DELAYED RELEASE ORAL at 08:38

## 2024-10-26 RX ADMIN — HYDROXYZINE HYDROCHLORIDE 50 MG: 50 TABLET, FILM COATED ORAL at 20:48

## 2024-10-26 NOTE — BH NOTE
Best Practice Advisory suggested to place patient on a Suicide Precautions.  However,  pt currently does not meet criteria to be on a constant observation precaution. Pt denies any suicidal ideation at this time, and states he feels safe on the unit.  On Call provider, Dr. Ronquillo, notified and ordered to discontinue the Suicide Precautions.  Will continue to observe patient on every 15 minute checks.

## 2024-10-26 NOTE — PROGRESS NOTES
Behavioral Services  Medicare Certification Upon Admission    I certify that this patient's inpatient psychiatric hospital admission is medically necessary for:    [x] (1) Treatment which could reasonably be expected to improve this patient's condition,       [x] (2) Or for diagnostic study;     AND     [x](2) The inpatient psychiatric services are provided while the individual is under the care of a physician and are included in the individualized plan of care.    Estimated length of stay/service 4 to 7 days    Plan for post-hospital care home with outpatient community mental health follow-up    Electronically signed by NURY MONGE MD on 10/22/2024 at 12:15 PM      
    [facility] Internal Medicine  Naren Collins MD; Kaleb Martínez MD, Tio Ramsey MD, Katie Huizar MD, Tj Rogers MD; Matt Limon MD    [facility] Internal Medicine   IN-PATIENT SERVICE   [facility]     HISTORY AND PHYSICAL EXAMINATION            Date:   10/26/2024  Patient name:  Meño Goodman  Date of admission:  10/21/2024  3:01 PM  MRN:   [id number]  Account:  [id number]  YOB: 1990  PCP:    No primary care provider on file.  Room:   [address] [place] [address]  Code Status:    Full Code      Chief Complaint:     Suicidal /Ac Psychosis    History Obtained From:     Patient/EMR/bedside RN     History of Present Illness:   Patient is 34-year-old female morbidly obese BMI of 49.9, history of type 2 diabetes, bipolar, depression, asthma, fatty liver has been admitted at [facility] for further management of depression and suicidal ideation.  Currently denies any chest pain shortness of breath      Past Medical History:     Past Medical History:   Diagnosis Date    Asthma     Back pain 2014    Bipolar 1 disorder (HCC)     Bipolar disorder (HCC) 2017    Depression     Diabetes mellitus (HCC)     Dizziness     Fatty liver disease, nonalcoholic     Fatty liver disease, nonalcoholic     GERD (gastroesophageal reflux disease)     Obesity         Past Surgical History:     Past Surgical History:   Procedure Laterality Date     SECTION      LEEP          Medications Prior to Admission:     Prior to Admission medications    Medication Sig Start Date End Date Taking? Authorizing Provider   paliperidone palmitate ER (INVEGA SUSTENNA) 234 MG/1.5ML DANK IM injection Inject 234 mg into the muscle every 28 days   Yes Provider, MD Herrera   DULoxetine (CYMBALTA) 20 MG extended release capsule Take 1 capsule by mouth daily   Yes ProviderHerrera MD   escitalopram (LEXAPRO) 20 MG tablet Take 1 tablet by mouth at bedtime   Yes Provider, 
    [facility] Internal Medicine  Naren Collins MD; Kaleb Martínez MD, Tio Ramsey MD, Katie Huizar MD, Tj Rogers MD; Matt Limon MD    [facility] Internal Medicine   IN-PATIENT SERVICE   [facility]     HISTORY AND PHYSICAL EXAMINATION            Date:   10/24/2024  Patient name:  Meño Goodman  Date of admission:  10/21/2024  3:01 PM  MRN:   [id number]  Account:  [id number]  YOB: 1990  PCP:    No primary care provider on file.  Room:   [address] [place] [address]  Code Status:    Full Code      Chief Complaint:     Suicidal /Ac Psychosis    History Obtained From:     Patient/EMR/bedside RN     History of Present Illness:   Patient is 34-year-old female morbidly obese BMI of 49.9, history of type 2 diabetes, bipolar, depression, asthma, fatty liver has been admitted at [facility] for further management of depression and suicidal ideation.  Currently denies any chest pain shortness of breath      Past Medical History:     Past Medical History:   Diagnosis Date    Asthma     Back pain 2014    Bipolar 1 disorder (HCC)     Bipolar disorder (HCC) 2017    Depression     Diabetes mellitus (HCC)     Dizziness     Fatty liver disease, nonalcoholic     Fatty liver disease, nonalcoholic     GERD (gastroesophageal reflux disease)     Obesity         Past Surgical History:     Past Surgical History:   Procedure Laterality Date     SECTION      LEEP          Medications Prior to Admission:     Prior to Admission medications    Medication Sig Start Date End Date Taking? Authorizing Provider   paliperidone palmitate ER (INVEGA SUSTENNA) 234 MG/1.5ML DANK IM injection Inject 234 mg into the muscle every 28 days   Yes Provider, MD Herrera   DULoxetine (CYMBALTA) 20 MG extended release capsule Take 1 capsule by mouth daily   Yes ProviderHerrera MD   escitalopram (LEXAPRO) 20 MG tablet Take 1 tablet by mouth at bedtime   Yes Provider, 
   10/22/24 1430   Encounter Summary   Encounter Overview/Reason Behavioral Health   Service Provided For Patient   Last Encounter  10/22/24   Behavioral Health    Type  Spirituality Group   Assessment/Intervention/Outcome   Assessment Complicated grieving;Compromised coping;Decisional conflict;Impaired resilience;Interrupted family processes;Impaired social interaction;Moral distress;Powerlessness;Questioning meaning and purpose;Social isolation;Stress overload   Intervention Active listening;Confronted/Challenged;Discussed illness injury and it’s impact;Discussed meaning/purpose;Empowerment;Explored/Affirmed feelings, thoughts, concerns;Explored Coping Skills/Resources;Nurtured Hope;Prayer (assurance of)/[place];Sustaining Presence/Ministry of presence   Outcome Engaged in conversation;Expressed feelings, needs, and concerns;Receptive       
   10/22/24 1441   Encounter Summary   Encounter Overview/Reason Loneliness/Social Isolation   Service Provided For Patient   Last Encounter  10/22/24   Behavioral Health    Type  Follow up BH   Assessment/Intervention/Outcome   Assessment Loneliness   Intervention Active listening;Discussed illness injury and it’s impact;Empowerment;Explored/Affirmed feelings, thoughts, concerns;Explored Coping Skills/Resources;Nurtured Hope;Sustaining Presence/Ministry of presence       
  Daily Progress Note  10/24/2024    Patient Name: Meño Goodman    CHIEF COMPLAINT:  Depression with suicidal ideation          SUBJECTIVE:      Patient seen face-to-face for follow-up assessment.  She is cooperative with discussion.  Thought processes linear, but slow at times.  She does endorse both auditory and visual hallucinations, but states that they are less intense since she presented to the ED.  She has not noticed AVH thus far today.  Patient feels her suicidal thoughts are less severe.  She has been compliant with her scheduled psychotropic medications and believes that they are helping.  We reviewed for any side effects and she denies all.  She has been able to maintain behavioral control and has not required any emergency medications.  She has been utilizing both trazodone and hydroxyzine in the evening to help with sleep.  Patient engaged in treatment and has been attending group programming.  Noted to present as flat and depressed and has been expressing depressive symptoms to staff.    She has yet to demonstrate stability and requires inpatient hospitalization for safety.    Appetite:  [x] Normal/Adequate/Unchanged  [] Increased  [] Decreased      Sleep:       [] Normal/Adequate/Unchanged  [x] Fair  [] Poor      Group Attendance on Unit:   [x] Yes  [] Selectively    [] No    Medication Side Effects:  Patient denies any medication side effects at the time of assessment.         Mental Status Exam  Level of consciousness: Alert and awake.   Appearance: Appropriate attire for setting, resting in bed, with fair  grooming and hygiene.   Behavior/Motor: Slightly withdrawn and evasive  Attitude toward examiner: Cooperative, attentive, fair eye contact.  Speech: Normal rate, low volume, monotone.  Mood:  Patient reports \"okay\".   Affect: Blunted  Thought processes: Linear and coherent.   Thought content: Denies homicidal ideation.  Suicidal Ideation: Reports improvement in suicidal 
  Daily Progress Note  10/25/2024    Patient Name: Meño Goodman    CHIEF COMPLAINT:  Depression with suicidal ideation          SUBJECTIVE:    Shavon was seen for follow-up assessment today.  She has been compliant with scheduled medications and behaviorally in control.  She has not required any emergency medications for agitation in the past 24 hours.  Nursing staff report she has been mostly isolative to her room but did come out for group today.  Patient was resting in bed on approach but awake.  She was agreeable to conversation.  She reports that her mood is \"alright\" but she presents as depressed and seems unmotivated.  Patient's responses to questions were mostly superficial.  Patient was asked to identify ways she feels she has improved since admission.  She reports that Gabe visual hallucinations have resolved and depression is less severe.  She is feeling less helpless and hopeless today.  Sleep and appetite have been adequate.  She was encouraged to spend more time up and out of bed and in the day area when possible.  She is denying any side effects to medications.  Although modestly improving patient is not yet able to contract for safety in the community and warrants further hospitalization for safety and stabilization.    Appetite:  [x] Normal/Adequate/Unchanged  [] Increased  [] Decreased      Sleep:       [x] Normal/Adequate/Unchanged  [] Fair  [] Poor      Group Attendance on Unit:   [x] Yes  [] Selectively    [] No    Medication Side Effects: Denies         Mental Status Exam  Level of consciousness: Alert and awake.   Appearance: Appropriate attire for setting, resting in bed, with fair  grooming and hygiene.   Behavior/Motor: Slightly withdrawn and evasive  Attitude toward examiner: Cooperative, attentive, fair eye contact.  Speech: Normal rate, low volume, monotone.  Mood:  Patient reports \"alright\".   Affect: Blunted  Thought processes: Linear and coherent.   Thought content: Denies 
RT ASSESSMENT TREATMENT GOALS    [x]Pt Goal:  Pt will identify 1-2 positive coping skills by time of discharge.    []Pt Goal:  Pt will identify 1-2 positive aspects of self by time of discharge.    []Pt Goal:  Pt will remain on task/topic for 15-30 minutes during group by time of discharge.    [x]Pt Goal:  Pt will identify 1-2 aspects of relapse prevention plan by time of discharge.    []Pt Goal:  Pt will join in conversation with peers 1-2 times per group by time of discharge.    []Pt Goal:  Pt will identify 1-2 new leisure interests by time of discharge.    []Pt Goal:  Pt will not voice any delusional content by time of discharge.   
improvement in suicidal ideations  Delusions: No evidence of delusions. Denies paranoia.  Perceptual Disturbance: Patient does not appear to be responding to internal stimuli. Denies auditory hallucinations. Denies visual hallucinations.   Cognition: Oriented to self, location, time, and situation.  Memory: Intact.  Insight & Judgement: Poor.     Data   height is 1.651 m (5' 5\") and weight is 136.1 kg (300 lb). Her oral temperature is 97.9 °F (36.6 °C). Her blood pressure is 110/73 and her pulse is 93. Her respiration is 14 and oxygen saturation is 98%.   Labs:   Admission on 10/21/2024   Component Date Value Ref Range Status    Sodium 10/21/2024 136  136 - 145 mmol/L Final    Potassium 10/21/2024 3.9  3.7 - 5.3 mmol/L Final    Comment: Specimen hemolysis has exceeded the interference as defined by Roche. Value may be falsely   increased. Suggest recollection if clinically indicated.      Chloride 10/21/2024 103  98 - 107 mmol/L Final    CO2 10/21/2024 24  20 - 31 mmol/L Final    Anion Gap 10/21/2024 9  9 - 16 mmol/L Final    Glucose 10/21/2024 213 (H)  74 - 99 mg/dL Final    BUN 10/21/2024 8  6 - 20 mg/dL Final    Creatinine 10/21/2024 0.9  0.7 - 1.2 mg/dL Final    Est, Glom Filt Rate 10/21/2024 85  >60 mL/min/1.73m2 Final    Comment:       These results are not intended for use in patients <18 years of age.        eGFR results are calculated without a race factor using the 2021 CKD-EPI equation.  Careful clinical correlation is recommended, particularly when comparing to results   calculated using previous equations.  The CKD-EPI equation is less accurate in patients with extremes of muscle mass, extra-renal   metabolism of creatine, excessive creatine ingestion, or following therapy that affects   renal tubular secretion.      Calcium 10/21/2024 9.1  8.6 - 10.4 mg/dL Final    Total Protein 10/21/2024 7.2  6.6 - 8.7 g/dL Final    Albumin 10/21/2024 3.5  3.5 - 5.2 g/dL Final    Total Bilirubin 10/21/2024 0.3  0.0 - 
tablet Take 1 tablet by mouth nightly   SYMBICORT 160-4.5 MCG/ACT AERO Inhale 2 puffs into the lungs 2 times daily for 14 days   ondansetron (ZOFRAN-ODT) 4 MG disintegrating tablet Take 1 tablet by mouth every 8 hours as needed for Nausea or Vomiting   TRULICITY 1.5 MG/0.5ML SC injection inject 0.5 milliliters subcutaneously every week   famotidine (PEPCID) 20 MG tablet Take 1 tablet by mouth 2 times daily   pantoprazole (PROTONIX) 40 MG tablet Take 1 tablet by mouth daily   metFORMIN (GLUCOPHAGE) 500 MG tablet Take 1 tablet by mouth 2 times daily (with meals)   aspirin-acetaminophen-caffeine (EXCEDRIN MIGRAINE) 250-250-65 MG per tablet Take 1 tablet by mouth every 8 hours as needed for Headaches         Please let me know if you have any questions about this encounter. Thank you!    Electronically signed by Enma Pacheco RPH on 10/21/2024 at 4:05 PM    
10/23/24  0709 10/23/24  0739 10/23/24  1157   POCGLU 210* 170* 163* 316*     No intake or output data in the 24 hours ending 10/23/24 1508    General Appearance:  alert, well appearing, and in no acute distress  Mental status: oriented to person, place, and time   Head:  normocephalic, atraumatic.  Neck: supple, no carotid bruits, thyroid not palpable  Lungs: Bilateral equal air entry, clear to ausculation, no wheezing, rales or rhonchi, normal effort  Cardiovascular: normal rate, regular rhythm, no murmur, gallop, rub.  Abdomen: Soft, nontender, nondistended, normal bowel sounds, no hepatomegaly or splenomegaly  Neurologic: CN II-XII intact , DTR normal, no new focal motor or sensory deficits, moving all extremities spontaneously.   Skin: No gross lesions, rashes, bruising or bleeding on exposed skin area  Extremities:  peripheral pulses palpable, no pedal edema or calf pain with palpation    Investigations:      Laboratory Testing:  Recent Results (from the past 24 hour(s))   POC Glucose Fingerstick    Collection Time: 10/22/24  4:47 PM   Result Value Ref Range    POC Glucose 227 (H) 65 - 105 mg/dL   POC Glucose Fingerstick    Collection Time: 10/22/24  8:33 PM   Result Value Ref Range    POC Glucose 210 (H) 65 - 105 mg/dL   POC Glucose Fingerstick    Collection Time: 10/23/24  7:09 AM   Result Value Ref Range    POC Glucose 170 (H) 65 - 105 mg/dL   POC Glucose Fingerstick    Collection Time: 10/23/24  7:39 AM   Result Value Ref Range    POC Glucose 163 (H) 65 - 105 mg/dL   POC Glucose Fingerstick    Collection Time: 10/23/24 11:57 AM   Result Value Ref Range    POC Glucose 316 (H) 65 - 105 mg/dL       Imaging/Diagonstics:  CT ABDOMEN PELVIS W IV CONTRAST Additional Contrast? None    Result Date: 10/15/2024  No acute intra-abdominal or intrapelvic abnormalities are noted. Hepatomegaly and fatty infiltration of the liver Small bilateral nonobstructing renal stones..       Assessment :      Hospital Problems        
assessment. Spent 17 minutes with the patient in supportive psychotherapy.  Plan will be as follows:      PLAN  Patient s symptoms   show no change  Increase Cymbalta to 60 mg and decrease Lexapro to 10 mg starting tomorrow  Attempt to develop insight  Psycho-education conducted.  Supportive Therapy conducted.  Probable discharge is undetermined at this time  Follow-up daily while on inpatient unit

## 2024-10-26 NOTE — GROUP NOTE
Group Therapy Note    Date: 10/26/2024    Group Start Time: 1100  Group End Time: 1145  Group Topic: Psychoeducation    [facility]    Group Therapy Note    Attendees: 7/13     Patient's Goal:  Patient will identify benefits of leisure for coping and stress management    Notes:  Patient attended group and participated    Status After Intervention:  Improved    Participation Level: Active Listener and Interactive    Participation Quality: Appropriate, Attentive, Sharing, and Supportive      Speech:  normal      Thought Process/Content: Logical  Linear      Affective Functioning: Constricted      Mood: euthymic      Level of consciousness:  Alert, Oriented x4, and Attentive      Response to Learning: Able to verbalize current knowledge/experience, Able to verbalize/acknowledge new learning, Able to retain information,  Able to change behavior, and Progressing to goal      Endings: None Reported    Modes of Intervention: Education, Support, Socialization, and Exploration      Discipline Responsible: Psychoeducational Specialist      Signature:  FRANKY Aviles

## 2024-10-26 NOTE — GROUP NOTE
Group Therapy Note    Date: 10/26/2024    Group Start Time: 0900  Group End Time: 0930  Group Topic: Orientation Group    [facility] Geovanna Valentine LPN        Group Therapy Note    Attendees: 7/15       Patient's Goal:   Orientation/Goal    Notes:   Patient verbalized the understanding of setting goals.    Status After Intervention:  Improved    Participation Level: Active Listener    Participation Quality: Appropriate, Attentive, Sharing, and Supportive      Speech:  normal      Thought Process/Content: Logical      Affective Functioning: Congruent      Mood: anxious      Level of consciousness:  Alert and Attentive      Response to Learning: Able to verbalize current knowledge/experience, Able to verbalize/acknowledge new learning, and Able to retain information      Endings: None Reported    Modes of Intervention: Education, Support, and Socialization      Discipline Responsible: Licensed Practical Nurse      Signature:  Geovanna Jack LPN

## 2024-10-27 VITALS
OXYGEN SATURATION: 95 % | RESPIRATION RATE: 15 BRPM | HEART RATE: 77 BPM | DIASTOLIC BLOOD PRESSURE: 60 MMHG | TEMPERATURE: 97.1 F | SYSTOLIC BLOOD PRESSURE: 127 MMHG | HEIGHT: 65 IN | BODY MASS INDEX: 48.82 KG/M2 | WEIGHT: 293 LBS

## 2024-10-27 LAB — GLUCOSE BLD-MCNC: 140 MG/DL (ref 65–105)

## 2024-10-27 PROCEDURE — 6370000000 HC RX 637 (ALT 250 FOR IP): Performed by: INTERNAL MEDICINE

## 2024-10-27 PROCEDURE — 6370000000 HC RX 637 (ALT 250 FOR IP): Performed by: PSYCHIATRY & NEUROLOGY

## 2024-10-27 PROCEDURE — 82947 ASSAY GLUCOSE BLOOD QUANT: CPT

## 2024-10-27 PROCEDURE — 99239 HOSP IP/OBS DSCHRG MGMT >30: CPT | Performed by: PSYCHIATRY & NEUROLOGY

## 2024-10-27 RX ADMIN — FAMOTIDINE 20 MG: 20 TABLET, FILM COATED ORAL at 08:20

## 2024-10-27 RX ADMIN — GLIPIZIDE 5 MG: 5 TABLET ORAL at 08:20

## 2024-10-27 RX ADMIN — METFORMIN HYDROCHLORIDE 1000 MG: 1000 TABLET ORAL at 08:20

## 2024-10-27 RX ADMIN — BUDESONIDE AND FORMOTEROL FUMARATE DIHYDRATE 2 PUFF: 160; 4.5 AEROSOL RESPIRATORY (INHALATION) at 08:20

## 2024-10-27 RX ADMIN — DULOXETINE HYDROCHLORIDE 100 MG: 60 CAPSULE, DELAYED RELEASE ORAL at 08:20

## 2024-10-27 RX ADMIN — PANTOPRAZOLE SODIUM 40 MG: 40 TABLET, DELAYED RELEASE ORAL at 08:20

## 2024-10-27 RX ADMIN — INSULIN GLARGINE 10 UNITS: 100 INJECTION, SOLUTION SUBCUTANEOUS at 08:25

## 2024-10-27 RX ADMIN — FLUTICASONE PROPIONATE 2 SPRAY: 50 SPRAY, METERED NASAL at 08:21

## 2024-10-27 NOTE — PLAN OF CARE
Problem: Risk for Elopement  Goal: Patient will not exit the unit/facility without proper excort  Outcome: Progressing     Problem: Depression  Goal: Will be euthymic at discharge  Description: INTERVENTIONS:  1. Administer medication as ordered  2. Provide emotional support via 1:1 interaction with staff  3. Encourage involvement in milieu/groups/activities  4. Monitor for social isolation  Outcome: Progressing     Problem: Self Harm/Suicidality  Goal: Will have no self-injury during hospital stay  Description: INTERVENTIONS:  1.  Ensure constant observer at bedside with Q15M safety checks  2.  Maintain a safe environment  3.  Secure patient belongings  4.  Ensure family/visitors adhere to safety recommendations  5.  Ensure safety tray has been added to patient's diet order  6.  Every shift and PRN: Re-assess suicidal risk via Frequent Screener    Outcome: Progressing  Note: Patient denies suicidal/homicidal ideation. Patient agreeable to seek out staff should thoughts of self harm/harming others arise. Patient denies hallucinations. Patient is positive for anxiety/depression but does not rate. Patient is cooperative, guarded, withdrawn, isolative to self, aloof of peers. Patient is medication compliant and behavior controlled. Comfort and reassurance provided. Safety checks maintained every 15 minutes and as needed.       
  Problem: Self Harm/Suicidality  Goal: Will have no self-injury during hospital stay  Description: INTERVENTIONS:  1.  Ensure constant observer at bedside with Q15M safety checks  2.  Maintain a safe environment  3.  Secure patient belongings  4.  Ensure family/visitors adhere to safety recommendations  5.  Ensure safety tray has been added to patient's diet order  6.  Every shift and PRN: Re-assess suicidal risk via Frequent Screener    10/23/2024 2228 by Iman López RN  Outcome: Progressing  Note: Patient denied thoughts of hurting herself or others and remains free from self harm and injury      Problem: Risk for Elopement  Goal: Patient will not exit the unit/facility without proper excort  10/23/2024 2228 by Iman López RN  Outcome: Progressing     Problem: Depression  Goal: Will be euthymic at discharge  Description: INTERVENTIONS:  1. Administer medication as ordered  2. Provide emotional support via 1:1 interaction with staff  3. Encourage involvement in milieu/groups/activities  4. Monitor for social isolation  10/23/2024 2228 by Iman López RN  Outcome: Progressing  Note: Patient reports both anxiety and depression to be 5/10. She was blunt and withdrawn during conversation. She slept most of shirt but was aloof of peers in dayroom for short period of time. She was medication compliant      Problem: Chronic Conditions and Co-morbidities  Goal: Patient's chronic conditions and co-morbidity symptoms are monitored and maintained or improved  10/23/2024 2228 by Iman López, RN  Outcome: Progressing     
  Problem: Self Harm/Suicidality  Goal: Will have no self-injury during hospital stay  Description: INTERVENTIONS:  1.  Ensure constant observer at bedside with Q15M safety checks  2.  Maintain a safe environment  3.  Secure patient belongings  4.  Ensure family/visitors adhere to safety recommendations  5.  Ensure safety tray has been added to patient's diet order  6.  Every shift and PRN: Re-assess suicidal risk via Frequent Screener    10/24/2024 1548 by Saira Etienne LPN  Outcome: Progressing   Patient denies thoughts to harm self  Problem: Risk for Elopement  Goal: Patient will not exit the unit/facility without proper excort  10/24/2024 1548 by Saira Etienne LPN  Outcome: Progressing   Patient does not exhibit any exit seeking behavior.   Problem: Depression  Goal: Will be euthymic at discharge  Description: INTERVENTIONS:  1. Administer medication as ordered  2. Provide emotional support via 1:1 interaction with staff  3. Encourage involvement in milieu/groups/activities  4. Monitor for social isolation  10/24/2024 1548 by Saira Etienne LPN  Outcome: Progressing   Miss Goodman is seen in the dayroom for meals, and groups. She is reporting some depression and anxiety. Denies any thoughts to harm self. Taking medications, Denies any audio hallucinations.   Problem: Chronic Conditions and Co-morbidities  Goal: Patient's chronic conditions and co-morbidity symptoms are monitored and maintained or improved  10/24/2024 1548 by Saira Etienne LPN  Outcome: Progressing   Chronic conditions are monitored and maintained  
  Problem: Self Harm/Suicidality  Goal: Will have no self-injury during hospital stay  Description: INTERVENTIONS:  1.  Ensure constant observer at bedside with Q15M safety checks  2.  Maintain a safe environment  3.  Secure patient belongings  4.  Ensure family/visitors adhere to safety recommendations  5.  Ensure safety tray has been added to patient's diet order  6.  Every shift and PRN: Re-assess suicidal risk via Frequent Screener    10/25/2024 0248 by Reva Pete  Outcome: Progressing  Note: Remains free from self harm at this time. Patient denies suicidal and homicidal thoughts.  Patient denies auditory and visual hallucinations.  Patient is taking meds and eating well.       Problem: Depression  Goal: Will be euthymic at discharge  Description: INTERVENTIONS:  1. Administer medication as ordered  2. Provide emotional support via 1:1 interaction with staff  3. Encourage involvement in milieu/groups/activities  4. Monitor for social isolation  10/25/2024 0248 by Reva Pete  Outcome: Progressing  Note: Out in the milieu. Flat affect but states her mood was good just tired. Compliant with all prescribed medications for this shift. Safety checks maintained q15min and irregular rounding.      
  Problem: Self Harm/Suicidality  Goal: Will have no self-injury during hospital stay  Description: INTERVENTIONS:  1.  Ensure constant observer at bedside with Q15M safety checks  2.  Maintain a safe environment  3.  Secure patient belongings  4.  Ensure family/visitors adhere to safety recommendations  5.  Ensure safety tray has been added to patient's diet order  6.  Every shift and PRN: Re-assess suicidal risk via Frequent Screener    10/25/2024 1050 by Saira Etienne LPN  Outcome: Progressing   Patient denies thoughts of self harm  Problem: Risk for Elopement  Goal: Patient will not exit the unit/facility without proper excort  Outcome: Progressing   Patient does not exhibit any exit seeking behaviors  Problem: Depression  Goal: Will be euthymic at discharge  Description: INTERVENTIONS:  1. Administer medication as ordered  2. Provide emotional support via 1:1 interaction with staff  3. Encourage involvement in milieu/groups/activities  4. Monitor for social isolation  10/25/2024 1050 by Saira Etienne LPN  Outcome: Progressing   Miss Goodman is seen out for meals and attending groups. She reports some anxiety, and states she is sleeping good and  has no issues with appetite. She is aloof of peers. Denies any thoughts to harm self   
  Problem: Self Harm/Suicidality  Goal: Will have no self-injury during hospital stay  Description: INTERVENTIONS:  1.  Ensure constant observer at bedside with Q15M safety checks  2.  Maintain a safe environment  3.  Secure patient belongings  4.  Ensure family/visitors adhere to safety recommendations  5.  Ensure safety tray has been added to patient's diet order  6.  Every shift and PRN: Re-assess suicidal risk via Frequent Screener    10/25/2024 2352 by Reva Pete  Outcome: Progressing  Note: Remains free from self harm at this time. Pt denies thoughts of self harm and is agreeable to seeking out should thoughts of self harm arise.  Safe environment maintained.  Q15 minute checks for safety cont per unit policy.  Will cont to monitor for safety and provides support and reassurance as needed.       Problem: Depression  Goal: Will be euthymic at discharge  Description: INTERVENTIONS:  1. Administer medication as ordered  2. Provide emotional support via 1:1 interaction with staff  3. Encourage involvement in milieu/groups/activities  4. Monitor for social isolation  10/25/2024 2352 by Reva Pete  Outcome: Progressing  Note: Out in the milieu at times. Cooperative. Flat affect. Compliant with all prescribed medications for this shift. States her mood has been good today but she has been tired.      
  Problem: Self Harm/Suicidality  Goal: Will have no self-injury during hospital stay  Description: INTERVENTIONS:  1.  Ensure constant observer at bedside with Q15M safety checks  2.  Maintain a safe environment  3.  Secure patient belongings  4.  Ensure family/visitors adhere to safety recommendations  5.  Ensure safety tray has been added to patient's diet order  6.  Every shift and PRN: Re-assess suicidal risk via Frequent Screener    10/26/2024 2059 by Tere Allen RN  Outcome: Progressing     Problem: Depression  Goal: Will be euthymic at discharge  Description: INTERVENTIONS:  1. Administer medication as ordered  2. Provide emotional support via 1:1 interaction with staff  3. Encourage involvement in milieu/groups/activities  4. Monitor for social isolation  10/26/2024 2059 by Tere Allen, RN  Outcome: Progressing  Note: Patient is brightened in appearance. She showered during this shift and is medication compliant. She reports readiness for discharge. Staff maintains Q15 minute safety checks.     
  Problem: Self Harm/Suicidality  Goal: Will have no self-injury during hospital stay  Description: INTERVENTIONS:  1.  Ensure constant observer at bedside with Q15M safety checks  2.  Maintain a safe environment  3.  Secure patient belongings  4.  Ensure family/visitors adhere to safety recommendations  5.  Ensure safety tray has been added to patient's diet order  6.  Every shift and PRN: Re-assess suicidal risk via Frequent Screener    Note: Ms. Goodman denies suicidal ideation and thoughts of harm to self and others.      Problem: Risk for Elopement  Goal: Patient will not exit the unit/facility without proper excort  Note: Ms. Goodman has not demonstrated elopement risk during this shift. She has remained isolative to her room most of the shift, coming out briefly during the day and for meals. She is selectively social.      Problem: Depression  Goal: Will be euthymic at discharge  Description: INTERVENTIONS:  1. Administer medication as ordered  2. Provide emotional support via 1:1 interaction with staff  3. Encourage involvement in milieu/groups/activities  4. Monitor for social isolation  Note: Ms. Goodman rates her Depression and anxiety as a 5 on a 0-10 scale where 10 is the highest. She has remained in her room most of the afternoon napping. She consumed meals and snacks and adhered to her medication as prescribed.      Problem: Chronic Conditions and Co-morbidities  Goal: Patient's chronic conditions and co-morbidity symptoms are monitored and maintained or improved  Note: Ms. Goodman was cooperative with her Blood sugar checks and insulin as well as her diabetic diet.      
  Problem: Self Harm/Suicidality  Goal: Will have no self-injury during hospital stay  Description: INTERVENTIONS:  1.  Ensure constant observer at bedside with Q15M safety checks  2.  Maintain a safe environment  3.  Secure patient belongings  4.  Ensure family/visitors adhere to safety recommendations  5.  Ensure safety tray has been added to patient's diet order  6.  Every shift and PRN: Re-assess suicidal risk via Frequent Screener    Outcome: Progressing    Patient denies thoughts of self harm. Continuing patient safety Q15 minutes with safety checks.     Problem: Risk for Elopement  Goal: Patient will not exit the unit/facility without proper excort  Outcome: Progressing     Problem: Depression  Goal: Will be euthymic at discharge  Description: INTERVENTIONS:  1. Administer medication as ordered  2. Provide emotional support via 1:1 interaction with staff  3. Encourage involvement in milieu/groups/activities  4. Monitor for social isolation  Outcome: Progressing     Patient denies thoughts of depression but seems withdrawn. Encouraged patient to express her feelings, continuing to provide emotional support.     
  Problem: Self Harm/Suicidality  Goal: Will have no self-injury during hospital stay  Description: INTERVENTIONS:  1.  Ensure constant observer at bedside with Q15M safety checks  2.  Maintain a safe environment  3.  Secure patient belongings  4.  Ensure family/visitors adhere to safety recommendations  5.  Ensure safety tray has been added to patient's diet order  6.  Every shift and PRN: Re-assess suicidal risk via Frequent Screener  10/22/2024 0929 by Yaz Diaz, RN  Outcome: Progressing     Problem: Risk for Elopement  Goal: Patient will not exit the unit/facility without proper excort  10/22/2024 0929 by Yaz Diaz, RN  Outcome: Progressing     Problem: Depression  Goal: Will be euthymic at discharge  Description: INTERVENTIONS:  1. Administer medication as ordered  2. Provide emotional support via 1:1 interaction with staff  3. Encourage involvement in milieu/groups/activities  4. Monitor for social isolation  Outcome: Progressing     Patient was cooperative with daily assessment. Patient is alert and oriented to person, place, time, and situation. Patient is capable of communicating their thoughts and feelings with staff. Based on patient's responses, their thoughts are unremarkable and coherent. Patient appears flat, congruent with their stated thoughts and feelings. Patient behaves appropriately on the unit. Patient has been observed being isolative and out for needs only.    Patient is accepting of all scheduled medications. Patient relates that they are not experiencing any physical pain at this time. Patient states that they are not experiencing any trouble sleeping at this time. Patient relates that their appetite is normal and that they feel they are not struggling to meet nutritional goals. Patient is isolative to their room.     Patient is comfortable approaching staff for any needs or requests. Patient agrees to notify staff if any thoughts, feelings, or concerns need to be 
Problem: Self Harm/Suicidality  Goal: Will have no self-injury during hospital stay  Description: INTERVENTIONS:  1.  Ensure constant observer at bedside with Q15M safety checks  2.  Maintain a safe environment  3.  Secure patient belongings  4.  Ensure family/visitors adhere to safety recommendations  5.  Ensure safety tray has been added to patient's diet order  6.  Every shift and PRN: Re-assess suicidal risk via Frequent Screener    Outcome: Progressing     Problem: Risk for Elopement  Goal: Patient will not exit the unit/facility without proper excort  Outcome: Progressing  Flowsheets (Taken 10/21/2024 8722 by Essex, Matthew, RN)  Nursing Interventions for Elopement Risk:   Communicate/escalate to nursing supervisor the risk of elopement   Place patient in room far away from exits and stairways   Communicate to physician the risk for elopement    The patient has been tearful and sad throughout the evening. Writer attempted to offer suggestions for calming. She declined and stated that she just needs alone time. She reports some anxiousness and depression. She reports that the emotions are worsening whenever she thinks about her grandmother. She denies endorsing current thoughts of self harm. Writer encouraged the patient to seek assistance should those thoughts arise. She voiced an understanding. PRN Atarax and Trazodone given as prescribed per request. No risks for elopement or exit seeking noted. Writer will continue to offer emotional support. Q15 minute checks to continue for safety.    
Poverty of content  Depression Symptoms: Feelings of helplessness, Feelings of hopelessess, Isolative  Anxiety Symptoms: Generalized  Rosaura Symptoms: No problems reported or observed.  Hallucinations: None  Delusions: No  Memory:Normal: Yes  Insight and Judgment: No  Insight and Judgment: Poor judgment, Poor insight, Unmotivated    EDUCATION:   Learner Progress Toward Treatment Goals: Will review group plans and strategies for care.    Method: Group therapy, Medication compliance, Individualized assessments and Care planning.    Outcome: Needs Reinforcement    PATIENT GOALS: To be discussed with patient within 72 hours    PLAN/TREATMENT RECOMMENDATIONS:     Continue group therapy , Medications compliance, Goal setting, Individualized assessments and Care planning, continue to monitor patient on unit.      SHORT-TERM GOALS:   Time frame for Short-Term Goals: 5-7 days    LONG-TERM GOALS:  Time frame for Long-Term Goals: 6 months  Members Present in Team Meeting: See Signature Sheet    Madeleine Philip RN   
up at [facility] and take medicine    PLAN/TREATMENT RECOMMENDATIONS UPDATE: continue with group therapies, increased socialization, continue planning for after discharge goals, continue with medication compliance    SHORT-TERM GOALS UPDATE:   Time frame for Short-Term Goals: 5-7 days    LONG-TERM GOALS UPDATE:   Time frame for Long-Term Goals: 6 months  Members Present in Team Meeting: See Signature Sheet    Jennifer Granda RN

## 2024-10-27 NOTE — TRANSITION OF CARE
Behavioral Health Transition Record    Patient Name: Meño Goodman  YOB: 1990   Medical Record Number: [id number]  Date of Admission: 10/21/2024  3:01 PM   Date of Discharge: 10/27/2024    Attending Provider: Ajit Chopra MD   Discharging Provider: Nav   To contact this individual call [phone] and ask the  to page.  If unavailable, ask to be transferred to Behavioral Health Provider on call.  A Behavioral Health Provider will be available on call  and during holidays.    Primary Care Provider: No primary care provider on file.    Allergies   Allergen Reactions    Morphine Anaphylaxis    Pcn [Penicillins] Hives and Shortness Of Breath     Tolerates ceftriaxone - given 22    Amoxicillin Hives     Tolerates ceftriaxone - given on 22    Asa [Aspirin]     Seasonal        Reason for Admission: Patient presents to the ED with suicidal idealizations with a plan to cut self due to the recent passing of her grandmother. Upon admission patient is still suicidal with a plan but contracts for safety. Patient admits to seeing and hearing her  grandmother. Patient is tearful and withdrawn     Admission Diagnosis: Depression with suicidal ideation [F32.A, R45.851]    * No surgery found *    Results for orders placed or performed during the hospital encounter of 10/21/24   Comprehensive Metabolic Panel   Result Value Ref Range    Sodium 136 136 - 145 mmol/L    Potassium 3.9 3.7 - 5.3 mmol/L    Chloride 103 98 - 107 mmol/L    CO2 24 20 - 31 mmol/L    Anion Gap 9 9 - 16 mmol/L    Glucose 213 (H) 74 - 99 mg/dL    BUN 8 6 - 20 mg/dL    Creatinine 0.9 0.7 - 1.2 mg/dL    Est, Glom Filt Rate 85 >60 mL/min/1.73m2    Calcium 9.1 8.6 - 10.4 mg/dL    Total Protein 7.2 6.6 - 8.7 g/dL    Albumin 3.5 3.5 - 5.2 g/dL    Total Bilirubin 0.3 0.0 - 1.2 mg/dL    Alkaline Phosphatase 105 (H) 35 - 104 U/L    ALT 39 (H) 10 - 35 U/L    AST 53 (H) 10 - 35 U/L   CBC with Auto Differential   Result

## 2024-10-27 NOTE — DISCHARGE INSTRUCTIONS
Information:  Medications:   Medication summary provided   I understand that I should take only the medications on my list.     -why and when I need to take each medicine.     -which side effects to watch for.     -that I should carry my medication information at all times in case of     Emergency situations.    I will take all of my medicines to follow up appointments.     -check with my physician or pharmacist before taking any new    Medication, over the counter product or drink alcohol.    -Ask about food, drug or dietary supplement interactions.    -discard old lists and update records with medication providers.    Notify Physician:  Notify physician if you notice:   Always call 911 if you feel your life is in danger  In case of an emergency call 911 immediately!  If 911 is not available call your local emergency medical system for help    Behavioral Health Follow Up:  Original Referral Source:PPU  Discharge Diagnosis: Depression with suicidal ideation [F32.A, R45.851]  Recommendations for Level of Care: Take medications as prescribed. Keep all follow up appointments   Patient status at discharge: Stable. Alert and oriented   My hospital  was: [facility]  Aftercare plan faxed: Bill   -faxed by: Staff    -date: 10/27/2024   -time: 1149  Prescriptions: [facility]     Smoking: Quit Smoking.   Call the [employer]'s smoking quitline at 4-792-24C-QUIT  Know the signs of a heart attack   If you have any of the following symptoms call 911 immediately, do not wait more    Than five minutes.    1. Pressure, fullness and/ or squeezing in the center of the chest spreading to    The jaw, neck or shoulder.    2. Chest discomfort with light headedness, fainting, sweating, nausea or    Shortness of breath.   3. Upper abdominal pressure or discomfort.   4. Lower chest pain, back pain, unusual fatigue, shortness of breath, nausea   Or dizziness.     General Information:   Questions regarding your bill:

## 2024-10-27 NOTE — BH NOTE
Behavioral Health [place]  Discharge Note    Pt discharged with followings belongings:   Dental Appliances: None  Vision - Corrective Lenses: None  Hearing Aid: None  Jewelry: Body Piercing, Earrings, Necklace, Ring  Body Piercings Removed: No  Clothing: Shirt, Pants, Footwear (1pr gray sneakers, 1 green shirt, 1 blk and white log sleeve shirt, 1pr gray pants)  Other Valuables: Other (Comment)   Valuables sent home with patient or returned to patient. Patient educated on aftercare instructions: Yes  Information faxed to [facility] by staff  at 11:27 AM .Patient verbalize understanding of AVS:  Yes.    Status EXAM upon discharge:  Mental Status and Behavioral Exam  Normal: No  Level of Assistance: Independent/Self  Facial Expression: Flat  Affect: Blunt  Level of Consciousness: Alert  Frequency of Checks: 4 times per hour, close  Mood:Normal: No  Mood: Depressed, Anxious  Motor Activity:Normal: Yes  Motor Activity: Decreased  Eye Contact: Good  Observed Behavior: Cooperative, Preoccupied  Sexual Misconduct History: Current - no  Preception: [place] to person, [place] to time, [place] to place, [place] to situation  Attention:Normal: No  Attention: Distractible  Thought Processes: Circumstantial  Thought Content:Normal: No  Thought Content: Preoccupations  Depression Symptoms: Feelings of helplessness, Feelings of hopelessess  Anxiety Symptoms: Generalized  Rosaura Symptoms: No problems reported or observed.  Hallucinations: None  Delusions: No  Memory:Normal: Yes  Insight and Judgment: No  Insight and Judgment: Poor judgment, Poor insight    Tobacco Screening:  Practical Counseling, on admission, melinda X, if applicable and completed (first 3 are required if patient doesn't refuse) :            ( ) Recognizing danger situations (included triggers and roadblocks)                    ( ) Coping skills (new ways to manage stress,relaxation techniques, changing routine, distraction)

## 2024-10-27 NOTE — GROUP NOTE
Group Therapy Note    Date: 10/27/2024    Group Start Time: 0900  Group End Time: 0945  Group Topic: Group Documentation    Selma Osullivan        Group Therapy Note    Attendees: 10/18           Patient's Goal:  discuss ways to organize our goals    Notes:  Morning goal group session, patient has opportunity to reflect on what they need to accomplish to achieve discharge, and decide on a step towards that that they would like to accomplish by the end of the day today.     Status After Intervention:  Improved    Participation Level: Active Listener and Interactive    Participation Quality: Appropriate, Attentive, and Sharing      Speech:  normal      Thought Process/Content: Logical  Linear      Affective Functioning: Congruent      Mood: euthymic      Level of consciousness:  Alert, Oriented x4, and Attentive      Response to Learning: Able to verbalize current knowledge/experience and Able to verbalize/acknowledge new learning      Endings: None Reported    Modes of Intervention: Education, Support, and Socialization      Discipline Responsible: Behavorial Health Tech      Signature:  Selma Matute
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
## 2024-10-28 NOTE — CARE COORDINATION
[facility] Biopsychosocial Assessment    Current Level of Psychosocial Functioning     Independent X  Dependent    Minimal Assist     Comments:    Psychosocial High Risk Factors (check all that apply)    Unable to obtain meds   Chronic illness/pain    Substance abuse   Lack of Family Support   Financial stress   Isolation   Inadequate Community Resources  Suicide attempt(s)  Not taking medications   Victim of crime   Developmental Delay  Unable to manage personal needs    Age 65 or older   Homeless  No transportation   Readmission within 30 days  Unemployment  Traumatic Event X    Comments:   Psychiatric Advanced Directives: n/a    Family to Involve in Treatment: none at this time    Sexual Orientation:  n/a    Patient Strengths: has housing, income, insurance, connected to outpatient unit    Patient Barriers: suicidal ideations, substance use, grief/loss, minimal support system      Opiate Education Provided:  no- patient does not use opiates      CMHC/mental health history: connected to [facility] currently and past [facility] admissions    Plan of Care   medication management, group/individual therapies, family meetings, psycho -education, treatment team meetings to assist with stabilization    Initial Discharge Plan:  return home and maintain connection to [facility]      Clinical Summary:    Meño is a 33 y/o female admitted to [facility] for suicidal ideations with a plan to cut her wrists.  Patient is evasive during social work assessment and provides minimal information.  She states that recently her grandmother passed away and this increased her suicidal thoughts and depression.  Patient has minimal support outside of the hospital within her family other than her spouse and her friend.  She is connected with [facility] for mental health treatment in the community and wishes to maintain that connection when ready for discharge.  She denies legal issues.  Reports using cannabis \"occasionally\".  Patient denies homicidal thoughts.  
(90 Base) MCG/ACT inhaler  budesonide-formoterol 160-4.5 MCG/ACT Aero  DULoxetine 20 MG extended release capsule  famotidine 20 MG tablet  fluticasone 50 MCG/ACT nasal spray  glipiZIDE 5 MG tablet  hydrOXYzine HCl 50 MG tablet  insulin glargine 100 UNIT/ML injection vial  insulin lispro 100 UNIT/ML Soln injection vial  metFORMIN 1000 MG tablet  montelukast 10 MG tablet  traZODone 50 MG tablet         Follow Up Appointment: [facility] [address] [place] [address]  [phone]    Follow up on 10/29/2024  @2:00pm with leopoldo Santoro    [facility] [address] [place] [address]  [phone]    Follow up on 11/18/2024  @340pm with GERALD and Dr. Samaniego

## 2024-11-08 ENCOUNTER — OFFICE VISIT (OUTPATIENT)
Dept: INTERNAL MEDICINE | Age: 34
End: 2024-11-08

## 2024-11-08 VITALS
OXYGEN SATURATION: 98 % | WEIGHT: 293 LBS | HEART RATE: 88 BPM | BODY MASS INDEX: 48.82 KG/M2 | TEMPERATURE: 97 F | HEIGHT: 65 IN | DIASTOLIC BLOOD PRESSURE: 82 MMHG | SYSTOLIC BLOOD PRESSURE: 116 MMHG

## 2024-11-08 DIAGNOSIS — R10.11 RIGHT UPPER QUADRANT PAIN: ICD-10-CM

## 2024-11-08 DIAGNOSIS — Z87.442 H/O RENAL CALCULI: ICD-10-CM

## 2024-11-08 DIAGNOSIS — K76.0 NON-ALCOHOLIC FATTY LIVER DISEASE: ICD-10-CM

## 2024-11-08 DIAGNOSIS — J45.51 SEVERE PERSISTENT ASTHMA WITH ACUTE EXACERBATION: ICD-10-CM

## 2024-11-08 DIAGNOSIS — E11.9 TYPE 2 DIABETES MELLITUS WITHOUT COMPLICATION, WITHOUT LONG-TERM CURRENT USE OF INSULIN (HCC): Primary | ICD-10-CM

## 2024-11-08 DIAGNOSIS — M79.673 PAIN OF FOOT, UNSPECIFIED LATERALITY: ICD-10-CM

## 2024-11-08 DIAGNOSIS — G47.33 OBSTRUCTIVE SLEEP APNEA OF ADULT: ICD-10-CM

## 2024-11-08 DIAGNOSIS — E88.89 STEATOSIS (HCC): ICD-10-CM

## 2024-11-08 DIAGNOSIS — L30.9 ECZEMA, UNSPECIFIED TYPE: ICD-10-CM

## 2024-11-08 RX ORDER — BLOOD PRESSURE TEST KIT
1 KIT MISCELLANEOUS 3 TIMES DAILY
Qty: 100 EACH | Refills: 0 | Status: SHIPPED | OUTPATIENT
Start: 2024-11-08

## 2024-11-08 RX ORDER — BLOOD-GLUCOSE METER
1 KIT MISCELLANEOUS DAILY
Qty: 1 KIT | Refills: 0 | Status: SHIPPED | OUTPATIENT
Start: 2024-11-08

## 2024-11-08 RX ORDER — BLOOD PRESSURE TEST KIT
1 KIT MISCELLANEOUS ONCE
Qty: 1 KIT | Refills: 0 | Status: SHIPPED | OUTPATIENT
Start: 2024-11-08 | End: 2024-11-08

## 2024-11-08 RX ORDER — HYDROXYZINE HYDROCHLORIDE 50 MG/1
50 TABLET, FILM COATED ORAL 3 TIMES DAILY PRN
COMMUNITY
End: 2024-11-08 | Stop reason: SDUPTHER

## 2024-11-08 RX ORDER — CALAMINE 8% AND ZINC OXIDE 8% 160 MG/ML
1 LOTION TOPICAL DAILY
Qty: 1 EACH | Refills: 0 | Status: SHIPPED | OUTPATIENT
Start: 2024-11-08

## 2024-11-08 RX ORDER — GLUCOSAMINE HCL/CHONDROITIN SU 500-400 MG
1 CAPSULE ORAL 3 TIMES DAILY
Qty: 100 STRIP | Refills: 11 | Status: SHIPPED | OUTPATIENT
Start: 2024-11-08

## 2024-11-08 RX ORDER — ESCITALOPRAM OXALATE 20 MG/1
20 TABLET ORAL DAILY
COMMUNITY

## 2024-11-08 RX ORDER — LANCETS 30 GAUGE
1 EACH MISCELLANEOUS DAILY
Qty: 100 EACH | Refills: 5 | Status: SHIPPED | OUTPATIENT
Start: 2024-11-08

## 2024-11-08 RX ORDER — HYDROXYZINE HYDROCHLORIDE 50 MG/1
50 TABLET, FILM COATED ORAL 3 TIMES DAILY PRN
Qty: 60 TABLET | Refills: 3 | Status: SHIPPED | OUTPATIENT
Start: 2024-11-08

## 2024-11-08 ASSESSMENT — PATIENT HEALTH QUESTIONNAIRE - PHQ9
5. POOR APPETITE OR OVEREATING: NOT AT ALL
SUM OF ALL RESPONSES TO PHQ9 QUESTIONS 1 & 2: 0
8. MOVING OR SPEAKING SO SLOWLY THAT OTHER PEOPLE COULD HAVE NOTICED. OR THE OPPOSITE, BEING SO FIGETY OR RESTLESS THAT YOU HAVE BEEN MOVING AROUND A LOT MORE THAN USUAL: NOT AT ALL
1. LITTLE INTEREST OR PLEASURE IN DOING THINGS: NOT AT ALL
SUM OF ALL RESPONSES TO PHQ QUESTIONS 1-9: 0
2. FEELING DOWN, DEPRESSED OR HOPELESS: NOT AT ALL
3. TROUBLE FALLING OR STAYING ASLEEP: NOT AT ALL
7. TROUBLE CONCENTRATING ON THINGS, SUCH AS READING THE NEWSPAPER OR WATCHING TELEVISION: NOT AT ALL
SUM OF ALL RESPONSES TO PHQ QUESTIONS 1-9: 0
4. FEELING TIRED OR HAVING LITTLE ENERGY: NOT AT ALL
6. FEELING BAD ABOUT YOURSELF - OR THAT YOU ARE A FAILURE OR HAVE LET YOURSELF OR YOUR FAMILY DOWN: NOT AT ALL
SUM OF ALL RESPONSES TO PHQ QUESTIONS 1-9: 0
9. THOUGHTS THAT YOU WOULD BE BETTER OFF DEAD, OR OF HURTING YOURSELF: NOT AT ALL
10. IF YOU CHECKED OFF ANY PROBLEMS, HOW DIFFICULT HAVE THESE PROBLEMS MADE IT FOR YOU TO DO YOUR WORK, TAKE CARE OF THINGS AT HOME, OR GET ALONG WITH OTHER PEOPLE: NOT DIFFICULT AT ALL
SUM OF ALL RESPONSES TO PHQ QUESTIONS 1-9: 0

## 2024-11-08 NOTE — PROGRESS NOTES
[facility]  [place] [id number] [place] [address]  Dept: [phone]  Dept Fax: [phone]    Office Progress/Follow Up Note  Date of patient's visit: 11/8/2024  Patient's Name:  Meño Goodman YOB: 1990            Patient Care Team:  Jean Claude Garcia MD as PCP - General (Internal Medicine)  Earnest Bermeo DO as PCP - Empaneled Provider  Alejandro Lopez MD as Consulting Physician (Obstetrics & Gynecology)  ________________________________________________________________________      Reason for Visit: Routine outpatient follow up/Same day visit/Post hospital/ED visit***  ________________________________________________________________________  Chief Complaint:  New Patient and Diabetes (Last A1c 9.2 10/24 )    ________________________________________________________________________  History of Presenting Illness:  History was obtained from: {HISTORY SOURCE:[id number]::\"patient\"}. Meño Goodman is a 34 y.o. is here for a ***       Patient Active Problem List   Diagnosis    Obesity    Gastroesophageal reflux disease without esophagitis    Fatty liver disease, nonalcoholic    Type 2 diabetes mellitus without complication (HCC)    Bipolar affective disorder, currently depressed, mild (HCC)    Depression with suicidal ideation    Moderate persistent asthma    Acute vaginitis    Family history of pancreatic cancer    Pneumonia due to infectious organism    Respiratory failure    Otalgia    Generalized anxiety disorder    Abdominal bloating    Pruritic disorder    Schizoaffective disorder, depressive type (HCC)    Steatosis (HCC)    Severe persistent asthma with acute exacerbation       Health Maintenance Due   Topic Date Due    Diabetic retinal exam  Never done    Pneumococcal 0-64 years Vaccine (2 of 2 - PCV) 08/18/2018    Diabetic foot exam  08/12/2022    Hepatitis B vaccine (3 of 3 - 19+ 3-dose series) 09/17/2022    Diabetic Alb to Cr ratio 
Attending Physician Statement  I have discussed the care of Meño Goodman including pertinent history and exam findings,  with the resident. I have reviewed the key elements of all parts of the encounter with the resident.  I agree with the assessment, plan and orders as documented by the resident.  (GE Modifier)    MD GRACE Dunbar  Attending Physician, [facility]   Faculty, Internal Medicine Residency Program  [facility]  11/8/2024, 3:08 PM    
(DESYREL) 50 MG tablet Take 1 tablet by mouth nightly as needed for Sleep 30 tablet 0    paliperidone palmitate ER (INVEGA SUSTENNA) 234 MG/1.5ML DANK IM injection Inject 234 mg into the muscle every 28 days      Lancets Misc. (ACCU-CHEK SOFTCLIX LANCET DEV) KIT Use as directed for glucose checks 2 kit 3    TRULICITY 1.5 MG/0.5ML SC injection inject 0.5 milliliters subcutaneously every week 2 mL 2    pantoprazole (PROTONIX) 40 MG tablet Take 1 tablet by mouth daily 30 tablet 5    [DISCONTINUED] aspirin-acetaminophen-caffeine (EXCEDRIN MIGRAINE) 250-250-65 MG per tablet Take 1 tablet by mouth every 8 hours as needed for Headaches 90 tablet 0     No current facility-administered medications on file prior to visit.       FAMILY HISTORY:          Problem Relation Age of Onset    Hypertension Maternal Grandmother     Heart Disease Father     Early Death Father     Cancer Mother     High Blood Pressure Mother     Diabetes Mother     Heart Disease Mother     Stroke Maternal Aunt     Ovarian Cancer Maternal Aunt     Cancer Maternal Aunt         lung    Cancer Paternal Cousin         brain       REVIEW OF SYSTEMS:    General: no significant weight changes.  Dermatological: no abnormal pigmentation, rash, or itching.  Ears/Nose/Throat: denies any hearing loss, abnormal smelling or throat pain  Respiratory: no cough, pleuritic chest pain, dyspnea, or wheezing  Cardiovascular: no pain, dyspnea on exertion, orthopnea, palpitations, or claudication  Gastrointestinal: no nausea, vomiting, heartburn, diarrhea, constipation, bloating, or abdominal pain. No bloody or black stools.  Genito-Urinary: no urinary urgency, frequency, dysuria, nocturia, hesitancy, incontinence, or pain. No hematuria  Musculoskeletal: no arthralgia, myalgia, weakness, or morning stiffness  Neurologic: no TIA or stroke symptoms. no paralysis, or frequent or severe headaches  Hematologic/Lymphatic/Immunologic: no abnormal bleeding/bruising, fever, chills,

## 2024-11-11 ENCOUNTER — TELEPHONE (OUTPATIENT)
Dept: GASTROENTEROLOGY | Age: 34
End: 2024-11-11

## 2024-11-13 ENCOUNTER — HOSPITAL ENCOUNTER (OUTPATIENT)
Age: 34
Discharge: HOME | End: 2024-11-13
Payer: COMMERCIAL

## 2024-11-13 DIAGNOSIS — K76.0 NON-ALCOHOLIC FATTY LIVER DISEASE: ICD-10-CM

## 2024-11-13 DIAGNOSIS — E88.89 STEATOSIS (HCC): ICD-10-CM

## 2024-11-13 DIAGNOSIS — R10.11 RIGHT UPPER QUADRANT PAIN: ICD-10-CM

## 2024-11-13 LAB
ALBUMIN SERPL-MCNC: 4.2 G/DL (ref 3.5–5.2)
ALBUMIN/GLOB SERPL: 1.2 {RATIO} (ref 1–2.5)
ALP SERPL-CCNC: 112 U/L (ref 35–104)
ALT SERPL-CCNC: 38 U/L (ref 10–35)
AST SERPL-CCNC: 45 U/L (ref 10–35)
BASOPHILS # BLD: 0.07 K/UL (ref 0–0.2)
BASOPHILS NFR BLD: 1 % (ref 0–2)
BILIRUB DIRECT SERPL-MCNC: 0.2 MG/DL (ref 0–0.2)
BILIRUB INDIRECT SERPL-MCNC: 0.2 MG/DL (ref 0–1)
BILIRUB SERPL-MCNC: 0.4 MG/DL (ref 0–1.2)
CHOLEST SERPL-MCNC: 196 MG/DL (ref 0–199)
CHOLESTEROL/HDL RATIO: 5.3
EOSINOPHIL # BLD: 0.63 K/UL (ref 0–0.44)
EOSINOPHILS RELATIVE PERCENT: 7 % (ref 1–4)
ERYTHROCYTE [DISTWIDTH] IN BLOOD BY AUTOMATED COUNT: 12.8 % (ref 11.8–14.4)
GLOBULIN SER CALC-MCNC: 3.5 G/DL
HCT VFR BLD AUTO: 41.4 % (ref 36.3–47.1)
HDLC SERPL-MCNC: 37 MG/DL
HGB BLD-MCNC: 13.3 G/DL (ref 11.9–15.1)
IMM GRANULOCYTES # BLD AUTO: <0.03 K/UL (ref 0–0.3)
IMM GRANULOCYTES NFR BLD: 0 %
LDLC SERPL CALC-MCNC: 133 MG/DL (ref 0–100)
LYMPHOCYTES NFR BLD: 2.68 K/UL (ref 1.1–3.7)
LYMPHOCYTES RELATIVE PERCENT: 28 % (ref 24–43)
MCH RBC QN AUTO: 30.2 PG (ref 25.2–33.5)
MCHC RBC AUTO-ENTMCNC: 32.1 G/DL (ref 28.4–34.8)
MCV RBC AUTO: 94.1 FL (ref 82.6–102.9)
MONOCYTES NFR BLD: 0.58 K/UL (ref 0.1–1.2)
MONOCYTES NFR BLD: 6 % (ref 3–12)
NEUTROPHILS NFR BLD: 58 % (ref 36–65)
NEUTS SEG NFR BLD: 5.77 K/UL (ref 1.5–8.1)
NRBC BLD-RTO: 0 PER 100 WBC
PLATELET # BLD AUTO: 383 K/UL (ref 138–453)
PMV BLD AUTO: 9.8 FL (ref 8.1–13.5)
PROT SERPL-MCNC: 7.7 G/DL (ref 6.6–8.7)
RBC # BLD AUTO: 4.4 M/UL (ref 3.95–5.11)
TRIGL SERPL-MCNC: 128 MG/DL
VLDLC SERPL CALC-MCNC: 26 MG/DL (ref 1–30)
WBC OTHER # BLD: 9.8 K/UL (ref 3.5–11.3)

## 2024-11-13 PROCEDURE — 80076 HEPATIC FUNCTION PANEL: CPT

## 2024-11-13 PROCEDURE — 85025 COMPLETE CBC W/AUTO DIFF WBC: CPT

## 2024-11-13 PROCEDURE — 36415 COLL VENOUS BLD VENIPUNCTURE: CPT

## 2024-11-13 PROCEDURE — 80061 LIPID PANEL: CPT

## 2024-11-20 ENCOUNTER — OFFICE VISIT (OUTPATIENT)
Dept: ORTHOPEDIC SURGERY | Age: 34
End: 2024-11-20

## 2024-11-20 VITALS — WEIGHT: 293 LBS | HEIGHT: 65 IN | BODY MASS INDEX: 48.82 KG/M2

## 2024-11-20 DIAGNOSIS — M72.2 PLANTAR FASCIITIS OF LEFT FOOT: Primary | ICD-10-CM

## 2024-11-20 DIAGNOSIS — M79.672 LEFT FOOT PAIN: ICD-10-CM

## 2024-11-20 RX ORDER — MELOXICAM 15 MG/1
15 TABLET ORAL DAILY
Qty: 30 TABLET | Refills: 3 | Status: SHIPPED | OUTPATIENT
Start: 2024-11-20

## 2024-11-20 NOTE — PROGRESS NOTES
[facility]  [address]  [place] [address]  Dept: [phone]    Ambulatory Orthopedic Office Visit      CHIEF COMPLAINT:    Chief Complaint   Patient presents with    Joint Pain     LT foot pain        HISTORY OF PRESENT ILLNESS:      The patient is a 34 y.o.female who is being seen at the request of  Jean Claude Garcia MD for consultation and evaluation of left foot pain.  Patient states that her left foot pain has gotten progressively worse over the past several months.  She states the pain is worse first thing in the morning when she stands and then progressively gets better the more she walks.  She also notices the pain after she is sitting for long periods of time.  She states she has tried treatments for this.  She wears tennis shoes without arch support.  She is minimally ambulatory mainly around the house.  She denies prior treatments.  She denies prior injuries to the left ankle/foot.  Denies numbness or tingling radiating into her left leg.      Past Medical History:    Past Medical History:   Diagnosis Date    Asthma     Back pain 2014    Bipolar 1 disorder (HCC)     Bipolar disorder (HCC) 2017    Depression     Diabetes mellitus (HCC)     Dizziness     Fatty liver disease, nonalcoholic     Fatty liver disease, nonalcoholic     GERD (gastroesophageal reflux disease)     Obesity        Past Surgical History:    Past Surgical History:   Procedure Laterality Date     SECTION      LEEP         Current Medications:   Current Outpatient Medications   Medication Sig Dispense Refill    meloxicam (MOBIC) 15 MG tablet Take 1 tablet by mouth daily 30 tablet 3    escitalopram (LEXAPRO) 20 MG tablet Take 1 tablet by mouth daily      blood glucose monitor kit and supplies Dispense sufficient amount for indicated testing frequency plus additional to accommodate PRN testing needs. Dispense all needed supplies to include:

## 2024-11-27 ENCOUNTER — OFFICE VISIT (OUTPATIENT)
Dept: GASTROENTEROLOGY | Age: 34
End: 2024-11-27
Payer: COMMERCIAL

## 2024-11-27 VITALS
RESPIRATION RATE: 16 BRPM | DIASTOLIC BLOOD PRESSURE: 73 MMHG | WEIGHT: 293 LBS | OXYGEN SATURATION: 97 % | TEMPERATURE: 97.3 F | BODY MASS INDEX: 54.42 KG/M2 | HEART RATE: 85 BPM | SYSTOLIC BLOOD PRESSURE: 121 MMHG

## 2024-11-27 DIAGNOSIS — K21.9 GASTROESOPHAGEAL REFLUX DISEASE WITHOUT ESOPHAGITIS: ICD-10-CM

## 2024-11-27 DIAGNOSIS — K76.0 FATTY LIVER DISEASE, NONALCOHOLIC: Primary | ICD-10-CM

## 2024-11-27 PROCEDURE — 1036F TOBACCO NON-USER: CPT | Performed by: INTERNAL MEDICINE

## 2024-11-27 PROCEDURE — G8417 CALC BMI ABV UP PARAM F/U: HCPCS | Performed by: INTERNAL MEDICINE

## 2024-11-27 PROCEDURE — 99204 OFFICE O/P NEW MOD 45 MIN: CPT | Performed by: INTERNAL MEDICINE

## 2024-11-27 PROCEDURE — G8427 DOCREV CUR MEDS BY ELIG CLIN: HCPCS | Performed by: INTERNAL MEDICINE

## 2024-11-27 PROCEDURE — G8484 FLU IMMUNIZE NO ADMIN: HCPCS | Performed by: INTERNAL MEDICINE

## 2024-11-27 ASSESSMENT — ENCOUNTER SYMPTOMS
WHEEZING: 0
VOMITING: 0
NAUSEA: 0
ANAL BLEEDING: 0
CHOKING: 0
ABDOMINAL PAIN: 0
COUGH: 0
TROUBLE SWALLOWING: 0
DIARRHEA: 0
BLOOD IN STOOL: 0
VOICE CHANGE: 0
RECTAL PAIN: 0
CONSTIPATION: 0
ABDOMINAL DISTENTION: 0
SORE THROAT: 0

## 2024-11-27 NOTE — PROGRESS NOTES
Wt (!) 148.3 kg (327 lb)   LMP 10/01/2024 (Approximate)   SpO2 97%   BMI 54.42 kg/m²     PHYSICAL EXAMINATION: Vital signs reviewed per the nursing documentation.       Body mass index is 54.42 kg/m².   Physical Exam  Vitals and nursing note reviewed.   Constitutional:       General: She is not in acute distress.     Appearance: She is well-developed. She is not diaphoretic.   HENT:      Head: Normocephalic.      Mouth/Throat:      Pharynx: No oropharyngeal exudate.   Eyes:      General: No scleral icterus.     Pupils: Pupils are equal, round, and reactive to light.   Neck:      Thyroid: No thyromegaly.      Vascular: No JVD.      Trachea: No tracheal deviation.   Cardiovascular:      Rate and Rhythm: Normal rate and regular rhythm.      Heart sounds: Normal heart sounds. No murmur heard.  Pulmonary:      Effort: Pulmonary effort is normal. No respiratory distress.      Breath sounds: Normal breath sounds. No wheezing.   Abdominal:      General: Bowel sounds are normal. There is no distension.      Palpations: Abdomen is soft.      Tenderness: There is no abdominal tenderness. There is no guarding or rebound.      Comments: No ascites   Musculoskeletal:         General: Normal range of motion.      Cervical back: Normal range of motion and neck supple.   Skin:     General: Skin is warm.      Coloration: Skin is not pale.      Findings: No erythema or rash.      Comments: She is not diaphoretic   Neurological:      Mental Status: She is alert and oriented to person, place, and time.      Deep Tendon Reflexes: Reflexes are normal and symmetric.   Psychiatric:         Behavior: Behavior normal.         Thought Content: Thought content normal.         Judgment: Judgment normal.         IMPRESSION: Ms. Goodman is a 34 y.o. female with      Diagnosis Orders   1. Fatty liver disease, nonalcoholic  Hepatitis A Antibody, Total    Hepatitis B Core Antibody, Total    Hepatitis B Surface Antibody    Hepatitis C Antibody

## 2024-12-04 NOTE — PROGRESS NOTES
I performed a history and physical examination of the patient and discussed management with the resident. I reviewed the /resident physician note and agree with the documented findings and plan of care. Any areas of disagreement are noted on the chart.   I have personally evaluated this patient and have completed at least one if not all key elements of the E/M (history, physical exam,procedure and MDM).  Additional findings are as noted. I agree with the chief complaint, past medical history, past surgical history, allergies, medications, social and family history as documented unless otherwise noted below.     Electronically signed by DEMETRI MORALES DO on 12/4/2024 at 8:07 AM

## 2024-12-06 ENCOUNTER — OFFICE VISIT (OUTPATIENT)
Dept: INTERNAL MEDICINE | Age: 34
End: 2024-12-06

## 2024-12-06 VITALS
HEART RATE: 87 BPM | BODY MASS INDEX: 48.82 KG/M2 | HEIGHT: 65 IN | DIASTOLIC BLOOD PRESSURE: 84 MMHG | WEIGHT: 293 LBS | OXYGEN SATURATION: 98 % | SYSTOLIC BLOOD PRESSURE: 132 MMHG

## 2024-12-06 DIAGNOSIS — G44.86 CERVICOGENIC HEADACHE: Primary | ICD-10-CM

## 2024-12-06 DIAGNOSIS — B85.0 HEAD LICE: ICD-10-CM

## 2024-12-06 RX ORDER — BUTALBITAL, ACETAMINOPHEN AND CAFFEINE 50; 325; 40 MG/1; MG/1; MG/1
1 TABLET ORAL EVERY 4 HOURS PRN
Qty: 180 TABLET | Refills: 3 | Status: SHIPPED | OUTPATIENT
Start: 2024-12-06

## 2024-12-06 ASSESSMENT — PATIENT HEALTH QUESTIONNAIRE - PHQ9
SUM OF ALL RESPONSES TO PHQ QUESTIONS 1-9: 5
10. IF YOU CHECKED OFF ANY PROBLEMS, HOW DIFFICULT HAVE THESE PROBLEMS MADE IT FOR YOU TO DO YOUR WORK, TAKE CARE OF THINGS AT HOME, OR GET ALONG WITH OTHER PEOPLE: NOT DIFFICULT AT ALL
6. FEELING BAD ABOUT YOURSELF - OR THAT YOU ARE A FAILURE OR HAVE LET YOURSELF OR YOUR FAMILY DOWN: NOT AT ALL
2. FEELING DOWN, DEPRESSED OR HOPELESS: SEVERAL DAYS
7. TROUBLE CONCENTRATING ON THINGS, SUCH AS READING THE NEWSPAPER OR WATCHING TELEVISION: NOT AT ALL
9. THOUGHTS THAT YOU WOULD BE BETTER OFF DEAD, OR OF HURTING YOURSELF: NOT AT ALL
SUM OF ALL RESPONSES TO PHQ9 QUESTIONS 1 & 2: 2
5. POOR APPETITE OR OVEREATING: MORE THAN HALF THE DAYS
4. FEELING TIRED OR HAVING LITTLE ENERGY: SEVERAL DAYS
SUM OF ALL RESPONSES TO PHQ QUESTIONS 1-9: 5
8. MOVING OR SPEAKING SO SLOWLY THAT OTHER PEOPLE COULD HAVE NOTICED. OR THE OPPOSITE, BEING SO FIGETY OR RESTLESS THAT YOU HAVE BEEN MOVING AROUND A LOT MORE THAN USUAL: NOT AT ALL
1. LITTLE INTEREST OR PLEASURE IN DOING THINGS: SEVERAL DAYS
SUM OF ALL RESPONSES TO PHQ QUESTIONS 1-9: 5
3. TROUBLE FALLING OR STAYING ASLEEP: NOT AT ALL
SUM OF ALL RESPONSES TO PHQ QUESTIONS 1-9: 5

## 2024-12-06 NOTE — PROGRESS NOTES
Attending Physician Statement  I have discussed the care of Meño Goodman, including pertinent history and exam findings, with the resident. I have seen and examined the patient and the key elements of all parts of the encounter have been performed by me.  I agree with the assessment, plan and orders as documented by the resident.  (GC Modifier)    MD GRACE Dunbar  Attending Physician  Internal Medicine Residency Program, Nephrology   [facility]  12/6/2024, 2:21 PM  
196 11/13/2024    HDL 37 (L) 11/13/2024    TRIG 128 11/13/2024     ________________________________________________________________________  Assessment and Plan:  Meño was seen today for diabetes and headache.    Diagnoses and all orders for this visit:    Cervicogenic headache  -     butalbital-acetaminophen-caffeine (FIORICET, ESGIC) -40 MG per tablet; Take 1 tablet by mouth every 4 hours as needed for Headaches Max Daily Amount: 6 tablets  -     MRI BRAIN WO CONTRAST; Future    Head lice  -     pyrethrins-piperonyl butoxide (LICIDE) 0.33-4 % shampoo; Apply topically once.      ________________________________________________________________________  Follow up and instructions:  No follow-ups on file.    Meño received counseling on the following healthy behaviors: nutrition, exercise, and medication adherence    Discussed use, benefit, and side effects of prescribed medications.  Barriers to medication compliance addressed.  All patient questions answered.  Pt voiced understanding.     Patient was encouraged to follow-up on all her of her labs as well as imaging studies to get a better idea of her symptoms.  It was explained to the patient that her diagnosis is important in order to treat her conditions including her headache/neurological symptoms and her abdominal pain.  She was also counseled to get appropriate testing done for concerns of sleep apnea.    Jean Claude Garcia MD      Department of Internal Medicine  [facility] [place]         12/6/2024, 2:36 PM      This note is created with the assistance of a speech-recognition program. While intending to generate a document that actually reflects the content of the visit, the document can still have some mistakes which may not have been identified and corrected by editing.

## 2024-12-13 ENCOUNTER — HOSPITAL ENCOUNTER (OUTPATIENT)
Dept: SLEEP CENTER | Age: 34
Discharge: HOME | End: 2024-12-15
Attending: INTERNAL MEDICINE
Payer: COMMERCIAL

## 2024-12-13 DIAGNOSIS — K76.0 NON-ALCOHOLIC FATTY LIVER DISEASE: ICD-10-CM

## 2024-12-13 PROCEDURE — 95810 POLYSOM 6/> YRS 4/> PARAM: CPT

## 2024-12-14 VITALS
HEART RATE: 89 BPM | OXYGEN SATURATION: 90 % | HEIGHT: 65 IN | RESPIRATION RATE: 16 BRPM | BODY MASS INDEX: 48.82 KG/M2 | WEIGHT: 293 LBS

## 2024-12-31 LAB — STATUS: NORMAL

## 2025-01-17 RX ORDER — HYDROXYZINE HYDROCHLORIDE 50 MG/1
TABLET, FILM COATED ORAL
Qty: 60 TABLET | Refills: 3 | Status: SHIPPED | OUTPATIENT
Start: 2025-01-17

## 2025-01-17 NOTE — TELEPHONE ENCOUNTER
..Request for   Requested Prescriptions     Pending Prescriptions Disp Refills    hydrOXYzine HCl (ATARAX) 50 MG tablet [Pharmacy Med Name: hydroxyzine HCl 50 mg tablet] 60 tablet 3     Sig: TAKE ONE TABLET BY MOUTH THREE TIMES DAILY AS NEEDED FOR ITCHING    .      Please review and e-scribe to pharmacy listed in chart if appropriate. Thank you.      Last Visit Date: 12/6/2024  Next Visit Date: 2/21/2025    Future Appointments   Date Time Provider Department Center   1/22/2025 10:20 AM SCHEDULE, [facility]   2/3/2025  7:30 PM [facility] SLE[facility] RM [address] [place] [facility]   2/21/2025  2:30 PM Jean Claude Garcia MD [facility] DEP   2/27/2025  1:30 PM Tari Jaimes MD [facility]       Health Maintenance   Topic Date Due    Diabetic retinal exam  Never done    Pneumococcal 0-64 years Vaccine (2 of 2 - PCV) 08/18/2018    Diabetic foot exam  08/12/2022    Hepatitis B vaccine (3 of 3 - 19+ 3-dose series) 09/17/2022    Diabetic Alb to Cr ratio (uACR) test  03/22/2023    Flu vaccine (1) 08/01/2024    COVID-19 Vaccine (3 - 2023-24 season) 09/01/2024    A1C test (Diabetic or Prediabetic)  01/21/2025    GFR test (Diabetes, CKD 3-4, OR last GFR 15-59)  10/21/2025    Lipids  11/13/2025    Depression Monitoring  12/06/2025    Cervical cancer screen  08/22/2028    DTaP/Tdap/Td vaccine (3 - Td or Tdap) 10/13/2033    Hepatitis C screen  Completed    HIV screen  Completed    Hepatitis A vaccine  Aged Out    Hib vaccine  Aged Out    HPV vaccine  Aged Out    Polio vaccine  Aged Out    Meningococcal (ACWY) vaccine  Aged Out    Varicella vaccine  Discontinued       Hemoglobin A1C (%)   Date Value   10/21/2024 9.2 (H)   01/12/2024 7.3 (H)   12/03/2023 6.5 (H)             ( goal A1C is < 7)   No components found for: \"LABMICR\"  No components found for: \"LDLCHOLESTEROL\", \"LDLCALC\"    (goal LDL is <100)   AST (U/L)   Date Value   11/13/2024 45 (H)     ALT (U/L)   Date Value   11/13/2024 38 (H)     BUN

## 2025-01-24 ENCOUNTER — HOSPITAL ENCOUNTER (EMERGENCY)
Age: 35
Discharge: ANOTHER HOSPITAL | End: 2025-01-24
Attending: EMERGENCY MEDICINE
Payer: COMMERCIAL

## 2025-01-24 ENCOUNTER — OFFICE VISIT (OUTPATIENT)
Dept: INTERNAL MEDICINE | Age: 35
End: 2025-01-24

## 2025-01-24 ENCOUNTER — CLINICAL DOCUMENTATION (OUTPATIENT)
Dept: BEHAVIORAL/MENTAL HEALTH CLINIC | Age: 35
End: 2025-01-24

## 2025-01-24 ENCOUNTER — HOSPITAL ENCOUNTER (INPATIENT)
Age: 35
LOS: 3 days | Discharge: HOME | DRG: 761 | End: 2025-01-27
Attending: PSYCHIATRY & NEUROLOGY | Admitting: PSYCHIATRY & NEUROLOGY
Payer: COMMERCIAL

## 2025-01-24 VITALS
OXYGEN SATURATION: 95 % | HEART RATE: 77 BPM | SYSTOLIC BLOOD PRESSURE: 137 MMHG | TEMPERATURE: 97.9 F | DIASTOLIC BLOOD PRESSURE: 85 MMHG | RESPIRATION RATE: 18 BRPM

## 2025-01-24 VITALS
WEIGHT: 293 LBS | HEART RATE: 74 BPM | BODY MASS INDEX: 48.82 KG/M2 | SYSTOLIC BLOOD PRESSURE: 161 MMHG | HEIGHT: 65 IN | DIASTOLIC BLOOD PRESSURE: 111 MMHG

## 2025-01-24 DIAGNOSIS — R45.851 SUICIDAL IDEATION: Primary | ICD-10-CM

## 2025-01-24 DIAGNOSIS — J45.51 SEVERE PERSISTENT ASTHMA WITH ACUTE EXACERBATION: ICD-10-CM

## 2025-01-24 DIAGNOSIS — F25.1 SCHIZOAFFECTIVE DISORDER, DEPRESSIVE TYPE (HCC): Primary | ICD-10-CM

## 2025-01-24 DIAGNOSIS — G47.33 OSA (OBSTRUCTIVE SLEEP APNEA): ICD-10-CM

## 2025-01-24 DIAGNOSIS — E88.89 STEATOSIS (HCC): ICD-10-CM

## 2025-01-24 DIAGNOSIS — E11.9 TYPE 2 DIABETES MELLITUS WITHOUT COMPLICATION, WITHOUT LONG-TERM CURRENT USE OF INSULIN (HCC): ICD-10-CM

## 2025-01-24 LAB
ALBUMIN SERPL-MCNC: 4.1 G/DL (ref 3.5–5.2)
ALBUMIN/GLOB SERPL: 1.1 {RATIO} (ref 1–2.5)
ALP SERPL-CCNC: 119 U/L (ref 35–104)
ALT SERPL-CCNC: 25 U/L (ref 10–35)
AMPHET UR QL SCN: NEGATIVE
ANION GAP SERPL CALCULATED.3IONS-SCNC: 14 MMOL/L (ref 9–16)
AST SERPL-CCNC: 38 U/L (ref 10–35)
BARBITURATES UR QL SCN: POSITIVE
BASOPHILS # BLD: 0.06 K/UL (ref 0–0.2)
BASOPHILS NFR BLD: 1 % (ref 0–2)
BENZODIAZ UR QL: NEGATIVE
BILIRUB SERPL-MCNC: 0.4 MG/DL (ref 0–1.2)
BILIRUB UR QL STRIP: NEGATIVE
BUN SERPL-MCNC: 12 MG/DL (ref 6–20)
CALCIUM SERPL-MCNC: 9.8 MG/DL (ref 8.6–10.4)
CANNABINOIDS UR QL SCN: NEGATIVE
CHLORIDE SERPL-SCNC: 105 MMOL/L (ref 98–107)
CLARITY UR: CLEAR
CO2 SERPL-SCNC: 22 MMOL/L (ref 20–31)
COCAINE UR QL SCN: NEGATIVE
COLOR UR: YELLOW
COMMENT: ABNORMAL
CREAT SERPL-MCNC: 0.8 MG/DL (ref 0.6–0.9)
EOSINOPHIL # BLD: 0.4 K/UL (ref 0–0.44)
EOSINOPHILS RELATIVE PERCENT: 5 % (ref 1–4)
ERYTHROCYTE [DISTWIDTH] IN BLOOD BY AUTOMATED COUNT: 12.3 % (ref 11.8–14.4)
ETHANOL PERCENT: <0.01 %
ETHANOLAMINE SERPL-MCNC: <10 MG/DL (ref 0–0.08)
FENTANYL UR QL: NEGATIVE
GFR, ESTIMATED: >90 ML/MIN/1.73M2
GLUCOSE BLD-MCNC: 139 MG/DL (ref 65–105)
GLUCOSE SERPL-MCNC: 98 MG/DL (ref 74–99)
GLUCOSE UR STRIP-MCNC: NEGATIVE MG/DL
HCG SERPL QL: NEGATIVE
HCT VFR BLD AUTO: 40.1 % (ref 36.3–47.1)
HGB BLD-MCNC: 13.6 G/DL (ref 11.9–15.1)
HGB UR QL STRIP.AUTO: NEGATIVE
IMM GRANULOCYTES # BLD AUTO: <0.03 K/UL (ref 0–0.3)
IMM GRANULOCYTES NFR BLD: 0 %
KETONES UR STRIP-MCNC: ABNORMAL MG/DL
LEUKOCYTE ESTERASE UR QL STRIP: NEGATIVE
LYMPHOCYTES NFR BLD: 2.06 K/UL (ref 1.1–3.7)
LYMPHOCYTES RELATIVE PERCENT: 24 % (ref 24–43)
MCH RBC QN AUTO: 29.8 PG (ref 25.2–33.5)
MCHC RBC AUTO-ENTMCNC: 33.9 G/DL (ref 28.4–34.8)
MCV RBC AUTO: 87.9 FL (ref 82.6–102.9)
METHADONE UR QL: NEGATIVE
MONOCYTES NFR BLD: 0.63 K/UL (ref 0.1–1.2)
MONOCYTES NFR BLD: 8 % (ref 3–12)
NEUTROPHILS NFR BLD: 62 % (ref 36–65)
NEUTS SEG NFR BLD: 5.28 K/UL (ref 1.5–8.1)
NITRITE UR QL STRIP: NEGATIVE
NRBC BLD-RTO: 0 PER 100 WBC
OPIATES UR QL SCN: NEGATIVE
OXYCODONE UR QL SCN: NEGATIVE
PCP UR QL SCN: NEGATIVE
PH UR STRIP: 6.5 [PH] (ref 5–8)
PLATELET # BLD AUTO: 338 K/UL (ref 138–453)
PMV BLD AUTO: 10.1 FL (ref 8.1–13.5)
POTASSIUM SERPL-SCNC: 4.6 MMOL/L (ref 3.7–5.3)
PROT SERPL-MCNC: 7.8 G/DL (ref 6.6–8.7)
PROT UR STRIP-MCNC: NEGATIVE MG/DL
RBC # BLD AUTO: 4.56 M/UL (ref 3.95–5.11)
SODIUM SERPL-SCNC: 141 MMOL/L (ref 136–145)
SP GR UR STRIP: 1.01 (ref 1–1.03)
TEST INFORMATION: ABNORMAL
UROBILINOGEN UR STRIP-ACNC: NORMAL EU/DL (ref 0–1)
WBC OTHER # BLD: 8.5 K/UL (ref 3.5–11.3)

## 2025-01-24 PROCEDURE — 80053 COMPREHEN METABOLIC PANEL: CPT

## 2025-01-24 PROCEDURE — 81003 URINALYSIS AUTO W/O SCOPE: CPT

## 2025-01-24 PROCEDURE — 80307 DRUG TEST PRSMV CHEM ANLYZR: CPT

## 2025-01-24 PROCEDURE — 1240000000 HC EMOTIONAL WELLNESS R&B

## 2025-01-24 PROCEDURE — 84703 CHORIONIC GONADOTROPIN ASSAY: CPT

## 2025-01-24 PROCEDURE — G0480 DRUG TEST DEF 1-7 CLASSES: HCPCS

## 2025-01-24 PROCEDURE — 85025 COMPLETE CBC W/AUTO DIFF WBC: CPT

## 2025-01-24 PROCEDURE — 82947 ASSAY GLUCOSE BLOOD QUANT: CPT

## 2025-01-24 PROCEDURE — 99285 EMERGENCY DEPT VISIT HI MDM: CPT

## 2025-01-24 RX ORDER — HYDROXYZINE HYDROCHLORIDE 50 MG/1
50 TABLET, FILM COATED ORAL 3 TIMES DAILY PRN
Status: DISCONTINUED | OUTPATIENT
Start: 2025-01-24 | End: 2025-01-27 | Stop reason: HOSPADM

## 2025-01-24 RX ORDER — POLYETHYLENE GLYCOL 3350 17 G
2 POWDER IN PACKET (EA) ORAL
Status: DISCONTINUED | OUTPATIENT
Start: 2025-01-24 | End: 2025-01-27 | Stop reason: HOSPADM

## 2025-01-24 RX ORDER — MAGNESIUM HYDROXIDE/ALUMINUM HYDROXICE/SIMETHICONE 120; 1200; 1200 MG/30ML; MG/30ML; MG/30ML
30 SUSPENSION ORAL EVERY 6 HOURS PRN
Status: DISCONTINUED | OUTPATIENT
Start: 2025-01-24 | End: 2025-01-27 | Stop reason: HOSPADM

## 2025-01-24 RX ORDER — POLYETHYLENE GLYCOL 3350 17 G/17G
17 POWDER, FOR SOLUTION ORAL DAILY PRN
Status: DISCONTINUED | OUTPATIENT
Start: 2025-01-24 | End: 2025-01-27 | Stop reason: HOSPADM

## 2025-01-24 RX ORDER — ACETAMINOPHEN 325 MG/1
650 TABLET ORAL EVERY 4 HOURS PRN
Status: DISCONTINUED | OUTPATIENT
Start: 2025-01-24 | End: 2025-01-27 | Stop reason: HOSPADM

## 2025-01-24 RX ORDER — TRAZODONE HYDROCHLORIDE 50 MG/1
50 TABLET, FILM COATED ORAL NIGHTLY PRN
Status: DISCONTINUED | OUTPATIENT
Start: 2025-01-24 | End: 2025-01-27 | Stop reason: HOSPADM

## 2025-01-24 ASSESSMENT — PATIENT HEALTH QUESTIONNAIRE - PHQ9
5. POOR APPETITE OR OVEREATING: NOT AT ALL
8. MOVING OR SPEAKING SO SLOWLY THAT OTHER PEOPLE COULD HAVE NOTICED. OR THE OPPOSITE, BEING SO FIGETY OR RESTLESS THAT YOU HAVE BEEN MOVING AROUND A LOT MORE THAN USUAL: NOT AT ALL
6. FEELING BAD ABOUT YOURSELF - OR THAT YOU ARE A FAILURE OR HAVE LET YOURSELF OR YOUR FAMILY DOWN: NEARLY EVERY DAY
4. FEELING TIRED OR HAVING LITTLE ENERGY: NEARLY EVERY DAY
3. TROUBLE FALLING OR STAYING ASLEEP: NOT AT ALL
2. FEELING DOWN, DEPRESSED OR HOPELESS: SEVERAL DAYS
7. TROUBLE CONCENTRATING ON THINGS, SUCH AS READING THE NEWSPAPER OR WATCHING TELEVISION: NOT AT ALL
SUM OF ALL RESPONSES TO PHQ QUESTIONS 1-9: 10
SUM OF ALL RESPONSES TO PHQ9 QUESTIONS 1 & 2: 2
SUM OF ALL RESPONSES TO PHQ QUESTIONS 1-9: 10
1. LITTLE INTEREST OR PLEASURE IN DOING THINGS: SEVERAL DAYS
SUM OF ALL RESPONSES TO PHQ QUESTIONS 1-9: 10
9. THOUGHTS THAT YOU WOULD BE BETTER OFF DEAD, OR OF HURTING YOURSELF: MORE THAN HALF THE DAYS
SUM OF ALL RESPONSES TO PHQ QUESTIONS 1-9: 8

## 2025-01-24 ASSESSMENT — COLUMBIA-SUICIDE SEVERITY RATING SCALE - C-SSRS
1. WITHIN THE PAST MONTH, HAVE YOU WISHED YOU WERE DEAD OR WISHED YOU COULD GO TO SLEEP AND NOT WAKE UP?: NO
6. HAVE YOU EVER DONE ANYTHING, STARTED TO DO ANYTHING, OR PREPARED TO DO ANYTHING TO END YOUR LIFE?: NO
2. HAVE YOU ACTUALLY HAD ANY THOUGHTS OF KILLING YOURSELF?: NO

## 2025-01-24 NOTE — PROGRESS NOTES
Supportive therapy used in session. Pt processed feelings regarding stress and depression. Pt processed feelings of stress and anger. Pt vented regarding stress and anger and depression. Pt acknowledged not taking oral medication regularly but is hoping to change this. Pt noted she is willing to go to the hospital. Staff then switched with MA who started sitting with pt.

## 2025-01-24 NOTE — PROGRESS NOTES
Attending Physician Statement  I have discussed the care of Meño Goodman, including pertinent history and exam findings with the resident. I have reviewed the key elements of all parts of the encounter with the resident. I have seen and examined the patient with the resident and the key elements of all parts of the encounter have been performed by me.  Patient presented to the clinic as acute care visit.  She is currently in the room extremely tearful.  She admits to major depression and having thoughts of harming herself.  She has an active plan.  She has been to behavioral health unit in the past and is requesting for admission.  I have initiated a legal hold on the patient and filled out the emergency admission to the hospital form.  We are going to call the squad and send the patient to [facility] for [facility] admission.  I agree with the assessment, and status of the problem list as documented. The plan and orders should include No orders of the defined types were placed in this encounter.   and this was also documented by the resident.  I agree with the referral to ED.The medication list was reviewed with the resident and is up to date. The return visit should be in 4 weeks .    Tarik Martinez MD  Attending Physician,  Department of Internal Medicine  [facility] Internal Medicine  [facility]      1/24/2025, 3:43 PM    
Dispense sufficient amount for indicated testing frequency plus additional to accommodate PRN testing needs. Dispense all needed supplies to include: monitor, strips, lancing device, lancets, control solutions, alcohol swabs. 1 kit 0    Calamine-Zinc Oxide (V-R CALAMINE) LOTN Apply 1 each topically Daily 1 each 0    glucose monitoring kit 1 kit by Does not apply route daily 1 kit 0    blood glucose monitor strips 1 strip by Other route in the morning, at noon, and at bedtime Test___times daily Diagnosis: 250.0   Diabetes Mellitus____Insulin Dependent____Non-Insulin Dependent 100 strip 11    Lancets MISC 1 each by Does not apply route daily 100 each 5    Alcohol Swabs PADS 1 each by Does not apply route in the morning, at noon, and at bedtime 100 each 0    blood glucose monitor strips 1 strip by Other route in the morning, at noon, and at bedtime Test___times daily Diagnosis: 250.0   Diabetes Mellitus____Insulin Dependent____Non-Insulin Dependent 100 strip 11    albuterol sulfate HFA (PROVENTIL HFA) 108 (90 Base) MCG/ACT inhaler Inhale 2 puffs into the lungs every 4 hours as needed for Wheezing or Shortness of Breath (Space out to every 6 hours as symptoms improve) 18 g 0    DULoxetine (CYMBALTA) 20 MG extended release capsule Take 5 capsules by mouth daily 150 capsule 0    famotidine (PEPCID) 20 MG tablet Take 1 tablet by mouth 2 times daily 60 tablet 0    fluticasone (FLONASE) 50 MCG/ACT nasal spray 2 sprays by Each Nostril route daily 16 g 0    glipiZIDE (GLUCOTROL) 5 MG tablet Take 1 tablet by mouth 2 times daily (before meals) 60 tablet 3    insulin lispro (HUMALOG,ADMELOG) 100 UNIT/ML SOLN injection vial Inject 0-8 Units into the skin 4 times daily (before meals and nightly) 10 mL 0    insulin glargine (LANTUS) 100 UNIT/ML injection vial Inject 10 Units into the skin daily 10 mL 0    metFORMIN (GLUCOPHAGE) 1000 MG tablet Take 1 tablet by mouth 2 times daily (with meals) 60 tablet 3    montelukast (SINGULAIR) 10

## 2025-01-24 NOTE — ED PROVIDER NOTES
[facility] EMERGENCY DEPARTMENT  Emergency Department Encounter  Emergency Medicine Resident     Pt Name:Meño Goodman  MRN: [id number]  Birthdate 1990  Date of evaluation: 25  PCP:  Jean Claude Garcia MD  Note Started: 4:52 PM EST      CHIEF COMPLAINT       Chief Complaint   Patient presents with    Suicidal       HISTORY OF PRESENT ILLNESS  (Location/Symptom, Timing/Onset, Context/Setting, Quality, Duration, Modifying Factors, Severity.)      Meño Goodman is a 34 y.o. female with significant pertinent history of PTSD, bipolar disorder, who presents with prior depression with suicidal ideation and current suicidal ideation.    Patient states that she has been feeling depressed, apparently her mother had cancer and she  and she is very depressed since then.    States she does go to [facility], last visit was about once ago, states she does sometimes take her medications, took them last night.    States she does not use any alcohol, does use marijuana.  Has not had any ingestion today.  No significant signs or symptoms on review of systems, however does have some abdominal pain, was seen in doctor's office, no significant fevers or chills, nausea without vomiting, no shortness of breath or chest pain.  No tenderness, rebound, guarding.  States the pain is epigastric and right lower quadrant.    Patient presents afebrile, not tachycardic, saturating well on room air and hemodynamically stable.  Will assess with labs, Tylenol for pain, suicide precautions, if medically clear for transfer to [facility].    PAST MEDICAL / SURGICAL / SOCIAL / FAMILY HISTORY      has a past medical history of Asthma, Back pain, Bipolar 1 disorder (HCC), Bipolar disorder (HCC), Depression, Diabetes mellitus (HCC), Dizziness, Fatty liver disease, nonalcoholic, Fatty liver disease, nonalcoholic, GERD (gastroesophageal reflux disease), and Obesity.       has a past surgical history that includes  section and

## 2025-01-24 NOTE — ED NOTES
[] [facility]    [] [facility]    [x]  [facility]    SUICIDE RISK ASSESSMENT      Y  N     [x] [] In the past two weeks have you had thoughts of hurting yourself in any way?    [x] [] In the past two weeks have you had thoughts that you would be better off dead?   [] [x] Have you made a suicide attempt in the past two months?   [x] [] Do you have a plan for hurting yourself or suicide?   [] [x] Presence of hallucinations/voices related to hurting himself or herself or someone else.    SUICIDE/SECURITY WATCH PRECAUTION CHECKLIST     Orders    [x]  Suicide/Security Watch Precautions initiated as checked below:   1/24/25 5:20 PM EST 11/11    [x] Notified physician:  Julian Hyot MD  1/24/25 5:20 PM EST    [x] Orders obtained as appropriate:     [x] 1:1 Observer     [] Psych Consult     [] Psych Consult    Name:  Date:  Time:    [x] 1:1 Observer, Notified by:  Shy Quiroga RN    Contact Nurse Supervisor    [x] Remove all personal clothes from room and place in snap/paper gown/pants.  Slipper only    [x] Remove all personal belongings from room and secured away from patient.    Documentation    [x] Initiate Suicide/Security Watch Precaution Flow Sheet    [x] Initiate individualized Care Plan/Problem    [x] Document why precautions initiated on flow sheet (Initiate Nursing Care Plan/Problem)    [x] 1:1 Observer in place; instructions provided.  Suicide precautions require observer be within arms length.    [x] Nurse-Observer Communication Hand-off initiated by RN, reviewed with Observer.  Subsequently used as Hand Off between Observers.    [x] Initiate every 15 minute observations per observer as delegated by the RN.    [x] Initiate RN assessment and documentation    Environmental Scan  Search Criteria and Process: OPTIONAL, see Search Policy    [] Reason for search:    [] Nursing in presence of second person to search patient    [] Patient notified of reason for body assessment and  ideation/behavior  [x] Depression  [] Suicide attempt      [x] Low self-esteem  [] Hallucinations      [x] Feeling of Hopelessness  [] Substance abuse or withdrawal    [x] Dysfunctional family  [] Major traumatic event, eg., divorce, etc   [x] Excessive stress/anxiety    1/24/25    Expected Outcomes    Patient will:   [x] Patient will remain safe for the duration of their stay   [x] Patient's environment will be safe, eg. Free of potential suicide weapons   [] Verbalize Recovery from suicidal episode and improvement in self-worth   [x] Discuss feeling that precipitated suicide attempt/thoughts/behavior   [] Will describe available resources for crisis prevention and management   [] Will verbalize positive coping skills     Nursing Intervention   [x] Assessment and Observations hourly   [x] Suicide Precautions implemented with patient, should be 1:1 observation   [x] Document observation [id number] and RN assessment hourly   [] Consult physician for:    [] Psychiatric consult    [] Pharmacological therapy    [] Other:    [x] Patient search completed by security   [x] Initiated appropriate safety protocols by removing from the patient's environment anything that could be used to inflict self injury, eg. Order safe tray, snap gown, etc   [x] Maintain open, warm, caring, non-judgmental attitude/manner towards patient   [] Discuss advantages and disadvantages of existing coping methods/skills   [x] Assist and educate patient with identifying present strengths and coping skills   [x] Keep patient informed regarding plan of care and provide clear concise explanations.  Provide the patient/family education information as well as telephone numbers and other information about crisis centers, hot lines, and counselors.    Discharge Planning:   [] Referral  [] Groups [] Health agencies  [] Other:

## 2025-01-24 NOTE — ED NOTES
Transfer Center Handoff for Behavioral Health Transfers      Patient's Current Location: [facility] EMERGENCY DEPARTMENT     Chief Complaint   Patient presents with    Suicidal       Current or History of Violent Behavior: No    Currently in Restraints Now or During this Encounter: No  (Specify if Agitation or self harm is noted in ED?)  If yes, please describe behaviors requiring restraint:             Medical Clearance Documented and Verified in the Chart: Yes    Allergies   Allergen Reactions    Morphine Anaphylaxis    Pcn [Penicillins] Hives and Shortness Of Breath     Tolerates ceftriaxone - given 8/1/22    Amoxicillin Hives     Tolerates ceftriaxone - given on 8/1/22    Asa [Aspirin]     Seasonal         Can Patient Tolerate Lying Flat: Yes    Able to Perform ADLs:  Yes  (Specify if able to ambulate or uses any mobility devices such as cane or walker)  Activity:    Level of Assistance:    Assistive Device:    Miscellaneous Devices:      LABS    CBC:   Lab Results   Component Value Date/Time    WBC 9.8 11/13/2024 09:07 AM    RBC 4.40 11/13/2024 09:07 AM    RBC 4.02 05/14/2012 11:03 AM    HGB 13.3 11/13/2024 09:07 AM    HCT 41.4 11/13/2024 09:07 AM    MCV 94.1 11/13/2024 09:07 AM    MCH 30.2 11/13/2024 09:07 AM    MCHC 32.1 11/13/2024 09:07 AM    RDW 12.8 11/13/2024 09:07 AM     11/13/2024 09:07 AM     05/14/2012 11:03 AM    MPV 9.8 11/13/2024 09:07 AM     CMP:   Lab Results   Component Value Date/Time     10/21/2024 03:52 PM    K 3.9 10/21/2024 03:52 PM     10/21/2024 03:52 PM    CO2 24 10/21/2024 03:52 PM    BUN 8 10/21/2024 03:52 PM    CREATININE 0.9 10/21/2024 03:52 PM    GFRAA >60 08/01/2022 10:42 PM    LABGLOM 85 10/21/2024 03:52 PM    LABGLOM >90 04/25/2024 10:26 PM    GLUCOSE 213 10/21/2024 03:52 PM    GLUCOSE 93 05/14/2012 11:03 AM    CALCIUM 9.1 10/21/2024 03:52 PM    BILITOT 0.4 11/13/2024 09:07 AM    ALKPHOS 112 11/13/2024 09:07 AM    AST 45 11/13/2024 09:07 AM    ALT

## 2025-01-24 NOTE — ED NOTES
Pt presented to ED via triage from PCP following making suicidal remarks to PCP. Pt has history of bipolar, depression and schizophrenia per pt. Pt is linked to [facility]. Pt states she hasn't taken her medications in 1 month due to not having period. Pt denies taking pregnancy test. Pt denies hallucinations. Pt states plan to cut throat. Pt has not made any effort to harm self. Pt states feeling depressed due to grandmother dying in September. Pt came with pink slip from pcp.

## 2025-01-24 NOTE — ED NOTES
The following labs were labeled with appropriate pt sticker and tubed to lab:     [x] Blue     [x] Lavender   [] on ice  [x] Green/yellow  [] Green/black [] on ice  [] Lam  [] on ice  [x] Yellow  [] Red  [] Pink  [] Type/ Screen  [] ABG  [] VBG    [] COVID-19 swab    [] Rapid  [] PCR  [] Flu swab  [] Peds Viral Panel     [x] Urine Sample  [] Fecal Sample  [] Pelvic Cultures  [] Blood Cultures  [] X 2  [] STREP Cultures  [] Wound Cultures

## 2025-01-24 NOTE — ED NOTES
Guard at bedside. Pt unable to fit into paper scrubs, so pt placed into gown. Pt belongings checked and locked up by [employer] police.

## 2025-01-24 NOTE — ED NOTES
Patient brought from the [facility] by  [employer] for SI with a plan to cut herself.  Patient is on a Pink Slip but is willing to go to the [facility].  Patient was last admitted to the [facility] on 10/21/2024.  Patient has been following with Bill and is prescribed Lexapro, Cymbalta, Desyrel, and Invega.  Patient states she quit taking her psych meds because she hasn't had a period in 2 months and was concerned she was pregnant.  Patient states she has not taken a pregnancy test \"because I can't afford it\".  Patient has a history of depression and admits to being depressed currently because of \"personal problems\" but does not wish to elaborate.  Patient denies HI or any legal issues.  Patient also denies abusing any drugs or alcohol, but does admit to smoking THC occasionally.  Await lab results/medical clearance and referral to the [facility].  CORBY Griffith

## 2025-01-24 NOTE — ED PROVIDER NOTES
[facility]     Emergency Department     Faculty Attestation    I performed a history and physical examination of the patient and discussed management with the resident. I reviewed the resident’s note and agree with the documented findings including all diagnostic interpretations and plan of care. Any areas of disagreement are noted on the chart. I was personally present for the key portions of any procedures. I have documented in the chart those procedures where I was not present during the key portions. I have reviewed the emergency nurses triage note. I agree with the chief complaint, past medical history, past surgical history, allergies, medications, social and family history as documented unless otherwise noted below. Documentation of the HPI, Physical Exam and Medical Decision Making performed by fadumo is based on my personal performance of the HPI, PE and MDM. For Physician Assistant/ Nurse Practitioner cases/documentation I have personally evaluated this patient and have completed at least one if not all key elements of the E/M (history, physical exam, and MDM). Additional findings are as noted.    Primary Care Physician: Jean Claude Garcia MD    Note Started: 5:51 PM EST     VITAL SIGNS:   temperature is 98.6 °F (37 °C). Her blood pressure is 141/103 (abnormal) and her pulse is 84. Her respiration is 18 and oxygen saturation is 98%.      Medical Decision Making  Suicidal ideation with plan to cut herself.  Involuntary admission form completed by internal medicine clinic who sent patient here.  Did not harm her self prior to arrival.  Patient ambulatory without ataxia alert oriented no evidence of intoxication.  No evidence of trauma to head neck or extremities.  Will obtain psychiatric screening labs per their protocol.  Social work involvement.    Amount and/or Complexity of Data Reviewed  Labs: ordered. Decision-making details documented in ED

## 2025-01-25 LAB
GLUCOSE BLD-MCNC: 127 MG/DL (ref 65–105)
GLUCOSE BLD-MCNC: 127 MG/DL (ref 65–105)
GLUCOSE BLD-MCNC: 139 MG/DL (ref 65–105)
GLUCOSE BLD-MCNC: 66 MG/DL (ref 65–105)
GLUCOSE BLD-MCNC: 74 MG/DL (ref 65–105)

## 2025-01-25 PROCEDURE — 82947 ASSAY GLUCOSE BLOOD QUANT: CPT

## 2025-01-25 PROCEDURE — 6370000000 HC RX 637 (ALT 250 FOR IP): Performed by: PSYCHIATRY & NEUROLOGY

## 2025-01-25 PROCEDURE — 6370000000 HC RX 637 (ALT 250 FOR IP)

## 2025-01-25 PROCEDURE — 6370000000 HC RX 637 (ALT 250 FOR IP): Performed by: NURSE PRACTITIONER

## 2025-01-25 PROCEDURE — 6370000000 HC RX 637 (ALT 250 FOR IP): Performed by: INTERNAL MEDICINE

## 2025-01-25 PROCEDURE — 1240000000 HC EMOTIONAL WELLNESS R&B

## 2025-01-25 PROCEDURE — 99222 1ST HOSP IP/OBS MODERATE 55: CPT | Performed by: INTERNAL MEDICINE

## 2025-01-25 PROCEDURE — 99223 1ST HOSP IP/OBS HIGH 75: CPT | Performed by: PSYCHIATRY & NEUROLOGY

## 2025-01-25 PROCEDURE — APPSS60 APP SPLIT SHARED TIME 46-60 MINUTES

## 2025-01-25 RX ORDER — BUDESONIDE AND FORMOTEROL FUMARATE DIHYDRATE 160; 4.5 UG/1; UG/1
2 AEROSOL RESPIRATORY (INHALATION) 2 TIMES DAILY
Status: DISCONTINUED | OUTPATIENT
Start: 2025-01-25 | End: 2025-01-27 | Stop reason: HOSPADM

## 2025-01-25 RX ORDER — DEXTROSE MONOHYDRATE 100 MG/ML
INJECTION, SOLUTION INTRAVENOUS CONTINUOUS PRN
Status: DISCONTINUED | OUTPATIENT
Start: 2025-01-25 | End: 2025-01-27 | Stop reason: HOSPADM

## 2025-01-25 RX ORDER — POLYETHYLENE GLYCOL 3350 17 G/17G
17 POWDER, FOR SOLUTION ORAL DAILY PRN
Status: DISCONTINUED | OUTPATIENT
Start: 2025-01-25 | End: 2025-01-25 | Stop reason: SDUPTHER

## 2025-01-25 RX ORDER — FAMOTIDINE 20 MG/1
20 TABLET, FILM COATED ORAL 2 TIMES DAILY
Status: DISCONTINUED | OUTPATIENT
Start: 2025-01-25 | End: 2025-01-27 | Stop reason: HOSPADM

## 2025-01-25 RX ORDER — DULOXETIN HYDROCHLORIDE 20 MG/1
20 CAPSULE, DELAYED RELEASE ORAL DAILY
Status: DISCONTINUED | OUTPATIENT
Start: 2025-01-25 | End: 2025-01-27 | Stop reason: HOSPADM

## 2025-01-25 RX ORDER — HYDROXYZINE HYDROCHLORIDE 50 MG/1
50 TABLET, FILM COATED ORAL 3 TIMES DAILY PRN
Status: DISCONTINUED | OUTPATIENT
Start: 2025-01-25 | End: 2025-01-25 | Stop reason: SDUPTHER

## 2025-01-25 RX ORDER — ACETAMINOPHEN 325 MG/1
650 TABLET ORAL EVERY 4 HOURS PRN
Status: DISCONTINUED | OUTPATIENT
Start: 2025-01-25 | End: 2025-01-25 | Stop reason: SDUPTHER

## 2025-01-25 RX ORDER — INSULIN GLARGINE 100 [IU]/ML
10 INJECTION, SOLUTION SUBCUTANEOUS DAILY
Status: DISCONTINUED | OUTPATIENT
Start: 2025-01-25 | End: 2025-01-27

## 2025-01-25 RX ORDER — GLIPIZIDE 5 MG/1
5 TABLET ORAL
Status: DISCONTINUED | OUTPATIENT
Start: 2025-01-25 | End: 2025-01-27 | Stop reason: HOSPADM

## 2025-01-25 RX ORDER — ARIPIPRAZOLE 10 MG/1
10 TABLET ORAL DAILY
Status: DISCONTINUED | OUTPATIENT
Start: 2025-01-25 | End: 2025-01-27 | Stop reason: HOSPADM

## 2025-01-25 RX ORDER — POLYETHYLENE GLYCOL 3350 17 G
2 POWDER IN PACKET (EA) ORAL
Status: DISCONTINUED | OUTPATIENT
Start: 2025-01-25 | End: 2025-01-25 | Stop reason: SDUPTHER

## 2025-01-25 RX ORDER — PANTOPRAZOLE SODIUM 40 MG/1
40 TABLET, DELAYED RELEASE ORAL DAILY
Status: DISCONTINUED | OUTPATIENT
Start: 2025-01-25 | End: 2025-01-25

## 2025-01-25 RX ORDER — MAGNESIUM HYDROXIDE/ALUMINUM HYDROXICE/SIMETHICONE 120; 1200; 1200 MG/30ML; MG/30ML; MG/30ML
30 SUSPENSION ORAL EVERY 6 HOURS PRN
Status: DISCONTINUED | OUTPATIENT
Start: 2025-01-25 | End: 2025-01-25 | Stop reason: SDUPTHER

## 2025-01-25 RX ORDER — ALBUTEROL SULFATE 90 UG/1
2 INHALANT RESPIRATORY (INHALATION) EVERY 4 HOURS PRN
Status: DISCONTINUED | OUTPATIENT
Start: 2025-01-25 | End: 2025-01-27 | Stop reason: HOSPADM

## 2025-01-25 RX ORDER — INSULIN LISPRO 100 [IU]/ML
0-8 INJECTION, SOLUTION INTRAVENOUS; SUBCUTANEOUS
Status: DISCONTINUED | OUTPATIENT
Start: 2025-01-25 | End: 2025-01-27 | Stop reason: HOSPADM

## 2025-01-25 RX ADMIN — Medication 2 PUFF: at 20:45

## 2025-01-25 RX ADMIN — FAMOTIDINE 20 MG: 20 TABLET, FILM COATED ORAL at 20:45

## 2025-01-25 RX ADMIN — HYDROXYZINE HYDROCHLORIDE 50 MG: 50 TABLET ORAL at 20:45

## 2025-01-25 RX ADMIN — ARIPIPRAZOLE 10 MG: 10 TABLET ORAL at 22:54

## 2025-01-25 RX ADMIN — METFORMIN HYDROCHLORIDE 1000 MG: 1000 TABLET ORAL at 11:45

## 2025-01-25 RX ADMIN — DULOXETINE HYDROCHLORIDE 20 MG: 20 CAPSULE, DELAYED RELEASE ORAL at 22:55

## 2025-01-25 RX ADMIN — Medication 3 MG: at 20:45

## 2025-01-25 RX ADMIN — INSULIN GLARGINE 10 UNITS: 100 INJECTION, SOLUTION SUBCUTANEOUS at 08:42

## 2025-01-25 RX ADMIN — FAMOTIDINE 20 MG: 20 TABLET, FILM COATED ORAL at 11:45

## 2025-01-25 RX ADMIN — METFORMIN HYDROCHLORIDE 1000 MG: 1000 TABLET ORAL at 17:20

## 2025-01-25 RX ADMIN — GLIPIZIDE 5 MG: 5 TABLET ORAL at 17:20

## 2025-01-25 ASSESSMENT — PATIENT HEALTH QUESTIONNAIRE - PHQ9
SUM OF ALL RESPONSES TO PHQ QUESTIONS 1-9: 11
1. LITTLE INTEREST OR PLEASURE IN DOING THINGS: SEVERAL DAYS
8. MOVING OR SPEAKING SO SLOWLY THAT OTHER PEOPLE COULD HAVE NOTICED. OR THE OPPOSITE, BEING SO FIGETY OR RESTLESS THAT YOU HAVE BEEN MOVING AROUND A LOT MORE THAN USUAL: NOT AT ALL
SUM OF ALL RESPONSES TO PHQ QUESTIONS 1-9: 11
SUM OF ALL RESPONSES TO PHQ QUESTIONS 1-9: 11
SUM OF ALL RESPONSES TO PHQ9 QUESTIONS 1 & 2: 2
7. TROUBLE CONCENTRATING ON THINGS, SUCH AS READING THE NEWSPAPER OR WATCHING TELEVISION: SEVERAL DAYS
5. POOR APPETITE OR OVEREATING: NOT AT ALL
2. FEELING DOWN, DEPRESSED OR HOPELESS: SEVERAL DAYS
3. TROUBLE FALLING OR STAYING ASLEEP: NOT AT ALL
6. FEELING BAD ABOUT YOURSELF - OR THAT YOU ARE A FAILURE OR HAVE LET YOURSELF OR YOUR FAMILY DOWN: NEARLY EVERY DAY
4. FEELING TIRED OR HAVING LITTLE ENERGY: NEARLY EVERY DAY
9. THOUGHTS THAT YOU WOULD BE BETTER OFF DEAD, OR OF HURTING YOURSELF: MORE THAN HALF THE DAYS
SUM OF ALL RESPONSES TO PHQ QUESTIONS 1-9: 9

## 2025-01-25 ASSESSMENT — SLEEP AND FATIGUE QUESTIONNAIRES
AVERAGE NUMBER OF SLEEP HOURS: 10
DO YOU HAVE DIFFICULTY SLEEPING: NO
DO YOU USE A SLEEP AID: NO

## 2025-01-25 ASSESSMENT — LIFESTYLE VARIABLES
HOW MANY STANDARD DRINKS CONTAINING ALCOHOL DO YOU HAVE ON A TYPICAL DAY: PATIENT DOES NOT DRINK
HOW MANY STANDARD DRINKS CONTAINING ALCOHOL DO YOU HAVE ON A TYPICAL DAY: PATIENT DOES NOT DRINK
HOW OFTEN DO YOU HAVE A DRINK CONTAINING ALCOHOL: NEVER
HOW MANY STANDARD DRINKS CONTAINING ALCOHOL DO YOU HAVE ON A TYPICAL DAY: PATIENT DOES NOT DRINK

## 2025-01-25 NOTE — PLAN OF CARE
Problem: Chronic Conditions and Co-morbidities  Goal: Patient's chronic conditions and co-morbidity symptoms are monitored and maintained or improved  Note: Ms. Goodman is cooperative with her diabetic diet, and medication protocol. She has not required insulin coverage during this shift.      Problem: Self Harm/Suicidality  Goal: Will have no self-injury during hospital stay  Description: INTERVENTIONS:  1.  Ensure constant observer at bedside with Q15M safety checks  2.  Maintain a safe environment  3.  Secure patient belongings  4.  Ensure family/visitors adhere to safety recommendations  5.  Ensure safety tray has been added to patient's diet order  6.  Every shift and PRN: Re-assess suicidal risk via Frequent Screener    Note: Ms. Goodman denies suicidal ideation and thoughts of harm to self and others.      Problem: Depression  Goal: Will be euthymic at discharge  Description: INTERVENTIONS:  1. Administer medication as ordered  2. Provide emotional support via 1:1 interaction with staff  3. Encourage involvement in milieu/groups/activities  4. Monitor for social isolation  Note: Ms. Goodman rates her depression as a 6 on a scale from 0-10 where 10 is the highest. She is isolative during this shift and comes out briefly to watch a movie. She is aloof of peers. Ms. Goodman is tearful at times and notes that she misses her .

## 2025-01-25 NOTE — BH NOTE
[facility]  Admission Note     Admission Type:   Involuntary - Signed in Upon Admission    Reason for admission:  Reason for Admission: Patient came in having suicidal thoughts to cut her throat. Has been off medications because she thought she was pregnant, since she missed her period for 2 months. Pregnancy test came back negative. She also thought the medications were disrupting her menstrual cycle. Patient  has been having depression and grief over the loss of her grandmother in September. Patient reports auditory and visual hallucinations, states she has been seeing glowing eyes and shadows. She also hears commanding voices telling her to harm herself and other people.    Addictive Behavior:   Addictive Behavior  In the Past 3 Months, Have You Felt or Has Someone Told You That You Have a Problem With  : None    Medical Problems:   Past Medical History:   Diagnosis Date    Asthma     Back pain 9/16/2014    Bipolar 1 disorder (HCC)     Bipolar disorder (HCC) 12/8/2017    Depression     Diabetes mellitus (HCC)     Dizziness     Fatty liver disease, nonalcoholic     Fatty liver disease, nonalcoholic     GERD (gastroesophageal reflux disease)     Obesity        Status EXAM:  Mental Status and Behavioral Exam  Normal: No  Level of Assistance: Independent/Self  Facial Expression: Flat, Sad, Worried  Affect: Appropriate  Level of Consciousness: Alert  Frequency of Checks: 4 times per hour, close  Mood:Normal: No  Mood: Depressed, Anxious, Helpless, Sad  Motor Activity:Normal: Yes  Eye Contact: Good  Observed Behavior: Cooperative, Preoccupied, Friendly  Sexual Misconduct History: Current - no  Preception: [place] to person, [place] to time, [place] to place, [place] to situation  Attention:Normal: No  Attention: Distractible  Thought Processes: Circumstantial  Thought Content:Normal: No  Thought Content: Preoccupations  Depression Symptoms: Feelings of helplessness, Feelings of hopelessess, Impaired

## 2025-01-25 NOTE — CARE COORDINATION
BHI Biopsychosocial Assessment    Current Level of Psychosocial Functioning     Independent   Dependent    Minimal Assist X    Psychosocial High Risk Factors (check all that apply)    Unable to obtain meds   Chronic illness/pain    Substance abuse   Lack of Family Support   Financial stress   Isolation X  Inadequate Community Resources  Suicide attempt(s)   Not taking medications X  Victim of crime   Developmental Delay  Unable to manage personal needs  X  Age 65 or older   Homeless  No transportation   Readmission within 30 days   Unemployment  Traumatic Event    Psychiatric Advanced Directives: none reported     Family to Involve in Treatment: Patient reports her  Russ is supportive and involved in her care     Sexual Orientation:  KATE    Patient Strengths: adequate housing, family support, linked with [facility] for outpatient treatment, insurance     Patient Barriers: presents on admission with suicidal ideation with a plan to sut herself, off medications    Opiate Education Provided: N/A Patient denies and does not have a documented history of Opiate or Heroin use/abuse. Patient denies any Alcohol or Illicit drug use. Patient's drug screen upon admission was positive for Barbiturates     CMHC/mental health history: Linked with [facility], off medications for 2 months, stable housing in [place] with      Plan of Care   medication management, group/individual therapies, family meetings, psycho -education, treatment team meetings to assist with stabilization    Initial Discharge Plan:  Patient reports a plan to return to her apartment in [place] at discharge. She also reports a plan to continue with outpatient treatment at [facility].     Clinical Summary:  Patient is a 34 year old female who presents on admission with suicidal ideation with a plan to cut herself. It was documented in patient chart that she was brought in by the [facility] after reporting suicidal ideations due to visit with her

## 2025-01-25 NOTE — ED NOTES
Patient admitted to the [facility]. Involuntary form faxed to [facility].

## 2025-01-25 NOTE — PLAN OF CARE
[facility]  Initial Interdisciplinary Treatment Plan NO      Original treatment plan Date & Time: 1/25/25 1300    Admission Type:  Admission Type: Involuntary (signed voluntary form on admit)    Reason for admission:   Reason for Admission: Patient came in having suicidal thoughts to cut her throat. Has been off medications, says they were disrupting her menstrual cycle, Has been having depression and grief over the loss of her grandmother in September. States she has been seeing glowing eyes and shadows. Hearing voices telling her to harm herself and others    Estimated Length of Stay:  5-7days  Estimated Discharge Date: to be determined by physician    PATIENT STRENGTHS:  Patient Strengths:   Patient Strengths and Limitations:   Addictive Behavior: Addictive Behavior  In the Past 3 Months, Have You Felt or Has Someone Told You That You Have a Problem With  : None  Medical Problems:  Past Medical History:   Diagnosis Date    Asthma     Back pain 9/16/2014    Bipolar 1 disorder (HCC)     Bipolar disorder (HCC) 12/8/2017    Depression     Diabetes mellitus (HCC)     Dizziness     Fatty liver disease, nonalcoholic     Fatty liver disease, nonalcoholic     GERD (gastroesophageal reflux disease)     Obesity      Status EXAM:Mental Status and Behavioral Exam  Normal: No  Level of Assistance: Independent/Self  Facial Expression: Flat, Sad, Worried  Affect: Appropriate  Level of Consciousness: Alert  Frequency of Checks: 4 times per hour, close  Mood:Normal: No  Mood: Depressed, Anxious, Helpless, Sad  Motor Activity:Normal: Yes  Eye Contact: Good  Observed Behavior: Cooperative, Preoccupied, Friendly  Sexual Misconduct History: Current - no  Preception: [place] to person, [place] to time, [place] to place, [place] to situation  Attention:Normal: No  Attention: Distractible  Thought Processes: Circumstantial  Thought Content:Normal: No  Thought Content: Preoccupations  Depression Symptoms: Feelings of

## 2025-01-25 NOTE — H&P
[facility]  Naren Collins MD; Kaleb Martínez MD, Tio Ramsey MD, Katie Huizar MD, Tj Rogers MD; Matt Limon MD    [facility]   IN-PATIENT SERVICE   [facility]     HISTORY AND PHYSICAL EXAMINATION            Date:   2025  Patient name:  Meño Goodman  Date of admission:  2025 11:34 PM  MRN:   [id number]  Account:  [id number]  YOB: 1990  PCP:    Jean Claude Garcia MD  Room:   [address] [place] [address]  Code Status:    Full Code      Chief Complaint:     Diabetes mellitus hypertension    History Obtained From:     Patient/EMR/bedside RN     History of Present Illness:     Diabetes   Duration more than 7 years  Modifying factors on Glucophage and other med  Severity uncontrolled sever  Associated signs and symtoms neuropathy/ckd/ CAD.   aggravated with sugar diet and better with low sugar diet     HTN  Onset more than 2 years ago  swetha mild to mod  Controlled with current po meds  Not associated with headaches or blurry vision  No chest pain      Past Medical History:     Past Medical History:   Diagnosis Date    Asthma     Back pain 2014    Bipolar 1 disorder (HCC)     Bipolar disorder (HCC) 2017    Depression     Diabetes mellitus (HCC)     Dizziness     Fatty liver disease, nonalcoholic     Fatty liver disease, nonalcoholic     GERD (gastroesophageal reflux disease)     Obesity         Past Surgical History:     Past Surgical History:   Procedure Laterality Date     SECTION      LEEP          Medications Prior to Admission:     Prior to Admission medications    Medication Sig Start Date End Date Taking? Authorizing Provider   hydrOXYzine HCl (ATARAX) 50 MG tablet TAKE ONE TABLET BY MOUTH THREE TIMES DAILY AS NEEDED FOR ITCHING 25   Jean Claude Garcia MD   butalbital-acetaminophen-caffeine (FIORICET, ESGIC) -40 MG per tablet Take 1 tablet by mouth every 4 hours as 
mL  250 mL IntraVENous PRN Romelia Slade APRN - CNP        glucagon injection 1 mg  1 mg SubCUTAneous PRN Romelia Slade APRN - CNP        dextrose 10 % infusion   IntraVENous Continuous PRN Romelia Slade APRN - CNP        insulin lispro (HUMALOG,ADMELOG) injection vial 0-8 Units  0-8 Units SubCUTAneous 4x Daily AC & HS Romelia Slade APRN - CNP        famotidine (PEPCID) tablet 20 mg  20 mg Oral BID Naren Collins MD   20 mg at 01/25/25 1145    glipiZIDE (GLUCOTROL) tablet 5 mg  5 mg Oral BID AC Naren Collins MD   5 mg at 01/25/25 1720    metFORMIN (GLUCOPHAGE) tablet 1,000 mg  1,000 mg Oral BID  Naren Collins MD   1,000 mg at 01/25/25 1720    acetaminophen (TYLENOL) tablet 650 mg  650 mg Oral Q4H PRN Ajit Chopra MD        aluminum & magnesium hydroxide-simethicone (MAALOX) 200-200-20 MG/5ML suspension 30 mL  30 mL Oral Q6H PRN Ajit Chopra MD        hydrOXYzine HCl (ATARAX) tablet 50 mg  50 mg Oral TID PRN Ajit Chopra MD        polyethylene glycol (GLYCOLAX) packet 17 g  17 g Oral Daily PRN Ajit Chopra MD        traZODone (DESYREL) tablet 50 mg  50 mg Oral Nightly PRN Ajit Chopra MD        nicotine polacrilex (COMMIT) lozenge 2 mg  2 mg Oral Q2H PRN Ajit Chopra MD            The patient was seen face-to-face.  She reports that she has been going through \"a lot\".  The patient did not wish to elaborate.  The patient has been feeling increasingly depressed, suicidal and has been experiencing occasional auditory hallucinations.  The patient has previously been diagnosed with psychotic symptoms.  She stopped taking her medications as noted above due to amenorrhea.  The patient has been on Invega which could be the cause of that.  The patient will be switched to Abilify 10 mg daily.  Her duloxetine has been restarted at 20 mg daily.  She may benefit from a long-acting injection.     PLAN  Medications as noted above  Attempt to develop insight  Psycho-education

## 2025-01-25 NOTE — GROUP NOTE
Group Therapy Note    Date: 1/24/2025    Group Start Time: 2030  Group End Time: 2115  Group Topic: Wrap-Up    Lucio Crockett        Group Therapy Note    Attendees: 8    Patient refused to go to wrap-up group at 8:30pm despite staff encouragement. One-to-one talk was offered but declined.             Signature:  Lucio Lugo

## 2025-01-25 NOTE — PROGRESS NOTES

## 2025-01-26 LAB
GLUCOSE BLD-MCNC: 104 MG/DL (ref 65–105)
GLUCOSE BLD-MCNC: 110 MG/DL (ref 65–105)
GLUCOSE BLD-MCNC: 111 MG/DL (ref 65–105)
GLUCOSE BLD-MCNC: 151 MG/DL (ref 65–105)

## 2025-01-26 PROCEDURE — 6370000000 HC RX 637 (ALT 250 FOR IP): Performed by: PSYCHIATRY & NEUROLOGY

## 2025-01-26 PROCEDURE — 99232 SBSQ HOSP IP/OBS MODERATE 35: CPT | Performed by: INTERNAL MEDICINE

## 2025-01-26 PROCEDURE — 6370000000 HC RX 637 (ALT 250 FOR IP): Performed by: NURSE PRACTITIONER

## 2025-01-26 PROCEDURE — 99232 SBSQ HOSP IP/OBS MODERATE 35: CPT | Performed by: PSYCHIATRY & NEUROLOGY

## 2025-01-26 PROCEDURE — 1240000000 HC EMOTIONAL WELLNESS R&B

## 2025-01-26 PROCEDURE — 6370000000 HC RX 637 (ALT 250 FOR IP): Performed by: INTERNAL MEDICINE

## 2025-01-26 PROCEDURE — 6370000000 HC RX 637 (ALT 250 FOR IP)

## 2025-01-26 RX ADMIN — METFORMIN HYDROCHLORIDE 1000 MG: 1000 TABLET ORAL at 08:32

## 2025-01-26 RX ADMIN — ACETAMINOPHEN 650 MG: 325 TABLET ORAL at 08:37

## 2025-01-26 RX ADMIN — GLIPIZIDE 5 MG: 5 TABLET ORAL at 08:32

## 2025-01-26 RX ADMIN — Medication 2 PUFF: at 08:32

## 2025-01-26 RX ADMIN — Medication 2 PUFF: at 21:33

## 2025-01-26 RX ADMIN — GLIPIZIDE 5 MG: 5 TABLET ORAL at 16:43

## 2025-01-26 RX ADMIN — FAMOTIDINE 20 MG: 20 TABLET, FILM COATED ORAL at 08:32

## 2025-01-26 RX ADMIN — INSULIN GLARGINE 10 UNITS: 100 INJECTION, SOLUTION SUBCUTANEOUS at 08:32

## 2025-01-26 RX ADMIN — TRAZODONE HYDROCHLORIDE 50 MG: 50 TABLET ORAL at 21:33

## 2025-01-26 RX ADMIN — ARIPIPRAZOLE 10 MG: 10 TABLET ORAL at 08:32

## 2025-01-26 RX ADMIN — Medication 3 MG: at 21:33

## 2025-01-26 RX ADMIN — DULOXETINE HYDROCHLORIDE 20 MG: 20 CAPSULE, DELAYED RELEASE ORAL at 08:32

## 2025-01-26 RX ADMIN — METFORMIN HYDROCHLORIDE 1000 MG: 1000 TABLET ORAL at 16:43

## 2025-01-26 RX ADMIN — HYDROXYZINE HYDROCHLORIDE 50 MG: 50 TABLET ORAL at 21:33

## 2025-01-26 RX ADMIN — FAMOTIDINE 20 MG: 20 TABLET, FILM COATED ORAL at 21:33

## 2025-01-26 ASSESSMENT — PAIN DESCRIPTION - ORIENTATION: ORIENTATION: MID

## 2025-01-26 ASSESSMENT — PAIN SCALES - GENERAL
PAINLEVEL_OUTOF10: 0
PAINLEVEL_OUTOF10: 6

## 2025-01-26 ASSESSMENT — PAIN DESCRIPTION - DESCRIPTORS: DESCRIPTORS: ACHING

## 2025-01-26 ASSESSMENT — LIFESTYLE VARIABLES
HOW OFTEN DO YOU HAVE A DRINK CONTAINING ALCOHOL: NEVER
HOW MANY STANDARD DRINKS CONTAINING ALCOHOL DO YOU HAVE ON A TYPICAL DAY: PATIENT DOES NOT DRINK
HOW OFTEN DO YOU HAVE A DRINK CONTAINING ALCOHOL: NEVER
HOW MANY STANDARD DRINKS CONTAINING ALCOHOL DO YOU HAVE ON A TYPICAL DAY: PATIENT DOES NOT DRINK

## 2025-01-26 ASSESSMENT — PAIN DESCRIPTION - LOCATION: LOCATION: HEAD

## 2025-01-26 NOTE — PROGRESS NOTES
[facility] Internal Medicine  Naren Collins MD; Kaleb Martínez MD, Tio Ramsey MD, Katie Huizar MD, Tj Rogers MD; Matt Limon MD    [facility] Internal Medicine   IN-PATIENT SERVICE   [facility]     HISTORY AND PHYSICAL EXAMINATION            Date:   2025  Patient name:  Meño Goodman  Date of admission:  2025 11:34 PM  MRN:   [id number]  Account:  [id number]  YOB: 1990  PCP:    Jean Claude Garcia MD  Room:   [address] [place] [address]  Code Status:    Full Code      Chief Complaint:     Diabetes mellitus hypertension    History Obtained From:     Patient/EMR/bedside RN     History of Present Illness:     Diabetes   Duration more than 7 years  Modifying factors on Glucophage and other med  Severity uncontrolled sever  Associated signs and symtoms neuropathy/ckd/ CAD.   aggravated with sugar diet and better with low sugar diet     HTN  Onset more than 2 years ago  swetha mild to mod  Controlled with current po meds  Not associated with headaches or blurry vision  No chest pain      Past Medical History:     Past Medical History:   Diagnosis Date    Asthma     Back pain 2014    Bipolar 1 disorder (HCC)     Bipolar disorder (HCC) 2017    Depression     Diabetes mellitus (HCC)     Dizziness     Fatty liver disease, nonalcoholic     Fatty liver disease, nonalcoholic     GERD (gastroesophageal reflux disease)     Obesity         Past Surgical History:     Past Surgical History:   Procedure Laterality Date     SECTION      LEEP          Medications Prior to Admission:     Prior to Admission medications    Medication Sig Start Date End Date Taking? Authorizing Provider   hydrOXYzine HCl (ATARAX) 50 MG tablet TAKE ONE TABLET BY MOUTH THREE TIMES DAILY AS NEEDED FOR ITCHING 25   Jean Claude Garcia MD   butalbital-acetaminophen-caffeine (FIORICET, ESGIC) -40 MG per tablet Take 1 tablet by mouth every 4 hours as

## 2025-01-26 NOTE — PROGRESS NOTES
Behavioral Services  Medicare Certification Upon Admission    I certify that this patient's inpatient psychiatric hospital admission is medically necessary for:    [x] (1) Treatment which could reasonably be expected to improve this patient's condition,       [x] (2) Or for diagnostic study;     AND     [x](2) The inpatient psychiatric services are provided while the individual is under the care of a physician and are included in the individualized plan of care.    Estimated length of stay/service 2-9 days    Plan for post-hospital care -outpatient care    Electronically signed by MIRTA ELLIS MD on 1/25/2025 at 7:52 PM

## 2025-01-26 NOTE — GROUP NOTE
Group Therapy Note    Date: 1/26/2025    Group Start Time: 1045  Group End Time: 1120  Group Topic: Psychoeducation    Shahid Busby        Group Therapy Note    Attendees: 9/22       Patient's Goal:  PT will participate actively in group discussion using conversation cubes.     Notes:  :  Patient is making progress, AEB participating in group discussion, actively listening, and supporting other group members    Status After Intervention:  Improved    Participation Level: Active Listener and Interactive    Participation Quality: Appropriate, Attentive, and Sharing      Speech:  normal      Thought Process/Content: Logical      Affective Functioning: Flat      Mood: depressed      Level of consciousness:  Alert, Oriented x4, and Attentive      Response to Learning: Able to verbalize/acknowledge new learning and Progressing to goal      Endings: None Reported    Modes of Intervention: Education, Support, and Socialization      Discipline Responsible: /Counselor      Signature:  Shahid Salter     no chills/no body aches/no chest pain

## 2025-01-26 NOTE — PLAN OF CARE
Problem: Chronic Conditions and Co-morbidities  Goal: Patient's chronic conditions and co-morbidity symptoms are monitored and maintained or improved  1/25/2025 2034 by Bronwyn Hays, RN  Outcome: Progressing  Note:   Patient is compliant with ordered medications, treatments, and blood sugar checks this evening. Patient is also compliant with as needed snacks for low blood sugar. Will continue to monitor.       Problem: Self Harm/Suicidality  Goal: Will have no self-injury during hospital stay  Description: INTERVENTIONS:  1.  Ensure constant observer at bedside with Q15M safety checks  2.  Maintain a safe environment  3.  Secure patient belongings  4.  Ensure family/visitors adhere to safety recommendations  5.  Ensure safety tray has been added to patient's diet order  6.  Every shift and PRN: Re-assess suicidal risk via Frequent Screener    1/25/2025 2034 by Bronwyn Hays, RN  Outcome: Progressing  Note:   Patient is alert to person, place, and time. Patient endorses depression related to missing spouse. Patient denies the presence of any suicidal ideation. Patient denies the presence of self harm. Patient is agreeable to seek out staff should feelings worsen or arise.. Every 15 minute safety checks maintained.

## 2025-01-26 NOTE — PLAN OF CARE
Problem: Self Harm/Suicidality  Goal: Will have no self-injury during hospital stay  Description: INTERVENTIONS:  1.  Ensure constant observer at bedside with Q15M safety checks  2.  Maintain a safe environment  3.  Secure patient belongings  4.  Ensure family/visitors adhere to safety recommendations  5.  Ensure safety tray has been added to patient's diet order  6.  Every shift and PRN: Re-assess suicidal risk via Frequent Screener    Outcome: Progressing  Flowsheets (Taken 1/26/2025 1048)  Will have no self-injury during hospital stay:   Ensure constant observer at bedside with Q15M safety checks   Every shift and PRN: Re-assess suicidal risk via Frequent Screener  Note: Patient is free of self harm at this time.  Patient agrees to seek out staff if thoughts to harm self arise.  Staff will provide support and reassurance as needed.  Safety checks maintained every 15 minutes.      Problem: Depression  Goal: Will be euthymic at discharge  Description: INTERVENTIONS:  1. Administer medication as ordered  2. Provide emotional support via 1:1 interaction with staff  3. Encourage involvement in milieu/groups/activities  4. Monitor for social isolation  Outcome: Progressing  Note: Patient endorses have depressive like symptoms rating them at a level of 5 on a 1-10 scale (with 1 being the lowest feelings of depression and 10 having the highest feelings of depression).       Patient denies homicidal or suicidal ideations. Reports improved mood since admission. Tearful and concerned about her blood sugar being low last night this morning Blood glucose 111.  Patient states this is her at home medication regimen. Will continue to monitor. Patient denies auditory or visual hallucinations.

## 2025-01-26 NOTE — GROUP NOTE
Group Therapy Note    Date: 1/26/2025    Group Start Time: 0900  Group End Time: 1000  Group Topic: Focus Group    CZ Akshat Anderson LPN      Group Therapy Note    Attendees: 15/21    Community Meeting Group Note        Date: January 26, 2025 Start Time: 9am  End Time: 10am      Number of Participants in Group & Unit Census:  7/21    Topic: Goal Group    Goal of Group:To set goals and boundaries to reach a goal      Comments:     Patient did not participate in Community Meeting group, despite staff encouragement and explanation of benefits.  Patient remain seclusive to self.  Q15 minute safety checks maintained for patient safety and will continue to encourage patient to attend unit programming.

## 2025-01-26 NOTE — PROGRESS NOTES
BEHAVIORAL HEALTH FOLLOW-UP NOTE     1/26/2025     Patient was seen and examined in person, Chart reviewed   Patient's case discussed with staff/team    Chief Complaint: Depression with suicidal ideation    Interim History:     The patient was seen face-to-face. She was found sleeping in her bed, laying on her side because she has flank pain due to kidney stones. She has been experiencing numerous health problems and depressive symptoms for months. She is discharge focused, and would like to return home with her  and friend. The patient will be switched to Abilify 10 mg daily. Which she is tolerating well. Her duloxetine has been restarted at 20 mg daily. She may benefit from a long-acting injection. She believe the medication are helping. She denies active SI/HI. She would like to go home sometime this week because she gets her SSDI check this Friday. She reports that she will f/u with [facility].     /78   Pulse 93   Temp 98.3 °F (36.8 °C) (Oral)   Resp 12   Ht 1.651 m (5' 5\")   Wt (!) 146.1 kg (322 lb)   LMP 11/18/2024 (Within Weeks) Comment: Pregnancy test negative  SpO2 95%   BMI 53.58 kg/m²   Appetite:  [x] Normal/Unchanged  [] Increased  [] Decreased      Sleep:       [] Normal/Unchanged  [x] Fair       [] Poor              Energy:    [x] Normal/Unchanged  [] Increased  [] Decreased        Aggression:  [] yes  [x] no    Patient is [x] able  [] unable to CONTRACT FOR SAFETY ON THE UNIT    PAST MEDICAL/PSYCHIATRIC HISTORY:   Past Medical History:   Diagnosis Date    Asthma     Back pain 9/16/2014    Bipolar 1 disorder (HCC)     Bipolar disorder (HCC) 12/8/2017    Depression     Diabetes mellitus (HCC)     Dizziness     Fatty liver disease, nonalcoholic     Fatty liver disease, nonalcoholic     GERD (gastroesophageal reflux disease)     Obesity        FAMILY/SOCIAL HISTORY:  Family History   Problem Relation Age of Onset    Hypertension Maternal Grandmother     Heart Disease Father     Early

## 2025-01-27 VITALS
RESPIRATION RATE: 16 BRPM | HEIGHT: 65 IN | BODY MASS INDEX: 48.82 KG/M2 | OXYGEN SATURATION: 97 % | SYSTOLIC BLOOD PRESSURE: 131 MMHG | TEMPERATURE: 97.9 F | WEIGHT: 293 LBS | DIASTOLIC BLOOD PRESSURE: 92 MMHG | HEART RATE: 101 BPM

## 2025-01-27 LAB
GLUCOSE BLD-MCNC: 115 MG/DL (ref 65–105)
GLUCOSE BLD-MCNC: 60 MG/DL (ref 65–105)
GLUCOSE BLD-MCNC: 61 MG/DL (ref 65–105)
GLUCOSE BLD-MCNC: 83 MG/DL (ref 65–105)

## 2025-01-27 PROCEDURE — 6370000000 HC RX 637 (ALT 250 FOR IP): Performed by: INTERNAL MEDICINE

## 2025-01-27 PROCEDURE — 6370000000 HC RX 637 (ALT 250 FOR IP)

## 2025-01-27 PROCEDURE — 6370000000 HC RX 637 (ALT 250 FOR IP): Performed by: PSYCHIATRY & NEUROLOGY

## 2025-01-27 PROCEDURE — 99239 HOSP IP/OBS DSCHRG MGMT >30: CPT | Performed by: PSYCHIATRY & NEUROLOGY

## 2025-01-27 PROCEDURE — 82947 ASSAY GLUCOSE BLOOD QUANT: CPT

## 2025-01-27 PROCEDURE — 99232 SBSQ HOSP IP/OBS MODERATE 35: CPT | Performed by: INTERNAL MEDICINE

## 2025-01-27 RX ORDER — ARIPIPRAZOLE 10 MG/1
10 TABLET ORAL DAILY
Qty: 30 TABLET | Refills: 3 | Status: SHIPPED | OUTPATIENT
Start: 2025-01-28

## 2025-01-27 RX ORDER — DULOXETIN HYDROCHLORIDE 20 MG/1
20 CAPSULE, DELAYED RELEASE ORAL DAILY
Qty: 30 CAPSULE | Refills: 3 | Status: SHIPPED | OUTPATIENT
Start: 2025-01-28

## 2025-01-27 RX ADMIN — GLIPIZIDE 5 MG: 5 TABLET ORAL at 09:06

## 2025-01-27 RX ADMIN — Medication 2 PUFF: at 09:12

## 2025-01-27 RX ADMIN — ARIPIPRAZOLE 10 MG: 10 TABLET ORAL at 09:06

## 2025-01-27 RX ADMIN — METFORMIN HYDROCHLORIDE 1000 MG: 1000 TABLET ORAL at 09:05

## 2025-01-27 RX ADMIN — DULOXETINE HYDROCHLORIDE 20 MG: 20 CAPSULE, DELAYED RELEASE ORAL at 09:06

## 2025-01-27 RX ADMIN — Medication 16 G: at 12:17

## 2025-01-27 RX ADMIN — HYDROXYZINE HYDROCHLORIDE 50 MG: 50 TABLET ORAL at 11:56

## 2025-01-27 RX ADMIN — FAMOTIDINE 20 MG: 20 TABLET, FILM COATED ORAL at 09:06

## 2025-01-27 RX ADMIN — INSULIN GLARGINE 10 UNITS: 100 INJECTION, SOLUTION SUBCUTANEOUS at 09:05

## 2025-01-27 NOTE — BH NOTE
[facility]al [facility] [place]  Discharge Note    Pt discharged with followings belongings:   Dental Appliances: None  Vision - Corrective Lenses: None  Hearing Aid: None  Jewelry: Bracelet, Necklace, Ring, Body Piercing  Body Piercings Removed: N/A  Clothing: Footwear, Jacket/Coat, Pants, Shirt, Undergarments  Other Valuables: Personal Toiletries   Valuables returned to patient. Patient educated on aftercare instructions:   [facility]  at 2:12 PM .Patient verbalize understanding of AVS:  And to follow up with PCP about lantus dosing due marlen low sugars.    Status EXAM upon discharge:  Mental Status and Behavioral Exam  Normal: No  Level of Assistance: Independent/Self  Facial Expression: Flat  Affect: Appropriate  Level of Consciousness: Alert  Frequency of Checks: 4 times per hour, close  Mood:Normal: No  Mood: Depressed, Anxious  Motor Activity:Normal: Yes  Eye Contact: Good  Observed Behavior: Cooperative, Friendly  Sexual Misconduct History: Current - no  Preception: [place] to person, [place] to place, [place] to time, [place] to situation  Attention:Normal: Yes  Attention: Unable to concentrate  Thought Processes: Unremarkable  Thought Content:Normal: Yes  Thought Content: Preoccupations  Depression Symptoms: No problems reported or observed.  Anxiety Symptoms: No problems reported or observed.  Rosaura Symptoms: No problems reported or observed.  Hallucinations: None  Delusions: No  Memory:Normal: Yes  Insight and Judgment: Yes  Insight and Judgment: Poor judgment, Poor insight, Unmotivated    Tobacco Screening:  Practical Counseling, on admission, melinda X, if applicable and completed (first 3 are required if patient doesn't refuse):            ( ) Recognizing danger situations (included triggers and roadblocks)                    ( ) Coping skills (new ways to manage stress,relaxation techniques, changing routine, distraction)                                                           ( ) Basic

## 2025-01-27 NOTE — DISCHARGE INSTR - DIET

## 2025-01-27 NOTE — CARE COORDINATION
Discharge Arrangements:  Return home with , Continue at [facility]    Guardian notified: N/A    Discharge destination/address: Home- address on Facesheet    Transported by:  Cab OR Family    Follow up appointment is scheduled for Amor Khan [facility] [address], WED, Feb 5th starting at 3:20PM       Patient was not accepting of referral. No current or history of ARACELI.  *ARACELI resources were offered to patient throughout admission and at time of discharge. This list of [facility] ARACELI providers was provided to patient:     [facility]  [address] [facility] [address]   [address] [facility] [address]  Phone: [phone]    Phone: [phone]    [facility]   [address] [facility] [address]  [address] [facility] [address]  Phone: [phone]    Phone: [phone]    [facility] [place]  [address] [facility] [address]    [address] [facility] [facility] [address]  Phone: [phone]    Phone: [phone]    [facility]     [facility]   [address] [place] [id number]  [place] [address]      Phone: [phone]  Phone: [phone]        [facility]  [address] [facility] [facility] [address]  [address] [place] [address]  Phone: [phone]    Phone:  [phone]    [employer]     [facility] [employer], [facility]  [address] [facility] [address]  [address] [facility] [address]  Phone: [phone]    Phone: [phone]    [facility]  [address] [facility] [address]  [address] [facility] [address]  Phone: [phone]    Phone: [phone]    [facility]  [facility]  [address] [facility] [address] [address] [facility] [address]  Phone: [phone]    Phone: [phone]    [facility]     Choices

## 2025-01-27 NOTE — GROUP NOTE
Group Therapy Note    Date: 1/26/2025    Group Start Time: 2015  Group End Time: 2045  Group Topic: Wrap-Up    Garrett Sands           Patient's Goal:  Patient was quit during group but did attend         Signature:  Garrett Ferreira

## 2025-01-27 NOTE — DISCHARGE INSTRUCTIONS
Information:  Medications:   Medication summary provided   I understand that I should take only the medications on my list.     -why and when I need to take each medicine.     -which side effects to watch for.     -that I should carry my medication information at all times in case of     Emergency situations.    I will take all of my medicines to follow up appointments.     -check with my physician or pharmacist before taking any new    Medication, over the counter product or drink alcohol.    -Ask about food, drug or dietary supplement interactions.    -discard old lists and update records with medication providers.    Notify Physician:  Notify physician if you notice:   Always call 911 if you feel your life is in danger  In case of an emergency call 911 immediately!  If 911 is not available call your local emergency medical system for help    Behavioral Health Follow Up:  Original Referral Source:[facility] ED   Discharge Diagnosis: Depression with suicidal ideation [F32.A, R45.851]  Recommendations for Level of Care: Home independent  Patient status at discharge: Alert [place] x4 Independent; Denies Suicidal Ideations  My hospital  was: [facility]  Aftercare plan faxed: Melissa   -faxed by: RN    -date: 1/27/25   -time:1230pm   Prescriptions: ***    Smoking: Quit Smoking.   Call the [employer]'s smoking quitline at 8-664-12F-QUIT  Know the signs of a heart attack   If you have any of the following symptoms call 911 immediately, do not wait more    Than five minutes.    1. Pressure, fullness and/ or squeezing in the center of the chest spreading to    The jaw, neck or shoulder.    2. Chest discomfort with light headedness, fainting, sweating, nausea or    Shortness of breath.   3. Upper abdominal pressure or discomfort.   4. Lower chest pain, back pain, unusual fatigue, shortness of breath, nausea   Or dizziness.     General Information:   Questions regarding your bill: Call HELP program [phone]     Suicide

## 2025-01-27 NOTE — PLAN OF CARE
Problem: Chronic Conditions and Co-morbidities  Goal: Patient's chronic conditions and co-morbidity symptoms are monitored and maintained or improved  Outcome: Progressing  Note:   Patient is alert to person, place, and time. Patient endorses depression 2/10and anxiety 2/10.Patient denies the presence of any suicidal ideation. Patient denies the desire to self harm. Patient is agreeable to seek out staff should feelings worsen or arise.  Every 15 minute safety checks maintained.                Patient is compliant with blood sugar checks, assessments and medications.          Problem: Self Harm/Suicidality  Goal: Will have no self-injury during hospital stay  Description: INTERVENTIONS:  1.  Ensure constant observer at bedside with Q15M safety checks  2.  Maintain a safe environment  3.  Secure patient belongings  4.  Ensure family/visitors adhere to safety recommendations  5.  Ensure safety tray has been added to patient's diet order  6.  Every shift and PRN: Re-assess suicidal risk via Frequent Screener    1/26/2025 2023 by Bronwyn Hays, RN  Outcome: Progressing

## 2025-01-27 NOTE — BH NOTE
Patient given tobacco quitline number 57385716662 at this time, refusing to call at this time, states \" I just dont want to quit now\"- patient given information as to the dangers of long term tobacco use. Continue to reinforce the importance of tobacco cessation.

## 2025-01-27 NOTE — GROUP NOTE
Group Therapy Note    Date: 1/27/2025    Group Start Time: 0915  Group End Time: 0935  Group Topic: Group Documentation    [facility]    Eun Braga LPN        Group Therapy Note    Attendees: 10/13       Patient's Goal:  Get discharge plans in order and be patient while waiting    Status After Intervention:  Improved    Participation Level: Active Listener and Interactive    Participation Quality: Appropriate, Attentive, and Sharing      Speech:  normal      Thought Process/Content: Logical  Linear      Affective Functioning: Congruent      Mood: euthymic      Level of consciousness:  Alert, Oriented x4, and Attentive      Response to Learning: Able to verbalize current knowledge/experience      Endings: None Reported    Modes of Intervention: Education, Support, and Socialization      Discipline Responsible: Licensed Practical Nurse      Signature:  Eun Braga LPN

## 2025-01-27 NOTE — TRANSITION OF CARE
Behavioral Health Transition Record    Patient Name: Meño Goodman  YOB: 1990   Medical Record Number: [id number]  Date of Admission: 1/24/2025 11:34 PM   Date of Discharge: 1/27/2025    Attending Provider: Pérez Ronquillo MD   Discharging Provider: Dr Ronquillo  To contact this individual call [phone] and ask the  to page.  If unavailable, ask to be transferred to Behavioral Health Provider on call.  A Behavioral Health Provider will be available on call 24/7 and during holidays.    Primary Care Provider: Jean Claude Garcia MD    Allergies   Allergen Reactions    Morphine Anaphylaxis    Pcn [Penicillins] Hives and Shortness Of Breath     Tolerates ceftriaxone - given 8/1/22    Amoxicillin Hives     Tolerates ceftriaxone - given on 8/1/22    Asa [Aspirin]     Seasonal        Reason for Admission: Threat to self requiring 24 hour professional observation  A mental disorder causing major disability in social, interpersonal, occupational, and/or educational functioning that is leading to dangerous or life-threatening functioning, and that can only be addressed in an acute inpatient setting   Failure of outpatient psychiatry treatment so that the beneficiary requires 24 hour professional observation and care  Concerns about patient's safety in the community  Past Mental Health Diagnosis: a history of  Schizoaffective Disorder and Prior suicide attempt  Triggering event or precipitating factor: Grief r/t loss  and Psych Treatment Noncompliance  Length of time/duration of triggering event: Weeks  Legal Status: Involuntary     CHIEF COMPLAINT: Depression with suicidal ideation    Admission Diagnosis: Depression with suicidal ideation [F32.A, R45.851]    * No surgery found *    Results for orders placed or performed during the hospital encounter of 01/24/25   POC Glucose Fingerstick   Result Value Ref Range    POC Glucose 139 (H) 65 - 105 mg/dL   POC Glucose Fingerstick   Result Value Ref Range

## 2025-01-27 NOTE — PROGRESS NOTES
[facility] Internal Medicine  Naren Collins MD; Kaleb Martínez MD, Tio Ramsey MD, Katie Huizar MD, Tj Rogers MD; Matt Limon MD    [facility] Internal Medicine   IN-PATIENT SERVICE   [facility]     HISTORY AND PHYSICAL EXAMINATION            Date:   2025  Patient name:  Meño Goodman  Date of admission:  2025 11:34 PM  MRN:   [id number]  Account:  [id number]  YOB: 1990  PCP:    Jean Claude Garcia MD  Room:   [address] [place] [address]  Code Status:    Full Code      Chief Complaint:     Diabetes mellitus hypertension    History Obtained From:     Patient/EMR/bedside RN     History of Present Illness:     Diabetes   Duration more than 7 years  Modifying factors on Glucophage and other med  Severity uncontrolled sever  Associated signs and symtoms neuropathy/ckd/ CAD.   aggravated with sugar diet and better with low sugar diet     HTN  Onset more than 2 years ago  swetha mild to mod  Controlled with current po meds  Not associated with headaches or blurry vision  No chest pain      Past Medical History:     Past Medical History:   Diagnosis Date    Asthma     Back pain 2014    Bipolar 1 disorder (HCC)     Bipolar disorder (HCC) 2017    Depression     Diabetes mellitus (HCC)     Dizziness     Fatty liver disease, nonalcoholic     Fatty liver disease, nonalcoholic     GERD (gastroesophageal reflux disease)     Obesity         Past Surgical History:     Past Surgical History:   Procedure Laterality Date     SECTION      LEEP          Medications Prior to Admission:     Prior to Admission medications    Medication Sig Start Date End Date Taking? Authorizing Provider   ARIPiprazole (ABILIFY) 10 MG tablet Take 1 tablet by mouth daily 25  Yes Pérez Ronquillo MD   DULoxetine (CYMBALTA) 20 MG extended release capsule Take 1 capsule by mouth daily 25  Yes Pérez Ronquillo MD   hydrOXYzine HCl (ATARAX) 50 MG tablet

## 2025-01-27 NOTE — DISCHARGE SUMMARY
DISCHARGE SUMMARY      Patient ID:  Meño Goodman  [id number]  34 y.o.  1990    Admit date: 1/24/2025    Discharge date and time: 1/27/2025    Disposition: Home     Admitting Physician: Pérez Ronquillo MD     Discharge Physician: Dr SINDI Ronquillo MD    Admission Diagnoses: Depression with suicidal ideation [F32.A, R45.851]    Admission Condition: poor    Discharged Condition: stable    Admission Circumstance: Meño Goodman is a 34 y.o. female who has a past medical history of GERD, type 2 diabetes, asthma, and mental illness who was pink slipped by her PCP after endorsing suicidal statements during office visit. Per [facility] ED documentation, Patient brought from the [facility] by  [employer] for SI with a plan to cut herself. Patient is on a Pink Slip but is willing to go to the [facility]. Patient was last admitted to the [facility] on 10/21/2024. Patient has been following with [facility] and is prescribed Lexapro, Cymbalta, Desyrel, and Invega. Patient states she quit taking her psych meds because she hasn't had a period in 2 months and was concerned she was pregnant. Patient states she has not taken a pregnancy test \"because I can't afford it\". Patient has a history of depression and admits to being depressed currently because of \"personal problems\" but does not wish to elaborate. Patient denies HI or any legal issues. Patient also denies abusing any drugs or alcohol, but does admit to smoking THC occasionally. Patient has a history of psychiatric hospitalizations.  Last admitted to [facility] 10/21/2024-10/27/2024. She was discharged taking Cymbalta Invega sustained, trazodone, and hydroxyzine.     Patient is seen today for initial assessment.  She is found lying in bed awake.  Agrees to conduct interview appropriate room with door open.  She presents as guarded but readily engages. When asked about events leading to hospitalization, she reports depression and suicidal ideation over the past several weeks with plans of

## 2025-01-27 NOTE — GROUP NOTE
Group Therapy Note    Date: 1/27/2025    Group Start Time: 1000  Group End Time: 1040  Group Topic: Psychotherapy    [facility]    Jennifer Oleary MSW, CORBY        Group Therapy Note    Attendees: 8/23       Patient's Goal:  Increase interpersonal relationship skills utilizing talk therapy while discussing positive coping skills for depression.      Status After Intervention:  Improved    Participation Level: Active Listener and Interactive    Participation Quality: Appropriate, Attentive, and Sharing      Speech:  normal      Thought Process/Content: Logical      Affective Functioning: Congruent      Mood: depressed      Level of consciousness:  Alert and Attentive      Response to Learning: Able to verbalize current knowledge/experience, Able to verbalize/acknowledge new learning, and Able to retain information      Endings: None Reported    Modes of Intervention: Education, Support, and Socialization      Discipline Responsible: /Counselor      Signature:  MASOUD Cruz LSW

## 2025-01-27 NOTE — GROUP NOTE
Group Therapy Note    Date: 1/27/2025    Group Start Time: 1430  Group End Time: 1520  Group Topic: Cognitive Skills    Suzan Iraheta CTRS        Group Therapy Note    Attendees: 6/18     Patient's Goal: To increase socialization, practice self expression, explore potential for positive change   in health, environment, support, through creative expression and discussion.       Notes: Pt was pleasant and cooperative and was attentive to group and engaged in discussion while   waiting to be discharged.. Pt was able to practice self expression, explore positive coping skills and supports through 1:1   Discussion with RT while peers were drawing. .          Status After Intervention:  Improved     Participation Level: Active Listener,  sharing , supportive     Participation Quality: Appropriate,  Attentive, sharing , supportive     Speech: Normal     Thought Process/Content: Logical , linear.      Affective Functioning: Congruent, brightened     Mood: Euthymic       Level of consciousness:  Alert, and Attentive      Response to Learning:  Able to verbalize current knowledge ,attentive to new learning, and Progressing to goal      Endings: None Reported     Modes of Intervention: Education, Support, Socialization, Exploration, Clarifying and Problem-solving      Discipline Responsible: Psychoeducational Specialist      Signature:  FRANKY WATTERS

## 2025-01-27 NOTE — PROGRESS NOTES
CLINICAL PHARMACY NOTE: MEDS TO BEDS    Total # of Prescriptions Filled: 1   The following medications were delivered to the patient:  Aripiprazole 10mg    Additional Documentation: 1/27/25 12:40pm macey delivered to [facility] Jennifer    Cymbalta too soon to fill on file at [facility] [phone] [address]

## 2025-01-27 NOTE — GROUP NOTE
Group Therapy Note    Date: 1/27/2025    Group Start Time: 1100  Group End Time: 1145  Group Topic: Cognitive Skills    [facility] Suzan Carlson CTRS        Group Therapy Note    Attendees: 9/23     Patient's Goal: To increase socialization, practice decision making skills and communication skills.              Notes: Pt was pleasant and cooperative and participated in 11:00 Cognitive Skills Group. Pt was able   to practice decision making skills and communication skills independently.          Status After Intervention:  Improved     Participation Level:  Attentive, sharing , supportive     Participation Quality: Attentive, appropriate, sharing , supportive        Speech:  Normal        Thought Process/Content: Logical, linear        Affective Functioning: Congruent, brightened        Mood: Euthymic        Level of consciousness:  Attentive and Alert        Response to Learning: Able to verbalize current knowledge/experience, Able to verbalize/acknowledge   new learning, and Progressing to goal        Endings: None Reported     Modes of Intervention: Education, Support, Exploration, Clarifying, and Problem solving        Discipline Responsible: Psychoeducational Specialist        Signature:  FRANKY WATTERS

## 2025-01-28 NOTE — CARE COORDINATION
Name: Meño Goodman    : 1990    Auth number: [id number]     Discharge Date: 2025    Destination: home with     Discharge Medications:      Medication List        START taking these medications      ARIPiprazole 10 MG tablet  Commonly known as: ABILIFY  Take 1 tablet by mouth daily  Notes to patient: Mood stabilizer             CHANGE how you take these medications      DULoxetine 20 MG extended release capsule  Commonly known as: CYMBALTA  Take 1 capsule by mouth daily  What changed: how much to take  Notes to patient: Improve mood            CONTINUE taking these medications      Accu-Chek Softclix Lancet Dev Kit  Use as directed for glucose checks     albuterol sulfate  (90 Base) MCG/ACT inhaler  Commonly known as: Proventil HFA  Inhale 2 puffs into the lungs every 4 hours as needed for Wheezing or Shortness of Breath (Space out to every 6 hours as symptoms improve)     Alcohol Swabs Pads  1 each by Does not apply route in the morning, at noon, and at bedtime     * blood glucose monitor kit and supplies  Dispense sufficient amount for indicated testing frequency plus additional to accommodate PRN testing needs. Dispense all needed supplies to include: monitor, strips, lancing device, lancets, control solutions, alcohol swabs.     * glucose monitoring kit  1 kit by Does not apply route daily     * blood glucose test strips  1 strip by Other route in the morning, at noon, and at bedtime Test___times daily Diagnosis: 250.0   Diabetes Mellitus____Insulin Dependent____Non-Insulin Dependent     * blood glucose test strips  1 strip by Other route in the morning, at noon, and at bedtime Test___times daily Diagnosis: 250.0   Diabetes Mellitus____Insulin Dependent____Non-Insulin Dependent     Blood Pressure Kit  1 kit by Does not apply route once for 1 dose     budesonide-formoterol 160-4.5 MCG/ACT Aero  Commonly known as: Symbicort  Inhale 2 puffs into the lungs 2 times daily  Notes to

## 2025-02-03 ENCOUNTER — HOSPITAL ENCOUNTER (OUTPATIENT)
Dept: SLEEP CENTER | Age: 35
Discharge: HOME | End: 2025-02-05
Payer: COMMERCIAL

## 2025-02-03 VITALS — WEIGHT: 293 LBS | HEIGHT: 65 IN | BODY MASS INDEX: 48.82 KG/M2

## 2025-02-03 PROCEDURE — 95811 POLYSOM 6/>YRS CPAP 4/> PARM: CPT

## 2025-02-08 ENCOUNTER — HOSPITAL ENCOUNTER (INPATIENT)
Age: 35
LOS: 3 days | Discharge: HOME | DRG: 141 | End: 2025-02-11
Attending: EMERGENCY MEDICINE | Admitting: STUDENT IN AN ORGANIZED HEALTH CARE EDUCATION/TRAINING PROGRAM
Payer: COMMERCIAL

## 2025-02-08 ENCOUNTER — APPOINTMENT (OUTPATIENT)
Dept: GENERAL RADIOLOGY | Age: 35
DRG: 141 | End: 2025-02-08
Payer: COMMERCIAL

## 2025-02-08 DIAGNOSIS — J06.9 VIRAL UPPER RESPIRATORY TRACT INFECTION: ICD-10-CM

## 2025-02-08 DIAGNOSIS — J45.901 MILD ASTHMA WITH EXACERBATION, UNSPECIFIED WHETHER PERSISTENT: Primary | ICD-10-CM

## 2025-02-08 DIAGNOSIS — J96.01 ACUTE HYPOXIC RESPIRATORY FAILURE: ICD-10-CM

## 2025-02-08 DIAGNOSIS — M79.661 RIGHT CALF PAIN: ICD-10-CM

## 2025-02-08 LAB
ANION GAP SERPL CALCULATED.3IONS-SCNC: 10 MMOL/L (ref 9–16)
B PARAP IS1001 DNA NPH QL NAA+NON-PROBE: NOT DETECTED
B PERT DNA SPEC QL NAA+PROBE: NOT DETECTED
BASOPHILS # BLD: 0.06 K/UL (ref 0–0.2)
BASOPHILS NFR BLD: 1 % (ref 0–2)
BODY TEMPERATURE: 37
BUN SERPL-MCNC: 14 MG/DL (ref 6–20)
C PNEUM DNA NPH QL NAA+NON-PROBE: NOT DETECTED
CALCIUM SERPL-MCNC: 9.5 MG/DL (ref 8.6–10.4)
CHLORIDE SERPL-SCNC: 103 MMOL/L (ref 98–107)
CO2 SERPL-SCNC: 25 MMOL/L (ref 20–31)
COHGB MFR BLD: 0.4 % (ref 0–5)
CREAT SERPL-MCNC: 1 MG/DL (ref 0.6–0.9)
D DIMER PPP FEU-MCNC: <0.27 UG/ML FEU (ref 0–0.57)
EOSINOPHIL # BLD: 0.51 K/UL (ref 0–0.44)
EOSINOPHILS RELATIVE PERCENT: 5 % (ref 1–4)
ERYTHROCYTE [DISTWIDTH] IN BLOOD BY AUTOMATED COUNT: 12.5 % (ref 11.8–14.4)
FIO2 ON VENT: ABNORMAL %
FLUAV AG SPEC QL: NEGATIVE
FLUAV RNA NPH QL NAA+NON-PROBE: NOT DETECTED
FLUBV AG SPEC QL: NEGATIVE
FLUBV RNA NPH QL NAA+NON-PROBE: NOT DETECTED
GFR, ESTIMATED: 75 ML/MIN/1.73M2
GLUCOSE BLD-MCNC: 364 MG/DL (ref 65–105)
GLUCOSE BLD-MCNC: 389 MG/DL (ref 65–105)
GLUCOSE SERPL-MCNC: 107 MG/DL (ref 74–99)
HADV DNA NPH QL NAA+NON-PROBE: NOT DETECTED
HCG SERPL QL: NEGATIVE
HCO3 VENOUS: 22 MMOL/L (ref 24–30)
HCOV 229E RNA NPH QL NAA+NON-PROBE: NOT DETECTED
HCOV HKU1 RNA NPH QL NAA+NON-PROBE: NOT DETECTED
HCOV NL63 RNA NPH QL NAA+NON-PROBE: NOT DETECTED
HCOV OC43 RNA NPH QL NAA+NON-PROBE: NOT DETECTED
HCT VFR BLD AUTO: 38.4 % (ref 36.3–47.1)
HGB BLD-MCNC: 12.9 G/DL (ref 11.9–15.1)
HMPV RNA NPH QL NAA+NON-PROBE: NOT DETECTED
HPIV1 RNA NPH QL NAA+NON-PROBE: NOT DETECTED
HPIV2 RNA NPH QL NAA+NON-PROBE: NOT DETECTED
HPIV3 RNA NPH QL NAA+NON-PROBE: NOT DETECTED
HPIV4 RNA NPH QL NAA+NON-PROBE: NOT DETECTED
IGE SERPL-ACNC: 172 IU/ML (ref 0–100)
IMM GRANULOCYTES # BLD AUTO: 0.03 K/UL (ref 0–0.3)
IMM GRANULOCYTES NFR BLD: 0 %
LACTIC ACID, WHOLE BLOOD: 4.7 MMOL/L (ref 0.7–2.1)
LYMPHOCYTES NFR BLD: 1.87 K/UL (ref 1.1–3.7)
LYMPHOCYTES RELATIVE PERCENT: 20 % (ref 24–43)
M PNEUMO DNA NPH QL NAA+NON-PROBE: NOT DETECTED
MCH RBC QN AUTO: 29.8 PG (ref 25.2–33.5)
MCHC RBC AUTO-ENTMCNC: 33.6 G/DL (ref 28.4–34.8)
MCV RBC AUTO: 88.7 FL (ref 82.6–102.9)
MONOCYTES NFR BLD: 0.57 K/UL (ref 0.1–1.2)
MONOCYTES NFR BLD: 6 % (ref 3–12)
NEGATIVE BASE EXCESS, VEN: 3.4 MMOL/L (ref 0–2)
NEUTROPHILS NFR BLD: 68 % (ref 36–65)
NEUTS SEG NFR BLD: 6.43 K/UL (ref 1.5–8.1)
NRBC BLD-RTO: 0 PER 100 WBC
O2 SAT, VEN: 75.8 % (ref 60–85)
PCO2 VENOUS: 41.2 MM HG (ref 39–55)
PH VENOUS: 7.35 (ref 7.32–7.42)
PLATELET # BLD AUTO: 385 K/UL (ref 138–453)
PMV BLD AUTO: 9.7 FL (ref 8.1–13.5)
PO2 VENOUS: 42.6 MM HG (ref 30–50)
POTASSIUM SERPL-SCNC: 4.3 MMOL/L (ref 3.7–5.3)
PROCALCITONIN SERPL-MCNC: 0.05 NG/ML (ref 0–0.09)
RBC # BLD AUTO: 4.33 M/UL (ref 3.95–5.11)
RSV RNA NPH QL NAA+NON-PROBE: NOT DETECTED
RV+EV RNA NPH QL NAA+NON-PROBE: NOT DETECTED
SARS-COV-2 RDRP RESP QL NAA+PROBE: NOT DETECTED
SARS-COV-2 RNA NPH QL NAA+NON-PROBE: NOT DETECTED
SODIUM SERPL-SCNC: 138 MMOL/L (ref 136–145)
SPECIMEN DESCRIPTION: NORMAL
SPECIMEN DESCRIPTION: NORMAL
TROPONIN I SERPL HS-MCNC: <6 NG/L (ref 0–14)
WBC OTHER # BLD: 9.5 K/UL (ref 3.5–11.3)

## 2025-02-08 PROCEDURE — 71046 X-RAY EXAM CHEST 2 VIEWS: CPT

## 2025-02-08 PROCEDURE — 2500000003 HC RX 250 WO HCPCS

## 2025-02-08 PROCEDURE — 84703 CHORIONIC GONADOTROPIN ASSAY: CPT

## 2025-02-08 PROCEDURE — 82805 BLOOD GASES W/O2 SATURATION: CPT

## 2025-02-08 PROCEDURE — 99285 EMERGENCY DEPT VISIT HI MDM: CPT

## 2025-02-08 PROCEDURE — 6370000000 HC RX 637 (ALT 250 FOR IP): Performed by: INTERNAL MEDICINE

## 2025-02-08 PROCEDURE — 6370000000 HC RX 637 (ALT 250 FOR IP)

## 2025-02-08 PROCEDURE — 2700000000 HC OXYGEN THERAPY PER DAY

## 2025-02-08 PROCEDURE — 82947 ASSAY GLUCOSE BLOOD QUANT: CPT

## 2025-02-08 PROCEDURE — 94761 N-INVAS EAR/PLS OXIMETRY MLT: CPT

## 2025-02-08 PROCEDURE — 36415 COLL VENOUS BLD VENIPUNCTURE: CPT

## 2025-02-08 PROCEDURE — 6360000002 HC RX W HCPCS

## 2025-02-08 PROCEDURE — 83605 ASSAY OF LACTIC ACID: CPT

## 2025-02-08 PROCEDURE — 96375 TX/PRO/DX INJ NEW DRUG ADDON: CPT

## 2025-02-08 PROCEDURE — 93005 ELECTROCARDIOGRAM TRACING: CPT

## 2025-02-08 PROCEDURE — 6370000000 HC RX 637 (ALT 250 FOR IP): Performed by: HOSPITALIST

## 2025-02-08 PROCEDURE — 0202U NFCT DS 22 TRGT SARS-COV-2: CPT

## 2025-02-08 PROCEDURE — 94640 AIRWAY INHALATION TREATMENT: CPT

## 2025-02-08 PROCEDURE — 84484 ASSAY OF TROPONIN QUANT: CPT

## 2025-02-08 PROCEDURE — 82785 ASSAY OF IGE: CPT

## 2025-02-08 PROCEDURE — 84145 PROCALCITONIN (PCT): CPT

## 2025-02-08 PROCEDURE — 96365 THER/PROPH/DIAG IV INF INIT: CPT

## 2025-02-08 PROCEDURE — 87804 INFLUENZA ASSAY W/OPTIC: CPT

## 2025-02-08 PROCEDURE — 2500000003 HC RX 250 WO HCPCS: Performed by: HOSPITALIST

## 2025-02-08 PROCEDURE — 85379 FIBRIN DEGRADATION QUANT: CPT

## 2025-02-08 PROCEDURE — 6360000002 HC RX W HCPCS: Performed by: HOSPITALIST

## 2025-02-08 PROCEDURE — 87635 SARS-COV-2 COVID-19 AMP PRB: CPT

## 2025-02-08 PROCEDURE — 2060000000 HC ICU INTERMEDIATE R&B

## 2025-02-08 PROCEDURE — 85025 COMPLETE CBC W/AUTO DIFF WBC: CPT

## 2025-02-08 PROCEDURE — 6370000000 HC RX 637 (ALT 250 FOR IP): Performed by: NURSE PRACTITIONER

## 2025-02-08 PROCEDURE — 80048 BASIC METABOLIC PNL TOTAL CA: CPT

## 2025-02-08 RX ORDER — ACETAMINOPHEN 650 MG/1
650 SUPPOSITORY RECTAL EVERY 6 HOURS PRN
Status: DISCONTINUED | OUTPATIENT
Start: 2025-02-08 | End: 2025-02-11 | Stop reason: HOSPADM

## 2025-02-08 RX ORDER — INSULIN GLARGINE 100 [IU]/ML
10 INJECTION, SOLUTION SUBCUTANEOUS DAILY
Status: DISCONTINUED | OUTPATIENT
Start: 2025-02-08 | End: 2025-02-10

## 2025-02-08 RX ORDER — DULOXETIN HYDROCHLORIDE 20 MG/1
20 CAPSULE, DELAYED RELEASE ORAL DAILY
Status: DISCONTINUED | OUTPATIENT
Start: 2025-02-08 | End: 2025-02-11 | Stop reason: HOSPADM

## 2025-02-08 RX ORDER — ACETAMINOPHEN 325 MG/1
650 TABLET ORAL EVERY 6 HOURS PRN
Status: DISCONTINUED | OUTPATIENT
Start: 2025-02-08 | End: 2025-02-11 | Stop reason: HOSPADM

## 2025-02-08 RX ORDER — ENOXAPARIN SODIUM 100 MG/ML
40 INJECTION SUBCUTANEOUS DAILY
Status: DISCONTINUED | OUTPATIENT
Start: 2025-02-08 | End: 2025-02-11

## 2025-02-08 RX ORDER — SODIUM CHLORIDE 0.9 % (FLUSH) 0.9 %
5-40 SYRINGE (ML) INJECTION EVERY 12 HOURS SCHEDULED
Status: DISCONTINUED | OUTPATIENT
Start: 2025-02-08 | End: 2025-02-11 | Stop reason: HOSPADM

## 2025-02-08 RX ORDER — DEXTROSE MONOHYDRATE 100 MG/ML
INJECTION, SOLUTION INTRAVENOUS CONTINUOUS PRN
Status: DISCONTINUED | OUTPATIENT
Start: 2025-02-08 | End: 2025-02-11 | Stop reason: HOSPADM

## 2025-02-08 RX ORDER — GLUCAGON 1 MG/ML
1 KIT INJECTION PRN
Status: DISCONTINUED | OUTPATIENT
Start: 2025-02-08 | End: 2025-02-11 | Stop reason: SDUPTHER

## 2025-02-08 RX ORDER — SODIUM CHLORIDE 9 MG/ML
INJECTION, SOLUTION INTRAVENOUS PRN
Status: DISCONTINUED | OUTPATIENT
Start: 2025-02-08 | End: 2025-02-11 | Stop reason: HOSPADM

## 2025-02-08 RX ORDER — ARIPIPRAZOLE 10 MG/1
10 TABLET ORAL DAILY
Status: DISCONTINUED | OUTPATIENT
Start: 2025-02-08 | End: 2025-02-11 | Stop reason: HOSPADM

## 2025-02-08 RX ORDER — POLYETHYLENE GLYCOL 3350 17 G/17G
17 POWDER, FOR SOLUTION ORAL DAILY PRN
Status: DISCONTINUED | OUTPATIENT
Start: 2025-02-08 | End: 2025-02-11 | Stop reason: HOSPADM

## 2025-02-08 RX ORDER — INSULIN LISPRO 100 [IU]/ML
5 INJECTION, SOLUTION INTRAVENOUS; SUBCUTANEOUS
Status: DISCONTINUED | OUTPATIENT
Start: 2025-02-09 | End: 2025-02-11 | Stop reason: HOSPADM

## 2025-02-08 RX ORDER — GLIPIZIDE 5 MG/1
5 TABLET ORAL
Status: DISCONTINUED | OUTPATIENT
Start: 2025-02-09 | End: 2025-02-11 | Stop reason: HOSPADM

## 2025-02-08 RX ORDER — BUDESONIDE 0.25 MG/2ML
250 INHALANT ORAL 2 TIMES DAILY
Status: DISCONTINUED | OUTPATIENT
Start: 2025-02-08 | End: 2025-02-08

## 2025-02-08 RX ORDER — GLUCAGON 1 MG/ML
1 KIT INJECTION PRN
Status: DISCONTINUED | OUTPATIENT
Start: 2025-02-08 | End: 2025-02-11 | Stop reason: HOSPADM

## 2025-02-08 RX ORDER — MAGNESIUM SULFATE IN WATER 40 MG/ML
2000 INJECTION, SOLUTION INTRAVENOUS ONCE
Status: COMPLETED | OUTPATIENT
Start: 2025-02-08 | End: 2025-02-08

## 2025-02-08 RX ORDER — BUDESONIDE AND FORMOTEROL FUMARATE DIHYDRATE 160; 4.5 UG/1; UG/1
2 AEROSOL RESPIRATORY (INHALATION) 2 TIMES DAILY
Status: DISCONTINUED | OUTPATIENT
Start: 2025-02-08 | End: 2025-02-11 | Stop reason: HOSPADM

## 2025-02-08 RX ORDER — ONDANSETRON 4 MG/1
4 TABLET, ORALLY DISINTEGRATING ORAL EVERY 8 HOURS PRN
Status: DISCONTINUED | OUTPATIENT
Start: 2025-02-08 | End: 2025-02-11 | Stop reason: HOSPADM

## 2025-02-08 RX ORDER — INSULIN GLARGINE 100 [IU]/ML
15 INJECTION, SOLUTION SUBCUTANEOUS DAILY
Status: DISCONTINUED | OUTPATIENT
Start: 2025-02-08 | End: 2025-02-11 | Stop reason: HOSPADM

## 2025-02-08 RX ORDER — CETIRIZINE HYDROCHLORIDE 10 MG/1
10 TABLET ORAL DAILY
Status: DISCONTINUED | OUTPATIENT
Start: 2025-02-09 | End: 2025-02-11 | Stop reason: HOSPADM

## 2025-02-08 RX ORDER — TRAZODONE HYDROCHLORIDE 50 MG/1
50 TABLET, FILM COATED ORAL NIGHTLY PRN
Status: DISCONTINUED | OUTPATIENT
Start: 2025-02-08 | End: 2025-02-11 | Stop reason: HOSPADM

## 2025-02-08 RX ORDER — SODIUM CHLORIDE 0.9 % (FLUSH) 0.9 %
5-40 SYRINGE (ML) INJECTION PRN
Status: DISCONTINUED | OUTPATIENT
Start: 2025-02-08 | End: 2025-02-11 | Stop reason: HOSPADM

## 2025-02-08 RX ORDER — INSULIN LISPRO 100 [IU]/ML
0-4 INJECTION, SOLUTION INTRAVENOUS; SUBCUTANEOUS
Status: DISCONTINUED | OUTPATIENT
Start: 2025-02-08 | End: 2025-02-11 | Stop reason: HOSPADM

## 2025-02-08 RX ORDER — PREDNISONE 20 MG/1
40 TABLET ORAL DAILY
Status: DISCONTINUED | OUTPATIENT
Start: 2025-02-10 | End: 2025-02-11 | Stop reason: HOSPADM

## 2025-02-08 RX ORDER — ALBUTEROL SULFATE 5 MG/ML
2.5 SOLUTION RESPIRATORY (INHALATION)
Status: DISCONTINUED | OUTPATIENT
Start: 2025-02-08 | End: 2025-02-09

## 2025-02-08 RX ORDER — ONDANSETRON 2 MG/ML
4 INJECTION INTRAMUSCULAR; INTRAVENOUS EVERY 6 HOURS PRN
Status: DISCONTINUED | OUTPATIENT
Start: 2025-02-08 | End: 2025-02-11 | Stop reason: HOSPADM

## 2025-02-08 RX ORDER — DEXTROSE MONOHYDRATE 100 MG/ML
INJECTION, SOLUTION INTRAVENOUS CONTINUOUS PRN
Status: DISCONTINUED | OUTPATIENT
Start: 2025-02-08 | End: 2025-02-11 | Stop reason: SDUPTHER

## 2025-02-08 RX ORDER — IPRATROPIUM BROMIDE AND ALBUTEROL SULFATE 2.5; .5 MG/3ML; MG/3ML
1 SOLUTION RESPIRATORY (INHALATION)
Status: DISCONTINUED | OUTPATIENT
Start: 2025-02-09 | End: 2025-02-09

## 2025-02-08 RX ORDER — HYDROXYZINE HYDROCHLORIDE 25 MG/1
50 TABLET, FILM COATED ORAL 3 TIMES DAILY PRN
Status: DISCONTINUED | OUTPATIENT
Start: 2025-02-08 | End: 2025-02-11 | Stop reason: HOSPADM

## 2025-02-08 RX ORDER — IPRATROPIUM BROMIDE AND ALBUTEROL SULFATE 2.5; .5 MG/3ML; MG/3ML
1 SOLUTION RESPIRATORY (INHALATION) EVERY 4 HOURS PRN
Status: DISCONTINUED | OUTPATIENT
Start: 2025-02-08 | End: 2025-02-11 | Stop reason: HOSPADM

## 2025-02-08 RX ORDER — INSULIN LISPRO 100 [IU]/ML
0-4 INJECTION, SOLUTION INTRAVENOUS; SUBCUTANEOUS
Status: DISCONTINUED | OUTPATIENT
Start: 2025-02-08 | End: 2025-02-09

## 2025-02-08 RX ORDER — BENZONATATE 100 MG/1
200 CAPSULE ORAL 3 TIMES DAILY PRN
Status: DISCONTINUED | OUTPATIENT
Start: 2025-02-08 | End: 2025-02-11 | Stop reason: HOSPADM

## 2025-02-08 RX ORDER — INSULIN LISPRO 100 [IU]/ML
8 INJECTION, SOLUTION INTRAVENOUS; SUBCUTANEOUS ONCE
Status: COMPLETED | OUTPATIENT
Start: 2025-02-08 | End: 2025-02-08

## 2025-02-08 RX ORDER — ALBUTEROL SULFATE 0.83 MG/ML
2.5 SOLUTION RESPIRATORY (INHALATION)
Status: DISCONTINUED | OUTPATIENT
Start: 2025-02-08 | End: 2025-02-08

## 2025-02-08 RX ORDER — GUAIFENESIN 600 MG/1
600 TABLET, EXTENDED RELEASE ORAL 2 TIMES DAILY
Status: DISCONTINUED | OUTPATIENT
Start: 2025-02-08 | End: 2025-02-11 | Stop reason: HOSPADM

## 2025-02-08 RX ORDER — FAMOTIDINE 20 MG/1
20 TABLET, FILM COATED ORAL 2 TIMES DAILY
Status: DISCONTINUED | OUTPATIENT
Start: 2025-02-08 | End: 2025-02-11 | Stop reason: HOSPADM

## 2025-02-08 RX ORDER — ACETAMINOPHEN 325 MG/1
650 TABLET ORAL EVERY 4 HOURS PRN
Status: DISCONTINUED | OUTPATIENT
Start: 2025-02-08 | End: 2025-02-11 | Stop reason: HOSPADM

## 2025-02-08 RX ADMIN — IPRATROPIUM BROMIDE AND ALBUTEROL SULFATE 1 DOSE: .5; 2.5 SOLUTION RESPIRATORY (INHALATION) at 14:27

## 2025-02-08 RX ADMIN — INSULIN GLARGINE 15 UNITS: 100 INJECTION, SOLUTION SUBCUTANEOUS at 21:00

## 2025-02-08 RX ADMIN — WATER 125 MG: 1 INJECTION INTRAMUSCULAR; INTRAVENOUS; SUBCUTANEOUS at 14:33

## 2025-02-08 RX ADMIN — INSULIN LISPRO 8 UNITS: 100 INJECTION, SOLUTION INTRAVENOUS; SUBCUTANEOUS at 23:43

## 2025-02-08 RX ADMIN — GUAIFENESIN 600 MG: 600 TABLET ORAL at 20:59

## 2025-02-08 RX ADMIN — MAGNESIUM SULFATE HEPTAHYDRATE 2000 MG: 40 INJECTION, SOLUTION INTRAVENOUS at 14:35

## 2025-02-08 RX ADMIN — SODIUM CHLORIDE, PRESERVATIVE FREE 10 ML: 5 INJECTION INTRAVENOUS at 22:00

## 2025-02-08 RX ADMIN — WATER 40 MG: 1 INJECTION INTRAMUSCULAR; INTRAVENOUS; SUBCUTANEOUS at 22:01

## 2025-02-08 RX ADMIN — ACETAMINOPHEN 650 MG: 325 TABLET ORAL at 20:59

## 2025-02-08 RX ADMIN — ARIPIPRAZOLE 10 MG: 10 TABLET ORAL at 20:59

## 2025-02-08 RX ADMIN — Medication 5 MG: at 16:15

## 2025-02-08 RX ADMIN — DULOXETINE HYDROCHLORIDE 20 MG: 20 CAPSULE, DELAYED RELEASE ORAL at 21:00

## 2025-02-08 RX ADMIN — Medication 5 MG: at 14:27

## 2025-02-08 RX ADMIN — FAMOTIDINE 20 MG: 20 TABLET, FILM COATED ORAL at 20:59

## 2025-02-08 RX ADMIN — INSULIN LISPRO 4 UNITS: 100 INJECTION, SOLUTION INTRAVENOUS; SUBCUTANEOUS at 21:00

## 2025-02-08 RX ADMIN — ENOXAPARIN SODIUM 40 MG: 100 INJECTION SUBCUTANEOUS at 21:00

## 2025-02-08 ASSESSMENT — PAIN SCALES - GENERAL
PAINLEVEL_OUTOF10: 6
PAINLEVEL_OUTOF10: 3

## 2025-02-08 NOTE — ED NOTES
ED to inpatient nurses report    Chief Complaint   Patient presents with   • Shortness of Breath   • Chest Pain      Present to ED from ***  LOC: {loc:53668}  Vital signs   Vitals:    02/08/25 1620 02/08/25 1701 02/08/25 1721 02/08/25 1751   BP:  124/87 (!) 138/96    Pulse: 97 (!) 116 (!) 136 (!) 112   Resp:       Temp:       SpO2: 99% 92% 97% 90%      Oxygen Baseline ***    Current needs required ***   LDAs:   Peripheral IV 02/08/25 Left Antecubital (Active)   Site Assessment Clean, dry & intact 02/08/25 1432   Line Status Blood return noted 02/08/25 1432   Phlebitis Assessment No symptoms 02/08/25 1432   Infiltration Assessment 0 02/08/25 1432   Dressing Status New dressing applied 02/08/25 1432   Dressing Type Transparent 02/08/25 1432     Mobility: {mobility:52603}  Pending ED orders: ***  Present condition: ***  Code Status: [unfilled]   Consults:  []  Hospitalist  Completed  [] yes [] no  []  Medicine  Completed  [] yes [] No  []  Cardiology  Completed  [] yes [] No  []  GI   Completed  [] yes [] No  []  Neurology  Completed  [] yes [] No  []  Nephrology Completed  [] yes [] No  []  Vascular  Completed  [] yes [] No   []  Surgery  Completed  [] yes [] No   []  Urology  Completed  [] yes [] No   []  Plastics  Completed  [] yes [] No   []  ENT  Completed  [] yes [] No   []  Other ***  Completed  [] yes [] No  Pertinent event(s) ***  Pertinent event(s) ***  Electronically signed by HARJIT GALVEZ RN on 2/8/2025 at 6:14 PM

## 2025-02-08 NOTE — ED NOTES
The following labs were labeled with appropriate pt sticker and tubed to lab:     [x] Blue     [x] Lavender   [] on ice  [x] Green/yellow  [x] Green/black [] on ice  [] Grey  [] on ice  [] Yellow  [x] Red  [] Pink  [] Type/ Screen  [] ABG  [] VBG    [x] COVID-19 swab    [x] Rapid  [] PCR  [x] Flu swab  [] Peds Viral Panel     [] Urine Sample  [] Fecal Sample  [] Pelvic Cultures  [] Blood Cultures  [] X 2  [] STREP Cultures  [] Wound Cultures

## 2025-02-08 NOTE — ED PROVIDER NOTES
[facility] EMERGENCY DEPARTMENT     Emergency Department     Faculty Attestation        I performed a history and physical examination of the patient and discussed management with the resident. I reviewed the resident’s note and agree with the documented findings and plan of care. Any areas of disagreement are noted on the chart. I was personally present for the key portions of any procedures. I have documented in the chart those procedures where I was not present during the key portions. I have reviewed the emergency nurses triage note. I agree with the chief complaint, past medical history, past surgical history, allergies, medications, social and family history as documented unless otherwise noted below.  For Physician Assistant/ Nurse Practitioner cases/documentation I have personally evaluated this patient and have completed at least one if not all key elements of the E/M (history, physical exam, and MDM). Additional findings are as noted.      Vital Signs: BP: (!) 135/108  Pulse: (!) 117  Respirations: 20  Temp: 98.2 °F (36.8 °C) SpO2: 90 %  PCP:  Jean Claude Garcia MD  Note Started: 2/8/25, 2:22 PM EST    Pertinent Comments:     Patient is a 34-year-old female with history of asthma out of her inhaler.    Patient over last 3 days had increasing shortness of breath now with inspiratory expiratory wheezing occurring.   Was satting 90% on room air placed on nasal cannula 97% now.   Paging respiratory staff breathing treatments.   Only mild respiratory distress.   Has had multiple exposures to sick patients however.    No vomiting or diarrhea and denies abdominal pain.   Does have chest tightness associated with this as well.    Assessment/Plan: At least asthma exacerbation will check brief cardiac workup as well as chest x-ray.   Stat breathing treatments as well as IV steroid and magnesium.  Reevaluate      Critical Care  None      (Please note that portions of 
        [facility] EMERGENCY DEPARTMENT  Emergency Department  Emergency Medicine Sign-out     Care of Meño Goodman was assumed from Dr. Myrick and is being seen for Shortness of Breath and Chest Pain  .  The patient's initial evaluation and plan have been discussed with the prior attending who initially evaluated the patient.     EMERGENCY DEPARTMENT COURSE / MEDICAL DECISION MAKING:       MEDICATIONS GIVEN:  Orders Placed This Encounter   Medications    magnesium sulfate 2000 mg in 50 mL IVPB premix    methylPREDNISolone sodium succ (SOLU-MEDROL) 125 mg in sterile water 2 mL injection    ipratropium 0.5 mg-albuterol 2.5 mg (DUONEB) nebulizer solution 1 Dose     Order Specific Question:   Initiate RT Bronchodilator Protocol     Answer:   Yes - Inpatient Protocol    albuterol (PROVENTIL) nebulizer solution 2.5 mg     Order Specific Question:   Initiate RT Bronchodilator Protocol     Answer:   Yes - Inpatient Protocol       LABS / RADIOLOGY:     Labs Reviewed   CBC WITH AUTO DIFFERENTIAL - Abnormal; Notable for the following components:       Result Value    Neutrophils % 68 (*)     Lymphocytes % 20 (*)     Eosinophils % 5 (*)     Eosinophils Absolute 0.51 (*)     All other components within normal limits   BASIC METABOLIC PANEL - Abnormal; Notable for the following components:    Glucose 107 (*)     Creatinine 1.0 (*)     All other components within normal limits   COVID-19, RAPID   RAPID INFLUENZA A/B ANTIGENS   HCG, SERUM, QUALITATIVE   D-DIMER, QUANTITATIVE   PREVIOUS SPECIMEN   TROPONIN       XR CHEST (2 VW)    Result Date: 2/8/2025  EXAMINATION: TWO XRAY VIEWS OF THE CHEST 2/8/2025 3:15 pm COMPARISON: September 22, 2024 HISTORY: ORDERING SYSTEM PROVIDED HISTORY: SOB TECHNOLOGIST PROVIDED HISTORY: SOB FINDINGS: There is poor inspiration with crowding of the bronchovascular markings. No confluent infiltrate, effusion, or pneumothorax identified.  Cardiac and mediastinal silhouettes are within normal 
     [facility] EMERGENCY DEPARTMENT  Emergency Department Encounter  Emergency Medicine Resident     Pt Name:Meño Goodman  MRN: [id number]  Birthdate 1990  Date of evaluation: 25  PCP:  Jean Claude Garcia MD  Note Started: 2:14 PM EST      CHIEF COMPLAINT       Chief Complaint   Patient presents with    Shortness of Breath    Chest Pain       HISTORY OF PRESENT ILLNESS  (Location/Symptom, Timing/Onset, Context/Setting, Quality, Duration, Modifying Factors, Severity.)      Meño Goodman is a 34 y.o. female history of asthma, diabetes, bipolar disorder who presents with shortness of breath this with been worsening over the past couple days.  Patient states that she has run out of her asthma medications.  Recently she has had cough, congestion and increasing shortness of breath.  Feels she is wheezing a lot however has run out of her home asthma medications.  She does complain of chest pain that she describes as a tightness.  No fevers at home.  Does note that she has not had a period in 2 months.  No associated abdominal pain    PAST MEDICAL / SURGICAL / SOCIAL / FAMILY HISTORY      has a past medical history of Asthma, Back pain, Bipolar 1 disorder (HCC), Bipolar disorder (HCC), Depression, Diabetes mellitus (HCC), Dizziness, Fatty liver disease, nonalcoholic, Fatty liver disease, nonalcoholic, GERD (gastroesophageal reflux disease), and Obesity.       has a past surgical history that includes  section and LEEP.      Social History     Socioeconomic History    Marital status:      Spouse name: Not on file    Number of children: Not on file    Years of education: Not on file    Highest education level: Not on file   Occupational History    Not on file   Tobacco Use    Smoking status: Former     Current packs/day: 0.00     Types: Cigarettes     Start date: 2000     Quit date: 2015     Years since quitting: 10.1    Smokeless tobacco: Never   Vaping Use    Vaping status: Never 
apply route once for 1 dose    BUDESONIDE-FORMOTEROL (SYMBICORT) 160-4.5 MCG/ACT AERO    Inhale 2 puffs into the lungs 2 times daily    CALAMINE-ZINC OXIDE (V-R CALAMINE) LOTN    Apply 1 each topically Daily    DULOXETINE (CYMBALTA) 20 MG EXTENDED RELEASE CAPSULE    Take 1 capsule by mouth daily    FAMOTIDINE (PEPCID) 20 MG TABLET    Take 1 tablet by mouth 2 times daily    GLIPIZIDE (GLUCOTROL) 5 MG TABLET    Take 1 tablet by mouth 2 times daily (before meals)    GLUCOSE MONITORING KIT    1 kit by Does not apply route daily    HYDROXYZINE HCL (ATARAX) 50 MG TABLET    TAKE ONE TABLET BY MOUTH THREE TIMES DAILY AS NEEDED FOR ITCHING    INSULIN GLARGINE (LANTUS) 100 UNIT/ML INJECTION VIAL    Inject 10 Units into the skin daily    INSULIN LISPRO (HUMALOG,ADMELOG) 100 UNIT/ML SOLN INJECTION VIAL    Inject 0-8 Units into the skin 4 times daily (before meals and nightly)    LANCETS MISC    1 each by Does not apply route daily    LANCETS MISC. (ACCU-CHEK SOFTCLIX LANCET DEV) KIT    Use as directed for glucose checks    MELOXICAM (MOBIC) 15 MG TABLET    Take 1 tablet by mouth daily    METFORMIN (GLUCOPHAGE) 1000 MG TABLET    Take 1 tablet by mouth 2 times daily (with meals)    TRAZODONE (DESYREL) 50 MG TABLET    Take 1 tablet by mouth nightly as needed for Sleep    TRULICITY 1.5 MG/0.5ML SC INJECTION    inject 0.5 milliliters subcutaneously every week       Encounter Medications  Orders Placed This Encounter   Medications    magnesium sulfate 2000 mg in 50 mL IVPB premix    methylPREDNISolone sodium succ (SOLU-MEDROL) 125 mg in sterile water 2 mL injection    ipratropium 0.5 mg-albuterol 2.5 mg (DUONEB) nebulizer solution 1 Dose     Order Specific Question:   Initiate RT Bronchodilator Protocol     Answer:   Yes - Inpatient Protocol    albuterol (PROVENTIL) nebulizer solution 2.5 mg     Order Specific Question:   Initiate RT Bronchodilator Protocol     Answer:   Yes - Inpatient Protocol       ALLERGIES     is allergic to

## 2025-02-08 NOTE — ED NOTES
Pt ambulated to restroom and back with steady gait.   Pt oxygen saturation was 88% on room air. Pt placed back on 2L with improvement to 94%

## 2025-02-09 LAB
ANION GAP SERPL CALCULATED.3IONS-SCNC: 12 MMOL/L (ref 9–16)
BASOPHILS # BLD: <0.03 K/UL (ref 0–0.2)
BASOPHILS NFR BLD: 0 % (ref 0–2)
BNP SERPL-MCNC: 100 PG/ML (ref 0–125)
BUN SERPL-MCNC: 16 MG/DL (ref 6–20)
CALCIUM SERPL-MCNC: 9.5 MG/DL (ref 8.6–10.4)
CHLORIDE SERPL-SCNC: 102 MMOL/L (ref 98–107)
CO2 SERPL-SCNC: 21 MMOL/L (ref 20–31)
CREAT SERPL-MCNC: 0.9 MG/DL (ref 0.6–0.9)
EOSINOPHIL # BLD: <0.03 K/UL (ref 0–0.44)
EOSINOPHILS RELATIVE PERCENT: 0 % (ref 1–4)
ERYTHROCYTE [DISTWIDTH] IN BLOOD BY AUTOMATED COUNT: 12.6 % (ref 11.8–14.4)
EST. AVERAGE GLUCOSE BLD GHB EST-MCNC: 108 MG/DL
GFR, ESTIMATED: 85 ML/MIN/1.73M2
GLUCOSE BLD-MCNC: 170 MG/DL (ref 65–105)
GLUCOSE BLD-MCNC: 199 MG/DL (ref 65–105)
GLUCOSE BLD-MCNC: 220 MG/DL (ref 65–105)
GLUCOSE BLD-MCNC: 222 MG/DL (ref 65–105)
GLUCOSE SERPL-MCNC: 336 MG/DL (ref 74–99)
HBA1C MFR BLD: 5.4 % (ref 4–6)
HCT VFR BLD AUTO: 37.7 % (ref 36.3–47.1)
HGB BLD-MCNC: 12.3 G/DL (ref 11.9–15.1)
IMM GRANULOCYTES # BLD AUTO: 0.04 K/UL (ref 0–0.3)
IMM GRANULOCYTES NFR BLD: 0 %
LACTIC ACID, WHOLE BLOOD: 2.7 MMOL/L (ref 0.7–2.1)
LACTIC ACID, WHOLE BLOOD: 4.6 MMOL/L (ref 0.7–2.1)
LYMPHOCYTES NFR BLD: 0.76 K/UL (ref 1.1–3.7)
LYMPHOCYTES RELATIVE PERCENT: 8 % (ref 24–43)
MCH RBC QN AUTO: 29.4 PG (ref 25.2–33.5)
MCHC RBC AUTO-ENTMCNC: 32.6 G/DL (ref 28.4–34.8)
MCV RBC AUTO: 90.2 FL (ref 82.6–102.9)
MONOCYTES NFR BLD: 0.12 K/UL (ref 0.1–1.2)
MONOCYTES NFR BLD: 1 % (ref 3–12)
NEUTROPHILS NFR BLD: 91 % (ref 36–65)
NEUTS SEG NFR BLD: 8.34 K/UL (ref 1.5–8.1)
NRBC BLD-RTO: 0 PER 100 WBC
PLATELET # BLD AUTO: 393 K/UL (ref 138–453)
PMV BLD AUTO: 9.9 FL (ref 8.1–13.5)
POTASSIUM SERPL-SCNC: 4.7 MMOL/L (ref 3.7–5.3)
PROCALCITONIN SERPL-MCNC: 0.04 NG/ML (ref 0–0.09)
PROCALCITONIN SERPL-MCNC: 0.05 NG/ML (ref 0–0.09)
RBC # BLD AUTO: 4.18 M/UL (ref 3.95–5.11)
SODIUM SERPL-SCNC: 135 MMOL/L (ref 136–145)
WBC OTHER # BLD: 9.3 K/UL (ref 3.5–11.3)

## 2025-02-09 PROCEDURE — 1200000000 HC SEMI PRIVATE

## 2025-02-09 PROCEDURE — 2060000000 HC ICU INTERMEDIATE R&B

## 2025-02-09 PROCEDURE — 85025 COMPLETE CBC W/AUTO DIFF WBC: CPT

## 2025-02-09 PROCEDURE — 99223 1ST HOSP IP/OBS HIGH 75: CPT | Performed by: INTERNAL MEDICINE

## 2025-02-09 PROCEDURE — 97161 PT EVAL LOW COMPLEX 20 MIN: CPT

## 2025-02-09 PROCEDURE — 6370000000 HC RX 637 (ALT 250 FOR IP): Performed by: INTERNAL MEDICINE

## 2025-02-09 PROCEDURE — 84145 PROCALCITONIN (PCT): CPT

## 2025-02-09 PROCEDURE — 83036 HEMOGLOBIN GLYCOSYLATED A1C: CPT

## 2025-02-09 PROCEDURE — 6360000002 HC RX W HCPCS: Performed by: HOSPITALIST

## 2025-02-09 PROCEDURE — 2500000003 HC RX 250 WO HCPCS: Performed by: HOSPITALIST

## 2025-02-09 PROCEDURE — 6370000000 HC RX 637 (ALT 250 FOR IP): Performed by: HOSPITALIST

## 2025-02-09 PROCEDURE — 97530 THERAPEUTIC ACTIVITIES: CPT

## 2025-02-09 PROCEDURE — 94640 AIRWAY INHALATION TREATMENT: CPT

## 2025-02-09 PROCEDURE — 6370000000 HC RX 637 (ALT 250 FOR IP): Performed by: NURSE PRACTITIONER

## 2025-02-09 PROCEDURE — 83880 ASSAY OF NATRIURETIC PEPTIDE: CPT

## 2025-02-09 PROCEDURE — 82947 ASSAY GLUCOSE BLOOD QUANT: CPT

## 2025-02-09 PROCEDURE — 36415 COLL VENOUS BLD VENIPUNCTURE: CPT

## 2025-02-09 PROCEDURE — 80048 BASIC METABOLIC PNL TOTAL CA: CPT

## 2025-02-09 RX ORDER — ALBUTEROL SULFATE 2.5 MG/.5ML
2.5 SOLUTION RESPIRATORY (INHALATION) EVERY 4 HOURS PRN
Status: DISCONTINUED | OUTPATIENT
Start: 2025-02-09 | End: 2025-02-11 | Stop reason: HOSPADM

## 2025-02-09 RX ADMIN — GLIPIZIDE 5 MG: 5 TABLET ORAL at 16:49

## 2025-02-09 RX ADMIN — BUDESONIDE AND FORMOTEROL FUMARATE DIHYDRATE 2 PUFF: 160; 4.5 AEROSOL RESPIRATORY (INHALATION) at 08:49

## 2025-02-09 RX ADMIN — GLIPIZIDE 5 MG: 5 TABLET ORAL at 09:16

## 2025-02-09 RX ADMIN — INSULIN LISPRO 1 UNITS: 100 INJECTION, SOLUTION INTRAVENOUS; SUBCUTANEOUS at 09:16

## 2025-02-09 RX ADMIN — ENOXAPARIN SODIUM 40 MG: 100 INJECTION SUBCUTANEOUS at 09:15

## 2025-02-09 RX ADMIN — INSULIN LISPRO 5 UNITS: 100 INJECTION, SOLUTION INTRAVENOUS; SUBCUTANEOUS at 16:49

## 2025-02-09 RX ADMIN — INSULIN GLARGINE 15 UNITS: 100 INJECTION, SOLUTION SUBCUTANEOUS at 09:14

## 2025-02-09 RX ADMIN — WATER 40 MG: 1 INJECTION INTRAMUSCULAR; INTRAVENOUS; SUBCUTANEOUS at 09:14

## 2025-02-09 RX ADMIN — FAMOTIDINE 20 MG: 20 TABLET, FILM COATED ORAL at 21:16

## 2025-02-09 RX ADMIN — BUDESONIDE AND FORMOTEROL FUMARATE DIHYDRATE 2 PUFF: 160; 4.5 AEROSOL RESPIRATORY (INHALATION) at 21:13

## 2025-02-09 RX ADMIN — ACETAMINOPHEN 650 MG: 325 TABLET ORAL at 06:41

## 2025-02-09 RX ADMIN — GUAIFENESIN 600 MG: 600 TABLET ORAL at 21:16

## 2025-02-09 RX ADMIN — WATER 40 MG: 1 INJECTION INTRAMUSCULAR; INTRAVENOUS; SUBCUTANEOUS at 21:16

## 2025-02-09 RX ADMIN — FAMOTIDINE 20 MG: 20 TABLET, FILM COATED ORAL at 09:15

## 2025-02-09 RX ADMIN — DULOXETINE HYDROCHLORIDE 20 MG: 20 CAPSULE, DELAYED RELEASE ORAL at 13:04

## 2025-02-09 RX ADMIN — GUAIFENESIN 600 MG: 600 TABLET ORAL at 09:15

## 2025-02-09 RX ADMIN — ACETAMINOPHEN 650 MG: 325 TABLET ORAL at 22:58

## 2025-02-09 RX ADMIN — ARIPIPRAZOLE 10 MG: 10 TABLET ORAL at 09:15

## 2025-02-09 RX ADMIN — INSULIN LISPRO 5 UNITS: 100 INJECTION, SOLUTION INTRAVENOUS; SUBCUTANEOUS at 13:05

## 2025-02-09 RX ADMIN — INSULIN LISPRO 5 UNITS: 100 INJECTION, SOLUTION INTRAVENOUS; SUBCUTANEOUS at 09:14

## 2025-02-09 RX ADMIN — ACETAMINOPHEN 650 MG: 325 TABLET ORAL at 13:04

## 2025-02-09 RX ADMIN — SODIUM CHLORIDE, PRESERVATIVE FREE 10 ML: 5 INJECTION INTRAVENOUS at 21:17

## 2025-02-09 RX ADMIN — INSULIN LISPRO 1 UNITS: 100 INJECTION, SOLUTION INTRAVENOUS; SUBCUTANEOUS at 13:04

## 2025-02-09 RX ADMIN — CETIRIZINE HYDROCHLORIDE 10 MG: 10 TABLET, FILM COATED ORAL at 09:15

## 2025-02-09 ASSESSMENT — PAIN DESCRIPTION - PAIN TYPE: TYPE: ACUTE PAIN

## 2025-02-09 ASSESSMENT — PAIN SCALES - GENERAL
PAINLEVEL_OUTOF10: 5
PAINLEVEL_OUTOF10: 6
PAINLEVEL_OUTOF10: 4
PAINLEVEL_OUTOF10: 5
PAINLEVEL_OUTOF10: 3

## 2025-02-09 ASSESSMENT — PAIN DESCRIPTION - DESCRIPTORS
DESCRIPTORS: ACHING
DESCRIPTORS: ACHING

## 2025-02-09 ASSESSMENT — PAIN SCALES - WONG BAKER: WONGBAKER_NUMERICALRESPONSE: HURTS A LITTLE BIT

## 2025-02-09 ASSESSMENT — PAIN DESCRIPTION - LOCATION
LOCATION: HEAD

## 2025-02-09 NOTE — PROGRESS NOTES
Physical Therapy  Facility/Department: [address] ONC/MED SURG   Physical Therapy Initial Evaluation    Patient Name: Meño Goodman        MRN: [id number]    : 1990    Date of Service: 2025    Chief Complaint   Patient presents with    Shortness of Breath    Chest Pain     Past Medical History:  has a past medical history of Asthma, Back pain, Bipolar 1 disorder (HCC), Bipolar disorder (HCC), Depression, Diabetes mellitus (HCC), Dizziness, Fatty liver disease, nonalcoholic, Fatty liver disease, nonalcoholic, GERD (gastroesophageal reflux disease), and Obesity.  Past Surgical History:  has a past surgical history that includes  section and LEEP.    Discharge Recommendations  Discharge Recommendations: No therapy recommended at discharge  PT Equipment Recommendations  Equipment Needed: No    Assessment     Assessment: The pt ambulated 75 ft without a device x SBA. She ambulated with no  c/o chest pain or SOB. No further PT intervention is needed at this time  Therapy Prognosis: Good  Decision Making: Medium Complexity  Requires PT Follow-Up: No  Activity Tolerance  Activity Tolerance: Patient tolerated treatment well  Safety Devices  Type of Devices: Nurse notified, Left in bed, Call light within reach  Restraints  Restraints Initially in Place: No    AM-PAC  AM-PAC Basic Mobility - Inpatient   How much help is needed turning from your back to your side while in a flat bed without using bedrails?: None  How much help is needed moving from lying on your back to sitting on the side of a flat bed without using bedrails?: None  How much help is needed moving to and from a bed to a chair?: None  How much help is needed standing up from a chair using your arms?: None  How much help is needed walking in hospital room?: None  How much help is needed climbing 3-5 steps with a railing?: None  AM-PAC Inpatient Mobility Raw Score : 24  AM-PAC Inpatient T-Scale Score : 61.14  Mobility Inpatient CMS 0-100% Score:

## 2025-02-09 NOTE — ED NOTES
ED to inpatient nurses report      Chief Complaint:  Chief Complaint   Patient presents with    Shortness of Breath    Chest Pain     Present to ED from: Home    MOA:     LOC: alert and orientated to name, place, date  Mobility: Independent  Oxygen Baseline: Room air    Current needs required: 2L   Pending ED orders: Admit for asthma exacerbation  Present condition: Stable    Why did the patient come to the ED? Patient is a 34-year-old female with history of asthma out of her inhaler.    Patient over last 3 days had increasing shortness of breath now with inspiratory expiratory wheezing occurring.   Was satting 90% on room air placed on nasal cannula 97% now.   Paging respiratory staff breathing treatments.   Only mild respiratory distress.   Has had multiple exposures to sick patients however.    No vomiting or diarrhea and denies abdominal pain.   Does have chest tightness associated with this as well.    What is the plan? Admit   Any procedures or intervention occur? Breathing treatment, 2L NC, chest x-ray  Any safety concerns?? No    Mental Status:       Psych Assessment:      Vital signs   Vitals:    02/08/25 1620 02/08/25 1701 02/08/25 1721 02/08/25 1751   BP:  124/87 (!) 138/96    Pulse: 97 (!) 116 (!) 136 (!) 112   Resp:       Temp:       SpO2: 99% 92% 97% 90%        Vitals:  Patient Vitals for the past 24 hrs:   BP Temp Pulse Resp SpO2   02/08/25 1751 -- -- (!) 112 -- 90 %   02/08/25 1721 (!) 138/96 -- (!) 136 -- 97 %   02/08/25 1701 124/87 -- (!) 116 -- 92 %   02/08/25 1620 -- -- 97 -- 99 %   02/08/25 1524 -- -- 97 -- --   02/08/25 1459 -- -- (!) 114 -- 91 %   02/08/25 1409 (!) 135/108 98.2 °F (36.8 °C) (!) 117 20 90 %      Visit Vitals  BP (!) 138/96   Pulse (!) 112   Temp 98.2 °F (36.8 °C)   Resp 20   SpO2 90%        LDAs:   Peripheral IV 02/08/25 Left Antecubital (Active)   Site Assessment Clean, dry & intact 02/08/25 1432   Line Status Blood return noted 02/08/25 1432   Phlebitis Assessment No symptoms

## 2025-02-09 NOTE — CARE COORDINATION
Case Management Assessment  Initial Evaluation    Date/Time of Evaluation: 2/9/2025 4:18 PM  Assessment Completed by: NEFTALY LAKHANI    If patient is discharged prior to next notation, then this note serves as note for discharge by case management.    Patient Name: Meño Goodman                   YOB: 1990  Diagnosis: Viral upper respiratory tract infection [J06.9]  Mild asthma with exacerbation, unspecified whether persistent [J45.901]  Acute hypoxic respiratory failure [J96.01]                   Date / Time: 2/8/2025  2:10 PM    Patient Admission Status: Inpatient   Readmission Risk (Low < 19, Mod (19-27), High > 27): Readmission Risk Score: 16.9    Current PCP: Jean Claude Garcia MD  PCP verified by CM? (P) Yes    Chart Reviewed: Yes      History Provided by:    Patient Orientation: (P) Alert and Oriented, Person, Place, Situation, Self    Patient Cognition: (P) Alert    Hospitalization in the last 30 days (Readmission):  Yes    If yes, Readmission Assessment in  Navigator will be completed.    Advance Directives:      Code Status: Full Code   Patient's Primary Decision Maker is: (P) Legal Next of Kin    Primary Decision Maker: Russ Goodman - Spouse - [phone]    Discharge Planning:    Patient lives with: (P) Spouse/Significant Other Type of Home: (P) Apartment  Primary Care Giver: (P) Self  Patient Support Systems include: (P) Spouse/Significant Other   Current Financial resources: (P) Medicaid  Current community resources:    Current services prior to admission: (P) None            Current DME:              Type of Home Care services:  (P) None    ADLS  Prior functional level: (P) Independent in ADLs/IADLs  Current functional level: (P) Independent in ADLs/IADLs    PT AM-PAC:   /24  OT AM-PAC:   /24    Family can provide assistance at DC: (P) Yes  Would you like Case Management to discuss the discharge plan with any other family members/significant others, and if so, who? (P)

## 2025-02-09 NOTE — CARE COORDINATION
02/09/25 1613   Readmission Assessment   Number of Days since last admission? 8-30 days   Previous Disposition Home with Family   Who is being Interviewed Patient   What was the patient's/caregiver's perception as to why they think they needed to return back to the hospital? Other (Comment)  (Patient states there was nothing that could have been done during the previous admission to prevent her from needing to return.)   Did you visit your Primary Care Physician after you left the hospital, before you returned this time? No   Why weren't you able to visit your PCP? Other (Comment)  (Appoint is scheduled for tomorrow.)   Did you see a specialist, such as Cardiac, Pulmonary, Orthopedic Physician, etc. after you left the hospital? No   Who advised the patient to return to the hospital? Caregiver   Does the patient report anything that got in the way of taking their medications? No   In our efforts to provide the best possible care to you and others like you, can you think of anything that we could have done to help you after you left the hospital the first time, so that you might not have needed to return so soon? Other (Comment)  (Patient states there was nothing that could have been done during the previous admission to prevent her from needing to return.)

## 2025-02-09 NOTE — H&P
[facility]  Office: [phone]  Mal Walsh DO, Alex Briones DO, Gonzales Gonzalez DO, Mike Flores DO, Luca Tamayo MD, Jesica Escobar MD, Princess Arenas MD, Stephanie Ruggiero MD,  Higinio Lee MD, Keaton Allen MD, Felix Roth MD,  Sai Howe DO, Shelley Humphries MD, Bala Robert MD, Alonzo Walsh DO, Briseyda Sharma MD,  Adriel Adorno DO, Yeimi Rothman MD, Lotus Chun MD, Leyla Banks MD, Kathryn Sanford MD,  Tarik Martinez MD, Ochoa Keys MD, Keaton Uribe MD, Macrina Roca MD, Flex Hickey MD, Madelin Cabrales MD, Napoleon Matos DO, Demarcus Torres MD, Sai Fry MD, Mohsin Reza, MD, Shirley Waterhouse, CNP,  Laila Montano CNP, Napoleon Braun, CNP,  Alena Lane, LISSA, Pilar Aguilar, CNP, Hawa Colvin, CNP, Salina Alvarez, CNP, Lauryn Beck CNP, RONI Yoder-SALVADOR, Jessi Neumann, CNP, Alla Ryder, CNP,  Brandie Bach, CNP, Jannet Chester, CNP, Tashia Becerril, CNP,  Margoth Johnson, CNS, Reva Schmidt CNP, Rachana Albright CNP,   Mary Cervantes, CNP         [facility]   IN-PATIENT SERVICE   [facility]    HISTORY AND PHYSICAL EXAMINATION            Date:   2/9/2025  Patient name:  Meño Goodman  Date of admission:  2/8/2025  2:10 PM  MRN:   [id number]  Account:  [id number]  YOB: 1990  PCP:    Jean Claude Garcia MD  Room:   [address] [place] [address]  Code Status:    Full Code    Chief Complaint:     Chief Complaint   Patient presents with    Shortness of Breath    Chest Pain   \"My breathing\"    History Obtained From:     patient    History of Present Illness:     Meño Goodman is a 34 y.o.  female w/ bipolar, asthma w/ morbid obesity (BMI =54) who presents with Shortness of Breath and Chest Pain   and is admitted to the hospital for the management of Acute hypoxic respiratory failure.    Patient describes worsening respiratory symptoms over the past 48 hours with increasing nonproductive cough rhinorrhea with malaise

## 2025-02-09 NOTE — PLAN OF CARE
Problem: Chronic Conditions and Co-morbidities  Goal: Patient's chronic conditions and co-morbidity symptoms are monitored and maintained or improved  2/9/2025 1110 by Jerman Irizarry  Outcome: Progressing  2/9/2025 0643 by Madelyn Patel RN  Outcome: Progressing     Problem: Pain  Goal: Verbalizes/displays adequate comfort level or baseline comfort level  2/9/2025 1110 by Jerman Irizarry  Outcome: Progressing  2/9/2025 0643 by Madelyn Patel RN  Outcome: Progressing     Problem: Safety - Adult  Goal: Free from fall injury  2/9/2025 1110 by Jerman Irizarry  Outcome: Progressing  2/9/2025 0643 by Madelyn Patel RN  Outcome: Progressing     Problem: Risk for Elopement  Goal: Patient will not exit the unit/facility without proper excort  2/9/2025 1110 by Jerman Irizarry  Outcome: Progressing  2/9/2025 0643 by Madelyn Patel RN  Outcome: Progressing

## 2025-02-10 ENCOUNTER — APPOINTMENT (OUTPATIENT)
Dept: VASCULAR LAB | Age: 35
DRG: 141 | End: 2025-02-10
Attending: INTERNAL MEDICINE
Payer: COMMERCIAL

## 2025-02-10 PROBLEM — R07.9 CHEST PAIN: Status: ACTIVE | Noted: 2025-02-10

## 2025-02-10 LAB
ANION GAP SERPL CALCULATED.3IONS-SCNC: 11 MMOL/L (ref 9–16)
BASOPHILS # BLD: <0.03 K/UL (ref 0–0.2)
BASOPHILS NFR BLD: 0 % (ref 0–2)
BUN SERPL-MCNC: 18 MG/DL (ref 6–20)
CALCIUM SERPL-MCNC: 9.8 MG/DL (ref 8.6–10.4)
CHLORIDE SERPL-SCNC: 104 MMOL/L (ref 98–107)
CO2 SERPL-SCNC: 24 MMOL/L (ref 20–31)
CREAT SERPL-MCNC: 0.7 MG/DL (ref 0.6–0.9)
ECHO BSA: 2.6 M2
EKG ATRIAL RATE: 99 BPM
EKG P AXIS: 56 DEGREES
EKG P-R INTERVAL: 144 MS
EKG Q-T INTERVAL: 350 MS
EKG QRS DURATION: 82 MS
EKG QTC CALCULATION (BAZETT): 449 MS
EKG R AXIS: 8 DEGREES
EKG T AXIS: 39 DEGREES
EKG VENTRICULAR RATE: 99 BPM
EOSINOPHIL # BLD: 0.05 K/UL (ref 0–0.44)
EOSINOPHILS RELATIVE PERCENT: 0 % (ref 1–4)
ERYTHROCYTE [DISTWIDTH] IN BLOOD BY AUTOMATED COUNT: 13.1 % (ref 11.8–14.4)
GFR, ESTIMATED: >90 ML/MIN/1.73M2
GLUCOSE BLD-MCNC: 172 MG/DL (ref 65–105)
GLUCOSE BLD-MCNC: 181 MG/DL (ref 65–105)
GLUCOSE BLD-MCNC: 185 MG/DL (ref 65–105)
GLUCOSE BLD-MCNC: 195 MG/DL (ref 65–105)
GLUCOSE SERPL-MCNC: 172 MG/DL (ref 74–99)
HCT VFR BLD AUTO: 38.9 % (ref 36.3–47.1)
HGB BLD-MCNC: 12.3 G/DL (ref 11.9–15.1)
IMM GRANULOCYTES # BLD AUTO: 0.09 K/UL (ref 0–0.3)
IMM GRANULOCYTES NFR BLD: 1 %
LYMPHOCYTES NFR BLD: 2.58 K/UL (ref 1.1–3.7)
LYMPHOCYTES RELATIVE PERCENT: 14 % (ref 24–43)
MAGNESIUM SERPL-MCNC: 2.2 MG/DL (ref 1.6–2.6)
MCH RBC QN AUTO: 29.2 PG (ref 25.2–33.5)
MCHC RBC AUTO-ENTMCNC: 31.6 G/DL (ref 28.4–34.8)
MCV RBC AUTO: 92.4 FL (ref 82.6–102.9)
MONOCYTES NFR BLD: 0.95 K/UL (ref 0.1–1.2)
MONOCYTES NFR BLD: 5 % (ref 3–12)
NEUTROPHILS NFR BLD: 80 % (ref 36–65)
NEUTS SEG NFR BLD: 14.4 K/UL (ref 1.5–8.1)
NRBC BLD-RTO: 0 PER 100 WBC
PLATELET # BLD AUTO: ABNORMAL K/UL (ref 138–453)
PLATELET, FLUORESCENCE: ABNORMAL K/UL (ref 138–453)
POTASSIUM SERPL-SCNC: 4.4 MMOL/L (ref 3.7–5.3)
RBC # BLD AUTO: 4.21 M/UL (ref 3.95–5.11)
SODIUM SERPL-SCNC: 139 MMOL/L (ref 136–145)
WBC OTHER # BLD: 18.1 K/UL (ref 3.5–11.3)

## 2025-02-10 PROCEDURE — 1200000000 HC SEMI PRIVATE

## 2025-02-10 PROCEDURE — 82947 ASSAY GLUCOSE BLOOD QUANT: CPT

## 2025-02-10 PROCEDURE — 6360000002 HC RX W HCPCS: Performed by: HOSPITALIST

## 2025-02-10 PROCEDURE — 93970 EXTREMITY STUDY: CPT

## 2025-02-10 PROCEDURE — 93010 ELECTROCARDIOGRAM REPORT: CPT | Performed by: INTERNAL MEDICINE

## 2025-02-10 PROCEDURE — 2500000003 HC RX 250 WO HCPCS: Performed by: HOSPITALIST

## 2025-02-10 PROCEDURE — 36415 COLL VENOUS BLD VENIPUNCTURE: CPT

## 2025-02-10 PROCEDURE — 99232 SBSQ HOSP IP/OBS MODERATE 35: CPT | Performed by: STUDENT IN AN ORGANIZED HEALTH CARE EDUCATION/TRAINING PROGRAM

## 2025-02-10 PROCEDURE — 6370000000 HC RX 637 (ALT 250 FOR IP): Performed by: HOSPITALIST

## 2025-02-10 PROCEDURE — 85025 COMPLETE CBC W/AUTO DIFF WBC: CPT

## 2025-02-10 PROCEDURE — 83735 ASSAY OF MAGNESIUM: CPT

## 2025-02-10 PROCEDURE — 6370000000 HC RX 637 (ALT 250 FOR IP): Performed by: INTERNAL MEDICINE

## 2025-02-10 PROCEDURE — 94640 AIRWAY INHALATION TREATMENT: CPT

## 2025-02-10 PROCEDURE — 93970 EXTREMITY STUDY: CPT | Performed by: SURGERY

## 2025-02-10 PROCEDURE — 80048 BASIC METABOLIC PNL TOTAL CA: CPT

## 2025-02-10 PROCEDURE — 85055 RETICULATED PLATELET ASSAY: CPT

## 2025-02-10 RX ADMIN — INSULIN LISPRO 1 UNITS: 100 INJECTION, SOLUTION INTRAVENOUS; SUBCUTANEOUS at 10:19

## 2025-02-10 RX ADMIN — CETIRIZINE HYDROCHLORIDE 10 MG: 10 TABLET, FILM COATED ORAL at 09:46

## 2025-02-10 RX ADMIN — SODIUM CHLORIDE, PRESERVATIVE FREE 10 ML: 5 INJECTION INTRAVENOUS at 09:52

## 2025-02-10 RX ADMIN — INSULIN LISPRO 5 UNITS: 100 INJECTION, SOLUTION INTRAVENOUS; SUBCUTANEOUS at 13:29

## 2025-02-10 RX ADMIN — SODIUM CHLORIDE, PRESERVATIVE FREE 10 ML: 5 INJECTION INTRAVENOUS at 20:34

## 2025-02-10 RX ADMIN — ARIPIPRAZOLE 10 MG: 10 TABLET ORAL at 09:46

## 2025-02-10 RX ADMIN — WATER 40 MG: 1 INJECTION INTRAMUSCULAR; INTRAVENOUS; SUBCUTANEOUS at 09:46

## 2025-02-10 RX ADMIN — GLIPIZIDE 5 MG: 5 TABLET ORAL at 09:46

## 2025-02-10 RX ADMIN — DULOXETINE HYDROCHLORIDE 20 MG: 20 CAPSULE, DELAYED RELEASE ORAL at 09:48

## 2025-02-10 RX ADMIN — HYDROXYZINE HYDROCHLORIDE 50 MG: 25 TABLET ORAL at 20:44

## 2025-02-10 RX ADMIN — INSULIN LISPRO 5 UNITS: 100 INJECTION, SOLUTION INTRAVENOUS; SUBCUTANEOUS at 10:19

## 2025-02-10 RX ADMIN — INSULIN LISPRO 1 UNITS: 100 INJECTION, SOLUTION INTRAVENOUS; SUBCUTANEOUS at 20:34

## 2025-02-10 RX ADMIN — FAMOTIDINE 20 MG: 20 TABLET, FILM COATED ORAL at 09:46

## 2025-02-10 RX ADMIN — GUAIFENESIN 600 MG: 600 TABLET ORAL at 09:49

## 2025-02-10 RX ADMIN — BUDESONIDE AND FORMOTEROL FUMARATE DIHYDRATE 2 PUFF: 160; 4.5 AEROSOL RESPIRATORY (INHALATION) at 20:07

## 2025-02-10 RX ADMIN — ENOXAPARIN SODIUM 40 MG: 100 INJECTION SUBCUTANEOUS at 09:48

## 2025-02-10 RX ADMIN — GLIPIZIDE 5 MG: 5 TABLET ORAL at 18:23

## 2025-02-10 RX ADMIN — PREDNISONE 40 MG: 20 TABLET ORAL at 20:44

## 2025-02-10 RX ADMIN — INSULIN LISPRO 5 UNITS: 100 INJECTION, SOLUTION INTRAVENOUS; SUBCUTANEOUS at 18:22

## 2025-02-10 RX ADMIN — GUAIFENESIN 600 MG: 600 TABLET ORAL at 20:34

## 2025-02-10 RX ADMIN — INSULIN GLARGINE 15 UNITS: 100 INJECTION, SOLUTION SUBCUTANEOUS at 10:20

## 2025-02-10 RX ADMIN — INSULIN LISPRO 1 UNITS: 100 INJECTION, SOLUTION INTRAVENOUS; SUBCUTANEOUS at 18:22

## 2025-02-10 RX ADMIN — FAMOTIDINE 20 MG: 20 TABLET, FILM COATED ORAL at 20:34

## 2025-02-10 ASSESSMENT — PAIN SCALES - GENERAL: PAINLEVEL_OUTOF10: 0

## 2025-02-10 NOTE — PLAN OF CARE
Problem: Chronic Conditions and Co-morbidities  Goal: Patient's chronic conditions and co-morbidity symptoms are monitored and maintained or improved  2/10/2025 1756 by Mervat Rodriguez RN  Outcome: Progressing  2/10/2025 0419 by Paradise Campbell RN  Outcome: Progressing     Problem: Pain  Goal: Verbalizes/displays adequate comfort level or baseline comfort level  2/10/2025 1756 by Mervat Rodriguez RN  Outcome: Progressing  2/10/2025 0419 by Paradise Campbell RN  Outcome: Progressing     Problem: Safety - Adult  Goal: Free from fall injury  2/10/2025 1756 by Mervat Rodriguez RN  Outcome: Progressing  2/10/2025 0419 by Paradise Campbell RN  Outcome: Progressing     Problem: Risk for Elopement  Goal: Patient will not exit the unit/facility without proper excort  2/10/2025 1756 by Mervat Rodriguez RN  Outcome: Progressing  2/10/2025 0419 by Paradise Campbell RN  Outcome: Progressing

## 2025-02-10 NOTE — PROGRESS NOTES
[facility]  Office: [phone]  Mal Walsh DO, Alex Briones DO, Gonzales Gonzalez DO, Mike Flores DO, Luca Tamayo MD, Jesica Escobar MD, Princess Arenas MD, Stephanie Ruggiero MD,  Higinio Lee MD, Keaton Allen MD, Felix Roth MD,  Sai Howe DO, Shelley Humphries MD, Bala Robert MD, Alonzo Walsh DO, Briseyda Sharma MD,  Adriel Adorno DO, Yeimi Rothman MD, Lotus Chun MD, Leyla Banks MD, Kathryn Sanford MD,  Tarik Martinez MD, Ochoa Keys MD, Keaton Uribe MD, Macrina Roca MD, lFex Hickey MD, Madelin Cabrales MD, Napoleon Matos DO, Demarcus Torres MD, Sai Fry MD, Mohsin Reza, MD, Shirley Waterhouse, CNP,  Laila Montano CNP, Napoleon Braun, CNP,  Alena Lane, DNP, Pilar Aguilar, CNP, Hawa Colvin, CNP, Salina Alvarez, CNP, Lauryn Beck, CNP, Meryl Puckett, PA-C, Jessi Neumann, CNP, Alla Ryder, CNP,  Brandie Bach, CNP, Jannet Chester, CNP, Tashia Becerril, CNP,  Margoth Johnson, CNS, Reva Schmidt CNP, Rachana Albright CNP,   Mary Cervantes, CNP         [facility]   IN-PATIENT SERVICE   [facility]    Progress Note    2/10/2025    3:42 PM    Name:   Meño Goodman  MRN:     [id number]     Acct:      [id number]   Room:   0443/0443-01   Day:  2  Admit Date:  2/8/2025  2:10 PM    PCP:   Jean Claude Garcia MD  Code Status:  Full Code    Subjective:     C/C:   Chief Complaint   Patient presents with    Shortness of Breath    Chest Pain     Interval History Status: improved.     Vitals reviewed, afebrile and hemodynamically stable.  Saturating well but requiring 2 L nasal cannula.  Labs reviewed, mild hyperglycemia 172, leukocytosis 18.1 likely secondary to steroids.  Respiratory viral panel negative.  Chest x-ray demonstrated poor inspiration with crowding of the bronchovascular markings but no confluent infiltrate identified.  Vascular duplex demonstrated no evidence of DVT.  Echocardiogram pending due to chest

## 2025-02-10 NOTE — PROGRESS NOTES
[facility]  Occupational Therapy Not Seen Note    DATE: 2/10/2025    NAME: Meño Goodman  MRN: [id number]   : 1990      Patient not seen this date for Occupational Therapy due to:    Patient independent with ADLs and functional tasks with no acute OT needs. Will defer OT evaluation at this time. Please reorder OT if future needs arise.       Electronically signed by ASIF Goldman/L on 2/10/2025 at 11:51 AM

## 2025-02-11 ENCOUNTER — APPOINTMENT (OUTPATIENT)
Age: 35
DRG: 141 | End: 2025-02-11
Attending: INTERNAL MEDICINE
Payer: COMMERCIAL

## 2025-02-11 VITALS
WEIGHT: 293 LBS | SYSTOLIC BLOOD PRESSURE: 140 MMHG | RESPIRATION RATE: 16 BRPM | DIASTOLIC BLOOD PRESSURE: 99 MMHG | BODY MASS INDEX: 48.82 KG/M2 | HEART RATE: 86 BPM | OXYGEN SATURATION: 96 % | TEMPERATURE: 97.9 F | HEIGHT: 65 IN

## 2025-02-11 LAB
ANION GAP SERPL CALCULATED.3IONS-SCNC: 10 MMOL/L (ref 9–16)
BASOPHILS # BLD: <0.03 K/UL (ref 0–0.2)
BASOPHILS NFR BLD: 0 % (ref 0–2)
BUN SERPL-MCNC: 18 MG/DL (ref 6–20)
CALCIUM SERPL-MCNC: 9.6 MG/DL (ref 8.6–10.4)
CHLORIDE SERPL-SCNC: 106 MMOL/L (ref 98–107)
CO2 SERPL-SCNC: 24 MMOL/L (ref 20–31)
CREAT SERPL-MCNC: 0.7 MG/DL (ref 0.6–0.9)
ECHO AO ASC DIAM: 3.7 CM
ECHO AO ASCENDING AORTA INDEX: 1.53 CM/M2
ECHO AO ROOT DIAM: 3.2 CM
ECHO AO ROOT INDEX: 1.32 CM/M2
ECHO AV AREA PEAK VELOCITY: 3.3 CM2
ECHO AV AREA VTI: 3.8 CM2
ECHO AV AREA/BSA PEAK VELOCITY: 1.4 CM2/M2
ECHO AV AREA/BSA VTI: 1.6 CM2/M2
ECHO AV MEAN GRADIENT: 5 MMHG
ECHO AV MEAN VELOCITY: 1 M/S
ECHO AV PEAK GRADIENT: 11 MMHG
ECHO AV PEAK VELOCITY: 1.6 M/S
ECHO AV VELOCITY RATIO: 0.63
ECHO AV VTI: 29.2 CM
ECHO BSA: 2.6 M2
ECHO EST RA PRESSURE: 3 MMHG
ECHO IVC PROX: 1.9 CM
ECHO LA AREA 2C: 17.2 CM2
ECHO LA AREA 4C: 15.9 CM2
ECHO LA DIAMETER INDEX: 1.49 CM/M2
ECHO LA DIAMETER: 3.6 CM
ECHO LA MAJOR AXIS: 5.1 CM
ECHO LA MINOR AXIS: 4.7 CM
ECHO LA TO AORTIC ROOT RATIO: 1.13
ECHO LA VOL BP: 46 ML (ref 22–52)
ECHO LA VOL MOD A2C: 52 ML (ref 22–52)
ECHO LA VOL MOD A4C: 39 ML (ref 22–52)
ECHO LA VOL/BSA BIPLANE: 19 ML/M2 (ref 16–34)
ECHO LA VOLUME INDEX MOD A2C: 21 ML/M2 (ref 16–34)
ECHO LA VOLUME INDEX MOD A4C: 16 ML/M2 (ref 16–34)
ECHO LV E' LATERAL VELOCITY: 9.25 CM/S
ECHO LV E' SEPTAL VELOCITY: 8.59 CM/S
ECHO LV EDV A2C: 93 ML
ECHO LV EDV A4C: 102 ML
ECHO LV EDV INDEX A4C: 42 ML/M2
ECHO LV EDV NDEX A2C: 38 ML/M2
ECHO LV EJECTION FRACTION A2C: 51 %
ECHO LV EJECTION FRACTION A4C: 53 %
ECHO LV EJECTION FRACTION BIPLANE: 52 % (ref 55–100)
ECHO LV ESV A2C: 45 ML
ECHO LV ESV A4C: 48 ML
ECHO LV ESV INDEX A2C: 19 ML/M2
ECHO LV ESV INDEX A4C: 20 ML/M2
ECHO LV IVSD: 0.9 CM (ref 0.6–0.9)
ECHO LV POSTERIOR WALL DIASTOLIC: 0.9 CM (ref 0.6–0.9)
ECHO LVOT AREA: 5.3 CM2
ECHO LVOT AV VTI INDEX: 0.71
ECHO LVOT DIAM: 2.6 CM
ECHO LVOT MEAN GRADIENT: 2 MMHG
ECHO LVOT PEAK GRADIENT: 4 MMHG
ECHO LVOT PEAK VELOCITY: 1 M/S
ECHO LVOT STROKE VOLUME INDEX: 45.6 ML/M2
ECHO LVOT SV: 110.4 ML
ECHO LVOT VTI: 20.8 CM
ECHO MV A VELOCITY: 0.9 M/S
ECHO MV AREA VTI: 3.7 CM2
ECHO MV E DECELERATION TIME (DT): 246 MS
ECHO MV E VELOCITY: 0.91 M/S
ECHO MV E/A RATIO: 1.01
ECHO MV E/E' LATERAL: 9.84
ECHO MV E/E' RATIO (AVERAGED): 10.22
ECHO MV E/E' SEPTAL: 10.59
ECHO MV LVOT VTI INDEX: 1.45
ECHO MV MAX VELOCITY: 0.9 M/S
ECHO MV MEAN GRADIENT: 2 MMHG
ECHO MV MEAN VELOCITY: 0.6 M/S
ECHO MV PEAK GRADIENT: 3 MMHG
ECHO MV VTI: 30.1 CM
ECHO RIGHT VENTRICULAR SYSTOLIC PRESSURE (RVSP): 15 MMHG
ECHO RV FREE WALL PEAK S': 13.7 CM/S
ECHO RV TAPSE: 2.9 CM (ref 1.7–?)
ECHO TV REGURGITANT MAX VELOCITY: 1.74 M/S
ECHO TV REGURGITANT PEAK GRADIENT: 12 MMHG
EOSINOPHIL # BLD: <0.03 K/UL (ref 0–0.44)
EOSINOPHILS RELATIVE PERCENT: 0 % (ref 1–4)
ERYTHROCYTE [DISTWIDTH] IN BLOOD BY AUTOMATED COUNT: 13.1 % (ref 11.8–14.4)
GFR, ESTIMATED: >90 ML/MIN/1.73M2
GLUCOSE BLD-MCNC: 123 MG/DL (ref 65–105)
GLUCOSE BLD-MCNC: 143 MG/DL (ref 65–105)
GLUCOSE BLD-MCNC: 160 MG/DL (ref 65–105)
GLUCOSE BLD-MCNC: 181 MG/DL (ref 65–105)
GLUCOSE SERPL-MCNC: 160 MG/DL (ref 74–99)
HCT VFR BLD AUTO: 39.9 % (ref 36.3–47.1)
HGB BLD-MCNC: 12.6 G/DL (ref 11.9–15.1)
IMM GRANULOCYTES # BLD AUTO: 0.07 K/UL (ref 0–0.3)
IMM GRANULOCYTES NFR BLD: 1 %
LYMPHOCYTES NFR BLD: 2.33 K/UL (ref 1.1–3.7)
LYMPHOCYTES RELATIVE PERCENT: 16 % (ref 24–43)
MAGNESIUM SERPL-MCNC: 2.3 MG/DL (ref 1.6–2.6)
MCH RBC QN AUTO: 29.3 PG (ref 25.2–33.5)
MCHC RBC AUTO-ENTMCNC: 31.6 G/DL (ref 28.4–34.8)
MCV RBC AUTO: 92.8 FL (ref 82.6–102.9)
MONOCYTES NFR BLD: 0.67 K/UL (ref 0.1–1.2)
MONOCYTES NFR BLD: 5 % (ref 3–12)
NEUTROPHILS NFR BLD: 78 % (ref 36–65)
NEUTS SEG NFR BLD: 11.93 K/UL (ref 1.5–8.1)
NRBC BLD-RTO: 0 PER 100 WBC
PLATELET # BLD AUTO: 437 K/UL (ref 138–453)
PMV BLD AUTO: 10 FL (ref 8.1–13.5)
POTASSIUM SERPL-SCNC: 4.1 MMOL/L (ref 3.7–5.3)
RBC # BLD AUTO: 4.3 M/UL (ref 3.95–5.11)
SODIUM SERPL-SCNC: 140 MMOL/L (ref 136–145)
WBC OTHER # BLD: 15 K/UL (ref 3.5–11.3)

## 2025-02-11 PROCEDURE — 6370000000 HC RX 637 (ALT 250 FOR IP): Performed by: INTERNAL MEDICINE

## 2025-02-11 PROCEDURE — 83735 ASSAY OF MAGNESIUM: CPT

## 2025-02-11 PROCEDURE — 94640 AIRWAY INHALATION TREATMENT: CPT

## 2025-02-11 PROCEDURE — 2500000003 HC RX 250 WO HCPCS: Performed by: HOSPITALIST

## 2025-02-11 PROCEDURE — 82947 ASSAY GLUCOSE BLOOD QUANT: CPT

## 2025-02-11 PROCEDURE — 36415 COLL VENOUS BLD VENIPUNCTURE: CPT

## 2025-02-11 PROCEDURE — 80048 BASIC METABOLIC PNL TOTAL CA: CPT

## 2025-02-11 PROCEDURE — 93306 TTE W/DOPPLER COMPLETE: CPT

## 2025-02-11 PROCEDURE — 99238 HOSP IP/OBS DSCHRG MGMT 30/<: CPT | Performed by: INTERNAL MEDICINE

## 2025-02-11 PROCEDURE — 85025 COMPLETE CBC W/AUTO DIFF WBC: CPT

## 2025-02-11 PROCEDURE — 6370000000 HC RX 637 (ALT 250 FOR IP): Performed by: HOSPITALIST

## 2025-02-11 PROCEDURE — 6360000002 HC RX W HCPCS: Performed by: HOSPITALIST

## 2025-02-11 PROCEDURE — 94761 N-INVAS EAR/PLS OXIMETRY MLT: CPT

## 2025-02-11 RX ORDER — IPRATROPIUM BROMIDE AND ALBUTEROL SULFATE 2.5; .5 MG/3ML; MG/3ML
3 SOLUTION RESPIRATORY (INHALATION) EVERY 4 HOURS PRN
Qty: 360 ML | Refills: 2 | Status: SHIPPED | OUTPATIENT
Start: 2025-02-11 | End: 2025-04-12

## 2025-02-11 RX ORDER — ENOXAPARIN SODIUM 100 MG/ML
30 INJECTION SUBCUTANEOUS 2 TIMES DAILY
Status: DISCONTINUED | OUTPATIENT
Start: 2025-02-11 | End: 2025-02-11 | Stop reason: HOSPADM

## 2025-02-11 RX ORDER — INSULIN GLARGINE 100 [IU]/ML
15 INJECTION, SOLUTION SUBCUTANEOUS DAILY
Qty: 10 ML | Refills: 3 | Status: SHIPPED | OUTPATIENT
Start: 2025-02-12

## 2025-02-11 RX ORDER — PREDNISONE 20 MG/1
10 TABLET ORAL DAILY
Qty: 1 TABLET | Refills: 0 | Status: SHIPPED | OUTPATIENT
Start: 2025-02-12 | End: 2025-02-14

## 2025-02-11 RX ADMIN — SODIUM CHLORIDE, PRESERVATIVE FREE 10 ML: 5 INJECTION INTRAVENOUS at 09:40

## 2025-02-11 RX ADMIN — CETIRIZINE HYDROCHLORIDE 10 MG: 10 TABLET, FILM COATED ORAL at 09:36

## 2025-02-11 RX ADMIN — INSULIN LISPRO 5 UNITS: 100 INJECTION, SOLUTION INTRAVENOUS; SUBCUTANEOUS at 09:35

## 2025-02-11 RX ADMIN — ENOXAPARIN SODIUM 40 MG: 100 INJECTION SUBCUTANEOUS at 09:35

## 2025-02-11 RX ADMIN — INSULIN GLARGINE 15 UNITS: 100 INJECTION, SOLUTION SUBCUTANEOUS at 09:35

## 2025-02-11 RX ADMIN — DULOXETINE HYDROCHLORIDE 20 MG: 20 CAPSULE, DELAYED RELEASE ORAL at 09:36

## 2025-02-11 RX ADMIN — PREDNISONE 40 MG: 20 TABLET ORAL at 09:36

## 2025-02-11 RX ADMIN — GUAIFENESIN 600 MG: 600 TABLET ORAL at 09:36

## 2025-02-11 RX ADMIN — ARIPIPRAZOLE 10 MG: 10 TABLET ORAL at 09:35

## 2025-02-11 RX ADMIN — FAMOTIDINE 20 MG: 20 TABLET, FILM COATED ORAL at 09:36

## 2025-02-11 RX ADMIN — BUDESONIDE AND FORMOTEROL FUMARATE DIHYDRATE 2 PUFF: 160; 4.5 AEROSOL RESPIRATORY (INHALATION) at 08:54

## 2025-02-11 RX ADMIN — INSULIN LISPRO 5 UNITS: 100 INJECTION, SOLUTION INTRAVENOUS; SUBCUTANEOUS at 13:30

## 2025-02-11 RX ADMIN — GLIPIZIDE 5 MG: 5 TABLET ORAL at 06:38

## 2025-02-11 ASSESSMENT — PAIN SCALES - GENERAL: PAINLEVEL_OUTOF10: 0

## 2025-02-11 NOTE — PROGRESS NOTES
Patient discharged home with all belongings.    Discharge paperwork given and explained, all questions answered.    Medications picked up at the pharmacy downstairs per patient request.    Patient transported off the unit in a wheelchair.    Patient alert and oriented.    Iv removed, no complications, catheter intact.     Last blood glucose reading was 160.

## 2025-02-11 NOTE — PROGRESS NOTES
Pharmacy Note     Enoxaparin Dose Adjustment    Meño Goodman is a 34 y.o. female. Pharmacist assessment of enoxaparin dose for VTE prophylaxis.    Recent Labs     02/10/25  0942 02/11/25  0721   BUN 18 18       Recent Labs     02/10/25  0942 02/11/25  0721   CREATININE 0.7 0.7       Estimated Creatinine Clearance: 162 mL/min (based on SCr of 0.7 mg/dL).    Height:   Ht Readings from Last 1 Encounters:   02/08/25 1.651 m (5' 5\")     Weight:  Wt Readings from Last 1 Encounters:   02/10/25 (!) 145.3 kg (320 lb 5.3 oz)       The following enoxaparin dose has been adjusted based upon renal function and/or patient weight per P&T Guidelines:             Lovenox 40mg SQ daily was changed to:    Lovenox 30mg SQ twice daily base on patient weight.

## 2025-02-11 NOTE — PLAN OF CARE
Problem: Chronic Conditions and Co-morbidities  Goal: Patient's chronic conditions and co-morbidity symptoms are monitored and maintained or improved  2/11/2025 1610 by Mervat Rodriguez RN  Outcome: Progressing  2/11/2025 0521 by Higinio Bunch RN  Outcome: Progressing     Problem: Pain  Goal: Verbalizes/displays adequate comfort level or baseline comfort level  2/11/2025 1610 by Mervat Rodriguez RN  Outcome: Progressing  2/11/2025 0521 by Higinio Bunch RN  Outcome: Progressing     Problem: Safety - Adult  Goal: Free from fall injury  2/11/2025 1610 by Mervat Rodriguez RN  Outcome: Progressing  2/11/2025 0521 by Higinio Bunch RN  Outcome: Progressing     Problem: Risk for Elopement  Goal: Patient will not exit the unit/facility without proper excort  2/11/2025 1610 by Mervat Rodriguez RN  Outcome: Progressing  2/11/2025 0521 by Higinio Bunch RN  Outcome: Progressing

## 2025-02-11 NOTE — PLAN OF CARE
Problem: Chronic Conditions and Co-morbidities  Goal: Patient's chronic conditions and co-morbidity symptoms are monitored and maintained or improved  2/11/2025 0521 by Higinio Bunch RN  Outcome: Progressing  2/10/2025 1756 by Mervat Rodriguez RN  Outcome: Progressing     Problem: Pain  Goal: Verbalizes/displays adequate comfort level or baseline comfort level  2/11/2025 0521 by Higinio Bunch RN  Outcome: Progressing  2/10/2025 1756 by Mervat Rodriguez RN  Outcome: Progressing     Problem: Safety - Adult  Goal: Free from fall injury  2/11/2025 0521 by Higinio Bunch RN  Outcome: Progressing  2/10/2025 1756 by Mervat Rodriguez RN  Outcome: Progressing     Problem: Risk for Elopement  Goal: Patient will not exit the unit/facility without proper excort  2/11/2025 0521 by Higinio Bunch RN  Outcome: Progressing  2/10/2025 1756 by Mervat Rodriguez RN  Outcome: Progressing

## 2025-02-11 NOTE — PROGRESS NOTES
Spiritual Health History and Assessment/Progress Note  [facility]    (P) Loneliness/Social Isolation,  ,  ,      Name: Meño Goodman MRN: [id number]    Age: 34 y.o.     Sex: female   Language: English   Nondenominational: None   Acute hypoxic respiratory failure     Date: 2/10/2025            Total Time Calculated: (P) 10 min              Spiritual Assessment began in [address] ONC/MED SURG        Referral/Consult From: (P) Nurse   Encounter Overview/Reason: (P) Loneliness/Social Isolation  Service Provided For: (P) Patient  Pt stated that she was in for \"difficulty breathing\" but that she feels like she has improved. When asked about family nearby, pt mentioned that she had none. Pt later mentioned living with her . Pt denied needing additional services from .     Imelda, Belief, Meaning:   Patient unable to assess at this time  Family/Friends No family/friends present      Importance and Influence:  Patient unable to assess at this time  Family/Friends No family/friends present    Community:  Patient expresses feelings of isolation: Other: pt mentioned that she had no family nearby  Family/Friends No family/friends present    Assessment and Plan of Care:     Patient Interventions include: Facilitated expression of thoughts and feelings  Family/Friends Interventions include: No family/friends present    Patient Plan of Care: Spiritual Care available upon further referral  Family/Friends Plan of Care: No family/friends present     02/10/25 2045   Encounter Summary   Encounter Overview/Reason Loneliness/Social Isolation   Service Provided For Patient   Referral/Consult From Nurse   Support System Unknown  (pt mentioned spouse... more in note)   Last Encounter  02/10/25   Complexity of Encounter Low   Begin Time 2010   End Time  2020   Total Time Calculated 10 min   Assessment/Intervention/Outcome   Assessment Calm;Coping   Intervention Active listening;Sustaining Presence/Ministry of presence

## 2025-02-11 NOTE — DISCHARGE INSTR - COC
Continuity of Care Form    Patient Name: Meño Goodman   :  1990  MRN:  [id number]    Admit date:  2025  Discharge date:  ***    Code Status Order: Full Code   Advance Directives:   Advance Care Flowsheet Documentation             Admitting Physician:  Adriel Adorno DO  PCP: Jean Claude Garcia MD    Discharging Nurse: ***  Discharging Hospital Unit/Room#: 0443/0443-01  Discharging Unit Phone Number: ***    Emergency Contact:   Extended Emergency Contact Information  Primary Emergency Contact: RexRuss  Address: [address] [place], [place] [address] [facility]  Home Phone: [phone]  Mobile Phone: [phone]  Relation: Spouse    Past Surgical History:  Past Surgical History:   Procedure Laterality Date     SECTION      LEEP         Immunization History:   Immunization History   Administered Date(s) Administered    COVID-19, MODERNA BLUE border, Primary or Immunocompromised, (age 12y+), IM, 100 mcg/0.5mL 2021, 10/20/2021    Hep B, ENGERIX-B, (age 20y+), IM, 1mL 2022, 2022    Influenza 10/26/2012    Influenza Virus Vaccine 1999, 2005, 2014    Influenza, FLUARIX, FLULAVAL, FLUZONE (age 6 mo+) and AFLURIA, (age 3 y+), Quadv PF, 0.5mL 2022    Pneumococcal, PPSV23, PNEUMOVAX 23, (age 2y+), SC/IM, 0.5mL 2017    TDaP, ADACEL (age 10y-64y), BOOSTRIX (age 10y+), IM, 0.5mL 2014, 10/13/2023       Active Problems:  Patient Active Problem List   Diagnosis Code    Obesity E66.9    Gastroesophageal reflux disease without esophagitis K21.9    Fatty liver disease, nonalcoholic K76.0    Type 2 diabetes mellitus without complication (HCC) E11.9    Bipolar affective disorder, currently depressed, mild (HCC) F31.31    Depression with suicidal ideation F32.A, R45.851    Moderate persistent asthma J45.40    Acute vaginitis N76.0    Family history of pancreatic cancer Z80.0    Pneumonia due to infectious organism J18.9    Respiratory

## 2025-02-11 NOTE — DISCHARGE SUMMARY
Units into the skin 4 times daily (before meals and nightly)  Qty: 10 mL Refills: 0    metFORMIN (GLUCOPHAGE) 1000 MG tablet  Take 1 tablet by mouth 2 times daily (with meals)  Qty: 60 tablet Refills: 3    budesonide-formoterol (SYMBICORT) 160-4.5 MCG/ACT AERO  Inhale 2 puffs into the lungs 2 times daily  Qty: 10.2 g Refills: 0    traZODone (DESYREL) 50 MG tablet  Take 1 tablet by mouth nightly as needed for Sleep  Qty: 30 tablet Refills: 0    TRULICITY 1.5 MG/0.5ML SC injection  inject 0.5 milliliters subcutaneously every week  Qty: 2 mL Refills: 2  Associated Diagnoses:Type 2 diabetes mellitus without complication, without long-term current use of insulin (HCC)    Lancets Misc. (ACCU-CHEK SOFTCLIX LANCET DEV) KIT  Use as directed for glucose checks  Qty: 2 kit Refills: 3  Associated Diagnoses:Type 2 diabetes mellitus without complication, without long-term current use of insulin (HCC)    !! - Potential duplicate medications found. Please discuss with provider.          Current Discharge Medication List    STOP taking these medications    aspirin-acetaminophen-caffeine (EXCEDRIN MIGRAINE) 250-250-65 MG per tablet  Comments:  Reason for Stopping:          Time Spent on Discharge:  35 minutes were spent in patient examination, evaluation, counseling as well as medication reconciliation, prescriptions for required medications, discharge plan, and follow up.    Electronically signed by Luca Brown MD on 2/11/25 at 3:50 PM EST

## 2025-02-12 ENCOUNTER — TELEPHONE (OUTPATIENT)
Dept: INTERNAL MEDICINE | Age: 35
End: 2025-02-12

## 2025-02-12 NOTE — TELEPHONE ENCOUNTER
..Care Transitions Initial Follow Up Call    Outreach made within 2 business days of discharge: Yes    Patient: Meño Goodman   Patient : 1990   MRN: [id number]    Reason for Admission: Acute hypoxic respiratory failure  Discharge Date: 25       Spoke with: Meño. Writer was able to change patients already scheduled follow up appointment.    Discharge department/facility: [facility] Interactive Patient Contact:  Was patient able to fill all prescriptions: Yes  Was patient instructed to bring all medications to the follow-up visit: Yes  Is patient taking all medications as directed in the discharge summary? Yes  Does patient understand their discharge instructions: Yes  Does patient have questions or concerns that need addressed prior to 7-14 day follow up office visit: no    Additional needs identified to be addressed with provider  No needs identified             Scheduled appointment with PCP within 7-14 days    Follow Up  Future Appointments   Date Time Provider Department Center   2025  2:30 PM Jean Claude Garcia MD [facility]   2025  1:30 PM [facility]       Keshia Alfonso MA

## 2025-02-21 ENCOUNTER — OFFICE VISIT (OUTPATIENT)
Dept: INTERNAL MEDICINE | Age: 35
End: 2025-02-21
Payer: COMMERCIAL

## 2025-02-21 VITALS
OXYGEN SATURATION: 96 % | DIASTOLIC BLOOD PRESSURE: 88 MMHG | BODY MASS INDEX: 48.82 KG/M2 | TEMPERATURE: 97.2 F | HEIGHT: 65 IN | SYSTOLIC BLOOD PRESSURE: 128 MMHG | RESPIRATION RATE: 18 BRPM | WEIGHT: 293 LBS | HEART RATE: 84 BPM

## 2025-02-21 DIAGNOSIS — Z23 NEED FOR PNEUMOCOCCAL VACCINE: ICD-10-CM

## 2025-02-21 DIAGNOSIS — Z23 ENCOUNTER FOR IMMUNIZATION: ICD-10-CM

## 2025-02-21 DIAGNOSIS — E11.9 TYPE 2 DIABETES MELLITUS WITHOUT COMPLICATION, WITHOUT LONG-TERM CURRENT USE OF INSULIN (HCC): ICD-10-CM

## 2025-02-21 DIAGNOSIS — G47.33 MODERATE OBSTRUCTIVE SLEEP APNEA: ICD-10-CM

## 2025-02-21 DIAGNOSIS — Z23 FLU VACCINE NEED: ICD-10-CM

## 2025-02-21 DIAGNOSIS — E66.01 MORBID OBESITY: ICD-10-CM

## 2025-02-21 DIAGNOSIS — J45.40 MODERATE PERSISTENT ASTHMA, UNSPECIFIED WHETHER COMPLICATED: Primary | ICD-10-CM

## 2025-02-21 PROCEDURE — 99214 OFFICE O/P EST MOD 30 MIN: CPT | Performed by: HOSPITALIST

## 2025-02-21 RX ORDER — BUDESONIDE AND FORMOTEROL FUMARATE DIHYDRATE 160; 4.5 UG/1; UG/1
2 AEROSOL RESPIRATORY (INHALATION) 2 TIMES DAILY
Qty: 10.2 G | Refills: 0 | Status: SHIPPED | OUTPATIENT
Start: 2025-02-21

## 2025-02-21 RX ORDER — ALBUTEROL SULFATE 90 UG/1
2 INHALANT RESPIRATORY (INHALATION) 4 TIMES DAILY PRN
Qty: 18 G | Refills: 5 | Status: SHIPPED | OUTPATIENT
Start: 2025-02-21

## 2025-02-21 RX ORDER — ALBUTEROL SULFATE 5 MG/ML
5 SOLUTION RESPIRATORY (INHALATION) 4 TIMES DAILY PRN
Qty: 120 EACH | Refills: 3 | Status: SHIPPED | OUTPATIENT
Start: 2025-02-21

## 2025-02-21 RX ORDER — AVOBENZONE, HOMOSALATE, OCTISALATE, OCTOCRYLENE 30; 40; 45; 26 MG/ML; MG/ML; MG/ML; MG/ML
1 CREAM TOPICAL DAILY
Qty: 100 EACH | Refills: 5 | Status: SHIPPED | OUTPATIENT
Start: 2025-02-21

## 2025-02-21 SDOH — ECONOMIC STABILITY: FOOD INSECURITY: WITHIN THE PAST 12 MONTHS, THE FOOD YOU BOUGHT JUST DIDN'T LAST AND YOU DIDN'T HAVE MONEY TO GET MORE.: NEVER TRUE

## 2025-02-21 SDOH — ECONOMIC STABILITY: FOOD INSECURITY: WITHIN THE PAST 12 MONTHS, YOU WORRIED THAT YOUR FOOD WOULD RUN OUT BEFORE YOU GOT MONEY TO BUY MORE.: NEVER TRUE

## 2025-02-21 ASSESSMENT — ANXIETY QUESTIONNAIRES
7. FEELING AFRAID AS IF SOMETHING AWFUL MIGHT HAPPEN: NOT AT ALL
IF YOU CHECKED OFF ANY PROBLEMS ON THIS QUESTIONNAIRE, HOW DIFFICULT HAVE THESE PROBLEMS MADE IT FOR YOU TO DO YOUR WORK, TAKE CARE OF THINGS AT HOME, OR GET ALONG WITH OTHER PEOPLE: NOT DIFFICULT AT ALL
1. FEELING NERVOUS, ANXIOUS, OR ON EDGE: NOT AT ALL
GAD7 TOTAL SCORE: 0
4. TROUBLE RELAXING: NOT AT ALL
5. BEING SO RESTLESS THAT IT IS HARD TO SIT STILL: NOT AT ALL
2. NOT BEING ABLE TO STOP OR CONTROL WORRYING: NOT AT ALL
6. BECOMING EASILY ANNOYED OR IRRITABLE: NOT AT ALL
3. WORRYING TOO MUCH ABOUT DIFFERENT THINGS: NOT AT ALL

## 2025-02-21 ASSESSMENT — PATIENT HEALTH QUESTIONNAIRE - PHQ9
5. POOR APPETITE OR OVEREATING: NOT AT ALL
3. TROUBLE FALLING OR STAYING ASLEEP: NOT AT ALL
9. THOUGHTS THAT YOU WOULD BE BETTER OFF DEAD, OR OF HURTING YOURSELF: NOT AT ALL
6. FEELING BAD ABOUT YOURSELF - OR THAT YOU ARE A FAILURE OR HAVE LET YOURSELF OR YOUR FAMILY DOWN: NOT AT ALL
SUM OF ALL RESPONSES TO PHQ QUESTIONS 1-9: 0
8. MOVING OR SPEAKING SO SLOWLY THAT OTHER PEOPLE COULD HAVE NOTICED. OR THE OPPOSITE, BEING SO FIGETY OR RESTLESS THAT YOU HAVE BEEN MOVING AROUND A LOT MORE THAN USUAL: NOT AT ALL
7. TROUBLE CONCENTRATING ON THINGS, SUCH AS READING THE NEWSPAPER OR WATCHING TELEVISION: NOT AT ALL
10. IF YOU CHECKED OFF ANY PROBLEMS, HOW DIFFICULT HAVE THESE PROBLEMS MADE IT FOR YOU TO DO YOUR WORK, TAKE CARE OF THINGS AT HOME, OR GET ALONG WITH OTHER PEOPLE: NOT DIFFICULT AT ALL
SUM OF ALL RESPONSES TO PHQ QUESTIONS 1-9: 0
SUM OF ALL RESPONSES TO PHQ9 QUESTIONS 1 & 2: 0
SUM OF ALL RESPONSES TO PHQ QUESTIONS 1-9: 0
1. LITTLE INTEREST OR PLEASURE IN DOING THINGS: NOT AT ALL
2. FEELING DOWN, DEPRESSED OR HOPELESS: NOT AT ALL
DEPRESSION UNABLE TO ASSESS: PT REFUSES
4. FEELING TIRED OR HAVING LITTLE ENERGY: NOT AT ALL
SUM OF ALL RESPONSES TO PHQ QUESTIONS 1-9: 0

## 2025-02-21 NOTE — PROGRESS NOTES
Attending Physician Statement  I have discussed the care of Meño Goodman, including pertinent history and exam findings with the resident. I have reviewed the key elements of all parts of the encounter with the resident. I have seen and examined the patient with the resident and the key elements of all parts of the encounter have been performed by me.  I agree with the assessment, and status of the problem list as documented. The plan and orders should include   Orders Placed This Encounter   Procedures    Pneumococcal, PCV20, PREVNAR 20, (age 6w+), IM, PF    Influenza, AFLURIA Trivalent, (age 3 y+), IM, Preservative Free, 0.5mL    Albumin/Creatinine Ratio, Urine    DME Order for Nebulizer as OP    and this was also documented by the resident. The medication list was reviewed with the resident and is up to date. The return visit should be in 3 months .    Tarik Martinez MD  Attending Physician,  Department of Internal Medicine  [facility] Internal Medicine  [facility]      2/21/2025, 3:43 PM

## 2025-02-21 NOTE — PROGRESS NOTES
[facility]  [facility] [place] [address] [place] [address]  Dept: [phone]  Dept Fax: [phone]    Office Progress/Follow Up Note  Date of patient's visit: 2/21/2025  Patient's Name:  Meño Goodman YOB: 1990            Patient Care Team:  Jean Claude Garcia MD as PCP - General (Internal Medicine)  Alejandro Lopez MD as Consulting Physician (Obstetrics & Gynecology)  ________________________________________________________________________      Reason for Visit: Routine outpatient follow up  ________________________________________________________________________  Chief Complaint:  Follow-up (Patient present today for hospital follow up.)    ________________________________________________________________________  History of Presenting Illness:  History was obtained from: patient. Meño Goodman is a 34 y.o. is here for a posthospital follow-up.  Patient was recently seen in the hospital for acute exacerbation of asthma.  Patient does have a history of mild persistent asthma for which she was taking albuterol.  The patient was recently seen on 2/8/2025 for asthma exacerbation.  The patient initially required oxygen supplementation therapy and was put on 2 to 3 L of oxygen.  Patient was evaluated for infectious cause and was not found to have any pneumonia. The patient was subsequently discharged with albuterol, DuoNeb as well as Symbicort.  During this visit the patient does not complain of any shortness of breath or exertional dyspnea.  She states that the inhalers and nebulization therapy helps her breathe better.     The patient also had a recent [facility] admission for suicidal ideation where she received therapy for 3 days and medication reconciliation regarding her depression and anxiety as well.  Today the patient appears to feel much better and states that her symptoms are under control.    The patient has a past medical history of type 2 diabetes

## 2025-03-19 ENCOUNTER — OFFICE VISIT (OUTPATIENT)
Dept: GASTROENTEROLOGY | Age: 35
End: 2025-03-19
Payer: COMMERCIAL

## 2025-03-19 VITALS
DIASTOLIC BLOOD PRESSURE: 88 MMHG | BODY MASS INDEX: 48.82 KG/M2 | WEIGHT: 293 LBS | SYSTOLIC BLOOD PRESSURE: 153 MMHG | HEIGHT: 65 IN | HEART RATE: 67 BPM | TEMPERATURE: 97.9 F

## 2025-03-19 DIAGNOSIS — K21.9 GASTROESOPHAGEAL REFLUX DISEASE WITHOUT ESOPHAGITIS: ICD-10-CM

## 2025-03-19 DIAGNOSIS — K76.0 FATTY LIVER DISEASE, NONALCOHOLIC: Primary | ICD-10-CM

## 2025-03-19 DIAGNOSIS — R10.30 LOWER ABDOMINAL PAIN: ICD-10-CM

## 2025-03-19 DIAGNOSIS — N92.6 MISSED MENSES: ICD-10-CM

## 2025-03-19 DIAGNOSIS — R74.8 ALKALINE PHOSPHATASE ELEVATION: ICD-10-CM

## 2025-03-19 PROCEDURE — G8417 CALC BMI ABV UP PARAM F/U: HCPCS | Performed by: PHYSICIAN ASSISTANT

## 2025-03-19 PROCEDURE — 99214 OFFICE O/P EST MOD 30 MIN: CPT | Performed by: PHYSICIAN ASSISTANT

## 2025-03-19 PROCEDURE — 1036F TOBACCO NON-USER: CPT | Performed by: PHYSICIAN ASSISTANT

## 2025-03-19 PROCEDURE — G8427 DOCREV CUR MEDS BY ELIG CLIN: HCPCS | Performed by: PHYSICIAN ASSISTANT

## 2025-03-19 RX ORDER — FAMOTIDINE 20 MG/1
20 TABLET, FILM COATED ORAL 2 TIMES DAILY
Qty: 60 TABLET | Refills: 0 | Status: SHIPPED | OUTPATIENT
Start: 2025-03-19

## 2025-03-19 ASSESSMENT — ENCOUNTER SYMPTOMS
NAUSEA: 1
ABDOMINAL PAIN: 1

## 2025-03-19 NOTE — PROGRESS NOTES
have reviewed and agree with the ROS entered by the MA/RN.     Thank you for allowing me to participate in the care of Ms. Goodman. For any further questions please do not hesitate to contact me.      Electronically signed by Nirali Armando PA-C on 3/19/2025 at 12:07 PM  [facility]  P: [phone]  F: [phone]

## 2025-03-29 ENCOUNTER — HOSPITAL ENCOUNTER (EMERGENCY)
Age: 35
Discharge: HOME | End: 2025-03-29
Attending: EMERGENCY MEDICINE
Payer: COMMERCIAL

## 2025-03-29 ENCOUNTER — APPOINTMENT (OUTPATIENT)
Dept: GENERAL RADIOLOGY | Age: 35
End: 2025-03-29
Payer: COMMERCIAL

## 2025-03-29 VITALS
TEMPERATURE: 98.3 F | BODY MASS INDEX: 56.08 KG/M2 | OXYGEN SATURATION: 95 % | RESPIRATION RATE: 17 BRPM | DIASTOLIC BLOOD PRESSURE: 53 MMHG | HEART RATE: 87 BPM | SYSTOLIC BLOOD PRESSURE: 129 MMHG | WEIGHT: 293 LBS

## 2025-03-29 DIAGNOSIS — R10.13 ABDOMINAL PAIN, EPIGASTRIC: Primary | ICD-10-CM

## 2025-03-29 LAB
ALBUMIN SERPL-MCNC: 3.6 G/DL (ref 3.5–5.2)
ALBUMIN/GLOB SERPL: 1.2 {RATIO} (ref 1–2.5)
ALP SERPL-CCNC: 102 U/L (ref 35–104)
ALT SERPL-CCNC: 23 U/L (ref 10–35)
ANION GAP SERPL CALCULATED.3IONS-SCNC: 12 MMOL/L (ref 9–16)
AST SERPL-CCNC: 24 U/L (ref 10–35)
BASOPHILS # BLD: 0.05 K/UL (ref 0–0.2)
BASOPHILS NFR BLD: 1 % (ref 0–2)
BILIRUB SERPL-MCNC: 0.3 MG/DL (ref 0–1.2)
BILIRUB UR QL STRIP: NEGATIVE
BUN SERPL-MCNC: 12 MG/DL (ref 6–20)
CALCIUM SERPL-MCNC: 8.9 MG/DL (ref 8.6–10.4)
CHLORIDE SERPL-SCNC: 102 MMOL/L (ref 98–107)
CLARITY UR: CLEAR
CO2 SERPL-SCNC: 24 MMOL/L (ref 20–31)
COLOR UR: YELLOW
COMMENT: NORMAL
CREAT SERPL-MCNC: 0.8 MG/DL (ref 0.6–0.9)
EOSINOPHIL # BLD: 0.62 K/UL (ref 0–0.44)
EOSINOPHILS RELATIVE PERCENT: 6 % (ref 1–4)
ERYTHROCYTE [DISTWIDTH] IN BLOOD BY AUTOMATED COUNT: 12.8 % (ref 11.8–14.4)
GFR, ESTIMATED: >90 ML/MIN/1.73M2
GLUCOSE SERPL-MCNC: 199 MG/DL (ref 74–99)
GLUCOSE UR STRIP-MCNC: NEGATIVE MG/DL
HCG SERPL QL: NEGATIVE
HCT VFR BLD AUTO: 34.2 % (ref 36.3–47.1)
HGB BLD-MCNC: 11.1 G/DL (ref 11.9–15.1)
HGB UR QL STRIP.AUTO: NEGATIVE
IMM GRANULOCYTES # BLD AUTO: 0.03 K/UL (ref 0–0.3)
IMM GRANULOCYTES NFR BLD: 0 %
KETONES UR STRIP-MCNC: NEGATIVE MG/DL
LEUKOCYTE ESTERASE UR QL STRIP: NEGATIVE
LIPASE SERPL-CCNC: 30 U/L (ref 13–60)
LYMPHOCYTES NFR BLD: 2.57 K/UL (ref 1.1–3.7)
LYMPHOCYTES RELATIVE PERCENT: 24 % (ref 24–43)
MCH RBC QN AUTO: 29.1 PG (ref 25.2–33.5)
MCHC RBC AUTO-ENTMCNC: 32.5 G/DL (ref 28.4–34.8)
MCV RBC AUTO: 89.8 FL (ref 82.6–102.9)
MONOCYTES NFR BLD: 0.7 K/UL (ref 0.1–1.2)
MONOCYTES NFR BLD: 7 % (ref 3–12)
NEUTROPHILS NFR BLD: 62 % (ref 36–65)
NEUTS SEG NFR BLD: 6.66 K/UL (ref 1.5–8.1)
NITRITE UR QL STRIP: NEGATIVE
NRBC BLD-RTO: 0 PER 100 WBC
PH UR STRIP: 5.5 [PH] (ref 5–8)
PLATELET # BLD AUTO: 308 K/UL (ref 138–453)
PMV BLD AUTO: 10.5 FL (ref 8.1–13.5)
POTASSIUM SERPL-SCNC: 3.6 MMOL/L (ref 3.7–5.3)
PROT SERPL-MCNC: 6.5 G/DL (ref 6.6–8.7)
PROT UR STRIP-MCNC: NEGATIVE MG/DL
RBC # BLD AUTO: 3.81 M/UL (ref 3.95–5.11)
SODIUM SERPL-SCNC: 138 MMOL/L (ref 136–145)
SP GR UR STRIP: 1.01 (ref 1–1.03)
TROPONIN I SERPL HS-MCNC: <6 NG/L (ref 0–14)
UROBILINOGEN UR STRIP-ACNC: NORMAL EU/DL (ref 0–1)
WBC OTHER # BLD: 10.6 K/UL (ref 3.5–11.3)

## 2025-03-29 PROCEDURE — 6360000002 HC RX W HCPCS

## 2025-03-29 PROCEDURE — 80053 COMPREHEN METABOLIC PANEL: CPT

## 2025-03-29 PROCEDURE — 81003 URINALYSIS AUTO W/O SCOPE: CPT

## 2025-03-29 PROCEDURE — 96374 THER/PROPH/DIAG INJ IV PUSH: CPT

## 2025-03-29 PROCEDURE — 93005 ELECTROCARDIOGRAM TRACING: CPT

## 2025-03-29 PROCEDURE — 2500000003 HC RX 250 WO HCPCS

## 2025-03-29 PROCEDURE — 71045 X-RAY EXAM CHEST 1 VIEW: CPT

## 2025-03-29 PROCEDURE — 83690 ASSAY OF LIPASE: CPT

## 2025-03-29 PROCEDURE — 2580000003 HC RX 258

## 2025-03-29 PROCEDURE — 85025 COMPLETE CBC W/AUTO DIFF WBC: CPT

## 2025-03-29 PROCEDURE — 84703 CHORIONIC GONADOTROPIN ASSAY: CPT

## 2025-03-29 PROCEDURE — 99285 EMERGENCY DEPT VISIT HI MDM: CPT

## 2025-03-29 PROCEDURE — 96375 TX/PRO/DX INJ NEW DRUG ADDON: CPT

## 2025-03-29 PROCEDURE — 6370000000 HC RX 637 (ALT 250 FOR IP)

## 2025-03-29 PROCEDURE — 84484 ASSAY OF TROPONIN QUANT: CPT

## 2025-03-29 RX ORDER — ONDANSETRON 4 MG/1
4 TABLET, ORALLY DISINTEGRATING ORAL 3 TIMES DAILY PRN
Qty: 21 TABLET | Refills: 0 | Status: SHIPPED | OUTPATIENT
Start: 2025-03-29

## 2025-03-29 RX ORDER — MAGNESIUM HYDROXIDE/ALUMINUM HYDROXICE/SIMETHICONE 120; 1200; 1200 MG/30ML; MG/30ML; MG/30ML
30 SUSPENSION ORAL ONCE
Status: COMPLETED | OUTPATIENT
Start: 2025-03-29 | End: 2025-03-29

## 2025-03-29 RX ORDER — LIDOCAINE HYDROCHLORIDE 20 MG/ML
15 SOLUTION OROPHARYNGEAL ONCE
Status: COMPLETED | OUTPATIENT
Start: 2025-03-29 | End: 2025-03-29

## 2025-03-29 RX ORDER — ONDANSETRON 2 MG/ML
4 INJECTION INTRAMUSCULAR; INTRAVENOUS ONCE
Status: COMPLETED | OUTPATIENT
Start: 2025-03-29 | End: 2025-03-29

## 2025-03-29 RX ORDER — ACETAMINOPHEN 325 MG/1
650 TABLET ORAL ONCE
Status: COMPLETED | OUTPATIENT
Start: 2025-03-29 | End: 2025-03-29

## 2025-03-29 RX ADMIN — ALUMINUM HYDROXIDE, MAGNESIUM HYDROXIDE, AND SIMETHICONE 30 ML: 200; 200; 20 SUSPENSION ORAL at 21:03

## 2025-03-29 RX ADMIN — ACETAMINOPHEN 650 MG: 325 TABLET ORAL at 21:33

## 2025-03-29 RX ADMIN — FAMOTIDINE 20 MG: 10 INJECTION, SOLUTION INTRAVENOUS at 21:04

## 2025-03-29 RX ADMIN — ONDANSETRON 4 MG: 2 INJECTION, SOLUTION INTRAMUSCULAR; INTRAVENOUS at 21:05

## 2025-03-29 RX ADMIN — LIDOCAINE HYDROCHLORIDE 15 ML: 20 SOLUTION ORAL at 21:02

## 2025-03-29 ASSESSMENT — PAIN - FUNCTIONAL ASSESSMENT: PAIN_FUNCTIONAL_ASSESSMENT: 0-10

## 2025-03-29 ASSESSMENT — PAIN SCALES - GENERAL: PAINLEVEL_OUTOF10: 6

## 2025-03-30 NOTE — DISCHARGE INSTRUCTIONS
You were seen today in the emergency department for your abdominal pain.   We now feel you are safe for discharge home.    Please return to the emergency department immediately if develop any new or worsening concerns including chest pain, shortness of breath, abdominal pain, nausea, vomiting, diarrhea, weakness, loss consciousness, fever, chills, or any other concerns.    Please call your PCP and schedule appointment within the next 24 to 48 hours for follow-up.

## 2025-03-30 NOTE — ED PROVIDER NOTES
[facility] EMERGENCY DEPARTMENT  Emergency Department Encounter  Emergency Medicine Resident     Pt Name:Meño Goodman  MRN: [id number]  Birthdate 1990  Date of evaluation: 3/29/25  PCP:  Jean Claude Garcia MD  Note Started: 9:14 PM EDT      CHIEF COMPLAINT       Chief Complaint   Patient presents with    Chest Pain    Abdominal Pain       HISTORY OF PRESENT ILLNESS  (Location/Symptom, Timing/Onset, Context/Setting, Quality, Duration, Modifying Factors, Severity.)      Meño Goodman is a 35 y.o. female who presents with chest pain, abdominal pain, nausea.  Patient states she has history of bipolar disorder, anxiety, depression, GERD.  States she began to have \"tummy pain\" beginning a couple of hours ago.  She states that she also has some chest discomfort and nausea.  States she vomited once.  States she intermittently has similar issues.  Denies any cardiac history, history of DVT or PE, family history of sudden cardiac death at young age.  Patient received Zofran prior to arrival by EMS which states helped.  Denies any vaginal pain, vaginal bleeding or vaginal discharge.  States she has not had a period since October.  States she is not taking a pregnancy test because she is scared to know the answer    PAST MEDICAL / SURGICAL / SOCIAL / FAMILY HISTORY      has a past medical history of Asthma, Back pain, Bipolar 1 disorder (HCC), Bipolar disorder (HCC), Depression, Diabetes mellitus (HCC), Dizziness, Fatty liver disease, nonalcoholic, Fatty liver disease, nonalcoholic, GERD (gastroesophageal reflux disease), and Obesity.       has a past surgical history that includes  section and LEEP.    Social History     Socioeconomic History    Marital status:      Spouse name: Not on file    Number of children: Not on file    Years of education: Not on file    Highest education level: Not on file   Occupational History    Not on file   Tobacco Use    Smoking status: Former     Current

## 2025-03-30 NOTE — ED PROVIDER NOTES
[facility]     Emergency Department     Faculty Attestation    I performed a history and physical examination of the patient and discussed management with the resident. I reviewed the resident's note and agree with the documented findings and plan of care. Any areas of disagreement are noted on the chart. I was personally present for the key portions of any procedures. I have documented in the chart those procedures where I was not present during the key portions. I have reviewed the emergency nurses triage note. I agree with the chief complaint, past medical history, past surgical history, allergies, medications, social and family history as documented unless otherwise noted below. For Physician Assistant/ Nurse Practitioner cases/documentation I have personally evaluated this patient and have completed at least one if not all key elements of the E/M (history, physical exam, and MDM). Additional findings are as noted.    Chest clear, heart exam normal, mild epigastric discomfort, no guarding or rebounding, no pain in McBurney's point.       EKG Interpretation    Interpreted by emergency department physician    Rhythm: normal sinus   Rate: normal/96  Axis: normal/54  Ectopy: none  Conduction: normal  ST Segments: no acute change  T Waves: no acute change  Q Waves: none    Clinical Impression: Normal EKG    Tanner Salazar, III     Tanner Salazar MD  03/29/25 [id number]

## 2025-03-30 NOTE — ED NOTES
Pt. Presents to the ED via ems for chest pain. Pt states that she normally has anxiety, but this feels different and that's why she wanted to be seen. Pt is complaining of abd and nausea with this chest pain. Pt expresses that she has not had a menstrual cycle since October, but she has not taken a pregnancy test. EMS gave patient 1 nitro and 4mg zofran pta. On evaluation, pt is also complaining of dizziness, headache, urinary burning/frequency, and bilateral flank pain.  Pt placed on continuous cardiac monitor, bp and pulse ox. EKG completed, IV established, blood work drawn. Resident at the bedside for evaluation. Will continue with plan of care.

## 2025-03-31 ENCOUNTER — HOSPITAL ENCOUNTER (EMERGENCY)
Age: 35
Discharge: HOME | End: 2025-04-01
Attending: EMERGENCY MEDICINE
Payer: COMMERCIAL

## 2025-03-31 ENCOUNTER — APPOINTMENT (OUTPATIENT)
Dept: GENERAL RADIOLOGY | Age: 35
End: 2025-03-31
Payer: COMMERCIAL

## 2025-03-31 DIAGNOSIS — R10.10 PAIN OF UPPER ABDOMEN: ICD-10-CM

## 2025-03-31 DIAGNOSIS — R11.2 NAUSEA AND VOMITING, UNSPECIFIED VOMITING TYPE: Primary | ICD-10-CM

## 2025-03-31 LAB
ALBUMIN SERPL-MCNC: 4 G/DL (ref 3.5–5.2)
ALBUMIN/GLOB SERPL: 1.1 {RATIO} (ref 1–2.5)
ALP SERPL-CCNC: 116 U/L (ref 35–104)
ALT SERPL-CCNC: 25 U/L (ref 10–35)
ANION GAP SERPL CALCULATED.3IONS-SCNC: 12 MMOL/L (ref 9–16)
AST SERPL-CCNC: 31 U/L (ref 10–35)
BASOPHILS # BLD: 0.06 K/UL (ref 0–0.2)
BASOPHILS NFR BLD: 1 % (ref 0–2)
BILIRUB SERPL-MCNC: 0.4 MG/DL (ref 0–1.2)
BUN SERPL-MCNC: 8 MG/DL (ref 6–20)
CALCIUM SERPL-MCNC: 9.5 MG/DL (ref 8.6–10.4)
CHLORIDE SERPL-SCNC: 102 MMOL/L (ref 98–107)
CO2 SERPL-SCNC: 26 MMOL/L (ref 20–31)
CREAT SERPL-MCNC: 0.9 MG/DL (ref 0.6–0.9)
EKG ATRIAL RATE: 96 BPM
EKG P AXIS: 71 DEGREES
EKG P-R INTERVAL: 136 MS
EKG Q-T INTERVAL: 364 MS
EKG QRS DURATION: 86 MS
EKG QTC CALCULATION (BAZETT): 459 MS
EKG R AXIS: 54 DEGREES
EKG T AXIS: 55 DEGREES
EKG VENTRICULAR RATE: 96 BPM
EOSINOPHIL # BLD: 0.55 K/UL (ref 0–0.44)
EOSINOPHILS RELATIVE PERCENT: 5 % (ref 1–4)
ERYTHROCYTE [DISTWIDTH] IN BLOOD BY AUTOMATED COUNT: 12.9 % (ref 11.8–14.4)
GFR, ESTIMATED: 84 ML/MIN/1.73M2
GLUCOSE SERPL-MCNC: 167 MG/DL (ref 74–99)
HCG SERPL QL: NEGATIVE
HCT VFR BLD AUTO: 37.6 % (ref 36.3–47.1)
HGB BLD-MCNC: 11.9 G/DL (ref 11.9–15.1)
IMM GRANULOCYTES # BLD AUTO: 0.04 K/UL (ref 0–0.3)
IMM GRANULOCYTES NFR BLD: 0 %
LIPASE SERPL-CCNC: 29 U/L (ref 13–60)
LYMPHOCYTES NFR BLD: 2.58 K/UL (ref 1.1–3.7)
LYMPHOCYTES RELATIVE PERCENT: 24 % (ref 24–43)
MCH RBC QN AUTO: 28.7 PG (ref 25.2–33.5)
MCHC RBC AUTO-ENTMCNC: 31.6 G/DL (ref 28.4–34.8)
MCV RBC AUTO: 90.8 FL (ref 82.6–102.9)
MONOCYTES NFR BLD: 0.76 K/UL (ref 0.1–1.2)
MONOCYTES NFR BLD: 7 % (ref 3–12)
NEUTROPHILS NFR BLD: 63 % (ref 36–65)
NEUTS SEG NFR BLD: 6.95 K/UL (ref 1.5–8.1)
NRBC BLD-RTO: 0 PER 100 WBC
PLATELET # BLD AUTO: 349 K/UL (ref 138–453)
PMV BLD AUTO: 10.3 FL (ref 8.1–13.5)
POTASSIUM SERPL-SCNC: 4.2 MMOL/L (ref 3.7–5.3)
PROT SERPL-MCNC: 7.6 G/DL (ref 6.6–8.7)
RBC # BLD AUTO: 4.14 M/UL (ref 3.95–5.11)
SODIUM SERPL-SCNC: 140 MMOL/L (ref 136–145)
TROPONIN I SERPL HS-MCNC: <6 NG/L (ref 0–14)
WBC OTHER # BLD: 10.9 K/UL (ref 3.5–11.3)

## 2025-03-31 PROCEDURE — 93005 ELECTROCARDIOGRAM TRACING: CPT

## 2025-03-31 PROCEDURE — 93010 ELECTROCARDIOGRAM REPORT: CPT | Performed by: INTERNAL MEDICINE

## 2025-03-31 PROCEDURE — 81003 URINALYSIS AUTO W/O SCOPE: CPT

## 2025-03-31 PROCEDURE — 96375 TX/PRO/DX INJ NEW DRUG ADDON: CPT

## 2025-03-31 PROCEDURE — 96374 THER/PROPH/DIAG INJ IV PUSH: CPT

## 2025-03-31 PROCEDURE — 83690 ASSAY OF LIPASE: CPT

## 2025-03-31 PROCEDURE — 85025 COMPLETE CBC W/AUTO DIFF WBC: CPT

## 2025-03-31 PROCEDURE — 99285 EMERGENCY DEPT VISIT HI MDM: CPT

## 2025-03-31 PROCEDURE — 84484 ASSAY OF TROPONIN QUANT: CPT

## 2025-03-31 PROCEDURE — 6360000002 HC RX W HCPCS

## 2025-03-31 PROCEDURE — 84703 CHORIONIC GONADOTROPIN ASSAY: CPT

## 2025-03-31 PROCEDURE — 80053 COMPREHEN METABOLIC PANEL: CPT

## 2025-03-31 PROCEDURE — 71045 X-RAY EXAM CHEST 1 VIEW: CPT

## 2025-03-31 RX ORDER — IPRATROPIUM BROMIDE AND ALBUTEROL SULFATE 2.5; .5 MG/3ML; MG/3ML
1 SOLUTION RESPIRATORY (INHALATION) ONCE
Status: DISCONTINUED | OUTPATIENT
Start: 2025-03-31 | End: 2025-04-01 | Stop reason: HOSPADM

## 2025-03-31 RX ORDER — ONDANSETRON 2 MG/ML
4 INJECTION INTRAMUSCULAR; INTRAVENOUS ONCE
Status: COMPLETED | OUTPATIENT
Start: 2025-03-31 | End: 2025-03-31

## 2025-03-31 RX ORDER — KETOROLAC TROMETHAMINE 30 MG/ML
30 INJECTION, SOLUTION INTRAMUSCULAR; INTRAVENOUS ONCE
Status: COMPLETED | OUTPATIENT
Start: 2025-03-31 | End: 2025-03-31

## 2025-03-31 RX ADMIN — ONDANSETRON 4 MG: 2 INJECTION, SOLUTION INTRAMUSCULAR; INTRAVENOUS at 22:54

## 2025-03-31 RX ADMIN — KETOROLAC TROMETHAMINE 30 MG: 30 INJECTION, SOLUTION INTRAMUSCULAR at 22:54

## 2025-04-01 VITALS
RESPIRATION RATE: 19 BRPM | SYSTOLIC BLOOD PRESSURE: 126 MMHG | HEART RATE: 89 BPM | DIASTOLIC BLOOD PRESSURE: 75 MMHG | OXYGEN SATURATION: 91 % | TEMPERATURE: 98.4 F

## 2025-04-01 LAB
BILIRUB UR QL STRIP: NEGATIVE
CLARITY UR: CLEAR
COLOR UR: YELLOW
COMMENT: NORMAL
GLUCOSE UR STRIP-MCNC: NEGATIVE MG/DL
HGB UR QL STRIP.AUTO: NEGATIVE
KETONES UR STRIP-MCNC: NEGATIVE MG/DL
LEUKOCYTE ESTERASE UR QL STRIP: NEGATIVE
NITRITE UR QL STRIP: NEGATIVE
PH UR STRIP: 6 [PH] (ref 5–8)
PROT UR STRIP-MCNC: NEGATIVE MG/DL
SP GR UR STRIP: 1.01 (ref 1–1.03)
TROPONIN I SERPL HS-MCNC: <6 NG/L (ref 0–14)
UROBILINOGEN UR STRIP-ACNC: NORMAL EU/DL (ref 0–1)

## 2025-04-01 RX ORDER — ONDANSETRON 4 MG/1
4 TABLET, FILM COATED ORAL 3 TIMES DAILY PRN
Qty: 15 TABLET | Refills: 0 | Status: SHIPPED | OUTPATIENT
Start: 2025-04-01

## 2025-04-01 NOTE — ED PROVIDER NOTES
[facility]     Emergency Department     Faculty Attestation    I performed a history and physical examination of the patient and discussed management with all residents. I reviewed the resident’s note and agree with the documented findings and plan of care. Any areas of disagreement are noted on the chart. I was personally present for the key portions of any procedures. I have documented in the chart those procedures where I was not present during the key portions. I have reviewed the emergency nurses triage note. I agree with the chief complaint, past medical history, past surgical history, allergies, medications, social and family history as documented unless otherwise noted below.    For Physician Assistant/ Nurse Practitioner cases/documentation I have personally evaluated this patient and have completed at least one if not all key elements of the E/M (history, physical exam, and MDM). Additional findings are as noted.      Primary Care Physician:  Jean Claude Garcia MD    CHIEF COMPLAINT       Chief Complaint   Patient presents with    Abdominal Pain    Vomiting       RECENT VITALS:   Temp: 98.4 °F (36.9 °C),  Pulse: 89, Respirations: 19, BP: 129/84    LABS:  Labs Reviewed   CBC WITH AUTO DIFFERENTIAL - Abnormal; Notable for the following components:       Result Value    Eosinophils % 5 (*)     Eosinophils Absolute 0.55 (*)     All other components within normal limits   COMPREHENSIVE METABOLIC PANEL - Abnormal; Notable for the following components:    Glucose 167 (*)     Alkaline Phosphatase 116 (*)     All other components within normal limits   LIPASE   HCG, SERUM, QUALITATIVE   TROPONIN   URINALYSIS WITH REFLEX TO CULTURE   TROPONIN       Radiology  XR CHEST PORTABLE    (Results Pending)         Attending Physician Additional  Notes    The patient is a 35-year-old female with history of diabetes, psychiatric issues, asthma, and fatty liver who presents for evaluation of abdominal

## 2025-04-01 NOTE — ED PROVIDER NOTES
[facility] EMERGENCY DEPARTMENT  Emergency Department Encounter  Emergency Medicine Resident     Pt Name:Meño Goodman  MRN: [id number]  Birthdate 1990  Date of evaluation: 3/31/25  PCP:  Jean Claude Garcia MD  Note Started: 10:40 PM EDT      CHIEF COMPLAINT       Chief Complaint   Patient presents with    Abdominal Pain    Vomiting       HISTORY OF PRESENT ILLNESS  (Location/Symptom, Timing/Onset, Context/Setting, Quality, Duration, Modifying Factors, Severity.)      Meño Goodman is a 35 y.o. female who presents with past medical history of diabetes, gallstones, nephrolithiasis here for concerns of right upper quadrant abdominal pain that started about 2 AM.  Associated nausea, vomiting.  Reports pain radiates to her chest, back.  Denies fever, chills, diarrhea.      PAST MEDICAL / SURGICAL / SOCIAL / FAMILY HISTORY      has a past medical history of Asthma, Back pain, Bipolar 1 disorder (HCC), Bipolar disorder (HCC), Depression, Diabetes mellitus (HCC), Dizziness, Fatty liver disease, nonalcoholic, Fatty liver disease, nonalcoholic, GERD (gastroesophageal reflux disease), and Obesity.     has a past surgical history that includes  section and LEEP.    Social History     Socioeconomic History    Marital status:      Spouse name: Not on file    Number of children: Not on file    Years of education: Not on file    Highest education level: Not on file   Occupational History    Not on file   Tobacco Use    Smoking status: Former     Current packs/day: 0.00     Types: Cigarettes     Start date: 2000     Quit date: 2015     Years since quitting: 10.2    Smokeless tobacco: Never   Vaping Use    Vaping status: Never Used   Substance and Sexual Activity    Alcohol use: Not Currently     Alcohol/week: 0.0 standard drinks of alcohol     Comment: rarely    Drug use: Yes     Types: Marijuana (Weed)     Comment: smoking marijuana, every 2 weeks    Sexual activity: Yes     Partners:

## 2025-04-01 NOTE — ED NOTES
Sw asked by GERALD Badillo to assist with transportation of discharged pt. Pt going to address on file. [employer] voucher utilized. Trip # [id number]

## 2025-04-02 LAB
EKG ATRIAL RATE: 89 BPM
EKG P AXIS: 18 DEGREES
EKG P-R INTERVAL: 126 MS
EKG Q-T INTERVAL: 376 MS
EKG QRS DURATION: 84 MS
EKG QTC CALCULATION (BAZETT): 457 MS
EKG R AXIS: 9 DEGREES
EKG T AXIS: 12 DEGREES
EKG VENTRICULAR RATE: 89 BPM

## 2025-04-02 ASSESSMENT — ENCOUNTER SYMPTOMS
DIARRHEA: 0
VOMITING: 1
NAUSEA: 1
BLOOD IN STOOL: 0
SHORTNESS OF BREATH: 0
ABDOMINAL PAIN: 1

## 2025-04-03 ENCOUNTER — HOSPITAL ENCOUNTER (EMERGENCY)
Age: 35
Discharge: HOME | End: 2025-04-03
Attending: EMERGENCY MEDICINE
Payer: COMMERCIAL

## 2025-04-03 ENCOUNTER — APPOINTMENT (OUTPATIENT)
Dept: GENERAL RADIOLOGY | Age: 35
End: 2025-04-03
Payer: COMMERCIAL

## 2025-04-03 VITALS
RESPIRATION RATE: 18 BRPM | OXYGEN SATURATION: 93 % | DIASTOLIC BLOOD PRESSURE: 85 MMHG | TEMPERATURE: 98.3 F | SYSTOLIC BLOOD PRESSURE: 134 MMHG | HEART RATE: 93 BPM

## 2025-04-03 DIAGNOSIS — J45.901 EXACERBATION OF ASTHMA, UNSPECIFIED ASTHMA SEVERITY, UNSPECIFIED WHETHER PERSISTENT: Primary | ICD-10-CM

## 2025-04-03 LAB
ALBUMIN SERPL-MCNC: 3.9 G/DL (ref 3.5–5.2)
ALBUMIN/GLOB SERPL: 1.1 {RATIO} (ref 1–2.5)
ALP SERPL-CCNC: 120 U/L (ref 35–104)
ALT SERPL-CCNC: 27 U/L (ref 10–35)
ANION GAP SERPL CALCULATED.3IONS-SCNC: 11 MMOL/L (ref 9–16)
AST SERPL-CCNC: 26 U/L (ref 10–35)
BASOPHILS # BLD: 0.06 K/UL (ref 0–0.2)
BASOPHILS NFR BLD: 1 % (ref 0–2)
BILIRUB DIRECT SERPL-MCNC: 0.2 MG/DL (ref 0–0.2)
BILIRUB INDIRECT SERPL-MCNC: 0.1 MG/DL (ref 0–1)
BILIRUB SERPL-MCNC: 0.3 MG/DL (ref 0–1.2)
BUN SERPL-MCNC: 13 MG/DL (ref 6–20)
CALCIUM SERPL-MCNC: 9.2 MG/DL (ref 8.6–10.4)
CHLORIDE SERPL-SCNC: 101 MMOL/L (ref 98–107)
CO2 SERPL-SCNC: 26 MMOL/L (ref 20–31)
CREAT SERPL-MCNC: 1 MG/DL (ref 0.6–0.9)
EOSINOPHIL # BLD: 0.56 K/UL (ref 0–0.44)
EOSINOPHILS RELATIVE PERCENT: 6 % (ref 1–4)
ERYTHROCYTE [DISTWIDTH] IN BLOOD BY AUTOMATED COUNT: 12.6 % (ref 11.8–14.4)
FLUAV AG SPEC QL: NEGATIVE
FLUBV AG SPEC QL: NEGATIVE
GFR, ESTIMATED: 74 ML/MIN/1.73M2
GLOBULIN SER CALC-MCNC: 3.7 G/DL
GLUCOSE SERPL-MCNC: 248 MG/DL (ref 74–99)
HCG SERPL QL: NEGATIVE
HCT VFR BLD AUTO: 39.1 % (ref 36.3–47.1)
HGB BLD-MCNC: 12.5 G/DL (ref 11.9–15.1)
IMM GRANULOCYTES # BLD AUTO: 0.04 K/UL (ref 0–0.3)
IMM GRANULOCYTES NFR BLD: 0 %
LYMPHOCYTES NFR BLD: 2.24 K/UL (ref 1.1–3.7)
LYMPHOCYTES RELATIVE PERCENT: 24 % (ref 24–43)
MCH RBC QN AUTO: 29.3 PG (ref 25.2–33.5)
MCHC RBC AUTO-ENTMCNC: 32 G/DL (ref 28.4–34.8)
MCV RBC AUTO: 91.6 FL (ref 82.6–102.9)
MONOCYTES NFR BLD: 0.54 K/UL (ref 0.1–1.2)
MONOCYTES NFR BLD: 6 % (ref 3–12)
NEUTROPHILS NFR BLD: 63 % (ref 36–65)
NEUTS SEG NFR BLD: 5.93 K/UL (ref 1.5–8.1)
NRBC BLD-RTO: 0 PER 100 WBC
PLATELET # BLD AUTO: 307 K/UL (ref 138–453)
PMV BLD AUTO: 9.9 FL (ref 8.1–13.5)
POTASSIUM SERPL-SCNC: 4.2 MMOL/L (ref 3.7–5.3)
PROT SERPL-MCNC: 7.6 G/DL (ref 6.6–8.7)
RBC # BLD AUTO: 4.27 M/UL (ref 3.95–5.11)
SARS-COV-2 RDRP RESP QL NAA+PROBE: NOT DETECTED
SODIUM SERPL-SCNC: 138 MMOL/L (ref 136–145)
SPECIMEN DESCRIPTION: NORMAL
TROPONIN I SERPL HS-MCNC: <6 NG/L (ref 0–14)
WBC OTHER # BLD: 9.4 K/UL (ref 3.5–11.3)

## 2025-04-03 PROCEDURE — 6370000000 HC RX 637 (ALT 250 FOR IP)

## 2025-04-03 PROCEDURE — 87804 INFLUENZA ASSAY W/OPTIC: CPT

## 2025-04-03 PROCEDURE — 80076 HEPATIC FUNCTION PANEL: CPT

## 2025-04-03 PROCEDURE — 93005 ELECTROCARDIOGRAM TRACING: CPT

## 2025-04-03 PROCEDURE — 87635 SARS-COV-2 COVID-19 AMP PRB: CPT

## 2025-04-03 PROCEDURE — 99285 EMERGENCY DEPT VISIT HI MDM: CPT

## 2025-04-03 PROCEDURE — 71045 X-RAY EXAM CHEST 1 VIEW: CPT

## 2025-04-03 PROCEDURE — 94640 AIRWAY INHALATION TREATMENT: CPT

## 2025-04-03 PROCEDURE — 94761 N-INVAS EAR/PLS OXIMETRY MLT: CPT

## 2025-04-03 PROCEDURE — 84484 ASSAY OF TROPONIN QUANT: CPT

## 2025-04-03 PROCEDURE — 80048 BASIC METABOLIC PNL TOTAL CA: CPT

## 2025-04-03 PROCEDURE — 6370000000 HC RX 637 (ALT 250 FOR IP): Performed by: EMERGENCY MEDICINE

## 2025-04-03 PROCEDURE — 85025 COMPLETE CBC W/AUTO DIFF WBC: CPT

## 2025-04-03 PROCEDURE — 6360000002 HC RX W HCPCS: Performed by: EMERGENCY MEDICINE

## 2025-04-03 PROCEDURE — 84703 CHORIONIC GONADOTROPIN ASSAY: CPT

## 2025-04-03 RX ORDER — ACETAMINOPHEN 500 MG
500 TABLET ORAL EVERY 6 HOURS PRN
Qty: 60 TABLET | Refills: 1 | Status: SHIPPED | OUTPATIENT
Start: 2025-04-03

## 2025-04-03 RX ORDER — PREDNISONE 20 MG/1
50 TABLET ORAL ONCE
Status: COMPLETED | OUTPATIENT
Start: 2025-04-03 | End: 2025-04-03

## 2025-04-03 RX ORDER — ALBUTEROL SULFATE 0.83 MG/ML
2.5 SOLUTION RESPIRATORY (INHALATION) EVERY 4 HOURS PRN
Status: DISCONTINUED | OUTPATIENT
Start: 2025-04-03 | End: 2025-04-03 | Stop reason: HOSPADM

## 2025-04-03 RX ORDER — IBUPROFEN 400 MG/1
400 TABLET, FILM COATED ORAL EVERY 6 HOURS PRN
Qty: 30 TABLET | Refills: 0 | Status: SHIPPED | OUTPATIENT
Start: 2025-04-03

## 2025-04-03 RX ORDER — PREDNISONE 20 MG/1
40 TABLET ORAL DAILY
Qty: 10 TABLET | Refills: 0 | Status: SHIPPED | OUTPATIENT
Start: 2025-04-03 | End: 2025-04-08

## 2025-04-03 RX ORDER — IPRATROPIUM BROMIDE AND ALBUTEROL SULFATE 2.5; .5 MG/3ML; MG/3ML
1 SOLUTION RESPIRATORY (INHALATION) EVERY 6 HOURS PRN
Status: DISCONTINUED | OUTPATIENT
Start: 2025-04-03 | End: 2025-04-03 | Stop reason: HOSPADM

## 2025-04-03 RX ADMIN — IPRATROPIUM BROMIDE AND ALBUTEROL SULFATE 1 DOSE: .5; 3 SOLUTION RESPIRATORY (INHALATION) at 09:12

## 2025-04-03 RX ADMIN — IPRATROPIUM BROMIDE AND ALBUTEROL SULFATE 1 DOSE: .5; 3 SOLUTION RESPIRATORY (INHALATION) at 08:39

## 2025-04-03 RX ADMIN — ALBUTEROL SULFATE 5 MG: 2.5 SOLUTION RESPIRATORY (INHALATION) at 08:39

## 2025-04-03 RX ADMIN — PREDNISONE 50 MG: 20 TABLET ORAL at 08:52

## 2025-04-03 RX ADMIN — ALBUTEROL SULFATE 2.5 MG: 2.5 SOLUTION RESPIRATORY (INHALATION) at 10:18

## 2025-04-03 ASSESSMENT — ENCOUNTER SYMPTOMS
VOMITING: 0
ABDOMINAL PAIN: 1
NAUSEA: 0
COUGH: 1
SHORTNESS OF BREATH: 1
WHEEZING: 1

## 2025-04-03 ASSESSMENT — PAIN - FUNCTIONAL ASSESSMENT: PAIN_FUNCTIONAL_ASSESSMENT: 0-10

## 2025-04-03 ASSESSMENT — PAIN SCALES - GENERAL: PAINLEVEL_OUTOF10: 5

## 2025-04-03 ASSESSMENT — PAIN DESCRIPTION - LOCATION: LOCATION: CHEST

## 2025-04-03 NOTE — ED TRIAGE NOTES
Pt presented to ED 37 with EMS.  Pt presents with C/O Difficulty breathing.  Per EMS, The pt has called out earlier today for the same SOB. EMS advised the pt to try a breathing tx first, then EMS states the pt called back without any change in symptoms. EMS states the pt did not do a breathing treatment but used her inhaler. EMS states the pt has called out multiple times this week. PTA pt received combivent.  Upon arrival patient is A+O x4.  Pt states she has had the SOB ongoing for a few days and her pcp told her to come to ER if it worsens. Pt states this AM she woke up out of her sleep due to the increased difficulty to breath. Pt states she's having abdominal pain ongoing for a few days. Pt states she has not had a period since October which is not normal for her. Pt stats the Sob is accompanied by chest pain.   Pt denies vision changes, N/V/D, headache.  Pt has a hx of asthma, diabetes.  Pt moved to [facility] and placed on our cardiac monitor, ekg completed, iv established, labs drawn, labeled and will send to lab via orders   White board updated. Will continue to follow plan of care.

## 2025-04-03 NOTE — ED PROVIDER NOTES
[facility] EMERGENCY DEPARTMENT     Emergency Department     Faculty Attestation    I performed a history and physical examination of the patient and discussed management with the resident. I reviewed the resident’s note and agree with the documented findings and plan of care. Any areas of disagreement are noted on the chart. I was personally present for the key portions of any procedures. I have documented in the chart those procedures where I was not present during the key portions. I have reviewed the emergency nurses triage note. I agree with the chief complaint, past medical history, past surgical history, allergies, medications, social and family history as documented unless otherwise noted below. For Physician Assistant/ Nurse Practitioner cases/documentation I have personally evaluated this patient and have completed at least one if not all key elements of the E/M (history, physical exam, and MDM). Additional findings are as noted.    8:42 AM EDT    Patient with history of asthma presents with increased difficulty breathing that has been worsening over the past 2 days.  She says she has not been feeling like herself for the last 2 days with cough and increased fatigue.  She says she also has been having some abdominal pain and nausea.  She denies any vomiting or changes in bowel movements.  She denies any abnormal vaginal bleeding or discharge.  She denies any dysuria but says she has been urinating more frequently.  Patient says she tried using her inhaler this morning but it did not help much.  On exam, patient was receiving a breathing treatment when I entered the room.  There is diffuse wheeze on auscultation of lungs bilaterally.  Abdomen is soft and nontender.  The bilateral calves are nontender nonswollen.  Will get EKG, chest x-ray, labs.  Will administer nebulizer treatment and steroids and reassess.    EKG Interpretation    Interpreted by emergency department physician    Rhythm: sinus  tachycardia  Rate: 110-120  Axis: normal  Ectopy: none  Conduction: normal  ST Segments: nonspecific changes  T Waves: non specific changes  Q Waves: none    Clinical Impression: non-specific EKG and sinus tachycardia    MD Alena Clay MD  Attending Emergency  Physician

## 2025-04-03 NOTE — ED NOTES
Informed patient needs transport home. Met with patient to verify address and information. She stated she is under the last name of Everette in the [employer] system.   Spoke with Ben and remy arranged for 11:45.

## 2025-04-03 NOTE — ED PROVIDER NOTES
[facility] EMERGENCY DEPARTMENT  Emergency Department Encounter  Emergency Medicine Resident     Pt Name:Meño Goodman  MRN: [id number]  Birthdate 1990  Date of evaluation: 4/3/25  PCP:  Jean Claude Garcia MD  Note Started: 10:29 AM EDT      CHIEF COMPLAINT       Chief Complaint   Patient presents with    Shortness of Breath       HISTORY OF PRESENT ILLNESS  (Location/Symptom, Timing/Onset, Context/Setting, Quality, Duration, Modifying Factors, Severity.)      Meño Goodman is a 35 y.o. female who presents with shortness of breath.  Patient has a history of asthma.  States that she has had shortness of breath wheezing and cough for the past 3 days and is getting worse.  Patient does have an albuterol nebulizer at home however has not been using it.  She has been using her albuterol inhaler with little relief in her symptoms.  Patient states that she has had a cough with no production of sputum.  Patient denies any fever chills nausea or vomiting.  Is also complaining of generalized abdominal pain that has been ongoing for the past 2 weeks.  Patient states that she has not had a menstrual period since October and this is not normal for her.  Patient states that she is sexually active and is unsure whether or not she is pregnant.  Patient denies any dysuria nausea vomiting vaginal bleeding or vaginal discharge.      PAST MEDICAL / SURGICAL / SOCIAL / FAMILY HISTORY      has a past medical history of Asthma, Back pain, Bipolar 1 disorder (HCC), Bipolar disorder, Depression, Diabetes mellitus (HCC), Dizziness, Fatty liver disease, nonalcoholic, Fatty liver disease, nonalcoholic, GERD (gastroesophageal reflux disease), and Obesity.       has a past surgical history that includes  section and LEEP.      Social History     Socioeconomic History    Marital status:      Spouse name: Not on file    Number of children: Not on file    Years of education: Not on file    Highest education

## 2025-04-03 NOTE — DISCHARGE INSTRUCTIONS
Seen in the emergency department for asthma exacerbation.    Take your prednisone as prescribed.  If you are a diabetic, you should check your blood sugar more frequently while taking prednisone.  Use your inhaler or nebulizer as prescribed, or at minimum every 4 hours while you are having an asthma attack.    Please follow-up with your primary care provider within 1 to 2 days of discharge.    PLEASE RETURN TO THE EMERGENCY DEPARTMENT IMMEDIATELY for worsening symptoms of shortness of breath, wheezing, change in the amount of sputum that you cough up or a change in the color of your sputum, using your inhaler more frequently or if your inhaler only lasts up to 2 hours, or if you develop any concerning symptoms such as: high fever not relieved by acetaminophen (Tylenol) and/or ibuprofen (Motrin / Advil), chills, shortness of breath, chest pain, feeling of your heart fluttering or racing, persistent nausea and/or vomiting, numbness, weakness or tingling in the arms or legs or change in color of the extremities, changes in mental status, persistent headache, blurry vision, unable to follow up with your physician, or other any other care or concern.

## 2025-04-03 NOTE — ED NOTES
Pt 02 dropped to 88% with good pleth. Writer to bedside, pt placed pt on 2L O2 at this time. O2 increased to 93%. Dr. Melanie hernandez.

## 2025-04-04 LAB
EKG ATRIAL RATE: 117 BPM
EKG P AXIS: 57 DEGREES
EKG P-R INTERVAL: 140 MS
EKG Q-T INTERVAL: 324 MS
EKG QRS DURATION: 82 MS
EKG QTC CALCULATION (BAZETT): 451 MS
EKG R AXIS: 8 DEGREES
EKG T AXIS: 35 DEGREES
EKG VENTRICULAR RATE: 117 BPM

## 2025-04-04 PROCEDURE — 93010 ELECTROCARDIOGRAM REPORT: CPT | Performed by: STUDENT IN AN ORGANIZED HEALTH CARE EDUCATION/TRAINING PROGRAM

## 2025-04-10 RX ORDER — HYDROXYZINE HYDROCHLORIDE 50 MG/1
TABLET, FILM COATED ORAL
Qty: 60 TABLET | Refills: 3 | Status: SHIPPED | OUTPATIENT
Start: 2025-04-10

## 2025-04-10 RX ORDER — MELOXICAM 15 MG/1
15 TABLET ORAL DAILY
Qty: 30 TABLET | Refills: 3 | OUTPATIENT
Start: 2025-04-10

## 2025-04-10 NOTE — TELEPHONE ENCOUNTER
Albumin/Creatinine Ratio, Urine Once 02/21/2025   Hepatitis A Antibody, Total Once 03/19/2025   Hepatitis B Core Antibody, Total Once 03/19/2025   Hepatitis B Surface Antibody Once 03/19/2025   Hepatitis B Surface Antigen Once 03/19/2025   LYNETTE Once 03/19/2025   Smooth Muscle Antibody Quant Once 03/19/2025   Alpha-1-Antitrypsin w Phenotype Once 03/19/2025   Iron and TIBC Once 03/19/2025   Protime-INR Once 03/19/2025   MITOCHONDRIAL ANTIBODIES, M2, IGG Once 03/19/2025   Hepatitis C Antibody Once 03/19/2025   hCG, Serum, Qualitative Once 03/19/2025   CT ABDOMEN PELVIS W IV CONTRAST Additional Contrast? None Once 03/19/2025               Patient Active Problem List:     Obesity     Gastroesophageal reflux disease without esophagitis     Fatty liver disease, nonalcoholic     Type 2 diabetes mellitus without complication     Bipolar affective disorder, currently depressed, mild (HCC)     Depression with suicidal ideation     Moderate persistent asthma     Acute vaginitis     Family history of pancreatic cancer     Pneumonia due to infectious organism     Respiratory failure     Otalgia     Generalized anxiety disorder     Abdominal bloating     Pruritic disorder     Schizoaffective disorder, depressive type (HCC)     Steatosis (HCC)     Severe persistent asthma with acute exacerbation (HCC)     Acute hypoxic respiratory failure     Chest pain             What Type Of Note Output Would You Prefer (Optional)?: Bullet Format How Severe Is Your Rash?: moderate Is This A New Presentation, Or A Follow-Up?: Rash

## 2025-04-17 ENCOUNTER — HOSPITAL ENCOUNTER (INPATIENT)
Age: 35
LOS: 2 days | Discharge: HOME | DRG: 141 | End: 2025-04-19
Attending: EMERGENCY MEDICINE | Admitting: STUDENT IN AN ORGANIZED HEALTH CARE EDUCATION/TRAINING PROGRAM
Payer: COMMERCIAL

## 2025-04-17 ENCOUNTER — APPOINTMENT (OUTPATIENT)
Dept: GENERAL RADIOLOGY | Age: 35
DRG: 141 | End: 2025-04-17
Payer: COMMERCIAL

## 2025-04-17 DIAGNOSIS — J45.901 EXACERBATION OF PERSISTENT ASTHMA, UNSPECIFIED ASTHMA SEVERITY: Primary | ICD-10-CM

## 2025-04-17 PROBLEM — J44.1 ASTHMA EXACERBATION IN COPD (HCC): Status: ACTIVE | Noted: 2025-04-17

## 2025-04-17 LAB
ALBUMIN SERPL-MCNC: 3.9 G/DL (ref 3.5–5.2)
ALBUMIN/GLOB SERPL: 1.1 {RATIO} (ref 1–2.5)
ALP SERPL-CCNC: 117 U/L (ref 35–104)
ALT SERPL-CCNC: 43 U/L (ref 10–35)
ANION GAP SERPL CALCULATED.3IONS-SCNC: 12 MMOL/L (ref 9–16)
AST SERPL-CCNC: 38 U/L (ref 10–35)
B-OH-BUTYR SERPL-MCNC: 0.08 MMOL/L (ref 0.02–0.27)
BASOPHILS # BLD: 0.04 K/UL (ref 0–0.2)
BASOPHILS NFR BLD: 0 % (ref 0–2)
BILIRUB SERPL-MCNC: 0.3 MG/DL (ref 0–1.2)
BILIRUB UR QL STRIP: NEGATIVE
BODY TEMPERATURE: 37
BUN SERPL-MCNC: 6 MG/DL (ref 6–20)
CALCIUM SERPL-MCNC: 9.2 MG/DL (ref 8.6–10.4)
CHLORIDE SERPL-SCNC: 99 MMOL/L (ref 98–107)
CHP ED QC CHECK: NORMAL
CLARITY UR: CLEAR
CO2 SERPL-SCNC: 24 MMOL/L (ref 20–31)
COHGB MFR BLD: 2 % (ref 0–5)
COLOR UR: YELLOW
COMMENT: ABNORMAL
CREAT SERPL-MCNC: 0.8 MG/DL (ref 0.6–0.9)
D DIMER PPP FEU-MCNC: <0.27 UG/ML FEU (ref 0–0.57)
EOSINOPHIL # BLD: 0.39 K/UL (ref 0–0.44)
EOSINOPHILS RELATIVE PERCENT: 4 % (ref 1–4)
ERYTHROCYTE [DISTWIDTH] IN BLOOD BY AUTOMATED COUNT: 12.7 % (ref 11.8–14.4)
FIO2 ON VENT: ABNORMAL %
GFR, ESTIMATED: >90 ML/MIN/1.73M2
GLUCOSE BLD-MCNC: 167 MG/DL (ref 65–105)
GLUCOSE BLD-MCNC: 368 MG/DL (ref 65–105)
GLUCOSE BLD-MCNC: 398 MG/DL
GLUCOSE SERPL-MCNC: 396 MG/DL (ref 74–99)
GLUCOSE UR STRIP-MCNC: ABNORMAL MG/DL
HCG SERPL QL: NEGATIVE
HCO3 VENOUS: 24.9 MMOL/L (ref 24–30)
HCT VFR BLD AUTO: 38.6 % (ref 36.3–47.1)
HGB BLD-MCNC: 12.5 G/DL (ref 11.9–15.1)
HGB UR QL STRIP.AUTO: NEGATIVE
IMM GRANULOCYTES # BLD AUTO: 0.04 K/UL (ref 0–0.3)
IMM GRANULOCYTES NFR BLD: 0 %
KETONES UR STRIP-MCNC: ABNORMAL MG/DL
LEUKOCYTE ESTERASE UR QL STRIP: NEGATIVE
LIPASE SERPL-CCNC: 32 U/L (ref 13–60)
LYMPHOCYTES NFR BLD: 2 K/UL (ref 1.1–3.7)
LYMPHOCYTES RELATIVE PERCENT: 20 % (ref 24–43)
MAGNESIUM SERPL-MCNC: 1.9 MG/DL (ref 1.6–2.6)
MCH RBC QN AUTO: 29.1 PG (ref 25.2–33.5)
MCHC RBC AUTO-ENTMCNC: 32.4 G/DL (ref 28.4–34.8)
MCV RBC AUTO: 90 FL (ref 82.6–102.9)
MONOCYTES NFR BLD: 0.68 K/UL (ref 0.1–1.2)
MONOCYTES NFR BLD: 7 % (ref 3–12)
NEGATIVE BASE EXCESS, VEN: 0.7 MMOL/L (ref 0–2)
NEUTROPHILS NFR BLD: 69 % (ref 36–65)
NEUTS SEG NFR BLD: 6.88 K/UL (ref 1.5–8.1)
NITRITE UR QL STRIP: NEGATIVE
NRBC BLD-RTO: 0 PER 100 WBC
O2 SAT, VEN: 96.3 % (ref 60–85)
PCO2 VENOUS: 44.7 MM HG (ref 39–55)
PH UR STRIP: 6 [PH] (ref 5–8)
PH VENOUS: 7.36 (ref 7.32–7.42)
PHOSPHATE SERPL-MCNC: 2.7 MG/DL (ref 2.5–4.5)
PLATELET # BLD AUTO: 323 K/UL (ref 138–453)
PMV BLD AUTO: 10.4 FL (ref 8.1–13.5)
PO2 VENOUS: 87.4 MM HG (ref 30–50)
POTASSIUM SERPL-SCNC: 4.4 MMOL/L (ref 3.7–5.3)
PROT SERPL-MCNC: 7.3 G/DL (ref 6.6–8.7)
PROT UR STRIP-MCNC: NEGATIVE MG/DL
RBC # BLD AUTO: 4.29 M/UL (ref 3.95–5.11)
SODIUM SERPL-SCNC: 135 MMOL/L (ref 136–145)
SP GR UR STRIP: 1.03 (ref 1–1.03)
UROBILINOGEN UR STRIP-ACNC: NORMAL EU/DL (ref 0–1)
WBC OTHER # BLD: 10 K/UL (ref 3.5–11.3)

## 2025-04-17 PROCEDURE — 81003 URINALYSIS AUTO W/O SCOPE: CPT

## 2025-04-17 PROCEDURE — 82010 KETONE BODYS QUAN: CPT

## 2025-04-17 PROCEDURE — 2580000003 HC RX 258: Performed by: STUDENT IN AN ORGANIZED HEALTH CARE EDUCATION/TRAINING PROGRAM

## 2025-04-17 PROCEDURE — 94640 AIRWAY INHALATION TREATMENT: CPT

## 2025-04-17 PROCEDURE — 96361 HYDRATE IV INFUSION ADD-ON: CPT

## 2025-04-17 PROCEDURE — 83690 ASSAY OF LIPASE: CPT

## 2025-04-17 PROCEDURE — 85379 FIBRIN DEGRADATION QUANT: CPT

## 2025-04-17 PROCEDURE — 84703 CHORIONIC GONADOTROPIN ASSAY: CPT

## 2025-04-17 PROCEDURE — 71045 X-RAY EXAM CHEST 1 VIEW: CPT

## 2025-04-17 PROCEDURE — 6360000002 HC RX W HCPCS

## 2025-04-17 PROCEDURE — G0378 HOSPITAL OBSERVATION PER HR: HCPCS

## 2025-04-17 PROCEDURE — 99285 EMERGENCY DEPT VISIT HI MDM: CPT

## 2025-04-17 PROCEDURE — 87040 BLOOD CULTURE FOR BACTERIA: CPT

## 2025-04-17 PROCEDURE — 84100 ASSAY OF PHOSPHORUS: CPT

## 2025-04-17 PROCEDURE — 93005 ELECTROCARDIOGRAM TRACING: CPT

## 2025-04-17 PROCEDURE — 94761 N-INVAS EAR/PLS OXIMETRY MLT: CPT

## 2025-04-17 PROCEDURE — 6370000000 HC RX 637 (ALT 250 FOR IP)

## 2025-04-17 PROCEDURE — 6370000000 HC RX 637 (ALT 250 FOR IP): Performed by: STUDENT IN AN ORGANIZED HEALTH CARE EDUCATION/TRAINING PROGRAM

## 2025-04-17 PROCEDURE — 2700000000 HC OXYGEN THERAPY PER DAY

## 2025-04-17 PROCEDURE — 82805 BLOOD GASES W/O2 SATURATION: CPT

## 2025-04-17 PROCEDURE — 1200000000 HC SEMI PRIVATE

## 2025-04-17 PROCEDURE — 96365 THER/PROPH/DIAG IV INF INIT: CPT

## 2025-04-17 PROCEDURE — 85025 COMPLETE CBC W/AUTO DIFF WBC: CPT

## 2025-04-17 PROCEDURE — 2500000003 HC RX 250 WO HCPCS: Performed by: STUDENT IN AN ORGANIZED HEALTH CARE EDUCATION/TRAINING PROGRAM

## 2025-04-17 PROCEDURE — 6360000002 HC RX W HCPCS: Performed by: STUDENT IN AN ORGANIZED HEALTH CARE EDUCATION/TRAINING PROGRAM

## 2025-04-17 PROCEDURE — 96372 THER/PROPH/DIAG INJ SC/IM: CPT

## 2025-04-17 PROCEDURE — 83735 ASSAY OF MAGNESIUM: CPT

## 2025-04-17 PROCEDURE — 36415 COLL VENOUS BLD VENIPUNCTURE: CPT

## 2025-04-17 PROCEDURE — 80053 COMPREHEN METABOLIC PANEL: CPT

## 2025-04-17 PROCEDURE — 96375 TX/PRO/DX INJ NEW DRUG ADDON: CPT

## 2025-04-17 PROCEDURE — 96374 THER/PROPH/DIAG INJ IV PUSH: CPT

## 2025-04-17 PROCEDURE — 2580000003 HC RX 258

## 2025-04-17 PROCEDURE — 99221 1ST HOSP IP/OBS SF/LOW 40: CPT | Performed by: STUDENT IN AN ORGANIZED HEALTH CARE EDUCATION/TRAINING PROGRAM

## 2025-04-17 PROCEDURE — 82947 ASSAY GLUCOSE BLOOD QUANT: CPT

## 2025-04-17 RX ORDER — ONDANSETRON 2 MG/ML
4 INJECTION INTRAMUSCULAR; INTRAVENOUS ONCE
Status: COMPLETED | OUTPATIENT
Start: 2025-04-17 | End: 2025-04-17

## 2025-04-17 RX ORDER — SODIUM CHLORIDE 0.9 % (FLUSH) 0.9 %
5-40 SYRINGE (ML) INJECTION EVERY 12 HOURS SCHEDULED
Status: DISCONTINUED | OUTPATIENT
Start: 2025-04-17 | End: 2025-04-19 | Stop reason: HOSPADM

## 2025-04-17 RX ORDER — MAGNESIUM SULFATE IN WATER 40 MG/ML
2000 INJECTION, SOLUTION INTRAVENOUS ONCE
Status: COMPLETED | OUTPATIENT
Start: 2025-04-17 | End: 2025-04-17

## 2025-04-17 RX ORDER — INSULIN GLARGINE 100 [IU]/ML
15 INJECTION, SOLUTION SUBCUTANEOUS NIGHTLY
Status: DISCONTINUED | OUTPATIENT
Start: 2025-04-17 | End: 2025-04-19

## 2025-04-17 RX ORDER — IPRATROPIUM BROMIDE AND ALBUTEROL SULFATE 2.5; .5 MG/3ML; MG/3ML
1 SOLUTION RESPIRATORY (INHALATION)
Status: DISCONTINUED | OUTPATIENT
Start: 2025-04-17 | End: 2025-04-19 | Stop reason: HOSPADM

## 2025-04-17 RX ORDER — INSULIN LISPRO 100 [IU]/ML
6 INJECTION, SOLUTION INTRAVENOUS; SUBCUTANEOUS ONCE
Status: COMPLETED | OUTPATIENT
Start: 2025-04-17 | End: 2025-04-17

## 2025-04-17 RX ORDER — DULOXETIN HYDROCHLORIDE 20 MG/1
20 CAPSULE, DELAYED RELEASE ORAL DAILY
Status: DISCONTINUED | OUTPATIENT
Start: 2025-04-17 | End: 2025-04-19 | Stop reason: HOSPADM

## 2025-04-17 RX ORDER — SODIUM CHLORIDE 9 MG/ML
INJECTION, SOLUTION INTRAVENOUS PRN
Status: DISCONTINUED | OUTPATIENT
Start: 2025-04-17 | End: 2025-04-19 | Stop reason: HOSPADM

## 2025-04-17 RX ORDER — ARIPIPRAZOLE 10 MG/1
10 TABLET ORAL DAILY
Status: DISCONTINUED | OUTPATIENT
Start: 2025-04-17 | End: 2025-04-19 | Stop reason: HOSPADM

## 2025-04-17 RX ORDER — INSULIN LISPRO 100 [IU]/ML
0-8 INJECTION, SOLUTION INTRAVENOUS; SUBCUTANEOUS
Status: DISCONTINUED | OUTPATIENT
Start: 2025-04-17 | End: 2025-04-19 | Stop reason: HOSPADM

## 2025-04-17 RX ORDER — SODIUM CHLORIDE 0.9 % (FLUSH) 0.9 %
5-40 SYRINGE (ML) INJECTION PRN
Status: DISCONTINUED | OUTPATIENT
Start: 2025-04-17 | End: 2025-04-19 | Stop reason: HOSPADM

## 2025-04-17 RX ORDER — SODIUM CHLORIDE, SODIUM LACTATE, POTASSIUM CHLORIDE, AND CALCIUM CHLORIDE .6; .31; .03; .02 G/100ML; G/100ML; G/100ML; G/100ML
1000 INJECTION, SOLUTION INTRAVENOUS ONCE
Status: COMPLETED | OUTPATIENT
Start: 2025-04-17 | End: 2025-04-17

## 2025-04-17 RX ORDER — POTASSIUM CHLORIDE 1500 MG/1
40 TABLET, EXTENDED RELEASE ORAL PRN
Status: DISCONTINUED | OUTPATIENT
Start: 2025-04-17 | End: 2025-04-19 | Stop reason: HOSPADM

## 2025-04-17 RX ORDER — POTASSIUM CHLORIDE 7.45 MG/ML
10 INJECTION INTRAVENOUS PRN
Status: DISCONTINUED | OUTPATIENT
Start: 2025-04-17 | End: 2025-04-19 | Stop reason: HOSPADM

## 2025-04-17 RX ORDER — SODIUM CHLORIDE 9 MG/ML
INJECTION, SOLUTION INTRAVENOUS CONTINUOUS
Status: DISCONTINUED | OUTPATIENT
Start: 2025-04-17 | End: 2025-04-18

## 2025-04-17 RX ORDER — POLYETHYLENE GLYCOL 3350 17 G/17G
17 POWDER, FOR SOLUTION ORAL DAILY PRN
Status: DISCONTINUED | OUTPATIENT
Start: 2025-04-17 | End: 2025-04-19 | Stop reason: HOSPADM

## 2025-04-17 RX ORDER — MAGNESIUM SULFATE IN WATER 40 MG/ML
2000 INJECTION, SOLUTION INTRAVENOUS PRN
Status: DISCONTINUED | OUTPATIENT
Start: 2025-04-17 | End: 2025-04-19 | Stop reason: HOSPADM

## 2025-04-17 RX ORDER — ONDANSETRON 4 MG/1
4 TABLET, ORALLY DISINTEGRATING ORAL EVERY 8 HOURS PRN
Status: DISCONTINUED | OUTPATIENT
Start: 2025-04-17 | End: 2025-04-19 | Stop reason: HOSPADM

## 2025-04-17 RX ORDER — ENOXAPARIN SODIUM 100 MG/ML
30 INJECTION SUBCUTANEOUS 2 TIMES DAILY
Status: DISCONTINUED | OUTPATIENT
Start: 2025-04-17 | End: 2025-04-19 | Stop reason: HOSPADM

## 2025-04-17 RX ORDER — ACETAMINOPHEN 325 MG/1
650 TABLET ORAL EVERY 6 HOURS PRN
Status: DISCONTINUED | OUTPATIENT
Start: 2025-04-17 | End: 2025-04-19 | Stop reason: HOSPADM

## 2025-04-17 RX ORDER — ACETAMINOPHEN 650 MG/1
650 SUPPOSITORY RECTAL EVERY 6 HOURS PRN
Status: DISCONTINUED | OUTPATIENT
Start: 2025-04-17 | End: 2025-04-19 | Stop reason: HOSPADM

## 2025-04-17 RX ORDER — ONDANSETRON 2 MG/ML
4 INJECTION INTRAMUSCULAR; INTRAVENOUS EVERY 6 HOURS PRN
Status: DISCONTINUED | OUTPATIENT
Start: 2025-04-17 | End: 2025-04-19 | Stop reason: HOSPADM

## 2025-04-17 RX ORDER — ALBUTEROL SULFATE 5 MG/ML
2.5 SOLUTION RESPIRATORY (INHALATION)
Status: DISCONTINUED | OUTPATIENT
Start: 2025-04-17 | End: 2025-04-19 | Stop reason: HOSPADM

## 2025-04-17 RX ADMIN — INSULIN LISPRO 6 UNITS: 100 INJECTION, SOLUTION INTRAVENOUS; SUBCUTANEOUS at 17:46

## 2025-04-17 RX ADMIN — MAGNESIUM SULFATE HEPTAHYDRATE 2000 MG: 40 INJECTION, SOLUTION INTRAVENOUS at 18:21

## 2025-04-17 RX ADMIN — Medication 2.5 MG: at 16:16

## 2025-04-17 RX ADMIN — SODIUM CHLORIDE, POTASSIUM CHLORIDE, SODIUM LACTATE AND CALCIUM CHLORIDE 1000 ML: 600; 310; 30; 20 INJECTION, SOLUTION INTRAVENOUS at 16:03

## 2025-04-17 RX ADMIN — IPRATROPIUM BROMIDE AND ALBUTEROL SULFATE 1 DOSE: .5; 2.5 SOLUTION RESPIRATORY (INHALATION) at 16:15

## 2025-04-17 RX ADMIN — SODIUM CHLORIDE, PRESERVATIVE FREE 10 ML: 5 INJECTION INTRAVENOUS at 20:15

## 2025-04-17 RX ADMIN — IPRATROPIUM BROMIDE AND ALBUTEROL SULFATE 1 DOSE: .5; 2.5 SOLUTION RESPIRATORY (INHALATION) at 21:16

## 2025-04-17 RX ADMIN — ARIPIPRAZOLE 10 MG: 10 TABLET ORAL at 21:17

## 2025-04-17 RX ADMIN — ONDANSETRON 4 MG: 2 INJECTION, SOLUTION INTRAMUSCULAR; INTRAVENOUS at 16:05

## 2025-04-17 RX ADMIN — ACETAMINOPHEN 650 MG: 325 TABLET ORAL at 21:17

## 2025-04-17 RX ADMIN — SODIUM CHLORIDE: 0.9 INJECTION, SOLUTION INTRAVENOUS at 19:57

## 2025-04-17 RX ADMIN — INSULIN GLARGINE 15 UNITS: 100 INJECTION, SOLUTION SUBCUTANEOUS at 21:17

## 2025-04-17 RX ADMIN — ENOXAPARIN SODIUM 30 MG: 100 INJECTION SUBCUTANEOUS at 21:17

## 2025-04-17 RX ADMIN — DULOXETINE 20 MG: 20 CAPSULE, DELAYED RELEASE ORAL at 21:17

## 2025-04-17 ASSESSMENT — ENCOUNTER SYMPTOMS
COLOR CHANGE: 0
ABDOMINAL PAIN: 0
WHEEZING: 0
BACK PAIN: 0
CONSTIPATION: 0
VOMITING: 1
DIARRHEA: 0
BLOOD IN STOOL: 0
SORE THROAT: 0
SHORTNESS OF BREATH: 1
TROUBLE SWALLOWING: 0
NAUSEA: 1
COUGH: 0

## 2025-04-17 ASSESSMENT — PAIN SCALES - GENERAL: PAINLEVEL_OUTOF10: 7

## 2025-04-17 ASSESSMENT — PAIN DESCRIPTION - LOCATION: LOCATION: HEAD

## 2025-04-17 ASSESSMENT — PAIN - FUNCTIONAL ASSESSMENT: PAIN_FUNCTIONAL_ASSESSMENT: 0-10

## 2025-04-17 NOTE — H&P
[facility]  Office: [phone]  Mal Walsh DO, Alex Briones DO, Gonzales Gonzalez DO, Mike Flores DO, Luca Tamayo MD, Jesica Escobar MD, Princess Arenas MD, Stephanie Ruggiero MD,  Higinio Lee MD, Keaton Allen MD, Felix Roth MD,  Sai Howe DO, Shelley Humphries MD, Bala Robert MD, Alonzo Walsh DO, Briseyda Sharma MD,  Adriel Adorno DO, Lotus Chun MD, Leyla Banks MD, Kathryn Sanford MD,  Tarik Martinez MD, Ochoa Keys MD, Keaton Uribe MD, Macrina Roca MD, Flex Hickey MD, Madelin Cabrales MD, Napoleon Matos DO, Nataliya Rogers MD, Demarcus Torres MD, Sai Fry MD, Mohsin Reza, MD, Pepe Mancia MD, Shirley Waterhouse, CNP,  Laila Montano, CNP, Napoleon Braun, CNP,  Alena Lane, DNP, Pilar Aguilar, CNP, Hawa Colvin, CNP, Salina Alvarez, CNP, Lauryn Beck, CNP, Meryl Puckett, PA-C, Jessi Neumann, CNP, Alla Ryder, CNP,  Brandie Bach, CNP, Jannet Chester, CNP, Nahid Avalos, PA-C, Tashia Becerril, CNP,  Margoth Johnson, CNS, Reva Schmidt, CNP, Rachana Albright, CNP,   Mary Cervantes, CNP         [facility]   IN-PATIENT SERVICE   [facility]    HISTORY AND PHYSICAL EXAMINATION            Date:   4/17/2025  Patient name:  Meño Goodman  Date of admission:  4/17/2025  3:14 PM  MRN:   [id number]  Account:  [id number]  YOB: 1990  PCP:    Jean Claude Garcia MD  Room:   07/07  Code Status:    Prior    Chief Complaint:     Chief Complaint   Patient presents with    Shortness of Breath    Nausea       History Obtained From:     patient    History of Present Illness:     Meño Goodman is a 35 y.o.  /  female who presents with Shortness of Breath and Nausea   and is admitted to the hospital for the management of Asthma exacerbation in COPD (HCC).    35-year-old obese female with past medical history of asthma diabetes bipolar disorder obstructive sleep apnea who presented to the hospital due to   Resp 16   LMP 10/29/2024 (Within Days)   SpO2 94%   Temp (24hrs), Av.1 °F (36.7 °C), Min:98.1 °F (36.7 °C), Max:98.1 °F (36.7 °C)    Recent Labs     25  1534   POCGLU 368*     No intake or output data in the 24 hours ending 25 1825    Physical Exam  Constitutional:       General: She is not in acute distress.     Appearance: She is obese. She is ill-appearing.   HENT:      Head: Normocephalic and atraumatic.      Mouth/Throat:      Mouth: Mucous membranes are moist.   Eyes:      General: No scleral icterus.     Extraocular Movements: Extraocular movements intact.      Pupils: Pupils are equal, round, and reactive to light.   Cardiovascular:      Rate and Rhythm: Normal rate and regular rhythm.      Heart sounds: No murmur heard.  Pulmonary:      Effort: Pulmonary effort is normal. No respiratory distress.      Breath sounds: No rales.   Abdominal:      General: There is no distension.      Tenderness: There is no abdominal tenderness. There is no rebound.   Musculoskeletal:         General: No tenderness or deformity.      Cervical back: Normal range of motion. No rigidity or tenderness.      Right lower leg: No edema.      Left lower leg: No edema.   Skin:     Coloration: Skin is not jaundiced.      Findings: No lesion or rash.   Neurological:      General: No focal deficit present.      Mental Status: She is alert and oriented to person, place, and time.      Sensory: No sensory deficit.      Motor: No weakness.   Psychiatric:         Mood and Affect: Mood normal.         Investigations:      Laboratory Testing:  Recent Results (from the past 24 hours)   POC Glucose Fingerstick    Collection Time: 25  3:34 PM   Result Value Ref Range    POC Glucose 368 (H) 65 - 105 mg/dL   POCT glucose    Collection Time: 25  3:36 PM   Result Value Ref Range    Glucose 398 mg/dL    QC OK? ok    CBC with Auto Differential    Collection Time: 25  3:38 PM   Result Value Ref Range    WBC 10.0 3.5

## 2025-04-17 NOTE — ED TRIAGE NOTES
Pt arrived to ED 07 via EMS.   Pt co SOB and nausea.   Pt states that she has had nausea x 3 days.   Pt was walking to the hospital when she became SOB.   Pt has hx of asthma.   Pt denies any CP.   Pt states that she has been without her insulin x 2 months.   Pt denies any vomiting.   Pt is resting on stretcher with call light within reach.  Breathing is non labored and no acute distress is noted.   Will continue to follow plan of care

## 2025-04-17 NOTE — ED PROVIDER NOTES
[facility] Emergency Department  Emergency Department Encounter  Emergency Medicine Resident     Pt Name:Meño Goodman  MRN: [id number]  Birthdate 1990  Date of evaluation: 25  PCP:  Jean Claude Garcia MD  Note Started: 5:15 PM EDT      CHIEF COMPLAINT       Chief Complaint   Patient presents with    Shortness of Breath    Nausea       HISTORY OF PRESENT ILLNESS  (Location/Symptom, Timing/Onset, Context/Setting, Quality, Duration, Modifying Factors, Severity.)      Meño Goodman is a 35 y.o. female with past medical history of asthma and diabetes who presents with nausea, vomiting for the past several days.  Patient was walking to the hospital to be evaluated and had walked approximately 3 blocks when she also developed shortness of breath so she called EMS.  Per EMS she was desatting to the 88%, tachycardic with diffuse wheezing. Patient herself reports that she has frequent asthma exacerbations, states that she does use her inhalers.  Patient does admit that she has not taken her insulin for over 2 months.  She states she has had difficulty having access to this.  Patient also reports that she has not had her period since October.  She is sexually active and is not on birth control.  No history of DVT/PE.  No hemoptysis, no calf tenderness, swelling or erythema.    PAST MEDICAL / SURGICAL / SOCIAL / FAMILY HISTORY      has a past medical history of Asthma, Back pain, Bipolar 1 disorder (HCC), Bipolar disorder, Depression, Diabetes mellitus (HCC), Dizziness, Fatty liver disease, nonalcoholic, Fatty liver disease, nonalcoholic, GERD (gastroesophageal reflux disease), and Obesity.       has a past surgical history that includes  section and LEEP.      Social History     Socioeconomic History    Marital status:      Spouse name: Not on file    Number of children: Not on file    Years of education: Not on file    Highest education level: Not on file   Occupational History    Not on file  (diffuse inspiratory and expiratory wheezing) present.   Skin:     General: Skin is warm and dry.      Capillary Refill: Capillary refill takes 2 to 3 seconds.   Neurological:      General: No focal deficit present.      Mental Status: She is alert and oriented to person, place, and time.           DDX/DIAGNOSTIC RESULTS / EMERGENCY DEPARTMENT COURSE / [facility]     Medical Decision Making  This is a 35-year-old morbidly obese female with multiple comorbidities including type 2 diabetes and asthma presenting with nausea, vomiting, shortness of breath.  Patient has been noncompliant with her insulin for months due to difficulty with access.  Patient was brought in by EMS and was actually requiring oxygen, desatting to 88% on room air.    On exam, patient appears ill, she is tachycardic and tachypneic.  Patient appears clinically dehydrated with dry mucous membranes and delayed capillary refill.  She has significant inspiratory and expiratory wheezing on auscultation.  She is conversationally dyspneic.    Concern for potential DKA/HHS picture along with asthma exacerbation.  Will do a DKA workup, chest x-ray, D-dimer to rule out PE due to significant tachycardia.  Will have respiratory care evaluate and treat.    Amount and/or Complexity of Data Reviewed  Labs: ordered. Decision-making details documented in ED Course.  Radiology: ordered. Decision-making details documented in ED Course.  ECG/medicine tests: ordered.    Risk  Prescription drug management.  Decision regarding hospitalization.        EKG  EKG Interpretation    Interpreted by emergency department physician    Rhythm: normal sinus   Rate: tachycardia  Axis: normal  Ectopy: none  Conduction: normal  ST Segments: no acute change  T Waves: no acute change  Q Waves: none    Clinical Impression: no acute changes and sinus tachycardia    Mai Bour, DO      All EKG's are interpreted by the Emergency Department Physician who either signs or Co-signs this chart in the  absence of a cardiologist.    EMERGENCY DEPARTMENT COURSE:    ED Course as of 04/18/25 0044   u Apr 17, 2025   1540 POCT glucose:    Glucose 398   QC OK? ok [MB]   1609 D-Dimer, Quantitative:    D-Dimer, Quant <0.27 [MB]   1609 CBC with Auto Differential(!):    WBC 10.0   RBC 4.29   Hemoglobin Quant 12.5   Hematocrit 38.6   MCV 90.0   MCH 29.1   MCHC 32.4   RDW 12.7   Platelet Count 323   MPV 10.4   NRBC Automated 0.0   Neutrophils % 69(!)   Lymphocyte % 20(!)   Monocytes % 7   Eosinophils % 4   Basophils % 0   Immature Granulocytes % 0   Neutrophils Absolute 6.88   Lymphocytes Absolute 2.00   Monocytes Absolute 0.68   Eosinophils Absolute 0.39   Basophils Absolute 0.04   Immature Granulocytes Absolute 0.04 [MB]   1610 HCG Qualitative, Serum:    Preg, Serum NEGATIVE [MB]   1610 BLOOD GAS, VENOUS(!):    pH, Kirt 7.364   pCO2, Kirt 44.7   pO2, Kirt 87.4(!)   Bicarbonate, Venous 24.9   Negative Base Excess, Kirt 0.7   O2 Saturation Venous 96.3(!)   Carboxyhemoglobin 2.0   Pt Temp 37.0   FIO2 INFORMATION NOT PROVIDED [MB]   1610 XR CHEST PORTABLE  IMPRESSION:  No acute process.   [MB]   1648 Beta-Hydroxybutyrate:    Beta-Hydroxybutyrate 0.08 [MB]   1714 Magnesium:    Magnesium 1.9 [MB]   1714 Phosphorus:    Phosphorus 2.7 [MB]   1714 Lipase:    Lipase 32 [MB]   1715 Reevaluated patient after breathing treatments.  Very minimal improvement in both inspiratory and expiratory wheeze.  Patient still requiring 2 L of oxygen.  Will plan for admission.  Will also give magnesium. [MB]   1726 Spoke with [facility] at this time, they will accept the patient for admission. [MB]      ED Course User Index  [MB] Mai Almonte DO         CONSULTS:  IP CONSULT TO INTERNAL MEDICINE  IP CONSULT TO INTERNAL MEDICINE  IP CONSULT TO RESPIRATORY CARE        FINAL IMPRESSION      1. Exacerbation of persistent asthma, unspecified asthma severity          DISPOSITION / PLAN     DISPOSITION Admitted 04/18/2025 12:31:42 AM   DISPOSITION CONDITION

## 2025-04-17 NOTE — ED PROVIDER NOTES
FACULTY SIGN-OUT  ADDENDUM     Care of this patient was assumed from previous attending physician. The patient's initial evaluation and plan have been discussed with the prior provider who initially evaluated the patient.      Attestation  I was available and discussed any additional care issues that arose and coordinated the management plans with the resident(s) caring for the patient during my duty period. Any areas of disagreement with resident's documentation of care or procedures are noted on the chart. I was personally present for the key portions of any/all procedures, during my duty period. I have documented in the chart those procedures where I was not present during the key portions.       ED COURSE      The patient was given the following medications:  Orders Placed This Encounter   Medications    lactated ringers bolus 1,000 mL    ondansetron (ZOFRAN) injection 4 mg    albuterol (PROVENTIL) nebulizer solution 2.5 mg     Initiate RT Bronchodilator Protocol:   Yes - Inpatient Protocol    ipratropium 0.5 mg-albuterol 2.5 mg (DUONEB) nebulizer solution 1 Dose     Initiate RT Bronchodilator Protocol:   Yes - Inpatient Protocol       RECENT VITALS:   Temp: 98.1 °F (36.7 °C), Pulse: (!) 118, Respirations: 20, BP: (!) 154/125    MEDICAL DECISION MAKING        Meño Goodman is a 35 y.o. female who presents to the Emergency Department with complaints of sob      Killian Irizarry MD, MD, F.A.C.E.P.  Attending Emergency Physician    (Please note that portions of this note were completed with a voice recognition program.  Efforts were made to edit the dictations but occasionally words are mis-transcribed.)        Killian Irizarry MD  04/17/25 5428

## 2025-04-17 NOTE — ED NOTES
ED to inpatient nurses report      Chief Complaint:  Chief Complaint   Patient presents with    Shortness of Breath    Nausea     Present to ED from: home    MOA:     LOC: alert and orientated to name, place, date  Mobility: Independent  Oxygen Baseline: RA    Current needs required: 2lNC   Pending ED orders: none  Present condition: stable    Why did the patient come to the ED? SOB, nausea   What is the plan? Admit for hyperglycemia and asthma exacerbation   Any procedures or intervention occur? X-ray, labs  Any safety concerns??    Mental Status:  Level of Consciousness: Alert (0)    Psych Assessment:      Vital signs   Vitals:    04/17/25 1523 04/17/25 1531 04/17/25 1615 04/17/25 1719   BP:  (!) 142/94     Pulse: (!) 121 (!) 130 (!) 118 (!) 115   Resp: 17 15 20 16   Temp:       TempSrc:       SpO2: 91% 91% 96% 94%        Vitals:  Patient Vitals for the past 24 hrs:   BP Temp Temp src Pulse Resp SpO2   04/17/25 1719 -- -- -- (!) 115 16 94 %   04/17/25 1615 -- -- -- (!) 118 20 96 %   04/17/25 1531 (!) 142/94 -- -- (!) 130 15 91 %   04/17/25 1523 -- -- -- (!) 121 17 91 %   04/17/25 1518 (!) 154/125 -- -- (!) 124 25 93 %   04/17/25 1516 (!) 155/47 98.1 °F (36.7 °C) Oral (!) 128 22 94 %      Visit Vitals  BP (!) 142/94   Pulse (!) 115   Temp 98.1 °F (36.7 °C) (Oral)   Resp 16   SpO2 94%        LDAs:   Peripheral IV 04/17/25 Posterior;Right Hand (Active)       Peripheral IV 04/17/25 Right;Anterior Forearm (Active)       Ambulatory Status:  Presents to emergency department  because of falls (Syncope, seizure, or loss of consciousness): No, Age > 70: No, Altered Mental Status, Intoxication with alcohol or substance confusion (Disorientation, impaired judgment, poor safety awaremess, or inability to follow instructions): No, Impaired Mobility: Ambulates or transfers with assistive devices or assistance; Unable to ambulate or transer.: No, Nursing Judgement: No    Diagnosis:  DISPOSITION Admitted 04/17/2025 05:28:48 PM  once for 1 dose    BUDESONIDE-FORMOTEROL (SYMBICORT) 160-4.5 MCG/ACT AERO    Inhale 2 puffs into the lungs 2 times daily    BUDESONIDE-FORMOTEROL (SYMBICORT) 160-4.5 MCG/ACT AERO    Inhale 2 puffs into the lungs 2 times daily    CALAMINE-ZINC OXIDE (V-R CALAMINE) LOTN    Apply 1 each topically Daily    DULOXETINE (CYMBALTA) 20 MG EXTENDED RELEASE CAPSULE    Take 1 capsule by mouth daily    FAMOTIDINE (PEPCID) 20 MG TABLET    Take 1 tablet by mouth 2 times daily    GLUCOSE MONITORING KIT    1 kit by Does not apply route daily    HYDROXYZINE HCL (ATARAX) 50 MG TABLET    TAKE ONE TABLET BY MOUTH THREE TIMES DAILY AS NEEDED FOR ITCHING    IBUPROFEN (IBU) 400 MG TABLET    Take 1 tablet by mouth every 6 hours as needed for Pain    INSULIN GLARGINE (LANTUS) 100 UNIT/ML INJECTION VIAL    Inject 15 Units into the skin daily    INSULIN LISPRO (HUMALOG,ADMELOG) 100 UNIT/ML SOLN INJECTION VIAL    Inject 0-8 Units into the skin 4 times daily (before meals and nightly)    IPRATROPIUM 0.5 MG-ALBUTEROL 2.5 MG (DUONEB) 0.5-2.5 (3) MG/3ML SOLN NEBULIZER SOLUTION    Inhale 3 mLs into the lungs every 4 hours as needed for Shortness of Breath    LANCETS MISC    1 each by Does not apply route daily    LANCETS MISC    1 each by Does not apply route daily    LANCETS MISC. (ACCU-CHEK SOFTCLIX LANCET DEV) KIT    Use as directed for glucose checks    MELOXICAM (MOBIC) 15 MG TABLET    Take 1 tablet by mouth daily    METFORMIN (GLUCOPHAGE) 1000 MG TABLET    Take 1 tablet by mouth 2 times daily (with meals)    ONDANSETRON (ZOFRAN) 4 MG TABLET    Take 1 tablet by mouth 3 times daily as needed for Nausea or Vomiting    ONDANSETRON (ZOFRAN-ODT) 4 MG DISINTEGRATING TABLET    Take 1 tablet by mouth 3 times daily as needed for Nausea or Vomiting    TRAZODONE (DESYREL) 50 MG TABLET    Take 1 tablet by mouth nightly as needed for Sleep    TRULICITY 1.5 MG/0.5ML SC INJECTION    inject 0.5 milliliters subcutaneously every week     Orders Placed This

## 2025-04-17 NOTE — ED PROVIDER NOTES
[facility] EMERGENCY DEPARTMENT     Emergency Department     Faculty Attestation        I performed a history and physical examination of the patient and discussed management with the resident. I reviewed the resident’s note and agree with the documented findings and plan of care. Any areas of disagreement are noted on the chart. I was personally present for the key portions of any procedures. I have documented in the chart those procedures where I was not present during the key portions. I have reviewed the emergency nurses triage note. I agree with the chief complaint, past medical history, past surgical history, allergies, medications, social and family history as documented unless otherwise noted below.    For mid-level providers such as nurse practitioners as well as physicians assistants:    I have personally seen and evaluated the patient.    I find the patient's history and physical exam are consistent with NP/PA documentation.  I agree with the care provided, treatment rendered, disposition, & follow-up plan.     Additional findings are as noted.    Vital Signs: BP (!) 154/125   Pulse (!) 121   Temp 98.1 °F (36.7 °C) (Oral)   Resp 17   LMP 10/29/2024 (Within Days)   SpO2 91%   PCP:  Jean Claude Garcia MD    Pertinent Comments:     Patient planes of shortness of breath.  She has multiple medical problems has been noncompliant with diabetes medication she is diffusely wheezing on exam and mildly tachypneic and tachycardic will give breathing treatments, laboratory studies, symptomatic treatment, D-dimer, reassess      Critical Care  None          Jan Agustin MD    Attending Emergency Medicine Physician            Luis Fernando Agutsin MD  04/17/25 1530

## 2025-04-18 DIAGNOSIS — J45.40 MODERATE PERSISTENT ASTHMA, UNSPECIFIED WHETHER COMPLICATED: ICD-10-CM

## 2025-04-18 LAB
EKG ATRIAL RATE: 123 BPM
EKG P AXIS: 67 DEGREES
EKG P-R INTERVAL: 140 MS
EKG Q-T INTERVAL: 318 MS
EKG QRS DURATION: 80 MS
EKG QTC CALCULATION (BAZETT): 455 MS
EKG R AXIS: 43 DEGREES
EKG T AXIS: 55 DEGREES
EKG VENTRICULAR RATE: 123 BPM
GLUCOSE BLD-MCNC: 208 MG/DL (ref 65–105)
GLUCOSE BLD-MCNC: 278 MG/DL (ref 65–105)
GLUCOSE BLD-MCNC: 316 MG/DL (ref 65–105)
GLUCOSE BLD-MCNC: 435 MG/DL (ref 65–105)
GLUCOSE BLD-MCNC: 437 MG/DL (ref 65–105)

## 2025-04-18 PROCEDURE — 6370000000 HC RX 637 (ALT 250 FOR IP): Performed by: STUDENT IN AN ORGANIZED HEALTH CARE EDUCATION/TRAINING PROGRAM

## 2025-04-18 PROCEDURE — 94640 AIRWAY INHALATION TREATMENT: CPT

## 2025-04-18 PROCEDURE — 82947 ASSAY GLUCOSE BLOOD QUANT: CPT

## 2025-04-18 PROCEDURE — 94760 N-INVAS EAR/PLS OXIMETRY 1: CPT

## 2025-04-18 PROCEDURE — 2700000000 HC OXYGEN THERAPY PER DAY

## 2025-04-18 PROCEDURE — 6360000002 HC RX W HCPCS: Performed by: STUDENT IN AN ORGANIZED HEALTH CARE EDUCATION/TRAINING PROGRAM

## 2025-04-18 PROCEDURE — 96361 HYDRATE IV INFUSION ADD-ON: CPT

## 2025-04-18 PROCEDURE — 1200000000 HC SEMI PRIVATE

## 2025-04-18 PROCEDURE — 99232 SBSQ HOSP IP/OBS MODERATE 35: CPT | Performed by: STUDENT IN AN ORGANIZED HEALTH CARE EDUCATION/TRAINING PROGRAM

## 2025-04-18 PROCEDURE — G0378 HOSPITAL OBSERVATION PER HR: HCPCS

## 2025-04-18 PROCEDURE — 96372 THER/PROPH/DIAG INJ SC/IM: CPT

## 2025-04-18 PROCEDURE — 2500000003 HC RX 250 WO HCPCS: Performed by: STUDENT IN AN ORGANIZED HEALTH CARE EDUCATION/TRAINING PROGRAM

## 2025-04-18 PROCEDURE — 2580000003 HC RX 258: Performed by: STUDENT IN AN ORGANIZED HEALTH CARE EDUCATION/TRAINING PROGRAM

## 2025-04-18 RX ORDER — INSULIN LISPRO 100 [IU]/ML
5 INJECTION, SOLUTION INTRAVENOUS; SUBCUTANEOUS
Status: DISCONTINUED | OUTPATIENT
Start: 2025-04-18 | End: 2025-04-19

## 2025-04-18 RX ORDER — GLUCAGON 1 MG/ML
1 KIT INJECTION PRN
Status: DISCONTINUED | OUTPATIENT
Start: 2025-04-18 | End: 2025-04-19 | Stop reason: HOSPADM

## 2025-04-18 RX ORDER — DEXTROSE MONOHYDRATE 100 MG/ML
INJECTION, SOLUTION INTRAVENOUS CONTINUOUS PRN
Status: DISCONTINUED | OUTPATIENT
Start: 2025-04-18 | End: 2025-04-19 | Stop reason: HOSPADM

## 2025-04-18 RX ORDER — PREDNISONE 20 MG/1
50 TABLET ORAL DAILY
Status: DISCONTINUED | OUTPATIENT
Start: 2025-04-18 | End: 2025-04-19 | Stop reason: HOSPADM

## 2025-04-18 RX ADMIN — INSULIN LISPRO 4 UNITS: 100 INJECTION, SOLUTION INTRAVENOUS; SUBCUTANEOUS at 13:09

## 2025-04-18 RX ADMIN — ARIPIPRAZOLE 10 MG: 10 TABLET ORAL at 09:31

## 2025-04-18 RX ADMIN — DULOXETINE 20 MG: 20 CAPSULE, DELAYED RELEASE ORAL at 09:30

## 2025-04-18 RX ADMIN — INSULIN LISPRO 5 UNITS: 100 INJECTION, SOLUTION INTRAVENOUS; SUBCUTANEOUS at 09:29

## 2025-04-18 RX ADMIN — INSULIN GLARGINE 15 UNITS: 100 INJECTION, SOLUTION SUBCUTANEOUS at 20:13

## 2025-04-18 RX ADMIN — IPRATROPIUM BROMIDE AND ALBUTEROL SULFATE 1 DOSE: .5; 2.5 SOLUTION RESPIRATORY (INHALATION) at 11:34

## 2025-04-18 RX ADMIN — ACETAMINOPHEN 650 MG: 325 TABLET ORAL at 06:19

## 2025-04-18 RX ADMIN — INSULIN LISPRO 5 UNITS: 100 INJECTION, SOLUTION INTRAVENOUS; SUBCUTANEOUS at 13:09

## 2025-04-18 RX ADMIN — IPRATROPIUM BROMIDE AND ALBUTEROL SULFATE 1 DOSE: .5; 2.5 SOLUTION RESPIRATORY (INHALATION) at 16:43

## 2025-04-18 RX ADMIN — ENOXAPARIN SODIUM 30 MG: 100 INJECTION SUBCUTANEOUS at 20:05

## 2025-04-18 RX ADMIN — INSULIN LISPRO 8 UNITS: 100 INJECTION, SOLUTION INTRAVENOUS; SUBCUTANEOUS at 18:10

## 2025-04-18 RX ADMIN — INSULIN LISPRO 8 UNITS: 100 INJECTION, SOLUTION INTRAVENOUS; SUBCUTANEOUS at 20:13

## 2025-04-18 RX ADMIN — ENOXAPARIN SODIUM 30 MG: 100 INJECTION SUBCUTANEOUS at 09:29

## 2025-04-18 RX ADMIN — IPRATROPIUM BROMIDE AND ALBUTEROL SULFATE 1 DOSE: .5; 2.5 SOLUTION RESPIRATORY (INHALATION) at 08:01

## 2025-04-18 RX ADMIN — IPRATROPIUM BROMIDE AND ALBUTEROL SULFATE 1 DOSE: .5; 2.5 SOLUTION RESPIRATORY (INHALATION) at 20:31

## 2025-04-18 RX ADMIN — INSULIN LISPRO 2 UNITS: 100 INJECTION, SOLUTION INTRAVENOUS; SUBCUTANEOUS at 09:28

## 2025-04-18 RX ADMIN — SODIUM CHLORIDE: 0.9 INJECTION, SOLUTION INTRAVENOUS at 06:19

## 2025-04-18 RX ADMIN — SODIUM CHLORIDE, PRESERVATIVE FREE 10 ML: 5 INJECTION INTRAVENOUS at 20:14

## 2025-04-18 RX ADMIN — PREDNISONE 50 MG: 20 TABLET ORAL at 09:29

## 2025-04-18 RX ADMIN — INSULIN LISPRO 5 UNITS: 100 INJECTION, SOLUTION INTRAVENOUS; SUBCUTANEOUS at 18:10

## 2025-04-18 ASSESSMENT — ENCOUNTER SYMPTOMS
WHEEZING: 0
DIARRHEA: 0
VOMITING: 0
BACK PAIN: 0
ABDOMINAL PAIN: 0
COUGH: 0
TROUBLE SWALLOWING: 0
NAUSEA: 0
SORE THROAT: 0
SHORTNESS OF BREATH: 0
CONSTIPATION: 0
COLOR CHANGE: 0
BLOOD IN STOOL: 0

## 2025-04-18 NOTE — PLAN OF CARE
Problem: Chronic Conditions and Co-morbidities  Goal: Patient's chronic conditions and co-morbidity symptoms are monitored and maintained or improved  Outcome: Progressing     Problem: Pain  Goal: Verbalizes/displays adequate comfort level or baseline comfort level  Outcome: Progressing     Problem: Respiratory - Adult  Goal: Achieves optimal ventilation and oxygenation  4/18/2025 1818 by Aby Barcenas RN  Outcome: Progressing  4/18/2025 1136 by Mansi Loera RCP  Outcome: Progressing  Flowsheets (Taken 4/18/2025 1134)  Achieves optimal ventilation and oxygenation:   Assess for changes in respiratory status   Assess for changes in mentation and behavior   Position to facilitate oxygenation and minimize respiratory effort   Encourage broncho-pulmonary hygiene including cough, deep breathe, incentive spirometry   Assess the need for suctioning and aspirate as needed   Respiratory therapy support as indicated   Assess and instruct to report shortness of breath or any respiratory difficulty   Oxygen supplementation based on oxygen saturation or arterial blood gases

## 2025-04-18 NOTE — CARE COORDINATION
Case Management Assessment  Initial Evaluation    Date/Time of Evaluation: 4/18/2025 8:49 AM  Assessment Completed by: Cora Loving RN    If patient is discharged prior to next notation, then this note serves as note for discharge by case management.    Patient Name: Meño Goodman                   YOB: 1990  Diagnosis: Asthma exacerbation in COPD (HCC) [J44.1]                   Date / Time: 4/17/2025  3:14 PM    Patient Admission Status: Inpatient   Readmission Risk (Low < 19, Mod (19-27), High > 27): Readmission Risk Score: 22    Current PCP: Jean Claude Garcia MD  PCP verified by CM? (P) Yes    Chart Reviewed: Yes      History Provided by: (P) Patient  Patient Orientation: (P) Alert and Oriented    Patient Cognition: (P) Alert    Hospitalization in the last 30 days (Readmission):  No    If yes, Readmission Assessment in CM Navigator will be completed.    Advance Directives:      Code Status: Full Code   Patient's Primary Decision Maker is: (P) Legal Next of Kin    Primary Decision Maker: Russ Goodman - Spouse - [phone]    Discharge Planning:    Patient lives with: (P) Spouse/Significant Other Type of Home: (P) Apartment  Primary Care Giver: (P) Self  Patient Support Systems include: (P) Spouse/Significant Other, Children, Family Members   Current Financial resources: (P) Medicaid  Current community resources:    Current services prior to admission: (P) C-pap            Current DME:              Type of Home Care services:  (P) None    ADLS  Prior functional level: (P) Independent in ADLs/IADLs  Current functional level: (P) Independent in ADLs/IADLs    PT AM-PAC:   /24  OT AM-PAC:   /24    Family can provide assistance at DC:    Would you like Case Management to discuss the discharge plan with any other family members/significant others, and if so, who?    Plans to Return to Present Housing: (P) Yes  Other Identified Issues/Barriers to RETURNING to current housing: none  Potential

## 2025-04-18 NOTE — PROGRESS NOTES
04/18/25 1134   Care Plan - Respiratory Goals   Achieves optimal ventilation and oxygenation Assess for changes in respiratory status;Assess for changes in mentation and behavior;Position to facilitate oxygenation and minimize respiratory effort;Encourage broncho-pulmonary hygiene including cough, deep breathe, incentive spirometry;Assess the need for suctioning and aspirate as needed;Respiratory therapy support as indicated;Assess and instruct to report shortness of breath or any respiratory difficulty;Oxygen supplementation based on oxygen saturation or arterial blood gases

## 2025-04-18 NOTE — PROGRESS NOTES
[facility]  Office: [phone]  Mal Walsh DO, Alex Briones DO, Gonzales Gonzalez DO, Mike Flores, DO, Luca Tamayo MD, Jesica Escobar MD, Princess Arenas MD, Stephanie Ruggiero MD,  Higinio Lee MD, Keaton Allen MD, Felix Roth MD,  Sai Howe DO, Shelley Humphries MD, Bala Robert MD, Alonzo Walsh DO, Briseyda Sharma MD,  Adriel Adorno DO, Lotus Chun MD, Leyla Banks MD, Kathryn Sanford MD,  Tarik Martinez MD, Ochoa Keys MD, Keaton Uribe MD, Macrina Roca MD, Flex Hickey MD, Madelin Cabrales MD, Napoleon Matos DO, Nataliya Rogers MD, Demarcus Torres MD, Sai Fry MD, Mohsin Reza, MD, Pepe Mancia MD, Shirley Waterhouse, CNP,  Laila Montano, CNP, Napoleon Braun, CNP,  Alena Lane, DNP, Pilar Aguilar, CNP, Hawa Colvin, CNP, Salina Alvarez, CNP, Lauryn Beck, CNP, Meryl Puckett, PA-C, Jessi Neumann, CNP, Alla Ryder, CNP,  Brandie Bach, CNP, Jannet Chester, CNP, Nahid Avalos, PA-C, Tashia Becerril, CNP,  Margoth Johnson, CNS, Reva Schmidt, CNP, Rachana Albright, CNP,   Mary Cervantes, CNP         [facility]   IN-PATIENT SERVICE   [facility]    Progress Note    4/18/2025    12:34 PM    Name:   Meño Goodman  MRN:     [id number]     Acct:      [id number]   Room:   0344/0344-02  IP Day:  1  Admit Date:  4/17/2025  3:14 PM    PCP:   Jean Claude Garcia MD  Code Status:  Full Code    Subjective:     C/C:   Chief Complaint   Patient presents with    Shortness of Breath    Nausea     Interval History Status: improved.     Seen and examined, she was seen eating her breakfast, feeling better today denies abdominal pain  Remain on 2 L nasal cannula  Will add insulin lispro with meals to control her blood sugar and will continue with oral steroid  Discontinue IV fluid  Consider discharge patient tomorrow if her blood sugar control and if she remains  Discussed with RN    Review of Systems:     Review of Systems   Constitutional:  General: No tenderness or deformity.      Cervical back: Normal range of motion. No rigidity or tenderness.      Right lower leg: No edema.      Left lower leg: No edema.   Skin:     Coloration: Skin is not jaundiced.      Findings: No lesion or rash.   Neurological:      General: No focal deficit present.      Mental Status: She is alert and oriented to person, place, and time.      Sensory: No sensory deficit.      Motor: No weakness.   Psychiatric:         Mood and Affect: Mood normal.         Assessment:        Hospital Problems           Last Modified POA    * (Principal) Asthma exacerbation in COPD ([facility]) 4/17/2025 Yes       Plan:        Acute hypoxic respiratory failure requiring oxygen supplement likely due to asthma exacerbation, continue with DuoNeb, oxygen supplement  wean off as tolerated, steroid   Diabetes with hyperglycemia, with normal anion gap and normal beta hydroxybutyrate, will continue with insulin sliding scale, Lantus diabetic diet, add insulin lispro with meals  Sinus tachycardia most likely due to dehydration -resolved  Discontinue IV fluid  History of anxiety and bipolar disorder resume home medication  Continue symptomatic management  Correct electrolyte abnormalities  Resume home medication  Morbidly obese counseled the patient regarding lifestyle modification    Macrina Roca MD  4/18/2025  12:34 PM

## 2025-04-18 NOTE — CARE COORDINATION
Case Management   Daily Progress Note       Patient Name: Meño Goodman                   YOB: 1990  Diagnosis: Asthma exacerbation in COPD (HCC) [J44.1]                       GMLOS: 2.9 days  Length of Stay: 1  days    Anticipated Discharge Date: One day until discharge    Readmission Risk (Low < 19, Mod (19-27), High > 27): Readmission Risk Score: 22        Current Transitional Plan    [x] Home Independently    [] Home with HC    [] Skilled Nursing Facility    [] Acute Rehabilitation    [] Long Term Acute Care (LTAC)    [] Other:     Plan for the Stay (Medical Management) :          Workflow Continuation (Additional Notes) :    Follow for home 02 needs    Cora Loving RN  April 18, 2025

## 2025-04-19 VITALS
WEIGHT: 293 LBS | DIASTOLIC BLOOD PRESSURE: 75 MMHG | RESPIRATION RATE: 17 BRPM | TEMPERATURE: 97.5 F | HEIGHT: 65 IN | SYSTOLIC BLOOD PRESSURE: 121 MMHG | BODY MASS INDEX: 48.82 KG/M2 | HEART RATE: 88 BPM | OXYGEN SATURATION: 94 %

## 2025-04-19 LAB
GLUCOSE BLD-MCNC: 197 MG/DL (ref 65–105)
GLUCOSE BLD-MCNC: 258 MG/DL (ref 65–105)
GLUCOSE BLD-MCNC: 314 MG/DL (ref 65–105)

## 2025-04-19 PROCEDURE — 6370000000 HC RX 637 (ALT 250 FOR IP): Performed by: STUDENT IN AN ORGANIZED HEALTH CARE EDUCATION/TRAINING PROGRAM

## 2025-04-19 PROCEDURE — G0378 HOSPITAL OBSERVATION PER HR: HCPCS

## 2025-04-19 PROCEDURE — 6360000002 HC RX W HCPCS: Performed by: STUDENT IN AN ORGANIZED HEALTH CARE EDUCATION/TRAINING PROGRAM

## 2025-04-19 PROCEDURE — 82947 ASSAY GLUCOSE BLOOD QUANT: CPT

## 2025-04-19 PROCEDURE — 99239 HOSP IP/OBS DSCHRG MGMT >30: CPT | Performed by: STUDENT IN AN ORGANIZED HEALTH CARE EDUCATION/TRAINING PROGRAM

## 2025-04-19 PROCEDURE — 94640 AIRWAY INHALATION TREATMENT: CPT

## 2025-04-19 PROCEDURE — 96372 THER/PROPH/DIAG INJ SC/IM: CPT

## 2025-04-19 RX ORDER — INSULIN LISPRO 100 [IU]/ML
8 INJECTION, SOLUTION INTRAVENOUS; SUBCUTANEOUS
Status: DISCONTINUED | OUTPATIENT
Start: 2025-04-19 | End: 2025-04-19 | Stop reason: HOSPADM

## 2025-04-19 RX ORDER — PREDNISONE 50 MG/1
50 TABLET ORAL DAILY
Qty: 3 TABLET | Refills: 0 | Status: SHIPPED | OUTPATIENT
Start: 2025-04-20 | End: 2025-04-23

## 2025-04-19 RX ORDER — INSULIN GLARGINE 100 [IU]/ML
20 INJECTION, SOLUTION SUBCUTANEOUS NIGHTLY
Status: DISCONTINUED | OUTPATIENT
Start: 2025-04-19 | End: 2025-04-19 | Stop reason: HOSPADM

## 2025-04-19 RX ORDER — INSULIN GLARGINE 100 [IU]/ML
20 INJECTION, SOLUTION SUBCUTANEOUS NIGHTLY
Qty: 10 ML | Refills: 3 | Status: SHIPPED | OUTPATIENT
Start: 2025-04-19

## 2025-04-19 RX ADMIN — INSULIN LISPRO 2 UNITS: 100 INJECTION, SOLUTION INTRAVENOUS; SUBCUTANEOUS at 08:36

## 2025-04-19 RX ADMIN — INSULIN LISPRO 5 UNITS: 100 INJECTION, SOLUTION INTRAVENOUS; SUBCUTANEOUS at 08:35

## 2025-04-19 RX ADMIN — INSULIN LISPRO 6 UNITS: 100 INJECTION, SOLUTION INTRAVENOUS; SUBCUTANEOUS at 12:40

## 2025-04-19 RX ADMIN — ARIPIPRAZOLE 10 MG: 10 TABLET ORAL at 08:40

## 2025-04-19 RX ADMIN — INSULIN LISPRO 8 UNITS: 100 INJECTION, SOLUTION INTRAVENOUS; SUBCUTANEOUS at 12:40

## 2025-04-19 RX ADMIN — ENOXAPARIN SODIUM 30 MG: 100 INJECTION SUBCUTANEOUS at 08:37

## 2025-04-19 RX ADMIN — DULOXETINE 20 MG: 20 CAPSULE, DELAYED RELEASE ORAL at 08:40

## 2025-04-19 RX ADMIN — IPRATROPIUM BROMIDE AND ALBUTEROL SULFATE 1 DOSE: .5; 2.5 SOLUTION RESPIRATORY (INHALATION) at 11:54

## 2025-04-19 RX ADMIN — IPRATROPIUM BROMIDE AND ALBUTEROL SULFATE 1 DOSE: .5; 2.5 SOLUTION RESPIRATORY (INHALATION) at 07:22

## 2025-04-19 RX ADMIN — PREDNISONE 50 MG: 20 TABLET ORAL at 08:37

## 2025-04-19 NOTE — PROGRESS NOTES
CLINICAL PHARMACY NOTE: MEDS TO BEDS    Total # of Prescriptions Filled: 2   The following medications were delivered to the patient:  Lantus  Prednisone 50 mg    Additional Documentation:   Delivered to pt on 4/19. Pt had no copay

## 2025-04-19 NOTE — PLAN OF CARE
Problem: Chronic Conditions and Co-morbidities  Goal: Patient's chronic conditions and co-morbidity symptoms are monitored and maintained or improved  4/18/2025 2159 by Cirilo Luna RN  Outcome: Progressing     Problem: Pain  Goal: Verbalizes/displays adequate comfort level or baseline comfort level  4/18/2025 2159 by Cirilo Luna, RN  Outcome: Progressing     Problem: Respiratory - Adult  Goal: Achieves optimal ventilation and oxygenation  4/18/2025 2159 by Cirilo Luna, RN  Outcome: Progressing

## 2025-04-19 NOTE — CARE COORDINATION
Referral made for transportation thru [place] [facility] [phone] Confirmation  #[id number]. Will be an [employer] or [employer] and will text patient when arrive and staff will bring patient down to main entrance

## 2025-04-19 NOTE — PLAN OF CARE
Problem: Respiratory - Adult  Goal: Achieves optimal ventilation and oxygenation  4/18/2025 2043 by Lindsey Evans RCP  Outcome: Progressing

## 2025-04-19 NOTE — DISCHARGE SUMMARY
[facility]  Office: [phone]  Mal Walsh DO, Alex Briones DO, Gonzales Gonzalez DO, Mike Flores DO, Luca Tamayo MD, Jesica Escobar MD, Princess Arenas MD, Stephanie Ruggiero MD,  Higinio Lee MD, Keaton Allen MD, Felix Roth MD,  Sai Howe DO, Shelley Humphries MD, Bala Robert MD, Alonzo Walsh DO, Briseyda Sharma MD,  Adriel Adorno DO, Lotus Chun MD, Leyla Banks MD, Kathryn Sanford MD,  Tarik Martinez MD, Ochoa Keys MD, Keaton Uribe MD, Macrina Roca MD, Flex Hickey MD, Madelin Cabrales MD, Napoleon Matos DO, Nataliya Rogers MD, Demarcus Torres MD, Sai Fry MD, Mohsin Reza, MD, Pepe Mancia MD, Shirley Waterhouse, CNP,  Laila Montano, CNP, Napoleon Braun, CNP,  Alena Lane, DNP, Pilar Aguilar, CNP, Hawa Colvin, CNP, Salina Alvarez, CNP, Lauryn Beck, CNP, Meryl Puckett, PA-C, Jessi Neumann, CNP, Alla Ryder, CNP,  Brandie Bach, CNP, Jannet Chester, CNP, Nahid Avalos, PA-C, Tashia Becerril, CNP,  Margoth Johnson, CNS, Reva Schmidt, CNP, Rachana Albright, CNP,   Mary Cervantes, CNP         [facility]   IN-PATIENT SERVICE   [facility]    Discharge Summary     Patient ID: Meño Goodman  :  1990   MRN: [id number]     ACCOUNT:  [id number]   Patient's PCP: Jean Claude Garcia MD  Admit Date: 2025   Discharge Date: 2025    Length of Stay: 2  Code Status:  Full Code  Admitting Physician: Macrina Roca MD  Discharge Physician: Macrina Roca MD     Active Discharge Diagnoses:     Hospital Problem Lists:  Principal Problem:    Asthma exacerbation in COPD (HCC)  Resolved Problems:    * No resolved hospital problems. *      Admission Condition:  fair     Discharged Condition: good    Hospital Stay:     Hospital Course:    35-year-old obese female with past medical history of asthma diabetes bipolar disorder obstructive sleep apnea who presented to the hospital due to generalized  weakness fatigue nausea vomiting and shortness of breath that started 2 days ago and is getting progressively worse, upon arrival to the emergency department patient was hypoxic placed on 2 L nasal cannula, lab remarkable for elevated blood sugar with a blood sugar 396, anion gap normal at 12, with normal beta hydroxybutyrate, she had mild transaminitis, chest x-ray unremarkable, D-dimer negative, EKG with sinus tachycardia with no acute ST or T wave changes, urinalysis pending, and she was admitted for generalized weakness fatigue, hyperglycemia and asthma exacerbation further evaluation and treatment     During hospital course patient continued to receive IV fluid, symptomatic management, close she received bronchodilator, oxygen supplement and oral steroid, she weaned off oxygen, Lantus dose increased, blood sugar become more controlled, she was seen and examined today at time of my evaluation today she was seen in room air, she denies any complaint, and she will be discharged home today on stable condition, to follow-up with the primary doctor after discharge        Significant therapeutic interventions: as above     Significant Diagnostic Studies:   Labs / Micro:  CBC:   Lab Results   Component Value Date/Time    WBC 10.0 04/17/2025 03:38 PM    RBC 4.29 04/17/2025 03:38 PM    RBC 4.02 05/14/2012 11:03 AM    HGB 12.5 04/17/2025 03:38 PM    HCT 38.6 04/17/2025 03:38 PM    MCV 90.0 04/17/2025 03:38 PM    MCH 29.1 04/17/2025 03:38 PM    MCHC 32.4 04/17/2025 03:38 PM    RDW 12.7 04/17/2025 03:38 PM     04/17/2025 03:38 PM     05/14/2012 11:03 AM     BMP:    Lab Results   Component Value Date/Time    GLUCOSE 396 04/17/2025 03:38 PM    GLUCOSE 93 05/14/2012 11:03 AM     04/17/2025 03:38 PM    K 4.4 04/17/2025 03:38 PM    CL 99 04/17/2025 03:38 PM    CO2 24 04/17/2025 03:38 PM    ANIONGAP 12 04/17/2025 03:38 PM    ANIONGAP 8 09/28/2020 10:38 PM    BUN 6 04/17/2025 03:38 PM    CREATININE 0.8

## 2025-04-19 NOTE — DISCHARGE INSTRUCTIONS
Please continue to take your medication as prescribed, continue to check your blood sugar regularly at home, follow-up with your primary doctor within 1 week of discharge to recheck your blood sugar and to adjust your diabetic medication if needed, continue with lifestyle modification including healthy diet and regular exercise.

## 2025-04-19 NOTE — PLAN OF CARE
Problem: Chronic Conditions and Co-morbidities  Goal: Patient's chronic conditions and co-morbidity symptoms are monitored and maintained or improved  Outcome: Adequate for Discharge     Problem: Pain  Goal: Verbalizes/displays adequate comfort level or baseline comfort level  Outcome: Adequate for Discharge     Problem: Respiratory - Adult  Goal: Achieves optimal ventilation and oxygenation  4/19/2025 1229 by Janna Shaikh RN  Outcome: Adequate for Discharge  4/19/2025 1228 by Bharat Valladares RCP  Outcome: Progressing

## 2025-04-20 LAB
MICROORGANISM SPEC CULT: NORMAL
SERVICE CMNT-IMP: NORMAL
SPECIMEN DESCRIPTION: NORMAL

## 2025-04-21 RX ORDER — BUDESONIDE AND FORMOTEROL FUMARATE DIHYDRATE 160; 4.5 UG/1; UG/1
AEROSOL RESPIRATORY (INHALATION)
Qty: 10.2 G | Refills: 0 | Status: SHIPPED | OUTPATIENT
Start: 2025-04-21

## 2025-04-21 NOTE — TELEPHONE ENCOUNTER
Last visit: 2/21/25  Last Med refill: 2/21/25  Does patient have enough medication for 72 hours: No:     Next Visit Date: 4/25/25  Future Appointments   Date Time Provider Department Center   4/22/2025 11:00 AM Nirali Armando PA-C [facility]   4/25/2025  2:30 PM Jean Claude Garcia MD [facility] DEP   5/27/2025  2:30 PM Chanelle Og DO [facility]       Health Maintenance   Topic Date Due    Diabetic retinal exam  Never done    Pneumococcal 0-49 years Vaccine (2 of 2 - PCV) 08/18/2018    Diabetic foot exam  08/12/2022    Hepatitis B vaccine (3 of 3 - 19+ 3-dose series) 09/17/2022    Diabetic Alb to Cr ratio (uACR) test  03/22/2023    COVID-19 Vaccine (3 - 2024-25 season) 09/01/2024    Flu vaccine (Season Ended) 08/01/2025    Lipids  11/13/2025    A1C test (Diabetic or Prediabetic)  02/09/2026    Depression Monitoring  02/21/2026    GFR test (Diabetes, CKD 3-4, OR last GFR 15-59)  04/17/2026    Cervical cancer screen  08/22/2028    DTaP/Tdap/Td vaccine (3 - Td or Tdap) 10/13/2033    Hepatitis C screen  Completed    HIV screen  Completed    Hepatitis A vaccine  Aged Out    Hib vaccine  Aged Out    HPV vaccine  Aged Out    Polio vaccine  Aged Out    Meningococcal (ACWY) vaccine  Aged Out    Meningococcal B vaccine  Aged Out    Varicella vaccine  Discontinued       Hemoglobin A1C (%)   Date Value   02/09/2025 5.4   10/21/2024 9.2 (H)   01/12/2024 7.3 (H)             ( goal A1C is < 7)   No components found for: \"LABMICR\"  No components found for: \"LDLCHOLESTEROL\", \"LDLCALC\"    (goal LDL is <100)   AST (U/L)   Date Value   04/17/2025 38 (H)     ALT (U/L)   Date Value   04/17/2025 43 (H)     BUN (mg/dL)   Date Value   04/17/2025 6     BP Readings from Last 3 Encounters:   04/19/25 121/75   04/03/25 134/85   04/01/25 126/75          (goal 120/80)    All Future Testing planned in [facility]  Lab Frequency Next Occurrence   US GALLBLADDER RUQ Once 11/09/2024   MRI BRAIN WO CONTRAST Once 12/06/2024

## 2025-04-22 LAB
MICROORGANISM SPEC CULT: NORMAL
SERVICE CMNT-IMP: NORMAL
SPECIMEN DESCRIPTION: NORMAL

## 2025-04-30 NOTE — PROGRESS NOTES
Physician Progress Note      PATIENT:               RINA RAHMAN  CSN #:                  [id number]  :                       1990  ADMIT DATE:       2025 3:14 PM  DISCH DATE:        2025 3:18 PM  RESPONDING  PROVIDER #:        Macrina Roca MD          QUERY TEXT:    Noted documentation of 'Asthma exacerbation in COPD' per H&P/DC Summary.   Please clarify one of the following.    The clinical indicators include:  36 yo F presented with SOB-acute respiratory failure. Hx Asthma and COPD. Per   H&P/progress note and DC Summary- \" Asthma exacerbation in COPD \" is   documented. Treated with supplemental Oxygen, Duonebs,steroids.  Options provided:  -- Asthma exacerbation confirmed and?COPD exacerbation ruled out  -- Asthma exacerbation with COPD exacerbation confirmed  -- Other - I will add my own diagnosis  -- Disagree - Not applicable / Not valid  -- Disagree - Clinically unable to determine / Unknown  -- Refer to Clinical Documentation Reviewer    PROVIDER RESPONSE TEXT:    After study, Asthma exacerbation confirmed and?COPD exacerbation ruled out.    Query created by: Olga Akins on 2025 7:55 AM      Electronically signed by:  Macrina Roca MD 2025 3:23 PM

## 2025-05-19 DIAGNOSIS — B85.0 PEDICULOSIS DUE TO PEDICULUS HUMANUS CAPITIS: ICD-10-CM

## 2025-05-20 ENCOUNTER — TELEPHONE (OUTPATIENT)
Dept: GASTROENTEROLOGY | Age: 35
End: 2025-05-20

## 2025-05-20 NOTE — TELEPHONE ENCOUNTER
Meño Goodman is calling to request a refill on the following medication(s):    Medication Request:  Requested Prescriptions     Pending Prescriptions Disp Refills    FT LICE KILLING MAX ST 0.33-4 % shampoo [Pharmacy Med Name: Lice Killing 0.33 %-4 % shampoo] 118 mL 3     Sig: USE AS DIRECTED       Last Visit Date (If Applicable):  Visit date not found    Next Visit Date:    7/11/2025

## 2025-05-20 NOTE — TELEPHONE ENCOUNTER
Leah from [facility] is requesting a call regarding patients upcoming appointment and can be reached at [phone] .

## 2025-05-25 ENCOUNTER — APPOINTMENT (OUTPATIENT)
Dept: GENERAL RADIOLOGY | Age: 35
End: 2025-05-25
Payer: COMMERCIAL

## 2025-05-25 ENCOUNTER — HOSPITAL ENCOUNTER (EMERGENCY)
Age: 35
Discharge: HOME | End: 2025-05-25
Attending: EMERGENCY MEDICINE
Payer: COMMERCIAL

## 2025-05-25 VITALS
HEART RATE: 108 BPM | DIASTOLIC BLOOD PRESSURE: 122 MMHG | TEMPERATURE: 98.8 F | RESPIRATION RATE: 17 BRPM | SYSTOLIC BLOOD PRESSURE: 169 MMHG | OXYGEN SATURATION: 90 %

## 2025-05-25 DIAGNOSIS — J45.901 EXACERBATION OF ASTHMA, UNSPECIFIED ASTHMA SEVERITY, UNSPECIFIED WHETHER PERSISTENT: Primary | ICD-10-CM

## 2025-05-25 LAB
ALBUMIN SERPL-MCNC: 4.1 G/DL (ref 3.5–5.2)
ALBUMIN/GLOB SERPL: 1.2 {RATIO} (ref 1–2.5)
ALP SERPL-CCNC: 110 U/L (ref 35–104)
ALT SERPL-CCNC: 36 U/L (ref 10–35)
ANION GAP SERPL CALCULATED.3IONS-SCNC: 12 MMOL/L (ref 9–16)
AST SERPL-CCNC: 38 U/L (ref 10–35)
BASOPHILS # BLD: 0.05 K/UL (ref 0–0.2)
BASOPHILS NFR BLD: 1 % (ref 0–2)
BILIRUB SERPL-MCNC: 0.7 MG/DL (ref 0–1.2)
BODY TEMPERATURE: 37
BUN SERPL-MCNC: 13 MG/DL (ref 6–20)
CALCIUM SERPL-MCNC: 9.4 MG/DL (ref 8.6–10.4)
CHLORIDE SERPL-SCNC: 102 MMOL/L (ref 98–107)
CO2 SERPL-SCNC: 26 MMOL/L (ref 20–31)
COHGB MFR BLD: 2.4 % (ref 0–5)
CREAT SERPL-MCNC: 0.9 MG/DL (ref 0.6–0.9)
EOSINOPHIL # BLD: 0.34 K/UL (ref 0–0.44)
EOSINOPHILS RELATIVE PERCENT: 3 % (ref 1–4)
ERYTHROCYTE [DISTWIDTH] IN BLOOD BY AUTOMATED COUNT: 12.7 % (ref 11.8–14.4)
FIO2 ON VENT: NORMAL %
FLUAV RNA RESP QL NAA+PROBE: NOT DETECTED
FLUBV RNA RESP QL NAA+PROBE: NOT DETECTED
GFR, ESTIMATED: 84 ML/MIN/1.73M2
GLUCOSE SERPL-MCNC: 141 MG/DL (ref 74–99)
HCG SERPL QL: NEGATIVE
HCO3 VENOUS: 26.3 MMOL/L (ref 24–30)
HCT VFR BLD AUTO: 39.7 % (ref 36.3–47.1)
HGB BLD-MCNC: 13 G/DL (ref 11.9–15.1)
IMM GRANULOCYTES # BLD AUTO: <0.03 K/UL (ref 0–0.3)
IMM GRANULOCYTES NFR BLD: 0 %
LYMPHOCYTES NFR BLD: 1.56 K/UL (ref 1.1–3.7)
LYMPHOCYTES RELATIVE PERCENT: 15 % (ref 24–43)
MCH RBC QN AUTO: 29.7 PG (ref 25.2–33.5)
MCHC RBC AUTO-ENTMCNC: 32.7 G/DL (ref 28.4–34.8)
MCV RBC AUTO: 90.8 FL (ref 82.6–102.9)
MONOCYTES NFR BLD: 0.69 K/UL (ref 0.1–1.2)
MONOCYTES NFR BLD: 7 % (ref 3–12)
NEUTROPHILS NFR BLD: 74 % (ref 36–65)
NEUTS SEG NFR BLD: 7.67 K/UL (ref 1.5–8.1)
NRBC BLD-RTO: 0 PER 100 WBC
O2 SAT, VEN: 61.2 % (ref 60–85)
PCO2 VENOUS: 48.7 MM HG (ref 39–55)
PH VENOUS: 7.35 (ref 7.32–7.42)
PLATELET # BLD AUTO: 301 K/UL (ref 138–453)
PMV BLD AUTO: 9.6 FL (ref 8.1–13.5)
PO2 VENOUS: 33.7 MM HG (ref 30–50)
POSITIVE BASE EXCESS, VEN: 0.1 MMOL/L (ref 0–2)
POTASSIUM SERPL-SCNC: 4.1 MMOL/L (ref 3.7–5.3)
PROT SERPL-MCNC: 7.5 G/DL (ref 6.6–8.7)
RBC # BLD AUTO: 4.37 M/UL (ref 3.95–5.11)
SARS-COV-2 RNA RESP QL NAA+PROBE: NOT DETECTED
SODIUM SERPL-SCNC: 140 MMOL/L (ref 136–145)
SOURCE: NORMAL
SPECIMEN DESCRIPTION: NORMAL
SPECIMEN SOURCE: NORMAL
STREP A, MOLECULAR: NEGATIVE
WBC OTHER # BLD: 10.3 K/UL (ref 3.5–11.3)

## 2025-05-25 PROCEDURE — 82805 BLOOD GASES W/O2 SATURATION: CPT

## 2025-05-25 PROCEDURE — 6360000002 HC RX W HCPCS

## 2025-05-25 PROCEDURE — 2700000000 HC OXYGEN THERAPY PER DAY

## 2025-05-25 PROCEDURE — 71045 X-RAY EXAM CHEST 1 VIEW: CPT

## 2025-05-25 PROCEDURE — 84703 CHORIONIC GONADOTROPIN ASSAY: CPT

## 2025-05-25 PROCEDURE — 99285 EMERGENCY DEPT VISIT HI MDM: CPT | Performed by: EMERGENCY MEDICINE

## 2025-05-25 PROCEDURE — 85025 COMPLETE CBC W/AUTO DIFF WBC: CPT

## 2025-05-25 PROCEDURE — 2500000003 HC RX 250 WO HCPCS

## 2025-05-25 PROCEDURE — 87651 STREP A DNA AMP PROBE: CPT

## 2025-05-25 PROCEDURE — 93005 ELECTROCARDIOGRAM TRACING: CPT

## 2025-05-25 PROCEDURE — 87636 SARSCOV2 & INF A&B AMP PRB: CPT

## 2025-05-25 PROCEDURE — 80053 COMPREHEN METABOLIC PANEL: CPT

## 2025-05-25 PROCEDURE — 94761 N-INVAS EAR/PLS OXIMETRY MLT: CPT

## 2025-05-25 PROCEDURE — 96365 THER/PROPH/DIAG IV INF INIT: CPT | Performed by: EMERGENCY MEDICINE

## 2025-05-25 PROCEDURE — 96366 THER/PROPH/DIAG IV INF ADDON: CPT | Performed by: EMERGENCY MEDICINE

## 2025-05-25 PROCEDURE — 93005 ELECTROCARDIOGRAM TRACING: CPT | Performed by: EMERGENCY MEDICINE

## 2025-05-25 PROCEDURE — 96375 TX/PRO/DX INJ NEW DRUG ADDON: CPT | Performed by: EMERGENCY MEDICINE

## 2025-05-25 PROCEDURE — 36415 COLL VENOUS BLD VENIPUNCTURE: CPT

## 2025-05-25 PROCEDURE — 94640 AIRWAY INHALATION TREATMENT: CPT

## 2025-05-25 RX ORDER — MAGNESIUM SULFATE IN WATER 40 MG/ML
2000 INJECTION, SOLUTION INTRAVENOUS ONCE
Status: COMPLETED | OUTPATIENT
Start: 2025-05-25 | End: 2025-05-25

## 2025-05-25 RX ORDER — ALBUTEROL SULFATE 0.83 MG/ML
2.5 SOLUTION RESPIRATORY (INHALATION) EVERY 4 HOURS PRN
Status: DISCONTINUED | OUTPATIENT
Start: 2025-05-25 | End: 2025-05-25 | Stop reason: HOSPADM

## 2025-05-25 RX ORDER — PREDNISONE 50 MG/1
50 TABLET ORAL DAILY
Qty: 5 TABLET | Refills: 0 | Status: SHIPPED | OUTPATIENT
Start: 2025-05-25 | End: 2025-05-30

## 2025-05-25 RX ADMIN — ALBUTEROL SULFATE 2.5 MG: 2.5 SOLUTION RESPIRATORY (INHALATION) at 14:14

## 2025-05-25 RX ADMIN — METHYLPREDNISOLONE SODIUM SUCCINATE 125 MG: 125 INJECTION, POWDER, LYOPHILIZED, FOR SOLUTION INTRAMUSCULAR; INTRAVENOUS at 13:09

## 2025-05-25 RX ADMIN — MAGNESIUM SULFATE HEPTAHYDRATE 2000 MG: 40 INJECTION, SOLUTION INTRAVENOUS at 14:20

## 2025-05-25 RX ADMIN — MAGNESIUM SULFATE HEPTAHYDRATE 2000 MG: 40 INJECTION, SOLUTION INTRAVENOUS at 13:09

## 2025-05-25 ASSESSMENT — PAIN - FUNCTIONAL ASSESSMENT: PAIN_FUNCTIONAL_ASSESSMENT: NONE - DENIES PAIN

## 2025-05-25 NOTE — ED NOTES
Pt presented to ED ambulatory from EMS   EMS stated she came in for SOB and they gave her one breathing treatment   Pt presents with C/O SOB, headache , chest pain .  Pt states she has recently been sick and tried using her inhaler and it did not work today  Pt denies any other C/O .  Pt has a hx of asthma .  Pt is Alert and oriented. Pt is resting comfortably on stretcher with call light in reach.  No acute distress noted. Respirations are even and unlabored.  White board updated. Will continue to follow plan of care.

## 2025-05-25 NOTE — ED PROVIDER NOTES
[facility] EMERGENCY DEPARTMENT  Emergency Department Encounter  Emergency Medicine Resident     Pt Name:Meño Goodman  MRN: [id number]  Birthdate 1990  Date of evaluation: 25  PCP:  Jean Claude Garcia MD  Note Started: 12:55 PM EDT      CHIEF COMPLAINT       Chief Complaint   Patient presents with    Shortness of Breath       HISTORY OF PRESENT ILLNESS  (Location/Symptom, Timing/Onset, Context/Setting, Quality, Duration, Modifying Factors, Severity.)      Meño Goodman is a 35 y.o. female brought in by EMS from home for evaluation of cough, shortness of breath.  She has a history of asthma and has been using breathing treatments at home without relief.  She notes several days of progressively worsening difficulty breathing, congestion, headache.   has a URI.  She does also note some urinary frequency recently.  No fever, nausea, vomiting, abdominal pain.    PAST MEDICAL / SURGICAL / SOCIAL / FAMILY HISTORY      has a past medical history of Asthma, Back pain, Bipolar 1 disorder (HCC), Bipolar disorder (HCC), Depression, Diabetes mellitus (HCC), Dizziness, Fatty liver disease, nonalcoholic, Fatty liver disease, nonalcoholic, GERD (gastroesophageal reflux disease), and Obesity.     has a past surgical history that includes  section and LEEP.    Social History     Socioeconomic History    Marital status:      Spouse name: Not on file    Number of children: Not on file    Years of education: Not on file    Highest education level: Not on file   Occupational History    Not on file   Tobacco Use    Smoking status: Former     Current packs/day: 0.00     Types: Cigarettes     Start date: 2000     Quit date: 2015     Years since quitting: 10.4    Smokeless tobacco: Never   Vaping Use    Vaping status: Never Used   Substance and Sexual Activity    Alcohol use: Not Currently     Alcohol/week: 0.0 standard drinks of alcohol     Comment: rarely    Drug use: Yes     Types:

## 2025-05-25 NOTE — ED PROVIDER NOTES
[facility]     Emergency Department     Faculty Attestation    I performed a history and physical examination of the patient and discussed management with the resident. I reviewed the resident’s note and agree with the documented findings including all diagnostic interpretations and plan of care. Any areas of disagreement are noted on the chart. I was personally present for the key portions of any procedures. I have documented in the chart those procedures where I was not present during the key portions. I have reviewed the emergency nurses triage note. I agree with the chief complaint, past medical history, past surgical history, allergies, medications, social and family history as documented unless otherwise noted below. Documentation of the HPI, Physical Exam and Medical Decision Making performed by fadumo is based on my personal performance of the HPI, PE and MDM. For Physician Assistant/ Nurse Practitioner cases/documentation I have personally evaluated this patient and have completed at least one if not all key elements of the E/M (history, physical exam, and MDM). Additional findings are as noted.    Primary Care Physician: Jean Claude Garcia MD    Note Started: 1:31 PM EDT     VITAL SIGNS:   oral temperature is 98.8 °F (37.1 °C). Her blood pressure is 169/122 (abnormal) and her pulse is 112 (abnormal). Her respiration is 17 and oxygen saturation is 95%.      Medical Decision Making  Shortness of breath.  History of.  Received albuterol by EMS prior to arrival.  On examination appears uncomfortable but not acutely distressed, cardiac exam tachycardic regular, pulmonary shows diffuse expiratory wheezes throughout all lung fields.  Impression is asthma exacerbation, steroids, aerosols, magnesium, reassess    Amount and/or Complexity of Data Reviewed  External Data Reviewed: labs.  Labs: ordered. Decision-making details documented in ED

## 2025-05-25 NOTE — DISCHARGE INSTRUCTIONS
You were seen in the ER today for cough, shortness of breath.  COVID, strep, flu are all negative.  X-ray without evidence of pneumonia.  You will be give steroids, take as directed.  Call your doctor within the next day or 2 to make a follow-up appointment for reevaluation.  Continue all home medications.  Return if new or worsening symptoms or any other concerns.

## 2025-05-27 ENCOUNTER — HOSPITAL ENCOUNTER (OUTPATIENT)
Age: 35
Setting detail: SPECIMEN
Discharge: HOME | End: 2025-05-27

## 2025-05-27 ENCOUNTER — OFFICE VISIT (OUTPATIENT)
Age: 35
End: 2025-05-27
Payer: COMMERCIAL

## 2025-05-27 VITALS
WEIGHT: 293 LBS | SYSTOLIC BLOOD PRESSURE: 164 MMHG | DIASTOLIC BLOOD PRESSURE: 90 MMHG | HEART RATE: 78 BPM | HEIGHT: 65 IN | BODY MASS INDEX: 48.82 KG/M2

## 2025-05-27 DIAGNOSIS — N91.1 SECONDARY AMENORRHEA: ICD-10-CM

## 2025-05-27 DIAGNOSIS — N91.1 SECONDARY AMENORRHEA: Primary | ICD-10-CM

## 2025-05-27 DIAGNOSIS — E11.9 TYPE 2 DIABETES MELLITUS WITHOUT COMPLICATION, WITHOUT LONG-TERM CURRENT USE OF INSULIN (HCC): ICD-10-CM

## 2025-05-27 PROBLEM — N76.0 ACUTE VAGINITIS: Status: RESOLVED | Noted: 2023-08-22 | Resolved: 2025-05-27

## 2025-05-27 LAB
EST. AVERAGE GLUCOSE BLD GHB EST-MCNC: 174 MG/DL
ESTRADIOL LEVEL: 23.3 PG/ML
FSH SERPL-ACNC: 4.7 MIU/ML
HBA1C MFR BLD: 7.7 % (ref 4–6)
LH SERPL-ACNC: 4.7 MIU/ML (ref 1.7–8.6)
PROGEST SERPL-MCNC: 0.07 NG/ML
PROLACTIN SERPL-MCNC: 54.3 NG/ML (ref 4.79–23.3)
TSH SERPL DL<=0.05 MIU/L-ACNC: 3.84 UIU/ML (ref 0.27–4.2)

## 2025-05-27 PROCEDURE — 99213 OFFICE O/P EST LOW 20 MIN: CPT | Performed by: STUDENT IN AN ORGANIZED HEALTH CARE EDUCATION/TRAINING PROGRAM

## 2025-05-27 PROCEDURE — 99212 OFFICE O/P EST SF 10 MIN: CPT | Performed by: STUDENT IN AN ORGANIZED HEALTH CARE EDUCATION/TRAINING PROGRAM

## 2025-05-27 PROCEDURE — 2022F DILAT RTA XM EVC RTNOPTHY: CPT | Performed by: STUDENT IN AN ORGANIZED HEALTH CARE EDUCATION/TRAINING PROGRAM

## 2025-05-27 PROCEDURE — 3051F HG A1C>EQUAL 7.0%<8.0%: CPT | Performed by: STUDENT IN AN ORGANIZED HEALTH CARE EDUCATION/TRAINING PROGRAM

## 2025-05-27 PROCEDURE — G8428 CUR MEDS NOT DOCUMENT: HCPCS | Performed by: STUDENT IN AN ORGANIZED HEALTH CARE EDUCATION/TRAINING PROGRAM

## 2025-05-27 PROCEDURE — G8417 CALC BMI ABV UP PARAM F/U: HCPCS | Performed by: STUDENT IN AN ORGANIZED HEALTH CARE EDUCATION/TRAINING PROGRAM

## 2025-05-27 PROCEDURE — 1036F TOBACCO NON-USER: CPT | Performed by: STUDENT IN AN ORGANIZED HEALTH CARE EDUCATION/TRAINING PROGRAM

## 2025-05-27 NOTE — PROGRESS NOTES
OB/GYN Problem Visit    Meño Goodman  2025                       Primary Care Physician: Jean Claude Garcia MD    CC:   Chief Complaint   Patient presents with    New Patient     No period for 7 months  ER confirmed no pregnancy          HPI: Meño Goodman is a 35 y.o. female     The patient was seen and examined. She is here for a problem visit and is complaining of not having menses.  Patient reports that since October of last year she has not had menses.  Patient admits to no bleeding since that time patient reports that prior to that she had a period approximately once every other month and that had been fairly regular for the past several years.     Patient is up to date on annual exam and Pap smear. She denies any history of recent medication changes. She has a history of CS x 1 and a LEEP     Her No LMP recorded. and she complains of irregular bleeding, she reports no bleeding for several months.     Her bowel habits are regular. She denies any bloating.  She denies dysuria. She denies urinary leaking.  She denies vaginal discharge.  She is not sexually active currently.  She uses no method for contraception and is not desiring pregnancy.    REVIEW OF SYSTEMS:   Constitutional: negative fever, negative chills, negative weight changes   HEENT: negative visual disturbances, negative headaches, negative dizziness  Breast: negative breast abnormalities, negative breast lumps, negative nipple discharge  Respiratory: negative dyspnea, negative cough, negative SOB  Cardiovascular: negative chest pain,  negative palpitations, negative arrhythmia, negative syncope   Gastrointestinal: negative abdominal pain, negative RUQ pain, negative N/V, negative diarrhea, negative constipation, negative bowel changes  Genitourinary: negative dysuria, negative hematuria, negative urinary incontinence, negative vaginal discharge, negative vaginal bleeding  Dermatological: negative rash, negative pruritis,

## 2025-05-28 LAB
EKG ATRIAL RATE: 109 BPM
EKG ATRIAL RATE: 110 BPM
EKG ATRIAL RATE: 117 BPM
EKG P AXIS: 48 DEGREES
EKG P AXIS: 50 DEGREES
EKG P-R INTERVAL: 104 MS
EKG P-R INTERVAL: 136 MS
EKG P-R INTERVAL: 150 MS
EKG Q-T INTERVAL: 344 MS
EKG Q-T INTERVAL: 450 MS
EKG Q-T INTERVAL: 490 MS
EKG QRS DURATION: 78 MS
EKG QRS DURATION: 82 MS
EKG QRS DURATION: 82 MS
EKG QTC CALCULATION (BAZETT): 463 MS
EKG QTC CALCULATION (BAZETT): 627 MS
EKG QTC CALCULATION (BAZETT): 663 MS
EKG R AXIS: 15 DEGREES
EKG R AXIS: 18 DEGREES
EKG R AXIS: 6 DEGREES
EKG T AXIS: 20 DEGREES
EKG T AXIS: 40 DEGREES
EKG T AXIS: 53 DEGREES
EKG VENTRICULAR RATE: 109 BPM
EKG VENTRICULAR RATE: 110 BPM
EKG VENTRICULAR RATE: 117 BPM
TESTOST SERPL-MCNC: 22 NG/DL (ref 8–48)

## 2025-05-28 PROCEDURE — 93010 ELECTROCARDIOGRAM REPORT: CPT | Performed by: INTERNAL MEDICINE

## 2025-05-28 NOTE — PROGRESS NOTES
Attending Physician Statement  I have discussed the care of Meño OCONNELL Tellomauro, including pertinent history and exam findings,  with the resident. I have reviewed the key elements of all parts of the encounter with the resident.  I agree with the assessment, plan and orders as documented by the resident.  (GE Modifier)    Electronically signed by Garrett Villalobos DO  5/28/2025  10:37 AM

## 2025-05-29 LAB — ANTI-MULLERIAN HORMONE: 0.88 NG/ML (ref 0.18–11.71)

## 2025-05-29 RX ORDER — PIPERONYL BUTOXIDE, PYRETHRUM EXTRACT 4; .33 G/100ML; G/100ML
SHAMPOO TOPICAL
Qty: 118 ML | Refills: 3 | Status: SHIPPED | OUTPATIENT
Start: 2025-05-29

## 2025-06-09 NOTE — TELEPHONE ENCOUNTER
Meño Goodman is calling to request a refill on the following medication(s):    Medication Request:  Requested Prescriptions     Pending Prescriptions Disp Refills    hydrOXYzine HCl (ATARAX) 50 MG tablet [Pharmacy Med Name: hydroxyzine HCl 50 mg tablet] 60 tablet 3     Sig: TAKE ONE TABLET BY MOUTH THREE TIMES DAILY AS NEEDED FOR ITCHING       Last Visit Date (If Applicable):  Visit date not found    Next Visit Date:    7/11/2025

## 2025-06-10 ENCOUNTER — SOCIAL WORK (OUTPATIENT)
Dept: EMERGENCY DEPT | Age: 35
End: 2025-06-10

## 2025-06-10 ENCOUNTER — HOSPITAL ENCOUNTER (OUTPATIENT)
Dept: ULTRASOUND IMAGING | Age: 35
Discharge: HOME | End: 2025-06-12
Payer: COMMERCIAL

## 2025-06-10 DIAGNOSIS — N91.1 SECONDARY AMENORRHEA: ICD-10-CM

## 2025-06-10 PROCEDURE — 76856 US EXAM PELVIC COMPLETE: CPT

## 2025-06-10 NOTE — PROGRESS NOTES
The Tech in Ultrasound contacted ED SW as patient had walked to get her testing done today and is requesting help with a ride home.  ADAM spoke with the patient who states she has used all her [employer] rides.  SW called and confirmed this with [place].  They also had her under Meño Gaviria, not Meño Goodman, as patient has not called [place] to change to her  name.  She also has not changed her address or phone number with [place] and was encouraged to do so.  ADAM will set up a one-time [employer] home for patient today.  The Tech is bringing patient up to the ED Lobby to catch the cab and updates will go to patient's phone.  CORBY Griffith

## 2025-06-13 RX ORDER — HYDROXYZINE HYDROCHLORIDE 50 MG/1
TABLET, FILM COATED ORAL
Qty: 60 TABLET | Refills: 3 | Status: SHIPPED | OUTPATIENT
Start: 2025-06-13

## 2025-06-19 ENCOUNTER — APPOINTMENT (OUTPATIENT)
Dept: CT IMAGING | Age: 35
End: 2025-06-19
Payer: COMMERCIAL

## 2025-06-19 ENCOUNTER — HOSPITAL ENCOUNTER (EMERGENCY)
Age: 35
Discharge: HOME | End: 2025-06-20
Attending: EMERGENCY MEDICINE
Payer: COMMERCIAL

## 2025-06-19 DIAGNOSIS — L03.012: ICD-10-CM

## 2025-06-19 DIAGNOSIS — R73.9 HYPERGLYCEMIA: Primary | ICD-10-CM

## 2025-06-19 LAB
ALBUMIN SERPL-MCNC: 3.9 G/DL (ref 3.5–5.2)
ALBUMIN/GLOB SERPL: 1.1 {RATIO} (ref 1–2.5)
ALP SERPL-CCNC: 124 U/L (ref 35–104)
ALT SERPL-CCNC: 59 U/L (ref 10–35)
ANION GAP SERPL CALCULATED.3IONS-SCNC: 11 MMOL/L (ref 9–16)
AST SERPL-CCNC: 57 U/L (ref 10–35)
B-OH-BUTYR SERPL-MCNC: 0.15 MMOL/L (ref 0.02–0.27)
BACTERIA URNS QL MICRO: ABNORMAL
BASOPHILS # BLD: 0.05 K/UL (ref 0–0.2)
BASOPHILS NFR BLD: 1 % (ref 0–2)
BILIRUB SERPL-MCNC: 0.3 MG/DL (ref 0–1.2)
BILIRUB UR QL STRIP: NEGATIVE
BODY TEMPERATURE: 37
BUN SERPL-MCNC: 7 MG/DL (ref 6–20)
CALCIUM SERPL-MCNC: 10.4 MG/DL (ref 8.6–10.4)
CASTS #/AREA URNS LPF: ABNORMAL /LPF (ref 0–8)
CHLORIDE SERPL-SCNC: 101 MMOL/L (ref 98–107)
CHP ED QC CHECK: YES
CLARITY UR: CLEAR
CO2 SERPL-SCNC: 26 MMOL/L (ref 20–31)
COHGB MFR BLD: 1 % (ref 0–5)
COLOR UR: YELLOW
CREAT SERPL-MCNC: 0.8 MG/DL (ref 0.6–0.9)
EOSINOPHIL # BLD: 0.6 K/UL (ref 0–0.44)
EOSINOPHILS RELATIVE PERCENT: 6 % (ref 1–4)
EPI CELLS #/AREA URNS HPF: ABNORMAL /HPF (ref 0–5)
ERYTHROCYTE [DISTWIDTH] IN BLOOD BY AUTOMATED COUNT: 12.8 % (ref 11.8–14.4)
FIO2 ON VENT: ABNORMAL %
GFR, ESTIMATED: >90 ML/MIN/1.73M2
GLUCOSE BLD-MCNC: 247 MG/DL
GLUCOSE BLD-MCNC: 247 MG/DL (ref 65–105)
GLUCOSE SERPL-MCNC: 279 MG/DL (ref 74–99)
GLUCOSE UR STRIP-MCNC: ABNORMAL MG/DL
HCG SERPL QL: NEGATIVE
HCO3 VENOUS: 24.9 MMOL/L (ref 24–30)
HCT VFR BLD AUTO: 37.3 % (ref 36.3–47.1)
HGB BLD-MCNC: 12.6 G/DL (ref 11.9–15.1)
HGB UR QL STRIP.AUTO: NEGATIVE
IMM GRANULOCYTES # BLD AUTO: 0.04 K/UL (ref 0–0.3)
IMM GRANULOCYTES NFR BLD: 0 %
KETONES UR STRIP-MCNC: ABNORMAL MG/DL
LEUKOCYTE ESTERASE UR QL STRIP: NEGATIVE
LIPASE SERPL-CCNC: 32 U/L (ref 13–60)
LYMPHOCYTES NFR BLD: 2.54 K/UL (ref 1.1–3.7)
LYMPHOCYTES RELATIVE PERCENT: 26 % (ref 24–43)
MCH RBC QN AUTO: 29.9 PG (ref 25.2–33.5)
MCHC RBC AUTO-ENTMCNC: 33.8 G/DL (ref 28.4–34.8)
MCV RBC AUTO: 88.6 FL (ref 82.6–102.9)
MONOCYTES NFR BLD: 0.67 K/UL (ref 0.1–1.2)
MONOCYTES NFR BLD: 7 % (ref 3–12)
NEUTROPHILS NFR BLD: 60 % (ref 36–65)
NEUTS SEG NFR BLD: 5.8 K/UL (ref 1.5–8.1)
NITRITE UR QL STRIP: NEGATIVE
NRBC BLD-RTO: 0 PER 100 WBC
O2 SAT, VEN: 94.3 % (ref 60–85)
PCO2 VENOUS: 36.7 MM HG (ref 39–55)
PH UR STRIP: 5.5 [PH] (ref 5–8)
PH VENOUS: 7.45 (ref 7.32–7.42)
PLATELET # BLD AUTO: 338 K/UL (ref 138–453)
PMV BLD AUTO: 10.6 FL (ref 8.1–13.5)
PO2 VENOUS: 67.6 MM HG (ref 30–50)
POSITIVE BASE EXCESS, VEN: 1.2 MMOL/L (ref 0–2)
POTASSIUM SERPL-SCNC: 4 MMOL/L (ref 3.7–5.3)
PROT SERPL-MCNC: 7.5 G/DL (ref 6.6–8.7)
PROT UR STRIP-MCNC: ABNORMAL MG/DL
RBC # BLD AUTO: 4.21 M/UL (ref 3.95–5.11)
RBC #/AREA URNS HPF: ABNORMAL /HPF (ref 0–4)
SODIUM SERPL-SCNC: 138 MMOL/L (ref 136–145)
SP GR UR STRIP: 1.02 (ref 1–1.03)
TROPONIN I SERPL HS-MCNC: <6 NG/L (ref 0–14)
UROBILINOGEN UR STRIP-ACNC: NORMAL EU/DL (ref 0–1)
WBC #/AREA URNS HPF: ABNORMAL /HPF (ref 0–5)
WBC OTHER # BLD: 9.7 K/UL (ref 3.5–11.3)

## 2025-06-19 PROCEDURE — 83690 ASSAY OF LIPASE: CPT

## 2025-06-19 PROCEDURE — 6360000004 HC RX CONTRAST MEDICATION

## 2025-06-19 PROCEDURE — 2580000003 HC RX 258

## 2025-06-19 PROCEDURE — 82947 ASSAY GLUCOSE BLOOD QUANT: CPT

## 2025-06-19 PROCEDURE — 99285 EMERGENCY DEPT VISIT HI MDM: CPT | Performed by: EMERGENCY MEDICINE

## 2025-06-19 PROCEDURE — 96361 HYDRATE IV INFUSION ADD-ON: CPT | Performed by: EMERGENCY MEDICINE

## 2025-06-19 PROCEDURE — 36415 COLL VENOUS BLD VENIPUNCTURE: CPT

## 2025-06-19 PROCEDURE — 93005 ELECTROCARDIOGRAM TRACING: CPT

## 2025-06-19 PROCEDURE — 84703 CHORIONIC GONADOTROPIN ASSAY: CPT

## 2025-06-19 PROCEDURE — 82010 KETONE BODYS QUAN: CPT

## 2025-06-19 PROCEDURE — 81001 URINALYSIS AUTO W/SCOPE: CPT

## 2025-06-19 PROCEDURE — 82805 BLOOD GASES W/O2 SATURATION: CPT

## 2025-06-19 PROCEDURE — 74177 CT ABD & PELVIS W/CONTRAST: CPT

## 2025-06-19 PROCEDURE — 85025 COMPLETE CBC W/AUTO DIFF WBC: CPT

## 2025-06-19 PROCEDURE — 6360000002 HC RX W HCPCS

## 2025-06-19 PROCEDURE — 96374 THER/PROPH/DIAG INJ IV PUSH: CPT | Performed by: EMERGENCY MEDICINE

## 2025-06-19 PROCEDURE — 80053 COMPREHEN METABOLIC PANEL: CPT

## 2025-06-19 PROCEDURE — 84484 ASSAY OF TROPONIN QUANT: CPT

## 2025-06-19 RX ORDER — IOPAMIDOL 755 MG/ML
75 INJECTION, SOLUTION INTRAVASCULAR
Status: COMPLETED | OUTPATIENT
Start: 2025-06-19 | End: 2025-06-19

## 2025-06-19 RX ORDER — SODIUM CHLORIDE, SODIUM LACTATE, POTASSIUM CHLORIDE, AND CALCIUM CHLORIDE .6; .31; .03; .02 G/100ML; G/100ML; G/100ML; G/100ML
1000 INJECTION, SOLUTION INTRAVENOUS ONCE
Status: COMPLETED | OUTPATIENT
Start: 2025-06-19 | End: 2025-06-20

## 2025-06-19 RX ORDER — KETOROLAC TROMETHAMINE 15 MG/ML
15 INJECTION, SOLUTION INTRAMUSCULAR; INTRAVENOUS ONCE
Status: COMPLETED | OUTPATIENT
Start: 2025-06-19 | End: 2025-06-19

## 2025-06-19 RX ADMIN — SODIUM CHLORIDE, SODIUM LACTATE, POTASSIUM CHLORIDE, AND CALCIUM CHLORIDE 1000 ML: .6; .31; .03; .02 INJECTION, SOLUTION INTRAVENOUS at 22:57

## 2025-06-19 RX ADMIN — KETOROLAC TROMETHAMINE 15 MG: 15 INJECTION, SOLUTION INTRAMUSCULAR; INTRAVENOUS at 22:54

## 2025-06-19 RX ADMIN — IOPAMIDOL 75 ML: 755 INJECTION, SOLUTION INTRAVENOUS at 23:48

## 2025-06-20 VITALS
OXYGEN SATURATION: 96 % | RESPIRATION RATE: 18 BRPM | HEART RATE: 77 BPM | SYSTOLIC BLOOD PRESSURE: 125 MMHG | DIASTOLIC BLOOD PRESSURE: 92 MMHG | TEMPERATURE: 98.4 F

## 2025-06-20 PROCEDURE — 96361 HYDRATE IV INFUSION ADD-ON: CPT | Performed by: EMERGENCY MEDICINE

## 2025-06-20 PROCEDURE — 6370000000 HC RX 637 (ALT 250 FOR IP)

## 2025-06-20 RX ORDER — CEPHALEXIN 500 MG/1
500 CAPSULE ORAL 2 TIMES DAILY
Qty: 14 CAPSULE | Refills: 0 | Status: SHIPPED | OUTPATIENT
Start: 2025-06-20 | End: 2025-06-27

## 2025-06-20 RX ORDER — CEPHALEXIN 500 MG/1
500 CAPSULE ORAL ONCE
Status: COMPLETED | OUTPATIENT
Start: 2025-06-20 | End: 2025-06-20

## 2025-06-20 RX ADMIN — CEPHALEXIN 500 MG: 500 CAPSULE ORAL at 01:04

## 2025-06-20 NOTE — ED PROVIDER NOTES
[facility]   Emergency Department  Emergency Medicine Attending Sign-out   Note started: 11:15 PM EDT    Care of Meño Goodman was assumed from previous attending Dr. Pritchard at 11 PM and is being seen for Dizziness  .  The patient's initial evaluation and plan have been discussed with the prior provider who initially evaluated the patient.     Attestation  I was available and discussed any additional care issues that arose and coordinated the management plans with the resident(s) caring for the patient during my duty period. Any areas of disagreement with resident's documentation of care or procedures are noted on the chart. I was personally present for the key portions of any/all procedures, during my duty period. I have documented in the chart those procedures where I was not present during the key portions.     BRIEF PATIENT SUMMARY/MDM COURSE PER INITIAL PROVIDER:   RECENT VITALS:     Temp: 98.4 °F (36.9 °C),  Pulse: (!) 102, Respirations: 18, BP: (!) 125/92, SpO2: 96 %    This patient is a 35 y.o. Female with dizziness, right upper quadrant and right lower quadrant pain.  Was recently started on insulin, but it stings when using it and she has not been compliant with it.    DIAGNOSTICS/MEDICATIONS:     MEDICATIONS GIVEN:  ED Medication Orders (From admission, onward)      Start Ordered     Status Ordering Provider    06/19/25 2245 06/19/25 2236  lactated ringers bolus 1,000 mL  ONCE         Last MAR action: New Bag - by BRITNEY BAILEY on 06/19/25 at 2257 JOSE C MORENO    06/19/25 2245 06/19/25 2236  ketorolac (TORADOL) injection 15 mg  ONCE         Last MAR action: Given - by BRITNEY BAILEY on 06/19/25 at 2254 JOSE C MORENO            LABS    Labs Reviewed   CBC WITH AUTO DIFFERENTIAL - Abnormal; Notable for the following components:       Result Value    Eosinophils % 6 (*)     Eosinophils Absolute 0.60 (*)     All other components within normal limits   BLOOD GAS,

## 2025-06-20 NOTE — ED PROVIDER NOTES
[facility] EMERGENCY DEPARTMENT  Emergency Department Encounter  Emergency Medicine Resident     Pt Name:Meño Goodman  MRN: [id number]  Birthdate 1990  Date of evaluation: 25  PCP:  Jean Claude Garcia MD  Note Started: 10:15 PM EDT      CHIEF COMPLAINT       Chief Complaint   Patient presents with    Dizziness       HISTORY OF PRESENT ILLNESS  (Location/Symptom, Timing/Onset, Context/Setting, Quality, Duration, Modifying Factors, Severity.)      Meño Goodman is a 35 y.o. female who presents with  past history of anxiety, bipolar, recently diagnosed with kidney stones here for concerns of 2 days of right flank pain, abdominal pain as well as dizziness.  Patient reports right flank pain radiates into the lower abdomen, has right upper quadrant pain on baseline that is worse than normal.  Patient reports that this flank pain started when she was arguing with her  2 nights ago.  Denies fever, chills, shortness of breath, did have an episode of emesis yesterday, no nausea right now.  Patient states that she takes Tylenol for pain without improvement.  Patient denies vaginal discharge, dysuria, blood in the stool.  Last bowel movement was this morning, passing flatus also reports lightheadedness and increased urination.  Patient takes metformin and insulin for her diabetes however she has not taken her insulin a couple days because it stays when she administers it.  Reports she has a follow-up appointment with her PCP to discuss diabetes management.    PAST MEDICAL / SURGICAL / SOCIAL / FAMILY HISTORY      has a past medical history of Asthma, Back pain, Bipolar 1 disorder (HCC), Bipolar disorder (HCC), Depression, Diabetes mellitus (HCC), Dizziness, Fatty liver disease, nonalcoholic, Fatty liver disease, nonalcoholic, GERD (gastroesophageal reflux disease), and Obesity.     has a past surgical history that includes  section and LEEP.    Social History     Socioeconomic History

## 2025-06-20 NOTE — DISCHARGE INSTRUCTIONS
You were seen for concerns of abdominal pain.  Your imaging and labs showed high blood sugar.  There is also some bacteria in your urine but no other signs of infection.  You also noticed some swelling of your finger.  You are discharged on Keflex to treat both.  We will culture your urine to see if we need to change the antibiotic to anything else.  Please follow-up with your PCP for these results.    Continue to take the antibiotics as prescribed.  Do not drink alcohol or caffeine while taking the antibiotics.    It is important that you take your insulin as prescribed to prevent further infections as well as life-threatening situations such as DKA and HHS.    Return to the emergency department if you experience worsening pain, inability to keep fluids down to stay hydrated, blood in your stool, fever, chills, any other concern.

## 2025-06-20 NOTE — ED TRIAGE NOTES
Pt presented to ED via wheelchair from triage.  Pt presents with C/O pain in abdomen, arms, legs, back and head.  Pt states this pain has been going on for days. Pt states she has had pain for months but it has gotten worse the past couple of days.  Pt denies taking anything other than tylenol for pain.  Pt has a hx of kidney stones, DM, and fatty liver disease.  Pt is Alert and oriented. Pt is resting comfortably on stretcher with call light in reach.  No acute distress noted. Respirations are even and unlabored.  White board updated. Will continue to follow plan of care.

## 2025-06-20 NOTE — ED NOTES
SW asked by GERALD Mayes to assist with transport. SW attempted to arrange transport of discharged pt back to address on file but was told pt is two [employer] rides over allowed amount and they can't assist.  [employer] voucher was utilized. Trip # [id number]

## 2025-06-20 NOTE — ED PROVIDER NOTES
[facility] EMERGENCY DEPARTMENT  eMERGENCY dEPARTMENT eNCOUnter   Attending Attestation     Pt Name: Meño Goodman  MRN: [id number]  Birthdate 1990  Date of evaluation: 6/19/25       Meño Goodman is a 35 y.o. female who presents with Dizziness      10:38 PM EDT      History: Patient presents with dizziness lightheadedness and abdominal pain.  Patient says that she has not been taking her insulin because it stings when she injects it.    Exam: Heart rate and rhythm are regular.  Lungs are clear to auscultation bilaterally.  Abdomen is soft, tenderness in the right lower quadrant as well as right upper quadrant.    Plan for pregnancy test abdominal pain workup, CT abdomen, concern for pregnancy versus ectopic versus hyperglycemia/DKA versus new urinary tract infection versus appendicitis.      EKG shows normal sinus rhythm with rate of 100.  Normal axis.  No ST elevation or depression.  T waves are upright.  No blocks arrhythmias.  Nonspecific EKG.  I performed a history and physical examination of the patient and discussed management with the resident. I reviewed the resident’s note and agree with the documented findings and plan of care. Any areas of disagreement are noted on the chart. I was personally present for the key portions of any procedures. I have documented in the chart those procedures where I was not present during the key portions. I have personally reviewed all images and agree with the resident's interpretation. I have reviewed the emergency nurses triage note. I agree with the chief complaint, past medical history, past surgical history, allergies, medications, social and family history as documented unless otherwise noted below. Documentation of the HPI, Physical Exam and Medical Decision Making performed by medical students or scribes is based on my personal performance of the HPI, PE and MDM. For Phys Assistant/ Nurse Practitioner cases/documentation I have had a face to face

## 2025-06-22 LAB
EKG ATRIAL RATE: 100 BPM
EKG P AXIS: 53 DEGREES
EKG P-R INTERVAL: 146 MS
EKG Q-T INTERVAL: 340 MS
EKG QRS DURATION: 80 MS
EKG QTC CALCULATION (BAZETT): 438 MS
EKG R AXIS: 31 DEGREES
EKG T AXIS: 35 DEGREES
EKG VENTRICULAR RATE: 100 BPM

## 2025-06-30 DIAGNOSIS — J45.40 MODERATE PERSISTENT ASTHMA, UNSPECIFIED WHETHER COMPLICATED: ICD-10-CM

## 2025-06-30 DIAGNOSIS — E11.9 TYPE 2 DIABETES MELLITUS WITHOUT COMPLICATION, WITHOUT LONG-TERM CURRENT USE OF INSULIN (HCC): ICD-10-CM

## 2025-06-30 DIAGNOSIS — M79.673 PAIN OF FOOT, UNSPECIFIED LATERALITY: ICD-10-CM

## 2025-06-30 DIAGNOSIS — B85.0 PEDICULOSIS DUE TO PEDICULUS HUMANUS CAPITIS: ICD-10-CM

## 2025-06-30 RX ORDER — BUDESONIDE AND FORMOTEROL FUMARATE DIHYDRATE 160; 4.5 UG/1; UG/1
AEROSOL RESPIRATORY (INHALATION)
Qty: 10.2 G | Refills: 0 | Status: CANCELLED | OUTPATIENT
Start: 2025-06-30

## 2025-07-01 ENCOUNTER — HOSPITAL ENCOUNTER (INPATIENT)
Age: 35
LOS: 3 days | Discharge: HOME | End: 2025-07-04
Attending: EMERGENCY MEDICINE | Admitting: PSYCHIATRY & NEUROLOGY
Payer: COMMERCIAL

## 2025-07-01 ENCOUNTER — OFFICE VISIT (OUTPATIENT)
Age: 35
End: 2025-07-01
Payer: COMMERCIAL

## 2025-07-01 VITALS
SYSTOLIC BLOOD PRESSURE: 126 MMHG | DIASTOLIC BLOOD PRESSURE: 82 MMHG | WEIGHT: 293 LBS | BODY MASS INDEX: 48.82 KG/M2 | HEIGHT: 65 IN | HEART RATE: 84 BPM | OXYGEN SATURATION: 92 % | TEMPERATURE: 97.1 F

## 2025-07-01 DIAGNOSIS — E11.9 TYPE 2 DIABETES MELLITUS WITHOUT COMPLICATION, WITHOUT LONG-TERM CURRENT USE OF INSULIN (HCC): ICD-10-CM

## 2025-07-01 DIAGNOSIS — M25.561 ACUTE PAIN OF RIGHT KNEE: ICD-10-CM

## 2025-07-01 DIAGNOSIS — R11.0 NAUSEA: ICD-10-CM

## 2025-07-01 DIAGNOSIS — G44.86 CERVICOGENIC HEADACHE: ICD-10-CM

## 2025-07-01 DIAGNOSIS — F25.1 SCHIZOAFFECTIVE DISORDER, DEPRESSIVE TYPE (HCC): Primary | ICD-10-CM

## 2025-07-01 DIAGNOSIS — F32.A DEPRESSION WITH SUICIDAL IDEATION: Primary | ICD-10-CM

## 2025-07-01 DIAGNOSIS — K21.9 GASTROESOPHAGEAL REFLUX DISEASE WITHOUT ESOPHAGITIS: ICD-10-CM

## 2025-07-01 DIAGNOSIS — J45.51 SEVERE PERSISTENT ASTHMA WITH ACUTE EXACERBATION (HCC): ICD-10-CM

## 2025-07-01 DIAGNOSIS — R45.851 DEPRESSION WITH SUICIDAL IDEATION: Primary | ICD-10-CM

## 2025-07-01 DIAGNOSIS — Z76.0 ENCOUNTER FOR MEDICATION REFILL: ICD-10-CM

## 2025-07-01 DIAGNOSIS — J45.20 MILD INTERMITTENT ASTHMA WITHOUT COMPLICATION: ICD-10-CM

## 2025-07-01 DIAGNOSIS — Z87.442 H/O RENAL CALCULI: ICD-10-CM

## 2025-07-01 DIAGNOSIS — J45.40 MODERATE PERSISTENT ASTHMA, UNSPECIFIED WHETHER COMPLICATED: ICD-10-CM

## 2025-07-01 LAB
ALBUMIN SERPL-MCNC: 4 G/DL (ref 3.5–5.2)
ALP SERPL-CCNC: 107 U/L (ref 35–104)
ALT SERPL-CCNC: 46 U/L (ref 10–35)
AMPHET UR QL SCN: NEGATIVE
ANION GAP SERPL CALCULATED.3IONS-SCNC: 10 MMOL/L (ref 9–16)
APAP SERPL-MCNC: <5 UG/ML (ref 10–30)
AST SERPL-CCNC: 41 U/L (ref 10–35)
BARBITURATES UR QL SCN: NEGATIVE
BASOPHILS # BLD: 0.07 K/UL (ref 0–0.2)
BASOPHILS NFR BLD: 1 % (ref 0–2)
BENZODIAZ UR QL: NEGATIVE
BILIRUB SERPL-MCNC: 0.4 MG/DL (ref 0–1.2)
BUN SERPL-MCNC: 7 MG/DL (ref 6–20)
CALCIUM SERPL-MCNC: 9.3 MG/DL (ref 8.6–10.4)
CANNABINOIDS UR QL SCN: NEGATIVE
CHLORIDE SERPL-SCNC: 104 MMOL/L (ref 98–107)
CO2 SERPL-SCNC: 27 MMOL/L (ref 20–31)
COCAINE UR QL SCN: NEGATIVE
CREAT SERPL-MCNC: 0.8 MG/DL (ref 0.7–1.2)
EOSINOPHIL # BLD: 0.35 K/UL (ref 0–0.44)
EOSINOPHILS RELATIVE PERCENT: 4 % (ref 0–4)
ERYTHROCYTE [DISTWIDTH] IN BLOOD BY AUTOMATED COUNT: 13 % (ref 11.5–14.9)
ETHANOL PERCENT: NORMAL %
ETHANOLAMINE SERPL-MCNC: <10 MG/DL (ref 0–0.08)
FENTANYL UR QL: NEGATIVE
GFR, ESTIMATED: >90 ML/MIN/1.73M2
GLUCOSE SERPL-MCNC: 178 MG/DL (ref 74–99)
HCG SERPL QL: NEGATIVE
HCT VFR BLD AUTO: 37.8 % (ref 36–46)
HGB BLD-MCNC: 12.6 G/DL (ref 12–16)
IMM GRANULOCYTES # BLD AUTO: 0.03 K/UL (ref 0–0.3)
IMM GRANULOCYTES NFR BLD: 0 %
LYMPHOCYTES NFR BLD: 1.98 K/UL (ref 1.1–3.7)
LYMPHOCYTES RELATIVE PERCENT: 20 % (ref 24–44)
MCH RBC QN AUTO: 30.5 PG (ref 26–34)
MCHC RBC AUTO-ENTMCNC: 33.3 G/DL (ref 31–37)
MCV RBC AUTO: 91.5 FL (ref 80–100)
METHADONE UR QL: NEGATIVE
MONOCYTES NFR BLD: 0.61 K/UL (ref 0.1–1.2)
MONOCYTES NFR BLD: 6 % (ref 3–12)
NEUTROPHILS NFR BLD: 69 % (ref 36–66)
NEUTS SEG NFR BLD: 6.75 K/UL (ref 1.5–8.1)
NRBC BLD-RTO: 0 PER 100 WBC
OPIATES UR QL SCN: NEGATIVE
OXYCODONE UR QL SCN: NEGATIVE
PCP UR QL SCN: NEGATIVE
PLATELET # BLD AUTO: 345 K/UL (ref 150–450)
PMV BLD AUTO: 9.9 FL (ref 8–13.5)
POTASSIUM SERPL-SCNC: 4.2 MMOL/L (ref 3.7–5.3)
PROT SERPL-MCNC: 7.9 G/DL (ref 6.6–8.7)
RBC # BLD AUTO: 4.13 M/UL (ref 3.95–5.11)
SALICYLATES SERPL-MCNC: 1.9 MG/DL (ref 0–10)
SODIUM SERPL-SCNC: 141 MMOL/L (ref 136–145)
TEST INFORMATION: NORMAL
WBC OTHER # BLD: 9.8 K/UL (ref 3.5–11)

## 2025-07-01 PROCEDURE — 1240000000 HC EMOTIONAL WELLNESS R&B

## 2025-07-01 PROCEDURE — 85025 COMPLETE CBC W/AUTO DIFF WBC: CPT

## 2025-07-01 PROCEDURE — 6370000000 HC RX 637 (ALT 250 FOR IP): Performed by: EMERGENCY MEDICINE

## 2025-07-01 PROCEDURE — 99285 EMERGENCY DEPT VISIT HI MDM: CPT

## 2025-07-01 PROCEDURE — G0480 DRUG TEST DEF 1-7 CLASSES: HCPCS

## 2025-07-01 PROCEDURE — 3052F HG A1C>EQUAL 8.0%<EQUAL 9.0%: CPT

## 2025-07-01 PROCEDURE — 36415 COLL VENOUS BLD VENIPUNCTURE: CPT

## 2025-07-01 PROCEDURE — 80143 DRUG ASSAY ACETAMINOPHEN: CPT

## 2025-07-01 PROCEDURE — 80179 DRUG ASSAY SALICYLATE: CPT

## 2025-07-01 PROCEDURE — 80307 DRUG TEST PRSMV CHEM ANLYZR: CPT

## 2025-07-01 PROCEDURE — 1036F TOBACCO NON-USER: CPT

## 2025-07-01 PROCEDURE — 84703 CHORIONIC GONADOTROPIN ASSAY: CPT

## 2025-07-01 PROCEDURE — 2022F DILAT RTA XM EVC RTNOPTHY: CPT

## 2025-07-01 PROCEDURE — G8427 DOCREV CUR MEDS BY ELIG CLIN: HCPCS

## 2025-07-01 PROCEDURE — 99214 OFFICE O/P EST MOD 30 MIN: CPT

## 2025-07-01 PROCEDURE — 99213 OFFICE O/P EST LOW 20 MIN: CPT

## 2025-07-01 PROCEDURE — 94761 N-INVAS EAR/PLS OXIMETRY MLT: CPT

## 2025-07-01 PROCEDURE — 94640 AIRWAY INHALATION TREATMENT: CPT

## 2025-07-01 PROCEDURE — G8417 CALC BMI ABV UP PARAM F/U: HCPCS

## 2025-07-01 PROCEDURE — 6370000000 HC RX 637 (ALT 250 FOR IP): Performed by: NURSE PRACTITIONER

## 2025-07-01 PROCEDURE — 80053 COMPREHEN METABOLIC PANEL: CPT

## 2025-07-01 RX ORDER — POLYETHYLENE GLYCOL 3350 17 G/17G
17 POWDER, FOR SOLUTION ORAL DAILY PRN
Status: DISCONTINUED | OUTPATIENT
Start: 2025-07-01 | End: 2025-07-04 | Stop reason: HOSPADM

## 2025-07-01 RX ORDER — BLOOD PRESSURE TEST KIT
1 KIT MISCELLANEOUS 3 TIMES DAILY
Qty: 100 EACH | Refills: 0 | Status: CANCELLED | OUTPATIENT
Start: 2025-07-01

## 2025-07-01 RX ORDER — FAMOTIDINE 20 MG/1
20 TABLET, FILM COATED ORAL 2 TIMES DAILY
Qty: 28 TABLET | Refills: 0 | Status: CANCELLED | OUTPATIENT
Start: 2025-07-01 | End: 2025-07-15

## 2025-07-01 RX ORDER — ALBUTEROL SULFATE 5 MG/ML
5 SOLUTION RESPIRATORY (INHALATION) 4 TIMES DAILY PRN
Qty: 120 EACH | Refills: 3 | Status: CANCELLED | OUTPATIENT
Start: 2025-07-01

## 2025-07-01 RX ORDER — GLIPIZIDE 5 MG/1
5 TABLET ORAL
Qty: 60 TABLET | Refills: 0 | Status: ON HOLD | OUTPATIENT
Start: 2025-07-01 | End: 2025-07-04

## 2025-07-01 RX ORDER — IPRATROPIUM BROMIDE AND ALBUTEROL SULFATE 2.5; .5 MG/3ML; MG/3ML
1 SOLUTION RESPIRATORY (INHALATION) ONCE
Status: COMPLETED | OUTPATIENT
Start: 2025-07-01 | End: 2025-07-01

## 2025-07-01 RX ORDER — ESCITALOPRAM OXALATE 20 MG/1
20 TABLET ORAL NIGHTLY
Qty: 14 TABLET | Refills: 0 | Status: CANCELLED | OUTPATIENT
Start: 2025-07-01 | End: 2025-07-15

## 2025-07-01 RX ORDER — ALBUTEROL SULFATE 90 UG/1
2 INHALANT RESPIRATORY (INHALATION) 4 TIMES DAILY PRN
Qty: 18 G | Refills: 0 | Status: CANCELLED | OUTPATIENT
Start: 2025-07-01

## 2025-07-01 RX ORDER — ACETAMINOPHEN 325 MG/1
650 TABLET ORAL EVERY 4 HOURS PRN
Status: DISCONTINUED | OUTPATIENT
Start: 2025-07-01 | End: 2025-07-04 | Stop reason: HOSPADM

## 2025-07-01 RX ORDER — TRAZODONE HYDROCHLORIDE 50 MG/1
50 TABLET ORAL NIGHTLY PRN
Qty: 14 TABLET | Refills: 0 | Status: CANCELLED | OUTPATIENT
Start: 2025-07-01 | End: 2025-07-15

## 2025-07-01 RX ORDER — HYDROXYZINE HYDROCHLORIDE 50 MG/1
50 TABLET, FILM COATED ORAL 3 TIMES DAILY PRN
Status: DISCONTINUED | OUTPATIENT
Start: 2025-07-01 | End: 2025-07-04 | Stop reason: HOSPADM

## 2025-07-01 RX ORDER — INSULIN LISPRO 100 [IU]/ML
0-8 INJECTION, SOLUTION INTRAVENOUS; SUBCUTANEOUS
Qty: 10 ML | Refills: 0 | Status: ON HOLD | OUTPATIENT
Start: 2025-07-01 | End: 2025-07-04

## 2025-07-01 RX ORDER — BLOOD PRESSURE TEST KIT
1 KIT MISCELLANEOUS 3 TIMES DAILY
Qty: 100 EACH | Refills: 0 | Status: SHIPPED | OUTPATIENT
Start: 2025-07-01

## 2025-07-01 RX ORDER — HYDROXYZINE HYDROCHLORIDE 50 MG/1
25 TABLET, FILM COATED ORAL EVERY 8 HOURS PRN
Qty: 60 TABLET | Refills: 3 | Status: CANCELLED | OUTPATIENT
Start: 2025-07-01 | End: 2025-12-08

## 2025-07-01 RX ORDER — ARIPIPRAZOLE 10 MG/1
10 TABLET ORAL DAILY
Qty: 14 TABLET | Refills: 0 | Status: CANCELLED | OUTPATIENT
Start: 2025-07-01 | End: 2025-07-15

## 2025-07-01 RX ORDER — HYDROXYZINE HYDROCHLORIDE 50 MG/1
25 TABLET, FILM COATED ORAL EVERY 8 HOURS PRN
Qty: 21 TABLET | Refills: 0 | Status: CANCELLED | OUTPATIENT
Start: 2025-07-01 | End: 2025-07-15

## 2025-07-01 RX ORDER — DULOXETIN HYDROCHLORIDE 20 MG/1
20 CAPSULE, DELAYED RELEASE ORAL DAILY
Qty: 14 CAPSULE | Refills: 0 | Status: CANCELLED | OUTPATIENT
Start: 2025-07-01 | End: 2025-07-15

## 2025-07-01 RX ORDER — CALAMINE 8% AND ZINC OXIDE 8% 160 MG/ML
1 LOTION TOPICAL DAILY
Qty: 1 EACH | Refills: 0 | Status: CANCELLED | OUTPATIENT
Start: 2025-07-01

## 2025-07-01 RX ORDER — MAGNESIUM HYDROXIDE/ALUMINUM HYDROXICE/SIMETHICONE 120; 1200; 1200 MG/30ML; MG/30ML; MG/30ML
30 SUSPENSION ORAL EVERY 6 HOURS PRN
Status: DISCONTINUED | OUTPATIENT
Start: 2025-07-01 | End: 2025-07-04 | Stop reason: HOSPADM

## 2025-07-01 RX ORDER — POLYETHYLENE GLYCOL 3350 17 G
2 POWDER IN PACKET (EA) ORAL
Status: DISCONTINUED | OUTPATIENT
Start: 2025-07-01 | End: 2025-07-04 | Stop reason: HOSPADM

## 2025-07-01 RX ORDER — ALBUTEROL SULFATE 90 UG/1
2 INHALANT RESPIRATORY (INHALATION) 4 TIMES DAILY PRN
Qty: 18 G | Refills: 5 | Status: CANCELLED | OUTPATIENT
Start: 2025-07-01

## 2025-07-01 RX ORDER — FLUTICASONE PROPIONATE AND SALMETEROL XINAFOATE 45; 21 UG/1; UG/1
2 AEROSOL, METERED RESPIRATORY (INHALATION) 2 TIMES DAILY
Qty: 1 EACH | Refills: 3 | Status: CANCELLED | OUTPATIENT
Start: 2025-07-01 | End: 2025-07-31

## 2025-07-01 RX ORDER — AVOBENZONE, HOMOSALATE, OCTISALATE, OCTOCRYLENE 30; 40; 45; 26 MG/ML; MG/ML; MG/ML; MG/ML
1 CREAM TOPICAL DAILY
Qty: 100 EACH | Refills: 0 | Status: SHIPPED | OUTPATIENT
Start: 2025-07-01

## 2025-07-01 RX ORDER — GLUCOSAMINE HCL/CHONDROITIN SU 500-400 MG
1 CAPSULE ORAL 3 TIMES DAILY
Qty: 100 STRIP | Refills: 11 | Status: ON HOLD | OUTPATIENT
Start: 2025-07-01 | End: 2025-07-04 | Stop reason: HOSPADM

## 2025-07-01 RX ORDER — ONDANSETRON 4 MG/1
4 TABLET, FILM COATED ORAL 3 TIMES DAILY PRN
Qty: 15 TABLET | Refills: 0 | Status: CANCELLED | OUTPATIENT
Start: 2025-07-01

## 2025-07-01 RX ORDER — ACETAMINOPHEN 500 MG
500 TABLET ORAL EVERY 6 HOURS PRN
Qty: 56 TABLET | Refills: 0 | Status: CANCELLED | OUTPATIENT
Start: 2025-07-01 | End: 2025-07-15

## 2025-07-01 RX ADMIN — HYDROXYZINE HYDROCHLORIDE 50 MG: 50 TABLET ORAL at 21:37

## 2025-07-01 RX ADMIN — IPRATROPIUM BROMIDE AND ALBUTEROL SULFATE 1 DOSE: .5; 2.5 SOLUTION RESPIRATORY (INHALATION) at 16:56

## 2025-07-01 ASSESSMENT — SLEEP AND FATIGUE QUESTIONNAIRES
AVERAGE NUMBER OF SLEEP HOURS: -2
SLEEP PATTERN: DIFFICULTY FALLING ASLEEP
DO YOU USE A SLEEP AID: NO
DO YOU HAVE DIFFICULTY SLEEPING: YES

## 2025-07-01 ASSESSMENT — PATIENT HEALTH QUESTIONNAIRE - PHQ9
SUM OF ALL RESPONSES TO PHQ QUESTIONS 1-9: 2
2. FEELING DOWN, DEPRESSED OR HOPELESS: SEVERAL DAYS
SUM OF ALL RESPONSES TO PHQ QUESTIONS 1-9: 2
1. LITTLE INTEREST OR PLEASURE IN DOING THINGS: SEVERAL DAYS

## 2025-07-01 NOTE — PROGRESS NOTES
Attending Physician Statement  I have discussed the care of Meño Goodman, including pertinent history and exam findings with the resident. I have reviewed the key elements of all parts of the encounter with the resident. I have seen and examined the patient with the resident and the key elements of all parts of the encounter have been performed by me.  Added history includes made an appointment due to blood sugars being high and she had run out of medications . I agree with the assessment, and status of the problem list as documented.   Diagnosis Orders   1. Schizoaffective disorder, depressive type (HCC)        2. Moderate persistent asthma, unspecified whether complicated        3. Type 2 diabetes mellitus without complication, without long-term current use of insulin (HCC)  Lancets MISC    DISCONTINUED: blood glucose monitor strips    DISCONTINUED: insulin lispro (HUMALOG,ADMELOG) 100 UNIT/ML SOLN injection vial    DISCONTINUED: metFORMIN (GLUCOPHAGE) 1000 MG tablet    DISCONTINUED: glipiZIDE (GLUCOTROL) 5 MG tablet      4. Gastroesophageal reflux disease without esophagitis        5. Severe persistent asthma with acute exacerbation ([facility])        6. Nausea        7. Cervicogenic headache        8. H/O renal calculi        9. Encounter for medication refill        10. Acute pain of right knee           The plan and orders should include No orders of the defined types were placed in this encounter.   and this was also documented by the resident.      Dr Anoop Sexton MD, FACP  Associate , Internal Medicine Residency Program  Res[facility] , [facility]  Chair, Department of Internal Medicine  [facility]         7/1/2025, 12:25 PM        
and metformin.  Continue lispro with sliding scale insulin, continue metformin 1G twice daily.  Monitor glucose 3 times daily and maintain a glucose record.          FOLLOW UP AND INSTRUCTIONS:  Follow up with PCP.    Meño received counseling on the following healthy behaviors: nutrition, exercise, medication adherence.    Discussed use, benefit, and side effects of prescribed medications.  Barriers to medication compliance addressed.  All patient questions answered.  Pt voiced understanding.    Patient given educational materials - see patient instructions  Geneva Madrid MD   Internal Medicine  7/1/2025, 11:29 AM    This note is created with the assistance of a speech-recognition program. While intending to generate a document that actually reflects the content of thevisit, the document can still have some mistakes which may not have been identified and corrected by editing.

## 2025-07-01 NOTE — ED PROVIDER NOTES
[facility]  EMERGENCY DEPARTMENT ENCOUNTER      Pt Name: Meño Goodman  MRN: [id number]  Birthdate 1990  Date of evaluation: 25      CHIEF COMPLAINT       Suicidal    HISTORY OF PRESENT ILLNESS   HPI 35 y.o. female presents for mental health evaluation.  The patient reports feeling derpessed and having thought of suicide for the last few days.  The patient has been thinking of stabbing herself.  The patient reports a h/o of previous suicide attempt.  The patient reports that their symptoms were provoked by problems at home and her 's health issues.  She also reports that she stopped taking her psychiatric medications about a month ago.  The patient does have a h/o of previous psychiatric admission most recently between  until .  The patient denies drug use, no attempts at self harm to this point. The patient reports she has had some wheezing the last few days.  No severe ANAND.  She states she has been out of hier inhaler.   REVIEW OF SYSTEMS     Review of Systems    10 systems reviewed and negative unless otherwise noted in the HPI.     PAST MEDICAL HISTORY     Past Medical History:   Diagnosis Date    Asthma     Back pain 2014    Bipolar 1 disorder (HCC)     Bipolar disorder (HCC) 2017    Depression     Diabetes mellitus (HCC)     Dizziness     Fatty liver disease, nonalcoholic     Fatty liver disease, nonalcoholic     GERD (gastroesophageal reflux disease)     Obesity        SURGICAL HISTORY       Past Surgical History:   Procedure Laterality Date     SECTION      LEEP         CURRENT MEDICATIONS       Current Discharge Medication List        CONTINUE these medications which have NOT CHANGED    Details   insulin lispro (HUMALOG,ADMELOG) 100 UNIT/ML SOLN injection vial Inject 0-8 Units into the skin 4 times daily (before meals and nightly)  Qty: 10 mL, Refills: 0    Associated Diagnoses: Type 2 diabetes mellitus without complication, without long-term

## 2025-07-01 NOTE — ED NOTES
Mode of arrival (squad #, walk in, police, etc) : police         Chief complaint(s): mental health         Arrival Note (brief scenario, treatment PTA, etc).: Pt states SI pt states she would steal a knife from her  and cut herself pt states she is under a lot of stress. Denies HI denies any drug or alcohol use.         C= \"Have you ever felt that you should Cut down on your drinking?\"  No  A= \"Have people Annoyed you by criticizing your drinking?\"  No  G= \"Have you ever felt bad or Guilty about your drinking?\"  No  E= \"Have you ever had a drink as an Eye-opener first thing in the morning to steady your nerves or to help a hangover?\"  No      Deferred []      Reason for deferring: N/A    *If yes to two or more: probable alcohol abuse.*

## 2025-07-01 NOTE — PROGRESS NOTES
Pharmacy Medication History Note      List of current medications patient is taking is incomplete.    Source of information: patient, Sure Scripts, Care Everywhere, OARRS     Changes made to medication list:  Medications removed (include reason, ex. therapy complete or physician discontinued, noncompliance):  None     Medications flagged for provider review:  Calamine lotion - course complete  Lice shampoo - course complete   Ondansetron - course complete   Duloxetine - currently prescribed as 20 mg twice daily     Medications added/doses adjusted:  None     Other notes (ex. Recent course of antibiotics, Coumadin dosing):  The patient reports she has not taken most medications in over a month.   The patient has historical prescriptions for Invega Sustenna 234 mg with the last fill on 4/4/25. Please contact [facility] once they reopen to confirm last injection date.   The patient received 5 days of prednisone on 5/27/25.   The patient received 7 days of cephalexin on 6/20/25.   OARRS negative   Patient reports cannabis use.       Current Home Medication List at Time of Admission:  Prior to Admission medications    Medication Sig   insulin lispro (HUMALOG,ADMELOG) 100 UNIT/ML SOLN injection vial Inject 0-8 Units into the skin 4 times daily (before meals and nightly)   metFORMIN (GLUCOPHAGE) 1000 MG tablet Take 1 tablet by mouth 2 times daily (with meals)   glipiZIDE (GLUCOTROL) 5 MG tablet Take 1 tablet by mouth 2 times daily (before meals)   hydrOXYzine HCl (ATARAX) 50 MG tablet TAKE ONE TABLET BY MOUTH THREE TIMES DAILY AS NEEDED FOR ITCHING   escitalopram (LEXAPRO) 20 MG tablet Take 1 tablet by mouth nightly   SYMBICORT 160-4.5 MCG/ACT AERO INHALE TWO PUFFS into THE lungs TWICE DAILY   insulin glargine (LANTUS) 100 UNIT/ML injection vial Inject 20 Units into the skin nightly   acetaminophen (TYLENOL) 500 MG tablet Take 1 tablet by mouth every 6 hours as needed for Pain   famotidine (PEPCID) 20 MG tablet Take 1 tablet  by mouth 2 times daily   albuterol sulfate HFA (VENTOLIN HFA) 108 (90 Base) MCG/ACT inhaler Inhale 2 puffs into the lungs 4 times daily as needed for Wheezing   ARIPiprazole (ABILIFY) 10 MG tablet Take 1 tablet by mouth daily   DULoxetine (CYMBALTA) 20 MG extended release capsule Take 1 capsule by mouth daily  Patient taking differently: Take 1 capsule by mouth 2 times daily   traZODone (DESYREL) 50 MG tablet Take 1 tablet by mouth nightly as needed for Sleep   TRULICITY 1.5 MG/0.5ML SC injection inject 0.5 milliliters subcutaneously every week   FT LICE KILLING MAX ST 0.33-4 % shampoo USE AS DIRECTED  Patient not taking: Reported on 7/1/2025   glipiZIDE (GLUCOTROL) 5 MG tablet Take 1 tablet by mouth 2 times daily (before meals)   ondansetron (ZOFRAN) 4 MG tablet Take 1 tablet by mouth 3 times daily as needed for Nausea or Vomiting  Patient not taking: Reported on 7/1/2025   ondansetron (ZOFRAN-ODT) 4 MG disintegrating tablet Take 1 tablet by mouth 3 times daily as needed for Nausea or Vomiting  Patient not taking: Reported on 7/1/2025   albuterol (PROVENTIL) (5 MG/ML) 0.5% nebulizer solution Take 1 mL by nebulization 4 times daily as needed for Wheezing   Calamine-Zinc Oxide (V-R CALAMINE) LOTN Apply 1 each topically Daily  Patient not taking: Reported on 7/1/2025   insulin lispro (HUMALOG,ADMELOG) 100 UNIT/ML SOLN injection vial Inject 0-8 Units into the skin 4 times daily (before meals and nightly)   metFORMIN (GLUCOPHAGE) 1000 MG tablet Take 1 tablet by mouth 2 times daily (with meals)         Please let me know if you have any questions about this encounter. Thank you!    Electronically signed by Anjelica Saldivar RPH on 7/1/2025 at 7:37 PM

## 2025-07-01 NOTE — ED NOTES
Provisional Diagnosis:   Depression with suicidal ideations    Psychosocial and Contextual Factors:    Patient has financial issues. Patient has physical health issues.  (homelessness, barriers to treatment)    C-SSRS Summary:      Patient: X  Family:   Agency:         Present Suicidal Behavior:  X    Verbal: Patient reports a plan to cut or stab herself with her husbands knife.     Attempt:     Past Suicidal Behavior: X    Verbal: Patient reports a history of suicidal ideations     Attempt:          Substance Abuse: Patient denies     Self-Injurious/Self-Mutilation: Patient denies       Violence Current or Past: None noted           Protective Factors:    Patient has housing. Patient has insurance. Patient is linked with mental health agency.      Risk Factors:    Patient is non compliant with psychiatric medications. Patient has poor judgment and coping skills.       Clinical Summary:    Patient is a 35 year old female that comes to the ED today on her own for suicidal ideations.     Patient reports having suicidal ideations for the past several days. Patient reports having a plan to cut or stab herself with her husbands knife. Patient denies H/I at this time. Patient denies auditory or visual hallucinations at this time.     Patient reports a history of mental illness. Per [facility] patient was last admitted to the [facility] in January 2025. Patient reports being linked with [facility] for outpatient treatment. Patient reports she has not been taking her medications for about a month. Patient denies any drug or alcohol use. Patient reports her  has been dealing with a lot of health issues and it has caused her additional stress.     Patient reports she does not currently feel safe and is voluntary for admission today.         Level of Care Disposition:    Writer consulted with psychiatry. Patient is accepted to the [facility].

## 2025-07-02 PROBLEM — F33.3 MAJOR DEPRESSIVE DISORDER, RECURRENT, SEVERE WITH PSYCHOTIC FEATURES (HCC): Status: ACTIVE | Noted: 2025-07-02

## 2025-07-02 LAB
GLUCOSE BLD-MCNC: 195 MG/DL (ref 65–105)
GLUCOSE BLD-MCNC: 220 MG/DL (ref 65–105)
GLUCOSE BLD-MCNC: 260 MG/DL (ref 65–105)

## 2025-07-02 PROCEDURE — 6370000000 HC RX 637 (ALT 250 FOR IP)

## 2025-07-02 PROCEDURE — 6370000000 HC RX 637 (ALT 250 FOR IP): Performed by: NURSE PRACTITIONER

## 2025-07-02 PROCEDURE — 99222 1ST HOSP IP/OBS MODERATE 55: CPT | Performed by: INTERNAL MEDICINE

## 2025-07-02 PROCEDURE — 1240000000 HC EMOTIONAL WELLNESS R&B

## 2025-07-02 PROCEDURE — APPSS60 APP SPLIT SHARED TIME 46-60 MINUTES

## 2025-07-02 PROCEDURE — 99222 1ST HOSP IP/OBS MODERATE 55: CPT | Performed by: PSYCHIATRY & NEUROLOGY

## 2025-07-02 PROCEDURE — 6370000000 HC RX 637 (ALT 250 FOR IP): Performed by: PSYCHIATRY & NEUROLOGY

## 2025-07-02 PROCEDURE — 82947 ASSAY GLUCOSE BLOOD QUANT: CPT

## 2025-07-02 RX ORDER — DULOXETIN HYDROCHLORIDE 20 MG/1
20 CAPSULE, DELAYED RELEASE ORAL 2 TIMES DAILY
Status: DISCONTINUED | OUTPATIENT
Start: 2025-07-02 | End: 2025-07-04 | Stop reason: HOSPADM

## 2025-07-02 RX ORDER — INSULIN GLARGINE 100 [IU]/ML
20 INJECTION, SOLUTION SUBCUTANEOUS NIGHTLY
Status: DISCONTINUED | OUTPATIENT
Start: 2025-07-02 | End: 2025-07-04 | Stop reason: HOSPADM

## 2025-07-02 RX ORDER — DEXTROSE MONOHYDRATE 100 MG/ML
INJECTION, SOLUTION INTRAVENOUS CONTINUOUS PRN
Status: DISCONTINUED | OUTPATIENT
Start: 2025-07-02 | End: 2025-07-04 | Stop reason: HOSPADM

## 2025-07-02 RX ORDER — ALBUTEROL SULFATE 90 UG/1
2 INHALANT RESPIRATORY (INHALATION) 4 TIMES DAILY PRN
Status: DISCONTINUED | OUTPATIENT
Start: 2025-07-02 | End: 2025-07-04 | Stop reason: HOSPADM

## 2025-07-02 RX ORDER — GLIPIZIDE 5 MG/1
5 TABLET ORAL
Status: DISCONTINUED | OUTPATIENT
Start: 2025-07-02 | End: 2025-07-04 | Stop reason: HOSPADM

## 2025-07-02 RX ORDER — BUDESONIDE AND FORMOTEROL FUMARATE DIHYDRATE 160; 4.5 UG/1; UG/1
2 AEROSOL RESPIRATORY (INHALATION)
Status: DISCONTINUED | OUTPATIENT
Start: 2025-07-02 | End: 2025-07-04 | Stop reason: HOSPADM

## 2025-07-02 RX ORDER — ALBUTEROL SULFATE 5 MG/ML
5 SOLUTION RESPIRATORY (INHALATION) 4 TIMES DAILY PRN
Status: DISCONTINUED | OUTPATIENT
Start: 2025-07-02 | End: 2025-07-04 | Stop reason: HOSPADM

## 2025-07-02 RX ORDER — INSULIN LISPRO 100 [IU]/ML
0-8 INJECTION, SOLUTION INTRAVENOUS; SUBCUTANEOUS
Status: DISCONTINUED | OUTPATIENT
Start: 2025-07-02 | End: 2025-07-04 | Stop reason: HOSPADM

## 2025-07-02 RX ORDER — PALIPERIDONE 3 MG/1
3 TABLET, EXTENDED RELEASE ORAL DAILY
Status: DISCONTINUED | OUTPATIENT
Start: 2025-07-02 | End: 2025-07-04 | Stop reason: HOSPADM

## 2025-07-02 RX ADMIN — ALBUTEROL SULFATE 2 PUFF: 90 AEROSOL, METERED RESPIRATORY (INHALATION) at 17:27

## 2025-07-02 RX ADMIN — GLIPIZIDE 5 MG: 5 TABLET ORAL at 17:21

## 2025-07-02 RX ADMIN — ACETAMINOPHEN 650 MG: 325 TABLET ORAL at 17:29

## 2025-07-02 RX ADMIN — INSULIN LISPRO 2 UNITS: 100 INJECTION, SOLUTION INTRAVENOUS; SUBCUTANEOUS at 20:43

## 2025-07-02 RX ADMIN — METFORMIN HYDROCHLORIDE 1000 MG: 1000 TABLET ORAL at 17:21

## 2025-07-02 RX ADMIN — HYDROXYZINE HYDROCHLORIDE 50 MG: 50 TABLET ORAL at 04:26

## 2025-07-02 RX ADMIN — ALBUTEROL SULFATE 2 PUFF: 90 AEROSOL, METERED RESPIRATORY (INHALATION) at 10:43

## 2025-07-02 RX ADMIN — DULOXETINE 20 MG: 20 CAPSULE, DELAYED RELEASE ORAL at 20:43

## 2025-07-02 RX ADMIN — INSULIN LISPRO 2 UNITS: 100 INJECTION, SOLUTION INTRAVENOUS; SUBCUTANEOUS at 17:21

## 2025-07-02 RX ADMIN — BUDESONIDE AND FORMOTEROL FUMARATE DIHYDRATE 2 PUFF: 160; 4.5 AEROSOL RESPIRATORY (INHALATION) at 20:03

## 2025-07-02 RX ADMIN — INSULIN GLARGINE 20 UNITS: 100 INJECTION, SOLUTION SUBCUTANEOUS at 20:44

## 2025-07-02 RX ADMIN — ALBUTEROL SULFATE 2 PUFF: 90 AEROSOL, METERED RESPIRATORY (INHALATION) at 04:25

## 2025-07-02 RX ADMIN — PALIPERIDONE 3 MG: 3 TABLET, EXTENDED RELEASE ORAL at 17:21

## 2025-07-02 ASSESSMENT — PAIN SCALES - GENERAL
PAINLEVEL_OUTOF10: 6
PAINLEVEL_OUTOF10: 0

## 2025-07-02 ASSESSMENT — PAIN - FUNCTIONAL ASSESSMENT: PAIN_FUNCTIONAL_ASSESSMENT: ACTIVITIES ARE NOT PREVENTED

## 2025-07-02 ASSESSMENT — PAIN DESCRIPTION - ORIENTATION: ORIENTATION: RIGHT

## 2025-07-02 ASSESSMENT — LIFESTYLE VARIABLES
HOW OFTEN DO YOU HAVE A DRINK CONTAINING ALCOHOL: NEVER
HOW MANY STANDARD DRINKS CONTAINING ALCOHOL DO YOU HAVE ON A TYPICAL DAY: PATIENT DOES NOT DRINK
HOW MANY STANDARD DRINKS CONTAINING ALCOHOL DO YOU HAVE ON A TYPICAL DAY: PATIENT DOES NOT DRINK
HOW OFTEN DO YOU HAVE A DRINK CONTAINING ALCOHOL: NEVER

## 2025-07-02 ASSESSMENT — PAIN DESCRIPTION - LOCATION: LOCATION: KNEE

## 2025-07-02 NOTE — PLAN OF CARE
[facility]  Initial Interdisciplinary Treatment Plan NO      Original treatment plan Date & Time: 7/2/2025       Admission Type:  Admission Type: Voluntary    Reason for admission:   Reason for Admission: Depression with suicidal ideation    Estimated Length of Stay:  5-7days  Estimated Discharge Date: to be determined by physician    PATIENT STRENGTHS:  Patient Strengths:   Patient Strengths and Limitations:   Addictive Behavior: Addictive Behavior  In the Past 3 Months, Have You Felt or Has Someone Told You That You Have a Problem With  : None  Medical Problems:  Past Medical History:   Diagnosis Date    Asthma     Back pain 9/16/2014    Bipolar 1 disorder (HCC)     Bipolar disorder (HCC) 12/8/2017    Depression     Diabetes mellitus (HCC)     Dizziness     Fatty liver disease, nonalcoholic     Fatty liver disease, nonalcoholic     GERD (gastroesophageal reflux disease)     Obesity      Status EXAM:Mental Status and Behavioral Exam  Normal: No  Level of Assistance: Independent/Self  Facial Expression: Sad, Worried  Affect: Congruent  Level of Consciousness: Alert  Frequency of Checks: 4 times per hour, close  Mood:Normal: No  Mood: Depressed, Anxious, Helpless, Sad, Worthless, low self-esteem  Motor Activity:Normal: No  Motor Activity: Decreased  Eye Contact: Fair  Observed Behavior: Cooperative, Guarded, Withdrawn, Preoccupied  Sexual Misconduct History: Current - no  Preception: [place] to person, [place] to time, [place] to place, [place] to situation  Attention:Normal: No  Attention: Distractible  Thought Processes: Circumstantial  Thought Content:Normal: No  Thought Content: Preoccupations  Depression Symptoms: Change in energy level, Feelings of helplessness, Feelings of hopelessess, Feelings of worthlessness, Impaired concentration, Isolative, Loss of interest  Anxiety Symptoms: Generalized  Rosaura Symptoms: No problems reported or observed.  Hallucinations: Auditory (comment), Visual

## 2025-07-02 NOTE — H&P
Department of Psychiatry  Attending Physician Psychiatric Assessment   Patient: Meño Goodman  MRN: [id number]  Reason for Admission to Psychiatric Unit:  Threat to self requiring 24 hour professional observation  Command hallucinations directing harm to self or others; risk of the patient taking action  A mental disorder causing major disability in social, interpersonal, occupational, and/or educational functioning that is leading to dangerous or life-threatening functioning, and that can only be addressed in an acute inpatient setting   Failure of outpatient psychiatry treatment so that the beneficiary requires 24 hour professional observation and care  Concerns about patient's safety in the community  Past Mental Health Diagnosis: a history of  Major Depression and Prior suicide attempt  Triggering event or precipitating factor: Relationship Issues and Psych Treatment Noncompliance  Length of time/duration of triggering event: Weeks  Legal Status: Voluntary    CHIEF COMPLAINT: Depression with suicidal ideation    History obtained from: Patient, electronic medical record          HISTORY OF PRESENT ILLNESS:    Meño Goodman is a 35 y.o. female who has a past medical history of type 2 diabetes, steatosis, asthma, chest pain. Patient presented to the ED  today on her own for suicidal ideations.    Patient reports having suicidal ideations for the past several days. Patient reports having a plan to cut or stab herself with her husbands knife. Patient denies H/I at this time. Patient denies auditory or visual hallucinations at this time.    Patient reports a history of mental illness. Per [facility] patient was last admitted to the [facility] in January 2025. Patient reports being linked with [facility] for outpatient treatment. Patient reports she has not been taking her medications for about a month. Patient denies any drug or alcohol use. Patient reports her  has been dealing with a lot of health issues and it has

## 2025-07-02 NOTE — H&P
DIRECTED  Patient not taking: Reported on 7/1/2025 5/29/25   Jean Claude Garcia MD   ondansetron (ZOFRAN) 4 MG tablet Take 1 tablet by mouth 3 times daily as needed for Nausea or Vomiting  Patient not taking: Reported on 7/1/2025 4/1/25   Heide Calderon,    ondansetron (ZOFRAN-ODT) 4 MG disintegrating tablet Take 1 tablet by mouth 3 times daily as needed for Nausea or Vomiting  Patient not taking: Reported on 7/1/2025 3/29/25   Cordell Peck MD   Lancets MISC 1 each by Does not apply route daily 2/21/25   Tarik Martinez MD   albuterol (PROVENTIL) (5 MG/ML) 0.5% nebulizer solution Take 1 mL by nebulization 4 times daily as needed for Wheezing 2/21/25   Tarik Martinez MD   blood glucose monitor kit and supplies Dispense sufficient amount for indicated testing frequency plus additional to accommodate PRN testing needs. Dispense all needed supplies to include: monitor, strips, lancing device, lancets, control solutions, alcohol swabs. 11/8/24   Lotus Chun MD   Blood Pressure KIT 1 kit by Does not apply route once for 1 dose 11/8/24 11/8/24  Lotus Chun MD   Calamine-Zinc Oxide (V-R CALAMINE) LOTN Apply 1 each topically Daily  Patient not taking: Reported on 7/1/2025 11/8/24   Lotus Chun MD   glucose monitoring kit 1 kit by Does not apply route daily 11/8/24   Lotus Chun MD   blood glucose monitor strips 1 strip by Other route in the morning, at noon, and at bedtime Test___times daily Diagnosis: 250.0   Diabetes Mellitus____Insulin Dependent____Non-Insulin Dependent 11/8/24   Lotus Chun MD   Lancets Misc. (ACCU-CHEK SOFTCLIX LANCET DEV) KIT Use as directed for glucose checks 1/18/23   Earnest Bermeo,    aspirin-acetaminophen-caffeine (EXCEDRIN MIGRAINE) 250-250-65 MG per tablet Take 1 tablet by mouth every 8 hours as needed for Headaches 3/24/22 8/2/22  Yeimi Rothman MD        Allergies:     Morphine, Pcn [penicillins], Amoxicillin, Asa        Imaging/Diagonstics:  No results found.    Assessment :      Hospital Problems           Last Modified POA    * (Principal) Major depressive disorder, recurrent, severe with psychotic features (HCC) 7/2/2025 Yes       Plan:     Admitted to inpatient psych with depression and suicidal ideation  Type 2 diabetes mellitus, on glipizide Lantus sliding scale and metformin, check HbA1c  Sleep apnea, respiratory care and eval  Transaminitis likely has hepatic steatosis, check hepatitis panel  Moderate persistent asthma, patient on bronchodilators which has been started currently denies any chest pain shortness of breath does have mild expiratory wheezing  Intermittently hypertensive and tachycardic likely secondary to underlying anxiety, will check TSH  Transaminitis likely does have underlying hepatic steatosis  Labs and medications reviewed.     DVT prophylaxis,  Pt mobile   Full code status       Consultations:   IP CONSULT TO INTERNAL MEDICINE      Katie Huizar MD  7/2/2025  3:34 PM    Copy sent to Jean Claude Padgett MD    Please note that this chart was generated using voice recognition Dragon dictation software.  Although every effort was made to ensure the accuracy of this automated transcription, some errors in transcription may have occurred.

## 2025-07-02 NOTE — GROUP NOTE
Group Therapy Note    Date: 7/2/2025    Group Start Time: 1100  Group End Time: 1130  Group Topic: Music Therapy    Herman Duran        Group Therapy Note    Attendees: 4/12     Patient's Goal:  Patients shared music and advice based on themes/lyrics within their songs selections. Goals to increase self-expression; Increase sense of community; Engage in peer support; Increase socialization; Demonstrate positive use of time      Notes:  Patient attended and participated in group having positive interactions with peers and staff throughout. Patient was pleasant and engaging, sharing music and advice, and supportive of peers sharing.      Status After Intervention:  Improved     Participation Level: Active Listener and Interactive     Participation Quality: Appropriate, Attentive, Sharing, and Supportive        Speech:  normal        Thought Process/Content: Logical  Linear        Affective Functioning: Congruent        Mood: euthymic        Level of consciousness:  Alert and Attentive        Response to Learning: Able to verbalize current knowledge/experience and Progressing to goal        Endings: None Reported     Modes of Intervention: Support, Socialization, Exploration, Activity, Media, and Reality-testing        Discipline Responsible: Psychoeducational Specialist        Signature:  Herman Huerta

## 2025-07-02 NOTE — BH NOTE
[facility]  Admission Note     Admission Type:   Voluntary     Reason for admission:  Reason for Admission: Depression with suicidal ideation due to being the only caregiver for her ailing  who is going to have leg amputated and child is in foster care.       Addictive Behavior:   Addictive Behavior  In the Past 3 Months, Have You Felt or Has Someone Told You That You Have a Problem With  : None    Medical Problems:   Past Medical History:   Diagnosis Date    Asthma     Back pain 9/16/2014    Bipolar 1 disorder (HCC)     Bipolar disorder (HCC) 12/8/2017    Depression     Diabetes mellitus (HCC)     Dizziness     Fatty liver disease, nonalcoholic     Fatty liver disease, nonalcoholic     GERD (gastroesophageal reflux disease)     Obesity        Status EXAM:  Mental Status and Behavioral Exam  Normal: No  Level of Assistance: Independent/Self  Facial Expression: Sad, Worried  Affect: Blunt  Level of Consciousness: Alert  Frequency of Checks: 4 times per hour, close  Mood:Normal: No  Mood: Depressed, Anxious, Helpless, Worthless, low self-esteem  Motor Activity:Normal: No  Motor Activity: Unusual posture/gait  Eye Contact: Fair  Observed Behavior: Guarded, Friendly, Tearful, Cooperative  Sexual Misconduct History: Current - no  Preception: [place] to person, [place] to time, [place] to place, [place] to situation  Attention:Normal: No  Attention: Distractible  Thought Processes: Circumstantial  Thought Content:Normal: No  Thought Content: Preoccupations  Depression Symptoms: Feelings of hopelessess, Feelings of worthlessness, Impaired concentration, Feelings of helplessness  Anxiety Symptoms: Generalized  Rosaura Symptoms: No problems reported or observed.  Hallucinations: None  Delusions: No  Memory:Normal: No  Memory: Poor recent, Poor remote  Insight and Judgment: No  Insight and Judgment: Poor judgment, Poor insight    Tobacco Screening:  Practical Counseling, on admission, melinda X, if applicable

## 2025-07-02 NOTE — PROGRESS NOTES
07/02/25 1436   Encounter Summary   Encounter Overview/Reason Behavioral Health   Service Provided For Patient   Last Encounter  07/02/25   Behavioral Health    Type  Spirituality Group   Assessment/Intervention/Outcome   Assessment Complicated grieving;Compromised coping;Concerns with suffering;Fearful;Impaired resilience;Stress overload   Intervention Active listening;Confronted/Challenged;Discussed belief system/Judaism practices/jasper;Discussed death, afterlife;Discussed illness injury and it’s impact;Discussed relationship with God;Empowerment;Explored/Affirmed feelings, thoughts, concerns;Explored Coping Skills/Resources;Prayer (assurance of)/[place];Sustaining Presence/Ministry of presence   Outcome Engaged in conversation;Expressed feelings, needs, and concerns;Grieving;Receptive

## 2025-07-02 NOTE — BH NOTE
Patient given tobacco quitline number 60835427563 at this time, refusing to call at this time, states \" I just dont want to quit now\"- patient given information as to the dangers of long term tobacco use. Continue to reinforce the importance of tobacco cessation.

## 2025-07-02 NOTE — PLAN OF CARE
Problem: Depression  Goal: Will be euthymic at discharge  Description: INTERVENTIONS:  1. Administer medication as ordered  2. Provide emotional support via 1:1 interaction with staff  3. Encourage involvement in milieu/groups/activities  4. Monitor for social isolation  Outcome: Progressing    Pt denies thought of harm to self or others, safety checks maintained Q15 minutes. Anxious and depressed. Pt also endorses auditory and visual hallucinations, but does not elaborate. Pt is cooperative but isolates to room. Out for needs and does attend groups. Reports poor sleep recently. Good appetite & hygiene encouraged.

## 2025-07-02 NOTE — GROUP NOTE
Group Therapy Note    Date: 7/2/2025    Group Start Time: 1000  Group End Time: 1040  Group Topic: Psychotherapy    [facility] Libia Collins MSW        Group Therapy Note    Attendees: 4/12       Patient's Goal:  Learn about the emotions wheel, discuss how we act/react to certain emotions    Notes:     Status After Intervention:  Improved    Participation Level: Active Listener and Interactive    Participation Quality: Appropriate, Attentive, and Sharing      Speech:  normal      Thought Process/Content: Logical  Linear      Affective Functioning: Congruent      Mood: anxious and depressed      Level of consciousness:  Alert and Oriented x4      Response to Learning: Able to verbalize current knowledge/experience and Able to verbalize/acknowledge new learning      Endings: None Reported    Modes of Intervention: Education and Exploration      Discipline Responsible: /Counselor      Signature:  MASOUD Kim

## 2025-07-02 NOTE — PROGRESS NOTES
Spoke with Tashia RN at [facility] who reports patient last received Invega sustenna 234 mg on 3/10/25.

## 2025-07-03 LAB
CHOLEST SERPL-MCNC: 169 MG/DL (ref 0–199)
CHOLESTEROL/HDL RATIO: 5
EST. AVERAGE GLUCOSE BLD GHB EST-MCNC: 197 MG/DL
GLUCOSE BLD-MCNC: 109 MG/DL (ref 65–105)
GLUCOSE BLD-MCNC: 136 MG/DL (ref 65–105)
GLUCOSE BLD-MCNC: 161 MG/DL (ref 65–105)
GLUCOSE BLD-MCNC: 193 MG/DL (ref 65–105)
HAV IGM SERPL QL IA: NONREACTIVE
HBA1C MFR BLD: 8.5 % (ref 4–6)
HBV CORE IGM SERPL QL IA: NONREACTIVE
HBV SURFACE AG SERPL QL IA: NONREACTIVE
HCV AB SERPL QL IA: NONREACTIVE
HDLC SERPL-MCNC: 34 MG/DL
LDLC SERPL CALC-MCNC: 114 MG/DL (ref 0–100)
TRIGL SERPL-MCNC: 106 MG/DL (ref 0–149)
TSH SERPL DL<=0.05 MIU/L-ACNC: 4.06 UIU/ML (ref 0.27–4.2)

## 2025-07-03 PROCEDURE — 80074 ACUTE HEPATITIS PANEL: CPT

## 2025-07-03 PROCEDURE — 1240000000 HC EMOTIONAL WELLNESS R&B

## 2025-07-03 PROCEDURE — 36415 COLL VENOUS BLD VENIPUNCTURE: CPT

## 2025-07-03 PROCEDURE — 99232 SBSQ HOSP IP/OBS MODERATE 35: CPT | Performed by: PSYCHIATRY & NEUROLOGY

## 2025-07-03 PROCEDURE — 6370000000 HC RX 637 (ALT 250 FOR IP): Performed by: INTERNAL MEDICINE

## 2025-07-03 PROCEDURE — 82947 ASSAY GLUCOSE BLOOD QUANT: CPT

## 2025-07-03 PROCEDURE — 80061 LIPID PANEL: CPT

## 2025-07-03 PROCEDURE — 83036 HEMOGLOBIN GLYCOSYLATED A1C: CPT

## 2025-07-03 PROCEDURE — 99232 SBSQ HOSP IP/OBS MODERATE 35: CPT | Performed by: INTERNAL MEDICINE

## 2025-07-03 PROCEDURE — 6370000000 HC RX 637 (ALT 250 FOR IP): Performed by: NURSE PRACTITIONER

## 2025-07-03 PROCEDURE — APPSS30 APP SPLIT SHARED TIME 16-30 MINUTES: Performed by: NURSE PRACTITIONER

## 2025-07-03 PROCEDURE — 6370000000 HC RX 637 (ALT 250 FOR IP): Performed by: PSYCHIATRY & NEUROLOGY

## 2025-07-03 PROCEDURE — 84443 ASSAY THYROID STIM HORMONE: CPT

## 2025-07-03 RX ORDER — ATORVASTATIN CALCIUM 20 MG/1
20 TABLET, FILM COATED ORAL NIGHTLY
Status: DISCONTINUED | OUTPATIENT
Start: 2025-07-03 | End: 2025-07-04 | Stop reason: HOSPADM

## 2025-07-03 RX ADMIN — ACETAMINOPHEN 650 MG: 325 TABLET ORAL at 09:02

## 2025-07-03 RX ADMIN — BUDESONIDE AND FORMOTEROL FUMARATE DIHYDRATE 2 PUFF: 160; 4.5 AEROSOL RESPIRATORY (INHALATION) at 20:15

## 2025-07-03 RX ADMIN — INSULIN LISPRO 2 UNITS: 100 INJECTION, SOLUTION INTRAVENOUS; SUBCUTANEOUS at 12:28

## 2025-07-03 RX ADMIN — PALIPERIDONE 3 MG: 3 TABLET, EXTENDED RELEASE ORAL at 08:54

## 2025-07-03 RX ADMIN — GLIPIZIDE 5 MG: 5 TABLET ORAL at 17:55

## 2025-07-03 RX ADMIN — METFORMIN HYDROCHLORIDE 1000 MG: 1000 TABLET ORAL at 08:54

## 2025-07-03 RX ADMIN — GLIPIZIDE 5 MG: 5 TABLET ORAL at 06:33

## 2025-07-03 RX ADMIN — ATORVASTATIN CALCIUM 20 MG: 20 TABLET, FILM COATED ORAL at 21:09

## 2025-07-03 RX ADMIN — INSULIN GLARGINE 20 UNITS: 100 INJECTION, SOLUTION SUBCUTANEOUS at 21:10

## 2025-07-03 RX ADMIN — DULOXETINE 20 MG: 20 CAPSULE, DELAYED RELEASE ORAL at 08:54

## 2025-07-03 RX ADMIN — DULOXETINE 20 MG: 20 CAPSULE, DELAYED RELEASE ORAL at 21:09

## 2025-07-03 RX ADMIN — BUDESONIDE AND FORMOTEROL FUMARATE DIHYDRATE 2 PUFF: 160; 4.5 AEROSOL RESPIRATORY (INHALATION) at 08:54

## 2025-07-03 RX ADMIN — ACETAMINOPHEN 650 MG: 325 TABLET ORAL at 21:16

## 2025-07-03 RX ADMIN — HYDROXYZINE HYDROCHLORIDE 50 MG: 50 TABLET ORAL at 21:09

## 2025-07-03 RX ADMIN — METFORMIN HYDROCHLORIDE 1000 MG: 1000 TABLET ORAL at 17:55

## 2025-07-03 ASSESSMENT — PAIN SCALES - GENERAL
PAINLEVEL_OUTOF10: 0
PAINLEVEL_OUTOF10: 4
PAINLEVEL_OUTOF10: 6
PAINLEVEL_OUTOF10: 2

## 2025-07-03 ASSESSMENT — PAIN DESCRIPTION - LOCATION
LOCATION: KNEE
LOCATION: BACK

## 2025-07-03 ASSESSMENT — PAIN SCALES - WONG BAKER
WONGBAKER_NUMERICALRESPONSE: HURTS LITTLE MORE
WONGBAKER_NUMERICALRESPONSE: NO HURT

## 2025-07-03 ASSESSMENT — PAIN DESCRIPTION - ORIENTATION: ORIENTATION: LOWER

## 2025-07-03 NOTE — PROGRESS NOTES
[facility] Internal Medicine  Naren Collins MD; Kaleb Martínez MD,, Katie Huizar MD, Dr Joshua Guy MD   ; Matt Limon MD    [facility] Internal Medicine   IN-PATIENT SERVICE   [facility]     HISTORY AND PHYSICAL EXAMINATION            Date:   7/3/2025  Patient name:  Meño Goodman  Date of admission:  2025  3:45 PM  MRN:   [id number]  Account:  [id number]  YOB: 1990  PCP:    Jean Claude Garcia MD  Room:   02090209-01  Code Status:    Full Code      Chief Complaint:     Suicidal /Ac Psychosis    History Obtained From:     Patient/EMR/bedside RN     History of Present Illness:       Patient is a 35-year-old female multiple comorbidities including history of bipolar, anxiety depression, type 2 diabetes mellitus, morbid obesity BMI of 57, asthma is been admitted at [facility] for further management of depression suicidal ideation    Patient currently denies any chest pain shortness of breath      Past Medical History:     Past Medical History:   Diagnosis Date    Asthma     Back pain 2014    Bipolar 1 disorder (HCC)     Bipolar disorder (HCC) 2017    Depression     Diabetes mellitus (HCC)     Dizziness     Fatty liver disease, nonalcoholic     Fatty liver disease, nonalcoholic     GERD (gastroesophageal reflux disease)     Obesity         Past Surgical History:     Past Surgical History:   Procedure Laterality Date     SECTION      LEEP          Medications Prior to Admission:     Prior to Admission medications    Medication Sig Start Date End Date Taking? Authorizing Provider   insulin lispro (HUMALOG,ADMELOG) 100 UNIT/ML SOLN injection vial Inject 0-8 Units into the skin 4 times daily (before meals and nightly) 25 Yes Anoop Sexton MD   metFORMIN (GLUCOPHAGE) 1000 MG tablet Take 1 tablet by mouth 2 times daily (with meals) 25 Yes Anoop Sexton MD   glipiZIDE (GLUCOTROL) 5 MG tablet Take 1 tablet by mouth 2 times

## 2025-07-03 NOTE — GROUP NOTE
Group Therapy Note    Date: 7/3/2025    Group Start Time: 1430  Group End Time: 1515  Group Topic: Music Therapy    Herman Duran        Group Therapy Note    Attendees: 4/13       Patient's Goal:  Patients shared songs that reminded them of positive things about themself/their life, engaging in conversation about those things with questions asked by this writer. Engaged in reflection on positive self-talk at end of group. Goals to increase self-esteem; Increase self-expression; Demonstrate positive use of time; Increase sense of community;    Notes:  Patient attended and participated in group having positive interactions with peers and staff throughout. Patient Shared music and how it reminded him of positive things about his life. Patient pleasant and engaging throughout and supportive of peers music and sharing.    Status After Intervention:  Improved    Participation Level: Active Listener and Interactive    Participation Quality: Appropriate, Attentive, Sharing, and Supportive      Speech:  normal      Thought Process/Content: Logical  Linear      Affective Functioning: Congruent      Mood: euthymic      Level of consciousness:  Alert and Attentive      Response to Learning: Able to verbalize current knowledge/experience and Progressing to goal      Endings: None Reported    Modes of Intervention: Support, Socialization, Exploration, Activity, Media, and Reality-testing      Discipline Responsible: Psychoeducational Specialist      Signature:  Herman Huerta

## 2025-07-03 NOTE — PROGRESS NOTES
PT refused bipap stated she doesn't want to wear hospital acquired unit and doesn't always wear home unit consistently. Spoke wit Pt's nurse and if pt changes her mind or is in need. We will bring a unit down

## 2025-07-03 NOTE — PLAN OF CARE
Problem: Self Harm/Suicidality  Goal: Will have no self-injury during hospital stay  Description: INTERVENTIONS:  1.  Ensure constant observer at bedside with Q15M safety checks  2.  Maintain a safe environment  3.  Secure patient belongings  4.  Ensure family/visitors adhere to safety recommendations  5.  Ensure safety tray has been added to patient's diet order  6.  Every shift and PRN: Re-assess suicidal risk via Frequent Screener    7/2/2025 2138 by Papi Villegas, RN  Outcome: Progressing  Flowsheets (Taken 7/2/2025 2110)  Will have no self-injury during hospital stay:   Ensure constant observer at bedside with Q15M safety checks   Maintain a safe environment  Patient will remain free from self harm during stay at hospital. Patient has not had any self harm or injury attempts since being admitted as a patient at hospital. Patient will have continued 15 minute rounding for safety while a patient at hospital. Will continue to monitor.      Problem: Depression  Goal: Will be euthymic at discharge  Description: INTERVENTIONS:  1. Administer medication as ordered  2. Provide emotional support via 1:1 interaction with staff  3. Encourage involvement in milieu/groups/activities  4. Monitor for social isolation  7/2/2025 2138 by Papi Villegas, RN  Outcome: Progressing  Patient will be euthymic at discharge. Patient has been compliant with medication and groups since admission at hospital. Patient is more social with staff and peers. Patient has been educated on staying compliant with medication administration. Patient voiced understanding. Will continue to monitor.

## 2025-07-03 NOTE — PROGRESS NOTES
Pharmacy Med Education Group Note    Date: 7/3/25  Start Time: 1338  End Time: 1400    Number Participants in Group:  4/13    Goal:  Patient will demonstrate an understanding of the medication’s intended purpose and possible adverse effects  Topic: [place] for Pharmacy Med Ed Group    Discipline Responsible:     OT  AT  [facility].  RT     X Other       Participation Level:     None  Minimal      X Active Listener    X Interactive    Monopolizing         Participation Quality:    X Appropriate  Inappropriate     X       Attentive        Intrusive          Sharing        Resistant          Supportive        Lethargic       Affective:     X Congruent  Incongruent  Blunted  Flat    Constricted  Anxious  Elated  Angry    Labile  Depressed  Other         Cognitive:    X Alert  Oriented PPTP     Concentration   X G  F  P   Attention Span   X G  F  P   Short-Term Memory   X G  F  P   Long-Term Memory  G  F  P   ProblemSolving/  Decision Making  G  F  P   Ability to Process  Information   X G  F  P      Contributing Factors             Delusional             Hallucinating             Flight of Ideas             Other:       Modes of Intervention:    X Education   X Support  Exploration    Clarifying  Problem Solving  Confrontation    Socialization  Limit Setting  Reality Testing    Activity  Movement  Media    Other:            Response to Learning:    X Able to verbalize current knowledge/experience    Able to verbalize/acknowledge new learning    Able to retain information    Capable of insight    Able to change behavior    Progressing to goal    Other:        Comments:     Vipul Yoo, Rian, BCPS, BCPP  7/3/2025 2:04 PM  [phone]

## 2025-07-03 NOTE — GROUP NOTE
Group Therapy Note    Date: 7/3/2025    Group Start Time: 1000  Group End Time: 1035  Group Topic: Psychotherapy    [facility] Libia Collins MSW        Group Therapy Note    Attendees: 4/13       Patient's Goal:  Discuss graditude/positive thinking using graditude jars    Notes:     Status After Intervention:  Improved    Participation Level: Active Listener and Interactive    Participation Quality: Appropriate, Attentive, and Sharing      Speech:  normal      Thought Process/Content: Logical  Linear      Affective Functioning: Congruent      Mood: euthymic      Level of consciousness:  Alert and Oriented x4      Response to Learning: Able to verbalize current knowledge/experience and Able to verbalize/acknowledge new learning      Endings: None Reported    Modes of Intervention: Education and Activity      Discipline Responsible: /Counselor      Signature:  MASOUD Kim

## 2025-07-03 NOTE — PROGRESS NOTES
Daily Progress Note  7/3/2025    Patient Name: Meño Goodman    CHIEF COMPLAINT: Depression with suicidal ideation         SUBJECTIVE:    Patient was seen for follow-up assessment today.  She has been compliant with scheduled medications and behaviorally in control.  She has not required any emergency medications for agitation in the past 24 hours.  Nursing staff report patient has been out for much of the day today.  Mood has been pleasant and she is cooperative.  She has refused CPAP machine overnight.  Patient was approached in her room where she was found to be resting in bed but awake.  She seemed guarded and evasive and had minimal interest in engaging in sustained conversation.  She is reporting that suicidal ideation is fleeting and thoughts not as intrusive.  She is reporting that her mood is \"good\" but this is incongruent to presentation.  Patient presents as depressed with blunted affect.  She was encouraged to attend as many groups as she can as she may find them to be beneficial.  She is denying auditory and visual hallucinations and made no delusional or paranoid statements, however patient may be minimizing of perceptual disturbances.  She did seem somewhat preoccupied with her thoughts.  She reports appetite and sleep to be adequate.  Patient is denying any homicidal ideation.  Although modestly improving patient has yet to demonstrate sustained stability and is at increased risk of harm to self and warrants further hospitalization for safety and stabilization.    Appetite:  [x] Adequate/Unchanged  [] Increased  [] Decreased      Sleep:       [x] Adequate/Unchanged  [] Fair  [] Poor      Group Attendance on Unit:   [] Yes   [x] Selectively    [] No    Compliant with scheduled medications: [x] Yes  [] No    Received emergency medications in past 24 hrs: [] Yes   [x] No    Medication Side Effects: Denies         Mental Status Exam  Level of consciousness: Awake and alert  Appearance:  Appropriate  voice recognition software. It may contain minor errors which are inherent in voice recognition technology.**   I independently saw and evaluated the patient.  I reviewed the nurse practitioners documentation above.  Principle diagnosis we are treating for is Major depressive disorder, recurrent, severe with psychotic features (HCC). Any additional comments or changes to the nurse practitioners documentation are stated below otherwise agree with assessment.  Plan will be as follows:  Patient reports improvement in her mood and discharge focused.  Patient has been tolerating Invega 3 mg daily and with the duloxetine.  Patient still needs to be monitored for any change in mood.  And if she maintains stability plan to discharge over the weekend.    PLAN  Patient s symptoms   are improving  Attempt to develop insight  Psycho-education conducted.  Supportive Therapy conducted.  Probable discharge is 2-3 days  Follow-up daily while on inpatient unit      Electronically signed by Elan Quiroga MD on 7/3/25 at 7:02 PM EDT

## 2025-07-03 NOTE — GROUP NOTE
Group Therapy Note    Date: 7/3/2025    Group Start Time: 0900  Group End Time: 0930  Group Topic: Group Documentation    Gio Verduzco LPN        Group Therapy Note    Attendees: 4/12     Patient declined attending goals group despite staff encouragement.      Signature:  Gio Ramirez LPN

## 2025-07-03 NOTE — GROUP NOTE
Group Therapy Note    Date: 7/3/2025    Group Start Time: 1100  Group End Time: 1130  Group Topic: Healthy Living/Wellness    [facility]    Lupe Yanez, RN        Group Therapy Note    Attendees: 3/13       Patient's Goal:  coping skills    Status After Intervention:  Improved    Participation Level: Active Listener    Participation Quality: Appropriate      Speech:  normal      Thought Process/Content: Logical      Affective Functioning: Flat      Mood: euthymic      Level of consciousness:  Alert, Oriented x4, and Attentive      Response to Learning: Progressing to goal      Endings: None Reported    Modes of Intervention: Socialization      Discipline Responsible: Registered Nurse      Signature:  Lupe Yanez RN

## 2025-07-03 NOTE — PLAN OF CARE
Problem: Self Harm/Suicidality  Goal: Will have no self-injury during hospital stay  Description: INTERVENTIONS:  1.  Ensure constant observer at bedside with Q15M safety checks  2.  Maintain a safe environment  3.  Secure patient belongings  4.  Ensure family/visitors adhere to safety recommendations  5.  Ensure safety tray has been added to patient's diet order  6.  Every shift and PRN: Re-assess suicidal risk via Frequent Screener    7/3/2025 1119 by Gio Ramirez LPN  Outcome: Progressing     Problem: Pain  Goal: Verbalizes/displays adequate comfort level or baseline comfort level  7/3/2025 1119 by Gio Ramirez LPN  Outcome: Progressing   Patient remains safe on the unit free from falls and self harm.  Patient currently denies suicidal ideations at this time.  Patient endorses pain however advocates for self by asking for PRN Tylenol.

## 2025-07-04 VITALS
BODY MASS INDEX: 48.82 KG/M2 | HEART RATE: 96 BPM | WEIGHT: 293 LBS | HEIGHT: 65 IN | TEMPERATURE: 98.4 F | RESPIRATION RATE: 15 BRPM | DIASTOLIC BLOOD PRESSURE: 93 MMHG | OXYGEN SATURATION: 87 % | SYSTOLIC BLOOD PRESSURE: 128 MMHG

## 2025-07-04 LAB
GLUCOSE BLD-MCNC: 141 MG/DL (ref 65–105)
GLUCOSE BLD-MCNC: 234 MG/DL (ref 65–105)

## 2025-07-04 PROCEDURE — 82947 ASSAY GLUCOSE BLOOD QUANT: CPT

## 2025-07-04 PROCEDURE — 6370000000 HC RX 637 (ALT 250 FOR IP): Performed by: PSYCHIATRY & NEUROLOGY

## 2025-07-04 PROCEDURE — 99232 SBSQ HOSP IP/OBS MODERATE 35: CPT | Performed by: INTERNAL MEDICINE

## 2025-07-04 PROCEDURE — GZHZZZZ GROUP PSYCHOTHERAPY: ICD-10-PCS | Performed by: PSYCHIATRY & NEUROLOGY

## 2025-07-04 PROCEDURE — 99239 HOSP IP/OBS DSCHRG MGMT >30: CPT | Performed by: PSYCHIATRY & NEUROLOGY

## 2025-07-04 RX ORDER — GLIPIZIDE 5 MG/1
5 TABLET ORAL
Qty: 60 TABLET | Refills: 0 | Status: SHIPPED | OUTPATIENT
Start: 2025-07-04 | End: 2025-08-03

## 2025-07-04 RX ORDER — INSULIN LISPRO 100 [IU]/ML
0-8 INJECTION, SOLUTION INTRAVENOUS; SUBCUTANEOUS
Qty: 10 ML | Refills: 0 | Status: SHIPPED | OUTPATIENT
Start: 2025-07-04 | End: 2025-08-04

## 2025-07-04 RX ORDER — ATORVASTATIN CALCIUM 20 MG/1
20 TABLET, FILM COATED ORAL NIGHTLY
Qty: 30 TABLET | Refills: 3 | Status: SHIPPED | OUTPATIENT
Start: 2025-07-04

## 2025-07-04 RX ORDER — PALIPERIDONE 3 MG/1
3 TABLET, EXTENDED RELEASE ORAL DAILY
Qty: 30 TABLET | Refills: 3 | Status: SHIPPED | OUTPATIENT
Start: 2025-07-05

## 2025-07-04 RX ORDER — INSULIN GLARGINE 100 [IU]/ML
20 INJECTION, SOLUTION SUBCUTANEOUS NIGHTLY
Qty: 10 ML | Refills: 3 | Status: SHIPPED | OUTPATIENT
Start: 2025-07-04

## 2025-07-04 RX ORDER — BUDESONIDE AND FORMOTEROL FUMARATE DIHYDRATE 160; 4.5 UG/1; UG/1
2 AEROSOL RESPIRATORY (INHALATION) 2 TIMES DAILY
Qty: 10.2 G | Refills: 0 | Status: SHIPPED | OUTPATIENT
Start: 2025-07-04

## 2025-07-04 RX ORDER — DULOXETIN HYDROCHLORIDE 20 MG/1
20 CAPSULE, DELAYED RELEASE ORAL 2 TIMES DAILY
Qty: 30 CAPSULE | Refills: 3 | Status: SHIPPED | OUTPATIENT
Start: 2025-07-04

## 2025-07-04 RX ADMIN — GLIPIZIDE 5 MG: 5 TABLET ORAL at 12:37

## 2025-07-04 RX ADMIN — BUDESONIDE AND FORMOTEROL FUMARATE DIHYDRATE 2 PUFF: 160; 4.5 AEROSOL RESPIRATORY (INHALATION) at 12:37

## 2025-07-04 RX ADMIN — INSULIN LISPRO 2 UNITS: 100 INJECTION, SOLUTION INTRAVENOUS; SUBCUTANEOUS at 12:37

## 2025-07-04 RX ADMIN — DULOXETINE 20 MG: 20 CAPSULE, DELAYED RELEASE ORAL at 12:37

## 2025-07-04 RX ADMIN — METFORMIN HYDROCHLORIDE 1000 MG: 1000 TABLET ORAL at 12:37

## 2025-07-04 RX ADMIN — PALIPERIDONE 3 MG: 3 TABLET, EXTENDED RELEASE ORAL at 12:37

## 2025-07-04 NOTE — PROGRESS NOTES
[facility] Internal Medicine  Naren Collins MD; Kaleb Martínez MD,, Katie Huizar MD, Dr Joshua Guy MD   ; Matt Limon MD    [facility] Internal Medicine   IN-PATIENT SERVICE   [facility]     HISTORY AND PHYSICAL EXAMINATION            Date:   2025  Patient name:  Meño Goodman  Date of admission:  2025  3:45 PM  MRN:   [id number]  Account:  [id number]  YOB: 1990  PCP:    Jean Claude Garcia MD  Room:   02090209-01  Code Status:    Full Code      Chief Complaint:     Suicidal /Ac Psychosis    History Obtained From:     Patient/EMR/bedside RN     History of Present Illness:       Patient is a 35-year-old female multiple comorbidities including history of bipolar, anxiety depression, type 2 diabetes mellitus, morbid obesity BMI of 57, asthma is been admitted at [facility] for further management of depression suicidal ideation    Patient currently denies any chest pain shortness of breath      Past Medical History:     Past Medical History:   Diagnosis Date    Asthma     Back pain 2014    Bipolar 1 disorder (HCC)     Bipolar disorder (HCC) 2017    Depression     Diabetes mellitus (HCC)     Dizziness     Fatty liver disease, nonalcoholic     Fatty liver disease, nonalcoholic     GERD (gastroesophageal reflux disease)     Obesity         Past Surgical History:     Past Surgical History:   Procedure Laterality Date     SECTION      LEEP          Medications Prior to Admission:     Prior to Admission medications    Medication Sig Start Date End Date Taking? Authorizing Provider   budesonide-formoterol (SYMBICORT) 160-4.5 MCG/ACT AERO Inhale 2 puffs into the lungs 2 times daily 25  Yes Elan Quiroga MD   DULoxetine (CYMBALTA) 20 MG extended release capsule Take 1 capsule by mouth 2 times daily 25  Yes Elan Quiroga MD   glipiZIDE (GLUCOTROL) 5 MG tablet Take 1 tablet by mouth 2 times daily (before meals) 7/4/25 8/3/25 Yes Junaid

## 2025-07-04 NOTE — GROUP NOTE
Group Therapy Note    Date: 7/4/2025    Group Start Time: 1000  Group End Time: 1035  Group Topic: Psychotherapy    [facility] Libia Mejía MSW        Group Therapy Note    Attendees: 6/13       Patient's Goal:  Identify others (ficitonal characters, relatives, etc.) strengths and then identify personal strengths    Notes:     Status After Intervention:  Improved    Participation Level: Active Listener and Interactive    Participation Quality: Appropriate, Attentive, and Sharing      Speech:  normal      Thought Process/Content: Logical  Linear      Affective Functioning: Congruent      Mood: euthymic      Level of consciousness:  Alert and Oriented x4      Response to Learning: Able to verbalize current knowledge/experience and Able to verbalize/acknowledge new learning      Endings: None Reported    Modes of Intervention: Education, Support, and Activity      Discipline Responsible: /Counselor      Signature:  MASOUD Kim

## 2025-07-04 NOTE — PLAN OF CARE
Problem: Depression  Goal: Will be euthymic at discharge  Description: INTERVENTIONS:  1. Administer medication as ordered  2. Provide emotional support via 1:1 interaction with staff  3. Encourage involvement in milieu/groups/activities  4. Monitor for social isolation  Outcome: Progressing     Problem: Pain  Goal: Verbalizes/displays adequate comfort level or baseline comfort level  Outcome: Progressing   Patient does endorse depression at time of assessment. Patient encouraged to attend groups to develop coping skills.  Patient endorses  adequate level of comfort at time of assessment. Patient did however, ask for PRN Tylenol at bedtime for knee pain. It was effective. Will continue to monitor.

## 2025-07-04 NOTE — TRANSITION OF CARE
Behavioral Health Transition Record    Patient Name: Meño Goodman  YOB: 1990   Medical Record Number: [id number]  Date of Admission: 7/1/2025  3:45 PM   Date of Discharge: 7/4/25    Attending Provider: Elan Quiroga MD   Discharging Provider: Elan Quiroga MD  To contact this individual call [phone] and ask the  to page.  If unavailable, ask to be transferred to Behavioral Health Provider on call.  A Behavioral Health Provider will be available on call 24/7 and during holidays.    Primary Care Provider: Jean Claude Garcia MD    Allergies   Allergen Reactions    Morphine Anaphylaxis    Pcn [Penicillins] Hives and Shortness Of Breath     Tolerates ceftriaxone - given 8/1/22    Amoxicillin Hives     Tolerates ceftriaxone - given on 8/1/22    Asa [Aspirin]     Environmental/Seasonal        Reason for Admission: Patient: Meño Goodman  MRN: [id number]  Reason for Admission to Psychiatric Unit:  Threat to self requiring 24 hour professional observation  Command hallucinations directing harm to self or others; risk of the patient taking action  A mental disorder causing major disability in social, interpersonal, occupational, and/or educational functioning that is leading to dangerous or life-threatening functioning, and that can only be addressed in an acute inpatient setting   Failure of outpatient psychiatry treatment so that the beneficiary requires 24 hour professional observation and care  Concerns about patient's safety in the community  Past Mental Health Diagnosis: a history of  Major Depression and Prior suicide attempt  Triggering event or precipitating factor: Relationship Issues and Psych Treatment Noncompliance  Length of time/duration of triggering event: Weeks  Legal Status: Voluntary     CHIEF COMPLAINT: Depression with suicidal ideation     History obtained from: Patient, electronic medical record     Admission Diagnosis: Depression with suicidal ideation [F32.A,

## 2025-07-04 NOTE — DISCHARGE SUMMARY
Provider Discharge Summary     Patient ID:  Meño Goodman  [id number]  35 y.o.  1990    Admit date: 7/1/2025    Discharge date and time: 7/4/2025  2:50 PM     Admitting Physician: Elan Quiroga MD     Discharge Physician: Elan Quiroga MD    Admission Diagnoses: Depression with suicidal ideation [F32.A, R45.851]    Discharge Diagnoses:      Major depressive disorder, recurrent, severe with psychotic features ([facility])     Patient Active Problem List   Diagnosis Code    Obesity E66.9    Gastroesophageal reflux disease without esophagitis K21.9    Fatty liver disease, nonalcoholic K76.0    Type 2 diabetes mellitus without complication ([facility]) E11.9    Bipolar affective disorder, currently depressed, mild ([facility]) F31.31    Depression with suicidal ideation F32.A, R45.851    Moderate persistent asthma J45.40    Family history of pancreatic cancer Z80.0    Pneumonia due to infectious organism J18.9    Respiratory failure ([facility]) J96.90    Otalgia H92.09    Generalized anxiety disorder F41.1    Abdominal bloating R14.0    Pruritic disorder L29.9    Schizoaffective disorder, depressive type ([facility]) F25.1    Steatosis ([facility]) E88.89    Severe persistent asthma with acute exacerbation ([facility]) J45.51    Acute hypoxic respiratory failure ([facility]) J96.01    Chest pain R07.9    Asthma exacerbation in COPD ([facility]) J44.1    Major depressive disorder, recurrent, severe with psychotic features ([facility]) F33.3        Admission Condition: poor    Discharged Condition: stable    Indication for Admission: threat to self    History of Present Illnes (present tense wording is of findings from admission exam and are not necessarily indicative of current findings):   Meño Goodman is a 35 y.o. female who has a past medical history of type 2 diabetes, steatosis, asthma, chest pain. Patient presented to the ED  today on her own for suicidal ideations.    Patient reports having suicidal ideations for the past several days. Patient reports having a plan to  You will recieve a call with your mental health follow-up appointment information on 7/7        Signed:    Electronically signed by Elan Quiroga MD on 7/4/25 at 2:50 PM EDT    Time Spent on discharge is more than 35 minutes in the examination, evaluation, counseling and review of medications and discharge plan.       An electronic signature was used to authenticate this note.     **This report has been created using voice recognition software. It may contain minor errors which are inherent in voice recognition technology.**

## 2025-07-04 NOTE — DISCHARGE INSTR - COC
Continuity of Care Form    Patient Name: Meño Goodman   :  1990  MRN:  [id number]    Admit date:  2025  Discharge date:  ***    Code Status Order: Full Code   Advance Directives:     Admitting Physician:  Elan Quiroga MD  PCP: Jean Claude Garcia MD    Discharging Nurse: ***  Discharging Hospital Unit/Room#: 0209/0209-01  Discharging Unit Phone Number: ***    Emergency Contact:   Extended Emergency Contact Information  Primary Emergency Contact: TelloRuss wade  Address: [address] [place] [address]  Home Phone: [phone]  Mobile Phone: [phone]  Relation: Spouse    Past Surgical History:  Past Surgical History:   Procedure Laterality Date     SECTION      LEEP         Immunization History:   Immunization History   Administered Date(s) Administered    COVID-19, MODERNA BLUE border, Primary or Immunocompromised, (age 12y+), IM, 100 mcg/0.5mL 2021, 10/20/2021    Hep B, ENGERIX-B, (age 20y+), IM, 1mL 2022, 2022    Influenza 10/26/2012    Influenza Virus Vaccine 1999, 2005, 2014    Influenza, FLUARIX, FLULAVAL, FLUZONE (age 6 mo+) and AFLURIA, (age 3 y+), Quadv PF, 0.5mL 2022    Pneumococcal, PPSV23, PNEUMOVAX 23, (age 2y+), SC/IM, 0.5mL 2017    TDaP, ADACEL (age 10y-64y), BOOSTRIX (age 10y+), IM, 0.5mL 2014, 10/13/2023       Active Problems:  Patient Active Problem List   Diagnosis Code    Obesity E66.9    Gastroesophageal reflux disease without esophagitis K21.9    Fatty liver disease, nonalcoholic K76.0    Type 2 diabetes mellitus without complication (HCC) E11.9    Bipolar affective disorder, currently depressed, mild (HCC) F31.31    Depression with suicidal ideation F32.A, R45.851    Moderate persistent asthma J45.40    Family history of pancreatic cancer Z80.0    Pneumonia due to infectious organism J18.9    Respiratory failure (HCC) J96.90    Otalgia H92.09    Generalized anxiety disorder

## 2025-07-04 NOTE — DISCHARGE INSTR - PHARMACY
8 medications from [facility] [place] [facility] EMMA Younger vd - P [phone] - F [phone]

## 2025-07-04 NOTE — GROUP NOTE
Group Therapy Note    Date: 7/4/2025    Group Start Time: 0900  Group End Time: 0930  Group Topic: Group Documentation    [facility]    Joyce Burgess LPN        Group Therapy Note         Patient did not participate in Goals  group, despite staff encouragement and explanation of benefits.  Patient remain seclusive to self.  Q15 minute safety checks maintained for patient safety and will continue to encourage patient to attend unit programming.      Signature:  Joyce Burgess LPN

## 2025-07-04 NOTE — BH NOTE
Patient given quit line phone number [phone] at this time, refusing to call at this time, states \" I just don't want to quit now\"-  dangers of longterm tobacco use discussed.  staff will continue to reinforce importance of smoking cessation.

## 2025-07-04 NOTE — BH NOTE
[facility]  Discharge Note    Pt discharged with followings belongings:   Dental Appliances: None  Vision - Corrective Lenses: None  Hearing Aid: None  Jewelry: Body Piercing  Body Piercings Removed: No  Clothing: Footwear, Shirt, Shorts, Undergarments  Other Valuables: Purse, Credit/Debit Card, Personal Toiletries (makeup)   Valuables sent home withpatient or returned to patient. Patient educated on aftercare instructions: yes  Information faxed to [facility] by staff  at 3:14 PM .Patient verbalize understanding of AVS:  yes.    Status EXAM upon discharge:  Mental Status and Behavioral Exam  Normal: No  Level of Assistance: Independent/Self  Facial Expression: Brightened  Affect: Blunt  Level of Consciousness: Alert  Frequency of Checks: 4 times per hour, close  Mood:Normal: No  Mood: Anxious, Sad  Motor Activity:Normal: No  Motor Activity: Decreased, Unusual posture/gait  Eye Contact: Good  Observed Behavior: Friendly, Cooperative  Sexual Misconduct History: Current - no  Preception: [place] to person, [place] to time, [place] to place, [place] to situation  Attention:Normal: No  Attention: Distractible  Thought Processes: Circumstantial  Thought Content:Normal: No  Thought Content: Preoccupations  Depression Symptoms: No problems reported or observed.  Anxiety Symptoms: Generalized  Rosaura Symptoms: No problems reported or observed.  Hallucinations: Auditory (comment), Visual (comment)  Delusions: No  Memory:Normal: Yes  Memory: Poor recent, Poor remote  Insight and Judgment: No  Insight and Judgment: Poor judgment, Poor insight    Tobacco Screening:  Practical Counseling, on admission, melinda X, if applicable and completed (first 3 are required if patient doesn't refuse):            ( ) Recognizing danger situations (included triggers and roadblocks)                    ( ) Coping skills (new ways to manage stress,relaxation techniques, changing routine, distraction)

## 2025-07-04 NOTE — DISCHARGE INSTR - DIET
Good nutrition is important when healing from an illness, injury, or surgery.  Follow any nutrition recommendations given to you during your hospital stay.   If you were given an oral nutrition supplement while in the hospital, continue to take this supplement at home.  You can take it with meals, in-between meals, and/or before bedtime. These supplements can be purchased at most local grocery stores, pharmacies, and chain [employer]-stores.   If you have any questions about your diet or nutrition, call the hospital and ask for the dietitian.  As tolerated, consider sugar and carb content   abd pain

## 2025-07-04 NOTE — DISCHARGE INSTRUCTIONS
Information:  Medications:   Medication summary provided   I understand that I should take only the medications on my list.     -why and when I need to take each medicine.     -which side effects to watch for.     -that I should carry my medication information at all times in case of     Emergency situations.    I will take all of my medicines to follow up appointments.     -check with my physician or pharmacist before taking any new    Medication, over the counter product or drink alcohol.    -Ask about food, drug or dietary supplement interactions.    -discard old lists and update records with medication providers.    Notify Physician:  Notify physician if you notice:   Always call 911 if you feel your life is in danger  In case of an emergency call 911 immediately!  If 911 is not available call your local emergency medical system for help    Behavioral Health Follow Up:  Original Referral Source:[facility] ED  Discharge Diagnosis: Depression with suicidal ideation [F32.A, R45.851]  Recommendations for Level of Care: Take all medications as ordered and attend all follow up appointments.  Patient status at discharge: Stable, denies suicidal ideations  My hospital  was: [facility]  Aftercare plan faxed: yes   -faxed by: staff   -date: 7/4/25   -time: 3p  Prescriptions: [address] - P [phone] - F [phone]     Smoking: Quit Smoking.   Call the [employer]'s smoking quitline at 4-404-28N-QUIT  Know the signs of a heart attack   If you have any of the following symptoms call 911 immediately, do not wait more    Than five minutes.    1. Pressure, fullness and/ or squeezing in the center of the chest spreading to    The jaw, neck or shoulder.    2. Chest discomfort with light headedness, fainting, sweating, nausea or    Shortness of breath.   3. Upper abdominal pressure or discomfort.   4. Lower chest pain, back pain, unusual fatigue, shortness of breath,  [facility]  [address] [facility] [address]  [address] [facility] [address]  Phone: [phone]      Phone: [phone]    [facility]      [facility]  [address] [place] [address]    [address] [facility] [address]  Phone: [phone]      Phone: [phone]    [facility]     [facility] Department of Psychiatry  [address] [facility] [address]   [address] [place], [place] [address]  Phone: [phone]      Phone: [phone]    [facility]       [facility]  [address] [facility] [address]     [address] [facility] [address]  Phone: [phone]      Phone: [phone]    [facility]   [address] [facility] [address]    [address] [facility] [address]  Phone: [phone]      Phone: [phone] Ext: 204    [facility]  [address] [facility] [address] / [address] [facility] [address] / [address] [facility] [address]  Phone: [phone]    [facility]   Formerly named [facility]  [address] [facility] [address]  [address] [place] [address]  Phone: [phone]      Phone: [phone]    [facility] [place] [facility]  [address] [facility] [address]    [address] [facility] [address]  Phone: [phone]      Phone: [phone]

## 2025-07-07 NOTE — CARE COORDINATION
BHI Biopsychosocial Assessment    Current Level of Psychosocial Functioning     Independent xx  Dependent    Minimal Assist     Comments:    Psychosocial High Risk Factors (check all that apply)    Unable to obtain meds   Chronic illness/pain    Substance abuse   Lack of Family Support   Financial stress   Isolation   Inadequate Community Resources  Suicide attempt(s)  Not taking medications   Victim of crime   Developmental Delay  Unable to manage personal needs    Age 65 or older   Homeless  No transportation   Readmission within 30 days  Unemployment  Traumatic Event    Comments:   Psychiatric Advanced Directives: none identified     Family to Involve in Treatment: spouse is supportive     Sexual Orientation:  n/a    Patient Strengths:  stable housing, supportive spouse, receives disability income     Patient Barriers:  history of previous hospitalizations       Opiate Education Provided:  denies       CMHC/mental health history: linked with [facility]     Plan of Care   medication management, group/individual therapies, family meetings, psycho -education, treatment team meetings to assist with stabilization    Initial Discharge Plan:  return home       Clinical Summary:  Meño is a 35 year old single female who has been admitted to [facility] with report of increased depression and suicidal ideation. She states she shares her home with her  of 10 years. She states she is linked with [facility] and receives disability income. She denies AOD issues, denies legal concerns. Writer offered ongoing support, encouragement.         
Discharge Arrangements:  Return home, Continue with [facility]    Guardian notified: n/a    Discharge destination/address: Home, [address] [facility] [address]     Transported by:  Family or Cab    Follow up appointment is scheduled for  [facility], You will recieve a call with your mental health follow-up appointment information on 7/7     Patient was not accepting of referral; No noted ARACELI concerns  *ARACELI resources were offered to patient throughout admission and at time of discharge. This list of [facility] ARACELI providers was provided to patient:     [facility]  [address] [facility] [address]   [address] [facility] [address]  Phone: [phone]     Phone: [phone]    [facility]  [place]   [address] [address]   [address] [facility] [address]  Phone: [phone]     Phone: [phone]    [facility] [place]  [address] [facility] [address]    [address] [facility] [address]  Phone: [phone]     Phone: [phone]    [facility]     [facility]   [address] [facility] [address]  [place] [address]      Phone: [phone]  Phone: [phone]        [facility]  [address] [facility] [address] [facility] [facility] [address] [address]   [address] [place] [address]  Phone: [phone]     Phone:  [phone]    [employer]      [facility] [place]  [address] [facility] [address]   [address] [facility] [address]  Phone: [phone]     Phone: [phone]    [facility]  [address] [facility] [address]   [address] [facility] [address]  Phone: [phone]     Phone: [phone]    [facility]  [facility]  [address] [facility] [address] [address] [facility] [address]  Phone: [phone]     Phone: 
Social Work called on 7/7 and informed patient of follow-up appointment; Given walk-in information due to history of [id number]    Follow up:  Bill, [address] [place] [address], You can WALK-IN anytime MON-THUR 8AM-1PM to begin mental health services with Bill  
CYMBALTA  Take 1 capsule by mouth 2 times daily  Notes to patient: To help with mood     glipiZIDE 5 MG tablet  Commonly known as: GLUCOTROL  Take 1 tablet by mouth 2 times daily (before meals)  Notes to patient: To help with blood sugar control     insulin glargine 100 UNIT/ML injection vial  Commonly known as: LANTUS  Inject 20 Units into the skin nightly  Notes to patient: To help with blood sugar control     insulin lispro 100 UNIT/ML Soln injection vial  Commonly known as: HUMALOG,ADMELOG  Inject 0-8 Units into the skin 4 times daily (before meals and nightly)  Notes to patient: To help with blood sugar control    **Medium Dose Corrective Algorithm**  Glucose:    Dose:          No Insulin  180-249    2 Units  250-299    4 Units  300-349    6 Units  Over 349    8 Units and notify physician      * Lancets Misc  1 each by Does not apply route daily     * Lancets Misc  1 each by Does not apply route daily     metFORMIN 1000 MG tablet  Commonly known as: GLUCOPHAGE  Take 1 tablet by mouth 2 times daily (with meals)  Notes to patient: To help with blood sugar control     Trulicity 1.5 MG/0.5ML SC injection  Generic drug: dulaglutide  inject 0.5 milliliters subcutaneously every week  Notes to patient: To help with blood sugar control           * This list has 4 medication(s) that are the same as other medications prescribed for you. Read the directions carefully, and ask your doctor or other care provider to review them with you.                STOP taking these medications      acetaminophen 500 MG tablet  Commonly known as: TYLENOL     ARIPiprazole 10 MG tablet  Commonly known as: ABILIFY     aspirin-acetaminophen-caffeine 250-250-65 MG per tablet  Commonly known as: EXCEDRIN MIGRAINE     blood glucose test strips     escitalopram 20 MG tablet  Commonly known as: LEXAPRO     famotidine 20 MG tablet  Commonly known as: Pepcid     FT Lice [address] 0.33-4 % shampoo  Generic drug: pyrethrins-piperonyl

## 2025-07-10 NOTE — TELEPHONE ENCOUNTER
Meño Goodman is calling to request a refill on the following medication(s):    Medication Request:  Requested Prescriptions     Pending Prescriptions Disp Refills    albuterol (PROVENTIL) (5 MG/ML) 0.5% nebulizer solution 120 each 3     Sig: Take 1 mL by nebulization 4 times daily as needed for Wheezing    albuterol sulfate HFA (VENTOLIN HFA) 108 (90 Base) MCG/ACT inhaler 18 g 5     Sig: Inhale 2 puffs into the lungs 4 times daily as needed for Wheezing    Alcohol Swabs PADS 100 each 0     Si each by Does not apply route in the morning, at noon, and at bedtime    Calamine-Zinc Oxide (V-R CALAMINE) LOTN 1 each 0     Sig: Apply 1 each topically Daily    hydrOXYzine HCl (ATARAX) 50 MG tablet 60 tablet 3     Sig: Take 0.5 tablets by mouth every 8 hours as needed for Itching    fluticasone-salmeterol (ADVAIR HFA) 45-21 MCG/ACT inhaler 1 each 3     Sig: Inhale 2 puffs into the lungs 2 times daily       The original prescription was discontinued on 2025 by Elan Quiroga MD for the following reason: Stop Taking at Discharge. Renewing this prescription may not be appropriate.     Last Visit Date (If Applicable):  Visit date not found    Next Visit Date:    2025

## 2025-07-18 RX ORDER — ALBUTEROL SULFATE 0.83 MG/ML
SOLUTION RESPIRATORY (INHALATION)
Qty: 360 ML | Refills: 3 | OUTPATIENT
Start: 2025-07-18

## 2025-07-27 ENCOUNTER — APPOINTMENT (OUTPATIENT)
Dept: GENERAL RADIOLOGY | Age: 35
End: 2025-07-27
Payer: COMMERCIAL

## 2025-07-27 ENCOUNTER — HOSPITAL ENCOUNTER (EMERGENCY)
Age: 35
Discharge: HOME | End: 2025-07-27
Attending: EMERGENCY MEDICINE
Payer: COMMERCIAL

## 2025-07-27 VITALS
TEMPERATURE: 98.1 F | HEART RATE: 98 BPM | OXYGEN SATURATION: 95 % | DIASTOLIC BLOOD PRESSURE: 97 MMHG | RESPIRATION RATE: 16 BRPM | SYSTOLIC BLOOD PRESSURE: 134 MMHG

## 2025-07-27 DIAGNOSIS — G44.209 TENSION HEADACHE: Primary | ICD-10-CM

## 2025-07-27 LAB
CHP ED QC CHECK: NORMAL
GLUCOSE BLD-MCNC: 165 MG/DL (ref 65–105)
PREGNANCY TEST URINE, POC: NEGATIVE

## 2025-07-27 PROCEDURE — 96375 TX/PRO/DX INJ NEW DRUG ADDON: CPT | Performed by: EMERGENCY MEDICINE

## 2025-07-27 PROCEDURE — 6360000002 HC RX W HCPCS

## 2025-07-27 PROCEDURE — 2580000003 HC RX 258

## 2025-07-27 PROCEDURE — 99284 EMERGENCY DEPT VISIT MOD MDM: CPT | Performed by: EMERGENCY MEDICINE

## 2025-07-27 PROCEDURE — 73562 X-RAY EXAM OF KNEE 3: CPT

## 2025-07-27 PROCEDURE — 96374 THER/PROPH/DIAG INJ IV PUSH: CPT | Performed by: EMERGENCY MEDICINE

## 2025-07-27 PROCEDURE — 96361 HYDRATE IV INFUSION ADD-ON: CPT | Performed by: EMERGENCY MEDICINE

## 2025-07-27 PROCEDURE — 82947 ASSAY GLUCOSE BLOOD QUANT: CPT

## 2025-07-27 RX ORDER — KETOROLAC TROMETHAMINE 30 MG/ML
30 INJECTION, SOLUTION INTRAMUSCULAR; INTRAVENOUS ONCE
Status: COMPLETED | OUTPATIENT
Start: 2025-07-27 | End: 2025-07-27

## 2025-07-27 RX ORDER — DIPHENHYDRAMINE HYDROCHLORIDE 50 MG/ML
50 INJECTION, SOLUTION INTRAMUSCULAR; INTRAVENOUS ONCE
Status: COMPLETED | OUTPATIENT
Start: 2025-07-27 | End: 2025-07-27

## 2025-07-27 RX ORDER — 0.9 % SODIUM CHLORIDE 0.9 %
500 INTRAVENOUS SOLUTION INTRAVENOUS ONCE
Status: COMPLETED | OUTPATIENT
Start: 2025-07-27 | End: 2025-07-27

## 2025-07-27 RX ORDER — PROCHLORPERAZINE EDISYLATE 5 MG/ML
10 INJECTION INTRAMUSCULAR; INTRAVENOUS ONCE
Status: COMPLETED | OUTPATIENT
Start: 2025-07-27 | End: 2025-07-27

## 2025-07-27 RX ADMIN — KETOROLAC TROMETHAMINE 30 MG: 30 INJECTION, SOLUTION INTRAMUSCULAR at 13:13

## 2025-07-27 RX ADMIN — DIPHENHYDRAMINE HYDROCHLORIDE 50 MG: 50 INJECTION INTRAMUSCULAR; INTRAVENOUS at 13:13

## 2025-07-27 RX ADMIN — SODIUM CHLORIDE 500 ML: 0.9 INJECTION, SOLUTION INTRAVENOUS at 13:13

## 2025-07-27 RX ADMIN — PROCHLORPERAZINE EDISYLATE 10 MG: 5 INJECTION INTRAMUSCULAR; INTRAVENOUS at 13:13

## 2025-07-27 NOTE — DISCHARGE INSTRUCTIONS
You were seen in the ED for headache and right knee pain.    Please follow-up with your primary care physician in the outpatient setting within the next 3 days.  Information for setting outpatient appointment has been provided.    In the meantime recommend using Tylenol for headache and application of ice to the right knee.  Please return to the ED if you have any worsening of pain, nausea/vomiting, blurry vision, weakness or no improvement symptoms

## 2025-07-27 NOTE — ED NOTES
Pt presented to ED ambulatory from triage.  Pt presents with C/O of having nausea, headache.  Pt states this started about two days ago.  Pt has a hx of headaches  Pt is Alert and oriented. Pt is resting comfortably on stretcher with call light in reach.  No acute distress noted. Respirations are even and unlabored.  White board updated. Will continue to follow plan of care.

## 2025-07-27 NOTE — ED NOTES
ADAM attempted to help patient with transportation through her [employer], but she is over the limit on her allotted tides.  Patient received cab vouchers from ADAM on her 6/10/2025 and 6/20/2025 ER visits and was told then she is responsible for her own rides home.  Patient offered a bus pass today which she utilized.  CORBY Griffith

## 2025-07-27 NOTE — ED PROVIDER NOTES
[facility] EMERGENCY DEPARTMENT  Emergency Department Encounter  Emergency Medicine Resident     Pt Name:Meño Goodman  MRN: [id number]  Birthdate 1990  Date of evaluation: 25  PCP:  Jean Claude Garcia MD  Note Started: 6:15 PM EDT      CHIEF COMPLAINT       Chief Complaint   Patient presents with    Headache       HISTORY OF PRESENT ILLNESS  (Location/Symptom, Timing/Onset, Context/Setting, Quality, Duration, Modifying Factors, Severity.)      Meño Goodman is a 35 y.o. female who presents with headache in the posterior occipital region.  Patient states headache has been going on for the past 2 days.  Patient states that she does have a history of headaches and often takes Tylenol for these headaches.  Patient states that her symptoms also been associated with nausea.  Patient denies any fall or trauma to that area.  Patient is also complaining of right knee pain and states she fell in her tub several days ago however denies any loss of conscious or use of anticoagulation medication.    PAST MEDICAL / SURGICAL / SOCIAL / FAMILY HISTORY      has a past medical history of Asthma, Back pain, Bipolar 1 disorder (HCC), Bipolar disorder (HCC), Depression, Diabetes mellitus (HCC), Dizziness, Fatty liver disease, nonalcoholic, Fatty liver disease, nonalcoholic, GERD (gastroesophageal reflux disease), and Obesity.       has a past surgical history that includes  section and LEEP.      Social History     Socioeconomic History    Marital status:      Spouse name: Not on file    Number of children: Not on file    Years of education: Not on file    Highest education level: Not on file   Occupational History    Not on file   Tobacco Use    Smoking status: Former     Current packs/day: 0.00     Types: Cigarettes     Start date: 2000     Quit date: 2015     Years since quitting: 10.5    Smokeless tobacco: Never   Vaping Use    Vaping status: Never Used   Substance and Sexual Activity

## 2025-07-27 NOTE — ED PROVIDER NOTES
[facility]  Emergency Department  Faculty Attestation     I performed a history and physical examination of the patient and discussed management with the resident. I reviewed the resident’s note and agree with the documented findings and plan of care. Any areas of disagreement are noted on the chart. I was personally present for the key portions of any procedures. I have documented in the chart those procedures where I was not present during the key portions. I have reviewed the emergency nurses triage note. I agree with the chief complaint, past medical history, past surgical history, allergies, medications, social and family history as documented unless otherwise noted below.    For Physician Assistant/ Nurse Practitioner cases/documentation I have personally evaluated this patient and have completed at least one if not all key elements of the E/M (history, physical exam, and MDM). Additional findings are as noted.    Preliminary note started at 1:25 PM EDT    Primary Care Physician:  Jean Claude Garcia MD    Screenings:  [unfilled]    CHIEF COMPLAINT       Chief Complaint   Patient presents with    Headache       RECENT VITALS:   BP (!) 134/97   Pulse 98   Temp 98.1 °F (36.7 °C)   Resp 16   SpO2 95%     LABS:  Labs Reviewed   POC GLUCOSE FINGERSTICK - Abnormal; Notable for the following components:       Result Value    POC Glucose 165 (*)     All other components within normal limits   POCT URINE PREGNANCY - Normal       Radiology  XR KNEE RIGHT (3 VIEWS)    (Results Pending)       Attending Physician Additional  Notes    Patient has migraine type headache with nausea and photophobia onset today.  She has fatty liver and unable to take most pain medications.  No vomiting stiff neck stroke symptoms trauma or seizure activity.  She also fell in the tub several days ago and has right lateral knee pain.  No laxity bruising bleeding or deformity.  She is diabetic and was

## 2025-08-15 DIAGNOSIS — E11.9 TYPE 2 DIABETES MELLITUS WITHOUT COMPLICATION, WITHOUT LONG-TERM CURRENT USE OF INSULIN (HCC): ICD-10-CM

## 2025-08-15 RX ORDER — BLOOD-GLUCOSE METER
EACH MISCELLANEOUS
Qty: 100 EACH | Refills: 5 | Status: SHIPPED | OUTPATIENT
Start: 2025-08-15